# Patient Record
Sex: FEMALE | Race: WHITE | NOT HISPANIC OR LATINO | Employment: OTHER | URBAN - METROPOLITAN AREA
[De-identification: names, ages, dates, MRNs, and addresses within clinical notes are randomized per-mention and may not be internally consistent; named-entity substitution may affect disease eponyms.]

---

## 2017-01-11 DIAGNOSIS — E11.8 TYPE 2 DIABETES MELLITUS WITH COMPLICATION (HCC): ICD-10-CM

## 2017-01-11 RX ORDER — HUMAN INSULIN 100 [IU]/ML
INJECTION, SOLUTION SUBCUTANEOUS
Qty: 3 ML | Refills: 2 | Status: SHIPPED | OUTPATIENT
Start: 2017-01-11 | End: 2017-03-31 | Stop reason: SDUPTHER

## 2017-02-28 RX ORDER — ACETAMINOPHEN AND CODEINE PHOSPHATE 300; 30 MG/1; MG/1
TABLET ORAL
Qty: 30 TABLET | Refills: 2 | OUTPATIENT
Start: 2017-02-28 | End: 2017-06-07 | Stop reason: SDUPTHER

## 2017-03-29 ENCOUNTER — TELEPHONE (OUTPATIENT)
Dept: ENDOCRINOLOGY | Age: 72
End: 2017-03-29

## 2017-03-29 DIAGNOSIS — Z79.4 TYPE 2 DIABETES MELLITUS WITH COMPLICATION, WITH LONG-TERM CURRENT USE OF INSULIN (HCC): ICD-10-CM

## 2017-03-29 DIAGNOSIS — E11.8 TYPE 2 DIABETES MELLITUS WITH COMPLICATION, WITH LONG-TERM CURRENT USE OF INSULIN (HCC): ICD-10-CM

## 2017-03-29 NOTE — TELEPHONE ENCOUNTER
----- Message from Lupis Bernard sent at 3/29/2017 11:30 AM EDT -----  Patient told pharmacy  that her novolin Rand N have been updated and [facility] is calling to find out what those changes are.   [phone] (Phone)  [phone] (Fax)      SCRIPT SENT

## 2017-03-31 DIAGNOSIS — E11.8 TYPE 2 DIABETES MELLITUS WITH COMPLICATION, WITH LONG-TERM CURRENT USE OF INSULIN (HCC): ICD-10-CM

## 2017-03-31 DIAGNOSIS — Z79.4 TYPE 2 DIABETES MELLITUS WITH COMPLICATION, WITH LONG-TERM CURRENT USE OF INSULIN (HCC): ICD-10-CM

## 2017-04-11 ENCOUNTER — RESULTS ENCOUNTER (OUTPATIENT)
Dept: INTERNAL MEDICINE | Facility: CLINIC | Age: 72
End: 2017-04-11

## 2017-04-11 DIAGNOSIS — N18.30 CHRONIC KIDNEY DISEASE, STAGE III (MODERATE) (HCC): ICD-10-CM

## 2017-04-11 DIAGNOSIS — I10 ESSENTIAL HYPERTENSION: ICD-10-CM

## 2017-04-11 DIAGNOSIS — IMO0002 UNCONTROLLED TYPE 2 DIABETES MELLITUS WITH DIABETIC POLYNEUROPATHY, WITH LONG-TERM CURRENT USE OF INSULIN: ICD-10-CM

## 2017-04-11 DIAGNOSIS — E78.2 MIXED HYPERLIPIDEMIA: ICD-10-CM

## 2017-04-17 RX ORDER — ISOSORBIDE MONONITRATE 60 MG/1
TABLET, EXTENDED RELEASE ORAL
Qty: 30 TABLET | Refills: 3 | Status: SHIPPED | OUTPATIENT
Start: 2017-04-17 | End: 2017-08-26 | Stop reason: SDUPTHER

## 2017-04-18 LAB
ALBUMIN SERPL-MCNC: 4.4 G/DL (ref 3.5–4.8)
ALBUMIN/CREAT UR: 148.2 MG/G CREAT (ref 0–30)
ALBUMIN/GLOB SERPL: 1.8 {RATIO} (ref 1.2–2.2)
ALP SERPL-CCNC: 106 IU/L (ref 39–117)
ALT SERPL-CCNC: 18 IU/L (ref 0–32)
AST SERPL-CCNC: 23 IU/L (ref 0–40)
BASOPHILS # BLD AUTO: 0 X10E3/UL (ref 0–0.2)
BASOPHILS NFR BLD AUTO: 0 %
BILIRUB SERPL-MCNC: 0.5 MG/DL (ref 0–1.2)
BUN SERPL-MCNC: 23 MG/DL (ref 8–27)
BUN/CREAT SERPL: 25 (ref 12–28)
CALCIUM SERPL-MCNC: 9.6 MG/DL (ref 8.7–10.3)
CHLORIDE SERPL-SCNC: 103 MMOL/L (ref 96–106)
CHOLEST SERPL-MCNC: 153 MG/DL (ref 100–199)
CO2 SERPL-SCNC: 25 MMOL/L (ref 18–29)
CREAT SERPL-MCNC: 0.91 MG/DL (ref 0.57–1)
CREAT UR-MCNC: 116.7 MG/DL
EOSINOPHIL # BLD AUTO: 0.1 X10E3/UL (ref 0–0.4)
EOSINOPHIL NFR BLD AUTO: 2 %
ERYTHROCYTE [DISTWIDTH] IN BLOOD BY AUTOMATED COUNT: 14.8 % (ref 12.3–15.4)
GLOBULIN SER CALC-MCNC: 2.5 G/DL (ref 1.5–4.5)
GLUCOSE SERPL-MCNC: 151 MG/DL (ref 65–99)
HCT VFR BLD AUTO: 37 % (ref 34–46.6)
HDLC SERPL-MCNC: 36 MG/DL
HGB BLD-MCNC: 12 G/DL (ref 11.1–15.9)
IMM GRANULOCYTES # BLD: 0 X10E3/UL (ref 0–0.1)
IMM GRANULOCYTES NFR BLD: 0 %
LDLC SERPL CALC-MCNC: 75 MG/DL (ref 0–99)
LYMPHOCYTES # BLD AUTO: 1.7 X10E3/UL (ref 0.7–3.1)
LYMPHOCYTES NFR BLD AUTO: 27 %
Lab: NORMAL
Lab: NORMAL
MCH RBC QN AUTO: 27.5 PG (ref 26.6–33)
MCHC RBC AUTO-ENTMCNC: 32.4 G/DL (ref 31.5–35.7)
MCV RBC AUTO: 85 FL (ref 79–97)
MICROALBUMIN UR-MCNC: 173 UG/ML
MONOCYTES # BLD AUTO: 0.5 X10E3/UL (ref 0.1–0.9)
MONOCYTES NFR BLD AUTO: 9 %
NEUTROPHILS # BLD AUTO: 3.9 X10E3/UL (ref 1.4–7)
NEUTROPHILS NFR BLD AUTO: 62 %
PLATELET # BLD AUTO: 217 X10E3/UL (ref 150–379)
POTASSIUM SERPL-SCNC: 4.3 MMOL/L (ref 3.5–5.2)
PROT SERPL-MCNC: 6.9 G/DL (ref 6–8.5)
RBC # BLD AUTO: 4.37 X10E6/UL (ref 3.77–5.28)
SODIUM SERPL-SCNC: 142 MMOL/L (ref 134–144)
TRIGL SERPL-MCNC: 209 MG/DL (ref 0–149)
VLDLC SERPL CALC-MCNC: 42 MG/DL (ref 5–40)
WBC # BLD AUTO: 6.3 X10E3/UL (ref 3.4–10.8)

## 2017-04-19 LAB
HBA1C MFR BLD: 8.3 % (ref 4.8–5.6)
WRITTEN AUTHORIZATION: NORMAL

## 2017-04-24 ENCOUNTER — OFFICE VISIT (OUTPATIENT)
Dept: INTERNAL MEDICINE | Facility: CLINIC | Age: 72
End: 2017-04-24

## 2017-04-24 VITALS
HEIGHT: 62 IN | SYSTOLIC BLOOD PRESSURE: 130 MMHG | RESPIRATION RATE: 18 BRPM | WEIGHT: 210 LBS | DIASTOLIC BLOOD PRESSURE: 70 MMHG | OXYGEN SATURATION: 99 % | HEART RATE: 91 BPM | BODY MASS INDEX: 38.64 KG/M2

## 2017-04-24 DIAGNOSIS — J06.9 ACUTE URI: ICD-10-CM

## 2017-04-24 DIAGNOSIS — N18.30 CHRONIC KIDNEY DISEASE, STAGE III (MODERATE) (HCC): ICD-10-CM

## 2017-04-24 DIAGNOSIS — E78.2 MIXED HYPERLIPIDEMIA: ICD-10-CM

## 2017-04-24 DIAGNOSIS — IMO0002 UNCONTROLLED TYPE II DIABETES MELLITUS WITH NEPHROPATHY: ICD-10-CM

## 2017-04-24 DIAGNOSIS — I10 ESSENTIAL HYPERTENSION: ICD-10-CM

## 2017-04-24 DIAGNOSIS — D17.71 ANGIOMYOLIPOMA OF KIDNEY: ICD-10-CM

## 2017-04-24 DIAGNOSIS — Z00.00 MEDICARE ANNUAL WELLNESS VISIT, SUBSEQUENT: Primary | ICD-10-CM

## 2017-04-24 PROCEDURE — 99214 OFFICE O/P EST MOD 30 MIN: CPT | Performed by: INTERNAL MEDICINE

## 2017-04-24 PROCEDURE — G0439 PPPS, SUBSEQ VISIT: HCPCS | Performed by: INTERNAL MEDICINE

## 2017-04-24 NOTE — PROGRESS NOTES
Medicare Annual Wellness Visit    Chief Complaint:  Annual Exam (SUB AWV, URI started 2 weeks ago, is not coughing anything up, but can feel a lot of chest congestion (2nd time within a month she has been sick)); Diabetes; Chronic Kidney Disease; Hyperlipidemia; and Hypertension      History of Present Illness    Denisse Chavez is a 71 y.o. female who presents for an Annual Wellness Visit. In addition, we addressed the following health issues:    Mammo:6/15/16  Pap:n/a  C-scope:6/24/15  Dental Exam: she hasn't been to a dentist in years.  She has both upper and lower dentures.She only wears the uppers.    Eye Exam: Dr. Larkin last month  Exercise: not much.  She takes the dogs out 6-7 times a day.  Advanced Care Planning:  has an advance directive - a copy has been provided and is in file   Immunization History   Administered Date(s) Administered   • Influenza Split High Dose Preservative Free IM 10/13/2016   • Pneumococcal Conjugate 13-Valent 03/18/2015   • Pneumococcal Polysaccharide 04/13/2016   • Tdap 10/13/2016     Denisse is here for follow up on her DM, CKD, HLD, HTN.  She started two days ago with chest congestion.  She feels chilled.  She's not more short of breath than normal.  She's not couging anything up yet.  She does have sinus congestion.  She says this all just feels like a virus.   She is seeing Dr. Nunez for her DM.  She saw Dr. Eagle in November for the renal masses.  She did a sleep study and is now using CPAP.  Dr. Tang referred her for sleep study.  No new conerns.      Review of Systems   Constitutional: Positive for chills. Negative for diaphoresis, fatigue and fever.   HENT: Positive for congestion, sinus pressure and voice change (acutely). Negative for hearing loss, sore throat, tinnitus and trouble swallowing.    Eyes: Negative for visual disturbance.   Respiratory: Positive for cough and chest tightness (congestion). Negative for shortness of breath.    Cardiovascular:  Positive for leg swelling (chronic). Negative for chest pain and palpitations.   Gastrointestinal: Negative for abdominal distention, abdominal pain, anal bleeding, blood in stool, constipation, diarrhea, nausea and vomiting.   Endocrine: Negative for polydipsia and polyuria.   Genitourinary: Negative for dysuria and hematuria.   Musculoskeletal: Positive for arthralgias (hands), back pain and myalgias (from her back.  radiates down the back of her legs).   Skin: Negative for rash and wound.   Neurological: Negative for dizziness, syncope, light-headedness and headaches.   Hematological: Negative for adenopathy. Does not bruise/bleed easily.   Psychiatric/Behavioral: Negative for confusion and dysphoric mood. The patient is not nervous/anxious.        Past Medical History:   Diagnosis Date   • Angiomyolipoma of kidney 3/30/2016   • CAD (coronary artery disease)     MI in 1996, treated medically   • Hyperlipidemia    • Hypertension    • Low back pain    • Mass of ovary 3/30/2016   • Morbid obesity    • Myocardial infarction     x 2 in 1996   • Type 2 diabetes mellitus    • Uterine leiomyoma 3/30/2016       Past Surgical History:   Procedure Laterality Date   • CATARACT EXTRACTION      X2    • HERNIA REPAIR     • HYSTERECTOMY     • TONSILLECTOMY         Social History     Social History   • Marital status: Single     Spouse name: N/A   • Number of children: 3   • Years of education: N/A     Occupational History   • retired from [employer]      Social History Main Topics   • Smoking status: Former Smoker     Packs/day: 0.25     Years: 2.00     Types: Cigarettes     Quit date: 1965   • Smokeless tobacco: Never Used      Comment: LIGHT USAGE   • Alcohol use No   • Drug use: No   • Sexual activity: Defer     Other Topics Concern   • Not on file     Social History Narrative       Family History   Problem Relation Age of Onset   • Diabetes Mother    • Heart attack Mother    • Hypertension Mother    • Hypothyroidism Mother    •  Diabetes type II Son        Allergies   Allergen Reactions   • Ace Inhibitors Other (See Comments)     Hyperkalemia/ROB   • Angiotensin Receptor Blockers Other (See Comments)     Pt unable to remember   • Sulfa Antibiotics Itching     rash       Current Outpatient Prescriptions on File Prior to Visit   Medication Sig Dispense Refill   • acetaminophen-codeine (TYLENOL #3) 300-30 MG per tablet TAKE ONE TABLET BY MOUTH ONCE DAILY AS NEEDED FOR PAIN 30 tablet 2   • amLODIPine (NORVASC) 5 MG tablet Take 1 tablet by mouth daily. 30 tablet 11   • aspirin 81 MG tablet Take  by mouth daily.     • docusate sodium (COLACE) 100 MG capsule Take 100 mg by mouth 2 (two) times a day.     • furosemide (LASIX) 40 MG tablet TAKE ONE TABLET BY MOUTH ONCE DAILY 90 tablet 3   • insulin NPH (NOVOLIN N RELION) 100 UNIT/ML injection Inject 108 Units under the skin Daily. 58 UNITS IN THE AM AND 50 UNITS IN THE PM 4 each 3   • insulin regular (NOVOLIN R RELION) 100 UNIT/ML injection TO TAKE 50 UNITS IN THE AM AND 48 UNITS IN THE PM 3 each 2   • isosorbide mononitrate (IMDUR) 60 MG 24 hr tablet TAKE ONE TABLET BY MOUTH ONCE DAILY 30 tablet 3   • Misc. Devices (ROLLATOR) misc 1 Units as needed (for walking). 1 each 0   • simvastatin (ZOCOR) 40 MG tablet TAKE ONE-HALF TABLET BY MOUTH ONCE DAILY 45 tablet 3   • [DISCONTINUED] Multiple Vitamin (MULTIVITAMINS PO) Take 1 tablet by mouth daily.       No current facility-administered medications on file prior to visit.        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   • Type II diabetes mellitus with neurological manifestations, uncontrolled   • Uncontrolled type 2 diabetes mellitus with background retinopathy   • Hyperinsulinism   • Abnormal weight gain   • Hyperlipidemia   • Essential hypertension   • Edema of lower extremity   • Angiomyolipoma of  "kidney   • Coronary artery disease without angina pectoris   • Memory changes   • Left hip pain   • Left-sided low back pain without sciatica   • Lumbar spinal stenosis   • Encounter for long-term (current) use of insulin   • Obstructive sleep apnea on autoCPAP   • Chronic kidney disease, stage III (moderate)       Health Risk Assessment/Depression Screen/Functional and Cognitive Screening:   The patient has completed a Health Risk Assessment & Depression screen. These have been reviewed with them and have been scanned as a separate documents.    Age-appropriate Screening Schedule:  Refer to the list below for future screening recommendations based on patient's age. Orders for these recommended tests are listed in the plan section. The patient has been provided with a written plan.     I have reviewed their problem list, past medical history, family history, social history, and surgical history.     Vitals:    04/24/17 1255   BP: 130/70   Pulse: 91   Resp: 18   SpO2: 99%   Weight: 210 lb (95.3 kg)   Height: 62\" (157.5 cm)   PainSc: 6  Comment: Pt gets epidural's done regurlary through the order of Dr. Garrett.   PainLoc: Back       Body mass index is 38.41 kg/(m^2).    Physical Exam   Constitutional: She is oriented to person, place, and time. She appears well-developed and well-nourished. No distress.   HENT:   Head: Normocephalic and atraumatic.   Nose: Nose normal.   Mouth/Throat: Oropharynx is clear and moist.   Eyes: Conjunctivae and EOM are normal. Pupils are equal, round, and reactive to light. No scleral icterus.   Neck: Normal range of motion. Neck supple. No thyromegaly present.   Cardiovascular: Normal rate, regular rhythm and normal heart sounds.  Exam reveals no gallop and no friction rub.    No murmur heard.  Pulses:       Carotid pulses are 2+ on the right side, and 2+ on the left side.       Femoral pulses are 2+ on the right side, and 2+ on the left side.       Dorsalis pedis pulses are 2+ on the " right side, and 2+ on the left side.        Posterior tibial pulses are 2+ on the right side, and 2+ on the left side.   Pulmonary/Chest: Effort normal and breath sounds normal. No respiratory distress. She has no wheezes. She has no rales. Right breast exhibits no mass and no nipple discharge. Left breast exhibits no mass and no nipple discharge.   Abdominal: Soft. Bowel sounds are normal. She exhibits no distension and no mass. There is no tenderness.   Musculoskeletal: Normal range of motion. She exhibits edema (compression hose in place yolande).   Lymphadenopathy:     She has no cervical adenopathy.     She has no axillary adenopathy.        Right: No inguinal and no supraclavicular adenopathy present.        Left: No inguinal and no supraclavicular adenopathy present.   Neurological: She is alert and oriented to person, place, and time. She has normal reflexes. No cranial nerve deficit.   Skin: Skin is warm and dry.   Psychiatric: She has a normal mood and affect. Her speech is normal and behavior is normal. Judgment and thought content normal. Cognition and memory are normal.   Vitals reviewed.      Results for orders placed or performed in visit on 04/11/17   Comprehensive Metabolic Panel   Result Value Ref Range    Glucose 151 (H) 65 - 99 mg/dL    BUN 23 8 - 27 mg/dL    Creatinine 0.91 0.57 - 1.00 mg/dL    eGFR Non African Am 64 >59 mL/min/1.73    eGFR African Am 73 >59 mL/min/1.73    BUN/Creatinine Ratio 25 12 - 28    Sodium 142 134 - 144 mmol/L    Potassium 4.3 3.5 - 5.2 mmol/L    Chloride 103 96 - 106 mmol/L    Total CO2 25 18 - 29 mmol/L    Calcium 9.6 8.7 - 10.3 mg/dL    Total Protein 6.9 6.0 - 8.5 g/dL    Albumin 4.4 3.5 - 4.8 g/dL    Globulin 2.5 1.5 - 4.5 g/dL    A/G Ratio 1.8 1.2 - 2.2    Total Bilirubin 0.5 0.0 - 1.2 mg/dL    Alkaline Phosphatase 106 39 - 117 IU/L    AST (SGOT) 23 0 - 40 IU/L    ALT (SGPT) 18 0 - 32 IU/L   Lipid Panel   Result Value Ref Range    Total Cholesterol 153 100 - 199 mg/dL     Triglycerides 209 (H) 0 - 149 mg/dL    HDL Cholesterol 36 (L) >39 mg/dL    VLDL Cholesterol 42 (H) 5 - 40 mg/dL    LDL Cholesterol  75 0 - 99 mg/dL   Microalbumin / Creatinine Urine Ratio   Result Value Ref Range    Creatinine, Urine 116.7 Not Estab. mg/dL    Microalbumin, Urine 173.0 Not Estab. ug/mL    Microalbumin/Creatinine Ratio Urine 148.2 (H) 0.0 - 30.0 mg/g creat   Verbal Order   Result Value Ref Range    See below: Comment:     Additional Test(s) Requested Comment:    Hemoglobin A1c   Result Value Ref Range    Hemoglobin A1C 8.3 (H) 4.8 - 5.6 %   Written Authorization   Result Value Ref Range    Written Authorization Comment    CBC & Differential   Result Value Ref Range    WBC 6.3 3.4 - 10.8 x10E3/uL    RBC 4.37 3.77 - 5.28 x10E6/uL    Hemoglobin 12.0 11.1 - 15.9 g/dL    Hematocrit 37.0 34.0 - 46.6 %    MCV 85 79 - 97 fL    MCH 27.5 26.6 - 33.0 pg    MCHC 32.4 31.5 - 35.7 g/dL    RDW 14.8 12.3 - 15.4 %    Platelets 217 150 - 379 x10E3/uL    Neutrophil Rel % 62 %    Lymphocyte Rel % 27 %    Monocyte Rel % 9 %    Eosinophil Rel % 2 %    Basophil Rel % 0 %    Neutrophils Absolute 3.9 1.4 - 7.0 x10E3/uL    Lymphocytes Absolute 1.7 0.7 - 3.1 x10E3/uL    Monocytes Absolute 0.5 0.1 - 0.9 x10E3/uL    Eosinophils Absolute 0.1 0.0 - 0.4 x10E3/uL    Basophils Absolute 0.0 0.0 - 0.2 x10E3/uL    Immature Granulocyte Rel % 0 %    Immature Grans Absolute 0.0 0.0 - 0.1 x10E3/uL       Assessment & Plan:    Diagnoses and all orders for this visit:    Medicare annual wellness visit, subsequent    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  She is up to date on health maintenance except for zostavax.  She will look into getting this.  Her bp was ok on exam today.  Her A1c is not controlled but this is managed by endo.  Her lipids are adequately controlled.  Her TG have improved some as her A1c as her bs has improved.  She has yearly follow ups with Dr. Eagle for her angiomyolipomas.  Her labs all look very good.  She also follows with Dr. Montenegro for her CKD.  Her Creatinine looks great right now.  She does have microalbuminuria and is unable to take ACE or ARB.  Her URI appears to be all viral.  Nothing on exam was concerning.        Follow Up:  Return in about 6 months (around 10/24/2017) for Next scheduled follow up, with nonfasting labs prior.     An After Visit Summary and PPPS with all of these plans were given to the patient.

## 2017-04-24 NOTE — PATIENT INSTRUCTIONS
Medicare Wellness  Personal Prevention Plan of Service     Date of Office Visit:  2017  Encounter Provider:  Agueda Rodriguez MD  Place of Service:  [facility] INTERNAL MEDICINE  Patient Name: Denisse Chavez  :  1945    As part of the Medicare Wellness portion of your visit today, we are providing you with this personalized preventive plan of services (PPPS). This plan is based upon recommendations of the [place] Preventive Services Task Force (USPSTF) and the Advisory Committee on Immunization Practices (ACIP).    This lists the preventive care services that should be considered, and provides dates of when you are due. Items listed as completed are up-to-date and do not require any further intervention.    Health Maintenance   Topic Date Due   • ZOSTER VACCINE  2016   • DIABETIC FOOT EXAM  10/13/2017   • HEMOGLOBIN A1C  10/17/2017   • DIABETIC EYE EXAM  2018   • LIPID PANEL  2018   • URINE MICROALBUMIN  2018   • MEDICARE ANNUAL WELLNESS  2018   • MAMMOGRAM  06/15/2018   • COLONOSCOPY  2018   • TDAP/TD VACCINES (2 - Td) 10/13/2026   • HEPATITIS C SCREENING  Completed   • INFLUENZA VACCINE  Completed   • PNEUMOCOCCAL VACCINES (65+ LOW/MEDIUM RISK)  Completed       No orders of the defined types were placed in this encounter.      Return in about 6 months (around 10/24/2017) for Next scheduled follow up, with nonfasting labs prior.

## 2017-04-26 ENCOUNTER — HOSPITAL ENCOUNTER (OUTPATIENT)
Dept: SLEEP MEDICINE | Facility: HOSPITAL | Age: 72
Discharge: HOME | End: 2017-04-26

## 2017-04-26 PROCEDURE — 99213 OFFICE O/P EST LOW 20 MIN: CPT | Performed by: INTERNAL MEDICINE

## 2017-04-29 NOTE — PROGRESS NOTES
Follow Up [facility] Note April 26, 2017      Patient Care Team:  Agueda Rodriguez MD as PCP - General (Internal Medicine)  Wally TYLER MD as PCP - Claims Attributed  Wally TYLER MD as Consulting Physician (Endocrinology)  Warner Tang MD as Consulting Physician (Cardiology)  Sergio Eagle MD as Consulting Physician (Urology)  Alfredito Mueller MD as Consulting Physician (Sleep Medicine)    Chief Complaint:  SAWYER     Interval History:   The patient was last seen by me in December 2016.  Her auto CPAP was adjusted.  She states she is improved.  She goes to bed at 1:10 AM and awakens at 8:30 AM.  She awakens once for the bathroom.  Her [place] Sleepiness Scale is normal at 0.    Review of Systems:  Recorded on the Sleep Questionnaire.  Unremarkable except for nasal congestion and occasional cough.    Social History:  She quit smoking in 1970.  No alcohol.  6 cups of tea a day.    Allergies:  Reviewed.     Medication Review:  Reviewed.      Vital Signs:  Height 61 inches and weight 210 and she is obese with a body mass index of 39-40.    Physical Exam:    Constitutional:  Well developed white female and appears in no apparent distress.  Awake & oriented times 3.  Normal mood with normal recent and remote memory and normal judgement.  Eyes:  Conjunctivae normal.  Oropharynx:  moist mucous membranes without exudate and a large tongue and normal uvula and patent posterior pharyngeal opening and class II-III MP airway.      Results Review:  DME is [facility] and she uses a fullface mask.  Downloads between October 28, 2016 and April 25, 2017 compliances 89% and average usage is 6 hours and 40 minutes and average AHI is mildly abnormal at 6.4 without significant leak and average auto CPAP pressure is 13.1 and her auto CPAP is 9-15.       Impression:   Obstructive sleep apnea nearly adequately treated with auto titrating CPAP with good compliance and usage and no  complaints of hypersomnolence.      Plan:  Good sleep hygiene measures should be maintained.  Weight loss would be beneficial in this patient who is obese by BMI.    The patient will continue her auto CPAP.  Pressures will be changed to auto CPAP between 9 and 17.    The patient will follow up one year.      Alfredito Mueller MD  04/29/17  10:59 AM

## 2017-05-05 ENCOUNTER — OFFICE VISIT (OUTPATIENT)
Dept: ENDOCRINOLOGY | Age: 72
End: 2017-05-05

## 2017-05-05 VITALS
DIASTOLIC BLOOD PRESSURE: 72 MMHG | HEART RATE: 102 BPM | HEIGHT: 62 IN | BODY MASS INDEX: 38.46 KG/M2 | OXYGEN SATURATION: 98 % | WEIGHT: 209 LBS | SYSTOLIC BLOOD PRESSURE: 118 MMHG

## 2017-05-05 DIAGNOSIS — R63.5 ABNORMAL WEIGHT GAIN: ICD-10-CM

## 2017-05-05 DIAGNOSIS — IMO0002 UNCONTROLLED TYPE 2 DIABETES MELLITUS WITH COMPLICATION, WITH LONG-TERM CURRENT USE OF INSULIN: Primary | ICD-10-CM

## 2017-05-05 DIAGNOSIS — Z79.4 ENCOUNTER FOR LONG-TERM (CURRENT) USE OF INSULIN (HCC): ICD-10-CM

## 2017-05-05 DIAGNOSIS — Z79.4 TYPE 2 DIABETES MELLITUS WITH COMPLICATION, WITH LONG-TERM CURRENT USE OF INSULIN (HCC): ICD-10-CM

## 2017-05-05 DIAGNOSIS — IMO0002 UNCONTROLLED TYPE II DIABETES MELLITUS WITH NEPHROPATHY: ICD-10-CM

## 2017-05-05 DIAGNOSIS — IMO0002 UNCONTROLLED TYPE 2 DIABETES MELLITUS WITH DIABETIC NEUROPATHY, WITH LONG-TERM CURRENT USE OF INSULIN: ICD-10-CM

## 2017-05-05 DIAGNOSIS — E11.8 TYPE 2 DIABETES MELLITUS WITH COMPLICATION, WITH LONG-TERM CURRENT USE OF INSULIN (HCC): ICD-10-CM

## 2017-05-05 DIAGNOSIS — E16.1 HYPERINSULINISM: ICD-10-CM

## 2017-05-05 DIAGNOSIS — IMO0002 UNCONTROLLED TYPE 2 DIABETES MELLITUS WITH BACKGROUND RETINOPATHY: ICD-10-CM

## 2017-05-05 DIAGNOSIS — N18.30 CHRONIC KIDNEY DISEASE, STAGE III (MODERATE) (HCC): ICD-10-CM

## 2017-05-05 PROCEDURE — 99214 OFFICE O/P EST MOD 30 MIN: CPT | Performed by: INTERNAL MEDICINE

## 2017-05-10 ENCOUNTER — ANESTHESIA EVENT (OUTPATIENT)
Dept: PAIN MEDICINE | Facility: HOSPITAL | Age: 72
End: 2017-05-10

## 2017-05-10 ENCOUNTER — HOSPITAL ENCOUNTER (OUTPATIENT)
Dept: PAIN MEDICINE | Facility: HOSPITAL | Age: 72
Discharge: HOME | End: 2017-05-10
Admitting: NEUROLOGICAL SURGERY

## 2017-05-10 ENCOUNTER — HOSPITAL ENCOUNTER (OUTPATIENT)
Dept: GENERAL RADIOLOGY | Facility: HOSPITAL | Age: 72
Discharge: HOME | End: 2017-05-10

## 2017-05-10 ENCOUNTER — ANESTHESIA (OUTPATIENT)
Dept: PAIN MEDICINE | Facility: HOSPITAL | Age: 72
End: 2017-05-10

## 2017-05-10 VITALS
TEMPERATURE: 99.1 F | RESPIRATION RATE: 16 BRPM | OXYGEN SATURATION: 100 % | DIASTOLIC BLOOD PRESSURE: 67 MMHG | SYSTOLIC BLOOD PRESSURE: 160 MMHG | HEART RATE: 71 BPM

## 2017-05-10 DIAGNOSIS — R52 PAIN: ICD-10-CM

## 2017-05-10 LAB — GLUCOSE BLDC GLUCOMTR-MCNC: 115 MG/DL (ref 70–130)

## 2017-05-10 PROCEDURE — 82962 GLUCOSE BLOOD TEST: CPT

## 2017-05-10 PROCEDURE — 77003 FLUOROGUIDE FOR SPINE INJECT: CPT

## 2017-05-10 PROCEDURE — 25010000002 METHYLPREDNISOLONE PER 80 MG: Performed by: ANESTHESIOLOGY

## 2017-05-10 PROCEDURE — C1755 CATHETER, INTRASPINAL: HCPCS

## 2017-05-10 RX ORDER — BUPIVACAINE HYDROCHLORIDE 2.5 MG/ML
30 INJECTION, SOLUTION EPIDURAL; INFILTRATION; INTRACAUDAL ONCE
Status: DISCONTINUED | OUTPATIENT
Start: 2017-05-10 | End: 2017-05-11 | Stop reason: HOSPADM

## 2017-05-10 RX ORDER — FENTANYL CITRATE 50 UG/ML
50 INJECTION, SOLUTION INTRAMUSCULAR; INTRAVENOUS
Status: DISCONTINUED | OUTPATIENT
Start: 2017-05-10 | End: 2017-05-11 | Stop reason: HOSPADM

## 2017-05-10 RX ORDER — SODIUM CHLORIDE 0.9 % (FLUSH) 0.9 %
1-10 SYRINGE (ML) INJECTION AS NEEDED
Status: DISCONTINUED | OUTPATIENT
Start: 2017-05-10 | End: 2017-05-11 | Stop reason: HOSPADM

## 2017-05-10 RX ORDER — LIDOCAINE HYDROCHLORIDE 10 MG/ML
1 INJECTION, SOLUTION INFILTRATION; PERINEURAL ONCE
Status: DISCONTINUED | OUTPATIENT
Start: 2017-05-10 | End: 2017-05-11 | Stop reason: HOSPADM

## 2017-05-10 RX ORDER — METHYLPREDNISOLONE ACETATE 80 MG/ML
80 INJECTION, SUSPENSION INTRA-ARTICULAR; INTRALESIONAL; INTRAMUSCULAR; SOFT TISSUE ONCE
Status: COMPLETED | OUTPATIENT
Start: 2017-05-10 | End: 2017-05-10

## 2017-05-10 RX ORDER — MIDAZOLAM HYDROCHLORIDE 1 MG/ML
1 INJECTION INTRAMUSCULAR; INTRAVENOUS
Status: DISCONTINUED | OUTPATIENT
Start: 2017-05-10 | End: 2017-05-11 | Stop reason: HOSPADM

## 2017-05-10 RX ADMIN — METHYLPREDNISOLONE ACETATE 80 MG: 80 INJECTION, SUSPENSION INTRA-ARTICULAR; INTRALESIONAL; INTRAMUSCULAR; SOFT TISSUE at 09:29

## 2017-06-07 DIAGNOSIS — I10 ESSENTIAL HYPERTENSION: ICD-10-CM

## 2017-06-07 RX ORDER — ACETAMINOPHEN AND CODEINE PHOSPHATE 300; 30 MG/1; MG/1
TABLET ORAL
Qty: 30 TABLET | Refills: 2 | OUTPATIENT
Start: 2017-06-07 | End: 2017-09-15 | Stop reason: SDUPTHER

## 2017-06-07 RX ORDER — AMLODIPINE BESYLATE 5 MG/1
TABLET ORAL
Qty: 30 TABLET | Refills: 0 | Status: SHIPPED | OUTPATIENT
Start: 2017-06-07 | End: 2017-06-28 | Stop reason: SDUPTHER

## 2017-06-07 RX ORDER — ACETAMINOPHEN AND CODEINE PHOSPHATE 300; 30 MG/1; MG/1
TABLET ORAL
Qty: 30 TABLET | Refills: 0 | OUTPATIENT
Start: 2017-06-07

## 2017-06-28 ENCOUNTER — OFFICE VISIT (OUTPATIENT)
Dept: CARDIOLOGY | Facility: CLINIC | Age: 72
End: 2017-06-28

## 2017-06-28 VITALS
WEIGHT: 216 LBS | HEART RATE: 85 BPM | DIASTOLIC BLOOD PRESSURE: 60 MMHG | SYSTOLIC BLOOD PRESSURE: 166 MMHG | HEIGHT: 62 IN | BODY MASS INDEX: 39.75 KG/M2

## 2017-06-28 DIAGNOSIS — I25.10 CORONARY ARTERY DISEASE INVOLVING NATIVE CORONARY ARTERY OF NATIVE HEART WITHOUT ANGINA PECTORIS: Primary | ICD-10-CM

## 2017-06-28 DIAGNOSIS — I87.2 CHRONIC VENOUS INSUFFICIENCY: ICD-10-CM

## 2017-06-28 DIAGNOSIS — I10 ESSENTIAL HYPERTENSION: ICD-10-CM

## 2017-06-28 DIAGNOSIS — Z79.4 TYPE 2 DIABETES MELLITUS WITH COMPLICATION, WITH LONG-TERM CURRENT USE OF INSULIN (HCC): ICD-10-CM

## 2017-06-28 DIAGNOSIS — E11.8 TYPE 2 DIABETES MELLITUS WITH COMPLICATION, WITH LONG-TERM CURRENT USE OF INSULIN (HCC): ICD-10-CM

## 2017-06-28 DIAGNOSIS — IMO0001 AORTIC SCLEROSIS: ICD-10-CM

## 2017-06-28 PROCEDURE — 93000 ELECTROCARDIOGRAM COMPLETE: CPT | Performed by: INTERNAL MEDICINE

## 2017-06-28 PROCEDURE — 99213 OFFICE O/P EST LOW 20 MIN: CPT | Performed by: INTERNAL MEDICINE

## 2017-06-28 RX ORDER — AMLODIPINE BESYLATE 10 MG/1
10 TABLET ORAL DAILY
Qty: 90 TABLET | Refills: 3 | Status: SHIPPED | OUTPATIENT
Start: 2017-06-28 | End: 2018-07-02 | Stop reason: SDUPTHER

## 2017-06-28 NOTE — PROGRESS NOTES
Date of Office Visit: 2017  Encounter Provider: Warner Tang MD  Place of Service: [facility] CARDIOLOGY  Patient Name: Denisse Chavez  :1945    Chief Complaint   Patient presents with   • Coronary Artery Disease     1 YR FOLLOW UP    :     HPI: Denisse Chavez is a 71 y.o. female who presents today to follow up.  She established care with me in May 2016.    She suffered a myocardial infarction in [place] in . She underwent cardiac catheterization and was reportedly found to have nonobstructive disease for which medical therapy was recommended. Her anginal symptoms were chest heaviness and nausea. In 2016, an echo was checked which showed normal LV systolic function, grade II diastolic dysfunction, aortic valve calcification, but no aortic stenosis. There was hypokinesis of the lateral wall, so I ordered a PET.  This revealed a small-medium sized lateral infarct without ischemia.      She has chronic lower extremity edema; she does use leg wraps.  Her SBP has been in the 160's at home.  I referred her for a sleep study, and she was found to have SAWYER and is on PAP therapy.    She denies angina, dyspnea, orthopnea, PND, or palpitations.    Past Medical History:   Diagnosis Date   • Angiomyolipoma of kidney 3/30/2016   • CAD (coronary artery disease)     MI in , treated medically.  PET 2016 with small-medium sized lateral infarct, no ischemia.  This wall motion abnormality was seen on echo as well.   • Chronic venous insufficiency    • Hyperlipidemia    • Hypertension    • Low back pain    • Mass of ovary 3/30/2016   • Morbid obesity    • Type 2 diabetes mellitus    • Uterine leiomyoma 3/30/2016       Past Surgical History:   Procedure Laterality Date   • CATARACT EXTRACTION      X2    • HERNIA REPAIR     • HYSTERECTOMY     • TONSILLECTOMY         Social History     Social History   • Marital status: Single     Spouse name: N/A   • Number of children: 3    • Years of education: N/A     Occupational History   • retired from [employer]      Social History Main Topics   • Smoking status: Former Smoker     Packs/day: 0.25     Years: 2.00     Types: Cigarettes     Quit date: 1965   • Smokeless tobacco: Never Used      Comment: LIGHT USAGE   • Alcohol use No   • Drug use: No   • Sexual activity: Defer     Other Topics Concern   • Not on file     Social History Narrative       Family History   Problem Relation Age of Onset   • Diabetes Mother    • Heart attack Mother    • Hypertension Mother    • Hypothyroidism Mother    • Diabetes type II Son        Review of Systems   Cardiovascular: Positive for leg swelling.   Endocrine: Positive for polyuria.   Musculoskeletal: Positive for joint pain and joint swelling.   All other systems reviewed and are negative.      Allergies   Allergen Reactions   • Ace Inhibitors Other (See Comments)     Hyperkalemia/ROB   • Angiotensin Receptor Blockers Other (See Comments)     Pt unable to remember   • Sulfa Antibiotics Itching     rash         Current Outpatient Prescriptions:   •  acetaminophen-codeine (TYLENOL #3) 300-30 MG per tablet, TAKE ONE TABLET BY MOUTH ONCE DAILY AS NEEDED FOR PAIN, Disp: 30 tablet, Rfl: 2  •  amLODIPine (NORVASC) 10 MG tablet, Take 1 tablet by mouth Daily., Disp: 90 tablet, Rfl: 3  •  aspirin 81 MG tablet, Take  by mouth daily., Disp: , Rfl:   •  docusate sodium (COLACE) 100 MG capsule, Take 100 mg by mouth 2 (two) times a day., Disp: , Rfl:   •  furosemide (LASIX) 40 MG tablet, TAKE ONE TABLET BY MOUTH ONCE DAILY, Disp: 90 tablet, Rfl: 3  •  insulin NPH (NOVOLIN N RELION) 100 UNIT/ML injection, Inject 108 Units under the skin Daily. 58 UNITS IN THE AM AND 50 UNITS IN THE PM, Disp: 4 each, Rfl: 3  •  insulin regular (NOVOLIN R RELION) 100 UNIT/ML injection, TO TAKE 50 UNITS IN THE AM AND 48 UNITS IN THE PM, Disp: 3 each, Rfl: 2  •  isosorbide mononitrate (IMDUR) 60 MG 24 hr tablet, TAKE ONE TABLET BY MOUTH ONCE DAILY,  "Disp: 30 tablet, Rfl: 3  •  Misc. Devices (ROLLATOR) misc, 1 Units as needed (for walking)., Disp: 1 each, Rfl: 0  •  simvastatin (ZOCOR) 40 MG tablet, TAKE ONE-HALF TABLET BY MOUTH ONCE DAILY, Disp: 45 tablet, Rfl: 3     Objective:     Vitals:    06/28/17 1459   BP: 166/60   Pulse: 85   Weight: 216 lb (98 kg)   Height: 62\" (157.5 cm)     Body mass index is 39.51 kg/(m^2).    Physical Exam   Constitutional: She is oriented to person, place, and time.   Obese   HENT:   Head: Normocephalic.   Nose: Nose normal.   Mouth/Throat: Oropharynx is clear and moist.   Eyes: Conjunctivae and EOM are normal. Pupils are equal, round, and reactive to light.   Neck: Normal range of motion.   Cannot assess for JVD due to habitus   Cardiovascular: Normal rate, regular rhythm and intact distal pulses.  Exam reveals distant heart sounds.    Murmur heard.   Systolic murmur is present with a grade of 2/6   Pulmonary/Chest: Effort normal.   Abdominal: Soft.   Obesity limits abdominal exam   Musculoskeletal: Normal range of motion. She exhibits no edema.   Neurological: She is alert and oriented to person, place, and time. No cranial nerve deficit.   Skin: Skin is warm and dry. No rash noted.   Psychiatric: She has a normal mood and affect. Her behavior is normal. Judgment and thought content normal.   Vitals reviewed.        ECG 12 Lead  Date/Time: 6/28/2017 3:31 PM  Performed by: TERA DOYLE  Authorized by: TERA DOYLE   Comparison: compared with previous ECG   Similar to previous ECG  Rhythm: sinus rhythm  Conduction: conduction normal  ST segment depression noted on lead: nsst abn.  T Waves: T waves normal  QRS axis: normal  Other: no other findings  Clinical impression: non-specific ECG              Assessment:       Diagnosis Plan   1. Coronary artery disease involving native coronary artery of native heart without angina pectoris     2. Essential hypertension  amLODIPine (NORVASC) 10 MG tablet   3. Aortic sclerosis     4. " Chronic venous insufficiency     5. Type 2 diabetes mellitus with complication, with long-term current use of insulin            Plan:       1.  Coronary Artery Disease  Assessment  • The patient has no angina  • She has no angina.  A nuclear stress in 2016 showed no ischemia.  She has normal LV systolic function.  She has evidence of a prior lateral wall infarct.  She's on a statin.    Subjective - Objective  • There is a history of past MI 1996  • Current antiplatelet therapy includes aspirin 81 mg      2.  I increased her amlodipine to 10mg daily.  She cannot take ACE/ARB due to hyperkalemia.  I may have to add a beta blocker.    3.  An echo in 2016 showed sclerosis without stenosis.  She has a murmur on exam. I will repeat her echo in 2019.    4.  She has chronic leg swelling which is multifactorial.  She is on furosemide and she wraps them.      Sincerely,       Warner Tang MD

## 2017-08-28 DIAGNOSIS — Z79.4 TYPE 2 DIABETES MELLITUS WITH COMPLICATION, WITH LONG-TERM CURRENT USE OF INSULIN (HCC): ICD-10-CM

## 2017-08-28 DIAGNOSIS — E11.8 TYPE 2 DIABETES MELLITUS WITH COMPLICATION, WITH LONG-TERM CURRENT USE OF INSULIN (HCC): ICD-10-CM

## 2017-08-28 RX ORDER — ISOSORBIDE MONONITRATE 60 MG/1
TABLET, EXTENDED RELEASE ORAL
Qty: 30 TABLET | Refills: 3 | Status: SHIPPED | OUTPATIENT
Start: 2017-08-28 | End: 2017-12-30 | Stop reason: SDUPTHER

## 2017-08-29 RX ORDER — HUMAN INSULIN 100 [IU]/ML
INJECTION, SOLUTION SUBCUTANEOUS
Qty: 30 ML | Refills: 2 | Status: SHIPPED | OUTPATIENT
Start: 2017-08-29 | End: 2017-12-29 | Stop reason: SDUPTHER

## 2017-09-15 NOTE — TELEPHONE ENCOUNTER
Pt had enough to wait until Monday to be filled.   Tylenol #3 refill.  Dmitry needs review.   Narc will be updated at [facility] coming up in one month, cannot drive to come in to sign one.

## 2017-09-18 RX ORDER — ACETAMINOPHEN AND CODEINE PHOSPHATE 300; 30 MG/1; MG/1
TABLET ORAL
Qty: 30 TABLET | Refills: 2 | OUTPATIENT
Start: 2017-09-18 | End: 2017-12-18 | Stop reason: SDUPTHER

## 2017-09-29 ENCOUNTER — OFFICE VISIT (OUTPATIENT)
Dept: ENDOCRINOLOGY | Age: 72
End: 2017-09-29

## 2017-09-29 VITALS
BODY MASS INDEX: 39.67 KG/M2 | DIASTOLIC BLOOD PRESSURE: 64 MMHG | HEIGHT: 62 IN | WEIGHT: 215.6 LBS | OXYGEN SATURATION: 97 % | HEART RATE: 96 BPM | SYSTOLIC BLOOD PRESSURE: 134 MMHG

## 2017-09-29 DIAGNOSIS — IMO0002 UNCONTROLLED TYPE 2 DIABETES MELLITUS WITH BACKGROUND RETINOPATHY: ICD-10-CM

## 2017-09-29 DIAGNOSIS — E16.1 HYPERINSULINISM: ICD-10-CM

## 2017-09-29 DIAGNOSIS — IMO0002 UNCONTROLLED TYPE 2 DIABETES MELLITUS WITH COMPLICATION, WITH LONG-TERM CURRENT USE OF INSULIN: Primary | ICD-10-CM

## 2017-09-29 DIAGNOSIS — R63.5 ABNORMAL WEIGHT GAIN: ICD-10-CM

## 2017-09-29 DIAGNOSIS — IMO0002 UNCONTROLLED TYPE 2 DIABETES MELLITUS WITH DIABETIC NEUROPATHY, WITH LONG-TERM CURRENT USE OF INSULIN: ICD-10-CM

## 2017-09-29 DIAGNOSIS — Z79.4 ENCOUNTER FOR LONG-TERM (CURRENT) USE OF INSULIN (HCC): ICD-10-CM

## 2017-09-29 DIAGNOSIS — IMO0002 UNCONTROLLED TYPE II DIABETES MELLITUS WITH NEPHROPATHY: ICD-10-CM

## 2017-09-29 PROCEDURE — 99214 OFFICE O/P EST MOD 30 MIN: CPT | Performed by: INTERNAL MEDICINE

## 2017-09-30 LAB
ALBUMIN SERPL-MCNC: 4.5 G/DL (ref 3.5–5.2)
ALBUMIN/CREAT UR: 652 MG/G CREAT (ref 0–30)
ALBUMIN/GLOB SERPL: 1.5 G/DL
ALP SERPL-CCNC: 112 U/L (ref 39–117)
ALT SERPL-CCNC: 19 U/L (ref 1–33)
AST SERPL-CCNC: 23 U/L (ref 1–32)
BILIRUB SERPL-MCNC: 0.4 MG/DL (ref 0.1–1.2)
BUN SERPL-MCNC: 19 MG/DL (ref 8–23)
BUN/CREAT SERPL: 19.4 (ref 7–25)
CALCIUM SERPL-MCNC: 10.4 MG/DL (ref 8.6–10.5)
CHLORIDE SERPL-SCNC: 98 MMOL/L (ref 98–107)
CHOLEST SERPL-MCNC: 173 MG/DL (ref 0–200)
CO2 SERPL-SCNC: 25.6 MMOL/L (ref 22–29)
CREAT SERPL-MCNC: 0.98 MG/DL (ref 0.57–1)
CREAT UR-MCNC: 37.3 MG/DL
GLOBULIN SER CALC-MCNC: 3.1 GM/DL
GLUCOSE SERPL-MCNC: 262 MG/DL (ref 65–99)
HBA1C MFR BLD: 7.4 % (ref 4.8–5.6)
HDLC SERPL-MCNC: 34 MG/DL (ref 40–60)
LDLC SERPL CALC-MCNC: 95 MG/DL (ref 0–100)
MICROALBUMIN UR-MCNC: 243.2 UG/ML
POTASSIUM SERPL-SCNC: 4.3 MMOL/L (ref 3.5–5.2)
PROT SERPL-MCNC: 7.6 G/DL (ref 6–8.5)
SODIUM SERPL-SCNC: 141 MMOL/L (ref 136–145)
TRIGL SERPL-MCNC: 220 MG/DL (ref 0–150)
VLDLC SERPL CALC-MCNC: 44 MG/DL (ref 5–40)

## 2017-10-21 ENCOUNTER — RESULTS ENCOUNTER (OUTPATIENT)
Dept: INTERNAL MEDICINE | Facility: CLINIC | Age: 72
End: 2017-10-21

## 2017-10-21 DIAGNOSIS — I10 ESSENTIAL HYPERTENSION: ICD-10-CM

## 2017-10-21 DIAGNOSIS — N18.30 CHRONIC KIDNEY DISEASE, STAGE III (MODERATE) (HCC): ICD-10-CM

## 2017-10-21 DIAGNOSIS — E78.2 MIXED HYPERLIPIDEMIA: ICD-10-CM

## 2017-10-24 ENCOUNTER — OFFICE VISIT (OUTPATIENT)
Dept: INTERNAL MEDICINE | Facility: CLINIC | Age: 72
End: 2017-10-24

## 2017-10-24 VITALS
HEART RATE: 102 BPM | DIASTOLIC BLOOD PRESSURE: 74 MMHG | RESPIRATION RATE: 18 BRPM | BODY MASS INDEX: 41.04 KG/M2 | SYSTOLIC BLOOD PRESSURE: 148 MMHG | OXYGEN SATURATION: 98 % | WEIGHT: 223 LBS | HEIGHT: 62 IN

## 2017-10-24 DIAGNOSIS — N18.30 TYPE 2 DIABETES MELLITUS WITH STAGE 3 CHRONIC KIDNEY DISEASE, WITH LONG-TERM CURRENT USE OF INSULIN (HCC): ICD-10-CM

## 2017-10-24 DIAGNOSIS — Z23 NEED FOR INFLUENZA VACCINATION: ICD-10-CM

## 2017-10-24 DIAGNOSIS — I10 ESSENTIAL HYPERTENSION: Primary | ICD-10-CM

## 2017-10-24 DIAGNOSIS — E11.22 TYPE 2 DIABETES MELLITUS WITH STAGE 3 CHRONIC KIDNEY DISEASE, WITH LONG-TERM CURRENT USE OF INSULIN (HCC): ICD-10-CM

## 2017-10-24 DIAGNOSIS — Z79.4 TYPE 2 DIABETES MELLITUS WITH STAGE 3 CHRONIC KIDNEY DISEASE, WITH LONG-TERM CURRENT USE OF INSULIN (HCC): ICD-10-CM

## 2017-10-24 DIAGNOSIS — E78.2 MIXED HYPERLIPIDEMIA: ICD-10-CM

## 2017-10-24 PROCEDURE — 99214 OFFICE O/P EST MOD 30 MIN: CPT | Performed by: INTERNAL MEDICINE

## 2017-10-24 RX ORDER — METOPROLOL SUCCINATE 25 MG/1
25 TABLET, EXTENDED RELEASE ORAL DAILY
Qty: 30 TABLET | Refills: 6 | Status: SHIPPED | OUTPATIENT
Start: 2017-10-24 | End: 2018-06-05 | Stop reason: SDUPTHER

## 2017-10-24 NOTE — PROGRESS NOTES
Chief Complaint  Denisse Chavez is a 71 y.o. female who presents for Hyperlipidemia; Hypertension; Diabetes; and Follow-up (6 month)  .    History of Present Illness   Denisse is here for routine follow up on her HTN, HLD, DM.  She is seeing Dr. Nunez for her DM.  She has seen him recently.  No cp or palp or soa .  She always has swelling.  She wears her compression hose.  She needs  Flu shot today.  Her podiatrist did a monofilament exam the last time she was there.  No new concerns.  She is feeling ok these days.  Her bp has been elevated on multiple occasions.  We discussed that she was on Atenolol in the past but she has no recollection why it was stopped.        Review of Systems   Constitution: Positive for weight gain. Negative for malaise/fatigue.   Cardiovascular: Positive for leg swelling. Negative for chest pain, dyspnea on exertion and palpitations.   Respiratory: Negative for shortness of breath.    Endocrine: Negative for polydipsia and polyuria.       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   • Type II diabetes mellitus with neurological manifestations, uncontrolled   • Uncontrolled type 2 diabetes mellitus with background retinopathy   • Hyperinsulinism   • Abnormal weight gain   • Hyperlipidemia   • Essential hypertension   • Edema of lower extremity   • Angiomyolipoma of kidney   • Memory changes   • Left hip pain   • Left-sided low back pain without sciatica   • Lumbar spinal stenosis   • Encounter for long-term (current) use of insulin   • Obstructive sleep apnea on autoCPAP   • Chronic kidney disease, stage III (moderate)   • Chronic venous insufficiency   • CAD (coronary artery disease)   • Type 2 diabetes mellitus with stage 3 chronic kidney disease, with long-term current use of insulin       Past Medical History:   Diagnosis Date    • Angiomyolipoma of kidney 3/30/2016   • CAD (coronary artery disease)     MI in 1996, treated medically.  PET 6/2016 with small-medium sized lateral infarct, no ischemia.  This wall motion abnormality was seen on echo as well.   • Chronic venous insufficiency    • Hyperlipidemia    • Hypertension    • Low back pain    • Mass of ovary 3/30/2016   • Morbid obesity    • Type 2 diabetes mellitus    • Uterine leiomyoma 3/30/2016       Past Surgical History:   Procedure Laterality Date   • CATARACT EXTRACTION      X2    • HERNIA REPAIR     • HYSTERECTOMY     • TONSILLECTOMY         Family History   Problem Relation Age of Onset   • Diabetes Mother    • Heart attack Mother    • Hypertension Mother    • Hypothyroidism Mother    • Diabetes type II Son        Social History     Social History   • Marital status: Single     Spouse name: N/A   • Number of children: 3   • Years of education: N/A     Occupational History   • retired from [employer]      Social History Main Topics   • Smoking status: Former Smoker     Packs/day: 0.25     Years: 2.00     Types: Cigarettes     Quit date: 1965   • Smokeless tobacco: Never Used      Comment: LIGHT USAGE   • Alcohol use No   • Drug use: No   • Sexual activity: Defer     Other Topics Concern   • Not on file     Social History Narrative       Current Outpatient Prescriptions on File Prior to Visit   Medication Sig Dispense Refill   • acetaminophen-codeine (TYLENOL #3) 300-30 MG per tablet TAKE ONE TABLET BY MOUTH ONCE DAILY AS NEEDED FOR PAIN 30 tablet 2   • amLODIPine (NORVASC) 10 MG tablet Take 1 tablet by mouth Daily. 90 tablet 3   • aspirin 81 MG tablet Take  by mouth daily.     • docusate sodium (COLACE) 100 MG capsule Take 100 mg by mouth 2 (two) times a day.     • furosemide (LASIX) 40 MG tablet TAKE ONE TABLET BY MOUTH ONCE DAILY 90 tablet 3   • insulin NPH (NOVOLIN N RELION) 100 UNIT/ML injection Inject 108 Units under the skin Daily. 58 UNITS IN THE AM AND 50 UNITS IN THE PM 4  "each 3   • isosorbide mononitrate (IMDUR) 60 MG 24 hr tablet TAKE ONE TABLET BY MOUTH ONCE DAILY 30 tablet 3   • Misc. Devices (ROLLATOR) misc 1 Units as needed (for walking). 1 each 0   • NOVOLIN R RELION 100 UNIT/ML injection TAKE 50 UNITS SUBQ IN THE AM, AND 48 UNITS IN THE PM 30 mL 2   • simvastatin (ZOCOR) 40 MG tablet TAKE ONE-HALF TABLET BY MOUTH ONCE DAILY 45 tablet 3     No current facility-administered medications on file prior to visit.        Allergies   Allergen Reactions   • Ace Inhibitors Other (See Comments)     Hyperkalemia/ROB   • Angiotensin Receptor Blockers Other (See Comments)     Pt unable to remember   • Sulfa Antibiotics Itching     rash       Vitals:    10/24/17 1414   BP: 148/74   Pulse: 102   Resp: 18   SpO2: 98%   Weight: 223 lb (101 kg)   Height: 62\" (157.5 cm)       Body mass index is 40.79 kg/(m^2).    Objective   Physical Exam   Constitutional: She is oriented to person, place, and time. She appears well-developed and well-nourished. No distress.   HENT:   Head: Normocephalic and atraumatic.   Eyes: Conjunctivae are normal. No scleral icterus.   Neck: Normal range of motion. Neck supple.   Cardiovascular: Normal rate, regular rhythm and normal heart sounds.  Exam reveals no gallop and no friction rub.    No murmur heard.  Pulmonary/Chest: Effort normal and breath sounds normal. No respiratory distress. She has no wheezes. She has no rales.   Musculoskeletal: She exhibits edema (2= ble compression hose in place).   Lymphadenopathy:     She has no cervical adenopathy.   Neurological: She is alert and oriented to person, place, and time. No cranial nerve deficit.   Psychiatric: She has a normal mood and affect. Her behavior is normal. Judgment and thought content normal.       Results for orders placed or performed in visit on 09/29/17   Comprehensive Metabolic Panel   Result Value Ref Range    Glucose 262 (H) 65 - 99 mg/dL    BUN 19 8 - 23 mg/dL    Creatinine 0.98 0.57 - 1.00 mg/dL    " eGFR Non African Am 56 (L) >60 mL/min/1.73    eGFR African Am 68 >60 mL/min/1.73    BUN/Creatinine Ratio 19.4 7.0 - 25.0    Sodium 141 136 - 145 mmol/L    Potassium 4.3 3.5 - 5.2 mmol/L    Chloride 98 98 - 107 mmol/L    Total CO2 25.6 22.0 - 29.0 mmol/L    Calcium 10.4 8.6 - 10.5 mg/dL    Total Protein 7.6 6.0 - 8.5 g/dL    Albumin 4.50 3.50 - 5.20 g/dL    Globulin 3.1 gm/dL    A/G Ratio 1.5 g/dL    Total Bilirubin 0.4 0.1 - 1.2 mg/dL    Alkaline Phosphatase 112 39 - 117 U/L    AST (SGOT) 23 1 - 32 U/L    ALT (SGPT) 19 1 - 33 U/L   Hemoglobin A1c   Result Value Ref Range    Hemoglobin A1C 7.40 (H) 4.80 - 5.60 %   Lipid Panel   Result Value Ref Range    Total Cholesterol 173 0 - 200 mg/dL    Triglycerides 220 (H) 0 - 150 mg/dL    HDL Cholesterol 34 (L) 40 - 60 mg/dL    VLDL Cholesterol 44 (H) 5 - 40 mg/dL    LDL Cholesterol  95 0 - 100 mg/dL   Microalbumin / Creatinine Urine Ratio - Urine, Clean Catch   Result Value Ref Range    Creatinine, Urine 37.3 Not Estab. mg/dL    Microalbumin, Urine 243.2 Not Estab. ug/mL    Microalbumin/Creatinine Ratio Urine 652.0 (H) 0.0 - 30.0 mg/g creat       Assessment/Plan   Diagnoses and all orders for this visit:    Essential hypertension  -     metoprolol succinate XL (TOPROL-XL) 25 MG 24 hr tablet; Take 1 tablet by mouth Daily.    Mixed hyperlipidemia    Type 2 diabetes mellitus with stage 3 chronic kidney disease, with long-term current use of insulin    Need for influenza vaccination  -     Flu Vaccine High Dose PF 65YR+        Discussion/Summary  Yasmin is here for routine follow up.  She is doing well but her bp is too high.  I spent about ten minutes looking through her chart to figure out why her atenolol was dropped.  It appeas to have happened in late spring of 2016.  We are going to start metoprolol since Atenolol is not available.  I will see her back in 4 weeks to reassess.  Cont all other meds.    Return in about 4 weeks (around 11/21/2017).

## 2017-11-15 RX ORDER — SIMVASTATIN 40 MG
TABLET ORAL
Qty: 45 TABLET | Refills: 3 | Status: SHIPPED | OUTPATIENT
Start: 2017-11-15 | End: 2019-01-08 | Stop reason: SDUPTHER

## 2017-11-20 ENCOUNTER — TELEPHONE (OUTPATIENT)
Dept: INTERNAL MEDICINE | Facility: CLINIC | Age: 72
End: 2017-11-20

## 2017-11-20 NOTE — TELEPHONE ENCOUNTER
Called patient to find out if she had callus's on her feet, pt stated that she does have callus's on the bottom of her right foot. Michael informed, completed the form. Faxing it today, pt aware.

## 2017-11-27 ENCOUNTER — OFFICE VISIT (OUTPATIENT)
Dept: INTERNAL MEDICINE | Facility: CLINIC | Age: 72
End: 2017-11-27

## 2017-11-27 VITALS
HEIGHT: 62 IN | DIASTOLIC BLOOD PRESSURE: 70 MMHG | RESPIRATION RATE: 18 BRPM | OXYGEN SATURATION: 98 % | SYSTOLIC BLOOD PRESSURE: 150 MMHG | HEART RATE: 86 BPM | BODY MASS INDEX: 39.2 KG/M2 | WEIGHT: 213 LBS

## 2017-11-27 DIAGNOSIS — J06.9 ACUTE URI: ICD-10-CM

## 2017-11-27 DIAGNOSIS — I10 ESSENTIAL HYPERTENSION: Primary | ICD-10-CM

## 2017-11-27 DIAGNOSIS — M48.061 SPINAL STENOSIS OF LUMBAR REGION, UNSPECIFIED WHETHER NEUROGENIC CLAUDICATION PRESENT: ICD-10-CM

## 2017-11-27 PROCEDURE — 99214 OFFICE O/P EST MOD 30 MIN: CPT | Performed by: INTERNAL MEDICINE

## 2017-11-27 RX ORDER — FUROSEMIDE 40 MG/1
TABLET ORAL
Qty: 90 TABLET | Refills: 3 | Status: SHIPPED | OUTPATIENT
Start: 2017-11-27 | End: 2019-08-28 | Stop reason: SDUPTHER

## 2017-11-27 NOTE — PROGRESS NOTES
Chief Complaint  Denisse Chavez is a 72 y.o. female who presents for Hypertension and Follow-up (4 week, started metoprolol)  .    History of Present Illness   She is here for follow up on her bp.  She is taking metoprolol once a day.  She has not stopped her other bp meds.  Her bp is higher today but she is not feeling well.   No cp or palp or dizziness or SOA.  She has a cuff at home but has not been checking her bp.      She has a cold that she has been fighting since last week.  She has been using coricidin HBP.  She just doesn't feel too good.  No fevers.  NO SOA.      They want a new referral to spine ortho.  She is having a lot of issues from her spinal stenosis.  Lots of back pain    Review of Systems   Constitution: Positive for malaise/fatigue. Negative for chills and fever.   HENT: Positive for congestion and sore throat.    Cardiovascular: Negative for chest pain, dyspnea on exertion and palpitations.   Respiratory: Positive for cough. Negative for shortness of breath.    Musculoskeletal: Positive for back pain.   Neurological: Negative for dizziness and light-headedness.       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   • Type II diabetes mellitus with neurological manifestations, uncontrolled   • Uncontrolled type 2 diabetes mellitus with background retinopathy   • Hyperinsulinism   • Abnormal weight gain   • Hyperlipidemia   • Essential hypertension   • Edema of lower extremity   • Angiomyolipoma of kidney   • Memory changes   • Left hip pain   • Left-sided low back pain without sciatica   • Lumbar spinal stenosis   • Encounter for long-term (current) use of insulin   • Obstructive sleep apnea on autoCPAP   • Chronic kidney disease, stage III (moderate)   • Chronic venous insufficiency   • CAD (coronary artery disease)   • Type 2 diabetes mellitus  with stage 3 chronic kidney disease, with long-term current use of insulin       Past Medical History:   Diagnosis Date   • Angiomyolipoma of kidney 3/30/2016   • CAD (coronary artery disease)     MI in 1996, treated medically.  PET 6/2016 with small-medium sized lateral infarct, no ischemia.  This wall motion abnormality was seen on echo as well.   • Chronic venous insufficiency    • Hyperlipidemia    • Hypertension    • Low back pain    • Mass of ovary 3/30/2016   • Morbid obesity    • Type 2 diabetes mellitus    • Uterine leiomyoma 3/30/2016       Past Surgical History:   Procedure Laterality Date   • CATARACT EXTRACTION      X2    • HERNIA REPAIR     • HYSTERECTOMY     • TONSILLECTOMY         Family History   Problem Relation Age of Onset   • Diabetes Mother    • Heart attack Mother    • Hypertension Mother    • Hypothyroidism Mother    • Diabetes type II Son        Social History     Social History   • Marital status: Single     Spouse name: N/A   • Number of children: 3   • Years of education: N/A     Occupational History   • retired from [employer]      Social History Main Topics   • Smoking status: Former Smoker     Packs/day: 0.25     Years: 2.00     Types: Cigarettes     Quit date: 1965   • Smokeless tobacco: Never Used      Comment: LIGHT USAGE   • Alcohol use No   • Drug use: No   • Sexual activity: Defer     Other Topics Concern   • Not on file     Social History Narrative       Current Outpatient Prescriptions on File Prior to Visit   Medication Sig Dispense Refill   • acetaminophen-codeine (TYLENOL #3) 300-30 MG per tablet TAKE ONE TABLET BY MOUTH ONCE DAILY AS NEEDED FOR PAIN 30 tablet 2   • amLODIPine (NORVASC) 10 MG tablet Take 1 tablet by mouth Daily. 90 tablet 3   • aspirin 81 MG tablet Take  by mouth daily.     • docusate sodium (COLACE) 100 MG capsule Take 100 mg by mouth 2 (two) times a day.     • insulin NPH (NOVOLIN N RELION) 100 UNIT/ML injection Inject 108 Units under the skin Daily. 58 UNITS  "IN THE AM AND 50 UNITS IN THE PM 4 each 3   • isosorbide mononitrate (IMDUR) 60 MG 24 hr tablet TAKE ONE TABLET BY MOUTH ONCE DAILY 30 tablet 3   • metoprolol succinate XL (TOPROL-XL) 25 MG 24 hr tablet Take 1 tablet by mouth Daily. 30 tablet 6   • Misc. Devices (ROLLATOR) misc 1 Units as needed (for walking). 1 each 0   • NOVOLIN R RELION 100 UNIT/ML injection TAKE 50 UNITS SUBQ IN THE AM, AND 48 UNITS IN THE PM 30 mL 2   • simvastatin (ZOCOR) 40 MG tablet TAKE ONE-HALF TABLET BY MOUTH ONCE DAILY 45 tablet 3   • [DISCONTINUED] furosemide (LASIX) 40 MG tablet TAKE ONE TABLET BY MOUTH ONCE DAILY 90 tablet 3     No current facility-administered medications on file prior to visit.        Allergies   Allergen Reactions   • Ace Inhibitors Other (See Comments)     Hyperkalemia/ROB   • Angiotensin Receptor Blockers Other (See Comments)     Pt unable to remember   • Sulfa Antibiotics Itching     rash       Vitals:    11/27/17 1346   BP: 150/70   Pulse: 86   Resp: 18   SpO2: 98%   Weight: 213 lb (96.6 kg)   Height: 62\" (157.5 cm)       Body mass index is 38.96 kg/(m^2).    Objective   Physical Exam   Constitutional: She is oriented to person, place, and time. She appears well-developed and well-nourished. No distress.   HENT:   Head: Normocephalic and atraumatic.   Mouth/Throat: Oropharynx is clear and moist.   Neck: Normal range of motion. Neck supple.   Cardiovascular: Normal rate and regular rhythm.    Pulmonary/Chest: Effort normal and breath sounds normal. No respiratory distress. She has no wheezes.   Lymphadenopathy:     She has no cervical adenopathy.   Neurological: She is alert and oriented to person, place, and time. No cranial nerve deficit.   Psychiatric: She has a normal mood and affect. Her behavior is normal.       Results for orders placed or performed in visit on 09/29/17   Comprehensive Metabolic Panel   Result Value Ref Range    Glucose 262 (H) 65 - 99 mg/dL    BUN 19 8 - 23 mg/dL    Creatinine 0.98 0.57 " - 1.00 mg/dL    eGFR Non African Am 56 (L) >60 mL/min/1.73    eGFR African Am 68 >60 mL/min/1.73    BUN/Creatinine Ratio 19.4 7.0 - 25.0    Sodium 141 136 - 145 mmol/L    Potassium 4.3 3.5 - 5.2 mmol/L    Chloride 98 98 - 107 mmol/L    Total CO2 25.6 22.0 - 29.0 mmol/L    Calcium 10.4 8.6 - 10.5 mg/dL    Total Protein 7.6 6.0 - 8.5 g/dL    Albumin 4.50 3.50 - 5.20 g/dL    Globulin 3.1 gm/dL    A/G Ratio 1.5 g/dL    Total Bilirubin 0.4 0.1 - 1.2 mg/dL    Alkaline Phosphatase 112 39 - 117 U/L    AST (SGOT) 23 1 - 32 U/L    ALT (SGPT) 19 1 - 33 U/L   Hemoglobin A1c   Result Value Ref Range    Hemoglobin A1C 7.40 (H) 4.80 - 5.60 %   Lipid Panel   Result Value Ref Range    Total Cholesterol 173 0 - 200 mg/dL    Triglycerides 220 (H) 0 - 150 mg/dL    HDL Cholesterol 34 (L) 40 - 60 mg/dL    VLDL Cholesterol 44 (H) 5 - 40 mg/dL    LDL Cholesterol  95 0 - 100 mg/dL   Microalbumin / Creatinine Urine Ratio - Urine, Clean Catch   Result Value Ref Range    Creatinine, Urine 37.3 Not Estab. mg/dL    Microalbumin, Urine 243.2 Not Estab. ug/mL    Microalbumin/Creatinine Ratio Urine 652.0 (H) 0.0 - 30.0 mg/g creat       Assessment/Plan   Diagnoses and all orders for this visit:    Essential hypertension    Spinal stenosis of lumbar region, unspecified whether neurogenic claudication present  -     Ambulatory Referral to Orthopedic Surgery    Acute URI        Discussion/Summary  Yasmin is here for follow up on her bp.  Unfortunately, it's not any better right now but she doesn't feel good and is battling a cold.  I have asked her to check her bp at home once she is feeling better.  She has an appt with endo in two months.  If her bp is still running high, advised to follow up with me sooner.  For her URI, advised saline nasal spray and mucinex or coricidin HBP.   No Follow-up on file.

## 2017-12-19 RX ORDER — ACETAMINOPHEN AND CODEINE PHOSPHATE 300; 30 MG/1; MG/1
TABLET ORAL
Qty: 30 TABLET | Refills: 2 | OUTPATIENT
Start: 2017-12-19 | End: 2018-03-27 | Stop reason: SDUPTHER

## 2017-12-19 NOTE — TELEPHONE ENCOUNTER
Tylenol #3 refill.   Dmitry SOLIS.   Narc needs updating. Did not get pt to sign at last OV, pt cannot drive to come in to sign one. Okay to wait until next f/u apt?

## 2017-12-29 DIAGNOSIS — Z79.4 TYPE 2 DIABETES MELLITUS WITH COMPLICATION, WITH LONG-TERM CURRENT USE OF INSULIN (HCC): ICD-10-CM

## 2017-12-29 DIAGNOSIS — E11.8 TYPE 2 DIABETES MELLITUS WITH COMPLICATION, WITH LONG-TERM CURRENT USE OF INSULIN (HCC): ICD-10-CM

## 2017-12-29 RX ORDER — HUMAN INSULIN 100 [IU]/ML
INJECTION, SOLUTION SUBCUTANEOUS
Qty: 30 ML | Refills: 0 | Status: SHIPPED | OUTPATIENT
Start: 2017-12-29 | End: 2018-02-07 | Stop reason: SDUPTHER

## 2018-01-02 RX ORDER — ISOSORBIDE MONONITRATE 60 MG/1
TABLET, EXTENDED RELEASE ORAL
Qty: 30 TABLET | Refills: 3 | Status: SHIPPED | OUTPATIENT
Start: 2018-01-02 | End: 2018-05-15 | Stop reason: SDUPTHER

## 2018-01-19 ENCOUNTER — TRANSCRIBE ORDERS (OUTPATIENT)
Dept: ADMINISTRATIVE | Facility: HOSPITAL | Age: 73
End: 2018-01-19

## 2018-01-19 DIAGNOSIS — M54.5 LOW BACK PAIN, UNSPECIFIED BACK PAIN LATERALITY, UNSPECIFIED CHRONICITY, WITH SCIATICA PRESENCE UNSPECIFIED: Primary | ICD-10-CM

## 2018-01-23 ENCOUNTER — TRANSCRIBE ORDERS (OUTPATIENT)
Dept: PAIN MEDICINE | Facility: HOSPITAL | Age: 73
End: 2018-01-23

## 2018-01-23 ENCOUNTER — ANESTHESIA EVENT (OUTPATIENT)
Dept: PAIN MEDICINE | Facility: HOSPITAL | Age: 73
End: 2018-01-23

## 2018-01-23 ENCOUNTER — ANESTHESIA (OUTPATIENT)
Dept: PAIN MEDICINE | Facility: HOSPITAL | Age: 73
End: 2018-01-23

## 2018-01-23 ENCOUNTER — HOSPITAL ENCOUNTER (OUTPATIENT)
Dept: GENERAL RADIOLOGY | Facility: HOSPITAL | Age: 73
Discharge: HOME | End: 2018-01-23

## 2018-01-23 ENCOUNTER — HOSPITAL ENCOUNTER (OUTPATIENT)
Dept: PAIN MEDICINE | Facility: HOSPITAL | Age: 73
Discharge: HOME | End: 2018-01-23
Attending: ORTHOPAEDIC SURGERY | Admitting: ORTHOPAEDIC SURGERY

## 2018-01-23 VITALS
WEIGHT: 221 LBS | HEART RATE: 61 BPM | DIASTOLIC BLOOD PRESSURE: 57 MMHG | OXYGEN SATURATION: 98 % | HEIGHT: 62 IN | SYSTOLIC BLOOD PRESSURE: 135 MMHG | TEMPERATURE: 97.8 F | RESPIRATION RATE: 16 BRPM | BODY MASS INDEX: 40.67 KG/M2

## 2018-01-23 DIAGNOSIS — M54.5 LOW BACK PAIN, UNSPECIFIED BACK PAIN LATERALITY, UNSPECIFIED CHRONICITY, WITH SCIATICA PRESENCE UNSPECIFIED: ICD-10-CM

## 2018-01-23 DIAGNOSIS — M54.5 LOW BACK PAIN, UNSPECIFIED BACK PAIN LATERALITY, UNSPECIFIED CHRONICITY, WITH SCIATICA PRESENCE UNSPECIFIED: Primary | ICD-10-CM

## 2018-01-23 DIAGNOSIS — R52 PAIN: ICD-10-CM

## 2018-01-23 PROCEDURE — 25010000002 METHYLPREDNISOLONE PER 80 MG: Performed by: ANESTHESIOLOGY

## 2018-01-23 PROCEDURE — C1755 CATHETER, INTRASPINAL: HCPCS

## 2018-01-23 PROCEDURE — 77003 FLUOROGUIDE FOR SPINE INJECT: CPT

## 2018-01-23 RX ORDER — METHYLPREDNISOLONE ACETATE 80 MG/ML
80 INJECTION, SUSPENSION INTRA-ARTICULAR; INTRALESIONAL; INTRAMUSCULAR; SOFT TISSUE ONCE
Status: COMPLETED | OUTPATIENT
Start: 2018-01-23 | End: 2018-01-23

## 2018-01-23 RX ORDER — LIDOCAINE HYDROCHLORIDE 10 MG/ML
1 INJECTION, SOLUTION INFILTRATION; PERINEURAL ONCE AS NEEDED
Status: DISCONTINUED | OUTPATIENT
Start: 2018-01-23 | End: 2018-01-24 | Stop reason: HOSPADM

## 2018-01-23 RX ADMIN — METHYLPREDNISOLONE ACETATE 80 MG: 80 INJECTION, SUSPENSION INTRA-ARTICULAR; INTRALESIONAL; INTRAMUSCULAR; SOFT TISSUE at 12:17

## 2018-01-23 NOTE — ANESTHESIA PROCEDURE NOTES
PAIN Epidural block    Patient location during procedure: pain clinic  Start Time: 1/23/2018 12:03 PM  Stop Time: 1/23/2018 12:19 PM  Indication:procedure for pain  Performed By  Anesthesiologist: SAMPSON FELIPE  Preanesthetic Checklist  Completed: patient identified, site marked, surgical consent, pre-op evaluation, timeout performed, risks and benefits discussed and monitors and equipment checked  Additional Notes  Interlaminar epidural was performed under fluoroscopic guidance.    Diagnosis     * Degeneration of lumbar intervertebral disc (M51.36)     * Spondylolisthesis, lumbar region (M43.16)     * Spinal stenosis, lumbar region without neurogenic claudication (M48.061)     * Neural foraminal stenosis of lumbar spine (M99.83)      Prep:  Pt Position:prone  Sterile Tech:cap, gloves, mask and sterile barrier  Prep:chlorhexidine gluconate and isopropyl alcohol  Monitoring:blood pressure monitoring, continuous pulse oximetry and EKG  Procedure:  Sedation: no   Approach:right paramedian  Guidance: fluoroscopy  Location:lumbar  Level:3-4  Needle Type:Tuohy  Needle Gauge:20 G  Aspiration:negative  Medications:  Depomedrol:80 mg  Preservative Free Saline:3mL  Isovue:0mL  Comments:Contrast was not utilized due to the patient's kidney function  Post Assessment:  Dressing:occlusive dressing applied  Pt Tolerance:patient tolerated the procedure well with no apparent complications  Complications:no

## 2018-01-23 NOTE — H&P
[facility]    History and Physical    Patient Name: Denisse Chavez  :  1945  MRN:  [id number]  Date of Admission: 2018    Subjective     The patient is a 72-year-old lady with pain in her lower back which radiates down the posterior aspect of her left and right lower extremity to the knee.  She has undergone lumbar epidural steroid injections in the past which of been helpful.  Her pain level today is a 7/10.    Her MRI results are as follows:    There is transitional anatomy at the level of the lumbosacral junction.  This transitional vertebral body has been enumerated as a relatively  lumbarized S1 segment. Utilizing this enumeration system, the conus  medullaris terminates at the L1-2 level. Please take careful note of  this enumeration system at the time of any potential intervention.      There is a mild-to-moderate degree of dextroconvex scoliotic curvature  of the lumbar spine with its apex centered at the L2-3 level. Anterior  spondylolisthesis of L4-L5 by approximately 4 to 5 mm is seen.      There is a severe degree of central canal stenosis at the L4-5 level  secondary to disc bulging, ligamentum flavum thickening, facet  arthropathy. A moderate to severe degree of central canal stenosis is  identified at the L3-4 level secondary to similar phenomena.      There is multilevel foraminal stenosis and this includes  mild-to-moderate left L2-3 and bilateral L3-4 foraminal narrowing  secondary to disc osteophytic change and facet arthropathy. There is a  moderate degree of left L4-5 and a mild-to-moderate degree of right L4-5  foraminal stenosis secondary to similar changes.      Prominent degenerative disc changes are identified the L2-3 and L3-4  levels.    The following portions of the patients history were reviewed and updated as appropriate: current medications, allergies, past medical history, past surgical history, past family history, past social history and problem  list                Objective     Past Medical History:   Past Medical History:   Diagnosis Date   • Angiomyolipoma of kidney 3/30/2016   • CAD (coronary artery disease)     MI in 1996, treated medically.  PET 6/2016 with small-medium sized lateral infarct, no ischemia.  This wall motion abnormality was seen on echo as well.   • Chronic venous insufficiency    • Hyperlipidemia    • Hypertension    • Low back pain    • Mass of ovary 3/30/2016   • Morbid obesity    • Sleep apnea    • Type 2 diabetes mellitus    • Uterine leiomyoma 3/30/2016     Past Surgical History:   Past Surgical History:   Procedure Laterality Date   • CATARACT EXTRACTION      X2    • HERNIA REPAIR     • HYSTERECTOMY     • TONSILLECTOMY       Family History:   Family History   Problem Relation Age of Onset   • Diabetes Mother    • Heart attack Mother    • Hypertension Mother    • Hypothyroidism Mother    • Diabetes type II Son      Social History:   Social History   Substance Use Topics   • Smoking status: Former Smoker     Packs/day: 0.25     Years: 2.00     Types: Cigarettes     Quit date: 1965   • Smokeless tobacco: Never Used      Comment: LIGHT USAGE   • Alcohol use No       Vital Signs Range for the last 24 hours  Temperature:     Temp Source:     BP:     Pulse:     Respirations:     SPO2:     O2 Amount (l/min):     O2 Devices     Weight:           --------------------------------------------------------------------------------    Current Outpatient Prescriptions   Medication Sig Dispense Refill   • acetaminophen-codeine (TYLENOL #3) 300-30 MG per tablet TAKE ONE TABLET BY MOUTH ONCE DAILY AS NEEDED FOR PAIN 30 tablet 2   • amLODIPine (NORVASC) 10 MG tablet Take 1 tablet by mouth Daily. 90 tablet 3   • docusate sodium (COLACE) 100 MG capsule Take 100 mg by mouth 2 (two) times a day.     • furosemide (LASIX) 40 MG tablet TAKE ONE TABLET BY MOUTH ONCE DAILY 90 tablet 3   • insulin NPH (NOVOLIN N RELION) 100 UNIT/ML injection Inject 108 Units  under the skin Daily. 58 UNITS IN THE AM AND 50 UNITS IN THE PM 4 each 3   • isosorbide mononitrate (IMDUR) 60 MG 24 hr tablet TAKE ONE TABLET BY MOUTH ONCE DAILY 30 tablet 3   • metoprolol succinate XL (TOPROL-XL) 25 MG 24 hr tablet Take 1 tablet by mouth Daily. 30 tablet 6   • NOVOLIN R RELION 100 UNIT/ML injection INJECT 50 UNITS SUBCUTANEOUSLY IN THE MORNING AND 48 IN THE EVENING 30 mL 0   • simvastatin (ZOCOR) 40 MG tablet TAKE ONE-HALF TABLET BY MOUTH ONCE DAILY 45 tablet 3   • aspirin 81 MG tablet Take  by mouth daily.     • Misc. Devices (ROLLATOR) misc 1 Units as needed (for walking). 1 each 0     No current facility-administered medications for this encounter.        --------------------------------------------------------------------------------  Assessment/Plan      Anesthesia Evaluation            Airway   Mallampati: II  TM distance: >3 FB  Neck ROM: full  Dental - normal exam     Pulmonary - normal exam    breath sounds clear to auscultation  (+) sleep apnea,   Cardiovascular - normal exam    Rhythm: regular  Rate: normal    (+) hypertension, CAD, PVD, hyperlipidemia      Neuro/Psych  GI/Hepatic/Renal/Endo    (+) morbid obesity, renal disease CRI, diabetes mellitus using insulin,     Musculoskeletal     Abdominal  - normal exam    Abdomen: soft.  Bowel sounds: normal.   Substance History      OB/GYN          Other                                           Diagnosis and Plan    Treatment Plan  ASA 4      Procedures: Lumbar Epidural Steroid Injection(LESI), With fluoroscopy,       Anesthetic plan and risks discussed with patient.          Diagnosis     * Degeneration of lumbar intervertebral disc [M51.36]     * Spondylolisthesis, lumbar region [M43.16]     * Spinal stenosis, lumbar region without neurogenic claudication [M48.061]     * Neural foraminal stenosis of lumbar spine [M99.83]

## 2018-02-07 DIAGNOSIS — E11.8 TYPE 2 DIABETES MELLITUS WITH COMPLICATION, WITH LONG-TERM CURRENT USE OF INSULIN (HCC): ICD-10-CM

## 2018-02-07 DIAGNOSIS — Z79.4 TYPE 2 DIABETES MELLITUS WITH COMPLICATION, WITH LONG-TERM CURRENT USE OF INSULIN (HCC): ICD-10-CM

## 2018-02-07 RX ORDER — HUMAN INSULIN 100 [IU]/ML
INJECTION, SUSPENSION SUBCUTANEOUS
Qty: 40 ML | Refills: 3 | Status: ON HOLD | OUTPATIENT
Start: 2018-02-07 | End: 2018-07-21

## 2018-02-07 RX ORDER — HUMAN INSULIN 100 [IU]/ML
INJECTION, SOLUTION SUBCUTANEOUS
Qty: 30 ML | Refills: 0 | Status: SHIPPED | OUTPATIENT
Start: 2018-02-07 | End: 2018-03-27 | Stop reason: SDUPTHER

## 2018-02-13 ENCOUNTER — OFFICE VISIT (OUTPATIENT)
Dept: ENDOCRINOLOGY | Age: 73
End: 2018-02-13

## 2018-02-13 VITALS
HEART RATE: 79 BPM | BODY MASS INDEX: 40.78 KG/M2 | DIASTOLIC BLOOD PRESSURE: 60 MMHG | HEIGHT: 62 IN | SYSTOLIC BLOOD PRESSURE: 132 MMHG | WEIGHT: 221.6 LBS

## 2018-02-13 DIAGNOSIS — IMO0002 UNCONTROLLED TYPE II DIABETES MELLITUS WITH NEPHROPATHY: ICD-10-CM

## 2018-02-13 DIAGNOSIS — R63.5 ABNORMAL WEIGHT GAIN: ICD-10-CM

## 2018-02-13 DIAGNOSIS — IMO0002 UNCONTROLLED TYPE 2 DIABETES MELLITUS WITH BACKGROUND RETINOPATHY: ICD-10-CM

## 2018-02-13 DIAGNOSIS — E11.8 TYPE 2 DIABETES MELLITUS WITH COMPLICATION, WITH LONG-TERM CURRENT USE OF INSULIN (HCC): Primary | ICD-10-CM

## 2018-02-13 DIAGNOSIS — E11.22 TYPE 2 DIABETES MELLITUS WITH STAGE 3 CHRONIC KIDNEY DISEASE, WITH LONG-TERM CURRENT USE OF INSULIN (HCC): ICD-10-CM

## 2018-02-13 DIAGNOSIS — E78.2 MIXED HYPERLIPIDEMIA: ICD-10-CM

## 2018-02-13 DIAGNOSIS — N18.30 TYPE 2 DIABETES MELLITUS WITH STAGE 3 CHRONIC KIDNEY DISEASE, WITH LONG-TERM CURRENT USE OF INSULIN (HCC): ICD-10-CM

## 2018-02-13 DIAGNOSIS — Z79.4 TYPE 2 DIABETES MELLITUS WITH STAGE 3 CHRONIC KIDNEY DISEASE, WITH LONG-TERM CURRENT USE OF INSULIN (HCC): ICD-10-CM

## 2018-02-13 DIAGNOSIS — IMO0002 UNCONTROLLED TYPE 2 DIABETES MELLITUS WITH COMPLICATION, WITH LONG-TERM CURRENT USE OF INSULIN: ICD-10-CM

## 2018-02-13 DIAGNOSIS — Z79.4 ENCOUNTER FOR LONG-TERM (CURRENT) USE OF INSULIN (HCC): ICD-10-CM

## 2018-02-13 DIAGNOSIS — Z79.4 TYPE 2 DIABETES MELLITUS WITH COMPLICATION, WITH LONG-TERM CURRENT USE OF INSULIN (HCC): Primary | ICD-10-CM

## 2018-02-13 DIAGNOSIS — IMO0002 UNCONTROLLED TYPE 2 DIABETES MELLITUS WITH DIABETIC NEUROPATHY, WITH LONG-TERM CURRENT USE OF INSULIN: ICD-10-CM

## 2018-02-13 PROCEDURE — 99214 OFFICE O/P EST MOD 30 MIN: CPT | Performed by: INTERNAL MEDICINE

## 2018-02-13 NOTE — PROGRESS NOTES
72 y.o.    Patient Care Team:  Agueda Rodriguez MD as PCP - General (Internal Medicine)  Wally TYLER MD as PCP - Claims Attributed  Wally TYLER MD as Consulting Physician (Endocrinology)  Warner Tang MD as Consulting Physician (Cardiology)  Sergio Eagle MD as Consulting Physician (Urology)  Alfredito Mueller MD as Consulting Physician (Sleep Medicine)    Chief Complaint:        F/U TYPE 2 DIABETES, UNCONTROLLED.  NO RECENT LABS.  Subjective     HPI   Patient is a 72-year-old white female with a past history of uncontrolled type 2 diabetes mellitus and complications came for follow-up  Uncontrolled type 2 diabetes mellitus  Patient is currently taking Novolin NPH 58 units in the morning and 50 units at bedtime and Novolin R insulin 50 units at breakfast and 48 units at supper  Patient has been experiencing late night or early morning hypoglycemia over the past several weeks with the blood sugars dropping into 67-90 range  She admits that sometimes she only eats salad for dinner and does not eat anything for a snack  Hypoglycemia  Patient had several episodes of hypoglycemia usually in the morning but sometimes also at bedtime  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  Patient is significant symptoms of tingling numbness and burning in both lower extremities  Uncontrolled type 2 diabetes mellitus with retinopathy  Patient is status post laser treatments in the past and has regular ophthalmology follow-up  Uncontrolled type 2 diabetes mellitus with nephropathy  Patient is microalbuminuria and elevated creatinine with chronic kidney disease.  Abnormal weight gain  Patient currently weighs 221 pounds with a BMI of 40.5.          Interval History November 3, 2016       Summary  Patient reported that she was diagnosed in 1996 with type 2 diabetes and has been on several different medications until 10 years ago when she started taking Lantus 44 units at bedtime in addition  Novolin regular insulin 15 units before breakfast and lunch and 20 units before dinner and metformin and Amaryl. Despite all this medication the blood sugars tend to be in the 150-300-400 range and hence the consultation.  Patient reported that she is not very compliant with her diet or activity. She does take her insulin injections on time.  She is occasionally known to miss her lunch.  Due to the extremely high cost of the insulins patient will switch to NPH and regular insulins and previous visit.  Patient reports that his tolerated this which very well.  she managed to increase the insulin NPH to 40 units in the morning and 30 units at night and regular insulin to 40 units at breakfast and supper. Despite which her blood sugars continue to be high  Her blood sugars have improved to some extent but still fluctuating between 150-400.  Fasting blood sugars appear to be consistent mostly less than 150  Diabetic neuropathy  Patient reports significant symptoms of tingling and numbness in both lower extremities  Diabetic nephropathy  Patient is known to have microalbuminuria and elevated creatinine next line diabetic retinopathy uncontrolled  Patient has history of background diabetic retinopathy.  Hypoglycemia patient has had experience with hypoglycemia but not in the past one month.  Hypertension patient is currently on medication  Hyperlipidemia patient is currently on medication.  interval history  Patient reported occasional hypoglycemia usually at night and early morning hours   The following portions of the patient's history were reviewed and updated as appropriate: allergies, current medications, past family history, past medical history, past social history, past surgical history and problem list.    Past Medical History:   Diagnosis Date   • Angiomyolipoma of kidney 3/30/2016   • CAD (coronary artery disease)     MI in 1996, treated medically.  PET 6/2016 with small-medium sized lateral infarct, no  ischemia.  This wall motion abnormality was seen on echo as well.   • Chronic venous insufficiency    • Hyperlipidemia    • Hypertension    • Low back pain    • Mass of ovary 3/30/2016   • Morbid obesity    • Sleep apnea    • Type 2 diabetes mellitus    • Uterine leiomyoma 3/30/2016     Family History   Problem Relation Age of Onset   • Diabetes Mother    • Heart attack Mother    • Hypertension Mother    • Hypothyroidism Mother    • Diabetes type II Son      Social History     Social History   • Marital status: Single     Spouse name: N/A   • Number of children: 3   • Years of education: N/A     Occupational History   • retired from [employer]      Social History Main Topics   • Smoking status: Former Smoker     Packs/day: 0.25     Years: 2.00     Types: Cigarettes     Quit date: 1965   • Smokeless tobacco: Never Used      Comment: LIGHT USAGE   • Alcohol use No   • Drug use: No   • Sexual activity: Defer     Other Topics Concern   • Not on file     Social History Narrative     Allergies   Allergen Reactions   • Ace Inhibitors Other (See Comments)     Hyperkalemia/ROB   • Angiotensin Receptor Blockers Other (See Comments)     Pt unable to remember   • Sulfa Antibiotics Itching     rash       Current Outpatient Prescriptions:   •  acetaminophen-codeine (TYLENOL #3) 300-30 MG per tablet, TAKE ONE TABLET BY MOUTH ONCE DAILY AS NEEDED FOR PAIN, Disp: 30 tablet, Rfl: 2  •  amLODIPine (NORVASC) 10 MG tablet, Take 1 tablet by mouth Daily., Disp: 90 tablet, Rfl: 3  •  aspirin 81 MG tablet, Take  by mouth daily., Disp: , Rfl:   •  docusate sodium (COLACE) 100 MG capsule, Take 100 mg by mouth 2 (two) times a day., Disp: , Rfl:   •  furosemide (LASIX) 40 MG tablet, TAKE ONE TABLET BY MOUTH ONCE DAILY, Disp: 90 tablet, Rfl: 3  •  insulin NPH (NOVOLIN N RELION) 100 UNIT/ML injection, Inject 108 Units under the skin Daily. 58 UNITS IN THE AM AND 50 UNITS IN THE PM, Disp: 4 each, Rfl: 3  •  isosorbide mononitrate (IMDUR) 60 MG 24 hr  "tablet, TAKE ONE TABLET BY MOUTH ONCE DAILY, Disp: 30 tablet, Rfl: 3  •  metoprolol succinate XL (TOPROL-XL) 25 MG 24 hr tablet, Take 1 tablet by mouth Daily., Disp: 30 tablet, Rfl: 6  •  Misc. Devices (ROLLATOR) misc, 1 Units as needed (for walking)., Disp: 1 each, Rfl: 0  •  NOVOLIN N RELION 100 UNIT/ML injection, INJECT 58 UNITS SUBCUTANEOUSLY IN THE MORNING AND 50 IN THE EVENING, Disp: 40 mL, Rfl: 3  •  NOVOLIN R RELION 100 UNIT/ML injection, INJECT 50 UNITS SUBCUTANEOUSLY IN THE MORNING AND 48 IN THE EVENING, Disp: 30 mL, Rfl: 0  •  simvastatin (ZOCOR) 40 MG tablet, TAKE ONE-HALF TABLET BY MOUTH ONCE DAILY, Disp: 45 tablet, Rfl: 3        Review of Systems   Constitutional: Negative for chills, fatigue and fever.   Cardiovascular: Negative for chest pain and palpitations.   Gastrointestinal: Negative for abdominal pain, constipation, diarrhea, nausea and vomiting.   Endocrine: Negative for cold intolerance and heat intolerance.   All other systems reviewed and are negative.      Objective       Vitals:    02/13/18 1121   BP: 132/60   Pulse: 79   Weight: 101 kg (221 lb 9.6 oz)   Height: 157.5 cm (62\")     Body mass index is 40.53 kg/(m^2).      Physical Exam   Constitutional: She is oriented to person, place, and time. She appears well-developed.   HENT:   Head: Normocephalic and atraumatic.   Eyes: EOM are normal. Pupils are equal, round, and reactive to light.   Neck: Normal range of motion. Neck supple. No thyromegaly present.   Cardiovascular: Normal rate, regular rhythm, normal heart sounds and intact distal pulses.    Pulmonary/Chest: Effort normal and breath sounds normal.   Abdominal: Soft. Bowel sounds are normal. She exhibits distension. There is no tenderness.   Musculoskeletal: Normal range of motion. She exhibits no edema.   Neurological: She is alert and oriented to person, place, and time.   Skin: Skin is warm and dry.   Psychiatric: She has a normal mood and affect. Her behavior is normal. "   Nursing note and vitals reviewed.    Results Review:     I reviewed the patient's new clinical results.    Medical records reviewed  Summary:      Office Visit on 09/29/2017   Component Date Value Ref Range Status   • Glucose 09/29/2017 262* 65 - 99 mg/dL Final   • BUN 09/29/2017 19  8 - 23 mg/dL Final   • Creatinine 09/29/2017 0.98  0.57 - 1.00 mg/dL Final   • eGFR Non  Am 09/29/2017 56* >60 mL/min/1.73 Final    Comment: The MDRD GFR formula is only valid for adults with stable  renal function between ages 18 and 70.     • eGFR  Am 09/29/2017 68  >60 mL/min/1.73 Final   • BUN/Creatinine Ratio 09/29/2017 19.4  7.0 - 25.0 Final   • Sodium 09/29/2017 141  136 - 145 mmol/L Final   • Potassium 09/29/2017 4.3  3.5 - 5.2 mmol/L Final   • Chloride 09/29/2017 98  98 - 107 mmol/L Final   • Total CO2 09/29/2017 25.6  22.0 - 29.0 mmol/L Final   • Calcium 09/29/2017 10.4  8.6 - 10.5 mg/dL Final   • Total Protein 09/29/2017 7.6  6.0 - 8.5 g/dL Final   • Albumin 09/29/2017 4.50  3.50 - 5.20 g/dL Final   • Globulin 09/29/2017 3.1  gm/dL Final   • A/G Ratio 09/29/2017 1.5  g/dL Final   • Total Bilirubin 09/29/2017 0.4  0.1 - 1.2 mg/dL Final   • Alkaline Phosphatase 09/29/2017 112  39 - 117 U/L Final   • AST (SGOT) 09/29/2017 23  1 - 32 U/L Final   • ALT (SGPT) 09/29/2017 19  1 - 33 U/L Final   • Hemoglobin A1C 09/29/2017 7.40* 4.80 - 5.60 % Final    Comment: Hemoglobin A1C Ranges:  Increased Risk for Diabetes  5.7% to 6.4%  Diabetes                     >= 6.5%  Diabetic Goal                < 7.0%     • Total Cholesterol 09/29/2017 173  0 - 200 mg/dL Final   • Triglycerides 09/29/2017 220* 0 - 150 mg/dL Final   • HDL Cholesterol 09/29/2017 34* 40 - 60 mg/dL Final   • VLDL Cholesterol 09/29/2017 44* 5 - 40 mg/dL Final   • LDL Cholesterol  09/29/2017 95  0 - 100 mg/dL Final   • Creatinine, Urine 09/29/2017 37.3  Not Estab. mg/dL Final   • Microalbumin, Urine 09/29/2017 243.2  Not Estab. ug/mL Final   •  Microalbumin/Creatinine Ratio Urine 09/29/2017 652.0* 0.0 - 30.0 mg/g creat Final     Lab Results   Component Value Date    HGBA1C 7.40 (H) 09/29/2017    HGBA1C 8.3 (H) 04/17/2017    HGBA1C 7.90 (H) 11/03/2016     Lab Results   Component Value Date    MICROALBUR 243.2 09/29/2017    CREATININE 0.98 09/29/2017     Imaging Results (most recent)     None          Assessment and Plan:    Denisse was seen today for diabetes.    Diagnoses and all orders for this visit:    Type 2 diabetes mellitus with complication, with long-term current use of insulin  -     Comprehensive Metabolic Panel  -     Hemoglobin A1c  -     TSH  -     Lipid Panel  -     Microalbumin / Creatinine Urine Ratio - Urine, Clean Catch    Mixed hyperlipidemia  -     Comprehensive Metabolic Panel  -     Hemoglobin A1c  -     TSH  -     Lipid Panel  -     Microalbumin / Creatinine Urine Ratio - Urine, Clean Catch    Uncontrolled type 2 diabetes mellitus with complication, with long-term current use of insulin    Type 2 diabetes mellitus with stage 3 chronic kidney disease, with long-term current use of insulin    Uncontrolled type 2 diabetes mellitus with diabetic neuropathy, with long-term current use of insulin    Uncontrolled type 2 diabetes mellitus with background retinopathy    Uncontrolled type II diabetes mellitus with nephropathy    Abnormal weight gain    Encounter for long-term (current) use of insulin    Other orders  -     Cardiovascular Risk Assessment  -     Diabetes Patient Education          Glucose log reviewed  Blood sugars are mostly in the 100-200 range but she also has blood sugars in the 50-70 range  Especially in the morning as well as bedtime    I advised her to adjust insulin as follows  She will take NPH insulin 58 units in the morning and 42 units at bedtime  She will take regular insulin 50 units at breakfast and 48 units at supper  If she is eating salad at dinner/supper she will take only 38 units  Patient was given written  "instructions  Patient and her son both verbalized understanding    Patient will get lab testing done today  She will return to follow-up in 6 months.    The total time spent for old record and lab review and face- to- face was more than 25 min of which greater than 15 min of time was spent on counseling the patient on recommended evaluation and treatment options, instructions for management/treatment and /or follow up  and importance of compliance with chosen management or treatment options   Wally Craft MD. FACE    02/13/18      EMR Dragon / transcription disclaimer:     \"Dictated utilizing Dragon dictation\".         "

## 2018-02-13 NOTE — PATIENT INSTRUCTIONS
Patient instructions    NPH insulin 58 units before breakfast and 42 units at bedtime    Regular Insulin 50 units at breakfast and 48 units at supper    If you're eating salad at supper take only 38 units

## 2018-02-14 LAB
ALBUMIN SERPL-MCNC: 4.4 G/DL (ref 3.5–5.2)
ALBUMIN/CREAT UR: 937.4 MG/G CREAT (ref 0–30)
ALBUMIN/GLOB SERPL: 1.6 G/DL
ALP SERPL-CCNC: 112 U/L (ref 39–117)
ALT SERPL-CCNC: 18 U/L (ref 1–33)
AST SERPL-CCNC: 18 U/L (ref 1–32)
BILIRUB SERPL-MCNC: 0.4 MG/DL (ref 0.1–1.2)
BUN SERPL-MCNC: 29 MG/DL (ref 8–23)
BUN/CREAT SERPL: 27.1 (ref 7–25)
CALCIUM SERPL-MCNC: 10.1 MG/DL (ref 8.6–10.5)
CHLORIDE SERPL-SCNC: 101 MMOL/L (ref 98–107)
CHOLEST SERPL-MCNC: 186 MG/DL (ref 0–200)
CO2 SERPL-SCNC: 29.5 MMOL/L (ref 22–29)
CREAT SERPL-MCNC: 1.07 MG/DL (ref 0.57–1)
CREAT UR-MCNC: 82.9 MG/DL
GFR SERPLBLD CREATININE-BSD FMLA CKD-EPI: 50 ML/MIN/1.73
GFR SERPLBLD CREATININE-BSD FMLA CKD-EPI: 61 ML/MIN/1.73
GLOBULIN SER CALC-MCNC: 2.7 GM/DL
GLUCOSE SERPL-MCNC: 42 MG/DL (ref 65–99)
HBA1C MFR BLD: 7.3 % (ref 4.8–5.6)
HDLC SERPL-MCNC: 40 MG/DL (ref 40–60)
INTERPRETATION: NORMAL
LDLC SERPL CALC-MCNC: 108 MG/DL (ref 0–100)
Lab: NORMAL
MICROALBUMIN UR-MCNC: 777.1 UG/ML
POTASSIUM SERPL-SCNC: 4.3 MMOL/L (ref 3.5–5.2)
PROT SERPL-MCNC: 7.1 G/DL (ref 6–8.5)
SODIUM SERPL-SCNC: 143 MMOL/L (ref 136–145)
TRIGL SERPL-MCNC: 189 MG/DL (ref 0–150)
TSH SERPL DL<=0.005 MIU/L-ACNC: 2.49 MIU/ML (ref 0.27–4.2)
VLDLC SERPL CALC-MCNC: 37.8 MG/DL (ref 5–40)

## 2018-02-27 ENCOUNTER — ANESTHESIA EVENT (OUTPATIENT)
Dept: PAIN MEDICINE | Facility: HOSPITAL | Age: 73
End: 2018-02-27

## 2018-02-27 ENCOUNTER — HOSPITAL ENCOUNTER (OUTPATIENT)
Dept: GENERAL RADIOLOGY | Facility: HOSPITAL | Age: 73
Discharge: HOME | End: 2018-02-27

## 2018-02-27 ENCOUNTER — HOSPITAL ENCOUNTER (OUTPATIENT)
Dept: PAIN MEDICINE | Facility: HOSPITAL | Age: 73
Discharge: HOME | End: 2018-02-27
Admitting: ORTHOPAEDIC SURGERY

## 2018-02-27 ENCOUNTER — ANESTHESIA (OUTPATIENT)
Dept: PAIN MEDICINE | Facility: HOSPITAL | Age: 73
End: 2018-02-27

## 2018-02-27 VITALS
BODY MASS INDEX: 40.48 KG/M2 | HEIGHT: 62 IN | HEART RATE: 65 BPM | OXYGEN SATURATION: 96 % | WEIGHT: 220 LBS | TEMPERATURE: 99.1 F | SYSTOLIC BLOOD PRESSURE: 196 MMHG | RESPIRATION RATE: 16 BRPM | DIASTOLIC BLOOD PRESSURE: 58 MMHG

## 2018-02-27 DIAGNOSIS — R52 PAIN: ICD-10-CM

## 2018-02-27 DIAGNOSIS — M54.5 LOW BACK PAIN, UNSPECIFIED BACK PAIN LATERALITY, UNSPECIFIED CHRONICITY, WITH SCIATICA PRESENCE UNSPECIFIED: ICD-10-CM

## 2018-02-27 PROCEDURE — 77003 FLUOROGUIDE FOR SPINE INJECT: CPT

## 2018-02-27 PROCEDURE — 25010000002 METHYLPREDNISOLONE PER 80 MG: Performed by: ANESTHESIOLOGY

## 2018-02-27 PROCEDURE — C1755 CATHETER, INTRASPINAL: HCPCS

## 2018-02-27 RX ORDER — METHYLPREDNISOLONE ACETATE 80 MG/ML
80 INJECTION, SUSPENSION INTRA-ARTICULAR; INTRALESIONAL; INTRAMUSCULAR; SOFT TISSUE ONCE
Status: COMPLETED | OUTPATIENT
Start: 2018-02-27 | End: 2018-02-27

## 2018-02-27 RX ADMIN — METHYLPREDNISOLONE ACETATE 80 MG: 80 INJECTION, SUSPENSION INTRA-ARTICULAR; INTRALESIONAL; INTRAMUSCULAR; SOFT TISSUE at 13:30

## 2018-02-27 NOTE — ANESTHESIA PROCEDURE NOTES
PAIN Epidural block    Patient location during procedure: pain clinic  Start Time: 2/27/2018 1:03 PM  Stop Time: 2/27/2018 1:31 PM  Indication:procedure for pain  Performed By  Anesthesiologist: SAMPSON FELIPE  Preanesthetic Checklist  Completed: patient identified, site marked, surgical consent, pre-op evaluation, timeout performed, risks and benefits discussed and monitors and equipment checked  Additional Notes  Interlaminar epidural was performed under fluoroscopic guidance.    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)  Prep:  Pt Position:prone  Sterile Tech:cap, gloves, mask and sterile barrier  Prep:chlorhexidine gluconate and isopropyl alcohol  Monitoring:blood pressure monitoring, continuous pulse oximetry and EKG  Procedure:  Sedation: no   Approach:right paramedian  Guidance: fluoroscopy  Location:lumbar  Level:4-5  Needle Type:Tuohy  Needle Gauge:20 G  Aspiration:negative  Medications:  Depomedrol:80 mg  Preservative Free Saline:3mL  Isovue:0mL  Comments:Contrast was not utilized due to the patient's kidney function  Post Assessment:  Dressing:occlusive dressing applied  Pt Tolerance:patient tolerated the procedure well with no apparent complications  Complications:no

## 2018-02-27 NOTE — H&P
[facility]    History and Physical    Patient Name: Denisse Chavez  :  1945  MRN:  [id number]  Date of Admission: 2018    Subjective     Patient is a 72-year-old female with pain in her lower back which radiates down the posterior aspect of both lower extremities.  She reports 20% relief following her last lumbar epidural steroid injection.  Her pain level today is a 6/10.  She is here for lumbar epidural steroid injection #2.    The following portions of the patients history were reviewed and updated as appropriate: current medications, allergies, past medical history, past surgical history, past family history, past social history and problem list                Objective     Past Medical History:   Past Medical History:   Diagnosis Date   • Angiomyolipoma of kidney 3/30/2016   • CAD (coronary artery disease)     MI in , treated medically.  PET 2016 with small-medium sized lateral infarct, no ischemia.  This wall motion abnormality was seen on echo as well.   • Chronic venous insufficiency    • Hyperlipidemia    • Hypertension    • Low back pain    • Mass of ovary 3/30/2016   • Morbid obesity    • Sleep apnea    • Type 2 diabetes mellitus    • Uterine leiomyoma 3/30/2016     Past Surgical History:   Past Surgical History:   Procedure Laterality Date   • CATARACT EXTRACTION      X2    • HERNIA REPAIR     • HYSTERECTOMY     • TONSILLECTOMY       Family History:   Family History   Problem Relation Age of Onset   • Diabetes Mother    • Heart attack Mother    • Hypertension Mother    • Hypothyroidism Mother    • Diabetes type II Son      Social History:   Social History   Substance Use Topics   • Smoking status: Former Smoker     Packs/day: 0.25     Years: 2.00     Types: Cigarettes     Quit date:    • Smokeless tobacco: Never Used      Comment: LIGHT USAGE   • Alcohol use No       Vital Signs Range for the last 24 hours  Temperature: Temp:  [37.3 °C (99.1 °F)] 37.3 °C (99.1 °F)   Temp  "Source: Temp src: Oral   BP: BP: (194)/(80) 194/80   Pulse: Heart Rate:  [71] 71   Respirations: Resp:  [16] 16   SPO2: SpO2:  [96 %] 96 %   O2 Amount (l/min):     O2 Devices O2 Device: room air   Weight: Weight:  [99.8 kg (220 lb)] 99.8 kg (220 lb)     Flowsheet Rows         First Filed Value    Admission Height  157.5 cm (62\") Documented at 02/27/2018 1233    Admission Weight  99.8 kg (220 lb) Documented at 02/27/2018 1233          --------------------------------------------------------------------------------    Current Outpatient Prescriptions   Medication Sig Dispense Refill   • acetaminophen-codeine (TYLENOL #3) 300-30 MG per tablet TAKE ONE TABLET BY MOUTH ONCE DAILY AS NEEDED FOR PAIN 30 tablet 2   • amLODIPine (NORVASC) 10 MG tablet Take 1 tablet by mouth Daily. 90 tablet 3   • aspirin 81 MG tablet Take  by mouth daily.     • docusate sodium (COLACE) 100 MG capsule Take 100 mg by mouth 2 (two) times a day.     • furosemide (LASIX) 40 MG tablet TAKE ONE TABLET BY MOUTH ONCE DAILY 90 tablet 3   • isosorbide mononitrate (IMDUR) 60 MG 24 hr tablet TAKE ONE TABLET BY MOUTH ONCE DAILY 30 tablet 3   • metoprolol succinate XL (TOPROL-XL) 25 MG 24 hr tablet Take 1 tablet by mouth Daily. 30 tablet 6   • Misc. Devices (ROLLATOR) misc 1 Units as needed (for walking). 1 each 0   • NOVOLIN N RELION 100 UNIT/ML injection INJECT 58 UNITS SUBCUTANEOUSLY IN THE MORNING AND 50 IN THE EVENING 40 mL 3   • NOVOLIN R RELION 100 UNIT/ML injection INJECT 50 UNITS SUBCUTANEOUSLY IN THE MORNING AND 48 IN THE EVENING 30 mL 0   • simvastatin (ZOCOR) 40 MG tablet TAKE ONE-HALF TABLET BY MOUTH ONCE DAILY 45 tablet 3     No current facility-administered medications for this encounter.        --------------------------------------------------------------------------------  Assessment/Plan      Anesthesia Evaluation     Patient summary reviewed and Nursing notes reviewed   NPO Solid Status: > 8 hours  NPO Liquid Status: < 2 hours           " Airway   Mallampati: II  TM distance: >3 FB  Neck ROM: full  Dental - normal exam   (+) upper dentures    Pulmonary - normal exam    breath sounds clear to auscultation  (+) sleep apnea,   Cardiovascular - normal exam    Rhythm: regular  Rate: normal    (+) hypertension, CAD, PVD, hyperlipidemia,   (-) angina, orthopnea, PND, SOLARES      Neuro/Psych- negative ROS  GI/Hepatic/Renal/Endo    (+) morbid obesity,  diabetes mellitus,     Musculoskeletal (-) negative ROS    Abdominal  - normal exam    Abdomen: soft.  Bowel sounds: normal.   Substance History - negative use     OB/GYN negative ob/gyn ROS         Other - negative ROS                  Diagnosis and Plan    Treatment Plan  ASA 3   Patient has had previous injection/procedure with 10-25% improvement.   Procedures: Lumbar Epidural Steroid Injection(LESI), With fluoroscopy,       Anesthetic plan and risks discussed with patient.          Diagnosis     * Degeneration of lumbar intervertebral disc [M51.36]     * Spondylolisthesis, lumbar region [M43.16]     * Spinal stenosis, lumbar region without neurogenic claudication [M48.061]     * Foraminal stenosis of lumbar region [M99.83]

## 2018-03-27 DIAGNOSIS — Z79.4 TYPE 2 DIABETES MELLITUS WITH COMPLICATION, WITH LONG-TERM CURRENT USE OF INSULIN (HCC): ICD-10-CM

## 2018-03-27 DIAGNOSIS — E11.8 TYPE 2 DIABETES MELLITUS WITH COMPLICATION, WITH LONG-TERM CURRENT USE OF INSULIN (HCC): ICD-10-CM

## 2018-03-27 RX ORDER — ACETAMINOPHEN AND CODEINE PHOSPHATE 300; 30 MG/1; MG/1
TABLET ORAL
Qty: 30 TABLET | Refills: 2 | OUTPATIENT
Start: 2018-03-27 | End: 2018-07-09 | Stop reason: SDUPTHER

## 2018-03-27 RX ORDER — HUMAN INSULIN 100 [IU]/ML
INJECTION, SOLUTION SUBCUTANEOUS
Qty: 30 ML | Refills: 3 | Status: SHIPPED | OUTPATIENT
Start: 2018-03-27 | End: 2018-07-21 | Stop reason: HOSPADM

## 2018-04-18 ENCOUNTER — OFFICE VISIT (OUTPATIENT)
Dept: SLEEP MEDICINE | Facility: HOSPITAL | Age: 73
End: 2018-04-18
Attending: INTERNAL MEDICINE

## 2018-04-18 DIAGNOSIS — G47.21 CIRCADIAN RHYTHM SLEEP DISORDER, DELAYED SLEEP PHASE TYPE: ICD-10-CM

## 2018-04-18 DIAGNOSIS — G47.33 OBSTRUCTIVE SLEEP APNEA SYNDROME: Primary | Chronic | ICD-10-CM

## 2018-04-18 PROCEDURE — G0463 HOSPITAL OUTPT CLINIC VISIT: HCPCS

## 2018-04-18 PROCEDURE — 99213 OFFICE O/P EST LOW 20 MIN: CPT | Performed by: INTERNAL MEDICINE

## 2018-04-18 NOTE — PROGRESS NOTES
[facility] Note       Patient Care Team:  Agueda Rodriguez MD as PCP - General (Internal Medicine)  Wally TYLER MD as PCP - Claims Attributed  Wally TYLER MD as Consulting Physician (Endocrinology)  Warner Tang MD as Consulting Physician (Cardiology)  Sergio Eagle MD as Consulting Physician (Urology)  Alfredito Mueller MD as Consulting Physician (Sleep Medicine)    Chief Complaint:  SAWYER     Interval History:   The patient was last seen by me in April 2017.  She has no new complaints and she states she is stable.  Delta Sleepiness Scale is normal at 1.  She goes to bed at 1:30 AM and awakens at 8:30 AM.  She awakens once for the bathroom.    Review of Systems:  Recorded on the Sleep Questionnaire.  Unremarkable .    Social History:    Social History     Social History   • Marital status: Single   • Number of children: 3     Occupational History   • retired from [employer]      Social History Main Topics   • Smoking status: Former Smoker     Packs/day: 0.25     Years: 2.00     Types: Cigarettes     Quit date: 1965   • Smokeless tobacco: Never Used      Comment: LIGHT USAGE   • Alcohol use No   • Drug use: No   • Sexual activity: Defer     Other Topics Concern   • Not on file       Allergies:  Ace inhibitors; Angiotensin receptor blockers; and Sulfa antibiotics     Medication Review:  Reviewed.      Vital Signs:  Height 61 inches, weight 221 pounds and BMI morbidly obese at 41.75.  She has gained 10 pounds since I last saw her.    Physical Exam:    Constitutional:  Well developed white female and appears in no apparent distress.  Awake & oriented times 3.  Normal mood with normal recent and remote memory and normal judgement.  Eyes:  Conjunctivae normal.  Oropharynx:  moist mucous membranes without exudate and a large tongue and normal uvula and patent posterior pharyngeal opening and class II-III MP airway      Results Review:  DME is [facility] and she  uses a fullface mask.  Downloads between October 19 and April 16, 2018 compliances 98%.  Average usage is 6 hours and 43 minutes.  Average AHI is normal with a leak of 29 minutes.  Average AutoCPAP pressure is 12.5 and her auto CPAP is 9-17.       Impression:   Obstructive sleep apnea adequately treated with auto titrating CPAP with good compliance and usage and no complaints of hypersomnolence.  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      Plan:  Good sleep hygiene measures should be maintained.  Weight loss would be beneficial in this patient who is obese by BMI.  The patient is benefiting from the treatment being provided.     The patient will continue auto CPAP.  A new prescription will be sent to her DME for all needed supplies.    The patient will call for any problems and will follow up in one year.      Alfredito Mueller MD  Sleep Medicine  04/23/18  10:01 AM

## 2018-04-23 PROBLEM — IMO0001 CLASS 3 OBESITY DUE TO EXCESS CALORIES WITH SERIOUS COMORBIDITY AND BODY MASS INDEX (BMI) OF 40.0 TO 44.9 IN ADULT: Status: ACTIVE | Noted: 2018-04-23

## 2018-04-23 PROBLEM — G47.21 CIRCADIAN RHYTHM SLEEP DISORDER, DELAYED SLEEP PHASE TYPE: Status: ACTIVE | Noted: 2018-04-23

## 2018-04-24 DIAGNOSIS — IMO0002 UNCONTROLLED TYPE 2 DIABETES MELLITUS WITH COMPLICATION, WITH LONG-TERM CURRENT USE OF INSULIN: ICD-10-CM

## 2018-04-24 DIAGNOSIS — E78.2 MIXED HYPERLIPIDEMIA: ICD-10-CM

## 2018-04-24 DIAGNOSIS — N18.30 CHRONIC KIDNEY DISEASE, STAGE III (MODERATE) (HCC): ICD-10-CM

## 2018-04-24 DIAGNOSIS — I10 ESSENTIAL HYPERTENSION: ICD-10-CM

## 2018-04-24 DIAGNOSIS — Z00.00 HEALTH CARE MAINTENANCE: Primary | ICD-10-CM

## 2018-04-27 LAB
ALBUMIN SERPL-MCNC: 4 G/DL (ref 3.5–5.2)
ALBUMIN/CREAT UR: 791.6 MG/G CREAT (ref 0–30)
ALBUMIN/GLOB SERPL: 1.4 G/DL
ALP SERPL-CCNC: 89 U/L (ref 39–117)
ALT SERPL-CCNC: 15 U/L (ref 1–33)
APPEARANCE UR: CLEAR
AST SERPL-CCNC: 14 U/L (ref 1–32)
BACTERIA #/AREA URNS HPF: NORMAL /HPF
BACTERIA UR CULT: NORMAL
BACTERIA UR CULT: NORMAL
BASOPHILS # BLD AUTO: 0.02 10*3/MM3 (ref 0–0.2)
BASOPHILS NFR BLD AUTO: 0.2 % (ref 0–1.5)
BILIRUB SERPL-MCNC: 0.5 MG/DL (ref 0.1–1.2)
BILIRUB UR QL STRIP: NEGATIVE
BUN SERPL-MCNC: 20 MG/DL (ref 8–23)
BUN/CREAT SERPL: 18 (ref 7–25)
CALCIUM SERPL-MCNC: 9.6 MG/DL (ref 8.6–10.5)
CHLORIDE SERPL-SCNC: 101 MMOL/L (ref 98–107)
CHOLEST SERPL-MCNC: 186 MG/DL (ref 0–200)
CO2 SERPL-SCNC: 27.8 MMOL/L (ref 22–29)
COLOR UR: YELLOW
CREAT SERPL-MCNC: 1.11 MG/DL (ref 0.57–1)
CREAT UR-MCNC: 39.2 MG/DL
EOSINOPHIL # BLD AUTO: 0.33 10*3/MM3 (ref 0–0.7)
EOSINOPHIL NFR BLD AUTO: 3.6 % (ref 0.3–6.2)
EPI CELLS #/AREA URNS HPF: NORMAL /HPF
ERYTHROCYTE [DISTWIDTH] IN BLOOD BY AUTOMATED COUNT: 14.9 % (ref 11.7–13)
GFR SERPLBLD CREATININE-BSD FMLA CKD-EPI: 48 ML/MIN/1.73
GFR SERPLBLD CREATININE-BSD FMLA CKD-EPI: 59 ML/MIN/1.73
GLOBULIN SER CALC-MCNC: 2.8 GM/DL
GLUCOSE SERPL-MCNC: 203 MG/DL (ref 65–99)
GLUCOSE UR QL: NEGATIVE
HBA1C MFR BLD: 7.5 % (ref 4.8–5.6)
HCT VFR BLD AUTO: 41.7 % (ref 35.6–45.5)
HDLC SERPL-MCNC: 32 MG/DL (ref 40–60)
HGB BLD-MCNC: 13.1 G/DL (ref 11.9–15.5)
HGB UR QL STRIP: NEGATIVE
IMM GRANULOCYTES # BLD: 0.04 10*3/MM3 (ref 0–0.03)
IMM GRANULOCYTES NFR BLD: 0.4 % (ref 0–0.5)
KETONES UR QL STRIP: NEGATIVE
LDLC SERPL CALC-MCNC: 100 MG/DL (ref 0–100)
LEUKOCYTE ESTERASE UR QL STRIP: ABNORMAL
LYMPHOCYTES # BLD AUTO: 2.06 10*3/MM3 (ref 0.9–4.8)
LYMPHOCYTES NFR BLD AUTO: 22.6 % (ref 19.6–45.3)
MCH RBC QN AUTO: 27.8 PG (ref 26.9–32)
MCHC RBC AUTO-ENTMCNC: 31.4 G/DL (ref 32.4–36.3)
MCV RBC AUTO: 88.5 FL (ref 80.5–98.2)
MICRO URNS: ABNORMAL
MICROALBUMIN UR-MCNC: 310.3 UG/ML
MONOCYTES # BLD AUTO: 0.77 10*3/MM3 (ref 0.2–1.2)
MONOCYTES NFR BLD AUTO: 8.4 % (ref 5–12)
NEUTROPHILS # BLD AUTO: 5.95 10*3/MM3 (ref 1.9–8.1)
NEUTROPHILS NFR BLD AUTO: 65.2 % (ref 42.7–76)
NITRITE UR QL STRIP: NEGATIVE
PH UR STRIP: 5.5 [PH] (ref 5–7.5)
PLATELET # BLD AUTO: 235 10*3/MM3 (ref 140–500)
POTASSIUM SERPL-SCNC: 4.7 MMOL/L (ref 3.5–5.2)
PROT SERPL-MCNC: 6.8 G/DL (ref 6–8.5)
PROT UR QL STRIP: ABNORMAL
RBC # BLD AUTO: 4.71 10*6/MM3 (ref 3.9–5.2)
RBC #/AREA URNS HPF: NORMAL /HPF
SODIUM SERPL-SCNC: 140 MMOL/L (ref 136–145)
SP GR UR: 1.02 (ref 1–1.03)
T4 FREE SERPL-MCNC: 1.19 NG/DL (ref 0.93–1.7)
TRIGL SERPL-MCNC: 269 MG/DL (ref 0–150)
TSH SERPL DL<=0.005 MIU/L-ACNC: 4.4 MIU/ML (ref 0.27–4.2)
URINALYSIS REFLEX: ABNORMAL
UROBILINOGEN UR STRIP-MCNC: 0.2 MG/DL (ref 0.2–1)
VLDLC SERPL CALC-MCNC: 53.8 MG/DL (ref 5–40)
WBC # BLD AUTO: 9.13 10*3/MM3 (ref 4.5–10.7)
WBC #/AREA URNS HPF: NORMAL /HPF

## 2018-05-01 ENCOUNTER — OFFICE VISIT (OUTPATIENT)
Dept: INTERNAL MEDICINE | Facility: CLINIC | Age: 73
End: 2018-05-01

## 2018-05-01 VITALS
SYSTOLIC BLOOD PRESSURE: 130 MMHG | BODY MASS INDEX: 40.85 KG/M2 | HEIGHT: 62 IN | WEIGHT: 222 LBS | DIASTOLIC BLOOD PRESSURE: 72 MMHG | OXYGEN SATURATION: 98 % | RESPIRATION RATE: 18 BRPM | HEART RATE: 78 BPM

## 2018-05-01 DIAGNOSIS — E11.22 TYPE 2 DIABETES MELLITUS WITH STAGE 3 CHRONIC KIDNEY DISEASE, WITH LONG-TERM CURRENT USE OF INSULIN (HCC): ICD-10-CM

## 2018-05-01 DIAGNOSIS — Z12.11 COLON CANCER SCREENING: ICD-10-CM

## 2018-05-01 DIAGNOSIS — Z00.00 MEDICARE ANNUAL WELLNESS VISIT, SUBSEQUENT: ICD-10-CM

## 2018-05-01 DIAGNOSIS — I10 ESSENTIAL HYPERTENSION: ICD-10-CM

## 2018-05-01 DIAGNOSIS — Z79.4 TYPE 2 DIABETES MELLITUS WITH STAGE 3 CHRONIC KIDNEY DISEASE, WITH LONG-TERM CURRENT USE OF INSULIN (HCC): ICD-10-CM

## 2018-05-01 DIAGNOSIS — E78.2 MIXED HYPERLIPIDEMIA: ICD-10-CM

## 2018-05-01 DIAGNOSIS — N18.30 CHRONIC KIDNEY DISEASE, STAGE III (MODERATE) (HCC): ICD-10-CM

## 2018-05-01 DIAGNOSIS — Z23 NEED FOR HEPATITIS A IMMUNIZATION: Primary | ICD-10-CM

## 2018-05-01 DIAGNOSIS — I25.10 CORONARY ARTERY DISEASE INVOLVING NATIVE CORONARY ARTERY OF NATIVE HEART WITHOUT ANGINA PECTORIS: ICD-10-CM

## 2018-05-01 DIAGNOSIS — N18.30 TYPE 2 DIABETES MELLITUS WITH STAGE 3 CHRONIC KIDNEY DISEASE, WITH LONG-TERM CURRENT USE OF INSULIN (HCC): ICD-10-CM

## 2018-05-01 DIAGNOSIS — IMO0002 UNCONTROLLED TYPE II DIABETES MELLITUS WITH NEPHROPATHY: ICD-10-CM

## 2018-05-01 PROCEDURE — G0439 PPPS, SUBSEQ VISIT: HCPCS | Performed by: INTERNAL MEDICINE

## 2018-05-01 PROCEDURE — 99214 OFFICE O/P EST MOD 30 MIN: CPT | Performed by: INTERNAL MEDICINE

## 2018-05-01 PROCEDURE — 90471 IMMUNIZATION ADMIN: CPT | Performed by: INTERNAL MEDICINE

## 2018-05-01 PROCEDURE — 90632 HEPA VACCINE ADULT IM: CPT | Performed by: INTERNAL MEDICINE

## 2018-05-01 NOTE — PROGRESS NOTES
Medicare Annual Wellness Visit    Chief Complaint:  Annual Exam (SUB AWV); Diabetes; Hypertension; Hyperlipidemia; Chronic Kidney Disease; and Coronary Artery Disease      History of Present Illness    Denisse Chavez is a 72 y.o. female who presents for an Annual Wellness Visit. In addition, we addressed the following health issues:    Mammo:7/2017  C-scope:6/24/2015 due 2018  Dental Exam: years ago - she has dentures on top.  Eye Exam: Dr. Larkin on Thursday - goes q6mo  Exercise:  Just daily walking around her house.    Advanced Care Planning:  has an advance directive - a copy has been provided and is in file   Immunization History   Administered Date(s) Administered   • Flu Vaccine High Dose PF 65YR+ 10/13/2016, 10/24/2017   • Pneumococcal Conjugate 13-Valent (PCV13) 03/18/2015   • Pneumococcal Polysaccharide (PPSV23) 04/13/2016   • Tdap 10/13/2016     Denisse is here for follow up on her CKD, HTN, DM, HLD, CAD.  She is seeing endocrine for her DM.    Her A1c is where Dr. Gerard wants it to be.  She denies any cp or palp or dizziness or soa.  She will try to get more exercise as the weather improves.  She has chronic edema in her legs and wears her compression hose.        Review of Systems   Constitution: Negative for chills, fever and malaise/fatigue.   HENT: Negative for hearing loss, hoarse voice and sore throat.    Eyes: Negative for blurred vision, double vision and visual disturbance.   Cardiovascular: Positive for leg swelling (chronic). Negative for chest pain and palpitations.   Respiratory: Negative for cough and shortness of breath.    Endocrine: Negative for polydipsia and polyuria.   Hematologic/Lymphatic: Does not bruise/bleed easily.   Skin: Negative for rash and suspicious lesions.   Musculoskeletal: Positive for arthritis and joint pain. Negative for myalgias.   Gastrointestinal: Negative for bloating, abdominal pain, change in bowel habit, constipation, diarrhea, dysphagia, hematochezia,  melena, nausea and vomiting.   Genitourinary: Negative for dysuria and hematuria.   Neurological: Negative for dizziness, headaches and light-headedness.   Psychiatric/Behavioral: Negative for depression. The patient is not nervous/anxious.        Past Medical History:   Diagnosis Date   • Angiomyolipoma of kidney 3/30/2016   • CAD (coronary artery disease)     MI in 1996, treated medically.  PET 6/2016 with small-medium sized lateral infarct, no ischemia.  This wall motion abnormality was seen on echo as well.   • Chronic venous insufficiency    • Hyperlipidemia    • Hypertension    • Low back pain    • Mass of ovary 3/30/2016   • Morbid obesity    • Sleep apnea    • Type 2 diabetes mellitus    • Uterine leiomyoma 3/30/2016       Past Surgical History:   Procedure Laterality Date   • CATARACT EXTRACTION      X2    • HERNIA REPAIR     • HYSTERECTOMY     • TONSILLECTOMY         Social History     Social History   • Marital status: Single     Spouse name: N/A   • Number of children: 3   • Years of education: N/A     Occupational History   • retired from [employer]      Social History Main Topics   • Smoking status: Former Smoker     Packs/day: 0.25     Years: 2.00     Types: Cigarettes     Quit date: 1970   • Smokeless tobacco: Never Used      Comment: LIGHT USAGE   • Alcohol use No   • Drug use: No   • Sexual activity: Defer     Other Topics Concern   • Not on file     Social History Narrative   • No narrative on file       Family History   Problem Relation Age of Onset   • Diabetes Mother    • Heart attack Mother    • Hypertension Mother    • Hypothyroidism Mother    • Diabetes type II Son        Allergies   Allergen Reactions   • Ace Inhibitors Other (See Comments)     Hyperkalemia/ROB   • Angiotensin Receptor Blockers Other (See Comments)     Pt unable to remember   • Sulfa Antibiotics Itching     rash       Current Outpatient Prescriptions on File Prior to Visit   Medication Sig Dispense Refill   •  acetaminophen-codeine (TYLENOL #3) 300-30 MG per tablet TAKE ONE TABLET BY MOUTH ONCE DAILY AS NEEDED FOR PAIN 30 tablet 2   • amLODIPine (NORVASC) 10 MG tablet Take 1 tablet by mouth Daily. 90 tablet 3   • aspirin 81 MG tablet Take  by mouth daily.     • docusate sodium (COLACE) 100 MG capsule Take 100 mg by mouth 2 (two) times a day.     • furosemide (LASIX) 40 MG tablet TAKE ONE TABLET BY MOUTH ONCE DAILY 90 tablet 3   • isosorbide mononitrate (IMDUR) 60 MG 24 hr tablet TAKE ONE TABLET BY MOUTH ONCE DAILY 30 tablet 3   • metoprolol succinate XL (TOPROL-XL) 25 MG 24 hr tablet Take 1 tablet by mouth Daily. 30 tablet 6   • Misc. Devices (ROLLATOR) misc 1 Units as needed (for walking). 1 each 0   • NOVOLIN N RELION 100 UNIT/ML injection INJECT 58 UNITS SUBCUTANEOUSLY IN THE MORNING AND 50 IN THE EVENING 40 mL 3   • NOVOLIN R RELION 100 UNIT/ML injection INJECT 50 UNITS SUBCUTANEOUSLY IN THE MORNING AND 48 IN THE EVENING 30 mL 3   • simvastatin (ZOCOR) 40 MG tablet TAKE ONE-HALF TABLET BY MOUTH ONCE DAILY 45 tablet 3     No current facility-administered medications on file prior to visit.        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   • Type II diabetes mellitus with neurological manifestations, uncontrolled   • Uncontrolled type 2 diabetes mellitus with background retinopathy   • Hyperinsulinism   • Abnormal weight gain   • Hyperlipidemia   • Essential hypertension   • Edema of lower extremity   • Angiomyolipoma of kidney   • Memory changes   • Left hip pain   • Left-sided low back pain without sciatica   • Lumbar spinal stenosis   • Encounter for long-term (current) use of insulin   • Obstructive sleep apnea on autoCPAP   • Chronic kidney disease, stage III (moderate)   • Chronic venous insufficiency   • CAD (coronary artery disease)   • Type 2 diabetes mellitus  "with stage 3 chronic kidney disease, with long-term current use of insulin   • Circadian rhythm sleep disorder, delayed sleep phase type   • Class 3 obesity due to excess calories with serious comorbidity and body mass index (BMI) of 40.0 to 44.9 in adult       Health Risk Assessment/Depression Screen/Functional and Cognitive Screening:   The patient has completed a Health Risk Assessment & Depression screen. These have been reviewed with them and have been scanned as a separate documents.    Age-appropriate Screening Schedule:  Refer to the list below for future screening recommendations based on patient's age. Orders for these recommended tests are listed in the plan section. The patient has been provided with a written plan.     I have reviewed their problem list, past medical history, family history, social history, and surgical history.     Vitals:    05/01/18 1302   BP: 130/72   Pulse: 78   Resp: 18   SpO2: 98%   Weight: 101 kg (222 lb)   Height: 157.5 cm (62.01\")   PainSc: 4  Comment: Back and hips. Chronic pain.       Body mass index is 40.59 kg/m².    Physical Exam   Constitutional: She is oriented to person, place, and time. She appears well-developed and well-nourished. No distress.   HENT:   Head: Normocephalic and atraumatic.   Nose: Nose normal.   Mouth/Throat: Oropharynx is clear and moist.   Eyes: Conjunctivae and EOM are normal. Pupils are equal, round, and reactive to light. No scleral icterus.   Neck: Normal range of motion. Neck supple. No thyromegaly present.   Cardiovascular: Normal rate, regular rhythm and normal heart sounds.  Exam reveals no gallop and no friction rub.    No murmur heard.  Pulses:       Carotid pulses are 2+ on the right side, and 2+ on the left side.       Femoral pulses are 2+ on the right side, and 2+ on the left side.       Dorsalis pedis pulses are 2+ on the right side, and 2+ on the left side.        Posterior tibial pulses are 2+ on the right side, and 2+ on the left " side.   Pulmonary/Chest: Effort normal and breath sounds normal. No respiratory distress. She has no wheezes. She has no rales. Right breast exhibits no mass and no nipple discharge. Left breast exhibits no mass and no nipple discharge.   Abdominal: Soft. Bowel sounds are normal. She exhibits no distension and no mass. There is no tenderness.   Musculoskeletal: Normal range of motion. She exhibits no edema.     Vascular Status -  Her right foot exhibits no edema. Her left foot exhibits no edema.  Skin Integrity  -  Her right foot skin is intact.Her left foot skin is intact..  Lymphadenopathy:     She has no cervical adenopathy.     She has no axillary adenopathy.        Right: No inguinal and no supraclavicular adenopathy present.        Left: No inguinal and no supraclavicular adenopathy present.   Neurological: She is alert and oriented to person, place, and time. She has normal reflexes. No cranial nerve deficit.   Skin: Skin is warm and dry.   Psychiatric: She has a normal mood and affect. Her speech is normal and behavior is normal. Judgment and thought content normal. Cognition and memory are normal.   Vitals reviewed.      Results for orders placed or performed in visit on 04/24/18   Urine culture, Comprehensive   Result Value Ref Range    Urine Culture Final report     Result 1 Comment    Comprehensive Metabolic Panel   Result Value Ref Range    Glucose 203 (H) 65 - 99 mg/dL    BUN 20 8 - 23 mg/dL    Creatinine 1.11 (H) 0.57 - 1.00 mg/dL    eGFR Non African Am 48 (L) >60 mL/min/1.73    eGFR African Am 59 (L) >60 mL/min/1.73    BUN/Creatinine Ratio 18.0 7.0 - 25.0    Sodium 140 136 - 145 mmol/L    Potassium 4.7 3.5 - 5.2 mmol/L    Chloride 101 98 - 107 mmol/L    Total CO2 27.8 22.0 - 29.0 mmol/L    Calcium 9.6 8.6 - 10.5 mg/dL    Total Protein 6.8 6.0 - 8.5 g/dL    Albumin 4.00 3.50 - 5.20 g/dL    Globulin 2.8 gm/dL    A/G Ratio 1.4 g/dL    Total Bilirubin 0.5 0.1 - 1.2 mg/dL    Alkaline Phosphatase 89 39 -  117 U/L    AST (SGOT) 14 1 - 32 U/L    ALT (SGPT) 15 1 - 33 U/L   Lipid Panel   Result Value Ref Range    Total Cholesterol 186 0 - 200 mg/dL    Triglycerides 269 (H) 0 - 150 mg/dL    HDL Cholesterol 32 (L) 40 - 60 mg/dL    VLDL Cholesterol 53.8 (H) 5 - 40 mg/dL    LDL Cholesterol  100 0 - 100 mg/dL   TSH Rfx On Abnormal To Free T4   Result Value Ref Range    TSH 4.40 (H) 0.27 - 4.2 mIU/mL   Urinalysis With / Culture If Indicated - Urine, Clean Catch   Result Value Ref Range    Specific Gravity, UA 1.016 1.005 - 1.030    pH, UA 5.5 5.0 - 7.5    Color, UA Yellow Yellow    Appearance, UA Clear Clear    Leukocytes, UA Trace (A) Negative    Protein 2+ (A) Negative/Trace    Glucose, UA Negative Negative    Ketones Negative Negative    Blood, UA Negative Negative    Bilirubin, UA Negative Negative    Urobilinogen, UA 0.2 0.2 - 1.0 mg/dL    Nitrite, UA Negative Negative    Microscopic Examination See below:     Urinalysis Reflex Comment    Hemoglobin A1c   Result Value Ref Range    Hemoglobin A1C 7.50 (H) 4.80 - 5.60 %   Microalbumin / Creatinine Urine Ratio - Urine, Clean Catch   Result Value Ref Range    Creatinine, Urine 39.2 Not Estab. mg/dL    Microalbumin, Urine 310.3 Not Estab. ug/mL    Microalbumin/Creatinine Ratio 791.6 (H) 0.0 - 30.0 mg/g creat   Microscopic Examination   Result Value Ref Range    WBC, UA 0-5 0 - 5 /hpf    RBC, UA 0-2 0 - 2 /hpf    Epithelial Cells (non renal) 0-10 0 - 10 /hpf    Bacteria, UA Few None seen/Few /hpf   T4F   Result Value Ref Range    Free T4 1.19 0.93 - 1.70 ng/dL   CBC & Differential   Result Value Ref Range    WBC 9.13 4.50 - 10.70 10*3/mm3    RBC 4.71 3.90 - 5.20 10*6/mm3    Hemoglobin 13.1 11.9 - 15.5 g/dL    Hematocrit 41.7 35.6 - 45.5 %    MCV 88.5 80.5 - 98.2 fL    MCH 27.8 26.9 - 32.0 pg    MCHC 31.4 (L) 32.4 - 36.3 g/dL    RDW 14.9 (H) 11.7 - 13.0 %    Platelets 235 140 - 500 10*3/mm3    Neutrophil Rel % 65.2 42.7 - 76.0 %    Lymphocyte Rel % 22.6 19.6 - 45.3 %    Monocyte  Rel % 8.4 5.0 - 12.0 %    Eosinophil Rel % 3.6 0.3 - 6.2 %    Basophil Rel % 0.2 0.0 - 1.5 %    Neutrophils Absolute 5.95 1.90 - 8.10 10*3/mm3    Lymphocytes Absolute 2.06 0.90 - 4.80 10*3/mm3    Monocytes Absolute 0.77 0.20 - 1.20 10*3/mm3    Eosinophils Absolute 0.33 0.00 - 0.70 10*3/mm3    Basophils Absolute 0.02 0.00 - 0.20 10*3/mm3    Immature Granulocyte Rel % 0.4 0.0 - 0.5 %    Immature Grans Absolute 0.04 (H) 0.00 - 0.03 10*3/mm3       Assessment & Plan:    Diagnoses and all orders for this visit:    Medicare annual wellness visit, subsequent    Mixed hyperlipidemia    Essential hypertension    Coronary artery disease involving native coronary artery of native heart without angina pectoris    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  She is up to date on all health maintenance.  Her bp is stable.  Kidney function is stable.  Lipids are stable.   Continue all current meds.  Due for c-scope this year.        Follow Up:  Return in about 6 months (around 11/1/2018) for Next scheduled follow up, with nonfasting labs prior.     An After Visit Summary and PPPS with all of these plans were given to the patient.

## 2018-05-15 RX ORDER — ISOSORBIDE MONONITRATE 60 MG/1
TABLET, EXTENDED RELEASE ORAL
Qty: 30 TABLET | Refills: 3 | Status: SHIPPED | OUTPATIENT
Start: 2018-05-15 | End: 2018-09-28 | Stop reason: SDUPTHER

## 2018-05-24 ENCOUNTER — PREP FOR SURGERY (OUTPATIENT)
Dept: OTHER | Facility: HOSPITAL | Age: 73
End: 2018-05-24

## 2018-05-24 DIAGNOSIS — Z86.010 HISTORY OF COLON POLYPS: Primary | ICD-10-CM

## 2018-06-05 DIAGNOSIS — I10 ESSENTIAL HYPERTENSION: ICD-10-CM

## 2018-06-06 RX ORDER — METOPROLOL SUCCINATE 25 MG/1
TABLET, EXTENDED RELEASE ORAL
Qty: 30 TABLET | Refills: 6 | Status: SHIPPED | OUTPATIENT
Start: 2018-06-06 | End: 2019-02-11 | Stop reason: SDUPTHER

## 2018-06-13 PROBLEM — Z86.010 HISTORY OF COLON POLYPS: Status: ACTIVE | Noted: 2018-06-13

## 2018-06-13 PROBLEM — Z86.0100 HISTORY OF COLON POLYPS: Status: ACTIVE | Noted: 2018-06-13

## 2018-06-20 ENCOUNTER — ANESTHESIA (OUTPATIENT)
Dept: PAIN MEDICINE | Facility: HOSPITAL | Age: 73
End: 2018-06-20

## 2018-06-20 ENCOUNTER — ANESTHESIA EVENT (OUTPATIENT)
Dept: PAIN MEDICINE | Facility: HOSPITAL | Age: 73
End: 2018-06-20

## 2018-06-20 ENCOUNTER — HOSPITAL ENCOUNTER (OUTPATIENT)
Dept: PAIN MEDICINE | Facility: HOSPITAL | Age: 73
Discharge: HOME | End: 2018-06-20
Attending: ORTHOPAEDIC SURGERY | Admitting: ORTHOPAEDIC SURGERY

## 2018-06-20 ENCOUNTER — HOSPITAL ENCOUNTER (OUTPATIENT)
Dept: GENERAL RADIOLOGY | Facility: HOSPITAL | Age: 73
Discharge: HOME | End: 2018-06-20

## 2018-06-20 VITALS
TEMPERATURE: 98.5 F | HEART RATE: 68 BPM | SYSTOLIC BLOOD PRESSURE: 170 MMHG | DIASTOLIC BLOOD PRESSURE: 68 MMHG | OXYGEN SATURATION: 99 % | RESPIRATION RATE: 16 BRPM

## 2018-06-20 DIAGNOSIS — R52 PAIN: ICD-10-CM

## 2018-06-20 LAB — GLUCOSE BLDC GLUCOMTR-MCNC: 141 MG/DL (ref 70–130)

## 2018-06-20 PROCEDURE — 77003 FLUOROGUIDE FOR SPINE INJECT: CPT

## 2018-06-20 PROCEDURE — 25010000002 METHYLPREDNISOLONE PER 80 MG: Performed by: ANESTHESIOLOGY

## 2018-06-20 PROCEDURE — C1755 CATHETER, INTRASPINAL: HCPCS

## 2018-06-20 PROCEDURE — 82962 GLUCOSE BLOOD TEST: CPT

## 2018-06-20 RX ORDER — SODIUM CHLORIDE 0.9 % (FLUSH) 0.9 %
1-10 SYRINGE (ML) INJECTION AS NEEDED
Status: DISCONTINUED | OUTPATIENT
Start: 2018-06-20 | End: 2018-06-21 | Stop reason: HOSPADM

## 2018-06-20 RX ORDER — FENTANYL CITRATE 50 UG/ML
50 INJECTION, SOLUTION INTRAMUSCULAR; INTRAVENOUS AS NEEDED
Status: DISCONTINUED | OUTPATIENT
Start: 2018-06-20 | End: 2018-06-21 | Stop reason: HOSPADM

## 2018-06-20 RX ORDER — MIDAZOLAM HYDROCHLORIDE 1 MG/ML
1 INJECTION INTRAMUSCULAR; INTRAVENOUS AS NEEDED
Status: DISCONTINUED | OUTPATIENT
Start: 2018-06-20 | End: 2018-06-21 | Stop reason: HOSPADM

## 2018-06-20 RX ORDER — LIDOCAINE HYDROCHLORIDE 10 MG/ML
1 INJECTION, SOLUTION INFILTRATION; PERINEURAL ONCE AS NEEDED
Status: DISCONTINUED | OUTPATIENT
Start: 2018-06-20 | End: 2018-06-21 | Stop reason: HOSPADM

## 2018-06-20 RX ORDER — METHYLPREDNISOLONE ACETATE 80 MG/ML
80 INJECTION, SUSPENSION INTRA-ARTICULAR; INTRALESIONAL; INTRAMUSCULAR; SOFT TISSUE ONCE
Status: COMPLETED | OUTPATIENT
Start: 2018-06-20 | End: 2018-06-20

## 2018-06-20 RX ADMIN — METHYLPREDNISOLONE ACETATE 80 MG: 80 INJECTION, SUSPENSION INTRA-ARTICULAR; INTRALESIONAL; INTRAMUSCULAR; SOFT TISSUE at 08:31

## 2018-06-20 NOTE — H&P
INTERVAL HISTORY:    The patient returns for another Lumbar epidural steroid injection today.  They have received 50% improvement since their last injection with a pain level of 7/10 at its worst recently.    Conservative measures tried in the interim walking at home and Tylenol 3.  Her MRI shows spinal stenosis    Exam:  BP (!) 183/83 (BP Location: Left arm, Patient Position: Sitting)   Pulse 76   Temp 36.9 °C (98.5 °F) (Oral)   Resp 16   SpO2 98%   Airway Mallampatti 2  Alert and oriented      Diagnosis:  Post-Op Diagnosis Codes:     * Lumbar spinal stenosis [M48.061]     * Lumbar neuritis [M54.16]    Plan:  Lumbar epidural steroid injection under fluoroscopic guidance    I have encouraged them to continue:  1.  Physical therapy exercises at home as prescribed by physical therapy or from the pain clinic handout (given to the patient).  Continuation of these exercises every day, or multiple times per week, even when the patient has good pain relief, was stressed to the patient as a preventative measure to decrease the frequency and severity of future pain episodes.  2.  Continue pain medicines as already prescribed.  If patient not currently taking any, it is recommended to begin Acetaminophen 1000 mg po q 8 hours.  If other medicines containing Acetaminophen are currently prescribed, maintain daily dose at 3000mg.    3.  If they can tolerate NSAIDS, it is recommended to take Ibuprofen 600 mg po q 6 hours for 7 days during pain exacerbations.   Alternatively, they may substitute an NSAID of their choice (e.g. Aleve)  4.  Heat and ice to the affected area as tolerated for pain control.  It was discussed that heating pads can cause burns.  5.  Low impact exercise such as walking or water exercise was recommended to maintain overall health and aid in weight control.   6.  Follow up as needed for subsequent injections.  7.  Patient was counseled to abstain from tobacco products.

## 2018-06-20 NOTE — ANESTHESIA PROCEDURE NOTES
PAIN Epidural block    Patient location during procedure: pain clinic  Indication:procedure for pain  Performed By  Anesthesiologist: ROHITH PITTS  Preanesthetic Checklist  Completed: patient identified and risks and benefits discussed  Additional Notes  Diagnosis:     Post-Op Diagnosis Codes:     * Lumbar spinal stenosis (M48.061)     * Lumbar neuritis (M54.16)    Sedation:  None    No dye secondary to kidney disease    A lumbar epidural steroid injection with fluoroscopic guidance was performed.  Under fluoroscopic guidance, the epidural space was identified and accessed, confirmed by loss of resistance to saline.  The above medications were injected uneventfully.    Prep:  Pt Position:prone  Sterile Tech:cap, gloves, mask and sterile barrier  Prep:chlorhexidine gluconate and isopropyl alcohol  Monitoring:blood pressure monitoring, continuous pulse oximetry and EKG  Procedure:  Sedation: no   Approach:midline  Guidance: fluoroscopy  Location:lumbar  Level:4-5  Needle Type:Tuohy  Needle Gauge:20  Aspiration:negative  Medications:  Depomedrol:80  Preservative Free Saline:1mL    Post Assessment:  Pt Tolerance:patient tolerated the procedure well with no apparent complications  Complications:no

## 2018-06-27 ENCOUNTER — OFFICE VISIT (OUTPATIENT)
Dept: CARDIOLOGY | Facility: CLINIC | Age: 73
End: 2018-06-27

## 2018-06-27 VITALS
DIASTOLIC BLOOD PRESSURE: 60 MMHG | SYSTOLIC BLOOD PRESSURE: 134 MMHG | HEART RATE: 71 BPM | BODY MASS INDEX: 41.41 KG/M2 | WEIGHT: 225 LBS | HEIGHT: 62 IN

## 2018-06-27 DIAGNOSIS — I25.10 CORONARY ARTERY DISEASE INVOLVING NATIVE CORONARY ARTERY OF NATIVE HEART WITHOUT ANGINA PECTORIS: Primary | ICD-10-CM

## 2018-06-27 DIAGNOSIS — I35.8 AORTIC VALVE SCLEROSIS: ICD-10-CM

## 2018-06-27 DIAGNOSIS — I10 ESSENTIAL HYPERTENSION: ICD-10-CM

## 2018-06-27 DIAGNOSIS — R01.1 HEART MURMUR: ICD-10-CM

## 2018-06-27 DIAGNOSIS — I87.2 CHRONIC VENOUS INSUFFICIENCY: ICD-10-CM

## 2018-06-27 PROCEDURE — 93000 ELECTROCARDIOGRAM COMPLETE: CPT | Performed by: INTERNAL MEDICINE

## 2018-06-27 PROCEDURE — 99213 OFFICE O/P EST LOW 20 MIN: CPT | Performed by: INTERNAL MEDICINE

## 2018-06-27 NOTE — PROGRESS NOTES
Date of Office Visit: 2018  Encounter Provider: Warner Tang MD  Place of Service: [facility] CARDIOLOGY  Patient Name: Denisse Chavez  :1945    Chief Complaint   Patient presents with   • Congestive Heart Failure     1 yr FOLLOW UP    :     HPI: Denisse Chavez is a 72 y.o. female who presents today to follow up.  She established care with me in May 2016.    She suffered a myocardial infarction in [place] in . She underwent cardiac catheterization and was reportedly found to have nonobstructive disease for which medical therapy was recommended. Her anginal symptoms were chest heaviness and nausea. In 2016, an echo was checked which showed normal LV systolic function, grade II diastolic dysfunction, aortic valve calcification, but no aortic stenosis. There was hypokinesis of the lateral wall, so I ordered a PET.  This revealed a small-medium sized lateral infarct without ischemia.      She has chronic lower extremity edema; she does use leg wraps.  She denies angina, dyspnea, orthopnea, PND, or palpitations.    Past Medical History:   Diagnosis Date   • Angiomyolipoma of kidney 3/30/2016   • CAD (coronary artery disease)     MI in , treated medically.  PET 2016 with small-medium sized lateral infarct, no ischemia.  This wall motion abnormality was seen on echo as well.   • Chronic venous insufficiency    • Hyperlipidemia    • Hypertension    • Low back pain    • Mass of ovary 3/30/2016   • Morbid obesity    • Sleep apnea    • Type 2 diabetes mellitus    • Uterine leiomyoma 3/30/2016       Past Surgical History:   Procedure Laterality Date   • CATARACT EXTRACTION      X2    • HERNIA REPAIR     • HYSTERECTOMY     • TONSILLECTOMY         Social History     Social History   • Marital status: Single     Spouse name: N/A   • Number of children: 3   • Years of education: N/A     Occupational History   • retired from [email or web address]      Social History Main Topics   •  Smoking status: Former Smoker     Packs/day: 0.25     Years: 2.00     Types: Cigarettes     Quit date: 1970   • Smokeless tobacco: Never Used      Comment: LIGHT USAGE   • Alcohol use No      Comment: CAFFEINE USE: 10 -12 CUPS OF COFFEE DAILY.    • Drug use: No   • Sexual activity: Defer     Other Topics Concern   • Not on file     Social History Narrative   • No narrative on file       Family History   Problem Relation Age of Onset   • Diabetes Mother    • Heart attack Mother    • Hypertension Mother    • Hypothyroidism Mother    • Diabetes type II Son        Review of Systems   Constitution: Positive for malaise/fatigue.   Cardiovascular: Positive for leg swelling. Negative for chest pain and palpitations.   Respiratory: Negative for shortness of breath.    Endocrine: Positive for polyuria.   Musculoskeletal: Positive for joint pain and joint swelling.   Neurological: Negative for light-headedness.   All other systems reviewed and are negative.      Allergies   Allergen Reactions   • Ace Inhibitors Other (See Comments)     Hyperkalemia/ROB   • Angiotensin Receptor Blockers Other (See Comments)     Pt unable to remember   • Sulfa Antibiotics Itching     rash         Current Outpatient Prescriptions:   •  acetaminophen-codeine (TYLENOL #3) 300-30 MG per tablet, TAKE ONE TABLET BY MOUTH ONCE DAILY AS NEEDED FOR PAIN, Disp: 30 tablet, Rfl: 2  •  amLODIPine (NORVASC) 10 MG tablet, Take 1 tablet by mouth Daily., Disp: 90 tablet, Rfl: 3  •  aspirin 81 MG tablet, Take  by mouth daily., Disp: , Rfl:   •  docusate sodium (COLACE) 100 MG capsule, Take 100 mg by mouth 2 (two) times a day., Disp: , Rfl:   •  furosemide (LASIX) 40 MG tablet, TAKE ONE TABLET BY MOUTH ONCE DAILY, Disp: 90 tablet, Rfl: 3  •  isosorbide mononitrate (IMDUR) 60 MG 24 hr tablet, TAKE ONE TABLET BY MOUTH ONCE DAILY, Disp: 30 tablet, Rfl: 3  •  metoprolol succinate XL (TOPROL-XL) 25 MG 24 hr tablet, TAKE ONE TABLET BY MOUTH ONCE DAILY, Disp: 30 tablet,  "Rfl: 6  •  Misc. Devices (ROLLATOR) misc, 1 Units as needed (for walking)., Disp: 1 each, Rfl: 0  •  NOVOLIN N RELION 100 UNIT/ML injection, INJECT 58 UNITS SUBCUTANEOUSLY IN THE MORNING AND 50 IN THE EVENING (Patient taking differently: INJECT 58 UNITS SUBCUTANEOUSLY IN THE MORNING AND 42 IN THE EVENING), Disp: 40 mL, Rfl: 3  •  NOVOLIN R RELION 100 UNIT/ML injection, INJECT 50 UNITS SUBCUTANEOUSLY IN THE MORNING AND 48 IN THE EVENING, Disp: 30 mL, Rfl: 3  •  simvastatin (ZOCOR) 40 MG tablet, TAKE ONE-HALF TABLET BY MOUTH ONCE DAILY, Disp: 45 tablet, Rfl: 3     Objective:     Vitals:    06/27/18 1447   BP: 134/60   BP Location: Left arm   Pulse: 71   Weight: 102 kg (225 lb)   Height: 157.5 cm (62.01\")     Body mass index is 41.14 kg/m².    Physical Exam   Constitutional: She is oriented to person, place, and time.   Obese   HENT:   Head: Normocephalic.   Nose: Nose normal.   Mouth/Throat: Oropharynx is clear and moist.   Eyes: Conjunctivae and EOM are normal. Pupils are equal, round, and reactive to light.   Neck: Normal range of motion.   Cannot assess for JVD due to habitus   Cardiovascular: Normal rate, regular rhythm and intact distal pulses.  Exam reveals distant heart sounds.    Murmur heard.   Systolic murmur is present with a grade of 2/6   Pulmonary/Chest: Effort normal.   Abdominal: Soft.   Obesity limits abdominal exam   Musculoskeletal: Normal range of motion. She exhibits no edema.   Neurological: She is alert and oriented to person, place, and time. No cranial nerve deficit.   Skin: Skin is warm and dry. No rash noted.   Psychiatric: She has a normal mood and affect. Her behavior is normal. Judgment and thought content normal.   Vitals reviewed.        ECG 12 Lead  Date/Time: 6/27/2018 3:11 PM  Performed by: TERA DOYLE  Authorized by: TERA DOYLE   Comparison: compared with previous ECG   Similar to previous ECG  Rhythm: sinus rhythm  Conduction: conduction normal  ST Segments: ST segments " normal  T Waves: T waves normal  QRS axis: normal  Other: no other findings  Clinical impression: normal ECG              Assessment:       Diagnosis Plan   1. Coronary artery disease involving native coronary artery of native heart without angina pectoris     2. Essential hypertension     3. Chronic venous insufficiency     4. Aortic valve sclerosis  Adult Transthoracic Echo Complete W/ Cont if Necessary Per Protocol   5. Heart murmur            Plan:       1.  Coronary Artery Disease  Assessment  • The patient has no angina  • She has no angina.  A nuclear stress in 2016 showed no ischemia.  She has normal LV systolic function.  She has evidence of a prior lateral wall infarct.  She's on a statin.    Subjective - Objective  • There is a history of past MI 1996  • Current antiplatelet therapy includes aspirin 81 mg      2.  Her BP is within goal.     3.  She has chronic leg swelling which is multifactorial.  She is on furosemide and she wraps them.    4/5.  An echo in 2016 showed sclerosis without stenosis.  She has a soft murmur on exam. I will repeat her echo.      Sincerely,       Warner Tang MD

## 2018-07-02 DIAGNOSIS — I10 ESSENTIAL HYPERTENSION: ICD-10-CM

## 2018-07-03 RX ORDER — AMLODIPINE BESYLATE 10 MG/1
TABLET ORAL
Qty: 90 TABLET | Refills: 3 | Status: SHIPPED | OUTPATIENT
Start: 2018-07-03 | End: 2019-08-19 | Stop reason: SDUPTHER

## 2018-07-09 RX ORDER — ACETAMINOPHEN AND CODEINE PHOSPHATE 300; 30 MG/1; MG/1
1 TABLET ORAL EVERY 6 HOURS PRN
Qty: 30 TABLET | Refills: 2 | Status: SHIPPED | OUTPATIENT
Start: 2018-07-09 | End: 2018-07-21 | Stop reason: HOSPADM

## 2018-07-09 RX ORDER — ACETAMINOPHEN AND CODEINE PHOSPHATE 300; 30 MG/1; MG/1
TABLET ORAL
Qty: 30 TABLET | Refills: 2 | Status: CANCELLED | OUTPATIENT
Start: 2018-07-09

## 2018-07-15 ENCOUNTER — HOSPITAL ENCOUNTER (INPATIENT)
Facility: HOSPITAL | Age: 73
LOS: 5 days | Discharge: NURSING, REHAB OR LONG-TERM CARE | End: 2018-07-21
Attending: EMERGENCY MEDICINE | Admitting: INTERNAL MEDICINE

## 2018-07-15 ENCOUNTER — APPOINTMENT (OUTPATIENT)
Dept: GENERAL RADIOLOGY | Facility: HOSPITAL | Age: 73
End: 2018-07-15

## 2018-07-15 DIAGNOSIS — Z79.4 TYPE 2 DIABETES MELLITUS WITH COMPLICATION, WITH LONG-TERM CURRENT USE OF INSULIN (HCC): ICD-10-CM

## 2018-07-15 DIAGNOSIS — L03.115 CELLULITIS OF RIGHT LEG: Primary | ICD-10-CM

## 2018-07-15 DIAGNOSIS — L97.519 DIABETIC ULCER OF RIGHT FOOT ASSOCIATED WITH OTHER SPECIFIED DIABETES MELLITUS, UNSPECIFIED PART OF FOOT, UNSPECIFIED ULCER STAGE (HCC): ICD-10-CM

## 2018-07-15 DIAGNOSIS — R26.2 DIFFICULTY WALKING: ICD-10-CM

## 2018-07-15 DIAGNOSIS — E13.621 DIABETIC ULCER OF RIGHT FOOT ASSOCIATED WITH OTHER SPECIFIED DIABETES MELLITUS, UNSPECIFIED PART OF FOOT, UNSPECIFIED ULCER STAGE (HCC): ICD-10-CM

## 2018-07-15 DIAGNOSIS — A41.9 SEPSIS, DUE TO UNSPECIFIED ORGANISM: ICD-10-CM

## 2018-07-15 DIAGNOSIS — E11.8 TYPE 2 DIABETES MELLITUS WITH COMPLICATION, WITH LONG-TERM CURRENT USE OF INSULIN (HCC): ICD-10-CM

## 2018-07-15 LAB
BASOPHILS # BLD AUTO: 0.01 10*3/MM3 (ref 0–0.2)
BASOPHILS NFR BLD AUTO: 0.1 % (ref 0–1.5)
DEPRECATED RDW RBC AUTO: 44.6 FL (ref 37–54)
EOSINOPHIL # BLD AUTO: 0 10*3/MM3 (ref 0–0.7)
EOSINOPHIL NFR BLD AUTO: 0 % (ref 0.3–6.2)
ERYTHROCYTE [DISTWIDTH] IN BLOOD BY AUTOMATED COUNT: 14.2 % (ref 11.7–13)
HCT VFR BLD AUTO: 40.5 % (ref 35.6–45.5)
HGB BLD-MCNC: 13.2 G/DL (ref 11.9–15.5)
IMM GRANULOCYTES # BLD: 0.04 10*3/MM3 (ref 0–0.03)
IMM GRANULOCYTES NFR BLD: 0.2 % (ref 0–0.5)
LYMPHOCYTES # BLD AUTO: 0.78 10*3/MM3 (ref 0.9–4.8)
LYMPHOCYTES NFR BLD AUTO: 4.4 % (ref 19.6–45.3)
MCH RBC QN AUTO: 28.3 PG (ref 26.9–32)
MCHC RBC AUTO-ENTMCNC: 32.6 G/DL (ref 32.4–36.3)
MCV RBC AUTO: 86.9 FL (ref 80.5–98.2)
MONOCYTES # BLD AUTO: 0.89 10*3/MM3 (ref 0.2–1.2)
MONOCYTES NFR BLD AUTO: 5 % (ref 5–12)
NEUTROPHILS # BLD AUTO: 15.98 10*3/MM3 (ref 1.9–8.1)
NEUTROPHILS NFR BLD AUTO: 90.3 % (ref 42.7–76)
PLATELET # BLD AUTO: 200 10*3/MM3 (ref 140–500)
PMV BLD AUTO: 10.4 FL (ref 6–12)
RBC # BLD AUTO: 4.66 10*6/MM3 (ref 3.9–5.2)
WBC NRBC COR # BLD: 17.7 10*3/MM3 (ref 4.5–10.7)

## 2018-07-15 PROCEDURE — 87040 BLOOD CULTURE FOR BACTERIA: CPT | Performed by: EMERGENCY MEDICINE

## 2018-07-15 PROCEDURE — 85025 COMPLETE CBC W/AUTO DIFF WBC: CPT | Performed by: EMERGENCY MEDICINE

## 2018-07-15 PROCEDURE — 93005 ELECTROCARDIOGRAM TRACING: CPT | Performed by: EMERGENCY MEDICINE

## 2018-07-15 PROCEDURE — 71046 X-RAY EXAM CHEST 2 VIEWS: CPT

## 2018-07-15 PROCEDURE — 99284 EMERGENCY DEPT VISIT MOD MDM: CPT

## 2018-07-15 PROCEDURE — 84145 PROCALCITONIN (PCT): CPT | Performed by: EMERGENCY MEDICINE

## 2018-07-15 PROCEDURE — 25010000002 PIPERACILLIN SOD-TAZOBACTAM PER 1 G: Performed by: EMERGENCY MEDICINE

## 2018-07-15 PROCEDURE — 80053 COMPREHEN METABOLIC PANEL: CPT | Performed by: EMERGENCY MEDICINE

## 2018-07-15 PROCEDURE — 93010 ELECTROCARDIOGRAM REPORT: CPT | Performed by: INTERNAL MEDICINE

## 2018-07-15 PROCEDURE — 83605 ASSAY OF LACTIC ACID: CPT | Performed by: EMERGENCY MEDICINE

## 2018-07-15 RX ORDER — SODIUM CHLORIDE 0.9 % (FLUSH) 0.9 %
10 SYRINGE (ML) INJECTION AS NEEDED
Status: DISCONTINUED | OUTPATIENT
Start: 2018-07-15 | End: 2018-07-21 | Stop reason: HOSPADM

## 2018-07-15 RX ORDER — ACETAMINOPHEN 500 MG
1000 TABLET ORAL ONCE
Status: COMPLETED | OUTPATIENT
Start: 2018-07-15 | End: 2018-07-15

## 2018-07-15 RX ADMIN — TAZOBACTAM SODIUM AND PIPERACILLIN SODIUM 4.5 G: 500; 4 INJECTION, SOLUTION INTRAVENOUS at 23:27

## 2018-07-15 RX ADMIN — ACETAMINOPHEN 1000 MG: 500 TABLET, FILM COATED ORAL at 23:27

## 2018-07-16 ENCOUNTER — APPOINTMENT (OUTPATIENT)
Dept: CARDIOLOGY | Facility: HOSPITAL | Age: 73
End: 2018-07-16
Attending: INTERNAL MEDICINE

## 2018-07-16 ENCOUNTER — TELEPHONE (OUTPATIENT)
Dept: INTERNAL MEDICINE | Facility: CLINIC | Age: 73
End: 2018-07-16

## 2018-07-16 PROBLEM — L03.115 CELLULITIS OF RIGHT LEG: Status: ACTIVE | Noted: 2018-07-16

## 2018-07-16 PROBLEM — A41.9 SEPSIS: Status: ACTIVE | Noted: 2018-07-16

## 2018-07-16 PROBLEM — J06.9 VIRAL UPPER RESPIRATORY INFECTION: Status: ACTIVE | Noted: 2018-07-16

## 2018-07-16 LAB
ALBUMIN SERPL-MCNC: 3.5 G/DL (ref 3.5–5.2)
ALBUMIN SERPL-MCNC: 4.1 G/DL (ref 3.5–5.2)
ALBUMIN/GLOB SERPL: 1.4 G/DL
ALBUMIN/GLOB SERPL: 1.4 G/DL
ALP SERPL-CCNC: 64 U/L (ref 39–117)
ALP SERPL-CCNC: 76 U/L (ref 39–117)
ALT SERPL W P-5'-P-CCNC: 17 U/L (ref 1–33)
ALT SERPL W P-5'-P-CCNC: 19 U/L (ref 1–33)
ANION GAP SERPL CALCULATED.3IONS-SCNC: 14.6 MMOL/L
ANION GAP SERPL CALCULATED.3IONS-SCNC: 17.1 MMOL/L
AST SERPL-CCNC: 47 U/L (ref 1–32)
AST SERPL-CCNC: 53 U/L (ref 1–32)
B PERT DNA SPEC QL NAA+PROBE: NOT DETECTED
BACTERIA UR QL AUTO: ABNORMAL /HPF
BASOPHILS # BLD AUTO: 0.01 10*3/MM3 (ref 0–0.2)
BASOPHILS NFR BLD AUTO: 0.1 % (ref 0–1.5)
BH CV LOW VAS RIGHT LESSER SAPH VESSEL: 1
BH CV LOWER VASCULAR LEFT COMMON FEMORAL AUGMENT: NORMAL
BH CV LOWER VASCULAR LEFT COMMON FEMORAL COMPETENT: NORMAL
BH CV LOWER VASCULAR LEFT COMMON FEMORAL COMPRESS: NORMAL
BH CV LOWER VASCULAR LEFT COMMON FEMORAL PHASIC: NORMAL
BH CV LOWER VASCULAR LEFT COMMON FEMORAL SPONT: NORMAL
BH CV LOWER VASCULAR RIGHT COMMON FEMORAL AUGMENT: NORMAL
BH CV LOWER VASCULAR RIGHT COMMON FEMORAL COMPETENT: NORMAL
BH CV LOWER VASCULAR RIGHT COMMON FEMORAL COMPRESS: NORMAL
BH CV LOWER VASCULAR RIGHT COMMON FEMORAL PHASIC: NORMAL
BH CV LOWER VASCULAR RIGHT COMMON FEMORAL SPONT: NORMAL
BH CV LOWER VASCULAR RIGHT DISTAL FEMORAL COMPRESS: NORMAL
BH CV LOWER VASCULAR RIGHT GASTRONEMIUS COMPRESS: NORMAL
BH CV LOWER VASCULAR RIGHT GREATER SAPH AK COMPRESS: NORMAL
BH CV LOWER VASCULAR RIGHT GREATER SAPH BK COMPRESS: NORMAL
BH CV LOWER VASCULAR RIGHT LESSER SAPH COMPRESS: NORMAL
BH CV LOWER VASCULAR RIGHT LESSER SAPH THROMBUS: NORMAL
BH CV LOWER VASCULAR RIGHT MID FEMORAL AUGMENT: NORMAL
BH CV LOWER VASCULAR RIGHT MID FEMORAL COMPETENT: NORMAL
BH CV LOWER VASCULAR RIGHT MID FEMORAL COMPRESS: NORMAL
BH CV LOWER VASCULAR RIGHT MID FEMORAL PHASIC: NORMAL
BH CV LOWER VASCULAR RIGHT MID FEMORAL SPONT: NORMAL
BH CV LOWER VASCULAR RIGHT PERONEAL COMPRESS: NORMAL
BH CV LOWER VASCULAR RIGHT POPLITEAL AUGMENT: NORMAL
BH CV LOWER VASCULAR RIGHT POPLITEAL COMPETENT: NORMAL
BH CV LOWER VASCULAR RIGHT POPLITEAL COMPRESS: NORMAL
BH CV LOWER VASCULAR RIGHT POPLITEAL PHASIC: NORMAL
BH CV LOWER VASCULAR RIGHT POPLITEAL SPONT: NORMAL
BH CV LOWER VASCULAR RIGHT POSTERIOR TIBIAL COMPRESS: NORMAL
BH CV LOWER VASCULAR RIGHT PROXIMAL FEMORAL COMPRESS: NORMAL
BH CV LOWER VASCULAR RIGHT SAPHENOFEMORAL JUNCTION AUGMENT: NORMAL
BH CV LOWER VASCULAR RIGHT SAPHENOFEMORAL JUNCTION COMPETENT: NORMAL
BH CV LOWER VASCULAR RIGHT SAPHENOFEMORAL JUNCTION COMPRESS: NORMAL
BH CV LOWER VASCULAR RIGHT SAPHENOFEMORAL JUNCTION PHASIC: NORMAL
BH CV LOWER VASCULAR RIGHT SAPHENOFEMORAL JUNCTION SPONT: NORMAL
BILIRUB SERPL-MCNC: 0.7 MG/DL (ref 0.1–1.2)
BILIRUB SERPL-MCNC: 0.8 MG/DL (ref 0.1–1.2)
BILIRUB UR QL STRIP: NEGATIVE
BUN BLD-MCNC: 19 MG/DL (ref 8–23)
BUN BLD-MCNC: 21 MG/DL (ref 8–23)
BUN/CREAT SERPL: 20.2 (ref 7–25)
BUN/CREAT SERPL: 20.6 (ref 7–25)
C PNEUM DNA NPH QL NAA+NON-PROBE: NOT DETECTED
CALCIUM SPEC-SCNC: 7.8 MG/DL (ref 8.6–10.5)
CALCIUM SPEC-SCNC: 9 MG/DL (ref 8.6–10.5)
CHLORIDE SERPL-SCNC: 100 MMOL/L (ref 98–107)
CHLORIDE SERPL-SCNC: 98 MMOL/L (ref 98–107)
CLARITY UR: CLEAR
CO2 SERPL-SCNC: 20.4 MMOL/L (ref 22–29)
CO2 SERPL-SCNC: 21.9 MMOL/L (ref 22–29)
COLOR UR: YELLOW
CREAT BLD-MCNC: 0.94 MG/DL (ref 0.57–1)
CREAT BLD-MCNC: 1.02 MG/DL (ref 0.57–1)
D-LACTATE SERPL-SCNC: 0.9 MMOL/L (ref 0.5–2)
D-LACTATE SERPL-SCNC: 2.2 MMOL/L (ref 0.5–2)
DEPRECATED RDW RBC AUTO: 46.2 FL (ref 37–54)
EOSINOPHIL # BLD AUTO: 0 10*3/MM3 (ref 0–0.7)
EOSINOPHIL NFR BLD AUTO: 0 % (ref 0.3–6.2)
ERYTHROCYTE [DISTWIDTH] IN BLOOD BY AUTOMATED COUNT: 14.5 % (ref 11.7–13)
FLUAV H1 2009 PAND RNA NPH QL NAA+PROBE: NOT DETECTED
FLUAV H1 HA GENE NPH QL NAA+PROBE: NOT DETECTED
FLUAV H3 RNA NPH QL NAA+PROBE: NOT DETECTED
FLUAV SUBTYP SPEC NAA+PROBE: NOT DETECTED
FLUBV RNA ISLT QL NAA+PROBE: NOT DETECTED
GFR SERPL CREATININE-BSD FRML MDRD: 53 ML/MIN/1.73
GFR SERPL CREATININE-BSD FRML MDRD: 59 ML/MIN/1.73
GLOBULIN UR ELPH-MCNC: 2.5 GM/DL
GLOBULIN UR ELPH-MCNC: 3 GM/DL
GLUCOSE BLD-MCNC: 206 MG/DL (ref 65–99)
GLUCOSE BLD-MCNC: 247 MG/DL (ref 65–99)
GLUCOSE BLDC GLUCOMTR-MCNC: 138 MG/DL (ref 70–130)
GLUCOSE BLDC GLUCOMTR-MCNC: 217 MG/DL (ref 70–130)
GLUCOSE BLDC GLUCOMTR-MCNC: 222 MG/DL (ref 70–130)
GLUCOSE BLDC GLUCOMTR-MCNC: 252 MG/DL (ref 70–130)
GLUCOSE BLDC GLUCOMTR-MCNC: 273 MG/DL (ref 70–130)
GLUCOSE BLDC GLUCOMTR-MCNC: 329 MG/DL (ref 70–130)
GLUCOSE UR STRIP-MCNC: ABNORMAL MG/DL
HADV DNA SPEC NAA+PROBE: NOT DETECTED
HCOV 229E RNA SPEC QL NAA+PROBE: NOT DETECTED
HCOV HKU1 RNA SPEC QL NAA+PROBE: NOT DETECTED
HCOV NL63 RNA SPEC QL NAA+PROBE: NOT DETECTED
HCOV OC43 RNA SPEC QL NAA+PROBE: NOT DETECTED
HCT VFR BLD AUTO: 37.6 % (ref 35.6–45.5)
HGB BLD-MCNC: 12 G/DL (ref 11.9–15.5)
HGB UR QL STRIP.AUTO: ABNORMAL
HMPV RNA NPH QL NAA+NON-PROBE: NOT DETECTED
HOLD SPECIMEN: NORMAL
HPIV1 RNA SPEC QL NAA+PROBE: NOT DETECTED
HPIV2 RNA SPEC QL NAA+PROBE: NOT DETECTED
HPIV3 RNA NPH QL NAA+PROBE: NOT DETECTED
HPIV4 P GENE NPH QL NAA+PROBE: NOT DETECTED
HYALINE CASTS UR QL AUTO: ABNORMAL /LPF
IMM GRANULOCYTES # BLD: 0.03 10*3/MM3 (ref 0–0.03)
IMM GRANULOCYTES NFR BLD: 0.2 % (ref 0–0.5)
KETONES UR QL STRIP: ABNORMAL
LEUKOCYTE ESTERASE UR QL STRIP.AUTO: NEGATIVE
LYMPHOCYTES # BLD AUTO: 0.76 10*3/MM3 (ref 0.9–4.8)
LYMPHOCYTES NFR BLD AUTO: 4.7 % (ref 19.6–45.3)
M PNEUMO IGG SER IA-ACNC: NOT DETECTED
MCH RBC QN AUTO: 28.1 PG (ref 26.9–32)
MCHC RBC AUTO-ENTMCNC: 31.9 G/DL (ref 32.4–36.3)
MCV RBC AUTO: 88.1 FL (ref 80.5–98.2)
MONOCYTES # BLD AUTO: 0.9 10*3/MM3 (ref 0.2–1.2)
MONOCYTES NFR BLD AUTO: 5.6 % (ref 5–12)
NEUTROPHILS # BLD AUTO: 14.31 10*3/MM3 (ref 1.9–8.1)
NEUTROPHILS NFR BLD AUTO: 89.4 % (ref 42.7–76)
NITRITE UR QL STRIP: NEGATIVE
PH UR STRIP.AUTO: 8 [PH] (ref 5–8)
PLATELET # BLD AUTO: 175 10*3/MM3 (ref 140–500)
PMV BLD AUTO: 10.3 FL (ref 6–12)
POTASSIUM BLD-SCNC: 3.7 MMOL/L (ref 3.5–5.2)
POTASSIUM BLD-SCNC: 4.2 MMOL/L (ref 3.5–5.2)
PROCALCITONIN SERPL-MCNC: 1.02 NG/ML (ref 0.1–0.25)
PROCALCITONIN SERPL-MCNC: 1.91 NG/ML (ref 0.1–0.25)
PROT SERPL-MCNC: 6 G/DL (ref 6–8.5)
PROT SERPL-MCNC: 7.1 G/DL (ref 6–8.5)
PROT UR QL STRIP: ABNORMAL
RBC # BLD AUTO: 4.27 10*6/MM3 (ref 3.9–5.2)
RBC # UR: ABNORMAL /HPF
REF LAB TEST METHOD: ABNORMAL
RHINOVIRUS RNA SPEC NAA+PROBE: NOT DETECTED
RSV RNA NPH QL NAA+NON-PROBE: NOT DETECTED
SODIUM BLD-SCNC: 135 MMOL/L (ref 136–145)
SODIUM BLD-SCNC: 137 MMOL/L (ref 136–145)
SP GR UR STRIP: 1.02 (ref 1–1.03)
SQUAMOUS #/AREA URNS HPF: ABNORMAL /HPF
TRANS CELLS #/AREA URNS HPF: ABNORMAL /HPF
UROBILINOGEN UR QL STRIP: ABNORMAL
WBC NRBC COR # BLD: 16.01 10*3/MM3 (ref 4.5–10.7)
WBC UR QL AUTO: ABNORMAL /HPF

## 2018-07-16 PROCEDURE — 25010000002 ENOXAPARIN PER 10 MG: Performed by: HOSPITALIST

## 2018-07-16 PROCEDURE — 87798 DETECT AGENT NOS DNA AMP: CPT | Performed by: INTERNAL MEDICINE

## 2018-07-16 PROCEDURE — 93971 EXTREMITY STUDY: CPT

## 2018-07-16 PROCEDURE — 25010000002 CEFEPIME PER 500 MG: Performed by: HOSPITALIST

## 2018-07-16 PROCEDURE — 85025 COMPLETE CBC W/AUTO DIFF WBC: CPT | Performed by: HOSPITALIST

## 2018-07-16 PROCEDURE — 80053 COMPREHEN METABOLIC PANEL: CPT | Performed by: HOSPITALIST

## 2018-07-16 PROCEDURE — 25010000002 VANCOMYCIN 10 G RECONSTITUTED SOLUTION: Performed by: EMERGENCY MEDICINE

## 2018-07-16 PROCEDURE — 99223 1ST HOSP IP/OBS HIGH 75: CPT | Performed by: INTERNAL MEDICINE

## 2018-07-16 PROCEDURE — 82962 GLUCOSE BLOOD TEST: CPT

## 2018-07-16 PROCEDURE — 63710000001 INSULIN ASPART PER 5 UNITS: Performed by: HOSPITALIST

## 2018-07-16 PROCEDURE — 87486 CHLMYD PNEUM DNA AMP PROBE: CPT | Performed by: INTERNAL MEDICINE

## 2018-07-16 PROCEDURE — 63710000001 INSULIN ISOPHANE HUMAN PER 5 UNITS: Performed by: HOSPITALIST

## 2018-07-16 PROCEDURE — 84145 PROCALCITONIN (PCT): CPT | Performed by: HOSPITALIST

## 2018-07-16 PROCEDURE — 81001 URINALYSIS AUTO W/SCOPE: CPT | Performed by: EMERGENCY MEDICINE

## 2018-07-16 PROCEDURE — 87633 RESP VIRUS 12-25 TARGETS: CPT | Performed by: INTERNAL MEDICINE

## 2018-07-16 PROCEDURE — 87581 M.PNEUMON DNA AMP PROBE: CPT | Performed by: INTERNAL MEDICINE

## 2018-07-16 PROCEDURE — 25010000002 VANCOMYCIN PER 500 MG: Performed by: HOSPITALIST

## 2018-07-16 PROCEDURE — 83605 ASSAY OF LACTIC ACID: CPT | Performed by: HOSPITALIST

## 2018-07-16 RX ORDER — ISOSORBIDE MONONITRATE 60 MG/1
60 TABLET, EXTENDED RELEASE ORAL DAILY
Status: DISCONTINUED | OUTPATIENT
Start: 2018-07-16 | End: 2018-07-21 | Stop reason: HOSPADM

## 2018-07-16 RX ORDER — SODIUM CHLORIDE 9 MG/ML
75 INJECTION, SOLUTION INTRAVENOUS CONTINUOUS
Status: DISCONTINUED | OUTPATIENT
Start: 2018-07-16 | End: 2018-07-17

## 2018-07-16 RX ORDER — ASPIRIN 81 MG/1
81 TABLET ORAL DAILY
Status: DISCONTINUED | OUTPATIENT
Start: 2018-07-16 | End: 2018-07-21 | Stop reason: HOSPADM

## 2018-07-16 RX ORDER — HYDROCODONE BITARTRATE AND ACETAMINOPHEN 7.5; 325 MG/1; MG/1
1 TABLET ORAL EVERY 4 HOURS PRN
Status: DISCONTINUED | OUTPATIENT
Start: 2018-07-16 | End: 2018-07-21 | Stop reason: HOSPADM

## 2018-07-16 RX ORDER — DOCUSATE SODIUM 100 MG/1
100 CAPSULE, LIQUID FILLED ORAL 2 TIMES DAILY
Status: DISCONTINUED | OUTPATIENT
Start: 2018-07-16 | End: 2018-07-21 | Stop reason: HOSPADM

## 2018-07-16 RX ORDER — NICOTINE POLACRILEX 4 MG
15 LOZENGE BUCCAL
Status: DISCONTINUED | OUTPATIENT
Start: 2018-07-16 | End: 2018-07-21 | Stop reason: HOSPADM

## 2018-07-16 RX ORDER — ONDANSETRON 2 MG/ML
4 INJECTION INTRAMUSCULAR; INTRAVENOUS EVERY 6 HOURS PRN
Status: DISCONTINUED | OUTPATIENT
Start: 2018-07-16 | End: 2018-07-21 | Stop reason: HOSPADM

## 2018-07-16 RX ORDER — ACETAMINOPHEN 325 MG/1
650 TABLET ORAL EVERY 4 HOURS PRN
Status: DISCONTINUED | OUTPATIENT
Start: 2018-07-16 | End: 2018-07-21 | Stop reason: HOSPADM

## 2018-07-16 RX ORDER — VANCOMYCIN HYDROCHLORIDE 1 G/200ML
1000 INJECTION, SOLUTION INTRAVENOUS EVERY 12 HOURS
Status: DISCONTINUED | OUTPATIENT
Start: 2018-07-16 | End: 2018-07-21 | Stop reason: HOSPADM

## 2018-07-16 RX ORDER — ATORVASTATIN CALCIUM 20 MG/1
20 TABLET, FILM COATED ORAL DAILY
Status: DISCONTINUED | OUTPATIENT
Start: 2018-07-16 | End: 2018-07-21 | Stop reason: HOSPADM

## 2018-07-16 RX ORDER — METOPROLOL SUCCINATE 25 MG/1
25 TABLET, EXTENDED RELEASE ORAL DAILY
Status: DISCONTINUED | OUTPATIENT
Start: 2018-07-16 | End: 2018-07-21 | Stop reason: HOSPADM

## 2018-07-16 RX ORDER — AMLODIPINE BESYLATE 10 MG/1
10 TABLET ORAL DAILY
Status: DISCONTINUED | OUTPATIENT
Start: 2018-07-16 | End: 2018-07-21 | Stop reason: HOSPADM

## 2018-07-16 RX ORDER — SODIUM CHLORIDE 0.9 % (FLUSH) 0.9 %
1-10 SYRINGE (ML) INJECTION AS NEEDED
Status: DISCONTINUED | OUTPATIENT
Start: 2018-07-16 | End: 2018-07-21 | Stop reason: HOSPADM

## 2018-07-16 RX ORDER — FUROSEMIDE 40 MG/1
40 TABLET ORAL DAILY
Status: DISCONTINUED | OUTPATIENT
Start: 2018-07-16 | End: 2018-07-16

## 2018-07-16 RX ORDER — DEXTROSE MONOHYDRATE 25 G/50ML
25 INJECTION, SOLUTION INTRAVENOUS
Status: DISCONTINUED | OUTPATIENT
Start: 2018-07-16 | End: 2018-07-21 | Stop reason: HOSPADM

## 2018-07-16 RX ADMIN — INSULIN ASPART 10 UNITS: 100 INJECTION, SOLUTION INTRAVENOUS; SUBCUTANEOUS at 12:18

## 2018-07-16 RX ADMIN — CEFEPIME HYDROCHLORIDE 1 G: 1 INJECTION, POWDER, FOR SOLUTION INTRAMUSCULAR; INTRAVENOUS at 17:09

## 2018-07-16 RX ADMIN — ENOXAPARIN SODIUM 40 MG: 40 INJECTION SUBCUTANEOUS at 18:18

## 2018-07-16 RX ADMIN — DOCUSATE SODIUM 100 MG: 100 CAPSULE, LIQUID FILLED ORAL at 21:55

## 2018-07-16 RX ADMIN — SODIUM CHLORIDE 100 ML/HR: 9 INJECTION, SOLUTION INTRAVENOUS at 03:38

## 2018-07-16 RX ADMIN — HUMAN INSULIN 40 UNITS: 100 INJECTION, SUSPENSION SUBCUTANEOUS at 17:08

## 2018-07-16 RX ADMIN — DOCUSATE SODIUM 100 MG: 100 CAPSULE, LIQUID FILLED ORAL at 09:12

## 2018-07-16 RX ADMIN — VANCOMYCIN HYDROCHLORIDE 2000 MG: 10 INJECTION, POWDER, LYOPHILIZED, FOR SOLUTION INTRAVENOUS at 00:05

## 2018-07-16 RX ADMIN — ATORVASTATIN CALCIUM 20 MG: 20 TABLET, FILM COATED ORAL at 09:13

## 2018-07-16 RX ADMIN — ISOSORBIDE MONONITRATE 60 MG: 60 TABLET, EXTENDED RELEASE ORAL at 09:13

## 2018-07-16 RX ADMIN — AMLODIPINE BESYLATE 10 MG: 10 TABLET ORAL at 09:13

## 2018-07-16 RX ADMIN — HUMAN INSULIN 40 UNITS: 100 INJECTION, SUSPENSION SUBCUTANEOUS at 09:13

## 2018-07-16 RX ADMIN — ENOXAPARIN SODIUM 40 MG: 40 INJECTION SUBCUTANEOUS at 06:17

## 2018-07-16 RX ADMIN — VANCOMYCIN HYDROCHLORIDE 1000 MG: 1 INJECTION, SOLUTION INTRAVENOUS at 12:36

## 2018-07-16 RX ADMIN — INSULIN ASPART 8 UNITS: 100 INJECTION, SOLUTION INTRAVENOUS; SUBCUTANEOUS at 17:08

## 2018-07-16 RX ADMIN — CEFEPIME HYDROCHLORIDE 2 G: 2 INJECTION, POWDER, FOR SOLUTION INTRAVENOUS at 04:32

## 2018-07-16 RX ADMIN — SODIUM CHLORIDE 1503 ML: 9 INJECTION, SOLUTION INTRAVENOUS at 01:11

## 2018-07-16 RX ADMIN — ASPIRIN 81 MG: 81 TABLET ORAL at 09:13

## 2018-07-16 RX ADMIN — METOPROLOL SUCCINATE 25 MG: 25 TABLET, FILM COATED, EXTENDED RELEASE ORAL at 09:13

## 2018-07-16 RX ADMIN — FUROSEMIDE 40 MG: 40 TABLET ORAL at 09:13

## 2018-07-16 NOTE — H&P
Name: Denisse Chavez ADMIT: 7/15/2018   : 1945  PCP: Agueda Rodriguez MD    MRN: [id number] LOS: 0 days   AGE/SEX: 72 y.o. female  ROOM: [facility]/     Chief Complaint   Patient presents with   • Shortness of Breath   • Fever   • Nausea       Subjective   Ms. Chavez is a 72 y.o. female with a history of type 2 diabetes on insulin, hypertension, chronic kidney disease, obesity, chronic right diabetic foot ulcer followed by podiatry who came to the hospital who came to the hospital for weakness, fatigue.  She also had upper respiratory symptoms with cough, sneezing and rhinorrhea.  In the ER she was found to have elevated temperature of 103.8.  She denies any new shortness of breath.  She does have a right plantar surface diabetic wound and has been followed by podiatry.  She saw the podiatrist on Tuesday (6 days ago) and there is no signs of infection.  Her son at the bedside says the toe wound on the plantar surface appears stable.  He noticed on  his mother had swelling, erythema and warmth of the right lower extremity.  This has progressed since  and prompted him to bring her to the emergency room.  He was so concerned by his mother he was checking up on her every few hours being worried that she was going to stop breathing.  She arrived she was given broad antibiotics with vancomycin and Zosyn.  She had multiple lab abnormalities and had sepsis secondary to the right lower extremity cellulitis.  She had elevated lactic acid of 2.2, elevated pro-calcitonin at 1.91, echo cytosis 17.7, fever of 103.8, left shift and she was admitted to our service.  She also has hyperglycemia and is on an unusual insulin regimen and is followed by Dr. Braden.  She does not have any history of DVTs.    History of Present Illness    Past Medical History:   Diagnosis Date   • Angiomyolipoma of kidney 3/30/2016   • CAD (coronary artery disease)     MI in , treated medically.  PET 2016 with small-medium  sized lateral infarct, no ischemia.  This wall motion abnormality was seen on echo as well.   • Chronic venous insufficiency    • Hyperlipidemia    • Hypertension    • Low back pain    • Mass of ovary 3/30/2016   • Morbid obesity (CMS/HCC)    • Sleep apnea    • Type 2 diabetes mellitus (CMS/HCC)    • Uterine leiomyoma 3/30/2016     Past Surgical History:   Procedure Laterality Date   • CATARACT EXTRACTION      X2    • HERNIA REPAIR     • HYSTERECTOMY     • TONSILLECTOMY       Family History   Problem Relation Age of Onset   • Diabetes Mother    • Heart attack Mother    • Hypertension Mother    • Hypothyroidism Mother    • Diabetes type II Son      Social History   Substance Use Topics   • Smoking status: Former Smoker     Packs/day: 0.25     Years: 2.00     Types: Cigarettes     Quit date: 1970   • Smokeless tobacco: Never Used      Comment: LIGHT USAGE   • Alcohol use No      Comment: CAFFEINE USE: 10 -12 CUPS OF COFFEE DAILY.      Prescriptions Prior to Admission   Medication Sig Dispense Refill Last Dose   • acetaminophen-codeine (TYLENOL #3) 300-30 MG per tablet Take 1 tablet by mouth Every 6 (Six) Hours As Needed for Moderate Pain . 30 tablet 2    • amLODIPine (NORVASC) 10 MG tablet TAKE ONE TABLET BY MOUTH ONCE DAILY 90 tablet 3    • aspirin 81 MG tablet Take  by mouth daily.   Taking   • docusate sodium (COLACE) 100 MG capsule Take 100 mg by mouth 2 (two) times a day.   Taking   • furosemide (LASIX) 40 MG tablet TAKE ONE TABLET BY MOUTH ONCE DAILY 90 tablet 3 Taking   • isosorbide mononitrate (IMDUR) 60 MG 24 hr tablet TAKE ONE TABLET BY MOUTH ONCE DAILY 30 tablet 3 Taking   • metoprolol succinate XL (TOPROL-XL) 25 MG 24 hr tablet TAKE ONE TABLET BY MOUTH ONCE DAILY 30 tablet 6 Taking   • Misc. Devices (ROLLATOR) misc 1 Units as needed (for walking). 1 each 0 Taking   • NOVOLIN N RELION 100 UNIT/ML injection INJECT 58 UNITS SUBCUTANEOUSLY IN THE MORNING AND 50 IN THE EVENING (Patient taking differently:  INJECT 58 UNITS SUBCUTANEOUSLY IN THE MORNING AND 42 IN THE EVENING) 40 mL 3 Taking   • NOVOLIN R RELION 100 UNIT/ML injection INJECT 50 UNITS SUBCUTANEOUSLY IN THE MORNING AND 48 IN THE EVENING 30 mL 3 Taking   • simvastatin (ZOCOR) 40 MG tablet TAKE ONE-HALF TABLET BY MOUTH ONCE DAILY 45 tablet 3 Taking     Allergies:    Allergies   Allergen Reactions   • Ace Inhibitors Other (See Comments)     Hyperkalemia/ROB   • Angiotensin Receptor Blockers Other (See Comments)     Pt unable to remember   • Sulfa Antibiotics Itching     rash       Review of Systems   Constitutional: Positive for activity change, fatigue and fever. Negative for appetite change.   HENT: Positive for rhinorrhea and sneezing. Negative for congestion, nosebleeds, sore throat and trouble swallowing.    Eyes: Negative for pain and visual disturbance.   Respiratory: Positive for cough. Negative for chest tightness and shortness of breath.    Cardiovascular: Positive for leg swelling (Right only). Negative for chest pain and palpitations.   Gastrointestinal: Negative for abdominal pain, constipation, diarrhea, nausea and vomiting.   Endocrine:        + for Diabetes but neg for thyroid disease   Genitourinary: Negative for difficulty urinating and hematuria.   Musculoskeletal: Negative.  Negative for back pain, neck pain and neck stiffness.   Skin: Positive for rash. Negative for wound.   Neurological: Positive for weakness. Negative for dizziness, syncope, light-headedness and headaches.   Hematological: Negative for adenopathy. Does not bruise/bleed easily.   Psychiatric/Behavioral: Negative for agitation, behavioral problems and confusion.        Objective    Vital Signs  Temp:  [99.1 °F (37.3 °C)-103.8 °F (39.9 °C)] 99.1 °F (37.3 °C)  Heart Rate:  [] 82  Resp:  [18-22] 18  BP: (157-205)/(62-81) 157/62  SpO2:  [94 %-99 %] 96 %  on   ;   Device (Oxygen Therapy): room air  Body mass index is 41.15 kg/m².    Physical Exam   Constitutional: She is  oriented to person, place, and time. She appears well-developed and well-nourished. No distress.   HENT:   Head: Normocephalic and atraumatic.   Mouth/Throat: Oropharynx is clear and moist. No oropharyngeal exudate.   Eyes: Conjunctivae and EOM are normal. Pupils are equal, round, and reactive to light. No scleral icterus.   Neck: Normal range of motion. Neck supple. No JVD present. No thyromegaly present.   Cardiovascular: Normal rate, regular rhythm and normal heart sounds.    No murmur heard.  Pulmonary/Chest: Effort normal and breath sounds normal. No stridor. No respiratory distress. She has no wheezes. She has no rales.   Abdominal: Soft. Bowel sounds are normal. She exhibits no distension. There is no tenderness.   Musculoskeletal: She exhibits edema (Nonpitting edema right lower extremity.  Bilateral chronic venous stasis changes.). She exhibits no tenderness.   Lymphadenopathy:     She has no cervical adenopathy.   Neurological: She is alert and oriented to person, place, and time. No cranial nerve deficit.   Skin: Skin is warm and dry. She is not diaphoretic. There is erythema.   There is a right plantar surface diabetic wound measuring about 3 x 2 cm that is not infected.  An area of erythema along the medial surface of the right leg extending from her ankle all the way to mid thigh which is warm, erythematous and mildly tender to palpation.   Psychiatric: She has a normal mood and affect. Her behavior is normal.       Results Review:   I reviewed the patient's new clinical results including all labs and xray's.    Lab Results (last 24 hours)     Procedure Component Value Units Date/Time    CBC & Differential [[id number]] Collected:  07/15/18 2326    Specimen:  Blood Updated:  07/15/18 2345    Narrative:       The following orders were created for panel order CBC & Differential.  Procedure                               Abnormality         Status                     ---------                                -----------         ------                     CBC Auto Differential[[id number]]        Abnormal            Final result                 Please view results for these tests on the individual orders.    Comprehensive Metabolic Panel [[id number]]  (Abnormal) Collected:  07/15/18 2326    Specimen:  Blood Updated:  07/16/18 0009     Glucose 206 (H) mg/dL      BUN 21 mg/dL      Creatinine 1.02 (H) mg/dL      Sodium 137 mmol/L      Potassium 4.2 mmol/L      Chloride 98 mmol/L      CO2 21.9 (L) mmol/L      Calcium 9.0 mg/dL      Total Protein 7.1 g/dL      Albumin 4.10 g/dL      ALT (SGPT) 19 U/L      AST (SGOT) 53 (H) U/L      Alkaline Phosphatase 76 U/L      Total Bilirubin 0.8 mg/dL      eGFR Non African Amer 53 (L) mL/min/1.73      Globulin 3.0 gm/dL      A/G Ratio 1.4 g/dL      BUN/Creatinine Ratio 20.6     Anion Gap 17.1 mmol/L     Narrative:       The MDRD GFR formula is only valid for adults with stable renal function between ages 18 and 70.    Blood Culture - Blood, [[id number]] Collected:  07/15/18 2326    Specimen:  Blood from Arm, Left Updated:  07/15/18 2335    Lactic Acid, Plasma [[id number]]  (Abnormal) Collected:  07/15/18 2326    Specimen:  Blood Updated:  07/16/18 0032     Lactate 2.2 (C) mmol/L     Procalcitonin [[id number]]  (Abnormal) Collected:  07/15/18 2326    Specimen:  Blood Updated:  07/16/18 0032     Procalcitonin 1.02 (C) ng/mL     Narrative:       As a Marker for Sepsis (Non-Neonates):   1. <0.5 ng/mL represents a low risk of severe sepsis and/or septic shock.  1. >2 ng/mL represents a high risk of severe sepsis and/or septic shock.    As a Marker for Lower Respiratory Tract Infections that require antibiotic therapy:  PCT on Admission     Antibiotic Therapy             6-12 Hrs later  > 0.5                Strongly Recommended            >0.25 - <0.5         Recommended  0.1 - 0.25           Discouraged                   Remeasure/reassess PCT  <0.1                 Strongly Discouraged           "Remeasure/reassess PCT      As 28 day mortality risk marker: \"Change in Procalcitonin Result\" (> 80 % or <=80 %) if Day 0 (or Day 1) and Day 4 values are available. Refer to http://[email or web address]-pct-[email or web address]/   Change in PCT <=80 %   A decrease of PCT levels below or equal to 80 % defines a positive change in PCT test result representing a higher risk for 28-day all-cause mortality of patients diagnosed with severe sepsis or septic shock.  Change in PCT > 80 %   A decrease of PCT levels of more than 80 % defines a negative change in PCT result representing a lower risk for 28-day all-cause mortality of patients diagnosed with severe sepsis or septic shock.                CBC Auto Differential [[id number]]  (Abnormal) Collected:  07/15/18 2326    Specimen:  Blood Updated:  07/15/18 2345     WBC 17.70 (H) 10*3/mm3      RBC 4.66 10*6/mm3      Hemoglobin 13.2 g/dL      Hematocrit 40.5 %      MCV 86.9 fL      MCH 28.3 pg      MCHC 32.6 g/dL      RDW 14.2 (H) %      RDW-SD 44.6 fl      MPV 10.4 fL      Platelets 200 10*3/mm3      Neutrophil % 90.3 (H) %      Lymphocyte % 4.4 (L) %      Monocyte % 5.0 %      Eosinophil % 0.0 (L) %      Basophil % 0.1 %      Immature Grans % 0.2 %      Neutrophils, Absolute 15.98 (H) 10*3/mm3      Lymphocytes, Absolute 0.78 (L) 10*3/mm3      Monocytes, Absolute 0.89 10*3/mm3      Eosinophils, Absolute 0.00 10*3/mm3      Basophils, Absolute 0.01 10*3/mm3      Immature Grans, Absolute 0.04 (H) 10*3/mm3     Lactic Acid, Reflex Timer (This will reflex a repeat order 3-3:15 hours after ordered.) [[id number]] Collected:  07/15/18 2326    Specimen:  Blood Updated:  07/16/18 0345     Extra Tube Hold for add-ons.     Comment: Auto resulted.       Blood Culture - Blood, [[id number]] Collected:  07/15/18 2331    Specimen:  Blood from Arm, Right Updated:  07/15/18 2335    Urinalysis With Microscopic If Indicated (No Culture) - Urine, Clean Catch [[id number]]  (Abnormal) Collected:  07/16/18 0030    " "Specimen:  Urine from Urine, Clean Catch Updated:  07/16/18 0101     Color, UA Yellow     Appearance, UA Clear     pH, UA 8.0     Specific Gravity, UA 1.022     Glucose,  mg/dL (Trace) (A)     Ketones, UA 15 mg/dL (1+) (A)     Bilirubin, UA Negative     Blood, UA Small (1+) (A)     Protein, UA >=300 mg/dL (3+) (A)     Leuk Esterase, UA Negative     Nitrite, UA Negative     Urobilinogen, UA 0.2 E.U./dL    Urinalysis, Microscopic Only - Urine, Clean Catch [[id number]]  (Abnormal) Collected:  07/16/18 0030    Specimen:  Urine from Urine, Clean Catch Updated:  07/16/18 0132     RBC, UA 3-5 (A) /HPF      WBC, UA 0-2 /HPF      Bacteria, UA None Seen /HPF      Squamous Epithelial Cells, UA 3-6 (A) /HPF      Transitional Epithelial Cells, UA 0-2 /HPF      Hyaline Casts, UA 0-2 /LPF      Methodology Manual Light Microscopy    POC Glucose Once [[id number]]  (Abnormal) Collected:  07/16/18 0315    Specimen:  Blood Updated:  07/16/18 0317     Glucose 217 (H) mg/dL     Narrative:       Meter: [id number] : [id number] Lai Healy RN    Procalcitonin [[id number]]  (Abnormal) Collected:  07/16/18 0430    Specimen:  Blood Updated:  07/16/18 0633     Procalcitonin 1.91 (C) ng/mL     Narrative:       As a Marker for Sepsis (Non-Neonates):   1. <0.5 ng/mL represents a low risk of severe sepsis and/or septic shock.  1. >2 ng/mL represents a high risk of severe sepsis and/or septic shock.    As a Marker for Lower Respiratory Tract Infections that require antibiotic therapy:  PCT on Admission     Antibiotic Therapy             6-12 Hrs later  > 0.5                Strongly Recommended            >0.25 - <0.5         Recommended  0.1 - 0.25           Discouraged                   Remeasure/reassess PCT  <0.1                 Strongly Discouraged          Remeasure/reassess PCT      As 28 day mortality risk marker: \"Change in Procalcitonin Result\" (> 80 % or <=80 %) if Day 0 (or Day 1) and Day 4 values are available. Refer to " http://[email or web address]-pct-[email or web address]/   Change in PCT <=80 %   A decrease of PCT levels below or equal to 80 % defines a positive change in PCT test result representing a higher risk for 28-day all-cause mortality of patients diagnosed with severe sepsis or septic shock.  Change in PCT > 80 %   A decrease of PCT levels of more than 80 % defines a negative change in PCT result representing a lower risk for 28-day all-cause mortality of patients diagnosed with severe sepsis or septic shock.                Lactic Acid, Plasma [[id number]]  (Normal) Collected:  07/16/18 0430    Specimen:  Blood Updated:  07/16/18 0502     Lactate 0.9 mmol/L     Comprehensive Metabolic Panel [[id number]]  (Abnormal) Collected:  07/16/18 0430    Specimen:  Blood Updated:  07/16/18 0520     Glucose 247 (H) mg/dL      BUN 19 mg/dL      Creatinine 0.94 mg/dL      Sodium 135 (L) mmol/L      Potassium 3.7 mmol/L      Chloride 100 mmol/L      CO2 20.4 (L) mmol/L      Calcium 7.8 (L) mg/dL      Total Protein 6.0 g/dL      Albumin 3.50 g/dL      ALT (SGPT) 17 U/L      AST (SGOT) 47 (H) U/L      Alkaline Phosphatase 64 U/L      Total Bilirubin 0.7 mg/dL      eGFR Non African Amer 59 (L) mL/min/1.73      Globulin 2.5 gm/dL      A/G Ratio 1.4 g/dL      BUN/Creatinine Ratio 20.2     Anion Gap 14.6 mmol/L     Narrative:       The MDRD GFR formula is only valid for adults with stable renal function between ages 18 and 70.    CBC Auto Differential [[id number]]  (Abnormal) Collected:  07/16/18 0430    Specimen:  Blood Updated:  07/16/18 0454     WBC 16.01 (H) 10*3/mm3      RBC 4.27 10*6/mm3      Hemoglobin 12.0 g/dL      Hematocrit 37.6 %      MCV 88.1 fL      MCH 28.1 pg      MCHC 31.9 (L) g/dL      RDW 14.5 (H) %      RDW-SD 46.2 fl      MPV 10.3 fL      Platelets 175 10*3/mm3      Neutrophil % 89.4 (H) %      Lymphocyte % 4.7 (L) %      Monocyte % 5.6 %      Eosinophil % 0.0 (L) %      Basophil % 0.1 %      Immature Grans % 0.2 %      Neutrophils,  Absolute 14.31 (H) 10*3/mm3      Lymphocytes, Absolute 0.76 (L) 10*3/mm3      Monocytes, Absolute 0.90 10*3/mm3      Eosinophils, Absolute 0.00 10*3/mm3      Basophils, Absolute 0.01 10*3/mm3      Immature Grans, Absolute 0.03 10*3/mm3     POC Glucose Once [[id number]]  (Abnormal) Collected:  07/16/18 0617    Specimen:  Blood Updated:  07/16/18 0618     Glucose 252 (H) mg/dL     Narrative:       Meter: [id number] : [id number] Laiyoselyn Healy RN          XR Chest 2 View   Final Result   No acute findings.           This report was finalized on 7/16/2018 4:33 AM by Dr. Navid Velázquez M.D.            Assessment/Plan      Active Hospital Problems    Diagnosis Date Noted   • **Cellulitis of right leg [L03.115] 07/16/2018   • Sepsis (CMS/HCC) [A41.9] 07/16/2018   • Chronic kidney disease, stage III (moderate) [N18.3] 10/13/2016   • Obstructive sleep apnea on autoCPAP [G47.30] 09/04/2016   • Diabetes mellitus type 2, uncontrolled, with complications (CMS/HCC) [E11.8, E11.65] 03/08/2016   • Essential hypertension [I10] 03/08/2016      Resolved Hospital Problems    Diagnosis Date Noted Date Resolved   No resolved problems to display.       · Right lower extremity cellulitis in the setting of uncontrolled diabetes, chronic kidney disease: I will continue vancomycin and cefepime.  Follow up on improvement fever curve,white blood cell count and signs and symptoms.We'll check right lower extremity venous duplex  · Sepsis:Likely secondary to above, fever 103.8, white blood cell count elevation, pro-calcitonin elevation, lactic acid elevation.  Continue IV antibiotics, decrease rate of IV fluids and hold Lasix for now  · Upper respiratory infection: Suspect a viral illness.  Respiratory viral panel has been ordered and pending.  This could partially be source of her fever but I would not expect elevation of white blood cell count or pro-calcitonin with this.  · Type 2 diabetes: Uncontrolled, is followed by endocrinology as out  patient and I will ask him to manage while in the hospital due to taking both Novolin R and Novolin N with breakfast and nightly.  · Continue amlodipine for hypertension  · Coronary artery disease: Stable, continue metoprolol, Imdur, statin, aspirin.  Cardiologist is Dr. Tang  · Lovenox for DVT prophylaxis.  ·   · I discussed CODE STATUS with patient and she is a full code.  Her healthcare surrogate is her son      I discussed the patients findings and my recommendations with patient, family and nursing staff.      Duy Banda MD  [facility]  07/16/18  10:08 AM

## 2018-07-16 NOTE — NURSING NOTE
WOCN Consult: Diabetic Wound. Patient is seen per Podiatrist who is caring for wound. Callous with dark edges. States it is debrided periodically. Current care antibiotic ointment and gauze roll. Cellulitis 3 plus edema right leg. Questioned if patient has had an xray of foot and she states no. She has had this wound for many years.      07/16/18 1351   Wound 07/16/18 Right posterior  diabetic/neuropathic ulceration   Date first assessed: 07/16/18   Present On Admission : yes  Side: Right  Orientation: posterior  Location: (c)   Type: diabetic/neuropathic ulceration   Dressing Appearance dry;intact   Base pink;slough  (yellow/ dark brown edges)   Periwound dry   Wound Length (cm) 2.5 cm   Wound Width (cm) 1 cm   Drainage Amount none    Inquired with patient if she would be open to vascular MD here at hospital assess wound. Patient states she is happy with current treatment and does not want an another MD to see.

## 2018-07-16 NOTE — ED PROVIDER NOTES
" EMERGENCY DEPARTMENT ENCOUNTER    Room Number:  24/24  Date seen:  7/16/2018  Time seen: 11:09 PM  PCP: Agueda Rodriguez MD  Historian: Patient, Family      HPI:  Chief Complaint: Respiratory Symptoms  Context: Denisse Chavez is a 72 y.o. female with h/o diabetes who presents to the ED c/o respiratory symptoms onset about 3 days ago. Specifically, pt has had rhinorrhea, dry cough, dyspnea, nausea, vomiting, fatigue, decreased appetite, and generalized weakness. Pt denies abdominal pain, diarrhea, chest pain, headache, neck pain/stiffness, dysuria, focal weakness/numbness, and speech/visual difficulties. Upon pt's arrival to the ER, pt has a fever of 103.8 F. Per family, pt has a known ulcer on the right foot for which pt is currently being followed by a podiatrist. There are no other complaints at this time.     Location: Respiratory  Radiation: N/A  Quality: \"respiratory infection\"  Intensity/Severity: Moderate  Duration: Onset about 3 days ago  Onset quality: Gradual in onset  Timing: Constant   Progression: Unchanged  Aggravating Factors: Nothing  Alleviating Factors: Nothing  Previous Episodes: Unknown  Treatment before arrival: Unknown  Associated Symptoms: Rhinorrhea, cough, dyspnea, nausea, vomiting, fatigue, decreased appetite, generalized weakness, fever        MEDICAL RECORD REVIEW    Pt has h/o diabetes for which pt is followed by Dr. Holman, endocrinologist.             PAST MEDICAL HISTORY  Active Ambulatory Problems     Diagnosis Date Noted   • Type 2 diabetes mellitus (CMS/[facility]) 03/07/2016   • Diabetes mellitus type 2, uncontrolled, with complications (CMS/[facility]) 03/08/2016   • Uncontrolled type II diabetes mellitus with nephropathy (CMS/[facility]) 03/08/2016   • Type II diabetes mellitus with neurological manifestations, uncontrolled (CMS/[facility]) 03/08/2016   • Uncontrolled type 2 diabetes mellitus with background retinopathy (CMS/[facility]) 03/08/2016   • Hyperinsulinism 03/08/2016   • Abnormal weight gain " 03/08/2016   • Hyperlipidemia 03/08/2016   • Essential hypertension 03/08/2016   • Edema of lower extremity 03/30/2016   • Angiomyolipoma of kidney 03/30/2016   • Memory changes 04/13/2016   • Left hip pain 05/16/2016   • Left-sided low back pain without sciatica 05/16/2016   • Lumbar spinal stenosis 05/16/2016   • Encounter for long-term (current) use of insulin (CMS/[facility]) 06/24/2016   • Obstructive sleep apnea on autoCPAP 09/04/2016   • Chronic kidney disease, stage III (moderate) 10/13/2016   • Chronic venous insufficiency    • CAD (coronary artery disease)    • Type 2 diabetes mellitus with stage 3 chronic kidney disease, with long-term current use of insulin (CMS/[facility]) 10/24/2017   • Circadian rhythm sleep disorder, delayed sleep phase type 04/23/2018   • Class 3 obesity due to excess calories with serious comorbidity and body mass index (BMI) of 40.0 to 44.9 in adult (CMS/[facility]) 04/23/2018   • History of colon polyps 06/13/2018     Resolved Ambulatory Problems     Diagnosis Date Noted   • Renal function test abnormal 03/30/2016   • Hypercalcemia 03/30/2016   • Hyperkalemia 03/30/2016   • Mass of ovary 03/30/2016   • Uterine leiomyoma 03/30/2016     Past Medical History:   Diagnosis Date   • Angiomyolipoma of kidney 3/30/2016   • CAD (coronary artery disease)    • Chronic venous insufficiency    • Hyperlipidemia    • Hypertension    • Low back pain    • Mass of ovary 3/30/2016   • Morbid obesity (CMS/[facility])    • Sleep apnea    • Type 2 diabetes mellitus (CMS/[facility])    • Uterine leiomyoma 3/30/2016         PAST SURGICAL HISTORY  Past Surgical History:   Procedure Laterality Date   • CATARACT EXTRACTION      X2    • HERNIA REPAIR     • HYSTERECTOMY     • TONSILLECTOMY           FAMILY HISTORY  Family History   Problem Relation Age of Onset   • Diabetes Mother    • Heart attack Mother    • Hypertension Mother    • Hypothyroidism Mother    • Diabetes type II Son          SOCIAL HISTORY  Social History     Social History    • Marital status: Single     Spouse name: N/A   • Number of children: 3   • Years of education: N/A     Occupational History   • retired from [employer]      Social History Main Topics   • Smoking status: Former Smoker     Packs/day: 0.25     Years: 2.00     Types: Cigarettes     Quit date: 1970   • Smokeless tobacco: Never Used      Comment: LIGHT USAGE   • Alcohol use No      Comment: CAFFEINE USE: 10 -12 CUPS OF COFFEE DAILY.    • Drug use: No   • Sexual activity: Defer     Other Topics Concern   • Not on file     Social History Narrative   • No narrative on file         ALLERGIES  Ace inhibitors; Angiotensin receptor blockers; and Sulfa antibiotics        REVIEW OF SYSTEMS  Review of Systems   Constitutional: Positive for appetite change (decreased appetite), fatigue and fever.   HENT: Positive for rhinorrhea. Negative for congestion and sore throat.    Eyes: Negative for pain and visual disturbance.   Respiratory: Positive for cough and shortness of breath.    Cardiovascular: Negative for chest pain, palpitations and leg swelling.   Gastrointestinal: Positive for nausea and vomiting. Negative for abdominal pain and diarrhea.   Genitourinary: Negative for difficulty urinating, dysuria, flank pain and frequency.   Musculoskeletal: Negative for myalgias, neck pain and neck stiffness.   Skin: Positive for wound (ulcer on right foot).   Neurological: Positive for weakness (generalized; not focal). Negative for dizziness, speech difficulty, light-headedness, numbness and headaches.   Psychiatric/Behavioral: Negative.    All other systems reviewed and are negative.           PHYSICAL EXAM  ED Triage Vitals   Temp Heart Rate Resp BP SpO2   07/15/18 2254 07/15/18 2254 07/15/18 2254 07/15/18 2318 07/15/18 2254   (!) 103.8 °F (39.9 °C) 119 22 (!) 205/81 99 %      Temp src Heart Rate Source Patient Position BP Location FiO2 (%)   07/15/18 2254 07/15/18 2254 -- -- --   Tympanic Monitor            Physical Exam    Constitutional: She is oriented to person, place, and time. She appears distressed (pt appears uncomfortable).   HENT:   Head: Normocephalic.   Mouth/Throat: Mucous membranes are normal.   Eyes: EOM are normal. Pupils are equal, round, and reactive to light.   Neck: Normal range of motion. Neck supple.   Cardiovascular: Normal rate, regular rhythm and normal heart sounds.    Pulmonary/Chest: Effort normal and breath sounds normal. No respiratory distress. She has no decreased breath sounds. She has no wheezes. She has no rhonchi. She has no rales.   Abdominal: Soft. There is no tenderness. There is no rebound and no guarding.   Musculoskeletal: Normal range of motion.   Neurological: She is alert and oriented to person, place, and time. She has normal sensation.   Skin: Skin is warm. There is erythema (of the RLE).   There is a draining ulcer to the plantar aspect of the right foot.   Psychiatric: Mood and affect normal.   Nursing note and vitals reviewed.          LAB RESULTS  Recent Results (from the past 24 hour(s))   Comprehensive Metabolic Panel    Collection Time: 07/15/18 11:26 PM   Result Value Ref Range    Glucose 206 (H) 65 - 99 mg/dL    BUN 21 8 - 23 mg/dL    Creatinine 1.02 (H) 0.57 - 1.00 mg/dL    Sodium 137 136 - 145 mmol/L    Potassium 4.2 3.5 - 5.2 mmol/L    Chloride 98 98 - 107 mmol/L    CO2 21.9 (L) 22.0 - 29.0 mmol/L    Calcium 9.0 8.6 - 10.5 mg/dL    Total Protein 7.1 6.0 - 8.5 g/dL    Albumin 4.10 3.50 - 5.20 g/dL    ALT (SGPT) 19 1 - 33 U/L    AST (SGOT) 53 (H) 1 - 32 U/L    Alkaline Phosphatase 76 39 - 117 U/L    Total Bilirubin 0.8 0.1 - 1.2 mg/dL    eGFR Non African Amer 53 (L) >60 mL/min/1.73    Globulin 3.0 gm/dL    A/G Ratio 1.4 g/dL    BUN/Creatinine Ratio 20.6 7.0 - 25.0    Anion Gap 17.1 mmol/L   Lactic Acid, Plasma    Collection Time: 07/15/18 11:26 PM   Result Value Ref Range    Lactate 2.2 (C) 0.5 - 2.0 mmol/L   Procalcitonin    Collection Time: 07/15/18 11:26 PM   Result Value  Ref Range    Procalcitonin 1.02 (C) 0.10 - 0.25 ng/mL   CBC Auto Differential    Collection Time: 07/15/18 11:26 PM   Result Value Ref Range    WBC 17.70 (H) 4.50 - 10.70 10*3/mm3    RBC 4.66 3.90 - 5.20 10*6/mm3    Hemoglobin 13.2 11.9 - 15.5 g/dL    Hematocrit 40.5 35.6 - 45.5 %    MCV 86.9 80.5 - 98.2 fL    MCH 28.3 26.9 - 32.0 pg    MCHC 32.6 32.4 - 36.3 g/dL    RDW 14.2 (H) 11.7 - 13.0 %    RDW-SD 44.6 37.0 - 54.0 fl    MPV 10.4 6.0 - 12.0 fL    Platelets 200 140 - 500 10*3/mm3    Neutrophil % 90.3 (H) 42.7 - 76.0 %    Lymphocyte % 4.4 (L) 19.6 - 45.3 %    Monocyte % 5.0 5.0 - 12.0 %    Eosinophil % 0.0 (L) 0.3 - 6.2 %    Basophil % 0.1 0.0 - 1.5 %    Immature Grans % 0.2 0.0 - 0.5 %    Neutrophils, Absolute 15.98 (H) 1.90 - 8.10 10*3/mm3    Lymphocytes, Absolute 0.78 (L) 0.90 - 4.80 10*3/mm3    Monocytes, Absolute 0.89 0.20 - 1.20 10*3/mm3    Eosinophils, Absolute 0.00 0.00 - 0.70 10*3/mm3    Basophils, Absolute 0.01 0.00 - 0.20 10*3/mm3    Immature Grans, Absolute 0.04 (H) 0.00 - 0.03 10*3/mm3   Urinalysis With Microscopic If Indicated (No Culture) - Urine, Clean Catch    Collection Time: 07/16/18 12:30 AM   Result Value Ref Range    Color, UA Yellow Yellow, Straw    Appearance, UA Clear Clear    pH, UA 8.0 5.0 - 8.0    Specific Gravity, UA 1.022 1.005 - 1.030    Glucose,  mg/dL (Trace) (A) Negative    Ketones, UA 15 mg/dL (1+) (A) Negative    Bilirubin, UA Negative Negative    Blood, UA Small (1+) (A) Negative    Protein, UA >=300 mg/dL (3+) (A) Negative    Leuk Esterase, UA Negative Negative    Nitrite, UA Negative Negative    Urobilinogen, UA 0.2 E.U./dL 0.2 - 1.0 E.U./dL   Urinalysis, Microscopic Only - Urine, Clean Catch    Collection Time: 07/16/18 12:30 AM   Result Value Ref Range    RBC, UA 3-5 (A) None Seen, 0-2 /HPF    WBC, UA 0-2 None Seen, 0-2 /HPF    Bacteria, UA None Seen None Seen /HPF    Squamous Epithelial Cells, UA 3-6 (A) None Seen, 0-2 /HPF    Transitional Epithelial Cells, UA 0-2 0  - 2 /HPF    Hyaline Casts, UA 0-2 None Seen /LPF    Methodology Manual Light Microscopy        Ordered the above labs and reviewed the results.        RADIOLOGY  XR Chest 2 View   Preliminary Result   No acute findings.                     Ordered the above noted radiological studies. Reviewed by me in PACS.            PROCEDURES  Procedures        EKG:           EKG time: 23:40  Rhythm/Rate: NSR rate 91  P waves and [place]: Normal P waves  QRS, axis: Probable LVH   ST and T waves: Normal ST     Interpreted Contemporaneously by me, independently viewed  No old EKG is available for comparison           MEDICATIONS GIVEN IN ER  Medications   sodium chloride 0.9 % flush 10 mL (not administered)   piperacillin-tazobactam (ZOSYN) 4.5 g in iso-osmotic dextrose 100 mL IVPB (premix) (0 g Intravenous Stopped 7/16/18 0004)   vancomycin 2000 mg/500 mL 0.9% NS IVPB ([facility]) (2,000 mg Intravenous New Bag 7/16/18 0005)   acetaminophen (TYLENOL) tablet 1,000 mg (1,000 mg Oral Given 7/15/18 2327)   sodium chloride 0.9% - IBW for BMI>30 bolus 1,503 mL (1,503 mL Intravenous New Bag 7/16/18 0111)             PROGRESS AND CONSULTS  ED Course as of Jul 16 0228   Mon Jul 16, 2018   0105 1:06 AM  Patient with fever and cellulitis with diabetic foot ulcer on right leg.  Given zosyn and vancomycin.  Given sepsis fluids.  Discussed with Dr. Mak who will admit.  [SL]      ED Course User Index  [SL] Cordell Kemp MD       11:18 PM:  UA, procalcitonin, lactic acid, blood cultures, blood work, CXR, and EKG ordered for further evaluation. Pt's temperature is 103.8 F -> Tylenol ordered. Zosyn and Vancomycin ordered to treat pt empirically for infection.     12:38 AM:  Placed call to [facility] for admission. Decision time to admit: Now.     12:56 AM:  Discussed case with Dr. Mak, hospitalist. He will admit pt to a telemetry bed.     1:02 AM:  Pt's lactic acid is 2.2 -> 30 cc/kg bolus ordered to hydrate pt.     1:06 AM:  Rechecked pt. On re-evaluation,  the erythema on pt's RLE has worsened. Informed pt and family that pt's WBC is 17.70 and pt's lactic acid is 2.2. Pt's CXR is negative acute. Suspect that pt has cellulitis of the right leg. Pt has been admitted to the hospital for further evaluation and treatment. Antibiotics have been ordered to treat for pt's cellulitis. Pt and family agree with plan.         MEDICAL DECISION MAKING      MDM  Number of Diagnoses or Management Options     Amount and/or Complexity of Data Reviewed  Clinical lab tests: ordered and reviewed (Lactic acid is 2.2. )  Tests in the radiology section of CPT®: ordered and reviewed (CXR:  No acute findings.)  Tests in the medicine section of CPT®: ordered and reviewed (EKG interpreted.   )  Decide to obtain previous medical records or to obtain history from someone other than the patient: yes  Discuss the patient with other providers: yes (Discussed the case with Dr. Mak, hospitalist.  )    Patient Progress  Patient progress: stable             DIAGNOSIS  Final diagnoses:   Cellulitis of right leg   Diabetic ulcer of right foot associated with other specified diabetes mellitus, unspecified part of foot, unspecified ulcer stage (CMS/HCC)   Sepsis, due to unspecified organism (CMS/HCC)         DISPOSITION  Pt admitted to telemetry.      ADMISSION    Discussed treatment plan and reason for admission with pt/family and admitting physician.  Pt/family voiced understanding of the plan for admission for further testing/treatment as needed.                 Latest Documented Vital Signs:  As of 2:09 AM  BP- 166/64 HR- 89 Temp- 99.9 °F (37.7 °C) (Oral) O2 sat- 94%        --  Documentation assistance provided by [employer] Banegas for Dr. Viridiana MD.  Information recorded by the [employer]iomara was done at my direction and has been verified and validated by me.           Jun Banegas  07/16/18 0228       Cordell Kemp MD  07/16/18 1122

## 2018-07-16 NOTE — CONSULTS
72 y.o.  Patient Care Team:  Agueda Rodriguez MD as PCP - General (Internal Medicine)  Wally TYLER MD as PCP - Claims Attributed  Wally TYLER MD as Consulting Physician (Endocrinology)  Warner Tang MD as Consulting Physician (Cardiology)  Sergio Eagle MD as Consulting Physician (Urology)  Candis Cruz, RN as Care Coordinator (Population Health)      Chief Complaint   Patient presents with   • Shortness of Breath   • Fever   • Nausea   Uncontrolled type 2 diabetes mellitus and cellulitis of right lower extremity    HPI   Patient is a 72-year-old white female with a past history of uncontrolled type 2 diabetes mellitus on insulin regimen admitted to the hospital for fever nausea and shortness of breath.  She was also reporting fatigue for the past several days  Patient  had chronic right diabetic foot ulcer followed by podiatry and apparently was seen by podiatry Tuesday last week and was debrided and some topical anybody cream was applied  Subsequently patient reported that she has not been feeling well she is feeling fatigued and weak and started having upper respiratory symptoms and had a elevated temperature of 103.8  Associates symptom was shortness of breath and mild cough    After a few days patient noticed swelling and redness and warmth over the right lower extremity and came to the emergency room yesterday and was evaluated and admitted to the hospital  She was started on vancomycin and Zosyn  Lactic acid was elevated at 2.2 and pro calcitonin was elevated at 1.9    Patient's blood sugars were in the 200-300 range and I was concentrated for further managing patient's diabetes during this hospitalization  Patient has been a diabetic for long time and was successfully managed on NPH and regular insulin regimen  She takes approximately 58 units of NPH in the morning and 42 at bedtime and she takes regular insulin 50 units with breakfast and 48 units at supper  She reports that  most of her blood sugars were below 200  She had very few episodes of hypoglycemia  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  Patient is symptoms of peripheral neuropathy and is a diabetic foot ulceration in the right fourth which is being attended by podiatry on a regular basis  Uncontrolled type 2 diabetes mellitus with nephropathy  Patient is chronic kidney disease with elevated microalbuminuria in the past        Past Medical History:   Diagnosis Date   • Angiomyolipoma of kidney 3/30/2016   • CAD (coronary artery disease)     MI in 1996, treated medically.  PET 6/2016 with small-medium sized lateral infarct, no ischemia.  This wall motion abnormality was seen on echo as well.   • Chronic venous insufficiency    • Hyperlipidemia    • Hypertension    • Low back pain    • Mass of ovary 3/30/2016   • Morbid obesity (CMS/HCC)    • Sleep apnea    • Type 2 diabetes mellitus (CMS/HCC)    • Uterine leiomyoma 3/30/2016       Family History   Problem Relation Age of Onset   • Diabetes Mother    • Heart attack Mother    • Hypertension Mother    • Hypothyroidism Mother    • Diabetes type II Son        Social History     Social History   • Marital status: Single     Spouse name: N/A   • Number of children: 3   • Years of education: N/A     Occupational History   • retired from [employer]      Social History Main Topics   • Smoking status: Former Smoker     Packs/day: 0.25     Years: 2.00     Types: Cigarettes     Quit date: 1970   • Smokeless tobacco: Never Used      Comment: LIGHT USAGE   • Alcohol use No      Comment: CAFFEINE USE: 10 -12 CUPS OF COFFEE DAILY.    • Drug use: No   • Sexual activity: Defer     Other Topics Concern   • Not on file     Social History Narrative   • No narrative on file       Allergies   Allergen Reactions   • Ace Inhibitors Other (See Comments)     Hyperkalemia/ROB   • Angiotensin Receptor Blockers Other (See Comments)     Pt unable to remember   • Sulfa Antibiotics Itching     rash          Current Facility-Administered Medications:   •  acetaminophen (TYLENOL) tablet 650 mg, 650 mg, Oral, Q4H PRN, Anil Mak MD  •  amLODIPine (NORVASC) tablet 10 mg, 10 mg, Oral, Daily, Anil Mak MD, 10 mg at 07/16/18 0913  •  aspirin EC tablet 81 mg, 81 mg, Oral, Daily, Anil Mak MD, 81 mg at 07/16/18 0913  •  atorvastatin (LIPITOR) tablet 20 mg, 20 mg, Oral, Daily, Anil Mak MD, 20 mg at 07/16/18 0913  •  cefepime (MAXIPIME) 1 g/100 mL 0.9% NS IVPB (mbp), 1 g, Intravenous, Q12H, Anil Mak MD  •  dextrose (D50W) solution 25 g, 25 g, Intravenous, Q15 Min PRN, Anil Mak MD  •  dextrose (GLUTOSE) oral gel 15 g, 15 g, Oral, Q15 Min PRN, Anil Mak MD  •  docusate sodium (COLACE) capsule 100 mg, 100 mg, Oral, BID, Anil Mak MD, 100 mg at 07/16/18 0912  •  enoxaparin (LOVENOX) syringe 40 mg, 40 mg, Subcutaneous, Q12H, Anil Mak MD, 40 mg at 07/16/18 0617  •  glucagon (human recombinant) (GLUCAGEN DIAGNOSTIC) injection 1 mg, 1 mg, Subcutaneous, PRN, Anil Mak MD  •  HYDROcodone-acetaminophen (NORCO) 7.5-325 MG per tablet 1 tablet, 1 tablet, Oral, Q4H PRN, Anil Mak MD  •  insulin aspart (novoLOG) injection 0-14 Units, 0-14 Units, Subcutaneous, 4x Daily With Meals & Nightly, Anil Mak MD, 10 Units at 07/16/18 1218  •  insulin NPH (humuLIN N,novoLIN N) injection 40 Units, 40 Units, Subcutaneous, BID AC, Anil Mak MD, 40 Units at 07/16/18 0913  •  isosorbide mononitrate (IMDUR) 24 hr tablet 60 mg, 60 mg, Oral, Daily, Anil Mak MD, 60 mg at 07/16/18 0913  •  metoprolol succinate XL (TOPROL-XL) 24 hr tablet 25 mg, 25 mg, Oral, Daily, Anil Mak MD, 25 mg at 07/16/18 0913  •  ondansetron (ZOFRAN) injection 4 mg, 4 mg, Intravenous, Q6H PRN, Anil Mak MD  •  Pharmacy to dose vancomycin, , Does not apply, Continuous PRN, Anil Mak MD  •  sodium chloride 0.9 % flush 1-10 mL, 1-10 mL, Intravenous, PRN, Anil Mak MD  •  Insert peripheral IV, ,  , Once **AND** sodium chloride 0.9 % flush 10 mL, 10 mL, Intravenous, PRN, Cordell Kemp MD  •  sodium chloride 0.9 % infusion, 75 mL/hr, Intravenous, Continuous, Duy Banda MD, Last Rate: 75 mL/hr at 07/16/18 1011, 75 mL/hr at 07/16/18 1011  •  vancomycin (VANCOCIN) in iso-osmotic dextrose IVPB 1 g (premix) 200 mL, 1,000 mg, Intravenous, Q12H, Anil Mak MD, 1,000 mg at 07/16/18 1236         Review of Systems   Constitutional: Positive for fatigue.   HENT: Positive for congestion.    Respiratory: Positive for cough and shortness of breath.    Cardiovascular: Positive for leg swelling.   Gastrointestinal: Positive for abdominal distention and nausea. Negative for abdominal pain.   Endocrine: Negative.    Genitourinary: Negative.    Musculoskeletal: Negative.    Skin: Positive for wound.   Neurological: Positive for dizziness, light-headedness and numbness.   Hematological: Negative.    Psychiatric/Behavioral: Negative.    All other systems reviewed and are negative.    Objective     Vital Signs  Temp:  [99.1 °F (37.3 °C)-103.8 °F (39.9 °C)] 99.8 °F (37.7 °C)  Heart Rate:  [] 82  Resp:  [18-22] 18  BP: (157-205)/(62-81) 166/71    Physical Exam  Physical Exam   Constitutional: She is oriented to person, place, and time. She appears well-developed and well-nourished.   Eyes: EOM are normal. Pupils are equal, round, and reactive to light.   Neck: Normal range of motion. Neck supple. No thyromegaly present.   Cardiovascular: Normal rate, regular rhythm, normal heart sounds and intact distal pulses.    Pulmonary/Chest: Effort normal and breath sounds normal.   Abdominal: Soft. Bowel sounds are normal. She exhibits distension. There is no tenderness.   Musculoskeletal: Normal range of motion. She exhibits edema.   Neurological: She is alert and oriented to person, place, and time.   Skin: Skin is warm and dry. There is erythema.   Psychiatric: She has a normal mood and affect. Her behavior is  normal.   Nursing note and vitals reviewed.      Results Review:    I reviewed the patient's new clinical results.  Glucose   Date/Time Value Ref Range Status   07/16/2018 1640 273 (H) 70 - 130 mg/dL Final   07/16/2018 1116 329 (H) 70 - 130 mg/dL Final   07/16/2018 0617 252 (H) 70 - 130 mg/dL Final   07/16/2018 0315 217 (H) 70 - 130 mg/dL Final     Lab Results (last 72 hours)     Procedure Component Value Units Date/Time    POC Glucose Once [[id number]]  (Abnormal) Collected:  07/16/18 1640    Specimen:  Blood Updated:  07/16/18 1642     Glucose 273 (H) mg/dL     Narrative:       Meter: [id number] : [id number] Vaavud KIA    Blood Culture - Blood, [[id number]]  (Normal) Collected:  07/15/18 2331    Specimen:  Blood from Arm, Right Updated:  07/16/18 1145     Blood Culture No growth at less than 24 hours    Blood Culture - Blood, [[id number]]  (Normal) Collected:  07/15/18 2326    Specimen:  Blood from Arm, Left Updated:  07/16/18 1145     Blood Culture No growth at less than 24 hours    POC Glucose Once [[id number]]  (Abnormal) Collected:  07/16/18 1116    Specimen:  Blood Updated:  07/16/18 1117     Glucose 329 (H) mg/dL     Narrative:       Meter: [id number] : [id number] Vaavud KIA    Respiratory Panel, PCR - Swab, Nasopharynx [[id number]] Collected:  07/16/18 1048    Specimen:  Swab from Nasopharynx Updated:  07/16/18 1056    Procalcitonin [[id number]]  (Abnormal) Collected:  07/16/18 0430    Specimen:  Blood Updated:  07/16/18 0633     Procalcitonin 1.91 (C) ng/mL     Narrative:       As a Marker for Sepsis (Non-Neonates):   1. <0.5 ng/mL represents a low risk of severe sepsis and/or septic shock.  1. >2 ng/mL represents a high risk of severe sepsis and/or septic shock.    As a Marker for Lower Respiratory Tract Infections that require antibiotic therapy:  PCT on Admission     Antibiotic Therapy             6-12 Hrs later  > 0.5                Strongly Recommended            >0.25 - <0.5          "Recommended  0.1 - 0.25           Discouraged                   Remeasure/reassess PCT  <0.1                 Strongly Discouraged          Remeasure/reassess PCT      As 28 day mortality risk marker: \"Change in Procalcitonin Result\" (> 80 % or <=80 %) if Day 0 (or Day 1) and Day 4 values are available. Refer to [email or web address] [email or web address]/   Change in PCT <=80 %   A decrease of PCT levels below or equal to 80 % defines a positive change in PCT test result representing a higher risk for 28-day all-cause mortality of patients diagnosed with severe sepsis or septic shock.  Change in PCT > 80 %   A decrease of PCT levels of more than 80 % defines a negative change in PCT result representing a lower risk for 28-day all-cause mortality of patients diagnosed with severe sepsis or septic shock.                POC Glucose Once [[id number]]  (Abnormal) Collected:  07/16/18 0617    Specimen:  Blood Updated:  07/16/18 0618     Glucose 252 (H) mg/dL     Narrative:       Meter: [id number] : [id number] Lai Healy RN    Comprehensive Metabolic Panel [[id number]]  (Abnormal) Collected:  07/16/18 0430    Specimen:  Blood Updated:  07/16/18 0520     Glucose 247 (H) mg/dL      BUN 19 mg/dL      Creatinine 0.94 mg/dL      Sodium 135 (L) mmol/L      Potassium 3.7 mmol/L      Chloride 100 mmol/L      CO2 20.4 (L) mmol/L      Calcium 7.8 (L) mg/dL      Total Protein 6.0 g/dL      Albumin 3.50 g/dL      ALT (SGPT) 17 U/L      AST (SGOT) 47 (H) U/L      Alkaline Phosphatase 64 U/L      Total Bilirubin 0.7 mg/dL      eGFR Non African Amer 59 (L) mL/min/1.73      Globulin 2.5 gm/dL      A/G Ratio 1.4 g/dL      BUN/Creatinine Ratio 20.2     Anion Gap 14.6 mmol/L     Narrative:       The MDRD GFR formula is only valid for adults with stable renal function between ages 18 and 70.    Lactic Acid, Plasma [[id number]]  (Normal) Collected:  07/16/18 0430    Specimen:  Blood Updated:  07/16/18 0502     Lactate 0.9 mmol/L     CBC Auto " Differential [[id number]]  (Abnormal) Collected:  07/16/18 0430    Specimen:  Blood Updated:  07/16/18 0454     WBC 16.01 (H) 10*3/mm3      RBC 4.27 10*6/mm3      Hemoglobin 12.0 g/dL      Hematocrit 37.6 %      MCV 88.1 fL      MCH 28.1 pg      MCHC 31.9 (L) g/dL      RDW 14.5 (H) %      RDW-SD 46.2 fl      MPV 10.3 fL      Platelets 175 10*3/mm3      Neutrophil % 89.4 (H) %      Lymphocyte % 4.7 (L) %      Monocyte % 5.6 %      Eosinophil % 0.0 (L) %      Basophil % 0.1 %      Immature Grans % 0.2 %      Neutrophils, Absolute 14.31 (H) 10*3/mm3      Lymphocytes, Absolute 0.76 (L) 10*3/mm3      Monocytes, Absolute 0.90 10*3/mm3      Eosinophils, Absolute 0.00 10*3/mm3      Basophils, Absolute 0.01 10*3/mm3      Immature Grans, Absolute 0.03 10*3/mm3     Lactic Acid, Reflex Timer (This will reflex a repeat order 3-3:15 hours after ordered.) [[id number]] Collected:  07/15/18 2326    Specimen:  Blood Updated:  07/16/18 0345     Extra Tube Hold for add-ons.     Comment: Auto resulted.       POC Glucose Once [[id number]]  (Abnormal) Collected:  07/16/18 0315    Specimen:  Blood Updated:  07/16/18 0317     Glucose 217 (H) mg/dL     Narrative:       Meter: [id number] : [id number] Lai Healy RN    Urinalysis, Microscopic Only - Urine, Clean Catch [[id number]]  (Abnormal) Collected:  07/16/18 0030    Specimen:  Urine from Urine, Clean Catch Updated:  07/16/18 0132     RBC, UA 3-5 (A) /HPF      WBC, UA 0-2 /HPF      Bacteria, UA None Seen /HPF      Squamous Epithelial Cells, UA 3-6 (A) /HPF      Transitional Epithelial Cells, UA 0-2 /HPF      Hyaline Casts, UA 0-2 /LPF      Methodology Manual Light Microscopy    Urinalysis With Microscopic If Indicated (No Culture) - Urine, Clean Catch [[id number]]  (Abnormal) Collected:  07/16/18 0030    Specimen:  Urine from Urine, Clean Catch Updated:  07/16/18 0101     Color, UA Yellow     Appearance, UA Clear     pH, UA 8.0     Specific Gravity, UA 1.022     Glucose,  mg/dL (Trace)  "(A)     Ketones, UA 15 mg/dL (1+) (A)     Bilirubin, UA Negative     Blood, UA Small (1+) (A)     Protein, UA >=300 mg/dL (3+) (A)     Leuk Esterase, UA Negative     Nitrite, UA Negative     Urobilinogen, UA 0.2 E.U./dL    Lactic Acid, Plasma [[id number]]  (Abnormal) Collected:  07/15/18 2326    Specimen:  Blood Updated:  07/16/18 0032     Lactate 2.2 (C) mmol/L     Procalcitonin [[id number]]  (Abnormal) Collected:  07/15/18 2326    Specimen:  Blood Updated:  07/16/18 0032     Procalcitonin 1.02 (C) ng/mL     Narrative:       As a Marker for Sepsis (Non-Neonates):   1. <0.5 ng/mL represents a low risk of severe sepsis and/or septic shock.  1. >2 ng/mL represents a high risk of severe sepsis and/or septic shock.    As a Marker for Lower Respiratory Tract Infections that require antibiotic therapy:  PCT on Admission     Antibiotic Therapy             6-12 Hrs later  > 0.5                Strongly Recommended            >0.25 - <0.5         Recommended  0.1 - 0.25           Discouraged                   Remeasure/reassess PCT  <0.1                 Strongly Discouraged          Remeasure/reassess PCT      As 28 day mortality risk marker: \"Change in Procalcitonin Result\" (> 80 % or <=80 %) if Day 0 (or Day 1) and Day 4 values are available. Refer to http://www.[employer]-pct-calculator.com/   Change in PCT <=80 %   A decrease of PCT levels below or equal to 80 % defines a positive change in PCT test result representing a higher risk for 28-day all-cause mortality of patients diagnosed with severe sepsis or septic shock.  Change in PCT > 80 %   A decrease of PCT levels of more than 80 % defines a negative change in PCT result representing a lower risk for 28-day all-cause mortality of patients diagnosed with severe sepsis or septic shock.                Comprehensive Metabolic Panel [[id number]]  (Abnormal) Collected:  07/15/18 2326    Specimen:  Blood Updated:  07/16/18 0009     Glucose 206 (H) mg/dL      BUN 21 mg/dL      " Creatinine 1.02 (H) mg/dL      Sodium 137 mmol/L      Potassium 4.2 mmol/L      Chloride 98 mmol/L      CO2 21.9 (L) mmol/L      Calcium 9.0 mg/dL      Total Protein 7.1 g/dL      Albumin 4.10 g/dL      ALT (SGPT) 19 U/L      AST (SGOT) 53 (H) U/L      Alkaline Phosphatase 76 U/L      Total Bilirubin 0.8 mg/dL      eGFR Non African Amer 53 (L) mL/min/1.73      Globulin 3.0 gm/dL      A/G Ratio 1.4 g/dL      BUN/Creatinine Ratio 20.6     Anion Gap 17.1 mmol/L     Narrative:       The MDRD GFR formula is only valid for adults with stable renal function between ages 18 and 70.    CBC & Differential [[id number]] Collected:  07/15/18 2326    Specimen:  Blood Updated:  07/15/18 2345    Narrative:       The following orders were created for panel order CBC & Differential.  Procedure                               Abnormality         Status                     ---------                               -----------         ------                     CBC Auto Differential[[id number]]        Abnormal            Final result                 Please view results for these tests on the individual orders.    CBC Auto Differential [[id number]]  (Abnormal) Collected:  07/15/18 2326    Specimen:  Blood Updated:  07/15/18 2345     WBC 17.70 (H) 10*3/mm3      RBC 4.66 10*6/mm3      Hemoglobin 13.2 g/dL      Hematocrit 40.5 %      MCV 86.9 fL      MCH 28.3 pg      MCHC 32.6 g/dL      RDW 14.2 (H) %      RDW-SD 44.6 fl      MPV 10.4 fL      Platelets 200 10*3/mm3      Neutrophil % 90.3 (H) %      Lymphocyte % 4.4 (L) %      Monocyte % 5.0 %      Eosinophil % 0.0 (L) %      Basophil % 0.1 %      Immature Grans % 0.2 %      Neutrophils, Absolute 15.98 (H) 10*3/mm3      Lymphocytes, Absolute 0.78 (L) 10*3/mm3      Monocytes, Absolute 0.89 10*3/mm3      Eosinophils, Absolute 0.00 10*3/mm3      Basophils, Absolute 0.01 10*3/mm3      Immature Grans, Absolute 0.04 (H) 10*3/mm3           Imaging Results (last 72 hours)     Procedure Component Value  Units Date/Time    XR Chest 2 View [[id number]] Collected:  07/16/18 0027     Updated:  07/16/18 0437    Narrative:       CHEST PA AND LATERAL.     HISTORY: Shortness of breath.     COMPARISON: No prior studies for comparison.     FINDINGS:  Cardiomediastinal silhouette is within normal limits.         There is no consolidation or effusion. Low lung volumes.          Impression:       No acute findings.         This report was finalized on 7/16/2018 4:33 AM by Dr. Navid Velázquez M.D.             Assessment/Plan     Patient Active Problem List    Diagnosis   • Cellulitis of right leg [L03.115]   • Sepsis (CMS/[facility]) [A41.9]   • Viral upper respiratory infection [J06.9, B97.89]   • History of colon polyps [Z86.010]     Overview Note:     Added automatically from request for surgery [id number]     • Circadian rhythm sleep disorder, delayed sleep phase type [G47.21]   • Class 3 obesity due to excess calories with serious comorbidity and body mass index (BMI) of 40.0 to 44.9 in adult (CMS/[facility]) [E66.09, Z68.41]   • Type 2 diabetes mellitus with stage 3 chronic kidney disease, with long-term current use of insulin (CMS/[facility]) [E11.22, N18.3, Z79.4]   • Chronic venous insufficiency [I87.2]   • CAD (coronary artery disease) [I25.10]     Overview Note:     MI in 1996, treated medically.  PET 6/2016 with small-medium sized lateral infarct, no ischemia.  This wall motion abnormality was seen on echo as well.     • Chronic kidney disease, stage III (moderate) [N18.3]   • Obstructive sleep apnea on autoCPAP [G47.30]   • Encounter for long-term (current) use of insulin (CMS/[facility]) [Z79.4]   • Left hip pain [M25.552]   • Left-sided low back pain without sciatica [M54.5]   • Lumbar spinal stenosis [M48.061]   • Memory changes [R41.3]   • Edema of lower extremity [R60.0]     Overview Note:     Impression: 09/23/2015 - Cont to wear her compression socks.  Lasix may help the edema some if we do end up switching from HCTZ.  Impression:  03/18/2015 - She is being fitted for compression socks soon.;      • Angiomyolipoma of kidney [D17.71]   • Diabetes mellitus type 2, uncontrolled, with complications (CMS/[facility]) [E11.8, E11.65]   • Uncontrolled type II diabetes mellitus with nephropathy (CMS/[facility]) [E11.21, E11.65]   • Type II diabetes mellitus with neurological manifestations, uncontrolled (CMS/[facility]) [E11.49, E11.65]   • Uncontrolled type 2 diabetes mellitus with background retinopathy (CMS/[facility]) [E11.3299, E11.65]   • Hyperinsulinism [E16.1]   • Abnormal weight gain [R63.5]   • Hyperlipidemia [E78.5]   • Essential hypertension [I10]   • Type 2 diabetes mellitus (CMS/[facility]) [E11.9]     Uncontrolled type 2 diabetes mellitus with complications  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  Diabetic foot ulceration right foot  Cellulitis right leg  Uncontrolled type 2 diabetes mellitus with nephropathy  Chronic kidney disease stage III  Uncontrolled diabetes mellitus with background diabetic retinopathy  Hyperinsulinism  Chronic insulin management    Patient's blood sugars have been in the 200 300 range  She's not receiving the regular insulin as she was getting at home  She is currently receiving NPH insulin 40 units twice daily along with sliding scale    Patient is eating reasonably better  She would like to restart her home regimen  She takes 58 units of NPH in the morning and 42 units at night  She takes 50 units of regular insulin in the morning and 48 units at supper    Considering the hospitalization and multiple interruptions to meal schedule I recommended that I will decrease the insulin during this hospitalization and try and keep her blood sugars below 200 if possible  Patient verbalized understanding  I also advised the patient to consider avoidance of hypoglycemia as one of the goals during this hospitalization    I will start her on 40 units of NPH in the morning and 30 units of NPH at bedtime  She will receive 30 units of regular in  "the morning and evening at mealtimes  Will also keep her on a regular insulin sliding scale    Will continue to monitor the Accu-Cheks and then adjust insulin as needed during this hospitalization  Patient and her son both verbalized understanding  I also discussed with the nurse attending the patient    The total time spent for old record and lab review and floor time was more than 110 min of which greater than 60 min of time ( greater than 50% of the total time )  was spent face to face with the patient counseling and coordination of care on recommended evaluation and treatment options, instructions for management/treatment and /or follow up  and importance of compliance with chosen management or treatment options  Wally Craft MD FACE.  07/16/18  5:10 PM        EMR Dragon / transcription disclaimer:     \"Dictated utilizing Dragon dictation\".   "

## 2018-07-16 NOTE — TELEPHONE ENCOUNTER
Pt's son called to tell me that he took his mother to the ER early this morning, she was sleeping excessively, labored breathing, not very responsive.   He is going to be applying to [place] for her.     Meal prep, getting dressed, taking her to doctor's appointments.   Would like to date the FMLA for today.

## 2018-07-16 NOTE — PROGRESS NOTES
Discharge Planning Assessment  [facility]     Patient Name: Denisse Chavez  MRN: [id number]  Today's Date: 7/16/2018    Admit Date: 7/15/2018          Discharge Needs Assessment     Row Name 07/16/18 1321       Living Environment    Lives With child(reagan), adult    Name(s) of Who Lives With Patient Anil Chavez     Current Living Arrangements home/apartment/condo    Potentially Unsafe Housing Conditions other (see comments)   no concerns     Primary Care Provided by self    Provides Primary Care For no one    Family Caregiver if Needed child(reagan), adult    Quality of Family Relationships helpful;involved;supportive    Able to Return to Prior Arrangements yes       Resource/Environmental Concerns    Resource/Environmental Concerns none    Transportation Concerns car, none       Transition Planning    Patient/Family Anticipates Transition to home with family    Patient/Family Anticipated Services at Transition none    Transportation Anticipated family or friend will provide       Discharge Needs Assessment    Readmission Within the Last 30 Days no previous admission in last 30 days    Concerns to be Addressed denies needs/concerns at this time;no discharge needs identified    Equipment Currently Used at Home cane, straight;rollator;bipap/cpap    Anticipated Changes Related to Illness none    Equipment Needed After Discharge none            Discharge Plan     Row Name 07/16/18 1322       Plan    Plan Home; follow for IV abx     Patient/Family in Agreement with Plan yes    Plan Comments CCP met with patient at bedside. CCP role explained and discharge planning discussed. Face sheet verified and IMM noted. Patient's PCP is Dr. Rodriguez. Patient lives with her son, in a single story home with a basement. Patient's bedroom and bathroom are located on the main level. Patient uses a cane for mobility and also has a rollator she uses for long distances. Patient has a CPAP machine. Patient has not used home health but has  been to SNF but could not recall which agency. Patient uses [facility] on [address]; patient denies having trouble affording her medications and does not have trouble remembering her medications. Patient has transportation home. Patient does not anticipate any discharge needs. CCP will follow for IV abx and PT eval. Mariya WALLACEW         Destination     No service coordination in this encounter.      Durable Medical Equipment     No service coordination in this encounter.      Dialysis/Infusion     No service coordination in this encounter.      Home Medical Care     No service coordination in this encounter.      Social Care     No service coordination in this encounter.                Demographic Summary     Row Name 07/16/18 1320       General Information    Admission Type inpatient    Arrived From emergency department    Required Notices Provided Important Message from Medicare    Referral Source admission list    Reason for Consult discharge planning    Preferred Language English     Used During This Interaction no            Functional Status     Row Name 07/16/18 1321       Functional Status    Usual Activity Tolerance good    Current Activity Tolerance good       Functional Status, IADL    Medications assistive equipment    Meal Preparation assistive equipment    Housekeeping assistive equipment    Laundry assistive equipment    Shopping assistive equipment       Mental Status    General Appearance WDL WDL       Mental Status Summary    Recent Changes in Mental Status/Cognitive Functioning no changes            Psychosocial    No documentation.           Abuse/Neglect    No documentation.           Legal    No documentation.           Substance Abuse    No documentation.           Patient Forms     Row Name 07/16/18 1327       Patient Forms    Provider Choice List Delivered    Delivered to Patient    Method of delivery In person          AMANDA Smith

## 2018-07-16 NOTE — PLAN OF CARE
Problem: Patient Care Overview  Goal: Plan of Care Review  Outcome: Ongoing (interventions implemented as appropriate)   07/16/18 0503 07/16/18 0920 07/16/18 1724   Coping/Psychosocial   Plan of Care Reviewed With --  patient --    Plan of Care Review   Progress no change --  --    OTHER   Outcome Summary --  --  VSS. Resp viral panel sent down for cold like symptoms. Doppler of right leg + for superficial thrombus. Pt on lovenox. IV fluids ans abx continue. Will continue to monitor.      Goal: Individualization and Mutuality  Outcome: Ongoing (interventions implemented as appropriate)    Goal: Discharge Needs Assessment  Outcome: Ongoing (interventions implemented as appropriate)    Goal: Interprofessional Rounds/Family Conf  Outcome: Ongoing (interventions implemented as appropriate)      Problem: Infection, Risk/Actual (Adult)  Goal: Infection Prevention/Resolution  Outcome: Ongoing (interventions implemented as appropriate)

## 2018-07-16 NOTE — PLAN OF CARE
Problem: Fall Risk (Adult)  Goal: Identify Related Risk Factors and Signs and Symptoms  Outcome: Outcome(s) achieved Date Met: 07/16/18    Goal: Absence of Fall  Outcome: Ongoing (interventions implemented as appropriate)      Problem: Patient Care Overview  Goal: Plan of Care Review  Outcome: Ongoing (interventions implemented as appropriate)   07/16/18 0503   Coping/Psychosocial   Plan of Care Reviewed With patient   Plan of Care Review   Progress no change   OTHER   Outcome Summary admitted to floor for cellulitis, IV abx given, IV fluids started, VSS, will continue to monitor       Problem: Infection, Risk/Actual (Adult)  Goal: Identify Related Risk Factors and Signs and Symptoms  Outcome: Outcome(s) achieved Date Met: 07/16/18    Goal: Infection Prevention/Resolution  Outcome: Ongoing (interventions implemented as appropriate)

## 2018-07-16 NOTE — PROGRESS NOTES
"Pharmacokinetic Consult - Vancomycin Dosing (Initial Note)    Denisse Chavez has been consulted for pharmacy to dose vancomycin for SSTI.    Pharmacy dosing vancomycin per Dr. Mak's request.     Goal trough: 10-20 mg/L   Other antimicrobials: cefepime    Relevant clinical data and objective history reviewed:  72 y.o. female 157.5 cm (62\") 102 kg (225 lb)    Past Medical History:   Diagnosis Date   • Angiomyolipoma of kidney 3/30/2016   • CAD (coronary artery disease)     MI in 1996, treated medically.  PET 6/2016 with small-medium sized lateral infarct, no ischemia.  This wall motion abnormality was seen on echo as well.   • Chronic venous insufficiency    • Hyperlipidemia    • Hypertension    • Low back pain    • Mass of ovary 3/30/2016   • Morbid obesity (CMS/HCC)    • Sleep apnea    • Type 2 diabetes mellitus (CMS/[facility])    • Uterine leiomyoma 3/30/2016     Creatinine   Date Value Ref Range Status   07/15/2018 1.02 (H) 0.57 - 1.00 mg/dL Final     BUN   Date Value Ref Range Status   07/15/2018 21 8 - 23 mg/dL Final     Estimated Creatinine Clearance: 55.8 mL/min (A) (by C-G formula based on SCr of 1.02 mg/dL (H)).    Lab Results   Component Value Date    WBC 17.70 (H) 07/15/2018     Temp Readings from Last 3 Encounters:   07/16/18 100.2 °F (37.9 °C) (Oral)      Baseline culture/source/susceptibility:  07/15 - Blood culture x2 -In process     Assessment/Plan  1.  Patient received vancomycin 2000mg IV x1 on 07/16/18 0005. Will start vancomycin 1000mg IV q12h. (BMI = 41.15kg)    2. Will monitor serum creatinine every 24 hours for the first 3 days then at least every 48 hours per dosing recommendations.     3. Vancomycin level 07/17/18 @ 1130 prior to 4th TDD.    4. Pharmacy will continue to follow daily while on vancomycin and adjust as needed.     Thanks,  Leroy Johnson, PharmD      "

## 2018-07-17 LAB
ANION GAP SERPL CALCULATED.3IONS-SCNC: 13.1 MMOL/L
BASOPHILS # BLD AUTO: 0.01 10*3/MM3 (ref 0–0.2)
BASOPHILS NFR BLD AUTO: 0.1 % (ref 0–1.5)
BUN BLD-MCNC: 15 MG/DL (ref 8–23)
BUN/CREAT SERPL: 17.9 (ref 7–25)
CALCIUM SPEC-SCNC: 8.3 MG/DL (ref 8.6–10.5)
CHLORIDE SERPL-SCNC: 97 MMOL/L (ref 98–107)
CO2 SERPL-SCNC: 22.9 MMOL/L (ref 22–29)
CREAT BLD-MCNC: 0.84 MG/DL (ref 0.57–1)
DEPRECATED RDW RBC AUTO: 46.2 FL (ref 37–54)
EOSINOPHIL # BLD AUTO: 0.09 10*3/MM3 (ref 0–0.7)
EOSINOPHIL NFR BLD AUTO: 0.6 % (ref 0.3–6.2)
ERYTHROCYTE [DISTWIDTH] IN BLOOD BY AUTOMATED COUNT: 14.3 % (ref 11.7–13)
GFR SERPL CREATININE-BSD FRML MDRD: 67 ML/MIN/1.73
GLUCOSE BLD-MCNC: 85 MG/DL (ref 65–99)
GLUCOSE BLDC GLUCOMTR-MCNC: 139 MG/DL (ref 70–130)
GLUCOSE BLDC GLUCOMTR-MCNC: 158 MG/DL (ref 70–130)
GLUCOSE BLDC GLUCOMTR-MCNC: 233 MG/DL (ref 70–130)
GLUCOSE BLDC GLUCOMTR-MCNC: 89 MG/DL (ref 70–130)
HCT VFR BLD AUTO: 39.3 % (ref 35.6–45.5)
HGB BLD-MCNC: 12.2 G/DL (ref 11.9–15.5)
IMM GRANULOCYTES # BLD: 0.03 10*3/MM3 (ref 0–0.03)
IMM GRANULOCYTES NFR BLD: 0.2 % (ref 0–0.5)
LYMPHOCYTES # BLD AUTO: 1.39 10*3/MM3 (ref 0.9–4.8)
LYMPHOCYTES NFR BLD AUTO: 9.8 % (ref 19.6–45.3)
MCH RBC QN AUTO: 27.2 PG (ref 26.9–32)
MCHC RBC AUTO-ENTMCNC: 31 G/DL (ref 32.4–36.3)
MCV RBC AUTO: 87.5 FL (ref 80.5–98.2)
MONOCYTES # BLD AUTO: 0.8 10*3/MM3 (ref 0.2–1.2)
MONOCYTES NFR BLD AUTO: 5.6 % (ref 5–12)
NEUTROPHILS # BLD AUTO: 11.93 10*3/MM3 (ref 1.9–8.1)
NEUTROPHILS NFR BLD AUTO: 83.7 % (ref 42.7–76)
PLATELET # BLD AUTO: 163 10*3/MM3 (ref 140–500)
PMV BLD AUTO: 10.5 FL (ref 6–12)
POTASSIUM BLD-SCNC: 3.1 MMOL/L (ref 3.5–5.2)
RBC # BLD AUTO: 4.49 10*6/MM3 (ref 3.9–5.2)
SODIUM BLD-SCNC: 133 MMOL/L (ref 136–145)
VANCOMYCIN TROUGH SERPL-MCNC: 13.4 MCG/ML (ref 5–20)
WBC NRBC COR # BLD: 14.25 10*3/MM3 (ref 4.5–10.7)

## 2018-07-17 PROCEDURE — 80202 ASSAY OF VANCOMYCIN: CPT | Performed by: HOSPITALIST

## 2018-07-17 PROCEDURE — 25010000002 ENOXAPARIN PER 10 MG: Performed by: HOSPITALIST

## 2018-07-17 PROCEDURE — 25010000002 CEFEPIME PER 500 MG: Performed by: HOSPITALIST

## 2018-07-17 PROCEDURE — 97162 PT EVAL MOD COMPLEX 30 MIN: CPT

## 2018-07-17 PROCEDURE — 99233 SBSQ HOSP IP/OBS HIGH 50: CPT | Performed by: INTERNAL MEDICINE

## 2018-07-17 PROCEDURE — 85025 COMPLETE CBC W/AUTO DIFF WBC: CPT | Performed by: INTERNAL MEDICINE

## 2018-07-17 PROCEDURE — 97110 THERAPEUTIC EXERCISES: CPT

## 2018-07-17 PROCEDURE — 25010000002 VANCOMYCIN PER 500 MG: Performed by: HOSPITALIST

## 2018-07-17 PROCEDURE — 63710000001 INSULIN REGULAR HUMAN PER 5 UNITS: Performed by: INTERNAL MEDICINE

## 2018-07-17 PROCEDURE — 82962 GLUCOSE BLOOD TEST: CPT

## 2018-07-17 PROCEDURE — 63710000001 INSULIN ISOPHANE HUMAN PER 5 UNITS: Performed by: INTERNAL MEDICINE

## 2018-07-17 PROCEDURE — 80048 BASIC METABOLIC PNL TOTAL CA: CPT | Performed by: INTERNAL MEDICINE

## 2018-07-17 RX ORDER — TEMAZEPAM 15 MG/1
15 CAPSULE ORAL NIGHTLY PRN
Status: DISCONTINUED | OUTPATIENT
Start: 2018-07-17 | End: 2018-07-21 | Stop reason: HOSPADM

## 2018-07-17 RX ORDER — POTASSIUM CHLORIDE 750 MG/1
40 CAPSULE, EXTENDED RELEASE ORAL 2 TIMES DAILY WITH MEALS
Status: COMPLETED | OUTPATIENT
Start: 2018-07-17 | End: 2018-07-17

## 2018-07-17 RX ADMIN — DOCUSATE SODIUM 100 MG: 100 CAPSULE, LIQUID FILLED ORAL at 20:51

## 2018-07-17 RX ADMIN — HUMAN INSULIN 2 UNITS: 100 INJECTION, SOLUTION SUBCUTANEOUS at 21:15

## 2018-07-17 RX ADMIN — ATORVASTATIN CALCIUM 20 MG: 20 TABLET, FILM COATED ORAL at 08:40

## 2018-07-17 RX ADMIN — ASPIRIN 81 MG: 81 TABLET ORAL at 08:40

## 2018-07-17 RX ADMIN — VANCOMYCIN HYDROCHLORIDE 1000 MG: 1 INJECTION, SOLUTION INTRAVENOUS at 13:01

## 2018-07-17 RX ADMIN — TEMAZEPAM 15 MG: 15 CAPSULE ORAL at 23:28

## 2018-07-17 RX ADMIN — POTASSIUM CHLORIDE 40 MEQ: 750 CAPSULE, EXTENDED RELEASE ORAL at 08:51

## 2018-07-17 RX ADMIN — ACETAMINOPHEN 650 MG: 325 TABLET, FILM COATED ORAL at 20:50

## 2018-07-17 RX ADMIN — CEFEPIME HYDROCHLORIDE 1 G: 1 INJECTION, POWDER, FOR SOLUTION INTRAMUSCULAR; INTRAVENOUS at 15:16

## 2018-07-17 RX ADMIN — HUMAN INSULIN 20 UNITS: 100 INJECTION, SUSPENSION SUBCUTANEOUS at 08:38

## 2018-07-17 RX ADMIN — VANCOMYCIN HYDROCHLORIDE 1000 MG: 1 INJECTION, SOLUTION INTRAVENOUS at 01:02

## 2018-07-17 RX ADMIN — ENOXAPARIN SODIUM 40 MG: 40 INJECTION SUBCUTANEOUS at 06:57

## 2018-07-17 RX ADMIN — AMLODIPINE BESYLATE 10 MG: 10 TABLET ORAL at 08:40

## 2018-07-17 RX ADMIN — HUMAN INSULIN 15 UNITS: 100 INJECTION, SOLUTION SUBCUTANEOUS at 08:39

## 2018-07-17 RX ADMIN — HUMAN INSULIN 3 UNITS: 100 INJECTION, SOLUTION SUBCUTANEOUS at 17:19

## 2018-07-17 RX ADMIN — HUMAN INSULIN 20 UNITS: 100 INJECTION, SUSPENSION SUBCUTANEOUS at 20:51

## 2018-07-17 RX ADMIN — SODIUM CHLORIDE 75 ML/HR: 9 INJECTION, SOLUTION INTRAVENOUS at 01:16

## 2018-07-17 RX ADMIN — POTASSIUM CHLORIDE 40 MEQ: 750 CAPSULE, EXTENDED RELEASE ORAL at 17:13

## 2018-07-17 RX ADMIN — ENOXAPARIN SODIUM 40 MG: 40 INJECTION SUBCUTANEOUS at 17:13

## 2018-07-17 RX ADMIN — DOCUSATE SODIUM 100 MG: 100 CAPSULE, LIQUID FILLED ORAL at 08:40

## 2018-07-17 RX ADMIN — METOPROLOL SUCCINATE 25 MG: 25 TABLET, FILM COATED, EXTENDED RELEASE ORAL at 08:39

## 2018-07-17 RX ADMIN — ISOSORBIDE MONONITRATE 60 MG: 60 TABLET, EXTENDED RELEASE ORAL at 08:40

## 2018-07-17 RX ADMIN — HUMAN INSULIN 15 UNITS: 100 INJECTION, SOLUTION SUBCUTANEOUS at 17:19

## 2018-07-17 RX ADMIN — CEFEPIME HYDROCHLORIDE 1 G: 1 INJECTION, POWDER, FOR SOLUTION INTRAMUSCULAR; INTRAVENOUS at 04:21

## 2018-07-17 NOTE — PLAN OF CARE
Problem: Patient Care Overview  Goal: Plan of Care Review  Outcome: Ongoing (interventions implemented as appropriate)   07/17/18 9526   Coping/Psychosocial   Plan of Care Reviewed With patient   OTHER   Outcome Summary Pt presents with impaired functional mobility and gait secondary to some generalized weakness, pain, and decreased activity tolerance s/p R LE cellulitis. Pt may benefit from skilled PT to address strength, mobility, and gait. Pt's concerns regarding discharge include pt has 6 steps to get into home.

## 2018-07-17 NOTE — PROGRESS NOTES
Name: Denisse Chavez ADMIT: 7/15/2018   : 1945  PCP: Agueda Rodriguez MD    MRN: [id number] LOS: 1 days   AGE/SEX: 72 y.o. female  ROOM: [facility]   Subjective     Right lower extremity pain and erythema slightly worse today according to patient.  Shortness of breath earlier today when walking to the bathroom but has resolved.  No chest pain or further shortness of breath.  No nausea, vomiting, diarrhea, rash or fever since admission.  Objective   Vital Signs  Temp:  [98.2 °F (36.8 °C)-99.1 °F (37.3 °C)] 98.6 °F (37 °C)  Heart Rate:  [74-86] 82  Resp:  [16-18] 16  BP: (135-172)/(58-69) 135/58  SpO2:  [95 %-98 %] 95 %  on   ;   Device (Oxygen Therapy): room air  Body mass index is 41.15 kg/m².    Physical Exam  Constitutional: She is oriented to person, place, and time. She appears well-developed and well-nourished. No distress.   Cardiovascular: Normal rate, regular rhythm and normal heart sounds.    No murmur heard.  Pulmonary/Chest: Effort normal and breath sounds normal. No stridor. No respiratory distress. She has no wheezes. She has no rales.   Abdominal: Soft. Bowel sounds are normal. She exhibits no distension. There is no tenderness.   Musculoskeletal: She exhibits edema (Nonpitting edema right lower extremity.  Bilateral chronic venous stasis changes.). She exhibits no tenderness.   Lymphadenopathy:     She has no cervical adenopathy.   Neurological: She is alert and oriented to person, place, and time. No cranial nerve deficit.   Skin: Skin is warm and dry. She is not diaphoretic. There is erythema.   There is a right plantar surface diabetic wound measuring about 3 x 2 cm that is not infected.  An area of erythema along the medial surface of the right leg extending from her ankle all the way to mid thigh which is warm, erythematous and mildly tender to palpation.  Slightly more erythematous today   Psychiatric: She has a normal mood and affect. Her behavior is normal.      Results Review:        I reviewed the patient's new clinical results.    Results from last 7 days  Lab Units 07/17/18  0609 07/16/18  0430 07/15/18  2326   WBC 10*3/mm3 14.25* 16.01* 17.70*   HEMOGLOBIN g/dL 12.2 12.0 13.2   PLATELETS 10*3/mm3 163 175 200     Results from last 7 days  Lab Units 07/17/18  0609 07/16/18  0430 07/15/18  2326   SODIUM mmol/L 133* 135* 137   POTASSIUM mmol/L 3.1* 3.7 4.2   CHLORIDE mmol/L 97* 100 98   CO2 mmol/L 22.9 20.4* 21.9*   BUN mg/dL 15 19 21   CREATININE mg/dL 0.84 0.94 1.02*   GLUCOSE mg/dL 85 247* 206*   Estimated Creatinine Clearance: 67.8 mL/min (by C-G formula based on SCr of 0.84 mg/dL).  Results from last 7 days  Lab Units 07/17/18  0609 07/16/18  0430 07/15/18  2326   CALCIUM mg/dL 8.3* 7.8* 9.0   ALBUMIN g/dL  --  3.50 4.10         amLODIPine 10 mg Oral Daily   aspirin 81 mg Oral Daily   atorvastatin 20 mg Oral Daily   cefepime 1 g Intravenous Q12H   docusate sodium 100 mg Oral BID   enoxaparin 40 mg Subcutaneous Q12H   insulin NPH 20 Units Subcutaneous QAM   insulin NPH 20 Units Subcutaneous Nightly   insulin regular 15 Units Subcutaneous Daily With Breakfast & Dinner   isosorbide mononitrate 60 mg Oral Daily   metoprolol succinate XL 25 mg Oral Daily   potassium chloride 40 mEq Oral BID With Meals   vancomycin 1,000 mg Intravenous Q12H       Pharmacy to dose vancomycin    Diet Regular; Cardiac, Consistent Carbohydrate, Renal      Assessment/Plan      Active Hospital Problems    Diagnosis Date Noted   • **Cellulitis of right leg [L03.115] 07/16/2018   • Sepsis (CMS/[facility]) [A41.9] 07/16/2018   • Viral upper respiratory infection [J06.9, B97.89] 07/16/2018   • Chronic kidney disease, stage III (moderate) [N18.3] 10/13/2016   • Obstructive sleep apnea on autoCPAP [G47.30] 09/04/2016   • Diabetes mellitus type 2, uncontrolled, with complications (CMS/HCC) [E11.8, E11.65] 03/08/2016   • Essential hypertension [I10] 03/08/2016      Resolved Hospital Problems    Diagnosis Date Noted Date Resolved    No resolved problems to display.     · Right lower extremity cellulitis in the setting of uncontrolled diabetes, chronic kidney disease:continue vancomycin and cefepime.  Fever ,white blood cell count and signs and symptoms all improving. Right lower extremity venous duplex negative for DVT  · Sepsis:secondary to above, fever 103.8, white blood cell count elevation, pro-calcitonin elevation, lactic acid elevation.  Continue IV antibiotics, Continue IIV fluids and hold Lasix for now  · Upper respiratory infection: Suspect a viral illness.  Respiratory viral panel is negative.  This could partially be source of her fever but I would not expect elevation of white blood cell count or pro-calcitonin with this.  · Type 2 diabetes: Uncontrolled, followed by endocrinology as out patient and I appreciate their assistance. Continue amlodipine for hypertension  · Coronary artery disease: Stable, continue metoprolol, Imdur, statin, aspirin.  Cardiologist is Dr. Tang  · Replace K  · Lovenox for DVT prophylaxis.  ·  PT consult      I discussed CODE STATUS with patient and she is a full code.  Her healthcare surrogate is her son    Duy Brooks MD [place]  [facility]  07/17/18  3:59 PM

## 2018-07-17 NOTE — THERAPY EVALUATION
Acute Care - Physical Therapy Initial Evaluation  [facility]     Patient Name: Denisse Chavez  : 1945  MRN: [id number]  Today's Date: 2018   Onset of Illness/Injury or Date of Surgery: 07/15/18            Admit Date: 7/15/2018    Visit Dx:     ICD-10-CM ICD-9-CM   1. Cellulitis of right leg L03.115 682.6   2. Diabetic ulcer of right foot associated with other specified diabetes mellitus, unspecified part of foot, unspecified ulcer stage (CMS/[facility]) E13.621 250.80    L97.519 707.15   3. Sepsis, due to unspecified organism (CMS/[facility]) A41.9 038.9     995.91   4. Difficulty walking R26.2 719.7     Patient Active Problem List   Diagnosis   • Type 2 diabetes mellitus (CMS/[facility])   • Diabetes mellitus type 2, uncontrolled, with complications (CMS/[facility])   • Uncontrolled type II diabetes mellitus with nephropathy (CMS/[facility])   • Type II diabetes mellitus with neurological manifestations, uncontrolled (CMS/[facility])   • Uncontrolled type 2 diabetes mellitus with background retinopathy (CMS/[facility])   • Hyperinsulinism   • Abnormal weight gain   • Hyperlipidemia   • Essential hypertension   • Edema of lower extremity   • Angiomyolipoma of kidney   • Memory changes   • Left hip pain   • Left-sided low back pain without sciatica   • Lumbar spinal stenosis   • Encounter for long-term (current) use of insulin (CMS/[facility])   • Obstructive sleep apnea on autoCPAP   • Chronic kidney disease, stage III (moderate)   • Chronic venous insufficiency   • CAD (coronary artery disease)   • Type 2 diabetes mellitus with stage 3 chronic kidney disease, with long-term current use of insulin (CMS/[facility])   • Circadian rhythm sleep disorder, delayed sleep phase type   • Class 3 obesity due to excess calories with serious comorbidity and body mass index (BMI) of 40.0 to 44.9 in adult (CMS/[facility])   • History of colon polyps   • Cellulitis of right leg   • Sepsis (CMS/[facility])   • Viral upper respiratory infection     Past Medical History:   Diagnosis Date    • Angiomyolipoma of kidney 3/30/2016   • CAD (coronary artery disease)     MI in 1996, treated medically.  PET 6/2016 with small-medium sized lateral infarct, no ischemia.  This wall motion abnormality was seen on echo as well.   • Chronic venous insufficiency    • Hyperlipidemia    • Hypertension    • Low back pain    • Mass of ovary 3/30/2016   • Morbid obesity (CMS/HCC)    • Sleep apnea    • Type 2 diabetes mellitus (CMS/HCC)    • Uterine leiomyoma 3/30/2016     Past Surgical History:   Procedure Laterality Date   • CATARACT EXTRACTION      X2    • HERNIA REPAIR     • HYSTERECTOMY     • TONSILLECTOMY          PT ASSESSMENT (last 12 hours)      Physical Therapy Evaluation     Row Name 07/17/18 1344          PT Evaluation Time/Intention    Subjective Information complains of;pain  -     Document Type evaluation  -     Mode of Treatment physical therapy  -     Patient Effort good  -     Symptoms Noted During/After Treatment none  -     Row Name 07/17/18 1341          General Information    Onset of Illness/Injury or Date of Surgery 07/15/18  -     Patient Observations alert;cooperative;agree to therapy  -     Patient/Family Observations pt supine in bed, R LE red and swollen, open sore noted on bottom of R foot, no acute distress noted at rest, son arrived to room during evaluation  -     Prior Level of Function independent:;gait;transfer;bed mobility;ADL's   walks with cane at baseline  -     Equipment Currently Used at Home cane, straight;walker, rolling  -     Pertinent History of Current Functional Problem pt admitted with cold-like symptoms, SOA, fever, R LE cellulitis, and uncontrolled DM  -     Existing Precautions/Restrictions fall  -     Barriers to Rehab medically complex  -     Row Name 07/17/18 1342          Relationship/Environment    Lives With child(reagan), adult  -     Row Name 07/17/18 1340          Resource/Environmental Concerns    Current Living Arrangements  home/apartment/condo  -     Row Name 07/17/18 1348          Cognitive Assessment/Interventions    Additional Documentation Cognitive Assessment/Intervention (Group)  -     Row Name 07/17/18 1348          Cognitive Assessment/Intervention- PT/OT    Orientation Status (Cognition) oriented x 4  -CH     Follows Commands (Cognition) WFL  -     Cognitive Function (Cognitive) WFL  -     Personal Safety Interventions fall prevention program maintained;gait belt;nonskid shoes/slippers when out of bed  -     Row Name 07/17/18 1348          Bed Mobility Assessment/Treatment    Bed Mobility Assessment/Treatment supine-sit;sit-supine  -     Supine-Sit [place] (Bed Mobility) supervision  -     Sit-Supine [place] (Bed Mobility) supervision  -     Comment (Bed Mobility) Pt requires increased time and effort to get in and out of bed without physical assistance  -     Row Name 07/17/18 1348          Transfer Assessment/Treatment    Transfer Assessment/Treatment sit-stand transfer;stand-sit transfer  -     Sit-Stand [place] (Transfers) verbal cues;nonverbal cues (demo/gesture);contact guard  -     Stand-Sit [place] (Transfers) verbal cues;nonverbal cues (demo/gesture);minimum assist (75% patient effort);2 person assist  -     Row Name 07/17/18 1348          Sit-Stand Transfer    Assistive Device (Sit-Stand Transfers) walker, front-wheeled  -     Row Name 07/17/18 1348          Stand-Sit Transfer    Assistive Device (Stand-Sit Transfers) walker, front-wheeled  -     Row Name 07/17/18 1348          Gait/Stairs Assessment/Training    Gait/Stairs Assessment/Training gait/ambulation independence  -     [place] Level (Gait) verbal cues;nonverbal cues (demo/gesture);contact guard  -     Assistive Device (Gait) walker, front-wheeled  -     Distance in Feet (Gait) 60  -CH     Deviations/Abnormal Patterns (Gait) charly decreased;gait speed decreased;stride length decreased  -      Bilateral Gait Deviations forward flexed posture  -     Comment (Gait/Stairs) pt with increased labored breathing upon returning to room, O2 sats at 95%  -[facility] Name 07/17/18 1348          General ROM    GENERAL ROM COMMENTS AROM WFL for age  -[facility] Name 07/17/18 1348          General Assessment (Manual Muscle Testing)    Comment, General Manual Muscle Testing (MMT) Assessment some generalized wekaness noted with functional mobility  -[facility] Name 07/17/18 1348          Motor Assessment/Intervention    Additional Documentation Balance (Group)  -CH     Row Name 07/17/18 1348          Balance    Balance static standing balance;dynamic standing balance  -[facility] Name 07/17/18 1348          Static Standing Balance    Level of [place] (Supported Standing, Static Balance) contact guard assist  -     Assistive Device Utilized (Supported Standing, Static Balance) rolling walker  -[facility] Name 07/17/18 1348          Dynamic Standing Balance    Level of [place], Reaches Outside Midline (Standing, Dynamic Balance) contact guard assist   rolling walker  -[facility] Name 07/17/18 1348          Pain Assessment    Additional Documentation Pain Scale: Numbers Pre/Post-Treatment (Group)  -CH     Row Name 07/17/18 1348          Pain Scale: Numbers Pre/Post-Treatment    Pain Scale: Numbers, Post-Treatment 7/10  -     Pain Location - Side Right  -     Pain Location --   leg  -     Pain Intervention(s) Repositioned  -CH     Row Name             Wound 07/16/18 Right posterior  diabetic/neuropathic ulceration    Wound - Properties Group Date first assessed: 07/16/18  -BF Present On Admission : yes  -BF Side: Right  -BF Orientation: posterior  -BF Type: diabetic/neuropathic ulceration  -[facility] Name 07/17/18 1348          Plan of Care Review    Plan of Care Reviewed With patient  -CH     Row Name 07/17/18 1348          Physical Therapy Clinical Impression    Patient/Family Goals Statement (PT  Clinical Impression) to return to [facility]  -     Criteria for Skilled Interventions Met (PT Clinical Impression) treatment indicated  -     Impairments Found (describe specific impairments) gait, locomotion, and balance;muscle performance  -     Rehab Potential (PT Clinical Summary) good, to achieve stated therapy goals  -     Row Name 07/17/18 1348          Physical Therapy Goals    Transfer Goal Selection (PT) transfer, PT goal 1  -     Gait Training Goal Selection (PT) gait training, PT goal 1  -     Stairs Goal Selection (PT) stairs, PT goal 1  -     Additional Documentation Stairs Goal Selection (PT) (Row)  -     Row Name 07/17/18 1345          Transfer Goal 1 (PT)    Activity/Assistive Device (Transfer Goal 1, PT) transfers, all;walker, rolling  -     [place] Level/Cues Needed (Transfer Goal 1, PT) supervision required  -CH     Time Frame (Transfer Goal 1, PT) 1 week  -     Row Name 07/17/18 1345          Gait Training Goal 1 (PT)    Activity/Assistive Device (Gait Training Goal 1, PT) gait (walking locomotion);walker, rolling  -     [place] Level (Gait Training Goal 1, PT) supervision required  -     Distance (Gait Goal 1, PT) 150  -CH     Time Frame (Gait Training Goal 1, PT) 1 week  -     Row Name 07/17/18 1345          Stairs Goal 1 (PT)    Activity/Assistive Device (Stairs Goal 1, PT) stairs, all skills  -     [place] Level/Cues Needed (Stairs Goal 1, PT) contact guard assist  -     Number of Stairs (Stairs Goal 1, PT) 6  -CH     Time Frame (Stairs Goal 1, PT) 1 week  -     Row Name 07/17/18 1347          Positioning and Restraints    Pre-Treatment Position in bed  -     Post Treatment Position bed  -     In Bed supine;call light within reach;encouraged to call for assist;with family/caregiver  -       User Key  (r) = Recorded By, (t) = Taken By, (c) = Cosigned By    Initials Name Provider Type    BF Brianne Tatum, RN, CWOCN Registered Nurse    LACY Sam  STAR Otero PT Physical Therapist          Physical Therapy Education     Title: PT OT SLP Therapies (Active)     Topic: Physical Therapy (Active)     Point: Mobility training (Done)    Learning Progress Summary     Learner Status Readiness Method Response Comment Documented by    Patient Done Acceptance E,TB,D VU,NR   07/17/18 1418          Point: Body mechanics (Done)    Learning Progress Summary     Learner Status Readiness Method Response Comment Documented by    Patient Done Acceptance E,TB,D VU,NR   07/17/18 1418          Point: Precautions (Done)    Learning Progress Summary     Learner Status Readiness Method Response Comment Documented by    Patient Done Acceptance E,TB,D VU,NR   07/17/18 1418                      User Key     Initials Effective Dates Name Provider Type Discipline     04/03/18 -  Cecy Otero, PT Physical Therapist PT                PT Recommendation and Plan  Anticipated Discharge Disposition (PT): home with home health, skilled nursing facility  Planned Therapy Interventions (PT Eval): balance training, bed mobility training, gait training, home exercise program, patient/family education, transfer training  Therapy Frequency (PT Clinical Impression): daily  Outcome Summary/Treatment Plan (PT)  Anticipated Discharge Disposition (PT): home with home health, skilled nursing facility  Plan of Care Reviewed With: patient  Outcome Summary: Pt presents with impaired functional mobility and gait secondary to some generalized weakness, pain, and decreased activity tolerance s/p R LE cellulitis. Pt may benefit from skilled PT to address strength, mobility, and gait. Pt's concerns regarding discharge include pt has 6 steps to get into home.          Outcome Measures     Row Name 07/17/18 1400             How much help from another person do you currently need...    Turning from your back to your side while in flat bed without using bedrails? 3  -CH      Moving from lying on back to  sitting on the side of a flat bed without bedrails? 3  -CH      Moving to and from a bed to a chair (including a wheelchair)? 3  -CH      Standing up from a chair using your arms (e.g., wheelchair, bedside chair)? 3  -CH      Climbing 3-5 steps with a railing? 2  -CH      To walk in hospital room? 3  -CH      AM-PAC 6 Clicks Score 17  -CH         Functional Assessment    Outcome Measure Options AM-PAC 6 Clicks Basic Mobility (PT)  -        User Key  (r) = Recorded By, (t) = Taken By, (c) = Cosigned By    Initials Name Provider Type     Cecy Otero, PT Physical Therapist           Time Calculation:         PT Charges     Row Name 07/17/18 1422             Time Calculation    Start Time 1331  -      Stop Time 1348  -      Time Calculation (min) 17 min  -      PT Received On 07/17/18  -      PT - Next Appointment 07/18/18  -      PT Goal Re-Cert Due Date 07/24/18  -         Time Calculation- PT    Total Timed Code Minutes- PT 10 minute(s)  -        User Key  (r) = Recorded By, (t) = Taken By, (c) = Cosigned By    Initials Name Provider Type     Cecy Otero, PT Physical Therapist        Therapy Suggested Charges     Code   Minutes Charges    None           Therapy Charges for Today     Code Description Service Date Service Provider Modifiers Qty    40856977470  PT EVAL MOD COMPLEXITY 2 7/17/2018 Cecy Otero, PT GP 1    [id number] HC PT THER PROC EA 15 MIN 7/17/2018 Cecy Otero, PT GP 1          PT G-Codes  Outcome Measure Options: AM-PAC 6 Clicks Basic Mobility (PT)      Cecy Otero, PT  7/17/2018

## 2018-07-17 NOTE — PLAN OF CARE
Problem: Patient Care Overview  Goal: Plan of Care Review  Outcome: Ongoing (interventions implemented as appropriate)   07/17/18 0536   Coping/Psychosocial   Plan of Care Reviewed With patient   Plan of Care Review   Progress improving   OTHER   Outcome Summary VSS. IV fluids and abx continue. No acute changes. Continue to monitor.      Goal: Individualization and Mutuality  Outcome: Ongoing (interventions implemented as appropriate)    Goal: Discharge Needs Assessment  Outcome: Ongoing (interventions implemented as appropriate)      Problem: Infection, Risk/Actual (Adult)  Goal: Infection Prevention/Resolution  Outcome: Ongoing (interventions implemented as appropriate)

## 2018-07-17 NOTE — PROGRESS NOTES
72 y.o.   LOS: 1 day   Patient Care Team:  Agueda Rodriguez MD as PCP - General (Internal Medicine)  Wally TYLER MD as PCP - Claims Attributed  Wally TYLER MD as Consulting Physician (Endocrinology)  Warner Tang MD as Consulting Physician (Cardiology)  Sergio Eagle MD as Consulting Physician (Urology)  Candis Cruz, RN as Care Coordinator (Population Health)    Chief Complaint:  Uncontrolled type 2 diabetes mellitus    Chief Complaint   Patient presents with   • Shortness of Breath   • Fever   • Nausea       Subjective     HPI  Patient's blood sugar started dropping into 80 range this morning and the nurse was uncomfortable giving her the insulin  I advised her to decrease insulin by 50%  Patient is currently 276  She is obviously eating better  She might need more insulin than we have given overnight  Interval History:    Patient is a 72-year-old white female with a past history of uncontrolled type 2 diabetes mellitus on insulin regimen admitted to the hospital for fever nausea and shortness of breath.  She was also reporting fatigue for the past several days  Patient  had chronic right diabetic foot ulcer followed by podiatry and apparently was seen by podiatry Tuesday last week and was debrided and some topical anybody cream was applied  Subsequently patient reported that she has not been feeling well she is feeling fatigued and weak and started having upper respiratory symptoms and had a elevated temperature of 103.8  Associates symptom was shortness of breath and mild cough     After a few days patient noticed swelling and redness and warmth over the right lower extremity and came to the emergency room yesterday and was evaluated and admitted to the hospital  She was started on vancomycin and Zosyn  Lactic acid was elevated at 2.2 and pro calcitonin was elevated at 1.9     Patient's blood sugars were in the 200-300 range and I was concentrated for further managing patient's  diabetes during this hospitalization  Patient has been a diabetic for long time and was successfully managed on NPH and regular insulin regimen  She takes approximately 58 units of NPH in the morning and 42 at bedtime and she takes regular insulin 50 units with breakfast and 48 units at supper  She reports that most of her blood sugars were below 200  She had very few episodes of hypoglycemia  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  Patient is symptoms of peripheral neuropathy and is a diabetic foot ulceration in the right fourth which is being attended by podiatry on a regular basis  Uncontrolled type 2 diabetes mellitus with nephropathy  Patient is chronic kidney disease with elevated microalbuminuria in the past        Review of Systems:      Review of Systems   Constitutional: Positive for fatigue.   Respiratory: Negative.    Cardiovascular: Positive for leg swelling.   Gastrointestinal: Positive for abdominal distention. Negative for abdominal pain.   Skin: Positive for color change and rash.   All other systems reviewed and are negative.    Objective     Vital Signs   Temp:  [98.2 °F (36.8 °C)-99.8 °F (37.7 °C)] 98.2 °F (36.8 °C)  Heart Rate:  [74-82] 78  Resp:  [16-18] 16  BP: (144-172)/(63-71) 156/69    Physical Exam:  Physical Exam   Constitutional: She is oriented to person, place, and time.   HENT:   Head: Atraumatic.   Eyes: EOM are normal. Pupils are equal, round, and reactive to light.   Neck: Normal range of motion. Neck supple.   Cardiovascular: Normal rate, regular rhythm, normal heart sounds and intact distal pulses.    Pulmonary/Chest: Effort normal and breath sounds normal.   Abdominal: Soft. Bowel sounds are normal. She exhibits distension.   Musculoskeletal: She exhibits edema.   Neurological: She is alert and oriented to person, place, and time.   Skin: Rash noted.   Psychiatric: She has a normal mood and affect. Her behavior is normal.   Nursing note and vitals  reviewed.  Results Review:     I reviewed the patient's new clinical results.      Glucose   Date/Time Value Ref Range Status   07/17/2018 0609 85 65 - 99 mg/dL Final   07/16/2018 0430 247 (H) 65 - 99 mg/dL Final   07/15/2018 2326 206 (H) 65 - 99 mg/dL Final     Lab Results (last 72 hours)     Procedure Component Value Units Date/Time    Basic Metabolic Panel [[id number]]  (Abnormal) Collected:  07/17/18 0609    Specimen:  Blood Updated:  07/17/18 0657     Glucose 85 mg/dL      BUN 15 mg/dL      Creatinine 0.84 mg/dL      Sodium 133 (L) mmol/L      Potassium 3.1 (L) mmol/L      Chloride 97 (L) mmol/L      CO2 22.9 mmol/L      Calcium 8.3 (L) mg/dL      eGFR Non African Amer 67 mL/min/1.73      BUN/Creatinine Ratio 17.9     Anion Gap 13.1 mmol/L     Narrative:       The MDRD GFR formula is only valid for adults with stable renal function between ages 18 and 70.    CBC & Differential [[id number]] Collected:  07/17/18 0609    Specimen:  Blood Updated:  07/17/18 0643    Narrative:       The following orders were created for panel order CBC & Differential.  Procedure                               Abnormality         Status                     ---------                               -----------         ------                     CBC Auto Differential[[id number]]        Abnormal            Final result                 Please view results for these tests on the individual orders.    CBC Auto Differential [[id number]]  (Abnormal) Collected:  07/17/18 0609    Specimen:  Blood Updated:  07/17/18 0643     WBC 14.25 (H) 10*3/mm3      RBC 4.49 10*6/mm3      Hemoglobin 12.2 g/dL      Hematocrit 39.3 %      MCV 87.5 fL      MCH 27.2 pg      MCHC 31.0 (L) g/dL      RDW 14.3 (H) %      RDW-SD 46.2 fl      MPV 10.5 fL      Platelets 163 10*3/mm3      Neutrophil % 83.7 (H) %      Lymphocyte % 9.8 (L) %      Monocyte % 5.6 %      Eosinophil % 0.6 %      Basophil % 0.1 %      Immature Grans % 0.2 %      Neutrophils, Absolute 11.93 (H)  10*3/mm3      Lymphocytes, Absolute 1.39 10*3/mm3      Monocytes, Absolute 0.80 10*3/mm3      Eosinophils, Absolute 0.09 10*3/mm3      Basophils, Absolute 0.01 10*3/mm3      Immature Grans, Absolute 0.03 10*3/mm3     POC Glucose Once [[id number]]  (Normal) Collected:  07/17/18 0612    Specimen:  Blood Updated:  07/17/18 0613     Glucose 89 mg/dL     Narrative:       Meter: [id number] : [id number] Dada ADAM    Blood Culture - Blood, [[id number]]  (Normal) Collected:  07/15/18 2331    Specimen:  Blood from Arm, Right Updated:  07/16/18 2345     Blood Culture No growth at 24 hours    Blood Culture - Blood, [[id number]]  (Normal) Collected:  07/15/18 2326    Specimen:  Blood from Arm, Left Updated:  07/16/18 2345     Blood Culture No growth at 24 hours    POC Glucose Once [[id number]]  (Abnormal) Collected:  07/16/18 2328    Specimen:  Blood Updated:  07/16/18 2329     Glucose 138 (H) mg/dL     Narrative:       Meter: [id number] : [id number] Dada ADAM    Respiratory Panel, PCR - Swab, Nasopharynx [[id number]]  (Normal) Collected:  07/16/18 1048    Specimen:  Swab from Nasopharynx Updated:  07/16/18 2157     ADENOVIRUS, PCR Not Detected     Coronavirus 229E Not Detected     Coronavirus HKU1 Not Detected     Coronavirus NL63 Not Detected     Coronavirus OC43 Not Detected     Human Metapneumovirus Not Detected     Human Rhinovirus/Enterovirus Not Detected     Influenza B PCR Not Detected     Parainfluenza Virus 1 Not Detected     Parainfluenza Virus 2 Not Detected     Parainfluenza Virus 3 Not Detected     Parainfluenza Virus 4 Not Detected     Bordetella pertussis pcr Not Detected     Influenza A H1 2009 PCR Not Detected     Chlamydophila pneumoniae PCR Not Detected     Mycoplasma pneumo by PCR Not Detected     Influenza A PCR Not Detected     Influenza A H3 Not Detected     Influenza A H1 Not Detected     RSV, PCR Not Detected    POC Glucose Once [[id number]]  (Abnormal) Collected:  07/16/18 2055     "Specimen:  Blood Updated:  07/16/18 2056     Glucose 222 (H) mg/dL     Narrative:       Meter: [id number] : [id number] Dada Ramon NA    POC Glucose Once [[id number]]  (Abnormal) Collected:  07/16/18 1640    Specimen:  Blood Updated:  07/16/18 1642     Glucose 273 (H) mg/dL     Narrative:       Meter: [id number] : [id number]  Tricia NA    POC Glucose Once [[id number]]  (Abnormal) Collected:  07/16/18 1116    Specimen:  Blood Updated:  07/16/18 1117     Glucose 329 (H) mg/dL     Narrative:       Meter: [id number] : [id number]  Tricia NA    Procalcitonin [[id number]]  (Abnormal) Collected:  07/16/18 0430    Specimen:  Blood Updated:  07/16/18 0633     Procalcitonin 1.91 (C) ng/mL     Narrative:       As a Marker for Sepsis (Non-Neonates):   1. <0.5 ng/mL represents a low risk of severe sepsis and/or septic shock.  1. >2 ng/mL represents a high risk of severe sepsis and/or septic shock.    As a Marker for Lower Respiratory Tract Infections that require antibiotic therapy:  PCT on Admission     Antibiotic Therapy             6-12 Hrs later  > 0.5                Strongly Recommended            >0.25 - <0.5         Recommended  0.1 - 0.25           Discouraged                   Remeasure/reassess PCT  <0.1                 Strongly Discouraged          Remeasure/reassess PCT      As 28 day mortality risk marker: \"Change in Procalcitonin Result\" (> 80 % or <=80 %) if Day 0 (or Day 1) and Day 4 values are available. Refer to http://[email or web address]-pct-calculator.com/   Change in PCT <=80 %   A decrease of PCT levels below or equal to 80 % defines a positive change in PCT test result representing a higher risk for 28-day all-cause mortality of patients diagnosed with severe sepsis or septic shock.  Change in PCT > 80 %   A decrease of PCT levels of more than 80 % defines a negative change in PCT result representing a lower risk for 28-day all-cause mortality of patients diagnosed with severe sepsis or " septic shock.                POC Glucose Once [[id number]]  (Abnormal) Collected:  07/16/18 0617    Specimen:  Blood Updated:  07/16/18 0618     Glucose 252 (H) mg/dL     Narrative:       Meter: [id number] : [id number] Lai Healy LEROY    Comprehensive Metabolic Panel [[id number]]  (Abnormal) Collected:  07/16/18 0430    Specimen:  Blood Updated:  07/16/18 0520     Glucose 247 (H) mg/dL      BUN 19 mg/dL      Creatinine 0.94 mg/dL      Sodium 135 (L) mmol/L      Potassium 3.7 mmol/L      Chloride 100 mmol/L      CO2 20.4 (L) mmol/L      Calcium 7.8 (L) mg/dL      Total Protein 6.0 g/dL      Albumin 3.50 g/dL      ALT (SGPT) 17 U/L      AST (SGOT) 47 (H) U/L      Alkaline Phosphatase 64 U/L      Total Bilirubin 0.7 mg/dL      eGFR Non African Amer 59 (L) mL/min/1.73      Globulin 2.5 gm/dL      A/G Ratio 1.4 g/dL      BUN/Creatinine Ratio 20.2     Anion Gap 14.6 mmol/L     Narrative:       The MDRD GFR formula is only valid for adults with stable renal function between ages 18 and 70.    Lactic Acid, Plasma [[id number]]  (Normal) Collected:  07/16/18 0430    Specimen:  Blood Updated:  07/16/18 0502     Lactate 0.9 mmol/L     CBC Auto Differential [[id number]]  (Abnormal) Collected:  07/16/18 0430    Specimen:  Blood Updated:  07/16/18 0454     WBC 16.01 (H) 10*3/mm3      RBC 4.27 10*6/mm3      Hemoglobin 12.0 g/dL      Hematocrit 37.6 %      MCV 88.1 fL      MCH 28.1 pg      MCHC 31.9 (L) g/dL      RDW 14.5 (H) %      RDW-SD 46.2 fl      MPV 10.3 fL      Platelets 175 10*3/mm3      Neutrophil % 89.4 (H) %      Lymphocyte % 4.7 (L) %      Monocyte % 5.6 %      Eosinophil % 0.0 (L) %      Basophil % 0.1 %      Immature Grans % 0.2 %      Neutrophils, Absolute 14.31 (H) 10*3/mm3      Lymphocytes, Absolute 0.76 (L) 10*3/mm3      Monocytes, Absolute 0.90 10*3/mm3      Eosinophils, Absolute 0.00 10*3/mm3      Basophils, Absolute 0.01 10*3/mm3      Immature Grans, Absolute 0.03 10*3/mm3     Lactic Acid, Reflex Timer (This will  reflex a repeat order 3-3:15 hours after ordered.) [[id number]] Collected:  07/15/18 2326    Specimen:  Blood Updated:  07/16/18 0345     Extra Tube Hold for add-ons.     Comment: Auto resulted.       POC Glucose Once [[id number]]  (Abnormal) Collected:  07/16/18 0315    Specimen:  Blood Updated:  07/16/18 0317     Glucose 217 (H) mg/dL     Narrative:       Meter: [id number] : [id number] Lai Healy RN    Urinalysis, Microscopic Only - Urine, Clean Catch [[id number]]  (Abnormal) Collected:  07/16/18 0030    Specimen:  Urine from Urine, Clean Catch Updated:  07/16/18 0132     RBC, UA 3-5 (A) /HPF      WBC, UA 0-2 /HPF      Bacteria, UA None Seen /HPF      Squamous Epithelial Cells, UA 3-6 (A) /HPF      Transitional Epithelial Cells, UA 0-2 /HPF      Hyaline Casts, UA 0-2 /LPF      Methodology Manual Light Microscopy    Urinalysis With Microscopic If Indicated (No Culture) - Urine, Clean Catch [[id number]]  (Abnormal) Collected:  07/16/18 0030    Specimen:  Urine from Urine, Clean Catch Updated:  07/16/18 0101     Color, UA Yellow     Appearance, UA Clear     pH, UA 8.0     Specific Gravity, UA 1.022     Glucose,  mg/dL (Trace) (A)     Ketones, UA 15 mg/dL (1+) (A)     Bilirubin, UA Negative     Blood, UA Small (1+) (A)     Protein, UA >=300 mg/dL (3+) (A)     Leuk Esterase, UA Negative     Nitrite, UA Negative     Urobilinogen, UA 0.2 E.U./dL    Lactic Acid, Plasma [[id number]]  (Abnormal) Collected:  07/15/18 2326    Specimen:  Blood Updated:  07/16/18 0032     Lactate 2.2 (C) mmol/L     Procalcitonin [[id number]]  (Abnormal) Collected:  07/15/18 2326    Specimen:  Blood Updated:  07/16/18 0032     Procalcitonin 1.02 (C) ng/mL     Narrative:       As a Marker for Sepsis (Non-Neonates):   1. <0.5 ng/mL represents a low risk of severe sepsis and/or septic shock.  1. >2 ng/mL represents a high risk of severe sepsis and/or septic shock.    As a Marker for Lower Respiratory Tract Infections that require antibiotic  "therapy:  PCT on Admission     Antibiotic Therapy             6-12 Hrs later  > 0.5                Strongly Recommended            >0.25 - <0.5         Recommended  0.1 - 0.25           Discouraged                   Remeasure/reassess PCT  <0.1                 Strongly Discouraged          Remeasure/reassess PCT      As 28 day mortality risk marker: \"Change in Procalcitonin Result\" (> 80 % or <=80 %) if Day 0 (or Day 1) and Day 4 values are available. Refer to http://www.[facility]-calculator.com/   Change in PCT <=80 %   A decrease of PCT levels below or equal to 80 % defines a positive change in PCT test result representing a higher risk for 28-day all-cause mortality of patients diagnosed with severe sepsis or septic shock.  Change in PCT > 80 %   A decrease of PCT levels of more than 80 % defines a negative change in PCT result representing a lower risk for 28-day all-cause mortality of patients diagnosed with severe sepsis or septic shock.                Comprehensive Metabolic Panel [[id number]]  (Abnormal) Collected:  07/15/18 2326    Specimen:  Blood Updated:  07/16/18 0009     Glucose 206 (H) mg/dL      BUN 21 mg/dL      Creatinine 1.02 (H) mg/dL      Sodium 137 mmol/L      Potassium 4.2 mmol/L      Chloride 98 mmol/L      CO2 21.9 (L) mmol/L      Calcium 9.0 mg/dL      Total Protein 7.1 g/dL      Albumin 4.10 g/dL      ALT (SGPT) 19 U/L      AST (SGOT) 53 (H) U/L      Alkaline Phosphatase 76 U/L      Total Bilirubin 0.8 mg/dL      eGFR Non African Amer 53 (L) mL/min/1.73      Globulin 3.0 gm/dL      A/G Ratio 1.4 g/dL      BUN/Creatinine Ratio 20.6     Anion Gap 17.1 mmol/L     Narrative:       The MDRD GFR formula is only valid for adults with stable renal function between ages 18 and 70.    CBC & Differential [[id number]] Collected:  07/15/18 2326    Specimen:  Blood Updated:  07/15/18 2345    Narrative:       The following orders were created for panel order CBC & Differential.  Procedure                  "              Abnormality         Status                     ---------                               -----------         ------                     CBC Auto Differential[[id number]]        Abnormal            Final result                 Please view results for these tests on the individual orders.    CBC Auto Differential [[id number]]  (Abnormal) Collected:  07/15/18 2326    Specimen:  Blood Updated:  07/15/18 2345     WBC 17.70 (H) 10*3/mm3      RBC 4.66 10*6/mm3      Hemoglobin 13.2 g/dL      Hematocrit 40.5 %      MCV 86.9 fL      MCH 28.3 pg      MCHC 32.6 g/dL      RDW 14.2 (H) %      RDW-SD 44.6 fl      MPV 10.4 fL      Platelets 200 10*3/mm3      Neutrophil % 90.3 (H) %      Lymphocyte % 4.4 (L) %      Monocyte % 5.0 %      Eosinophil % 0.0 (L) %      Basophil % 0.1 %      Immature Grans % 0.2 %      Neutrophils, Absolute 15.98 (H) 10*3/mm3      Lymphocytes, Absolute 0.78 (L) 10*3/mm3      Monocytes, Absolute 0.89 10*3/mm3      Eosinophils, Absolute 0.00 10*3/mm3      Basophils, Absolute 0.01 10*3/mm3      Immature Grans, Absolute 0.04 (H) 10*3/mm3         Imaging Results (last 72 hours)     Procedure Component Value Units Date/Time    XR Chest 2 View [[id number]] Collected:  07/16/18 0027     Updated:  07/16/18 0437    Narrative:       CHEST PA AND LATERAL.     HISTORY: Shortness of breath.     COMPARISON: No prior studies for comparison.     FINDINGS:  Cardiomediastinal silhouette is within normal limits.         There is no consolidation or effusion. Low lung volumes.          Impression:       No acute findings.         This report was finalized on 7/16/2018 4:33 AM by Dr. Navid Velázquez M.D.             Medication Review:       Current Facility-Administered Medications:   •  acetaminophen (TYLENOL) tablet 650 mg, 650 mg, Oral, Q4H PRN, Anil Mak MD  •  amLODIPine (NORVASC) tablet 10 mg, 10 mg, Oral, Daily, Anil Mak MD, 10 mg at 07/16/18 0913  •  aspirin EC tablet 81 mg, 81 mg, Oral, Daily, Anil  FATOU Mak MD, 81 mg at 07/16/18 0913  •  atorvastatin (LIPITOR) tablet 20 mg, 20 mg, Oral, Daily, Anil Mak MD, 20 mg at 07/16/18 0913  •  cefepime (MAXIPIME) 1 g/100 mL 0.9% NS IVPB (mbp), 1 g, Intravenous, Q12H, Anil Mak MD, 1 g at 07/17/18 0421  •  dextrose (D50W) solution 25 g, 25 g, Intravenous, Q15 Min PRN, Anil Mak MD  •  dextrose (GLUTOSE) oral gel 15 g, 15 g, Oral, Q15 Min PRN, Anil Mak MD  •  docusate sodium (COLACE) capsule 100 mg, 100 mg, Oral, BID, Anil Mak MD, 100 mg at 07/16/18 2155  •  enoxaparin (LOVENOX) syringe 40 mg, 40 mg, Subcutaneous, Q12H, Anil Mak MD, 40 mg at 07/17/18 0657  •  glucagon (human recombinant) (GLUCAGEN DIAGNOSTIC) injection 1 mg, 1 mg, Subcutaneous, PRN, Anil Mak MD  •  HYDROcodone-acetaminophen (NORCO) 7.5-325 MG per tablet 1 tablet, 1 tablet, Oral, Q4H PRN, nAil Mak MD  •  insulin NPH (humuLIN N,novoLIN N) injection 20 Units, 20 Units, Subcutaneous, QAM, Wally TYLER MD  •  insulin NPH (humuLIN N,novoLIN N) injection 20 Units, 20 Units, Subcutaneous, Nightly, Wally TYLER MD  •  insulin regular (humuLIN R,novoLIN R) injection 15 Units, 15 Units, Subcutaneous, Daily With Breakfast & Dinner, Wally TYLER MD  •  insulin regular (humuLIN R,novoLIN R) injection 2-5 Units, 2-5 Units, Subcutaneous, 4x Daily AC & at Bedtime PRN, Wally TYLER MD  •  isosorbide mononitrate (IMDUR) 24 hr tablet 60 mg, 60 mg, Oral, Daily, Anil Mak MD, 60 mg at 07/16/18 0913  •  metoprolol succinate XL (TOPROL-XL) 24 hr tablet 25 mg, 25 mg, Oral, Daily, Anil Mak MD, 25 mg at 07/16/18 0913  •  ondansetron (ZOFRAN) injection 4 mg, 4 mg, Intravenous, Q6H PRN, Anil Mak MD  •  Pharmacy to dose vancomycin, , Does not apply, Continuous PRN, Anil Mak MD  •  sodium chloride 0.9 % flush 1-10 mL, 1-10 mL, Intravenous, PRN, Anil Mak MD  •  Insert peripheral IV, , , Once **AND** sodium chloride 0.9 %  flush 10 mL, 10 mL, Intravenous, PRN, Cordell Kemp MD  •  sodium chloride 0.9 % infusion, 75 mL/hr, Intravenous, Continuous, Duy Banda MD, Last Rate: 75 mL/hr at 07/17/18 0116, 75 mL/hr at 07/17/18 0116  •  vancomycin (VANCOCIN) in iso-osmotic dextrose IVPB 1 g (premix) 200 mL, 1,000 mg, Intravenous, Q12H, Anil Mak MD, 1,000 mg at 07/17/18 0102    Assessment/Plan     Patient Active Problem List   Diagnosis   • Type 2 diabetes mellitus (CMS/[facility])   • Diabetes mellitus type 2, uncontrolled, with complications (CMS/[facility])   • Uncontrolled type II diabetes mellitus with nephropathy (CMS/[facility])   • Type II diabetes mellitus with neurological manifestations, uncontrolled (CMS/[facility])   • Uncontrolled type 2 diabetes mellitus with background retinopathy (CMS/[facility])   • Hyperinsulinism   • Abnormal weight gain   • Hyperlipidemia   • Essential hypertension   • Edema of lower extremity   • Angiomyolipoma of kidney   • Memory changes   • Left hip pain   • Left-sided low back pain without sciatica   • Lumbar spinal stenosis   • Encounter for long-term (current) use of insulin (CMS/[facility])   • Obstructive sleep apnea on autoCPAP   • Chronic kidney disease, stage III (moderate)   • Chronic venous insufficiency   • CAD (coronary artery disease)   • Type 2 diabetes mellitus with stage 3 chronic kidney disease, with long-term current use of insulin (CMS/[facility])   • Circadian rhythm sleep disorder, delayed sleep phase type   • Class 3 obesity due to excess calories with serious comorbidity and body mass index (BMI) of 40.0 to 44.9 in adult (CMS/[facility])   • History of colon polyps   • Cellulitis of right leg   • Sepsis (CMS/[facility])   • Viral upper respiratory infection     Uncontrolled type 2 diabetes mellitus with complications  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  Diabetic foot ulceration right foot  Cellulitis right leg  Uncontrolled type 2 diabetes mellitus with nephropathy  Chronic kidney disease stage  III  Uncontrolled diabetes mellitus with background diabetic retinopathy  Hyperinsulinism  Chronic insulin management     Patient's blood sugars have been in the 200 300 range  She's not receiving the regular insulin as she was getting at home  She is currently receiving NPH insulin 40 units twice daily along with sliding scale     Patient is eating reasonably better  She would like to restart her home regimen  She takes 58 units of NPH in the morning and 42 units at night  She takes 50 units of regular insulin in the morning and 48 units at supper   Considering the hospitalization and multiple interruptions to meal schedule I recommended that I will decrease the insulin during this hospitalization and try and keep her blood sugars below 200 if possible  Patient verbalized understanding  I also advised the patient to consider avoidance of hypoglycemia as one of the goals during this hospitalization     Patient was started on 40 units of NPH in the morning and 30 units at night and the regular insulin 30 units twice daily  Patient did not eat a bedtime snack yesterday    Patient's blood sugars overnight came down to 80 range  Patient is not symptomatic  I advised the nurse to decrease NPH to 20 units twice daily  I will also decrease the regular insulin to 15 units twice daily before meals  Will continue to monitor the Accu-Cheks and then adjust insulin as needed  I also recommended that she eat a bedtime snack daily    I discussed with the patient and her son at the bedside  Both verbalized understanding    The total time spent for old record and lab review and floor time was more than 35 min of which greater than 20 min of time  ( greater than 50% of the total time )  was spent face to face with the patient counseling and coordination of care owas spent on counseling the patient on recommended evaluation and treatment options, instructions for management/treatment and /or follow up  and importance of compliance  "with chosen management or treatment options    Wally Craft MD FACE.  07/17/18  8:11 AM      EM[facility] Dragon / transcription disclaimer:     \"Dictated utilizing Dragon dictation\".   "

## 2018-07-17 NOTE — PROGRESS NOTES
"Pharmacy to Dose Vancomycin    Patient: Denisse Chavez ([birth year]/, [id number]  LOS: 1 day )  Relevant clinical data and objective history reviewed:  72 y.o. female 157.5 cm (62\") 102 kg (225 lb)  Body mass index is 41.15 kg/m².  she has a past medical history of Angiomyolipoma of kidney (3/30/2016); CAD (coronary artery disease); Chronic venous insufficiency; Hyperlipidemia; Hypertension; Low back pain; Mass of ovary (3/30/2016); Morbid obesity (CMS/[facility]); Sleep apnea; Type 2 diabetes mellitus (CMS/[facility]); and Uterine leiomyoma (3/30/2016).    Day #2  Duration: 7 days  Consult for Dr Anil Mak MD  Indication: RLE cellulitis  Current dose: 1000 mg IV q12h    Cultures: respiratory viral panel - negative   blood cx x2 - no growth x 24h     Radiology:  Date Location/type Results      CXR  no acute findings             Other Abx: cefepime 1 gm q12h    Evaluation of prior vancomycin  levels:   Date Type/value Comment     1130  trough = 13.4  prior to 4th dose of 1000 mg IV q12h              Results from last 7 days  Lab Units 18  0609 18  0430 07/15/18  2326   WBC 10*3/mm3 14.25* 16.01* 17.70*   HEMOGLOBIN g/dL 12.2 12.0 13.2   HEMATOCRIT % 39.3 37.6 40.5   PLATELETS 10*3/mm3 163 175 200     Lab Results  Lab Value Date/Time   LACTATE 0.9 2018 0430   LACTATE 2.2 (C) 07/15/2018 2326   PROCALCITO 1.91 (C) 2018 0430   PROCALCITO 1.02 (C) 07/15/2018 2326     Temp (24hrs), Av.7 °F (37.1 °C), Min:98.2 °F (36.8 °C), Max:99.1 °F (37.3 °C)    Serum creatinine: 0.84 mg/dL 18 0609  Estimated creatinine clearance: 67.8 mL/min      Assessment/Plan:   -fever and WBC trending down  -renal function improved     Continue Vancomycin 1000 mg IV q12h.  At goal of 10-20 for cellulitis. Recheck trough in about 2-3 days to assess for accumulation.     Sundar Galvez, PharmD  18 1:50 PM      "

## 2018-07-17 NOTE — PLAN OF CARE
Problem: Fall Risk (Adult)  Goal: Absence of Fall  Outcome: Ongoing (interventions implemented as appropriate)      Problem: Patient Care Overview  Goal: Plan of Care Review  Outcome: Ongoing (interventions implemented as appropriate)   07/17/18 1621   Coping/Psychosocial   Plan of Care Reviewed With patient   Plan of Care Review   Progress improving   OTHER   Outcome Summary VSS. IVF turned off and abx continueed. Sleeping pill ordered for tonight. Potassium BID ordered. Continue to monitor.      Goal: Individualization and Mutuality  Outcome: Ongoing (interventions implemented as appropriate)    Goal: Discharge Needs Assessment  Outcome: Ongoing (interventions implemented as appropriate)    Goal: Interprofessional Rounds/Family Conf  Outcome: Ongoing (interventions implemented as appropriate)      Problem: Infection, Risk/Actual (Adult)  Goal: Infection Prevention/Resolution  Outcome: Ongoing (interventions implemented as appropriate)

## 2018-07-18 LAB
ANION GAP SERPL CALCULATED.3IONS-SCNC: 12.8 MMOL/L
BASOPHILS # BLD AUTO: 0.01 10*3/MM3 (ref 0–0.2)
BASOPHILS NFR BLD AUTO: 0.1 % (ref 0–1.5)
BUN BLD-MCNC: 14 MG/DL (ref 8–23)
BUN/CREAT SERPL: 15.7 (ref 7–25)
CALCIUM SPEC-SCNC: 8.1 MG/DL (ref 8.6–10.5)
CHLORIDE SERPL-SCNC: 100 MMOL/L (ref 98–107)
CO2 SERPL-SCNC: 22.2 MMOL/L (ref 22–29)
CREAT BLD-MCNC: 0.89 MG/DL (ref 0.57–1)
DEPRECATED RDW RBC AUTO: 45.5 FL (ref 37–54)
EOSINOPHIL # BLD AUTO: 0.18 10*3/MM3 (ref 0–0.7)
EOSINOPHIL NFR BLD AUTO: 1.8 % (ref 0.3–6.2)
ERYTHROCYTE [DISTWIDTH] IN BLOOD BY AUTOMATED COUNT: 14.2 % (ref 11.7–13)
GFR SERPL CREATININE-BSD FRML MDRD: 62 ML/MIN/1.73
GLUCOSE BLD-MCNC: 148 MG/DL (ref 65–99)
GLUCOSE BLDC GLUCOMTR-MCNC: 146 MG/DL (ref 70–130)
GLUCOSE BLDC GLUCOMTR-MCNC: 151 MG/DL (ref 70–130)
GLUCOSE BLDC GLUCOMTR-MCNC: 165 MG/DL (ref 70–130)
GLUCOSE BLDC GLUCOMTR-MCNC: 269 MG/DL (ref 70–130)
HCT VFR BLD AUTO: 37.1 % (ref 35.6–45.5)
HGB BLD-MCNC: 11.5 G/DL (ref 11.9–15.5)
IMM GRANULOCYTES # BLD: 0.02 10*3/MM3 (ref 0–0.03)
IMM GRANULOCYTES NFR BLD: 0.2 % (ref 0–0.5)
LYMPHOCYTES # BLD AUTO: 1.27 10*3/MM3 (ref 0.9–4.8)
LYMPHOCYTES NFR BLD AUTO: 13 % (ref 19.6–45.3)
MCH RBC QN AUTO: 27.2 PG (ref 26.9–32)
MCHC RBC AUTO-ENTMCNC: 31 G/DL (ref 32.4–36.3)
MCV RBC AUTO: 87.7 FL (ref 80.5–98.2)
MONOCYTES # BLD AUTO: 0.57 10*3/MM3 (ref 0.2–1.2)
MONOCYTES NFR BLD AUTO: 5.8 % (ref 5–12)
NEUTROPHILS # BLD AUTO: 7.73 10*3/MM3 (ref 1.9–8.1)
NEUTROPHILS NFR BLD AUTO: 79.1 % (ref 42.7–76)
PLATELET # BLD AUTO: 177 10*3/MM3 (ref 140–500)
PMV BLD AUTO: 10.9 FL (ref 6–12)
POTASSIUM BLD-SCNC: 3.6 MMOL/L (ref 3.5–5.2)
RBC # BLD AUTO: 4.23 10*6/MM3 (ref 3.9–5.2)
SODIUM BLD-SCNC: 135 MMOL/L (ref 136–145)
WBC NRBC COR # BLD: 9.78 10*3/MM3 (ref 4.5–10.7)

## 2018-07-18 PROCEDURE — 82962 GLUCOSE BLOOD TEST: CPT

## 2018-07-18 PROCEDURE — 25010000002 CEFEPIME PER 500 MG: Performed by: HOSPITALIST

## 2018-07-18 PROCEDURE — 80048 BASIC METABOLIC PNL TOTAL CA: CPT | Performed by: INTERNAL MEDICINE

## 2018-07-18 PROCEDURE — 85025 COMPLETE CBC W/AUTO DIFF WBC: CPT | Performed by: INTERNAL MEDICINE

## 2018-07-18 PROCEDURE — 63710000001 INSULIN ISOPHANE HUMAN PER 5 UNITS: Performed by: INTERNAL MEDICINE

## 2018-07-18 PROCEDURE — 99233 SBSQ HOSP IP/OBS HIGH 50: CPT | Performed by: INTERNAL MEDICINE

## 2018-07-18 PROCEDURE — 25010000002 CEFEPIME PER 500 MG: Performed by: INTERNAL MEDICINE

## 2018-07-18 PROCEDURE — 25010000002 ENOXAPARIN PER 10 MG: Performed by: HOSPITALIST

## 2018-07-18 PROCEDURE — 97110 THERAPEUTIC EXERCISES: CPT

## 2018-07-18 PROCEDURE — 63710000001 INSULIN REGULAR HUMAN PER 5 UNITS: Performed by: INTERNAL MEDICINE

## 2018-07-18 PROCEDURE — 25010000002 VANCOMYCIN PER 500 MG: Performed by: HOSPITALIST

## 2018-07-18 RX ADMIN — HUMAN INSULIN 15 UNITS: 100 INJECTION, SOLUTION SUBCUTANEOUS at 08:13

## 2018-07-18 RX ADMIN — METOPROLOL SUCCINATE 25 MG: 25 TABLET, FILM COATED, EXTENDED RELEASE ORAL at 08:13

## 2018-07-18 RX ADMIN — ATORVASTATIN CALCIUM 20 MG: 20 TABLET, FILM COATED ORAL at 08:13

## 2018-07-18 RX ADMIN — HUMAN INSULIN 4 UNITS: 100 INJECTION, SOLUTION SUBCUTANEOUS at 11:56

## 2018-07-18 RX ADMIN — TEMAZEPAM 15 MG: 15 CAPSULE ORAL at 23:47

## 2018-07-18 RX ADMIN — ENOXAPARIN SODIUM 40 MG: 40 INJECTION SUBCUTANEOUS at 06:09

## 2018-07-18 RX ADMIN — DOCUSATE SODIUM 100 MG: 100 CAPSULE, LIQUID FILLED ORAL at 08:13

## 2018-07-18 RX ADMIN — CEFEPIME HYDROCHLORIDE 1 G: 1 INJECTION, POWDER, FOR SOLUTION INTRAMUSCULAR; INTRAVENOUS at 04:27

## 2018-07-18 RX ADMIN — ASPIRIN 81 MG: 81 TABLET ORAL at 08:13

## 2018-07-18 RX ADMIN — HUMAN INSULIN 2 UNITS: 100 INJECTION, SOLUTION SUBCUTANEOUS at 21:22

## 2018-07-18 RX ADMIN — HUMAN INSULIN 20 UNITS: 100 INJECTION, SUSPENSION SUBCUTANEOUS at 21:45

## 2018-07-18 RX ADMIN — AMLODIPINE BESYLATE 10 MG: 10 TABLET ORAL at 08:13

## 2018-07-18 RX ADMIN — CEFEPIME HYDROCHLORIDE 1 G: 1 INJECTION, POWDER, FOR SOLUTION INTRAMUSCULAR; INTRAVENOUS at 13:58

## 2018-07-18 RX ADMIN — VANCOMYCIN HYDROCHLORIDE 1000 MG: 1 INJECTION, SOLUTION INTRAVENOUS at 12:00

## 2018-07-18 RX ADMIN — CEFEPIME HYDROCHLORIDE 1 G: 1 INJECTION, POWDER, FOR SOLUTION INTRAMUSCULAR; INTRAVENOUS at 21:24

## 2018-07-18 RX ADMIN — ENOXAPARIN SODIUM 40 MG: 40 INJECTION SUBCUTANEOUS at 17:53

## 2018-07-18 RX ADMIN — HUMAN INSULIN 15 UNITS: 100 INJECTION, SOLUTION SUBCUTANEOUS at 17:54

## 2018-07-18 RX ADMIN — DOCUSATE SODIUM 100 MG: 100 CAPSULE, LIQUID FILLED ORAL at 20:19

## 2018-07-18 RX ADMIN — VANCOMYCIN HYDROCHLORIDE 1000 MG: 1 INJECTION, SOLUTION INTRAVENOUS at 01:17

## 2018-07-18 RX ADMIN — HUMAN INSULIN 20 UNITS: 100 INJECTION, SUSPENSION SUBCUTANEOUS at 08:13

## 2018-07-18 RX ADMIN — ACETAMINOPHEN 650 MG: 325 TABLET, FILM COATED ORAL at 20:19

## 2018-07-18 RX ADMIN — ISOSORBIDE MONONITRATE 60 MG: 60 TABLET, EXTENDED RELEASE ORAL at 08:13

## 2018-07-18 NOTE — DISCHARGE PLACEMENT REQUEST
"Denisse Chavez (72 y.o. Female)     Date of Birth Social Security Number Address Home Phone MRN    1945  [address] [address] [phone] [id number]    Congregational Marital Status          Congregational Single       Admission Date Admission Type Admitting Provider Attending Provider Department, Room/Bed    7/15/18 Emergency Hogancamp, MD Mal Montoya Matthew D, MD [address], [address]    Discharge Date Discharge Disposition Discharge Destination                       Attending Provider:  Rudi Resendez MD    Allergies:  Ace Inhibitors, Angiotensin Receptor Blockers, Sulfa Antibiotics    Isolation:  None   Infection:  None   Code Status:  CPR    Ht:  157.5 cm (62\")   Wt:  102 kg (225 lb)    Admission Cmt:  None   Principal Problem:  Cellulitis of right leg [L03.115]                 Active Insurance as of 7/15/2018     Primary Coverage     Payor Plan Insurance Group Employer/Plan Group    MEDICARE MEDICARE A & B      Payor Plan Address Payor Plan Phone Number Effective From Effective To    [address] [phone] 9/1/2007     [facility] [id number]       Subscriber Name Subscriber Birth Date Member ID       DNEISSE CHAVEZ 1945 [id number]           Secondary Coverage     Payor Plan Insurance Group Employer/Plan Group    [facility] SUPP [id number]     Payor Plan Address Payor Plan Phone Number Effective From Effective To    [address]  12/1/2016     [facility] [id number]       Subscriber Name Subscriber Birth Date Member ID       DENISSE CHAVEZ 1945 [id number]                 Emergency Contacts      (Rel.) Home Phone Work Phone Mobile Phone    Anil Chavez (Son) [phone] -- [phone]            "

## 2018-07-18 NOTE — PLAN OF CARE
Problem: Fall Risk (Adult)  Goal: Absence of Fall  Outcome: Ongoing (interventions implemented as appropriate)      Problem: Patient Care Overview  Goal: Plan of Care Review  Outcome: Ongoing (interventions implemented as appropriate)   07/18/18 0613   Coping/Psychosocial   Plan of Care Reviewed With patient   Plan of Care Review   Progress improving   OTHER   Outcome Summary VSS. IV abx continued. Dressing on foot changed. No acute changes. Encourage pt to turn- cream applied to coccyx. Continue to monitor.     Goal: Individualization and Mutuality  Outcome: Ongoing (interventions implemented as appropriate)      Problem: Infection, Risk/Actual (Adult)  Goal: Infection Prevention/Resolution  Outcome: Ongoing (interventions implemented as appropriate)      Problem: Skin Injury Risk (Adult)  Goal: Identify Related Risk Factors and Signs and Symptoms  Outcome: Ongoing (interventions implemented as appropriate)    Goal: Skin Health and Integrity  Outcome: Ongoing (interventions implemented as appropriate)

## 2018-07-18 NOTE — PROGRESS NOTES
Continued Stay Note  [facility]     Patient Name: Denisse Chavez  MRN: [id number]  Today's Date: 7/18/2018    Admit Date: 7/15/2018          Discharge Plan     Row Name 07/18/18 1433       Plan    Plan subacute rehab    Patient/Family in Agreement with Plan yes   son at bedside    Plan Comments Met at bedside with pt and her son. Pt ambulated 60ft with PT today. Both pt and son state that she is alone during the day and feel she will need some rehab. Discussed choices. [place]. Pt [facility]. They request referral to [facility] and [facility]. Call to [facility] and placed in her in box....[facility]              Discharge Codes    No documentation.           Lis Pérez RN

## 2018-07-18 NOTE — PLAN OF CARE
Problem: Patient Care Overview  Goal: Plan of Care Review  Outcome: Ongoing (interventions implemented as appropriate)   07/18/18 1005   Coping/Psychosocial   Plan of Care Reviewed With patient   Plan of Care Review   Progress improving   OTHER   Outcome Summary Pt increasing with strength and balance with use of RWX and improved gait

## 2018-07-18 NOTE — PROGRESS NOTES
Name: Denisse Chavez ADMIT: 7/15/2018   : 1945  PCP: Agueda Rodriguez MD    MRN: [id number] LOS: 2 days   AGE/SEX: 72 y.o. female  ROOM: [facility]   Subjective   CC: right lower extremity pain  No acute events.  RLE is feeling better.  Taking PO well.  No f/c/n/v/d.  Worked with PT today, has not yet attempted stairs which she has at her house.    Objective   Vital Signs  Temp:  [98.9 °F (37.2 °C)-100.8 °F (38.2 °C)] 98.9 °F (37.2 °C)  Heart Rate:  [69-93] 69  Resp:  [18] 18  BP: (139-172)/(56-77) 150/60  SpO2:  [94 %-96 %] 96 %  on   ;   Device (Oxygen Therapy): room air  Body mass index is 41.15 kg/m².    Physical Exam   Constitutional: She is oriented to person, place, and time. No distress.   HENT:   Head: Normocephalic and atraumatic.   Mouth/Throat: Oropharynx is clear and moist.   Eyes: Pupils are equal, round, and reactive to light. Conjunctivae and EOM are normal.   Neck: Normal range of motion. Neck supple.   Cardiovascular: Normal rate, regular rhythm and intact distal pulses.    Pulmonary/Chest: Effort normal and breath sounds normal.   Abdominal: Soft. Bowel sounds are normal.   Musculoskeletal: She exhibits edema (BLE with chronic stasis changes). She exhibits no tenderness.   Neurological: She is alert and oriented to person, place, and time.   Skin: Skin is warm and dry. She is not diaphoretic.   Mild erythema around right ankle   Right plantar surface diabetic wound measuring about 3 x 2 cm that is not infected.     Psychiatric: She has a normal mood and affect. Her behavior is normal.   Nursing note and vitals reviewed.     Results Review:       I reviewed the patient's new clinical results.    Results from last 7 days  Lab Units 18  0603 18  0609 18  0430 07/15/18  2326   WBC 10*3/mm3 9.78 14.25* 16.01* 17.70*   HEMOGLOBIN g/dL 11.5* 12.2 12.0 13.2   PLATELETS 10*3/mm3 177 163 175 200       Results from last 7 days  Lab Units 18  0603 18  0609  07/16/18  0430 07/15/18  2326   SODIUM mmol/L 135* 133* 135* 137   POTASSIUM mmol/L 3.6 3.1* 3.7 4.2   CHLORIDE mmol/L 100 97* 100 98   CO2 mmol/L 22.2 22.9 20.4* 21.9*   BUN mg/dL 14 15 19 21   CREATININE mg/dL 0.89 0.84 0.94 1.02*   GLUCOSE mg/dL 148* 85 247* 206*   Estimated Creatinine Clearance: 64 mL/min (by C-G formula based on SCr of 0.89 mg/dL).    Results from last 7 days  Lab Units 07/18/18  0603 07/17/18  0609 07/16/18  0430 07/15/18  2326   CALCIUM mg/dL 8.1* 8.3* 7.8* 9.0   ALBUMIN g/dL  --   --  3.50 4.10         amLODIPine 10 mg Oral Daily   aspirin 81 mg Oral Daily   atorvastatin 20 mg Oral Daily   cefepime 1 g Intravenous Q8H   docusate sodium 100 mg Oral BID   enoxaparin 40 mg Subcutaneous Q12H   insulin NPH 20 Units Subcutaneous QAM   insulin NPH 20 Units Subcutaneous Nightly   insulin regular 15 Units Subcutaneous Daily With Breakfast & Dinner   isosorbide mononitrate 60 mg Oral Daily   metoprolol succinate XL 25 mg Oral Daily   vancomycin 1,000 mg Intravenous Q12H       Pharmacy to dose vancomycin    Diet Regular; Cardiac, Consistent Carbohydrate, Renal      Assessment/Plan      Active Hospital Problems    Diagnosis Date Noted   • **Cellulitis of right leg [L03.115] 07/16/2018   • Sepsis (CMS/[facility]) [A41.9] 07/16/2018   • Viral upper respiratory infection [J06.9, B97.89] 07/16/2018   • Chronic kidney disease, stage III (moderate) [N18.3] 10/13/2016   • Obstructive sleep apnea on autoCPAP [G47.30] 09/04/2016   • Diabetes mellitus type 2, uncontrolled, with complications (CMS/[facility]) [E11.8, E11.65] 03/08/2016   • Essential hypertension [I10] 03/08/2016      Resolved Hospital Problems    Diagnosis Date Noted Date Resolved   No resolved problems to display.   Right lower extremity cellulitis  - complicated by uncontrolled diabetes and chronic kidney disease  - had secondary sepsis with fever 103.8, white blood cell count elevation, pro-calcitonin elevation, lactic acid elevation.  - continue vancomycin  and cefepime with plan to switch to PO abx tomorrow if she continues to improve  - Right lower extremity venous duplex negative for DVT    Upper respiratory infection  - likely a viral illness, though RVP is negative.      Type 2 diabetes  - Uncontrolled, followed by endocrinology as out patient and I appreciate their assistance    HTN  - continue amlodipine    CAD  - Stable  - continue metoprolol, Imdur, statin, aspirin  - Cardiologist is Dr. Tang    DVT Prophylaxis  - lovenox    Disposition  LENORE tomorrow    Rudi Resendez MD  [facility]  07/18/18  2:51 PM

## 2018-07-18 NOTE — PLAN OF CARE
Problem: Patient Care Overview  Goal: Plan of Care Review  Outcome: Ongoing (interventions implemented as appropriate)   07/18/18 1610   Coping/Psychosocial   Plan of Care Reviewed With patient   Plan of Care Review   Progress improving   OTHER   Outcome Summary Transfer from . A&O x4. Up w/ Asst x1 w/ [place]. ACHS. Right Diabetic Foot Ulcer - Wound Care Daily. Continue IV Antibiotic - possible switch to PO tomorrow. Plan to d/c to [facility].      Goal: Individualization and Mutuality  Outcome: Ongoing (interventions implemented as appropriate)    Goal: Discharge Needs Assessment  Outcome: Ongoing (interventions implemented as appropriate)    Goal: Interprofessional Rounds/Family Conf  Outcome: Ongoing (interventions implemented as appropriate)      Problem: Infection, Risk/Actual (Adult)  Goal: Infection Prevention/Resolution  Outcome: Ongoing (interventions implemented as appropriate)      Problem: Skin Injury Risk (Adult)  Goal: Skin Health and Integrity  Outcome: Ongoing (interventions implemented as appropriate)

## 2018-07-18 NOTE — PROGRESS NOTES
72 y.o.   LOS: 2 days   Patient Care Team:  Agueda Rodriguez MD as PCP - General (Internal Medicine)  Wally TYLER MD as PCP - Claims Attributed  Wally TYLER MD as Consulting Physician (Endocrinology)  Warner Tang MD as Consulting Physician (Cardiology)  Sergio Eagle MD as Consulting Physician (Urology)  Candis Cruz, LEROY as Care Coordinator (Population Health)    Chief Complaint:  Uncontrolled type 2 diabetes mellitus    Chief Complaint   Patient presents with   • Shortness of Breath   • Fever   • Nausea       Subjective     HPI  Patient's blood sugars are fluctuating from     She is currently eating reasonably well  Her insulin doses have been given since yesterday  She denies any problems with insulin dosing  She reports more redness and swelling in the right lower extremity today      Interval History:     Patient is a 72-year-old white female with a past history of uncontrolled type 2 diabetes mellitus on insulin regimen admitted to the hospital for fever nausea and shortness of breath.  She was also reporting fatigue for the past several days  Patient  had chronic right diabetic foot ulcer followed by podiatry and apparently was seen by podiatry Tuesday last week and was debrided and some topical anybody cream was applied  Subsequently patient reported that she has not been feeling well she is feeling fatigued and weak and started having upper respiratory symptoms and had a elevated temperature of 103.8  Associates symptom was shortness of breath and mild cough     After a few days patient noticed swelling and redness and warmth over the right lower extremity and came to the emergency room yesterday and was evaluated and admitted to the hospital  She was started on vancomycin and Zosyn  Lactic acid was elevated at 2.2 and pro calcitonin was elevated at 1.9     Patient's blood sugars were in the 200-300 range and I was concentrated for further managing patient's diabetes  during this hospitalization  Patient has been a diabetic for long time and was successfully managed on NPH and regular insulin regimen  She takes approximately 58 units of NPH in the morning and 42 at bedtime and she takes regular insulin 50 units with breakfast and 48 units at supper  She reports that most of her blood sugars were below 200  She had very few episodes of hypoglycemia  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  Patient is symptoms of peripheral neuropathy and is a diabetic foot ulceration in the right fourth which is being attended by podiatry on a regular basis  Uncontrolled type 2 diabetes mellitus with nephropathy  Patient is chronic kidney disease with elevated microalbuminuria in the past     Review of Systems:      Review of Systems   Constitutional: Positive for fatigue.   Respiratory: Negative.    Cardiovascular: Positive for leg swelling.   Gastrointestinal: Positive for abdominal distention. Negative for abdominal pain.   Skin: Positive for color change and rash.   All other systems reviewed and are negative.    Objective     Vital Signs   Temp:  [98.6 °F (37 °C)-100.8 °F (38.2 °C)] 99.7 °F (37.6 °C)  Heart Rate:  [73-93] 93  Resp:  [16-18] 18  BP: (135-172)/(56-77) 168/59    Physical Exam:  Physical Exam   Constitutional: She is oriented to person, place, and time.   HENT:   Head: Atraumatic.   Eyes: Pupils are equal, round, and reactive to light. EOM are normal.   Neck: Normal range of motion. Neck supple.   Cardiovascular: Normal rate, regular rhythm, normal heart sounds and intact distal pulses.    Pulmonary/Chest: Effort normal and breath sounds normal.   Abdominal: Soft. Bowel sounds are normal. She exhibits distension.   Musculoskeletal: She exhibits edema.   Neurological: She is alert and oriented to person, place, and time.   Skin: Rash noted.   Psychiatric: She has a normal mood and affect. Her behavior is normal.   Nursing note and vitals reviewed.  Results Review:      I reviewed the patient's new clinical results.      Glucose   Date/Time Value Ref Range Status   07/18/2018 0603 148 (H) 65 - 99 mg/dL Final   07/17/2018 0609 85 65 - 99 mg/dL Final   07/16/2018 0430 247 (H) 65 - 99 mg/dL Final   07/15/2018 2326 206 (H) 65 - 99 mg/dL Final     Lab Results (last 72 hours)     Procedure Component Value Units Date/Time    Basic Metabolic Panel [[id number]]  (Abnormal) Collected:  07/18/18 0603    Specimen:  Blood Updated:  07/18/18 0728     Glucose 148 (H) mg/dL      BUN 14 mg/dL      Creatinine 0.89 mg/dL      Sodium 135 (L) mmol/L      Potassium 3.6 mmol/L      Chloride 100 mmol/L      CO2 22.2 mmol/L      Calcium 8.1 (L) mg/dL      eGFR Non African Amer 62 mL/min/1.73      BUN/Creatinine Ratio 15.7     Anion Gap 12.8 mmol/L     Narrative:       The MDRD GFR formula is only valid for adults with stable renal function between ages 18 and 70.    CBC & Differential [[id number]] Collected:  07/18/18 0603    Specimen:  Blood Updated:  07/18/18 0659    Narrative:       The following orders were created for panel order CBC & Differential.  Procedure                               Abnormality         Status                     ---------                               -----------         ------                     CBC Auto Differential[[id number]]        Abnormal            Final result                 Please view results for these tests on the individual orders.    CBC Auto Differential [[id number]]  (Abnormal) Collected:  07/18/18 0603    Specimen:  Blood Updated:  07/18/18 0659     WBC 9.78 10*3/mm3      RBC 4.23 10*6/mm3      Hemoglobin 11.5 (L) g/dL      Hematocrit 37.1 %      MCV 87.7 fL      MCH 27.2 pg      MCHC 31.0 (L) g/dL      RDW 14.2 (H) %      RDW-SD 45.5 fl      MPV 10.9 fL      Platelets 177 10*3/mm3      Neutrophil % 79.1 (H) %      Lymphocyte % 13.0 (L) %      Monocyte % 5.8 %      Eosinophil % 1.8 %      Basophil % 0.1 %      Immature Grans % 0.2 %      Neutrophils, Absolute  7.73 10*3/mm3      Lymphocytes, Absolute 1.27 10*3/mm3      Monocytes, Absolute 0.57 10*3/mm3      Eosinophils, Absolute 0.18 10*3/mm3      Basophils, Absolute 0.01 10*3/mm3      Immature Grans, Absolute 0.02 10*3/mm3     POC Glucose Once [[id number]]  (Abnormal) Collected:  07/18/18 0636    Specimen:  Blood Updated:  07/18/18 0638     Glucose 146 (H) mg/dL     Narrative:       Meter: [id number] : [id number] Rubina ADAM    Blood Culture - Blood, [[id number]]  (Normal) Collected:  07/15/18 2331    Specimen:  Blood from Arm, Right Updated:  07/17/18 2345     Blood Culture No growth at 2 days    Blood Culture - Blood, [[id number]]  (Normal) Collected:  07/15/18 2326    Specimen:  Blood from Arm, Left Updated:  07/17/18 2345     Blood Culture No growth at 2 days    POC Glucose Once [[id number]]  (Abnormal) Collected:  07/17/18 2044    Specimen:  Blood Updated:  07/17/18 2047     Glucose 158 (H) mg/dL     Narrative:       Meter: [id number] : [id number] Rubina ADAM    POC Glucose Once [[id number]]  (Abnormal) Collected:  07/17/18 1601    Specimen:  Blood Updated:  07/17/18 1604     Glucose 233 (H) mg/dL     Narrative:       Meter: [id number] : [id number] Rosy ADAM    Vancomycin, Trough Vancomycin is scheduled at 1200 on 07/17/18. Please draw trough 30 minutes prior to hanging dose (don't draw level while medication is infusing). [[id number]]  (Normal) Collected:  07/17/18 1130    Specimen:  Blood Updated:  07/17/18 1246     Vancomycin Trough 13.40 mcg/mL     POC Glucose Once [[id number]]  (Abnormal) Collected:  07/17/18 1058    Specimen:  Blood Updated:  07/17/18 1103     Glucose 139 (H) mg/dL     Narrative:       Meter: [id number] : [id number] Rosy ADAM    Basic Metabolic Panel [[id number]]  (Abnormal) Collected:  07/17/18 0609    Specimen:  Blood Updated:  07/17/18 0657     Glucose 85 mg/dL      BUN 15 mg/dL      Creatinine 0.84 mg/dL      Sodium 133 (L) mmol/L      Potassium 3.1 (L)  mmol/L      Chloride 97 (L) mmol/L      CO2 22.9 mmol/L      Calcium 8.3 (L) mg/dL      eGFR Non African Amer 67 mL/min/1.73      BUN/Creatinine Ratio 17.9     Anion Gap 13.1 mmol/L     Narrative:       The MDRD GFR formula is only valid for adults with stable renal function between ages 18 and 70.    CBC & Differential [[id number]] Collected:  07/17/18 0609    Specimen:  Blood Updated:  07/17/18 0643    Narrative:       The following orders were created for panel order CBC & Differential.  Procedure                               Abnormality         Status                     ---------                               -----------         ------                     CBC Auto Differential[[id number]]        Abnormal            Final result                 Please view results for these tests on the individual orders.    CBC Auto Differential [[id number]]  (Abnormal) Collected:  07/17/18 0609    Specimen:  Blood Updated:  07/17/18 0643     WBC 14.25 (H) 10*3/mm3      RBC 4.49 10*6/mm3      Hemoglobin 12.2 g/dL      Hematocrit 39.3 %      MCV 87.5 fL      MCH 27.2 pg      MCHC 31.0 (L) g/dL      RDW 14.3 (H) %      RDW-SD 46.2 fl      MPV 10.5 fL      Platelets 163 10*3/mm3      Neutrophil % 83.7 (H) %      Lymphocyte % 9.8 (L) %      Monocyte % 5.6 %      Eosinophil % 0.6 %      Basophil % 0.1 %      Immature Grans % 0.2 %      Neutrophils, Absolute 11.93 (H) 10*3/mm3      Lymphocytes, Absolute 1.39 10*3/mm3      Monocytes, Absolute 0.80 10*3/mm3      Eosinophils, Absolute 0.09 10*3/mm3      Basophils, Absolute 0.01 10*3/mm3      Immature Grans, Absolute 0.03 10*3/mm3     POC Glucose Once [[id number]]  (Normal) Collected:  07/17/18 0612    Specimen:  Blood Updated:  07/17/18 0613     Glucose 89 mg/dL     Narrative:       Meter: [id number] : [id number] Dada ADAM    POC Glucose Once [[id number]]  (Abnormal) Collected:  07/16/18 2328    Specimen:  Blood Updated:  07/16/18 2329     Glucose 138 (H) mg/dL     Narrative:        Meter: [id number] : [id number] Dada Rmaon KIA    Respiratory Panel, PCR - Swab, Nasopharynx [[id number]]  (Normal) Collected:  07/16/18 1048    Specimen:  Swab from Nasopharynx Updated:  07/16/18 2157     ADENOVIRUS, PCR Not Detected     Coronavirus 229E Not Detected     Coronavirus HKU1 Not Detected     Coronavirus NL63 Not Detected     Coronavirus OC43 Not Detected     Human Metapneumovirus Not Detected     Human Rhinovirus/Enterovirus Not Detected     Influenza B PCR Not Detected     Parainfluenza Virus 1 Not Detected     Parainfluenza Virus 2 Not Detected     Parainfluenza Virus 3 Not Detected     Parainfluenza Virus 4 Not Detected     Bordetella pertussis pcr Not Detected     Influenza A H1 2009 PCR Not Detected     Chlamydophila pneumoniae PCR Not Detected     Mycoplasma pneumo by PCR Not Detected     Influenza A PCR Not Detected     Influenza A H3 Not Detected     Influenza A H1 Not Detected     RSV, PCR Not Detected    POC Glucose Once [[id number]]  (Abnormal) Collected:  07/16/18 2056    Specimen:  Blood Updated:  07/16/18 2056     Glucose 222 (H) mg/dL     Narrative:       Meter: [id number] : [id number] Dada Ramon KIA    POC Glucose Once [[id number]]  (Abnormal) Collected:  07/16/18 1640    Specimen:  Blood Updated:  07/16/18 1642     Glucose 273 (H) mg/dL     Narrative:       Meter: [id number] : [id number] Ck ADAM    POC Glucose Once [[id number]]  (Abnormal) Collected:  07/16/18 1116    Specimen:  Blood Updated:  07/16/18 1117     Glucose 329 (H) mg/dL     Narrative:       Meter: [id number] : [id number] PictureHealingdeisy ADAM    Procalcitonin [[id number]]  (Abnormal) Collected:  07/16/18 0430    Specimen:  Blood Updated:  07/16/18 0633     Procalcitonin 1.91 (C) ng/mL     Narrative:       As a Marker for Sepsis (Non-Neonates):   1. <0.5 ng/mL represents a low risk of severe sepsis and/or septic shock.  1. >2 ng/mL represents a high risk of severe sepsis and/or septic shock.    As  "a Marker for Lower Respiratory Tract Infections that require antibiotic therapy:  PCT on Admission     Antibiotic Therapy             6-12 Hrs later  > 0.5                Strongly Recommended            >0.25 - <0.5         Recommended  0.1 - 0.25           Discouraged                   Remeasure/reassess PCT  <0.1                 Strongly Discouraged          Remeasure/reassess PCT      As 28 day mortality risk marker: \"Change in Procalcitonin Result\" (> 80 % or <=80 %) if Day 0 (or Day 1) and Day 4 values are available. Refer to [email or web address]/   Change in PCT <=80 %   A decrease of PCT levels below or equal to 80 % defines a positive change in PCT test result representing a higher risk for 28-day all-cause mortality of patients diagnosed with severe sepsis or septic shock.  Change in PCT > 80 %   A decrease of PCT levels of more than 80 % defines a negative change in PCT result representing a lower risk for 28-day all-cause mortality of patients diagnosed with severe sepsis or septic shock.                POC Glucose Once [[id number]]  (Abnormal) Collected:  07/16/18 0617    Specimen:  Blood Updated:  07/16/18 0618     Glucose 252 (H) mg/dL     Narrative:       Meter: [id number] : [id number] Lai Healy RN    Comprehensive Metabolic Panel [[id number]]  (Abnormal) Collected:  07/16/18 0430    Specimen:  Blood Updated:  07/16/18 0520     Glucose 247 (H) mg/dL      BUN 19 mg/dL      Creatinine 0.94 mg/dL      Sodium 135 (L) mmol/L      Potassium 3.7 mmol/L      Chloride 100 mmol/L      CO2 20.4 (L) mmol/L      Calcium 7.8 (L) mg/dL      Total Protein 6.0 g/dL      Albumin 3.50 g/dL      ALT (SGPT) 17 U/L      AST (SGOT) 47 (H) U/L      Alkaline Phosphatase 64 U/L      Total Bilirubin 0.7 mg/dL      eGFR Non African Amer 59 (L) mL/min/1.73      Globulin 2.5 gm/dL      A/G Ratio 1.4 g/dL      BUN/Creatinine Ratio 20.2     Anion Gap 14.6 mmol/L     Narrative:       The MDRD GFR formula is only " valid for adults with stable renal function between ages 18 and 70.    Lactic Acid, Plasma [[id number]]  (Normal) Collected:  07/16/18 0430    Specimen:  Blood Updated:  07/16/18 0502     Lactate 0.9 mmol/L     CBC Auto Differential [[id number]]  (Abnormal) Collected:  07/16/18 0430    Specimen:  Blood Updated:  07/16/18 0454     WBC 16.01 (H) 10*3/mm3      RBC 4.27 10*6/mm3      Hemoglobin 12.0 g/dL      Hematocrit 37.6 %      MCV 88.1 fL      MCH 28.1 pg      MCHC 31.9 (L) g/dL      RDW 14.5 (H) %      RDW-SD 46.2 fl      MPV 10.3 fL      Platelets 175 10*3/mm3      Neutrophil % 89.4 (H) %      Lymphocyte % 4.7 (L) %      Monocyte % 5.6 %      Eosinophil % 0.0 (L) %      Basophil % 0.1 %      Immature Grans % 0.2 %      Neutrophils, Absolute 14.31 (H) 10*3/mm3      Lymphocytes, Absolute 0.76 (L) 10*3/mm3      Monocytes, Absolute 0.90 10*3/mm3      Eosinophils, Absolute 0.00 10*3/mm3      Basophils, Absolute 0.01 10*3/mm3      Immature Grans, Absolute 0.03 10*3/mm3     Lactic Acid, Reflex Timer (This will reflex a repeat order 3-3:15 hours after ordered.) [[id number]] Collected:  07/15/18 2326    Specimen:  Blood Updated:  07/16/18 0345     Extra Tube Hold for add-ons.     Comment: Auto resulted.       POC Glucose Once [[id number]]  (Abnormal) Collected:  07/16/18 0315    Specimen:  Blood Updated:  07/16/18 0317     Glucose 217 (H) mg/dL     Narrative:       Meter: [id number] : [id number] Lai Healy RN    Urinalysis, Microscopic Only - Urine, Clean Catch [[id number]]  (Abnormal) Collected:  07/16/18 0030    Specimen:  Urine from Urine, Clean Catch Updated:  07/16/18 0132     RBC, UA 3-5 (A) /HPF      WBC, UA 0-2 /HPF      Bacteria, UA None Seen /HPF      Squamous Epithelial Cells, UA 3-6 (A) /HPF      Transitional Epithelial Cells, UA 0-2 /HPF      Hyaline Casts, UA 0-2 /LPF      Methodology Manual Light Microscopy    Urinalysis With Microscopic If Indicated (No Culture) - Urine, Clean Catch [[id number]]  (Abnormal)  "Collected:  07/16/18 0030    Specimen:  Urine from Urine, Clean Catch Updated:  07/16/18 0101     Color, UA Yellow     Appearance, UA Clear     pH, UA 8.0     Specific Gravity, UA 1.022     Glucose,  mg/dL (Trace) (A)     Ketones, UA 15 mg/dL (1+) (A)     Bilirubin, UA Negative     Blood, UA Small (1+) (A)     Protein, UA >=300 mg/dL (3+) (A)     Leuk Esterase, UA Negative     Nitrite, UA Negative     Urobilinogen, UA 0.2 E.U./dL    Lactic Acid, Plasma [[id number]]  (Abnormal) Collected:  07/15/18 2326    Specimen:  Blood Updated:  07/16/18 0032     Lactate 2.2 (C) mmol/L     Procalcitonin [[id number]]  (Abnormal) Collected:  07/15/18 2326    Specimen:  Blood Updated:  07/16/18 0032     Procalcitonin 1.02 (C) ng/mL     Narrative:       As a Marker for Sepsis (Non-Neonates):   1. <0.5 ng/mL represents a low risk of severe sepsis and/or septic shock.  1. >2 ng/mL represents a high risk of severe sepsis and/or septic shock.    As a Marker for Lower Respiratory Tract Infections that require antibiotic therapy:  PCT on Admission     Antibiotic Therapy             6-12 Hrs later  > 0.5                Strongly Recommended            >0.25 - <0.5         Recommended  0.1 - 0.25           Discouraged                   Remeasure/reassess PCT  <0.1                 Strongly Discouraged          Remeasure/reassess PCT      As 28 day mortality risk marker: \"Change in Procalcitonin Result\" (> 80 % or <=80 %) if Day 0 (or Day 1) and Day 4 values are available. Refer to [email or web address]-[email or web address]-[email or web address]/   Change in PCT <=80 %   A decrease of PCT levels below or equal to 80 % defines a positive change in PCT test result representing a higher risk for 28-day all-cause mortality of patients diagnosed with severe sepsis or septic shock.  Change in PCT > 80 %   A decrease of PCT levels of more than 80 % defines a negative change in PCT result representing a lower risk for 28-day all-cause mortality of patients diagnosed with " severe sepsis or septic shock.                Comprehensive Metabolic Panel [[id number]]  (Abnormal) Collected:  07/15/18 2326    Specimen:  Blood Updated:  07/16/18 0009     Glucose 206 (H) mg/dL      BUN 21 mg/dL      Creatinine 1.02 (H) mg/dL      Sodium 137 mmol/L      Potassium 4.2 mmol/L      Chloride 98 mmol/L      CO2 21.9 (L) mmol/L      Calcium 9.0 mg/dL      Total Protein 7.1 g/dL      Albumin 4.10 g/dL      ALT (SGPT) 19 U/L      AST (SGOT) 53 (H) U/L      Alkaline Phosphatase 76 U/L      Total Bilirubin 0.8 mg/dL      eGFR Non African Amer 53 (L) mL/min/1.73      Globulin 3.0 gm/dL      A/G Ratio 1.4 g/dL      BUN/Creatinine Ratio 20.6     Anion Gap 17.1 mmol/L     Narrative:       The MDRD GFR formula is only valid for adults with stable renal function between ages 18 and 70.    CBC & Differential [[id number]] Collected:  07/15/18 2326    Specimen:  Blood Updated:  07/15/18 2345    Narrative:       The following orders were created for panel order CBC & Differential.  Procedure                               Abnormality         Status                     ---------                               -----------         ------                     CBC Auto Differential[[id number]]        Abnormal            Final result                 Please view results for these tests on the individual orders.    CBC Auto Differential [[id number]]  (Abnormal) Collected:  07/15/18 2326    Specimen:  Blood Updated:  07/15/18 2345     WBC 17.70 (H) 10*3/mm3      RBC 4.66 10*6/mm3      Hemoglobin 13.2 g/dL      Hematocrit 40.5 %      MCV 86.9 fL      MCH 28.3 pg      MCHC 32.6 g/dL      RDW 14.2 (H) %      RDW-SD 44.6 fl      MPV 10.4 fL      Platelets 200 10*3/mm3      Neutrophil % 90.3 (H) %      Lymphocyte % 4.4 (L) %      Monocyte % 5.0 %      Eosinophil % 0.0 (L) %      Basophil % 0.1 %      Immature Grans % 0.2 %      Neutrophils, Absolute 15.98 (H) 10*3/mm3      Lymphocytes, Absolute 0.78 (L) 10*3/mm3      Monocytes, Absolute  0.89 10*3/mm3      Eosinophils, Absolute 0.00 10*3/mm3      Basophils, Absolute 0.01 10*3/mm3      Immature Grans, Absolute 0.04 (H) 10*3/mm3         Imaging Results (last 72 hours)     Procedure Component Value Units Date/Time    XR Chest 2 View [[id number]] Collected:  07/16/18 0027     Updated:  07/16/18 0437    Narrative:       CHEST PA AND LATERAL.     HISTORY: Shortness of breath.     COMPARISON: No prior studies for comparison.     FINDINGS:  Cardiomediastinal silhouette is within normal limits.         There is no consolidation or effusion. Low lung volumes.          Impression:       No acute findings.         This report was finalized on 7/16/2018 4:33 AM by Dr. Navid Velázquez M.D.             Medication Review:       Current Facility-Administered Medications:   •  acetaminophen (TYLENOL) tablet 650 mg, 650 mg, Oral, Q4H PRN, Anil Mak MD, 650 mg at 07/17/18 2050  •  amLODIPine (NORVASC) tablet 10 mg, 10 mg, Oral, Daily, Anil Mak MD, 10 mg at 07/18/18 0813  •  aspirin EC tablet 81 mg, 81 mg, Oral, Daily, Anil Mak MD, 81 mg at 07/18/18 0813  •  atorvastatin (LIPITOR) tablet 20 mg, 20 mg, Oral, Daily, Anil Mak MD, 20 mg at 07/18/18 0813  •  cefepime (MAXIPIME) 1 g/100 mL 0.9% NS IVPB (mbp), 1 g, Intravenous, Q8H, Rudi Resendez MD  •  dextrose (D50W) solution 25 g, 25 g, Intravenous, Q15 Min PRN, Anil Mak MD  •  dextrose (GLUTOSE) oral gel 15 g, 15 g, Oral, Q15 Min PRN, Anil Mak MD  •  docusate sodium (COLACE) capsule 100 mg, 100 mg, Oral, BID, Anil Mak MD, 100 mg at 07/18/18 0813  •  enoxaparin (LOVENOX) syringe 40 mg, 40 mg, Subcutaneous, Q12H, Anil Mak MD, 40 mg at 07/18/18 0609  •  glucagon (human recombinant) (GLUCAGEN DIAGNOSTIC) injection 1 mg, 1 mg, Subcutaneous, PRN, Anil Mak MD  •  HYDROcodone-acetaminophen (NORCO) 7.5-325 MG per tablet 1 tablet, 1 tablet, Oral, Q4H PRN, Anil Mak MD  •  insulin NPH (humuLIN N,novoLIN N) injection 20  Units, 20 Units, Subcutaneous, QAM, Wally TYLER MD, 20 Units at 07/18/18 0813  •  insulin NPH (humuLIN N,novoLIN N) injection 20 Units, 20 Units, Subcutaneous, Nightly, Wally TYLER MD, 20 Units at 07/17/18 2051  •  insulin regular (humuLIN R,novoLIN R) injection 15 Units, 15 Units, Subcutaneous, Daily With Breakfast & Dinner, Wally TYLER MD, 15 Units at 07/18/18 0813  •  insulin regular (humuLIN R,novoLIN R) injection 2-5 Units, 2-5 Units, Subcutaneous, 4x Daily AC & at Bedtime PRN, Wally TYLER MD, 2 Units at 07/17/18 2115  •  isosorbide mononitrate (IMDUR) 24 hr tablet 60 mg, 60 mg, Oral, Daily, Anil Mak MD, 60 mg at 07/18/18 0813  •  metoprolol succinate XL (TOPROL-XL) 24 hr tablet 25 mg, 25 mg, Oral, Daily, Anil Mak MD, 25 mg at 07/18/18 0813  •  ondansetron (ZOFRAN) injection 4 mg, 4 mg, Intravenous, Q6H PRN, Anil Mak MD  •  Pharmacy to dose vancomycin, , Does not apply, Continuous PRN, Anil Mak MD  •  sodium chloride 0.9 % flush 1-10 mL, 1-10 mL, Intravenous, PRN, Anil Mak MD  •  Insert peripheral IV, , , Once **AND** sodium chloride 0.9 % flush 10 mL, 10 mL, Intravenous, PRN, Cordell Kemp MD  •  temazepam (RESTORIL) capsule 15 mg, 15 mg, Oral, Nightly PRN, Duy Banda MD, 15 mg at 07/17/18 2328  •  vancomycin (VANCOCIN) in iso-osmotic dextrose IVPB 1 g (premix) 200 mL, 1,000 mg, Intravenous, Q12H, Anil Mak MD, Last Rate: 0 mL/hr at 07/17/18 1516, 1,000 mg at 07/18/18 0117    Assessment/Plan     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 (CMS/HCC)   • Type II diabetes mellitus with neurological manifestations, uncontrolled (CMS/HCC)   • Uncontrolled type 2 diabetes mellitus with background retinopathy (CMS/HCC)   • Hyperinsulinism   • Abnormal weight  gain   • Hyperlipidemia   • Essential hypertension   • Edema of lower extremity   • Angiomyolipoma of kidney   • Memory changes   • Left hip pain   • Left-sided low back pain without sciatica   • Lumbar spinal stenosis   • Encounter for long-term (current) use of insulin (CMS/[facility])   • Obstructive sleep apnea on autoCPAP   • Chronic kidney disease, stage III (moderate)   • Chronic venous insufficiency   • CAD (coronary artery disease)   • Type 2 diabetes mellitus with stage 3 chronic kidney disease, with long-term current use of insulin (CMS/[facility])   • Circadian rhythm sleep disorder, delayed sleep phase type   • Class 3 obesity due to excess calories with serious comorbidity and body mass index (BMI) of 40.0 to 44.9 in adult (CMS/[facility])   • History of colon polyps   • Cellulitis of right leg   • Sepsis (CMS/[facility])   • Viral upper respiratory infection     Uncontrolled type 2 diabetes mellitus with complications  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  Diabetic foot ulceration right foot  Cellulitis right leg  Uncontrolled type 2 diabetes mellitus with nephropathy  Chronic kidney disease stage III  Uncontrolled diabetes mellitus with background diabetic retinopathy  Hyperinsulinism  Chronic insulin management     Patient's blood sugars have been in the 200 300 range  She's not receiving the regular insulin as she was getting at home  She is currently receiving NPH insulin 40 units twice daily along with sliding scale     Patient is eating reasonably better  She would like to restart her home regimen  She takes 58 units of NPH in the morning and 42 units at night  She takes 50 units of regular insulin in the morning and 48 units at supper   Considering the hospitalization and multiple interruptions to meal schedule I recommended that I will decrease the insulin during this hospitalization and try and keep her blood sugars below 200 if possible  Patient verbalized understanding  I also advised the patient to  "consider avoidance of hypoglycemia as one of the goals during this hospitalization     Patient was started on 40 units of NPH in the morning and 30 units at night and the regular insulin 30 units twice daily  Patient did not eat a bedtime snack yesterday  Patient's blood sugars came down to 80 range yesterday morning  She was still not symptomatic  The NPH insulin was reduced to 20 units twice daily  Regular insulin was reduced to 15 units twice daily before meals  Patient's blood sugars have remained in the 100 200 range since yesterday  I advised the patient to eat a bedtime snack    As a result patient's blood sugars better this morning  I discussed my plan with the patient and her son at bedside  Both verbalized understanding      The total time spent for old record and lab review and floor time was more than 35 min of which greater than 20 min of time  ( greater than 50% of the total time )  was spent face to face with the patient counseling and coordination of care owas spent on counseling the patient on recommended evaluation and treatment options, instructions for management/treatment and /or follow up  and importance of compliance with chosen management or treatment options      Wally Craft MD FACE.  07/18/18  10:46 AM      EMR Dragon / transcription disclaimer:     \"Dictated utilizing Dragon dictation\".   "

## 2018-07-18 NOTE — PLAN OF CARE
Problem: Fall Risk (Adult)  Goal: Absence of Fall  Outcome: Ongoing (interventions implemented as appropriate)      Problem: Patient Care Overview  Goal: Plan of Care Review  Outcome: Ongoing (interventions implemented as appropriate)   07/18/18 6262   Coping/Psychosocial   Plan of Care Reviewed With patient   Plan of Care Review   Progress improving   OTHER   Outcome Summary VSS. Cefepime changed to every 8 hours. Plan is possibly switch abx to PO tomorrow then discharge. Pt is thinking about rehab. Continue to monitor.      Goal: Individualization and Mutuality  Outcome: Ongoing (interventions implemented as appropriate)    Goal: Discharge Needs Assessment  Outcome: Ongoing (interventions implemented as appropriate)    Goal: Interprofessional Rounds/Family Conf  Outcome: Ongoing (interventions implemented as appropriate)      Problem: Infection, Risk/Actual (Adult)  Goal: Infection Prevention/Resolution  Outcome: Ongoing (interventions implemented as appropriate)      Problem: Skin Injury Risk (Adult)  Goal: Identify Related Risk Factors and Signs and Symptoms  Outcome: Outcome(s) achieved Date Met: 07/18/18    Goal: Skin Health and Integrity  Outcome: Ongoing (interventions implemented as appropriate)

## 2018-07-18 NOTE — THERAPY TREATMENT NOTE
Acute Care - Physical Therapy Treatment Note  [facility]     Patient Name: Denisse Chavez  : 1945  MRN: [id number]  Today's Date: 2018  Onset of Illness/Injury or Date of Surgery: 07/15/18          Admit Date: 7/15/2018    Visit Dx:    ICD-10-CM ICD-9-CM   1. Cellulitis of right leg L03.115 682.6   2. Diabetic ulcer of right foot associated with other specified diabetes mellitus, unspecified part of foot, unspecified ulcer stage (CMS/[facility]) E13.621 250.80    L97.519 707.15   3. Sepsis, due to unspecified organism (CMS/[facility]) A41.9 038.9     995.91   4. Difficulty walking R26.2 719.7     Patient Active Problem List   Diagnosis   • Type 2 diabetes mellitus (CMS/[facility])   • Diabetes mellitus type 2, uncontrolled, with complications (CMS/[facility])   • Uncontrolled type II diabetes mellitus with nephropathy (CMS/[facility])   • Type II diabetes mellitus with neurological manifestations, uncontrolled (CMS/[facility])   • Uncontrolled type 2 diabetes mellitus with background retinopathy (CMS/[facility])   • Hyperinsulinism   • Abnormal weight gain   • Hyperlipidemia   • Essential hypertension   • Edema of lower extremity   • Angiomyolipoma of kidney   • Memory changes   • Left hip pain   • Left-sided low back pain without sciatica   • Lumbar spinal stenosis   • Encounter for long-term (current) use of insulin (CMS/[facility])   • Obstructive sleep apnea on autoCPAP   • Chronic kidney disease, stage III (moderate)   • Chronic venous insufficiency   • CAD (coronary artery disease)   • Type 2 diabetes mellitus with stage 3 chronic kidney disease, with long-term current use of insulin (CMS/[facility])   • Circadian rhythm sleep disorder, delayed sleep phase type   • Class 3 obesity due to excess calories with serious comorbidity and body mass index (BMI) of 40.0 to 44.9 in adult (CMS/[facility])   • History of colon polyps   • Cellulitis of right leg   • Sepsis (CMS/[facility])   • Viral upper respiratory infection       Therapy Treatment          Rehabilitation  Treatment Summary     Row Name 07/18/18 1000             Treatment Time/Intention    Discipline physical therapy assistant  -CW      Document Type therapy note (daily note)  -CW      Subjective Information complains of;weakness;fatigue  -CW      Mode of Treatment physical therapy  -CW      Therapy Frequency (PT Clinical Impression) daily  -CW      Patient Effort good  -CW      Existing Precautions/Restrictions fall  -CW      Recorded by [CW] René Patel, [facility] 07/18/18 1005      Row Name 07/18/18 1000             Vital Signs    O2 Delivery Pre Treatment room air  -CW      Recorded by [CW] René Patel, [facility] 07/18/18 1005      Row Name 07/18/18 1000             Cognitive Assessment/Intervention- PT/OT    Orientation Status (Cognition) oriented x 4  -CW      Follows Commands (Cognition) WFL  -CW      Cognitive Function (Cognitive) WFL  -CW      Personal Safety Interventions fall prevention program maintained;gait belt;muscle strengthening facilitated;nonskid shoes/slippers when out of bed  -CW      Recorded by [CW] René Patel, [facility] 07/18/18 1005      Row Name 07/18/18 1000             Bed Mobility Assessment/Treatment    Bed Mobility Assessment/Treatment supine-sit;sit-supine  -CW      Supine-Sit [place] (Bed Mobility) not tested  -CW      Comment (Bed Mobility) in chair  -CW      Recorded by [CW] René Patel, [facility] 07/18/18 1005      Row Name 07/18/18 1000             Transfer Assessment/Treatment    Transfer Assessment/Treatment sit-stand transfer;stand-sit transfer  -CW      Recorded by [CW] René Patel, [facility] 07/18/18 1005      Row Name 07/18/18 1000             Sit-Stand Transfer    Sit-Stand [place] (Transfers) verbal cues;nonverbal cues (demo/gesture);contact guard  -CW      Assistive Device (Sit-Stand Transfers) walker, front-wheeled  -CW      Recorded by [CW] René Patel, [facility] 07/18/18 1005      Row Name 07/18/18 1000             Stand-Sit Transfer    Stand-Sit  [place] (Transfers) verbal cues;nonverbal cues (demo/gesture);minimum assist (75% patient effort);2 person assist  -CW      Assistive Device (Stand-Sit Transfers) walker, front-wheeled  -CW      Recorded by [CW] René Patel PTA 07/18/18 1005      Row Name 07/18/18 1000             Gait/Stairs Assessment/Training    [place] Level (Gait) verbal cues;nonverbal cues (demo/gesture);contact guard  -CW      Assistive Device (Gait) walker, front-wheeled  -CW      Distance in Feet (Gait) 60  -CW      Deviations/Abnormal Patterns (Gait) charly decreased;gait speed decreased;stride length decreased  -CW      Bilateral Gait Deviations forward flexed posture  -CW      Recorded by [CW] René Patel PTA 07/18/18 1005      Row Name 07/18/18 1000             Positioning and Restraints    Pre-Treatment Position sitting in chair/recliner  -CW      Post Treatment Position chair  -CW      In Chair notified nsg;sitting;call light within reach;encouraged to call for assist  -CW      Recorded by [CW] René Patel PTA 07/18/18 1005      Row Name 07/18/18 1000             Pain Assessment    Additional Documentation Pain Scale: Numbers Pre/Post-Treatment (Group)  -CW      Recorded by [CW] René Patel PTA 07/18/18 1005      Row Name 07/18/18 1000             Pain Scale: Numbers Pre/Post-Treatment    Pain Scale: Numbers, Pretreatment 4/10  -CW      Pain Scale: Numbers, Post-Treatment 5/10  -CW      Pain Location - Side Right  -CW      Pain Location - Orientation lower  -CW      Pain Location extremity  -CW      Pain Intervention(s) Repositioned;Ambulation/increased activity  -CW      Recorded by [CW] René Patel PTA 07/18/18 1005      Row Name                Wound 07/16/18 Right posterior  diabetic/neuropathic ulceration    Wound - Properties Group Date first assessed: 07/16/18 [BF] Present On Admission : yes [BF] Side: Right [BF] Orientation: posterior [BF] Type: diabetic/neuropathic ulceration  [BF] Recorded by:  [BF] [facility] 07/16/18 1351    Row Name 07/18/18 1000             Outcome Summary/Treatment Plan (PT)    Anticipated Discharge Disposition (PT) home with home health;skilled nursing facility  -      Recorded by [CW] René Patel, PTA 07/18/18 1005        User Key  (r) = Recorded By, (t) = Taken By, (c) = Cosigned By    Initials Name Effective Dates Discipline    BF [facility]atum [facility] 06/16[facility]16 -  Nurse    CW René Patel, PTA 03/07/18 -  PT          Wound 07/16/18 Right posterior  diabetic/neuropathic ulceration (Active)   Dressing Appearance dry;intact;dried drainage 7/18/2018  8:09 AM   Closure None 7/18/2018  8:09 AM   Base dressing in place, unable to visualize 7/18/2018  8:09 AM   Periwound dry 7/18/2018  8:09 AM   Drainage Characteristics/Odor serosanguineous 7/18/2018  8:09 AM   Drainage Amount scant 7/18/2018  8:09 AM   Care, Wound cleansed with;sterile normal saline;antimicrobial agent applied 7/17/2018 11:45 PM   Dressing Care, Wound gauze;non-adherent 7/18/2018  8:09 AM             Physical Therapy Education     Title: PT OT SLP Therapies (Done)     Topic: Physical Therapy (Done)     Point: Mobility training (Done)    Learning Progress Summary     Learner Status Readiness Method Response Comment Documented by    Patient Done Acceptance OSVALDO GUTHRIE DU CW 07/18/18 1005     Done Acceptance CLEVELANDTBSONI VU,NR   07/17/18 1418          Point: Home exercise program (Done)    Learning Progress Summary     Learner Status Readiness Method Response Comment Documented by    Patient Done Acceptance OSVALDO GUTHRIE DU CW 07/18/18 1005          Point: Body mechanics (Done)    Learning Progress Summary     Learner Status Readiness Method Response Comment Documented by    Patient Done Acceptance OSVALDO GUTHRIE DU CW 07/18/18 1005     Done Acceptance ETBSONI VU,NR   07/17/18 1418          Point: Precautions (Done)    Learning Progress Summary     Learner Status Readiness Method Response  Comment Documented by    Patient Done Acceptance EOSVALDO DU   07/18/18 1005     Done Acceptance E,TB,SONI DUNLAP,LOU   07/17/18 1418                      User Key     Initials Effective Dates Name Provider Type [facility] 04/03/18 -  Cecy Otero, PT Physical Therapist PT     03/07/18 -  René Patel PTA Physical Therapy Assistant PT                    PT Recommendation and Plan  Anticipated Discharge Disposition (PT): home with home health, skilled nursing facility  Therapy Frequency (PT Clinical Impression): daily  Outcome Summary/Treatment Plan (PT)  Anticipated Discharge Disposition (PT): home with home health, skilled nursing facility  Plan of Care Reviewed With: patient  Progress: improving  Outcome Summary: Pt increasing with strength and balance with use of RWX and improved gait          Outcome Measures     Row Name 07/18/18 1000 07/17/18 1400          How much help from another person do you currently need...    Turning from your back to your side while in flat bed without using bedrails? 3  -CW 3  -CH     Moving from lying on back to sitting on the side of a flat bed without bedrails? 3  -CW 3  -CH     Moving to and from a bed to a chair (including a wheelchair)? 3  -CW 3  -CH     Standing up from a chair using your arms (e.g., wheelchair, bedside chair)? 3  -CW 3  -CH     Climbing 3-5 steps with a railing? 2  -CW 2  -CH     To walk in hospital room? 3  -CW 3  -CH     AM-PAC 6 Clicks Score 17  -CW 17  -CH        Functional Assessment    Outcome Measure Options AM-PAC 6 Clicks Basic Mobility (PT)  -CW AM-PAC 6 Clicks Basic Mobility (PT)  -CH       User Key  (r) = Recorded By, (t) = Taken By, (c) = Cosigned By    Initials Name Provider Type     Cecy Otero, PT Physical Therapist    CW René Patel, AR[facility] Physical Therapy Assistant           Time Calculation:         PT Charges     Row Name 07/18/18 1006             Time Calculation    Start Time 0950  -CW      Stop Time 1006  -CW       Time Calculation (min) 16 min  -CW      PT Received On 07/18/18  -CW      PT - Next Appointment 07/19/18  -CW        User Key  (r) = Recorded By, (t) = Taken By, (c) = Cosigned By    Initials Name Provider Type    CW René Patel PTA Physical Therapy Assistant        Therapy Suggested Charges     Code   Minutes Charges    None           Therapy Charges for Today     Code Description Service Date Service Provider Modifiers Qty    70276662388 HC PT THER PROC EA 15 MIN 7/18/2018 René Patel PTA GP 1    50148270612 HC PT THER SUPP EA 15 MIN 7/18/2018 René Patel PTA GP 1          PT G-Codes  Outcome Measure Options: AM-PAC 6 Clicks Basic Mobility (PT)    René Patel PTA  7/18/2018

## 2018-07-18 NOTE — PROGRESS NOTES
Continued Stay Note  [facility]     Patient Name: Denisse Chavez  MRN: [id number]  Today's Date: 7/18/2018    Admit Date: 7/15/2018          Discharge Plan     Row Name 07/18/18 1711       Plan    Plan [facility]--SNF. Need 30-day signed (on packet). Packet in office.    Patient/Family in Agreement with Plan yes    Plan Comments Gemma/Carlos met with patient at bedside. States agreeable with [facility]. Says [facility] does not have any beds. Continue to follow.    Row Name 07/18/18 1433       Plan    Plan subacute rehab    Patient/Family in Agreement with Plan yes   son at bedside    Plan Comments Met at bedside with pt and her son. Pt ambulated 60ft with PT today. Both pt and son state that she is alone during the day and feel she will need some rehab. Discussed choices. [place]. Pt has been at [facility], Given list and [facility]. They request referral to [facility] and [facility]. Call to Tasha and placed in her in box....[facility]              Discharge Codes    No documentation.           Christi Pagan RN

## 2018-07-19 LAB
ANION GAP SERPL CALCULATED.3IONS-SCNC: 12.5 MMOL/L
BASOPHILS # BLD AUTO: 0.01 10*3/MM3 (ref 0–0.2)
BASOPHILS NFR BLD AUTO: 0.1 % (ref 0–1.5)
BUN BLD-MCNC: 14 MG/DL (ref 8–23)
BUN/CREAT SERPL: 16.9 (ref 7–25)
CALCIUM SPEC-SCNC: 8.5 MG/DL (ref 8.6–10.5)
CHLORIDE SERPL-SCNC: 100 MMOL/L (ref 98–107)
CO2 SERPL-SCNC: 22.5 MMOL/L (ref 22–29)
CREAT BLD-MCNC: 0.83 MG/DL (ref 0.57–1)
DEPRECATED RDW RBC AUTO: 44.9 FL (ref 37–54)
EOSINOPHIL # BLD AUTO: 0.27 10*3/MM3 (ref 0–0.7)
EOSINOPHIL NFR BLD AUTO: 3.3 % (ref 0.3–6.2)
ERYTHROCYTE [DISTWIDTH] IN BLOOD BY AUTOMATED COUNT: 14.2 % (ref 11.7–13)
GFR SERPL CREATININE-BSD FRML MDRD: 68 ML/MIN/1.73
GLUCOSE BLD-MCNC: 152 MG/DL (ref 65–99)
GLUCOSE BLDC GLUCOMTR-MCNC: 150 MG/DL (ref 70–130)
GLUCOSE BLDC GLUCOMTR-MCNC: 152 MG/DL (ref 70–130)
GLUCOSE BLDC GLUCOMTR-MCNC: 220 MG/DL (ref 70–130)
GLUCOSE BLDC GLUCOMTR-MCNC: 279 MG/DL (ref 70–130)
HCT VFR BLD AUTO: 37.9 % (ref 35.6–45.5)
HGB BLD-MCNC: 12.5 G/DL (ref 11.9–15.5)
IMM GRANULOCYTES # BLD: 0.06 10*3/MM3 (ref 0–0.03)
IMM GRANULOCYTES NFR BLD: 0.7 % (ref 0–0.5)
LYMPHOCYTES # BLD AUTO: 1.67 10*3/MM3 (ref 0.9–4.8)
LYMPHOCYTES NFR BLD AUTO: 20.7 % (ref 19.6–45.3)
MCH RBC QN AUTO: 28.3 PG (ref 26.9–32)
MCHC RBC AUTO-ENTMCNC: 33 G/DL (ref 32.4–36.3)
MCV RBC AUTO: 85.7 FL (ref 80.5–98.2)
MONOCYTES # BLD AUTO: 0.6 10*3/MM3 (ref 0.2–1.2)
MONOCYTES NFR BLD AUTO: 7.4 % (ref 5–12)
NEUTROPHILS # BLD AUTO: 5.51 10*3/MM3 (ref 1.9–8.1)
NEUTROPHILS NFR BLD AUTO: 68.5 % (ref 42.7–76)
PLATELET # BLD AUTO: 206 10*3/MM3 (ref 140–500)
PMV BLD AUTO: 10.4 FL (ref 6–12)
POTASSIUM BLD-SCNC: 3.6 MMOL/L (ref 3.5–5.2)
RBC # BLD AUTO: 4.42 10*6/MM3 (ref 3.9–5.2)
SODIUM BLD-SCNC: 135 MMOL/L (ref 136–145)
WBC NRBC COR # BLD: 8.06 10*3/MM3 (ref 4.5–10.7)

## 2018-07-19 PROCEDURE — 97110 THERAPEUTIC EXERCISES: CPT

## 2018-07-19 PROCEDURE — 63710000001 INSULIN REGULAR HUMAN PER 5 UNITS: Performed by: INTERNAL MEDICINE

## 2018-07-19 PROCEDURE — 63710000001 INSULIN ISOPHANE HUMAN PER 5 UNITS: Performed by: INTERNAL MEDICINE

## 2018-07-19 PROCEDURE — 82962 GLUCOSE BLOOD TEST: CPT

## 2018-07-19 PROCEDURE — 25010000002 ENOXAPARIN PER 10 MG: Performed by: HOSPITALIST

## 2018-07-19 PROCEDURE — 25010000002 CEFEPIME PER 500 MG: Performed by: INTERNAL MEDICINE

## 2018-07-19 PROCEDURE — 85025 COMPLETE CBC W/AUTO DIFF WBC: CPT | Performed by: INTERNAL MEDICINE

## 2018-07-19 PROCEDURE — 25010000002 VANCOMYCIN PER 500 MG: Performed by: HOSPITALIST

## 2018-07-19 PROCEDURE — 80048 BASIC METABOLIC PNL TOTAL CA: CPT | Performed by: INTERNAL MEDICINE

## 2018-07-19 RX ADMIN — DOCUSATE SODIUM 100 MG: 100 CAPSULE, LIQUID FILLED ORAL at 08:22

## 2018-07-19 RX ADMIN — DOCUSATE SODIUM 100 MG: 100 CAPSULE, LIQUID FILLED ORAL at 21:13

## 2018-07-19 RX ADMIN — VANCOMYCIN HYDROCHLORIDE 1000 MG: 1 INJECTION, SOLUTION INTRAVENOUS at 12:31

## 2018-07-19 RX ADMIN — HUMAN INSULIN 20 UNITS: 100 INJECTION, SUSPENSION SUBCUTANEOUS at 08:22

## 2018-07-19 RX ADMIN — HUMAN INSULIN 3 UNITS: 100 INJECTION, SOLUTION SUBCUTANEOUS at 17:16

## 2018-07-19 RX ADMIN — CEFEPIME HYDROCHLORIDE 1 G: 1 INJECTION, POWDER, FOR SOLUTION INTRAMUSCULAR; INTRAVENOUS at 14:45

## 2018-07-19 RX ADMIN — AMLODIPINE BESYLATE 10 MG: 10 TABLET ORAL at 08:21

## 2018-07-19 RX ADMIN — METOPROLOL SUCCINATE 25 MG: 25 TABLET, FILM COATED, EXTENDED RELEASE ORAL at 08:21

## 2018-07-19 RX ADMIN — ASPIRIN 81 MG: 81 TABLET ORAL at 08:21

## 2018-07-19 RX ADMIN — ENOXAPARIN SODIUM 40 MG: 40 INJECTION SUBCUTANEOUS at 06:26

## 2018-07-19 RX ADMIN — ENOXAPARIN SODIUM 40 MG: 40 INJECTION SUBCUTANEOUS at 17:16

## 2018-07-19 RX ADMIN — CEFEPIME HYDROCHLORIDE 1 G: 1 INJECTION, POWDER, FOR SOLUTION INTRAMUSCULAR; INTRAVENOUS at 06:26

## 2018-07-19 RX ADMIN — CEFEPIME HYDROCHLORIDE 1 G: 1 INJECTION, POWDER, FOR SOLUTION INTRAMUSCULAR; INTRAVENOUS at 22:44

## 2018-07-19 RX ADMIN — HUMAN INSULIN 3 UNITS: 100 INJECTION, SOLUTION SUBCUTANEOUS at 21:13

## 2018-07-19 RX ADMIN — ATORVASTATIN CALCIUM 20 MG: 20 TABLET, FILM COATED ORAL at 08:21

## 2018-07-19 RX ADMIN — ISOSORBIDE MONONITRATE 60 MG: 60 TABLET, EXTENDED RELEASE ORAL at 08:21

## 2018-07-19 RX ADMIN — VANCOMYCIN HYDROCHLORIDE 1000 MG: 1 INJECTION, SOLUTION INTRAVENOUS at 01:36

## 2018-07-19 RX ADMIN — HUMAN INSULIN 15 UNITS: 100 INJECTION, SOLUTION SUBCUTANEOUS at 08:22

## 2018-07-19 RX ADMIN — HUMAN INSULIN 3 UNITS: 100 INJECTION, SOLUTION SUBCUTANEOUS at 11:31

## 2018-07-19 RX ADMIN — HUMAN INSULIN 20 UNITS: 100 INJECTION, SUSPENSION SUBCUTANEOUS at 21:12

## 2018-07-19 RX ADMIN — TEMAZEPAM 15 MG: 15 CAPSULE ORAL at 21:54

## 2018-07-19 RX ADMIN — HUMAN INSULIN 15 UNITS: 100 INJECTION, SOLUTION SUBCUTANEOUS at 17:17

## 2018-07-19 NOTE — PLAN OF CARE
Problem: Fall Risk (Adult)  Goal: Absence of Fall  Outcome: Ongoing (interventions implemented as appropriate)      Problem: Patient Care Overview  Goal: Plan of Care Review  Outcome: Ongoing (interventions implemented as appropriate)   07/19/18 1602   Coping/Psychosocial   Plan of Care Reviewed With patient;chauncey   Plan of Care Review   Progress improving   OTHER   Outcome Summary Pt has had no c/o pain, ambulates with assist and cane. R lower leg slightly red and warm. Continue IV antibiotics. Plan for d/c to SNF 7/21/18. Will continue to monitor.      Goal: Individualization and Mutuality  Outcome: Ongoing (interventions implemented as appropriate)    Goal: Discharge Needs Assessment  Outcome: Ongoing (interventions implemented as appropriate)    Goal: Interprofessional Rounds/Family Conf  Outcome: Ongoing (interventions implemented as appropriate)      Problem: Infection, Risk/Actual (Adult)  Goal: Infection Prevention/Resolution  Outcome: Ongoing (interventions implemented as appropriate)      Problem: Skin Injury Risk (Adult)  Goal: Skin Health and Integrity  Outcome: Ongoing (interventions implemented as appropriate)

## 2018-07-19 NOTE — PLAN OF CARE
Problem: Patient Care Overview  Goal: Plan of Care Review  Outcome: Ongoing (interventions implemented as appropriate)   07/19/18 2289   Coping/Psychosocial   Plan of Care Reviewed With patient   Plan of Care Review   Progress improving   OTHER   Outcome Summary Pt tolerated treatment well this date. Increased gait distance to 75ft w/ Rw and CGA. Reported pain in her back, which pt states is chronic. PT will continue to address functional mobility deficits as tolerated.

## 2018-07-19 NOTE — PROGRESS NOTES
Continued Stay Note  [facility]     Patient Name: Denisse Chavez  MRN: [id number]  Today's Date: 7/19/2018    Admit Date: 7/15/2018          Discharge Plan     Row Name 07/19/18 1105       Plan    Plan [facility]--SNF. Bed available on Saturday. Need 30-day signed (on packet). Packet in office. Continue to follow.              Discharge Codes    No documentation.           Christi Pagan RN

## 2018-07-19 NOTE — THERAPY TREATMENT NOTE
Acute Care - Physical Therapy Treatment Note  [facility]     Patient Name: Denisse Chavez  : 1945  MRN: [id number]  Today's Date: 2018  Onset of Illness/Injury or Date of Surgery: 07/15/18          Admit Date: 7/15/2018    Visit Dx:    ICD-10-CM ICD-9-CM   1. Cellulitis of right leg L03.115 682.6   2. Diabetic ulcer of right foot associated with other specified diabetes mellitus, unspecified part of foot, unspecified ulcer stage (CMS/[facility]) E13.621 250.80    L97.519 707.15   3. Sepsis, due to unspecified organism (CMS/[facility]) A41.9 038.9     995.91   4. Difficulty walking R26.2 719.7     Patient Active Problem List   Diagnosis   • Type 2 diabetes mellitus (CMS/[facility])   • Diabetes mellitus type 2, uncontrolled, with complications (CMS/[facility])   • Uncontrolled type II diabetes mellitus with nephropathy (CMS/[facility])   • Type II diabetes mellitus with neurological manifestations, uncontrolled (CMS/[facility])   • Uncontrolled type 2 diabetes mellitus with background retinopathy (CMS/[facility])   • Hyperinsulinism   • Abnormal weight gain   • Hyperlipidemia   • Essential hypertension   • Edema of lower extremity   • Angiomyolipoma of kidney   • Memory changes   • Left hip pain   • Left-sided low back pain without sciatica   • Lumbar spinal stenosis   • Encounter for long-term (current) use of insulin (CMS/[facility])   • Obstructive sleep apnea on autoCPAP   • Chronic kidney disease, stage III (moderate)   • Chronic venous insufficiency   • CAD (coronary artery disease)   • Type 2 diabetes mellitus with stage 3 chronic kidney disease, with long-term current use of insulin (CMS/[facility])   • Circadian rhythm sleep disorder, delayed sleep phase type   • Class 3 obesity due to excess calories with serious comorbidity and body mass index (BMI) of 40.0 to 44.9 in adult (CMS/[facility])   • History of colon polyps   • Cellulitis of right leg   • Sepsis (CMS/[facility])   • Viral upper respiratory infection       Therapy Treatment          Rehabilitation  Treatment Summary     Row Name 07/19/18 1310             Treatment Time/Intention    Discipline physical therapy assistant  -      Document Type therapy note (daily note)  -      Subjective Information complains of;pain;weakness  -SM      Mode of Treatment physical therapy  -SM      Patient Effort good  -SM      Existing Precautions/Restrictions fall  -SM      Recorded by [SM] Agueda Lucas, [facility] 07/19/18 1402      Row Name 07/19/18 1310             Cognitive Assessment/Intervention- PT/OT    Orientation Status (Cognition) oriented x 4  -SM      Follows Commands (Cognition) WFL  -SM      Cognitive Function (Cognitive) WFL  -SM      Personal Safety Interventions fall prevention program maintained;gait belt;nonskid shoes/slippers when out of bed  -SM      Recorded by [SM] Agueda Lucas, [facility] 07/19/18 1402      Row Name 07/19/18 1310             Bed Mobility Assessment/Treatment    Bed Mobility Assessment/Treatment supine-sit  -SM      Supine-Sit [place] (Bed Mobility) supervision  -SM      Sit-Supine [place] (Bed Mobility) not tested   up in chair  -SM      Assistive Device (Bed Mobility) bed rails;head of bed elevated  -SM      Recorded by [SM] Agueda Lucas, [facility] 07/19/18 1402      Row Name 07/19/18 1310             Transfer Assessment/Treatment    Transfer Assessment/Treatment sit-stand transfer;stand-sit transfer  -SM      Recorded by [] Agueda Lucas, [facility] 07/19/18 1402      Row Name 07/19/18 1310             Sit-Stand Transfer    Sit-Stand [place] (Transfers) contact guard;verbal cues  -      Assistive Device (Sit-Stand Transfers) walker, front-wheeled  -      Recorded by [SM] Agueda Lucas, [facility] 07/19/18 1402      Row Name 07/19/18 1310             Stand-Sit Transfer    Stand-Sit [place] (Transfers) contact guard;verbal cues  -      Assistive Device (Stand-Sit Transfers) walker, front-wheeled  -      Recorded by [SM] Agueda Lucas, [facility] 07/19/18 1402       Row Name 07/19/18 1310             Gait/Stairs Assessment/Training    Gait/Stairs Assessment/Training gait/ambulation independence;gait/ambulation assistive device;distance ambulated;gait pattern;gait deviations  -      [place] (Gait) contact guard;1 person to manage equipment;verbal cues  -      Assistive Device (Gait) walker, front-wheeled  -      Distance in Feet (Gait) 75  -SM      Deviations/Abnormal Patterns (Gait) charly decreased;stride length decreased  -      Bilateral Gait Deviations forward flexed posture  -SM      Recorded by [] Agueda Lucas, \[facility]\"" 07/19/18 1402      Row Name 07/19/18 1310             Positioning and Restraints    Pre-Treatment Position in bed  -      Post Treatment Position chair  -SM      In Chair reclined;call light within reach;encouraged to call for assist;exit alarm on  -SM      Recorded by [] Agueda Lucas, \[facility]\"" 07/19/18 1402      Row Name 07/19/18 1310             Pain Assessment    Additional Documentation Pain Scale: Numbers Pre/Post-Treatment (Group)  -      Recorded by [] Agueda Lucas, \[facility]\"" 07/19/18 1402      Row Name 07/19/18 1310             Pain Scale: Numbers Pre/Post-Treatment    Pain Scale: Numbers, Pretreatment 5/10  -SM      Pain Scale: Numbers, Post-Treatment 5/10  -SM      Pain Location - Orientation lower  -SM      Pain Location back  -SM      Pain Intervention(s) Repositioned;Ambulation/increased activity;Rest  -SM      Recorded by [] Agueda Lucas, \[facility]\"" 07/19/18 1402      Row Name                Wound 07/16/18 Right posterior  diabetic/neuropathic ulceration    Wound - Properties Group Date first assessed: 07/16/18 [BF] Present On Admission : yes [BF] Side: Right [BF] Orientation: posterior [BF] Type: diabetic/neuropathic ulceration [BF] Recorded by:  [BF] Brianne Tatum RN, CWOCN 07/16/18 7348      User Key  (r) = Recorded By, (t) = Taken By, (c) = Cosigned By    Initials Name Effective Dates Discipline    BF  Brianne Tatum RN, CWOCN 06/16/16 -  Nurse     Agueda Lucas, PTA 03/07/18 -  PT          Wound 07/16/18 Right posterior  diabetic/neuropathic ulceration (Active)   Dressing Appearance dry;intact 7/19/2018  8:23 AM   Dressing Care, Wound gauze 7/19/2018  8:23 AM             Physical Therapy Education     Title: PT OT SLP Therapies (Done)     Topic: Physical Therapy (Done)     Point: Mobility training (Done)    Learning Progress Summary     Learner Status Readiness Method Response Comment Documented by    Patient Done Acceptance E,TB,D VU,NR   07/19/18 1402     Done Acceptance E,TB VU,DU   07/18/18 1005     Done Acceptance E,TB,D VU,NR   07/17/18 1418          Point: Home exercise program (Done)    Learning Progress Summary     Learner Status Readiness Method Response Comment Documented by    Patient Done Acceptance E,TB,D VU,NR   07/19/18 1402     Done Acceptance E,TB VU,DU   07/18/18 1005          Point: Body mechanics (Done)    Learning Progress Summary     Learner Status Readiness Method Response Comment Documented by    Patient Done Acceptance E,TB,D VU,NR   07/19/18 1402     Done Acceptance E,TB VU,DU   07/18/18 1005     Done Acceptance E,TB,D VU,NR   07/17/18 1418          Point: Precautions (Done)    Learning Progress Summary     Learner Status Readiness Method Response Comment Documented by    Patient Done Acceptance E,TB,D VU,NR   07/19/18 1402     Done Acceptance E,TB VU,DU   07/18/18 1005     Done Acceptance E,TB,D VU,NR   07/17/18 1418                      User Key     Initials Effective Dates Name Provider Type Discipline     04/03/18 -  Cecy Otero, PT Physical Therapist PT     03/07/18 -  Agueda Lucas, PTA Physical Therapy Assistant PT     03/07/18 -  René Patel PTA Physical Therapy Assistant PT                    PT Recommendation and Plan     Plan of Care Reviewed With: patient  Progress: improving  Outcome Summary: Pt tolerated treatment well  this date. Increased gait distance to 75ft w/ Rw and CGA. Reported pain in her back, which pt states is chronic. PT will continue to address functional mobility deficits as tolerated.          Outcome Measures     Row Name 07/19/18 1400 07/18/18 1000 07/17/18 1400       How much help from another person do you currently need...    Turning from your back to your side while in flat bed without using bedrails? 3  -SM 3  -CW 3  -CH    Moving from lying on back to sitting on the side of a flat bed without bedrails? 3  -SM 3  -CW 3  -CH    Moving to and from a bed to a chair (including a wheelchair)? 3  -SM 3  -CW 3  -CH    Standing up from a chair using your arms (e.g., wheelchair, bedside chair)? 3  -SM 3  -CW 3  -CH    Climbing 3-5 steps with a railing? 2  -SM 2  -CW 2  -CH    To walk in hospital room? 3  -SM 3  -CW 3  -CH    AM-PAC 6 Clicks Score 17  -SM 17  -CW 17  -CH       Functional Assessment    Outcome Measure Options AM-PAC 6 Clicks Basic Mobility (PT)  -SM AM-PAC 6 Clicks Basic Mobility (PT)  -CW AM-PAC 6 Clicks Basic Mobility (PT)  -CH      User Key  (r) = Recorded By, (t) = Taken By, (c) = Cosigned By    Initials Name Provider Type     Cecy Otero, PT Physical Therapist     Agueda Lucas, PTA Physical Therapy Assistant    CW René Patel, PTA Physical Therapy Assistant           Time Calculation:         PT Charges     Row Name 07/19/18 1404             Time Calculation    Start Time 1310  -SM      Stop Time 1326  -SM      Time Calculation (min) 16 min  -SM      PT Received On 07/19/18  -      PT - Next Appointment 07/20/18  -        User Key  (r) = Recorded By, (t) = Taken By, (c) = Cosigned By    Initials Name Provider Type     Agueda Lucas PTA Physical Therapy Assistant        Therapy Suggested Charges     Code   Minutes Charges    None           Therapy Charges for Today     Code Description Service Date Service Provider Modifiers Qty    74227983501 HC PT THER PROC EA 15  MIN 7/19/2018 Agueda Lucas, PTA GP 1    65358359696 HC PT THER SUPP EA 15 MIN 7/19/2018 Agueda Lucas, PTA GP 1          PT G-Codes  Outcome Measure Options: AM-PAC 6 Clicks Basic Mobility (PT)    Agueda Lucas, ARAM  7/19/2018

## 2018-07-19 NOTE — PROGRESS NOTES
Name: Denisse Chavez ADMIT: 7/15/2018   : 1945  PCP: Agueda Rodriguez MD    MRN: [id number] LOS: 3 days   AGE/SEX: 72 y.o. female  [facility]   Subjective   CC: right lower extremity pain  No acute events.  RLE is feeling much better.  Taking PO well.  No c/n/v/d.  Had borderline fever last night.  Worked with PT.    Objective   Vital Signs  Temp:  [97.7 °F (36.5 °C)-100.6 °F (38.1 °C)] 98 °F (36.7 °C)  Heart Rate:  [68-76] 68  Resp:  [16-18] 16  BP: (140-158)/(68-76) 150/69  SpO2:  [94 %-97 %] 94 %  on   ;   Device (Oxygen Therapy): room air  Body mass index is 41.15 kg/m².    Physical Exam   Constitutional: She is oriented to person, place, and time. No distress.   HENT:   Head: Normocephalic and atraumatic.   Mouth/Throat: Oropharynx is clear and moist.   Eyes: Pupils are equal, round, and reactive to light. Conjunctivae and EOM are normal.   Neck: Normal range of motion. Neck supple.   Cardiovascular: Normal rate, regular rhythm and intact distal pulses.    Pulmonary/Chest: Effort normal and breath sounds normal.   Abdominal: Soft. Bowel sounds are normal.   Musculoskeletal: She exhibits edema (BLE with chronic stasis changes). She exhibits no tenderness.   Neurological: She is alert and oriented to person, place, and time.   Skin: Skin is warm and dry. She is not diaphoretic.   Erythema around right ankle resolved  Right plantar surface diabetic wound measuring about 3 x 2 cm that is not infected.     Psychiatric: She has a normal mood and affect. Her behavior is normal.   Nursing note and vitals reviewed.     Results Review:       I reviewed the patient's new clinical results.    Results from last 7 days  Lab Units 18  0707 18  0603 18  0609 18  0430   WBC 10*3/mm3 8.06 9.78 14.25* 16.01*   HEMOGLOBIN g/dL 12.5 11.5* 12.2 12.0   PLATELETS 10*3/mm3 206 177 163 175       Results from last 7 days  Lab Units 18  0707 18  0603 18  0609 18  0430    SODIUM mmol/L 135* 135* 133* 135*   POTASSIUM mmol/L 3.6 3.6 3.1* 3.7   CHLORIDE mmol/L 100 100 97* 100   CO2 mmol/L 22.5 22.2 22.9 20.4*   BUN mg/dL 14 14 15 19   CREATININE mg/dL 0.83 0.89 0.84 0.94   GLUCOSE mg/dL 152* 148* 85 247*   Estimated Creatinine Clearance: 68.6 mL/min (by C-G formula based on SCr of 0.83 mg/dL).    Results from last 7 days  Lab Units 07/19/18  0707 07/18/18  0603 07/17/18  0609 07/16/18  0430 07/15/18  2326   CALCIUM mg/dL 8.5* 8.1* 8.3* 7.8* 9.0   ALBUMIN g/dL  --   --   --  3.50 4.10         amLODIPine 10 mg Oral Daily   aspirin 81 mg Oral Daily   atorvastatin 20 mg Oral Daily   cefepime 1 g Intravenous Q8H   docusate sodium 100 mg Oral BID   enoxaparin 40 mg Subcutaneous Q12H   insulin NPH 20 Units Subcutaneous QAM   insulin NPH 20 Units Subcutaneous Nightly   insulin regular 15 Units Subcutaneous Daily With Breakfast & Dinner   isosorbide mononitrate 60 mg Oral Daily   metoprolol succinate XL 25 mg Oral Daily   vancomycin 1,000 mg Intravenous Q12H       Pharmacy to dose vancomycin    Diet Regular; Cardiac, Consistent Carbohydrate, Renal      Assessment/Plan      Active Hospital Problems    Diagnosis Date Noted   • **Cellulitis of right leg [L03.115] 07/16/2018   • Sepsis (CMS/[facility]) [A41.9] 07/16/2018   • Viral upper respiratory infection [J06.9, B97.89] 07/16/2018   • Chronic kidney disease, stage III (moderate) [N18.3] 10/13/2016   • Obstructive sleep apnea on autoCPAP [G47.30] 09/04/2016   • Diabetes mellitus type 2, uncontrolled, with complications (CMS/[facility]) [E11.8, E11.65] 03/08/2016   • Essential hypertension [I10] 03/08/2016      Resolved Hospital Problems    Diagnosis Date Noted Date Resolved   No resolved problems to display.   Right lower extremity cellulitis  - complicated by uncontrolled diabetes and chronic kidney disease  - had secondary sepsis with fever 103.8, white blood cell count elevation, pro-calcitonin elevation, lactic acid elevation.  - continue vancomycin and  cefepime with plan to switch to PO abx at discharge  - Right lower extremity venous duplex negative for DVT    Upper respiratory infection  - likely a viral illness, though RVP is negative  - continue supportive care  - likely the source of the fever    Type 2 diabetes  - Uncontrolled  - endocrinology is following, appreciate recs    HTN  - continue amlodipine    CAD  - Stable  - continue metoprolol, Imdur, statin, aspirin  - Cardiologist is Dr. Tang    DVT Prophylaxis  - lovenox    Disposition  LENORE Saturday    Rudi Resendez MD  [facility]  07/19/18  2:18 PM

## 2018-07-20 LAB
ANION GAP SERPL CALCULATED.3IONS-SCNC: 11.8 MMOL/L
BACTERIA SPEC AEROBE CULT: NORMAL
BACTERIA SPEC AEROBE CULT: NORMAL
BASOPHILS # BLD AUTO: 0.02 10*3/MM3 (ref 0–0.2)
BASOPHILS NFR BLD AUTO: 0.2 % (ref 0–1.5)
BUN BLD-MCNC: 13 MG/DL (ref 8–23)
BUN/CREAT SERPL: 16.7 (ref 7–25)
CALCIUM SPEC-SCNC: 8.4 MG/DL (ref 8.6–10.5)
CHLORIDE SERPL-SCNC: 100 MMOL/L (ref 98–107)
CO2 SERPL-SCNC: 22.2 MMOL/L (ref 22–29)
CREAT BLD-MCNC: 0.78 MG/DL (ref 0.57–1)
DEPRECATED RDW RBC AUTO: 44.1 FL (ref 37–54)
EOSINOPHIL # BLD AUTO: 0.26 10*3/MM3 (ref 0–0.7)
EOSINOPHIL NFR BLD AUTO: 3 % (ref 0.3–6.2)
ERYTHROCYTE [DISTWIDTH] IN BLOOD BY AUTOMATED COUNT: 14.1 % (ref 11.7–13)
GFR SERPL CREATININE-BSD FRML MDRD: 73 ML/MIN/1.73
GLUCOSE BLD-MCNC: 180 MG/DL (ref 65–99)
GLUCOSE BLDC GLUCOMTR-MCNC: 195 MG/DL (ref 70–130)
GLUCOSE BLDC GLUCOMTR-MCNC: 212 MG/DL (ref 70–130)
GLUCOSE BLDC GLUCOMTR-MCNC: 248 MG/DL (ref 70–130)
GLUCOSE BLDC GLUCOMTR-MCNC: 256 MG/DL (ref 70–130)
HCT VFR BLD AUTO: 36.9 % (ref 35.6–45.5)
HGB BLD-MCNC: 12 G/DL (ref 11.9–15.5)
IMM GRANULOCYTES # BLD: 0.06 10*3/MM3 (ref 0–0.03)
IMM GRANULOCYTES NFR BLD: 0.7 % (ref 0–0.5)
LYMPHOCYTES # BLD AUTO: 1.75 10*3/MM3 (ref 0.9–4.8)
LYMPHOCYTES NFR BLD AUTO: 20.2 % (ref 19.6–45.3)
MCH RBC QN AUTO: 27.8 PG (ref 26.9–32)
MCHC RBC AUTO-ENTMCNC: 32.5 G/DL (ref 32.4–36.3)
MCV RBC AUTO: 85.4 FL (ref 80.5–98.2)
MONOCYTES # BLD AUTO: 0.53 10*3/MM3 (ref 0.2–1.2)
MONOCYTES NFR BLD AUTO: 6.1 % (ref 5–12)
NEUTROPHILS # BLD AUTO: 6.12 10*3/MM3 (ref 1.9–8.1)
NEUTROPHILS NFR BLD AUTO: 70.5 % (ref 42.7–76)
PLATELET # BLD AUTO: 245 10*3/MM3 (ref 140–500)
PMV BLD AUTO: 10.9 FL (ref 6–12)
POTASSIUM BLD-SCNC: 3.6 MMOL/L (ref 3.5–5.2)
RBC # BLD AUTO: 4.32 10*6/MM3 (ref 3.9–5.2)
SODIUM BLD-SCNC: 134 MMOL/L (ref 136–145)
WBC NRBC COR # BLD: 8.68 10*3/MM3 (ref 4.5–10.7)

## 2018-07-20 PROCEDURE — 63710000001 INSULIN REGULAR HUMAN PER 5 UNITS: Performed by: INTERNAL MEDICINE

## 2018-07-20 PROCEDURE — 85025 COMPLETE CBC W/AUTO DIFF WBC: CPT | Performed by: INTERNAL MEDICINE

## 2018-07-20 PROCEDURE — 25010000002 ENOXAPARIN PER 10 MG: Performed by: HOSPITALIST

## 2018-07-20 PROCEDURE — 82962 GLUCOSE BLOOD TEST: CPT

## 2018-07-20 PROCEDURE — 25010000002 CEFEPIME PER 500 MG: Performed by: INTERNAL MEDICINE

## 2018-07-20 PROCEDURE — 97110 THERAPEUTIC EXERCISES: CPT

## 2018-07-20 PROCEDURE — 99233 SBSQ HOSP IP/OBS HIGH 50: CPT | Performed by: INTERNAL MEDICINE

## 2018-07-20 PROCEDURE — 63710000001 INSULIN ISOPHANE HUMAN PER 5 UNITS: Performed by: INTERNAL MEDICINE

## 2018-07-20 PROCEDURE — 25010000002 VANCOMYCIN PER 500 MG: Performed by: HOSPITALIST

## 2018-07-20 PROCEDURE — 80048 BASIC METABOLIC PNL TOTAL CA: CPT | Performed by: INTERNAL MEDICINE

## 2018-07-20 RX ADMIN — TEMAZEPAM 15 MG: 15 CAPSULE ORAL at 22:15

## 2018-07-20 RX ADMIN — CEFEPIME HYDROCHLORIDE 1 G: 1 INJECTION, POWDER, FOR SOLUTION INTRAMUSCULAR; INTRAVENOUS at 22:06

## 2018-07-20 RX ADMIN — HUMAN INSULIN 15 UNITS: 100 INJECTION, SOLUTION SUBCUTANEOUS at 08:55

## 2018-07-20 RX ADMIN — ENOXAPARIN SODIUM 40 MG: 40 INJECTION SUBCUTANEOUS at 05:27

## 2018-07-20 RX ADMIN — HUMAN INSULIN 3 UNITS: 100 INJECTION, SOLUTION SUBCUTANEOUS at 12:48

## 2018-07-20 RX ADMIN — ENOXAPARIN SODIUM 40 MG: 40 INJECTION SUBCUTANEOUS at 17:21

## 2018-07-20 RX ADMIN — HUMAN INSULIN 20 UNITS: 100 INJECTION, SUSPENSION SUBCUTANEOUS at 22:15

## 2018-07-20 RX ADMIN — METOPROLOL SUCCINATE 25 MG: 25 TABLET, FILM COATED, EXTENDED RELEASE ORAL at 09:00

## 2018-07-20 RX ADMIN — CEFEPIME HYDROCHLORIDE 1 G: 1 INJECTION, POWDER, FOR SOLUTION INTRAMUSCULAR; INTRAVENOUS at 05:28

## 2018-07-20 RX ADMIN — HUMAN INSULIN 2 UNITS: 100 INJECTION, SOLUTION SUBCUTANEOUS at 08:56

## 2018-07-20 RX ADMIN — HUMAN INSULIN 20 UNITS: 100 INJECTION, SUSPENSION SUBCUTANEOUS at 08:56

## 2018-07-20 RX ADMIN — HUMAN INSULIN 3 UNITS: 100 INJECTION, SOLUTION SUBCUTANEOUS at 17:21

## 2018-07-20 RX ADMIN — VANCOMYCIN HYDROCHLORIDE 1000 MG: 1 INJECTION, SOLUTION INTRAVENOUS at 02:26

## 2018-07-20 RX ADMIN — VANCOMYCIN HYDROCHLORIDE 1000 MG: 1 INJECTION, SOLUTION INTRAVENOUS at 12:48

## 2018-07-20 RX ADMIN — ISOSORBIDE MONONITRATE 60 MG: 60 TABLET, EXTENDED RELEASE ORAL at 09:00

## 2018-07-20 RX ADMIN — HUMAN INSULIN 15 UNITS: 100 INJECTION, SOLUTION SUBCUTANEOUS at 17:21

## 2018-07-20 RX ADMIN — ATORVASTATIN CALCIUM 20 MG: 20 TABLET, FILM COATED ORAL at 09:00

## 2018-07-20 RX ADMIN — DOCUSATE SODIUM 100 MG: 100 CAPSULE, LIQUID FILLED ORAL at 09:00

## 2018-07-20 RX ADMIN — AMLODIPINE BESYLATE 10 MG: 10 TABLET ORAL at 09:00

## 2018-07-20 RX ADMIN — CEFEPIME HYDROCHLORIDE 1 G: 1 INJECTION, POWDER, FOR SOLUTION INTRAMUSCULAR; INTRAVENOUS at 15:55

## 2018-07-20 RX ADMIN — DOCUSATE SODIUM 100 MG: 100 CAPSULE, LIQUID FILLED ORAL at 22:15

## 2018-07-20 RX ADMIN — ASPIRIN 81 MG: 81 TABLET ORAL at 09:00

## 2018-07-20 NOTE — PROGRESS NOTES
72 y.o.   LOS: 4 days   Patient Care Team:  Agueda Rodriguez MD as PCP - General (Internal Medicine)  Wally TYLER MD as PCP - Claims Attributed  Wally TYLER MD as Consulting Physician (Endocrinology)  Warner Tang MD as Consulting Physician (Cardiology)  Sergio Eagle MD as Consulting Physician (Urology)  Candis Cruz, RN as Care Coordinator (Population Health)    Chief Complaint:  Uncontrolled type 2 diabetes mellitus    Chief Complaint   Patient presents with   • Shortness of Breath   • Fever   • Nausea       Subjective     HPI  Patient's blood sugars have improved significantly after changing the insulin dose  Patient is also eating better  Her blood sugars are in the 100-200 range  She has not had any further hypoglycemia    Interval History:     Patient is a 72-year-old white female with a past history of uncontrolled type 2 diabetes mellitus on insulin regimen admitted to the hospital for fever nausea and shortness of breath.  She was also reporting fatigue for the past several days  Patient  had chronic right diabetic foot ulcer followed by podiatry and apparently was seen by podiatry Tuesday last week and was debrided and some topical anybody cream was applied  Subsequently patient reported that she has not been feeling well she is feeling fatigued and weak and started having upper respiratory symptoms and had a elevated temperature of 103.8  Associates symptom was shortness of breath and mild cough     After a few days patient noticed swelling and redness and warmth over the right lower extremity and came to the emergency room yesterday and was evaluated and admitted to the hospital  She was started on vancomycin and Zosyn  Lactic acid was elevated at 2.2 and pro calcitonin was elevated at 1.9     Patient's blood sugars were in the 200-300 range and I was concentrated for further managing patient's diabetes during this hospitalization  Patient has been a diabetic for long  time and was successfully managed on NPH and regular insulin regimen  She takes approximately 58 units of NPH in the morning and 42 at bedtime and she takes regular insulin 50 units with breakfast and 48 units at supper  She reports that most of her blood sugars were below 200  She had very few episodes of hypoglycemia  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  Patient is symptoms of peripheral neuropathy and is a diabetic foot ulceration in the right fourth which is being attended by podiatry on a regular basis  Uncontrolled type 2 diabetes mellitus with nephropathy  Patient is chronic kidney disease with elevated microalbuminuria in the past     Review of Systems:      Review of Systems   Constitutional: Positive for fatigue.   Respiratory: Negative.    Cardiovascular: Positive for leg swelling.   Gastrointestinal: Positive for abdominal distention. Negative for abdominal pain.   Skin: Positive for color change and rash.   All other systems reviewed and are negative.    Objective     Vital Signs   Temp:  [97.7 °F (36.5 °C)-99.9 °F (37.7 °C)] 99.9 °F (37.7 °C)  Heart Rate:  [68-78] 78  Resp:  [16-20] 20  BP: (150-162)/(69-77) 157/77    Physical Exam:  Physical Exam   Constitutional: She is oriented to person, place, and time.   HENT:   Head: Atraumatic.   Eyes: Pupils are equal, round, and reactive to light. EOM are normal.   Neck: Normal range of motion. Neck supple.   Cardiovascular: Normal rate, regular rhythm, normal heart sounds and intact distal pulses.    Pulmonary/Chest: Effort normal and breath sounds normal.   Abdominal: Soft. Bowel sounds are normal. She exhibits distension.   Musculoskeletal: She exhibits edema.   Neurological: She is alert and oriented to person, place, and time.   Skin: Rash noted.   Psychiatric: She has a normal mood and affect. Her behavior is normal.   Nursing note and vitals reviewed.  Results Review:     I reviewed the patient's new clinical results.      Glucose    Date/Time Value Ref Range Status   07/20/2018 0603 180 (H) 65 - 99 mg/dL Final   07/19/2018 0707 152 (H) 65 - 99 mg/dL Final   07/18/2018 0603 148 (H) 65 - 99 mg/dL Final     Lab Results (last 72 hours)     Procedure Component Value Units Date/Time    POC Glucose Once [[id number]]  (Abnormal) Collected:  07/20/18 0744    Specimen:  Blood Updated:  07/20/18 0745     Glucose 195 (H) mg/dL     Narrative:       Meter: [id number] : [id number] Leon ADAM    CBC & Differential [[id number]] Collected:  07/20/18 0603    Specimen:  Blood Updated:  07/20/18 0701    Narrative:       The following orders were created for panel order CBC & Differential.  Procedure                               Abnormality         Status                     ---------                               -----------         ------                     CBC Auto Differential[[id number]]        Abnormal            Final result                 Please view results for these tests on the individual orders.    CBC Auto Differential [[id number]]  (Abnormal) Collected:  07/20/18 0603    Specimen:  Blood Updated:  07/20/18 0701     WBC 8.68 10*3/mm3      RBC 4.32 10*6/mm3      Hemoglobin 12.0 g/dL      Hematocrit 36.9 %      MCV 85.4 fL      MCH 27.8 pg      MCHC 32.5 g/dL      RDW 14.1 (H) %      RDW-SD 44.1 fl      MPV 10.9 fL      Platelets 245 10*3/mm3      Neutrophil % 70.5 %      Lymphocyte % 20.2 %      Monocyte % 6.1 %      Eosinophil % 3.0 %      Basophil % 0.2 %      Immature Grans % 0.7 (H) %      Neutrophils, Absolute 6.12 10*3/mm3      Lymphocytes, Absolute 1.75 10*3/mm3      Monocytes, Absolute 0.53 10*3/mm3      Eosinophils, Absolute 0.26 10*3/mm3      Basophils, Absolute 0.02 10*3/mm3      Immature Grans, Absolute 0.06 (H) 10*3/mm3     Basic Metabolic Panel [[id number]]  (Abnormal) Collected:  07/20/18 0603    Specimen:  Blood Updated:  07/20/18 0649     Glucose 180 (H) mg/dL      BUN 13 mg/dL      Creatinine 0.78 mg/dL      Sodium 134 (L)  mmol/L      Potassium 3.6 mmol/L      Chloride 100 mmol/L      CO2 22.2 mmol/L      Calcium 8.4 (L) mg/dL      eGFR Non African Amer 73 mL/min/1.73      BUN/Creatinine Ratio 16.7     Anion Gap 11.8 mmol/L     Narrative:       The MDRD GFR formula is only valid for adults with stable renal function between ages 18 and 70.    Blood Culture - Blood, [[id number]]  (Normal) Collected:  07/15/18 2331    Specimen:  Blood from Arm, Right Updated:  07/19/18 2345     Blood Culture No growth at 4 days    Blood Culture - Blood, [[id number]]  (Normal) Collected:  07/15/18 2326    Specimen:  Blood from Arm, Left Updated:  07/19/18 2345     Blood Culture No growth at 4 days    POC Glucose Once [[id number]]  (Abnormal) Collected:  07/19/18 2129    Specimen:  Blood Updated:  07/19/18 2129     Glucose 152 (H) mg/dL     Narrative:       Meter: [id number] : [id number] Ronal ADAM    POC Glucose Once [[id number]]  (Abnormal) Collected:  07/19/18 1705    Specimen:  Blood Updated:  07/19/18 1705     Glucose 220 (H) mg/dL     Narrative:       Meter: [id number] : [id number] Bj CARL CNA    POC Glucose Once [[id number]]  (Abnormal) Collected:  07/19/18 1109    Specimen:  Blood Updated:  07/19/18 1112     Glucose 279 (H) mg/dL     Narrative:       Meter: [id number] : [id number] Fredo ADAM    Basic Metabolic Panel [[id number]]  (Abnormal) Collected:  07/19/18 0707    Specimen:  Blood Updated:  07/19/18 0748     Glucose 152 (H) mg/dL      BUN 14 mg/dL      Creatinine 0.83 mg/dL      Sodium 135 (L) mmol/L      Potassium 3.6 mmol/L      Chloride 100 mmol/L      CO2 22.5 mmol/L      Calcium 8.5 (L) mg/dL      eGFR Non African Amer 68 mL/min/1.73      BUN/Creatinine Ratio 16.9     Anion Gap 12.5 mmol/L     Narrative:       The MDRD GFR formula is only valid for adults with stable renal function between ages 18 and 70.    CBC & Differential [[id number]] Collected:  07/19/18 0707    Specimen:  Blood Updated:  07/19/18 0720     Narrative:       The following orders were created for panel order CBC & Differential.  Procedure                               Abnormality         Status                     ---------                               -----------         ------                     CBC Auto Differential[[id number]]        Abnormal            Final result                 Please view results for these tests on the individual orders.    CBC Auto Differential [[id number]]  (Abnormal) Collected:  07/19/18 0707    Specimen:  Blood Updated:  07/19/18 0720     WBC 8.06 10*3/mm3      RBC 4.42 10*6/mm3      Hemoglobin 12.5 g/dL      Hematocrit 37.9 %      MCV 85.7 fL      MCH 28.3 pg      MCHC 33.0 g/dL      RDW 14.2 (H) %      RDW-SD 44.9 fl      MPV 10.4 fL      Platelets 206 10*3/mm3      Neutrophil % 68.5 %      Lymphocyte % 20.7 %      Monocyte % 7.4 %      Eosinophil % 3.3 %      Basophil % 0.1 %      Immature Grans % 0.7 (H) %      Neutrophils, Absolute 5.51 10*3/mm3      Lymphocytes, Absolute 1.67 10*3/mm3      Monocytes, Absolute 0.60 10*3/mm3      Eosinophils, Absolute 0.27 10*3/mm3      Basophils, Absolute 0.01 10*3/mm3      Immature Grans, Absolute 0.06 (H) 10*3/mm3     POC Glucose Once [[id number]]  (Abnormal) Collected:  07/19/18 0714    Specimen:  Blood Updated:  07/19/18 0715     Glucose 150 (H) mg/dL     Narrative:       Meter: [id number] : [id number] Fredo Francois NA    POC Glucose Once [[id number]]  (Abnormal) Collected:  07/18/18 2041    Specimen:  Blood Updated:  07/18/18 2042     Glucose 165 (H) mg/dL     Narrative:       Meter: [id number] : [id number] Ronal Myrna NA    POC Glucose Once [[id number]]  (Abnormal) Collected:  07/18/18 1736    Specimen:  Blood Updated:  07/18/18 1747     Glucose 151 (H) mg/dL     Narrative:       Meter: [id number] : [id number] Mike Polo NA    POC Glucose Once [[id number]]  (Abnormal) Collected:  07/18/18 1056    Specimen:  Blood Updated:  07/18/18 1057     Glucose 269 (H) mg/dL      Narrative:       Meter: [id number] : [id number] Ck ADAM    Basic Metabolic Panel [[id number]]  (Abnormal) Collected:  07/18/18 0603    Specimen:  Blood Updated:  07/18/18 0728     Glucose 148 (H) mg/dL      BUN 14 mg/dL      Creatinine 0.89 mg/dL      Sodium 135 (L) mmol/L      Potassium 3.6 mmol/L      Chloride 100 mmol/L      CO2 22.2 mmol/L      Calcium 8.1 (L) mg/dL      eGFR Non African Amer 62 mL/min/1.73      BUN/Creatinine Ratio 15.7     Anion Gap 12.8 mmol/L     Narrative:       The MDRD GFR formula is only valid for adults with stable renal function between ages 18 and 70.    CBC & Differential [[id number]] Collected:  07/18/18 0603    Specimen:  Blood Updated:  07/18/18 0659    Narrative:       The following orders were created for panel order CBC & Differential.  Procedure                               Abnormality         Status                     ---------                               -----------         ------                     CBC Auto Differential[[id number]]        Abnormal            Final result                 Please view results for these tests on the individual orders.    CBC Auto Differential [[id number]]  (Abnormal) Collected:  07/18/18 0603    Specimen:  Blood Updated:  07/18/18 0659     WBC 9.78 10*3/mm3      RBC 4.23 10*6/mm3      Hemoglobin 11.5 (L) g/dL      Hematocrit 37.1 %      MCV 87.7 fL      MCH 27.2 pg      MCHC 31.0 (L) g/dL      RDW 14.2 (H) %      RDW-SD 45.5 fl      MPV 10.9 fL      Platelets 177 10*3/mm3      Neutrophil % 79.1 (H) %      Lymphocyte % 13.0 (L) %      Monocyte % 5.8 %      Eosinophil % 1.8 %      Basophil % 0.1 %      Immature Grans % 0.2 %      Neutrophils, Absolute 7.73 10*3/mm3      Lymphocytes, Absolute 1.27 10*3/mm3      Monocytes, Absolute 0.57 10*3/mm3      Eosinophils, Absolute 0.18 10*3/mm3      Basophils, Absolute 0.01 10*3/mm3      Immature Grans, Absolute 0.02 10*3/mm3     POC Glucose Once [[id number]]  (Abnormal) Collected:  07/18/18  0636    Specimen:  Blood Updated:  07/18/18 0638     Glucose 146 (H) mg/dL     Narrative:       Meter: [id number] : [id number] Rubina ADAM    POC Glucose Once [[id number]]  (Abnormal) Collected:  07/17/18 2044    Specimen:  Blood Updated:  07/17/18 2047     Glucose 158 (H) mg/dL     Narrative:       Meter: [id number] : [id number] Derrellma Rudi ADAM    POC Glucose Once [[id number]]  (Abnormal) Collected:  07/17/18 1601    Specimen:  Blood Updated:  07/17/18 1604     Glucose 233 (H) mg/dL     Narrative:       Meter: [id number] : [id number] Rosy ADAM    Vancomycin, Trough Vancomycin is scheduled at 1200 on 07/17/18. Please draw trough 30 minutes prior to hanging dose (don't draw level while medication is infusing). [[id number]]  (Normal) Collected:  07/17/18 1130    Specimen:  Blood Updated:  07/17/18 1246     Vancomycin Trough 13.40 mcg/mL     POC Glucose Once [[id number]]  (Abnormal) Collected:  07/17/18 1058    Specimen:  Blood Updated:  07/17/18 1103     Glucose 139 (H) mg/dL     Narrative:       Meter: [id number] : [id number] Rosy ADAM        Imaging Results (last 72 hours)     ** No results found for the last 72 hours. **          Medication Review:       Current Facility-Administered Medications:   •  acetaminophen (TYLENOL) tablet 650 mg, 650 mg, Oral, Q4H PRN, Anil Mak MD, 650 mg at 07/18/18 2019  •  amLODIPine (NORVASC) tablet 10 mg, 10 mg, Oral, Daily, Anil Mak MD, 10 mg at 07/19/18 0821  •  aspirin EC tablet 81 mg, 81 mg, Oral, Daily, Anil Mak MD, 81 mg at 07/19/18 0821  •  atorvastatin (LIPITOR) tablet 20 mg, 20 mg, Oral, Daily, Anil Mak MD, 20 mg at 07/19/18 0821  •  cefepime (MAXIPIME) 1 g/100 mL 0.9% NS IVPB (mbp), 1 g, Intravenous, Q8H, Rudi Resendez MD, 1 g at 07/20/18 0528  •  dextrose (D50W) solution 25 g, 25 g, Intravenous, Q15 Min PRN, Anil Mak MD  •  dextrose (GLUTOSE) oral gel 15 g, 15 g, Oral, Q15 Min PRN, Anil Mak MD  •   docusate sodium (COLACE) capsule 100 mg, 100 mg, Oral, BID, Anil Mak MD, 100 mg at 07/19/18 2113  •  enoxaparin (LOVENOX) syringe 40 mg, 40 mg, Subcutaneous, Q12H, Anil Mak MD, 40 mg at 07/20/18 0527  •  glucagon (human recombinant) (GLUCAGEN DIAGNOSTIC) injection 1 mg, 1 mg, Subcutaneous, PRN, Anil Mak MD  •  HYDROcodone-acetaminophen (NORCO) 7.5-325 MG per tablet 1 tablet, 1 tablet, Oral, Q4H PRN, Anil Mak MD  •  insulin NPH (humuLIN N,novoLIN N) injection 20 Units, 20 Units, Subcutaneous, QAM, Wally TYLER MD, 20 Units at 07/19/18 0822  •  insulin NPH (humuLIN N,novoLIN N) injection 20 Units, 20 Units, Subcutaneous, Nightly, Wally TYLER MD, 20 Units at 07/19/18 2112  •  insulin regular (humuLIN R,novoLIN R) injection 15 Units, 15 Units, Subcutaneous, Daily With Breakfast & Dinner, Wally TYLER MD, 15 Units at 07/19/18 1717  •  insulin regular (humuLIN R,novoLIN R) injection 2-5 Units, 2-5 Units, Subcutaneous, 4x Daily AC & at Bedtime PRN, Wally TYLER MD, 3 Units at 07/19/18 2113  •  isosorbide mononitrate (IMDUR) 24 hr tablet 60 mg, 60 mg, Oral, Daily, Anil Mak MD, 60 mg at 07/19/18 0821  •  metoprolol succinate XL (TOPROL-XL) 24 hr tablet 25 mg, 25 mg, Oral, Daily, Anil Mak MD, 25 mg at 07/19/18 0821  •  ondansetron (ZOFRAN) injection 4 mg, 4 mg, Intravenous, Q6H PRN, Anil Mak MD  •  Pharmacy to dose vancomycin, , Does not apply, Continuous PRN, Anil Mak MD  •  sodium chloride 0.9 % flush 1-10 mL, 1-10 mL, Intravenous, PRN, Anil Mak MD  •  Insert peripheral IV, , , Once **AND** sodium chloride 0.9 % flush 10 mL, 10 mL, Intravenous, PRN, Cordell Kemp MD  •  temazepam (RESTORIL) capsule 15 mg, 15 mg, Oral, Nightly PRN, Duy Banda MD, 15 mg at 07/19/18 2155  •  vancomycin (VANCOCIN) in iso-osmotic dextrose IVPB 1 g (premix) 200 mL, 1,000 mg, Intravenous, Q12H, Anil Mak MD, Last Rate: 0 mL/hr at  07/18/18 1358, 1,000 mg at 07/20/18 0226    Assessment/Plan     Patient Active Problem List   Diagnosis   • Type 2 diabetes mellitus (CMS/[facility])   • Diabetes mellitus type 2, uncontrolled, with complications (CMS/[facility])   • Uncontrolled type II diabetes mellitus with nephropathy (CMS/[facility])   • Type II diabetes mellitus with neurological manifestations, uncontrolled (CMS/[facility])   • Uncontrolled type 2 diabetes mellitus with background retinopathy (CMS/[facility])   • Hyperinsulinism   • Abnormal weight gain   • Hyperlipidemia   • Essential hypertension   • Edema of lower extremity   • Angiomyolipoma of kidney   • Memory changes   • Left hip pain   • Left-sided low back pain without sciatica   • Lumbar spinal stenosis   • Encounter for long-term (current) use of insulin (CMS/[facility])   • Obstructive sleep apnea on autoCPAP   • Chronic kidney disease, stage III (moderate)   • Chronic venous insufficiency   • CAD (coronary artery disease)   • Type 2 diabetes mellitus with stage 3 chronic kidney disease, with long-term current use of insulin (CMS/[facility])   • Circadian rhythm sleep disorder, delayed sleep phase type   • Class 3 obesity due to excess calories with serious comorbidity and body mass index (BMI) of 40.0 to 44.9 in adult (CMS/[facility])   • History of colon polyps   • Cellulitis of right leg   • Sepsis (CMS/[facility])   • Viral upper respiratory infection   Uncontrolled type 2 diabetes mellitus with complications  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  Diabetic foot ulceration right foot  Cellulitis right leg  Uncontrolled type 2 diabetes mellitus with nephropathy  Chronic kidney disease stage III  Uncontrolled diabetes mellitus with background diabetic retinopathy  Hyperinsulinism  Chronic insulin management     Patient's blood sugars have been in the 200 300 range  She's not receiving the regular insulin as she was getting at home  She is currently receiving NPH insulin 40 units twice daily along with sliding  "scale     Patient is eating reasonably better  She would like to restart her home regimen  She takes 58 units of NPH in the morning and 42 units at night  She takes 50 units of regular insulin in the morning and 48 units at supper   Considering the hospitalization and multiple interruptions to meal schedule I recommended that I will decrease the insulin during this hospitalization and try and keep her blood sugars below 200 if possible  Patient verbalized understanding  I also advised the patient to consider avoidance of hypoglycemia as one of the goals during this hospitalization     Patient was started on 40 units of NPH in the morning and 30 units at night and the regular insulin 30 units twice daily  Patient did not eat a bedtime snack yesterday    After patient had a hypoglycemic episode today's ago patient's insulin was reduced  She is currently receiving NPH insulin 20 units twice daily and regular insulin 15 units twice daily before meals  Her blood sugars are much better  Majority are in the 100-200 range  Will continue the current dose of insulin  Patient is still concerned that has led to lower leg appears more erythematous today  I advised her to discuss with the ID specialist    The total time spent for old record and lab review and floor time was more than 35 min of which greater than 20 min of time  ( greater than 50% of the total time )  was spent face to face with the patient counseling and coordination of care owas spent on counseling the patient on recommended evaluation and treatment options, instructions for management/treatment and /or follow up  and importance of compliance with chosen management or treatment options         Wally Craft MD FACE.  07/20/18  7:52 AM      EM[facility] Dragon / transcription disclaimer:     \"Dictated utilizing Dragon dictation\".   "

## 2018-07-20 NOTE — THERAPY TREATMENT NOTE
Acute Care - Physical Therapy Treatment Note  [facility]     Patient Name: Denisse Chavez  : 1945  MRN: [id number]  Today's Date: 2018  Onset of Illness/Injury or Date of Surgery: 07/15/18          Admit Date: 7/15/2018    Visit Dx:    ICD-10-CM ICD-9-CM   1. Cellulitis of right leg L03.115 682.6   2. Diabetic ulcer of right foot associated with other specified diabetes mellitus, unspecified part of foot, unspecified ulcer stage (CMS/[facility]) E13.621 250.80    L97.519 707.15   3. Sepsis, due to unspecified organism (CMS/[facility]) A41.9 038.9     995.91   4. Difficulty walking R26.2 719.7     Patient Active Problem List   Diagnosis   • Type 2 diabetes mellitus (CMS/[facility])   • Diabetes mellitus type 2, uncontrolled, with complications (CMS/[facility])   • Uncontrolled type II diabetes mellitus with nephropathy (CMS/[facility])   • Type II diabetes mellitus with neurological manifestations, uncontrolled (CMS/[facility])   • Uncontrolled type 2 diabetes mellitus with background retinopathy (CMS/[facility])   • Hyperinsulinism   • Abnormal weight gain   • Hyperlipidemia   • Essential hypertension   • Edema of lower extremity   • Angiomyolipoma of kidney   • Memory changes   • Left hip pain   • Left-sided low back pain without sciatica   • Lumbar spinal stenosis   • Encounter for long-term (current) use of insulin (CMS/[facility])   • Obstructive sleep apnea on autoCPAP   • Chronic kidney disease, stage III (moderate)   • Chronic venous insufficiency   • CAD (coronary artery disease)   • Type 2 diabetes mellitus with stage 3 chronic kidney disease, with long-term current use of insulin (CMS/[facility])   • Circadian rhythm sleep disorder, delayed sleep phase type   • Class 3 obesity due to excess calories with serious comorbidity and body mass index (BMI) of 40.0 to 44.9 in adult (CMS/[facility])   • History of colon polyps   • Cellulitis of right leg   • Sepsis (CMS/[facility])   • Viral upper respiratory infection       Therapy Treatment          Rehabilitation  Treatment Summary     Row Name 07/20/18 1405             Treatment Time/Intention    Discipline physical therapy assistant  -      Document Type therapy note (daily note)  -SM      Subjective Information no complaints  -SM      Mode of Treatment physical therapy  -SM      Patient Effort good  -SM      Existing Precautions/Restrictions fall  -SM      Recorded by [SM] Agueda Lucas, [facility] 07/20/18 1430      Row Name 07/20/18 1405             Cognitive Assessment/Intervention- PT/OT    Orientation Status (Cognition) oriented x 4  -SM      Follows Commands (Cognition) WFL  -SM      Cognitive Function (Cognitive) WFL  -SM      Personal Safety Interventions fall prevention program maintained;gait belt;nonskid shoes/slippers when out of bed  -SM      Recorded by [SM] Agueda Lucas, [facility] 07/20/18 1430      Row Name 07/20/18 1405             Bed Mobility Assessment/Treatment    Bed Mobility Assessment/Treatment sit-supine  -SM      Supine-Sit [place] (Bed Mobility) not tested   up in chair  -SM      Sit-Supine [place] (Bed Mobility) supervision  -SM      Recorded by [] Agueda Lucas, [facility] 07/20/18 1430      Row Name 07/20/18 1405             Transfer Assessment/Treatment    Transfer Assessment/Treatment sit-stand transfer;stand-sit transfer  -SM      Recorded by [SM] Agueda Lucas, [facility] 07/20/18 1430      Row Name 07/20/18 1405             Sit-Stand Transfer    Sit-Stand [place] (Transfers) contact guard  -      Assistive Device (Sit-Stand Transfers) walker, front-wheeled  -      Recorded by [SM] Agueda Lucas, [facility] 07/20/18 1430      Row Name 07/20/18 1405             Stand-Sit Transfer    Stand-Sit [place] (Transfers) contact guard  -      Assistive Device (Stand-Sit Transfers) walker, front-wheeled  -      Recorded by [SM] Agueda Lucas, [facility] 07/20/18 1430      Row Name 07/20/18 1405             Gait/Stairs Assessment/Training    Gait/Stairs Assessment/Training  gait/ambulation independence;gait/ambulation assistive device;distance ambulated;gait pattern;gait deviations  -      [place] Level (Gait) contact guard  -SM      Assistive Device (Gait) walker, front-wheeled  -      Distance in Feet (Gait) 75  -SM      Deviations/Abnormal Patterns (Gait) charly decreased;stride length decreased  -      Bilateral Gait Deviations forward flexed posture  -      Comment (Gait/Stairs) increased fatigue noted, stating she hasn't gotten much sleep and was tired  -SM      Recorded by [SM] Agueda Lucas, [facility] 07/20/18 1430      Row Name 07/20/18 1405             Positioning and Restraints    Pre-Treatment Position sitting in chair/recliner  -SM      Post Treatment Position bed  -SM      In Bed supine;call light within reach;encouraged to call for assist  -SM      Recorded by [] Agueda Lucas, [facility] 07/20/18 1430      Row Name 07/20/18 1405             Pain Scale: Numbers Pre/Post-Treatment    Pain Scale: Numbers, Pretreatment 0/10 - no pain  -SM      Recorded by [] Agueda Lucas [facility] 07/20/18 1430      Row Name                Wound 07/16/18 Right posterior  diabetic/neuropathic ulceration    Wound - Properties Group Date first assessed: 07/16/18 [BF] Present On Admission : yes [BF] Side: Right [BF] Orientation: posterior [BF] Type: diabetic/neuropathic ulceration [BF] Recorded by:  [BF] Brianne Tatum RN, [facility] 07/16/18 9066      User Key  (r) = Recorded By, (t) = Taken By, (c) = Cosigned By    Initials Name Effective Dates Discipline    BF [facility] -  Nurse    SM Agueda Lucas, [facility] 03/07/18 -  PT          Wound 07/16/18 Right posterior  diabetic/neuropathic ulceration (Active)   Dressing Appearance dry;intact 7/20/2018  9:00 AM   Dressing Care, Wound gauze 7/20/2018  9:00 AM             Physical Therapy Education     Title: PT OT SLP Therapies (Done)     Topic: Physical Therapy (Done)     Point: Mobility training (Done)     Learning Progress Summary     Learner Status Readiness Method Response Comment Documented by    Patient Done Acceptance E,TB VU,NR   07/20/18 1430     Done Acceptance E,TB,D VU,NR   07/19/18 1402     Done Acceptance E,TB VU,DU   07/18/18 1005     Done Acceptance E,TB,D VU,NR   07/17/18 1418          Point: Home exercise program (Done)    Learning Progress Summary     Learner Status Readiness Method Response Comment Documented by    Patient Done Acceptance E,TB VU,NR   07/20/18 1430     Done Acceptance E,TB,D VU,NR   07/19/18 1402     Done Acceptance E,TB VU,DU   07/18/18 1005          Point: Body mechanics (Done)    Learning Progress Summary     Learner Status Readiness Method Response Comment Documented by    Patient Done Acceptance E,TB VU,NR   07/20/18 1430     Done Acceptance E,TB,D VU,NR   07/19/18 1402     Done Acceptance E,TB VU,DU   07/18/18 1005     Done Acceptance E,TB,D VU,NR   07/17/18 1418          Point: Precautions (Done)    Learning Progress Summary     Learner Status Readiness Method Response Comment Documented by    Patient Done Acceptance E,TB VU,NR   07/20/18 1430     Done Acceptance E,TB,D VU,NR   07/19/18 1402     Done Acceptance E,TB VU,DU   07/18/18 1005     Done Acceptance E,TB,D VU,NR   07/17/18 1418                      User Key     Initials Effective Dates Name Provider Type Discipline     04/03/18 -  Cecy Otero, PT Physical Therapist PT     03/07/18 -  Agueda Lucas, ARAM Physical Therapy Assistant PT     03/07/18 -  René Patel PTA Physical Therapy Assistant PT                    PT Recommendation and Plan     Plan of Care Reviewed With: patient  Progress: improving  Outcome Summary: Pt tolerated treatment well this date. Pt stated she did not get much sleep last night and was feeling tired. Fatigue noted by end. Ambulated 75ft w/ Rw and CGA. PT will continue to address functional mobility deficits as tolerated.          Outcome  Measures     Row Name 07/20/18 1400 07/19/18 1400 07/18/18 1000       How much help from another person do you currently need...    Turning from your back to your side while in flat bed without using bedrails? 3  -SM 3  -SM 3  -CW    Moving from lying on back to sitting on the side of a flat bed without bedrails? 3  -SM 3  -SM 3  -CW    Moving to and from a bed to a chair (including a wheelchair)? 3  -SM 3  -SM 3  -CW    Standing up from a chair using your arms (e.g., wheelchair, bedside chair)? 3  -SM 3  -SM 3  -CW    Climbing 3-5 steps with a railing? 2  -SM 2  -SM 2  -CW    To walk in hospital room? 3  -SM 3  -SM 3  -CW    AM-PAC 6 Clicks Score 17  -SM 17  -SM 17  -CW       Functional Assessment    Outcome Measure Options AM-PAC 6 Clicks Basic Mobility (PT)  -SM AM-PAC 6 Clicks Basic Mobility (PT)  -SM AM-PAC 6 Clicks Basic Mobility (PT)  -CW      User Key  (r) = Recorded By, (t) = Taken By, (c) = Cosigned By    Initials Name Provider Type     Agueda Lucas PTA Physical Therapy Assistant    MARITZA Patel PTA Physical Therapy Assistant           Time Calculation:         PT Charges     Row Name 07/20/18 1434             Time Calculation    Start Time 1405  -      Stop Time 1420  -      Time Calculation (min) 15 min  -      PT Received On 07/20/18  -      PT - Next Appointment 07/21/18  -        User Key  (r) = Recorded By, (t) = Taken By, (c) = Cosigned By    Initials Name Provider Type     Agueda Lucas PTA Physical Therapy Assistant        Therapy Suggested Charges     Code   Minutes Charges    None           Therapy Charges for Today     Code Description Service Date Service Provider Modifiers Qty    11426787192 HC PT THER PROC EA 15 MIN 7/19/2018 Agueda Lucas PTA GP 1    06957470431 HC PT THER SUPP EA 15 MIN 7/19/2018 Agueda Lucas PTA GP 1    46369976421 HC PT THER PROC EA 15 MIN 7/20/2018 Agueda Lucas PTA GP 1          PT G-Codes  Outcome Measure  Options: AM-PAC 6 Clicks Basic Mobility (PT)    Agueda Lucas, PTA  7/20/2018

## 2018-07-20 NOTE — PLAN OF CARE
Problem: Fall Risk (Adult)  Goal: Absence of Fall  Outcome: Ongoing (interventions implemented as appropriate)      Problem: Patient Care Overview  Goal: Plan of Care Review  Outcome: Ongoing (interventions implemented as appropriate)   07/20/18 1702   Coping/Psychosocial   Plan of Care Reviewed With patient   Plan of Care Review   Progress improving   OTHER   Outcome Summary Pt up with assist x 1 and cane. No c/o pain. Continue IV abx. plan for d/c to [facility] tomorrow. Will continue to monitor.      Goal: Individualization and Mutuality  Outcome: Ongoing (interventions implemented as appropriate)    Goal: Discharge Needs Assessment  Outcome: Ongoing (interventions implemented as appropriate)    Goal: Interprofessional Rounds/Family Conf  Outcome: Ongoing (interventions implemented as appropriate)      Problem: Infection, Risk/Actual (Adult)  Goal: Infection Prevention/Resolution  Outcome: Ongoing (interventions implemented as appropriate)      Problem: Skin Injury Risk (Adult)  Goal: Skin Health and Integrity  Outcome: Ongoing (interventions implemented as appropriate)

## 2018-07-20 NOTE — PLAN OF CARE
Problem: Fall Risk (Adult)  Goal: Absence of Fall  Outcome: Ongoing (interventions implemented as appropriate)      Problem: Patient Care Overview  Goal: Plan of Care Review   07/20/18 0518   Coping/Psychosocial   Plan of Care Reviewed With patient   Plan of Care Review   Progress improving   OTHER   Outcome Summary Continue IV antibx...waiting placement to SNU. Assist x1 with cane. Appeared to have a restful night.        Problem: Infection, Risk/Actual (Adult)  Goal: Infection Prevention/Resolution  Outcome: Ongoing (interventions implemented as appropriate)      Problem: Skin Injury Risk (Adult)  Goal: Skin Health and Integrity  Outcome: Ongoing (interventions implemented as appropriate)

## 2018-07-20 NOTE — PLAN OF CARE
Problem: Patient Care Overview  Goal: Plan of Care Review  Outcome: Ongoing (interventions implemented as appropriate)   07/20/18 1430   Coping/Psychosocial   Plan of Care Reviewed With patient   Plan of Care Review   Progress improving   OTHER   Outcome Summary Pt tolerated treatment well this date. Pt stated she did not get much sleep last night and was feeling tired. Fatigue noted by end. Ambulated 75ft w/ Rw and CGA. PT will continue to address functional mobility deficits as tolerated.

## 2018-07-20 NOTE — PROGRESS NOTES
Name: Denisse Chavez ADMIT: 7/15/2018   : 1945  PCP: Agueda Rodriguez MD    MRN: [id number] LOS: 4 days   AGE/SEX: 72 y.o. female  ROOM: [facility]   Subjective   CC: right lower extremity pain  No acute events.  Overall feeling well.  Fatigues easily.  Taking PO well.  No c/n/v/d.  Had borderline fever last night.  Worked with PT.    Objective   Vital Signs  Temp:  [97 °F (36.1 °C)-99.9 °F (37.7 °C)] 97 °F (36.1 °C)  Heart Rate:  [70-78] 77  Resp:  [18-20] 18  BP: (157-183)/(70-77) 183/70  SpO2:  [95 %-98 %] 95 %  on   ;   Device (Oxygen Therapy): room air  Body mass index is 41.15 kg/m².    Physical Exam   Constitutional: She is oriented to person, place, and time. No distress.   HENT:   Head: Normocephalic and atraumatic.   Mouth/Throat: Oropharynx is clear and moist.   Eyes: Pupils are equal, round, and reactive to light. Conjunctivae and EOM are normal.   Neck: Normal range of motion. Neck supple.   Cardiovascular: Normal rate, regular rhythm and intact distal pulses.    Pulmonary/Chest: Effort normal and breath sounds normal.   Abdominal: Soft. Bowel sounds are normal.   Musculoskeletal: She exhibits edema (BLE with chronic stasis changes). She exhibits no tenderness.   Neurological: She is alert and oriented to person, place, and time.   Skin: Skin is warm and dry. She is not diaphoretic.   Erythema around right ankle resolved  Right plantar surface diabetic wound measuring about 3 x 2 cm that is not infected.     Psychiatric: She has a normal mood and affect. Her behavior is normal.   Nursing note and vitals reviewed.     Results Review:       I reviewed the patient's new clinical results.    Results from last 7 days  Lab Units 18  0603 18  0707 18  0603 18  0609   WBC 10*3/mm3 8.68 8.06 9.78 14.25*   HEMOGLOBIN g/dL 12.0 12.5 11.5* 12.2   PLATELETS 10*3/mm3 245 206 177 163       Results from last 7 days  Lab Units 18  0603 18  0707 18  0603  07/17/18  0609   SODIUM mmol/L 134* 135* 135* 133*   POTASSIUM mmol/L 3.6 3.6 3.6 3.1*   CHLORIDE mmol/L 100 100 100 97*   CO2 mmol/L 22.2 22.5 22.2 22.9   BUN mg/dL 13 14 14 15   CREATININE mg/dL 0.78 0.83 0.89 0.84   GLUCOSE mg/dL 180* 152* 148* 85   Estimated Creatinine Clearance: 71.1 mL/min (by C-G formula based on SCr of 0.78 mg/dL).    Results from last 7 days  Lab Units 07/20/18  0603 07/19/18  0707 07/18/18  0603 07/17/18  0609 07/16/18  0430 07/15/18  2326   CALCIUM mg/dL 8.4* 8.5* 8.1* 8.3* 7.8* 9.0   ALBUMIN g/dL  --   --   --   --  3.50 4.10         amLODIPine 10 mg Oral Daily   aspirin 81 mg Oral Daily   atorvastatin 20 mg Oral Daily   cefepime 1 g Intravenous Q8H   docusate sodium 100 mg Oral BID   enoxaparin 40 mg Subcutaneous Q12H   insulin NPH 20 Units Subcutaneous QAM   insulin NPH 20 Units Subcutaneous Nightly   insulin regular 15 Units Subcutaneous Daily With Breakfast & Dinner   isosorbide mononitrate 60 mg Oral Daily   metoprolol succinate XL 25 mg Oral Daily   vancomycin 1,000 mg Intravenous Q12H       Pharmacy to dose vancomycin    Diet Regular; Cardiac, Consistent Carbohydrate, Renal      Assessment/Plan      Active Hospital Problems    Diagnosis Date Noted   • **Cellulitis of right leg [L03.115] 07/16/2018   • Sepsis (CMS/[facility]) [A41.9] 07/16/2018   • Viral upper respiratory infection [J06.9, B97.89] 07/16/2018   • Chronic kidney disease, stage III (moderate) [N18.3] 10/13/2016   • Obstructive sleep apnea on autoCPAP [G47.30] 09/04/2016   • Diabetes mellitus type 2, uncontrolled, with complications (CMS/[facility]) [E11.8, E11.65] 03/08/2016   • Essential hypertension [I10] 03/08/2016      Resolved Hospital Problems    Diagnosis Date Noted Date Resolved   No resolved problems to display.   Right lower extremity cellulitis  - complicated by uncontrolled diabetes and chronic kidney disease  - had secondary sepsis with fever 103.8, white blood cell count elevation, pro-calcitonin elevation, lactic  acid elevation.  - continue vancomycin and cefepime with plan to switch to PO abx at discharge  - Right lower extremity venous duplex negative for DVT    Upper respiratory infection  - likely a viral illness, though RVP is negative  - continue supportive care  - likely the source of the fever    Type 2 diabetes  - Uncontrolled  - endocrinology is following, appreciate recs    HTN  - continue amlodipine    CAD  - Stable  - continue metoprolol, Imdur, statin, aspirin  - Cardiologist is Dr. Tang    DVT Prophylaxis  - lovenox    Disposition  [facility] tomorrow    Rudi Resendez MD  [facility]  07/20/18  3:28 PM

## 2018-07-20 NOTE — PROGRESS NOTES
Continued Stay Note  [facility]     Patient Name: Denisse Chavez  MRN: [id number]  Today's Date: 7/20/2018    Admit Date: 7/15/2018          Discharge Plan     Row Name 07/20/18 0955       Plan    Plan [facility]--SNF. Bed available on Saturday. 30-day signed and in packet. Packet on chart. Family to transport per private auto. Continue to follow.    Patient/Family in Agreement with Plan yes              Discharge Codes    No documentation.       Expected Discharge Date and Time     Expected Discharge Date Expected Discharge Time    Jul 21, 2018             Christi Pagan RN

## 2018-07-21 VITALS
DIASTOLIC BLOOD PRESSURE: 71 MMHG | HEIGHT: 62 IN | TEMPERATURE: 97.9 F | WEIGHT: 225 LBS | BODY MASS INDEX: 41.41 KG/M2 | SYSTOLIC BLOOD PRESSURE: 146 MMHG | HEART RATE: 63 BPM | RESPIRATION RATE: 16 BRPM | OXYGEN SATURATION: 98 %

## 2018-07-21 PROBLEM — A41.9 SEPSIS: Status: RESOLVED | Noted: 2018-07-16 | Resolved: 2018-07-21

## 2018-07-21 LAB
ANION GAP SERPL CALCULATED.3IONS-SCNC: 12.8 MMOL/L
BASOPHILS # BLD AUTO: 0.03 10*3/MM3 (ref 0–0.2)
BASOPHILS NFR BLD AUTO: 0.3 % (ref 0–1.5)
BUN BLD-MCNC: 13 MG/DL (ref 8–23)
BUN/CREAT SERPL: 16.5 (ref 7–25)
CALCIUM SPEC-SCNC: 8.8 MG/DL (ref 8.6–10.5)
CHLORIDE SERPL-SCNC: 105 MMOL/L (ref 98–107)
CO2 SERPL-SCNC: 21.2 MMOL/L (ref 22–29)
CREAT BLD-MCNC: 0.79 MG/DL (ref 0.57–1)
DEPRECATED RDW RBC AUTO: 45.6 FL (ref 37–54)
EOSINOPHIL # BLD AUTO: 0.34 10*3/MM3 (ref 0–0.7)
EOSINOPHIL NFR BLD AUTO: 3.5 % (ref 0.3–6.2)
ERYTHROCYTE [DISTWIDTH] IN BLOOD BY AUTOMATED COUNT: 14.3 % (ref 11.7–13)
GFR SERPL CREATININE-BSD FRML MDRD: 72 ML/MIN/1.73
GLUCOSE BLD-MCNC: 166 MG/DL (ref 65–99)
GLUCOSE BLDC GLUCOMTR-MCNC: 180 MG/DL (ref 70–130)
GLUCOSE BLDC GLUCOMTR-MCNC: 247 MG/DL (ref 70–130)
HCT VFR BLD AUTO: 36.7 % (ref 35.6–45.5)
HGB BLD-MCNC: 11.7 G/DL (ref 11.9–15.5)
IMM GRANULOCYTES # BLD: 0.14 10*3/MM3 (ref 0–0.03)
IMM GRANULOCYTES NFR BLD: 1.4 % (ref 0–0.5)
LYMPHOCYTES # BLD AUTO: 1.69 10*3/MM3 (ref 0.9–4.8)
LYMPHOCYTES NFR BLD AUTO: 17.3 % (ref 19.6–45.3)
MCH RBC QN AUTO: 27.7 PG (ref 26.9–32)
MCHC RBC AUTO-ENTMCNC: 31.9 G/DL (ref 32.4–36.3)
MCV RBC AUTO: 87 FL (ref 80.5–98.2)
MONOCYTES # BLD AUTO: 0.72 10*3/MM3 (ref 0.2–1.2)
MONOCYTES NFR BLD AUTO: 7.4 % (ref 5–12)
NEUTROPHILS # BLD AUTO: 7 10*3/MM3 (ref 1.9–8.1)
NEUTROPHILS NFR BLD AUTO: 71.5 % (ref 42.7–76)
NRBC BLD MANUAL-RTO: 0 /100 WBC (ref 0–0)
PLATELET # BLD AUTO: 242 10*3/MM3 (ref 140–500)
PMV BLD AUTO: 11.1 FL (ref 6–12)
POTASSIUM BLD-SCNC: 4 MMOL/L (ref 3.5–5.2)
RBC # BLD AUTO: 4.22 10*6/MM3 (ref 3.9–5.2)
SODIUM BLD-SCNC: 139 MMOL/L (ref 136–145)
WBC NRBC COR # BLD: 9.78 10*3/MM3 (ref 4.5–10.7)

## 2018-07-21 PROCEDURE — 63710000001 INSULIN REGULAR HUMAN PER 5 UNITS: Performed by: INTERNAL MEDICINE

## 2018-07-21 PROCEDURE — 63710000001 INSULIN ISOPHANE HUMAN PER 5 UNITS: Performed by: INTERNAL MEDICINE

## 2018-07-21 PROCEDURE — 25010000002 CEFEPIME PER 500 MG: Performed by: INTERNAL MEDICINE

## 2018-07-21 PROCEDURE — 25010000002 VANCOMYCIN PER 500 MG: Performed by: HOSPITALIST

## 2018-07-21 PROCEDURE — 82962 GLUCOSE BLOOD TEST: CPT

## 2018-07-21 PROCEDURE — 80048 BASIC METABOLIC PNL TOTAL CA: CPT | Performed by: INTERNAL MEDICINE

## 2018-07-21 PROCEDURE — 85025 COMPLETE CBC W/AUTO DIFF WBC: CPT | Performed by: INTERNAL MEDICINE

## 2018-07-21 PROCEDURE — 25010000002 ENOXAPARIN PER 10 MG: Performed by: HOSPITALIST

## 2018-07-21 PROCEDURE — 99233 SBSQ HOSP IP/OBS HIGH 50: CPT | Performed by: INTERNAL MEDICINE

## 2018-07-21 RX ORDER — DOXYCYCLINE HYCLATE 50 MG/1
100 CAPSULE ORAL EVERY 12 HOURS SCHEDULED
Qty: 20 CAPSULE | Refills: 0
Start: 2018-07-21 | End: 2018-07-26

## 2018-07-21 RX ORDER — CEPHALEXIN 500 MG/1
500 CAPSULE ORAL 2 TIMES DAILY
Qty: 10 CAPSULE | Refills: 0
Start: 2018-07-21 | End: 2018-07-26

## 2018-07-21 RX ADMIN — HUMAN INSULIN 15 UNITS: 100 INJECTION, SOLUTION SUBCUTANEOUS at 08:28

## 2018-07-21 RX ADMIN — ENOXAPARIN SODIUM 40 MG: 40 INJECTION SUBCUTANEOUS at 05:26

## 2018-07-21 RX ADMIN — ISOSORBIDE MONONITRATE 60 MG: 60 TABLET, EXTENDED RELEASE ORAL at 08:27

## 2018-07-21 RX ADMIN — ASPIRIN 81 MG: 81 TABLET ORAL at 08:27

## 2018-07-21 RX ADMIN — AMLODIPINE BESYLATE 10 MG: 10 TABLET ORAL at 08:27

## 2018-07-21 RX ADMIN — DOCUSATE SODIUM 100 MG: 100 CAPSULE, LIQUID FILLED ORAL at 08:27

## 2018-07-21 RX ADMIN — ATORVASTATIN CALCIUM 20 MG: 20 TABLET, FILM COATED ORAL at 08:27

## 2018-07-21 RX ADMIN — METOPROLOL SUCCINATE 25 MG: 25 TABLET, FILM COATED, EXTENDED RELEASE ORAL at 08:27

## 2018-07-21 RX ADMIN — HUMAN INSULIN 20 UNITS: 100 INJECTION, SUSPENSION SUBCUTANEOUS at 08:28

## 2018-07-21 RX ADMIN — CEFEPIME HYDROCHLORIDE 1 G: 1 INJECTION, POWDER, FOR SOLUTION INTRAMUSCULAR; INTRAVENOUS at 05:26

## 2018-07-21 RX ADMIN — VANCOMYCIN HYDROCHLORIDE 1000 MG: 1 INJECTION, SOLUTION INTRAVENOUS at 02:12

## 2018-07-21 NOTE — DISCHARGE SUMMARY
Date of Admission: 7/15/2018  Date of Discharge:  7/21/2018  Primary Care Physician: Agueda Rodriguez MD     Discharge Diagnosis:  Active Hospital Problems    Diagnosis Date Noted   • **Cellulitis of right leg [L03.115] 07/16/2018   • Viral upper respiratory infection [J06.9, B97.89] 07/16/2018   • Chronic kidney disease, stage III (moderate) [N18.3] 10/13/2016   • Obstructive sleep apnea on autoCPAP [G47.30] 09/04/2016   • Diabetes mellitus type 2, uncontrolled, with complications (CMS/[facility]) [E11.8, E11.65] 03/08/2016   • Essential hypertension [I10] 03/08/2016      Resolved Hospital Problems    Diagnosis Date Noted Date Resolved   • Sepsis (CMS/[facility]) [A41.9] 07/16/2018 07/21/2018       Presenting Problem/History of Present Illness:  Cellulitis of right leg [L03.115]  Sepsis, due to unspecified organism (CMS/[facility]) [A41.9]  Diabetic ulcer of right foot associated with other specified diabetes mellitus, unspecified part of foot, unspecified ulcer stage (CMS/[facility]) [E13.621, L97.519]     Hospital Course:  The patient is a 72 y.o. female with a history of DM and chronic right foot ulcer who presented with fever and right leg erythema.  Please see admission H&P from 7/16/18 for further details.  She was found to have RLE cellulitis and was started on vancomycin and cefepime, of which she received 5 days and will finish the remainder of her antibiotics with keflex and doxycycline. Her symptoms improved and her erythema resolved.  Her lower extremity ulcer did not appear to be infected and here was no evidence of osteomyelitis.  She additionally had an upper respiratory infection which improved with supportive care.  She will be discharged to rehab for continued strengthening.  Of note, she does have a long history of diabetes, and is on insulin at home.  She follows with endocrinology, and Dr. Craft did see her while she was hospitalized.  She did have some hypoglycemia and her NPH was reduced from 58 QAM/ 42 QHS to  "20 units twice daily and her regular insulin was reduced to 15 units twice daily.  She will be discharged on this new regimen along with a sliding scale.  Her regimen will likely need adjustment as her acute issues resolve.  She can follow up with endocrinology in a few weeks.    Exam Today:  Blood pressure 146/71, pulse 63, temperature 97.9 °F (36.6 °C), temperature source Oral, resp. rate 16, height 157.5 cm (62\"), weight 102 kg (225 lb), SpO2 98 %.  Constitutional: She is oriented to person, place, and time. No distress.   Head: Normocephalic and atraumatic.   Mouth/Throat: Oropharynx is clear and moist.   Eyes: Pupils are equal, round, and reactive to light. Conjunctivae and EOM are normal.   Neck: Normal range of motion. Neck supple.   Cardiovascular: Normal rate, regular rhythm and intact distal pulses.    Pulmonary/Chest: Effort normal and breath sounds normal.   Abdominal: Soft. Bowel sounds are normal.   Musculoskeletal: She exhibits BLE edema with chronic venous stasis changes. She exhibits no tenderness.   Neurological: She is alert and oriented to person, place, and time.   Skin: Skin is warm and dry. She is not diaphoretic.   Erythema around right ankle resolved  Right plantar surface diabetic wound measuring about 3 x 2 cm that is not infected.  Psychiatric: She has a normal mood and affect. Her behavior is normal.   Nursing note and vitals reviewed.    Consults:   Consults     Date and Time Order Name Status Description    7/16/2018 1007 Inpatient Endocrinology Consult Completed     7/16/2018 0038 [facility] (on-call MD unless specified) Completed            Discharge Disposition:  Skilled Nursing Facility (DC - External)    Discharge Medications:     Discharge Medications      New Medications      Instructions Start Date   cephalexin 500 MG capsule  Commonly known as:  KEFLEX   500 mg, Oral, 2 Times Daily      doxycycline 50 MG capsule  Commonly known as:  VIBRAMYCIN   100 mg, Oral, Every 12 Hours " Scheduled         Changes to Medications      Instructions Start Date   insulin  UNIT/ML injection  Commonly known as:  NOVOLIN N RELION  What changed:  See the new instructions.   INJECT 20 UNITS SUBCUTANEOUSLY IN THE MORNING AND 20 UNITS IN THE EVENING      insulin regular 100 UNIT/ML injection  Commonly known as:  humuLIN R,novoLIN R  What changed:  See the new instructions.   2-5 Units, Subcutaneous, 4 Times Daily Before Meals & Nightly PRN      insulin regular 100 UNIT/ML injection  Commonly known as:  humuLIN R,novoLIN R  What changed:  You were already taking a medication with the same name, and this prescription was added. Make sure you understand how and when to take each.   15 Units, Subcutaneous, Daily With Breakfast & Dinner         Continue These Medications      Instructions Start Date   amLODIPine 10 MG tablet  Commonly known as:  NORVASC   TAKE ONE TABLET BY MOUTH ONCE DAILY      aspirin 81 MG tablet   Oral, Daily      docusate sodium 100 MG capsule  Commonly known as:  COLACE   100 mg, Oral, 2 Times Daily      furosemide 40 MG tablet  Commonly known as:  LASIX   TAKE ONE TABLET BY MOUTH ONCE DAILY      isosorbide mononitrate 60 MG 24 hr tablet  Commonly known as:  IMDUR   TAKE ONE TABLET BY MOUTH ONCE DAILY      metoprolol succinate XL 25 MG 24 hr tablet  Commonly known as:  TOPROL-XL   TAKE ONE TABLET BY MOUTH ONCE DAILY      ROLLATOR misc   1 Units, Does not apply, As Needed      simvastatin 40 MG tablet  Commonly known as:  ZOCOR   TAKE ONE-HALF TABLET BY MOUTH ONCE DAILY         Stop These Medications    acetaminophen-codeine 300-30 MG per tablet  Commonly known as:  TYLENOL #3            Discharge Diet:   Diet Instructions     Diet: Consistent Carbohydrate, Cardiac; Thin       Discharge Diet:   Consistent Carbohydrate  Cardiac       Fluid Consistency:  Thin          Activity at Discharge:   Activity Instructions     Activity as Tolerated             Follow-up Appointments:  Future  Appointments  Date Time Provider Department Center   7/24/2018 2:00 PM [facility]   8/13/2018 1:15 PM Wally TYLER MD [facility] None   10/31/2018 9:40 AM [facility] None   11/7/2018 2:00 PM Agueda Rodriguez MD [facility] None   4/24/2019 11:00 AM Alfredito Mueller MD [facility] None   6/26/2019 11:45 AM Warner Tang MD [facility] None     Additional Instructions for the Follow-ups that You Need to Schedule     Discharge Follow-up with Specialty: Podiatry    As directed      Specialty:  Podiatry    Follow Up Details:  As scheduled         Discharge Follow-up with Specified Provider: House physician or medical director at SNF    As directed      To:  House physician or medical director at SNF    Follow Up Details:  1-3d                 Rudi Resendez MD  07/21/18  11:49 AM    Time Spent on Discharge Activities: Greater than 30 minutes.

## 2018-07-21 NOTE — PROGRESS NOTES
72 y.o.   LOS: 5 days   Patient Care Team:  Agueda Rodriguez MD as PCP - General (Internal Medicine)  Wally TYLER MD as PCP - Claims Attributed  Wally TYLER MD as Consulting Physician (Endocrinology)  Warner Tang MD as Consulting Physician (Cardiology)  Sergio Eagle MD as Consulting Physician (Urology)  Candis Cruz, RN as Care Coordinator (Population Health)    Chief Complaint:  Uncontrolled type 2 diabetes mellitus with cellulitis right lower extremity    Chief Complaint   Patient presents with   • Shortness of Breath   • Fever   • Nausea       Subjective     HPI  Right lower extremity appears better  It is not Swollen Or red  Patient's blood sugars have also improved  She is currently receiving NPH and regular insulin  She might be going to the rehabilitation place today      Interval History:     Patient is a 72-year-old white female with a past history of uncontrolled type 2 diabetes mellitus on insulin regimen admitted to the hospital for fever nausea and shortness of breath.  She was also reporting fatigue for the past several days  Patient  had chronic right diabetic foot ulcer followed by podiatry and apparently was seen by podiatry Tuesday last week and was debrided and some topical anybody cream was applied  Subsequently patient reported that she has not been feeling well she is feeling fatigued and weak and started having upper respiratory symptoms and had a elevated temperature of 103.8  Associates symptom was shortness of breath and mild cough     After a few days patient noticed swelling and redness and warmth over the right lower extremity and came to the emergency room yesterday and was evaluated and admitted to the hospital  She was started on vancomycin and Zosyn  Lactic acid was elevated at 2.2 and pro calcitonin was elevated at 1.9     Patient's blood sugars were in the 200-300 range and I was concentrated for further managing patient's diabetes during this  hospitalization  Patient has been a diabetic for long time and was successfully managed on NPH and regular insulin regimen  She takes approximately 58 units of NPH in the morning and 42 at bedtime and she takes regular insulin 50 units with breakfast and 48 units at supper  She reports that most of her blood sugars were below 200  She had very few episodes of hypoglycemia  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  Patient is symptoms of peripheral neuropathy and is a diabetic foot ulceration in the right fourth which is being attended by podiatry on a regular basis  Uncontrolled type 2 diabetes mellitus with nephropathy  Patient is chronic kidney disease with elevated microalbuminuria in the past     Review of Systems:      Review of Systems   Constitutional: Positive for fatigue.   Respiratory: Negative.    Cardiovascular: Positive for leg swelling.   Gastrointestinal: Positive for abdominal distention. Negative for abdominal pain.   Skin: Positive for color change and rash.   All other systems reviewed and are negative.    Objective     Vital Signs   Temp:  [96.7 °F (35.9 °C)-98.6 °F (37 °C)] 96.7 °F (35.9 °C)  Heart Rate:  [60-74] 60  Resp:  [16-18] 16  BP: (149-173)/(62-71) 173/71    Physical Exam:  Physical Exam   Constitutional: She is oriented to person, place, and time.   HENT:   Head: Atraumatic.   Eyes: Pupils are equal, round, and reactive to light. EOM are normal.   Neck: Normal range of motion. Neck supple.   Cardiovascular: Normal rate, regular rhythm, normal heart sounds and intact distal pulses.    Pulmonary/Chest: Effort normal and breath sounds normal.   Abdominal: Soft. Bowel sounds are normal. She exhibits distension.   Musculoskeletal: She exhibits edema.   Neurological: She is alert and oriented to person, place, and time.   Skin: Rash noted.   Psychiatric: She has a normal mood and affect. Her behavior is normal.   Nursing note and vitals reviewed.  Results Review:     I reviewed  the patient's new clinical results.      Glucose   Date/Time Value Ref Range Status   07/21/2018 0518 166 (H) 65 - 99 mg/dL Final   07/20/2018 0603 180 (H) 65 - 99 mg/dL Final   07/19/2018 0707 152 (H) 65 - 99 mg/dL Final     Lab Results (last 72 hours)     Procedure Component Value Units Date/Time    CBC & Differential [[id number]] Collected:  07/21/18 0518    Specimen:  Blood Updated:  07/21/18 0727    Narrative:       The following orders were created for panel order CBC & Differential.  Procedure                               Abnormality         Status                     ---------                               -----------         ------                     Scan Slide[[id number]]                                                                  CBC Auto Differential[[id number]]        Abnormal            Final result                 Please view results for these tests on the individual orders.    CBC Auto Differential [[id number]]  (Abnormal) Collected:  07/21/18 0518    Specimen:  Blood Updated:  07/21/18 0727     WBC 9.78 10*3/mm3      RBC 4.22 10*6/mm3      Hemoglobin 11.7 (L) g/dL      Hematocrit 36.7 %      MCV 87.0 fL      MCH 27.7 pg      MCHC 31.9 (L) g/dL      RDW 14.3 (H) %      RDW-SD 45.6 fl      MPV 11.1 fL      Platelets 242 10*3/mm3      Neutrophil % 71.5 %      Lymphocyte % 17.3 (L) %      Monocyte % 7.4 %      Eosinophil % 3.5 %      Basophil % 0.3 %      Immature Grans % 1.4 (H) %      Neutrophils, Absolute 7.00 10*3/mm3      Lymphocytes, Absolute 1.69 10*3/mm3      Monocytes, Absolute 0.72 10*3/mm3      Eosinophils, Absolute 0.34 10*3/mm3      Basophils, Absolute 0.03 10*3/mm3      Immature Grans, Absolute 0.14 (H) 10*3/mm3      nRBC 0.0 /100 WBC     POC Glucose Once [[id number]]  (Abnormal) Collected:  07/21/18 0714    Specimen:  Blood Updated:  07/21/18 0716     Glucose 180 (H) mg/dL     Narrative:       Meter: [id number] : [id number] Ronal Bryant CNA    Basic Metabolic Panel [[id number]]   (Abnormal) Collected:  07/21/18 0518    Specimen:  Blood Updated:  07/21/18 0607     Glucose 166 (H) mg/dL      BUN 13 mg/dL      Creatinine 0.79 mg/dL      Sodium 139 mmol/L      Potassium 4.0 mmol/L      Chloride 105 mmol/L      CO2 21.2 (L) mmol/L      Calcium 8.8 mg/dL      eGFR Non African Amer 72 mL/min/1.73      BUN/Creatinine Ratio 16.5     Anion Gap 12.8 mmol/L     Narrative:       The MDRD GFR formula is only valid for adults with stable renal function between ages 18 and 70.    Blood Culture - Blood, [[id number]]  (Normal) Collected:  07/15/18 2331    Specimen:  Blood from Arm, Right Updated:  07/20/18 2345     Blood Culture No growth at 5 days    Blood Culture - Blood, [[id number]]  (Normal) Collected:  07/15/18 2326    Specimen:  Blood from Arm, Left Updated:  07/20/18 2345     Blood Culture No growth at 5 days    POC Glucose Once [[id number]]  (Abnormal) Collected:  07/20/18 2013    Specimen:  Blood Updated:  07/20/18 2015     Glucose 256 (H) mg/dL     Narrative:       Meter: [id number] : [id number] Jake Johns NA    POC Glucose Once [[id number]]  (Abnormal) Collected:  07/20/18 1702    Specimen:  Blood Updated:  07/20/18 1703     Glucose 212 (H) mg/dL     Narrative:       Meter: [id number] : [id number] Quintero Mulu NA    POC Glucose Once [[id number]]  (Abnormal) Collected:  07/20/18 1212    Specimen:  Blood Updated:  07/20/18 1214     Glucose 248 (H) mg/dL     Narrative:       Meter: [id number] : [id number] Quintero Mulu NA    POC Glucose Once [[id number]]  (Abnormal) Collected:  07/20/18 0744    Specimen:  Blood Updated:  07/20/18 0745     Glucose 195 (H) mg/dL     Narrative:       Meter: [id number] : [id number] Quintero Mulu NA    CBC & Differential [[id number]] Collected:  07/20/18 0603    Specimen:  Blood Updated:  07/20/18 0701    Narrative:       The following orders were created for panel order CBC & Differential.  Procedure                               Abnormality         Status                      ---------                               -----------         ------                     CBC Auto Differential[[id number]]        Abnormal            Final result                 Please view results for these tests on the individual orders.    CBC Auto Differential [[id number]]  (Abnormal) Collected:  07/20/18 0603    Specimen:  Blood Updated:  07/20/18 0701     WBC 8.68 10*3/mm3      RBC 4.32 10*6/mm3      Hemoglobin 12.0 g/dL      Hematocrit 36.9 %      MCV 85.4 fL      MCH 27.8 pg      MCHC 32.5 g/dL      RDW 14.1 (H) %      RDW-SD 44.1 fl      MPV 10.9 fL      Platelets 245 10*3/mm3      Neutrophil % 70.5 %      Lymphocyte % 20.2 %      Monocyte % 6.1 %      Eosinophil % 3.0 %      Basophil % 0.2 %      Immature Grans % 0.7 (H) %      Neutrophils, Absolute 6.12 10*3/mm3      Lymphocytes, Absolute 1.75 10*3/mm3      Monocytes, Absolute 0.53 10*3/mm3      Eosinophils, Absolute 0.26 10*3/mm3      Basophils, Absolute 0.02 10*3/mm3      Immature Grans, Absolute 0.06 (H) 10*3/mm3     Basic Metabolic Panel [[id number]]  (Abnormal) Collected:  07/20/18 0603    Specimen:  Blood Updated:  07/20/18 0649     Glucose 180 (H) mg/dL      BUN 13 mg/dL      Creatinine 0.78 mg/dL      Sodium 134 (L) mmol/L      Potassium 3.6 mmol/L      Chloride 100 mmol/L      CO2 22.2 mmol/L      Calcium 8.4 (L) mg/dL      eGFR Non African Amer 73 mL/min/1.73      BUN/Creatinine Ratio 16.7     Anion Gap 11.8 mmol/L     Narrative:       The MDRD GFR formula is only valid for adults with stable renal function between ages 18 and 70.    POC Glucose Once [[id number]]  (Abnormal) Collected:  07/19/18 2129    Specimen:  Blood Updated:  07/19/18 2129     Glucose 152 (H) mg/dL     Narrative:       Meter: [id number] : [id number] Ronal ADAM    POC Glucose Once [[id number]]  (Abnormal) Collected:  07/19/18 1705    Specimen:  Blood Updated:  07/19/18 1705     Glucose 220 (H) mg/dL     Narrative:       Meter: [id number] : [id number] Ray  Ora SIDDHARTHA CNA    POC Glucose Once [[id number]]  (Abnormal) Collected:  07/19/18 1109    Specimen:  Blood Updated:  07/19/18 1112     Glucose 279 (H) mg/dL     Narrative:       Meter: [id number] : [id number] Fredo ADAM    Basic Metabolic Panel [[id number]]  (Abnormal) Collected:  07/19/18 0707    Specimen:  Blood Updated:  07/19/18 0748     Glucose 152 (H) mg/dL      BUN 14 mg/dL      Creatinine 0.83 mg/dL      Sodium 135 (L) mmol/L      Potassium 3.6 mmol/L      Chloride 100 mmol/L      CO2 22.5 mmol/L      Calcium 8.5 (L) mg/dL      eGFR Non African Amer 68 mL/min/1.73      BUN/Creatinine Ratio 16.9     Anion Gap 12.5 mmol/L     Narrative:       The MDRD GFR formula is only valid for adults with stable renal function between ages 18 and 70.    CBC & Differential [[id number]] Collected:  07/19/18 0707    Specimen:  Blood Updated:  07/19/18 0720    Narrative:       The following orders were created for panel order CBC & Differential.  Procedure                               Abnormality         Status                     ---------                               -----------         ------                     CBC Auto Differential[[id number]]        Abnormal            Final result                 Please view results for these tests on the individual orders.    CBC Auto Differential [[id number]]  (Abnormal) Collected:  07/19/18 0707    Specimen:  Blood Updated:  07/19/18 0720     WBC 8.06 10*3/mm3      RBC 4.42 10*6/mm3      Hemoglobin 12.5 g/dL      Hematocrit 37.9 %      MCV 85.7 fL      MCH 28.3 pg      MCHC 33.0 g/dL      RDW 14.2 (H) %      RDW-SD 44.9 fl      MPV 10.4 fL      Platelets 206 10*3/mm3      Neutrophil % 68.5 %      Lymphocyte % 20.7 %      Monocyte % 7.4 %      Eosinophil % 3.3 %      Basophil % 0.1 %      Immature Grans % 0.7 (H) %      Neutrophils, Absolute 5.51 10*3/mm3      Lymphocytes, Absolute 1.67 10*3/mm3      Monocytes, Absolute 0.60 10*3/mm3      Eosinophils, Absolute 0.27 10*3/mm3       Basophils, Absolute 0.01 10*3/mm3      Immature Grans, Absolute 0.06 (H) 10*3/mm3     POC Glucose Once [[id number]]  (Abnormal) Collected:  07/19/18 0714    Specimen:  Blood Updated:  07/19/18 0715     Glucose 150 (H) mg/dL     Narrative:       Meter: [id number] : [id number] Fredo Francois NA    POC Glucose Once [[id number]]  (Abnormal) Collected:  07/18/18 2041    Specimen:  Blood Updated:  07/18/18 2042     Glucose 165 (H) mg/dL     Narrative:       Meter: [id number] : [id number] Ronal Myrna NA    POC Glucose Once [[id number]]  (Abnormal) Collected:  07/18/18 1736    Specimen:  Blood Updated:  07/18/18 1747     Glucose 151 (H) mg/dL     Narrative:       Meter: [id number] : [id number] Mike Wildestity NA    POC Glucose Once [[id number]]  (Abnormal) Collected:  07/18/18 1056    Specimen:  Blood Updated:  07/18/18 1057     Glucose 269 (H) mg/dL     Narrative:       Meter: [id number] : [id number]  Tricia NA        Imaging Results (last 72 hours)     ** No results found for the last 72 hours. **          Medication Review:       Current Facility-Administered Medications:   •  acetaminophen (TYLENOL) tablet 650 mg, 650 mg, Oral, Q4H PRN, Anil Mak MD, 650 mg at 07/18/18 2019  •  amLODIPine (NORVASC) tablet 10 mg, 10 mg, Oral, Daily, Anil Mak MD, 10 mg at 07/21/18 0827  •  aspirin EC tablet 81 mg, 81 mg, Oral, Daily, Anil Mak MD, 81 mg at 07/21/18 0827  •  atorvastatin (LIPITOR) tablet 20 mg, 20 mg, Oral, Daily, Anil Mak MD, 20 mg at 07/21/18 0827  •  cefepime (MAXIPIME) 1 g/100 mL 0.9% NS IVPB (mbp), 1 g, Intravenous, Q8H, Rudi Resendez MD, 1 g at 07/21/18 0526  •  dextrose (D50W) solution 25 g, 25 g, Intravenous, Q15 Min PRN, Anil Mak MD  •  dextrose (GLUTOSE) oral gel 15 g, 15 g, Oral, Q15 Min PRN, Anil Mak MD  •  docusate sodium (COLACE) capsule 100 mg, 100 mg, Oral, BID, Anil Mak MD, 100 mg at 07/21/18 0827  •  enoxaparin (LOVENOX) syringe 40 mg, 40  mg, Subcutaneous, Q12H, Anil Mak MD, 40 mg at 07/21/18 0526  •  glucagon (human recombinant) (GLUCAGEN DIAGNOSTIC) injection 1 mg, 1 mg, Subcutaneous, PRN, Anil Mak MD  •  HYDROcodone-acetaminophen (NORCO) 7.5-325 MG per tablet 1 tablet, 1 tablet, Oral, Q4H PRN, Anil Mak MD  •  insulin NPH (humuLIN N,novoLIN N) injection 20 Units, 20 Units, Subcutaneous, QAM, Wally TYLER MD, 20 Units at 07/21/18 0828  •  insulin NPH (humuLIN N,novoLIN N) injection 20 Units, 20 Units, Subcutaneous, Nightly, Wally TYLER MD, 20 Units at 07/20/18 2215  •  insulin regular (humuLIN R,novoLIN R) injection 15 Units, 15 Units, Subcutaneous, Daily With Breakfast & Dinner, Wally TYLER MD, 15 Units at 07/21/18 0828  •  insulin regular (humuLIN R,novoLIN R) injection 2-5 Units, 2-5 Units, Subcutaneous, 4x Daily AC & at Bedtime PRN, Wally TYLER MD, 3 Units at 07/20/18 1721  •  isosorbide mononitrate (IMDUR) 24 hr tablet 60 mg, 60 mg, Oral, Daily, Anil Mak MD, 60 mg at 07/21/18 0827  •  metoprolol succinate XL (TOPROL-XL) 24 hr tablet 25 mg, 25 mg, Oral, Daily, Anil Mak MD, 25 mg at 07/21/18 0827  •  ondansetron (ZOFRAN) injection 4 mg, 4 mg, Intravenous, Q6H PRN, Anil Mak MD  •  Pharmacy to dose vancomycin, , Does not apply, Continuous PRN, Anil Mak MD  •  sodium chloride 0.9 % flush 1-10 mL, 1-10 mL, Intravenous, PRN, Anil Mak MD  •  Insert peripheral IV, , , Once **AND** sodium chloride 0.9 % flush 10 mL, 10 mL, Intravenous, PRN, Cordell Kemp MD  •  temazepam (RESTORIL) capsule 15 mg, 15 mg, Oral, Nightly PRN, Duy Banda MD, 15 mg at 07/20/18 1425  •  vancomycin (VANCOCIN) in iso-osmotic dextrose IVPB 1 g (premix) 200 mL, 1,000 mg, Intravenous, Q12H, Anil Mak MD, Last Rate: 0 mL/hr at 07/18/18 1358, 1,000 mg at 07/21/18 0212    Assessment/Plan     Patient Active Problem List   Diagnosis   • Type 2 diabetes mellitus (CMS/HCC)   •  Diabetes mellitus type 2, uncontrolled, with complications (CMS/[facility])   • Uncontrolled type II diabetes mellitus with nephropathy (CMS/[facility])   • Type II diabetes mellitus with neurological manifestations, uncontrolled (CMS/[facility])   • Uncontrolled type 2 diabetes mellitus with background retinopathy (CMS/[facility])   • Hyperinsulinism   • Abnormal weight gain   • Hyperlipidemia   • Essential hypertension   • Edema of lower extremity   • Angiomyolipoma of kidney   • Memory changes   • Left hip pain   • Left-sided low back pain without sciatica   • Lumbar spinal stenosis   • Encounter for long-term (current) use of insulin (CMS/[facility])   • Obstructive sleep apnea on autoCPAP   • Chronic kidney disease, stage III (moderate)   • Chronic venous insufficiency   • CAD (coronary artery disease)   • Type 2 diabetes mellitus with stage 3 chronic kidney disease, with long-term current use of insulin (CMS/[facility])   • Circadian rhythm sleep disorder, delayed sleep phase type   • Class 3 obesity due to excess calories with serious comorbidity and body mass index (BMI) of 40.0 to 44.9 in adult (CMS/[facility])   • History of colon polyps   • Cellulitis of right leg   • Sepsis (CMS/[facility])   • Viral upper respiratory infection     Uncontrolled type 2 diabetes mellitus with complications  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  Diabetic foot ulceration right foot  Cellulitis right leg  Uncontrolled type 2 diabetes mellitus with nephropathy  Chronic kidney disease stage III  Uncontrolled diabetes mellitus with background diabetic retinopathy  Hyperinsulinism  Chronic insulin management     Patient's blood sugars have been in the 200 300 range  She's not receiving the regular insulin as she was getting at home  She is currently receiving NPH insulin 40 units twice daily along with sliding scale     Patient is eating reasonably better  She would like to restart her home regimen  She takes 58 units of NPH in the morning and 42 units at night  She  "takes 50 units of regular insulin in the morning and 48 units at supper   Considering the hospitalization and multiple interruptions to meal schedule I recommended that I will decrease the insulin during this hospitalization and try and keep her blood sugars below 200 if possible  Patient verbalized understanding  I also advised the patient to consider avoidance of hypoglycemia as one of the goals during this hospitalization     Patient's blood sugars have improved significantly  She's currently taking NPH insulin 20 units twice daily and regular insulin 15 units twice daily  She is eating reasonably better  She is apparently going to rehabilitation place today  I advised the patient to continue the current regimen from the hospital at the rehabilitation place as well  Once she is discharged and goes home from the rehabilitation she may be able to continue this dosing or return to the home dosing if the blood sugars are going higher  Both patient and her son verbalized understanding    The total time spent for old record and lab review and floor time was more than 35 min of which greater than 20 min of time  ( greater than 50% of the total time )  was spent face to face with the patient counseling and coordination of care owas spent on counseling the patient on recommended evaluation and treatment options, instructions for management/treatment and /or follow up  and importance of compliance with chosen management or treatment options         Wally Craft MD FACE.  07/21/18  9:54 AM      EMR Dragon / transcription disclaimer:     \"Dictated utilizing Dragon dictation\".   "

## 2018-07-21 NOTE — PLAN OF CARE
Problem: Fall Risk (Adult)  Goal: Absence of Fall  Outcome: Ongoing (interventions implemented as appropriate)      Problem: Patient Care Overview  Goal: Plan of Care Review  Outcome: Ongoing (interventions implemented as appropriate)   07/21/18 0626   Coping/Psychosocial   Plan of Care Reviewed With patient   Plan of Care Review   Progress improving   OTHER   Outcome Summary Pt had a good night. Anticipating d/c to rehab today. IV antibiotics continues. No c/o pain. VSS. Up with assist of 1. Dressing over diabetic ulcer on right foot changed at 0530. Continue to monitor.       Problem: Infection, Risk/Actual (Adult)  Goal: Infection Prevention/Resolution  Outcome: Ongoing (interventions implemented as appropriate)      Problem: Skin Injury Risk (Adult)  Goal: Skin Health and Integrity  Outcome: Ongoing (interventions implemented as appropriate)

## 2018-07-23 ENCOUNTER — PATIENT OUTREACH (OUTPATIENT)
Dept: CASE MANAGEMENT | Facility: OTHER | Age: 73
End: 2018-07-23

## 2018-07-23 NOTE — PROGRESS NOTES
Case Management Discharge Note    Final Note: Orders received to [place]. Confirmed with Yenny/Carlos patient arrived to [facility]. Family transported per private auto.    Destination - Selection Complete     Service Request Status Selected Specialties Address Phone Number Fax Number    [facility] [address] [address] [phone]      Durable Medical Equipment     No service has been selected for the patient.      Dialysis/Infusion     No service has been selected for the patient.      Home Medical Care     No service has been selected for the patient.      Social Care     No service has been selected for the patient.        Other: Family Support Services    Final Discharge Disposition Code: 03 - skilled nursing facility (SNF)

## 2018-07-23 NOTE — OUTREACH NOTE
Skilled Nursing Facility Discharge Flowsheet:     Skilled Nursing Facility Discharge Assessment 7/23/2018   Acute Facility Discharged From [facility]   Acute Discharge Date 7/21/2018   Name of the Skilled Nursing Facility? [facility]   Tier Level of the Skilled Nursing Facility 4   Purpose of SNF Admission PT;OT;SN;WC   Estimated length of stay for the patient? To be determined   Who is the insurance provider or payor of patient stay? Medicare

## 2018-08-01 ENCOUNTER — PATIENT OUTREACH (OUTPATIENT)
Dept: CASE MANAGEMENT | Facility: OTHER | Age: 73
End: 2018-08-01

## 2018-08-01 NOTE — OUTREACH NOTE
Skilled Nursing Facility Discharge Flowsheet:     Skilled Nursing Facility Discharge Assessment 8/1/2018   Acute Facility Discharged From [facility]   Acute Discharge Date 7/21/2018   Name of the Skilled Nursing Facility? [facility]   Tier Level of the Skilled Nursing Facility 4   Purpose of SNF Admission PT;OT;SN;WC   Estimated length of stay for the patient? To be determined   Who is the insurance provider or payor of patient stay? Medicare   Progression of Patient? Continues with skilled therapies and care

## 2018-08-02 ENCOUNTER — TELEPHONE (OUTPATIENT)
Dept: INTERNAL MEDICINE | Facility: CLINIC | Age: 73
End: 2018-08-02

## 2018-08-02 NOTE — TELEPHONE ENCOUNTER
Called for verbal okay for PT/OT for eval and treat, since the pt is being released tomorrow.     Verbal okay given.

## 2018-08-03 DIAGNOSIS — Z79.4 TYPE 2 DIABETES MELLITUS WITH COMPLICATION, WITH LONG-TERM CURRENT USE OF INSULIN (HCC): Primary | ICD-10-CM

## 2018-08-03 DIAGNOSIS — E11.8 TYPE 2 DIABETES MELLITUS WITH COMPLICATION, WITH LONG-TERM CURRENT USE OF INSULIN (HCC): Primary | ICD-10-CM

## 2018-08-06 ENCOUNTER — PATIENT OUTREACH (OUTPATIENT)
Dept: CASE MANAGEMENT | Facility: OTHER | Age: 73
End: 2018-08-06

## 2018-08-06 NOTE — OUTREACH NOTE
Skilled Nursing Facility Discharge Flowsheet:     Skilled Nursing Facility Discharge Assessment 8/6/2018   Acute Facility Discharged From [facility]   Acute Discharge Date 8/3/2018   Name of the Skilled Nursing Facility? [facility]   Tier Level of the Skilled Nursing Facility -   Purpose of SNF Admission PT;OT;SN;WC   Estimated length of stay for the patient? -   Who is the insurance provider or payor of patient stay? Medicare   Progression of Patient? -   Skilled Nursing Discharge Date? 8/3/2018   Where was the patient discharged to? Home   Home Health Agency at discharge? Yes   Name of Home Health Agency? [facility]   DME Needed at Discharge? No   Are there any medication concerns at Discharge No   Does the patient have a follow-up appointment? Yes   Comments Regarding F/U Appt. ? PCP, Dr. Rodriguez 8/9/18

## 2018-08-07 ENCOUNTER — TELEPHONE (OUTPATIENT)
Dept: INTERNAL MEDICINE | Facility: CLINIC | Age: 73
End: 2018-08-07

## 2018-08-07 NOTE — TELEPHONE ENCOUNTER
PT called and stated that she does not require any further PT treatment, the eval was completed today, is independent with all immobility and care for herself.

## 2018-08-09 ENCOUNTER — EPISODE CHANGES (OUTPATIENT)
Dept: CASE MANAGEMENT | Facility: OTHER | Age: 73
End: 2018-08-09

## 2018-08-09 ENCOUNTER — OFFICE VISIT (OUTPATIENT)
Dept: INTERNAL MEDICINE | Facility: CLINIC | Age: 73
End: 2018-08-09

## 2018-08-09 VITALS
BODY MASS INDEX: 39.75 KG/M2 | WEIGHT: 216 LBS | RESPIRATION RATE: 18 BRPM | OXYGEN SATURATION: 98 % | DIASTOLIC BLOOD PRESSURE: 66 MMHG | HEART RATE: 91 BPM | SYSTOLIC BLOOD PRESSURE: 128 MMHG | HEIGHT: 62 IN

## 2018-08-09 DIAGNOSIS — Z09 HOSPITAL DISCHARGE FOLLOW-UP: Primary | ICD-10-CM

## 2018-08-09 DIAGNOSIS — IMO0002 UNCONTROLLED TYPE 2 DIABETES MELLITUS WITH COMPLICATION, WITH LONG-TERM CURRENT USE OF INSULIN: ICD-10-CM

## 2018-08-09 DIAGNOSIS — L03.115 CELLULITIS OF RIGHT LEG: ICD-10-CM

## 2018-08-09 DIAGNOSIS — I10 ESSENTIAL HYPERTENSION: ICD-10-CM

## 2018-08-09 DIAGNOSIS — E78.2 MIXED HYPERLIPIDEMIA: ICD-10-CM

## 2018-08-09 PROCEDURE — 99214 OFFICE O/P EST MOD 30 MIN: CPT | Performed by: INTERNAL MEDICINE

## 2018-08-09 NOTE — PROGRESS NOTES
Chief Complaint  Denisse Chavez is a 72 y.o. female who presents for Cellulitis (Hospitalized for cellulitis) and Follow-up (Rehab)  .    History of Present Illness   Yasmin is here for hospital follow up from her right leg cellulitis.  Her blood cultures were negative.  She was treated with IV abx.  She was seen by her endocrinologist while in the hospital.  Her last A1c was 7.5.  She has a chronic diabetic foot ulcer that they did not feel was infected.  She has had this for years and has been followed by her podiatrist for this for years.  She had just seen him the week before she was hospitalized.  She has a follow up with him in a few weeks.   She was in rehab after her discharge for about two weeks.  She feels like her strength is good now.   No cp or palp or dizziness or soa.        Review of Systems   Constitution: Positive for weight loss. Negative for chills, fever and malaise/fatigue.   Cardiovascular: Positive for leg swelling (chronic, wears ALEC hose). Negative for chest pain, dyspnea on exertion and palpitations.   Respiratory: Negative for shortness of breath.    Skin: Negative for color change, poor wound healing and rash.   Neurological: Negative for dizziness and light-headedness.       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 (CMS/HCC)   • Type II diabetes mellitus with neurological manifestations, uncontrolled (CMS/HCC)   • Uncontrolled type 2 diabetes mellitus with background retinopathy (CMS/HCC)   • Hyperinsulinism   • Abnormal weight gain   • Hyperlipidemia   • Essential hypertension   • Edema of lower extremity   • Angiomyolipoma of kidney   • Memory changes   • Left hip pain   • Left-sided low back pain without sciatica   • Lumbar spinal stenosis   • Encounter for long-term (current) use of insulin (CMS/HCC)   •  Obstructive sleep apnea on autoCPAP   • Chronic kidney disease, stage III (moderate)   • Chronic venous insufficiency   • CAD (coronary artery disease)   • Type 2 diabetes mellitus with stage 3 chronic kidney disease, with long-term current use of insulin (CMS/[facility])   • Circadian rhythm sleep disorder, delayed sleep phase type   • Class 3 obesity due to excess calories with serious comorbidity and body mass index (BMI) of 40.0 to 44.9 in adult (CMS/[facility])   • History of colon polyps   • Cellulitis of right leg   • Viral upper respiratory infection       Past Medical History:   Diagnosis Date   • Angiomyolipoma of kidney 3/30/2016   • CAD (coronary artery disease)     MI in 1996, treated medically.  PET 6/2016 with small-medium sized lateral infarct, no ischemia.  This wall motion abnormality was seen on echo as well.   • Chronic venous insufficiency    • Hyperlipidemia    • Hypertension    • Low back pain    • Mass of ovary 3/30/2016   • Morbid obesity (CMS/[facility])    • Sleep apnea    • Type 2 diabetes mellitus (CMS/[facility])    • Uterine leiomyoma 3/30/2016       Past Surgical History:   Procedure Laterality Date   • CATARACT EXTRACTION      X2    • HERNIA REPAIR     • HYSTERECTOMY     • TONSILLECTOMY         Family History   Problem Relation Age of Onset   • Diabetes Mother    • Heart attack Mother    • Hypertension Mother    • Hypothyroidism Mother    • Diabetes type II Son        Social History     Social History   • Marital status: Single     Spouse name: N/A   • Number of children: 3   • Years of education: N/A     Occupational History   • retired from [employer]      Social History Main Topics   • Smoking status: Former Smoker     Packs/day: 0.25     Years: 2.00     Types: Cigarettes     Quit date: 1970   • Smokeless tobacco: Never Used      Comment: LIGHT USAGE   • Alcohol use No      Comment: CAFFEINE USE: 10 -12 CUPS OF COFFEE DAILY.    • Drug use: No   • Sexual activity: Defer     Other Topics Concern   • Not on file  "    Social History Narrative   • No narrative on file       Current Outpatient Prescriptions on File Prior to Visit   Medication Sig Dispense Refill   • amLODIPine (NORVASC) 10 MG tablet TAKE ONE TABLET BY MOUTH ONCE DAILY 90 tablet 3   • aspirin 81 MG tablet Take  by mouth daily.     • docusate sodium (COLACE) 100 MG capsule Take 100 mg by mouth 2 (two) times a day.     • furosemide (LASIX) 40 MG tablet TAKE ONE TABLET BY MOUTH ONCE DAILY 90 tablet 3   • insulin NPH (NOVOLIN N RELION) 100 UNIT/ML injection INJECT 25 UNITS SUBCUTANEOUSLY IN THE MORNING AND 25 UNITS IN THE EVENING 40 mL 3   • insulin regular (humuLIN R,novoLIN R) 100 UNIT/ML injection Inject 18 Units under the skin Daily With Breakfast & Dinner.  12   • isosorbide mononitrate (IMDUR) 60 MG 24 hr tablet TAKE ONE TABLET BY MOUTH ONCE DAILY 30 tablet 3   • metoprolol succinate XL (TOPROL-XL) 25 MG 24 hr tablet TAKE ONE TABLET BY MOUTH ONCE DAILY 30 tablet 6   • Misc. Devices (ROLLATOR) misc 1 Units as needed (for walking). 1 each 0   • simvastatin (ZOCOR) 40 MG tablet TAKE ONE-HALF TABLET BY MOUTH ONCE DAILY 45 tablet 3     No current facility-administered medications on file prior to visit.        Allergies   Allergen Reactions   • Ace Inhibitors Other (See Comments)     Hyperkalemia/ROB   • Angiotensin Receptor Blockers Other (See Comments)     Pt unable to remember   • Sulfa Antibiotics Itching     rash       Vitals:    08/09/18 1108   BP: 128/66   Pulse: 91   Resp: 18   SpO2: 98%   Weight: 98 kg (216 lb)   Height: 157.5 cm (62.01\")       Body mass index is 39.5 kg/m².    Objective   Physical Exam   Constitutional: She is oriented to person, place, and time. She appears well-developed and well-nourished. No distress.   HENT:   Head: Normocephalic and atraumatic.   Eyes: Conjunctivae are normal. No scleral icterus.   Neck: Normal range of motion. Neck supple.   Cardiovascular: Normal rate, regular rhythm and normal heart sounds.  Exam reveals no " gallop and no friction rub.    No murmur heard.  Pulmonary/Chest: Effort normal and breath sounds normal. No respiratory distress. She has no wheezes. She has no rales.   Musculoskeletal: She exhibits edema (  Jovani hose on).   Lymphadenopathy:     She has no cervical adenopathy.   Neurological: She is alert and oriented to person, place, and time. No cranial nerve deficit.   Psychiatric: She has a normal mood and affect. Her behavior is normal. Judgment and thought content normal.           Assessment/Plan   Diagnoses and all orders for this visit:    Hospital discharge follow-up    Cellulitis of right leg    Uncontrolled type 2 diabetes mellitus with complication, with long-term current use of insulin (CMS/[facility])    Essential hypertension    Mixed hyperlipidemia        Discussion/Summary  Yasmin is here for hospital follow up.  She is doing well since her discharge.  She is home and getting around ok.  She has some home health coming to check on her but she completed her PT.  She is checking her bs and has a follow up with endo soon.  Her bp is controlled.  No new signs of infection.  She will continue to see her podiatrist for her chronic foot ulcer.    No Follow-up on file.      Current outpatient and discharge medications have been reconciled for the patient.  Reviewed by: Agueda Rodriguez MD

## 2018-08-14 ENCOUNTER — RESULTS ENCOUNTER (OUTPATIENT)
Dept: ENDOCRINOLOGY | Age: 73
End: 2018-08-14

## 2018-08-14 DIAGNOSIS — E11.8 TYPE 2 DIABETES MELLITUS WITH COMPLICATION, WITH LONG-TERM CURRENT USE OF INSULIN (HCC): ICD-10-CM

## 2018-08-14 DIAGNOSIS — Z79.4 TYPE 2 DIABETES MELLITUS WITH COMPLICATION, WITH LONG-TERM CURRENT USE OF INSULIN (HCC): ICD-10-CM

## 2018-08-22 ENCOUNTER — HOSPITAL ENCOUNTER (OUTPATIENT)
Dept: CARDIOLOGY | Facility: HOSPITAL | Age: 73
Discharge: HOME | End: 2018-08-22
Attending: INTERNAL MEDICINE | Admitting: INTERNAL MEDICINE

## 2018-08-22 DIAGNOSIS — I35.8 AORTIC VALVE SCLEROSIS: ICD-10-CM

## 2018-08-22 LAB
BH CV ECHO MEAS - ACS: 1.8 CM
BH CV ECHO MEAS - AO MAX PG (FULL): 2.3 MMHG
BH CV ECHO MEAS - AO MAX PG: 11.2 MMHG
BH CV ECHO MEAS - AO MEAN PG (FULL): 1.4 MMHG
BH CV ECHO MEAS - AO MEAN PG: 6.1 MMHG
BH CV ECHO MEAS - AO ROOT AREA (BSA CORRECTED): 1.5
BH CV ECHO MEAS - AO ROOT AREA: 7.3 CM^2
BH CV ECHO MEAS - AO ROOT DIAM: 3 CM
BH CV ECHO MEAS - AO V2 MAX: 167.2 CM/SEC
BH CV ECHO MEAS - AO V2 MEAN: 115.1 CM/SEC
BH CV ECHO MEAS - AO V2 VTI: 40.9 CM
BH CV ECHO MEAS - AVA(I,A): 2.4 CM^2
BH CV ECHO MEAS - AVA(I,D): 2.4 CM^2
BH CV ECHO MEAS - AVA(V,A): 2.3 CM^2
BH CV ECHO MEAS - AVA(V,D): 2.3 CM^2
BH CV ECHO MEAS - BSA(HAYCOCK): 2.1 M^2
BH CV ECHO MEAS - BSA: 2 M^2
BH CV ECHO MEAS - BZI_BMI: 39.5 KILOGRAMS/M^2
BH CV ECHO MEAS - BZI_METRIC_HEIGHT: 157.5 CM
BH CV ECHO MEAS - BZI_METRIC_WEIGHT: 98 KG
BH CV ECHO MEAS - EDV(MOD-SP2): 73 ML
BH CV ECHO MEAS - EDV(MOD-SP4): 70 ML
BH CV ECHO MEAS - EDV(TEICH): 134.7 ML
BH CV ECHO MEAS - EF(CUBED): 59.7 %
BH CV ECHO MEAS - EF(MOD-BP): 59 %
BH CV ECHO MEAS - EF(TEICH): 50.9 %
BH CV ECHO MEAS - ESV(MOD-SP2): 30 ML
BH CV ECHO MEAS - ESV(MOD-SP4): 28 ML
BH CV ECHO MEAS - ESV(TEICH): 66.1 ML
BH CV ECHO MEAS - FS: 26.2 %
BH CV ECHO MEAS - IVS/LVPW: 1
BH CV ECHO MEAS - IVSD: 1.1 CM
BH CV ECHO MEAS - LAT PEAK E' VEL: 18 CM/SEC
BH CV ECHO MEAS - LV DIASTOLIC VOL/BSA (35-75): 35.4 ML/M^2
BH CV ECHO MEAS - LV MASS(C)D: 220.9 GRAMS
BH CV ECHO MEAS - LV MASS(C)DI: 111.8 GRAMS/M^2
BH CV ECHO MEAS - LV MAX PG: 8.9 MMHG
BH CV ECHO MEAS - LV MEAN PG: 4.7 MMHG
BH CV ECHO MEAS - LV SYSTOLIC VOL/BSA (12-30): 14.2 ML/M^2
BH CV ECHO MEAS - LV V1 MAX: 149.1 CM/SEC
BH CV ECHO MEAS - LV V1 MEAN: 101 CM/SEC
BH CV ECHO MEAS - LV V1 VTI: 38.4 CM
BH CV ECHO MEAS - LVIDD: 5.3 CM
BH CV ECHO MEAS - LVIDS: 3.9 CM
BH CV ECHO MEAS - LVLD AP2: 6.6 CM
BH CV ECHO MEAS - LVLD AP4: 6.6 CM
BH CV ECHO MEAS - LVLS AP2: 5.9 CM
BH CV ECHO MEAS - LVLS AP4: 5.8 CM
BH CV ECHO MEAS - LVOT AREA (M): 2.5 CM^2
BH CV ECHO MEAS - LVOT AREA: 2.5 CM^2
BH CV ECHO MEAS - LVOT DIAM: 1.8 CM
BH CV ECHO MEAS - LVPWD: 1.1 CM
BH CV ECHO MEAS - MED PEAK E' VEL: 30 CM/SEC
BH CV ECHO MEAS - MR MAX PG: 69.6 MMHG
BH CV ECHO MEAS - MR MAX VEL: 417.1 CM/SEC
BH CV ECHO MEAS - MV A DUR: 0.12 SEC
BH CV ECHO MEAS - MV A MAX VEL: 117.5 CM/SEC
BH CV ECHO MEAS - MV DEC SLOPE: 492.8 CM/SEC^2
BH CV ECHO MEAS - MV DEC TIME: 0.24 SEC
BH CV ECHO MEAS - MV E MAX VEL: 142 CM/SEC
BH CV ECHO MEAS - MV E/A: 1.2
BH CV ECHO MEAS - MV MAX PG: 8.8 MMHG
BH CV ECHO MEAS - MV MEAN PG: 4 MMHG
BH CV ECHO MEAS - MV P1/2T MAX VEL: 136.9 CM/SEC
BH CV ECHO MEAS - MV P1/2T: 81.4 MSEC
BH CV ECHO MEAS - MV V2 MAX: 148.1 CM/SEC
BH CV ECHO MEAS - MV V2 MEAN: 94.6 CM/SEC
BH CV ECHO MEAS - MV V2 VTI: 49.6 CM
BH CV ECHO MEAS - MVA P1/2T LCG: 1.6 CM^2
BH CV ECHO MEAS - MVA(P1/2T): 2.7 CM^2
BH CV ECHO MEAS - MVA(VTI): 2 CM^2
BH CV ECHO MEAS - PA ACC TIME: 0.13 SEC
BH CV ECHO MEAS - PA MAX PG (FULL): 6.1 MMHG
BH CV ECHO MEAS - PA MAX PG: 7.5 MMHG
BH CV ECHO MEAS - PA PR(ACCEL): 18.8 MMHG
BH CV ECHO MEAS - PA V2 MAX: 137.2 CM/SEC
BH CV ECHO MEAS - PULM A REVS DUR: 0.12 SEC
BH CV ECHO MEAS - PULM A REVS VEL: 24.7 CM/SEC
BH CV ECHO MEAS - PULM DIAS VEL: 73.2 CM/SEC
BH CV ECHO MEAS - PULM S/D: 1
BH CV ECHO MEAS - PULM SYS VEL: 74.2 CM/SEC
BH CV ECHO MEAS - PVA(V,A): 1.3 CM^2
BH CV ECHO MEAS - PVA(V,D): 1.3 CM^2
BH CV ECHO MEAS - QP/QS: 0.35
BH CV ECHO MEAS - RAP SYSTOLE: 3 MMHG
BH CV ECHO MEAS - RV MAX PG: 1.4 MMHG
BH CV ECHO MEAS - RV MEAN PG: 0.74 MMHG
BH CV ECHO MEAS - RV V1 MAX: 60.1 CM/SEC
BH CV ECHO MEAS - RV V1 MEAN: 38.8 CM/SEC
BH CV ECHO MEAS - RV V1 VTI: 11.5 CM
BH CV ECHO MEAS - RVOT AREA: 2.9 CM^2
BH CV ECHO MEAS - RVOT DIAM: 1.9 CM
BH CV ECHO MEAS - RVSP: 32 MMHG
BH CV ECHO MEAS - SI(AO): 150.6 ML/M^2
BH CV ECHO MEAS - SI(CUBED): 44.7 ML/M^2
BH CV ECHO MEAS - SI(LVOT): 49.4 ML/M^2
BH CV ECHO MEAS - SI(MOD-SP2): 21.8 ML/M^2
BH CV ECHO MEAS - SI(MOD-SP4): 21.3 ML/M^2
BH CV ECHO MEAS - SI(TEICH): 34.7 ML/M^2
BH CV ECHO MEAS - SUP REN AO DIAM: 1.7 CM
BH CV ECHO MEAS - SV(AO): 297.5 ML
BH CV ECHO MEAS - SV(CUBED): 88.4 ML
BH CV ECHO MEAS - SV(LVOT): 97.6 ML
BH CV ECHO MEAS - SV(MOD-SP2): 43 ML
BH CV ECHO MEAS - SV(MOD-SP4): 42 ML
BH CV ECHO MEAS - SV(RVOT): 33.9 ML
BH CV ECHO MEAS - SV(TEICH): 68.5 ML
BH CV ECHO MEAS - TAPSE (>1.6): 2 CM2
BH CV ECHO MEAS - TR MAX VEL: 267.1 CM/SEC
BH CV ECHO MEASUREMENTS AVERAGE E/E' RATIO: 5.92
BH CV XLRA - RV BASE: 2.9 CM
BH CV XLRA - TDI S': 15 CM/SEC
LEFT ATRIUM VOLUME INDEX: 27 ML/M2
LV EF 2D ECHO EST: 59 %
SINUS: 2.8 CM
STJ: 2.7 CM

## 2018-08-22 PROCEDURE — 93306 TTE W/DOPPLER COMPLETE: CPT

## 2018-08-22 PROCEDURE — 93306 TTE W/DOPPLER COMPLETE: CPT | Performed by: INTERNAL MEDICINE

## 2018-08-23 LAB
ALBUMIN SERPL-MCNC: 4.2 G/DL (ref 3.5–5.2)
ALBUMIN/CREAT UR: 2021.6 MG/G CREAT (ref 0–30)
ALBUMIN/GLOB SERPL: 1.5 G/DL
ALP SERPL-CCNC: 100 U/L (ref 39–117)
ALT SERPL-CCNC: 23 U/L (ref 1–33)
AST SERPL-CCNC: 18 U/L (ref 1–32)
BILIRUB SERPL-MCNC: 0.5 MG/DL (ref 0.1–1.2)
BUN SERPL-MCNC: 21 MG/DL (ref 8–23)
BUN/CREAT SERPL: 21.9 (ref 7–25)
CALCIUM SERPL-MCNC: 9.9 MG/DL (ref 8.6–10.5)
CHLORIDE SERPL-SCNC: 104 MMOL/L (ref 98–107)
CO2 SERPL-SCNC: 26.1 MMOL/L (ref 22–29)
CREAT SERPL-MCNC: 0.96 MG/DL (ref 0.57–1)
CREAT UR-MCNC: 37 MG/DL
GLOBULIN SER CALC-MCNC: 2.8 GM/DL
GLUCOSE SERPL-MCNC: 201 MG/DL (ref 65–99)
HBA1C MFR BLD: 8.1 % (ref 4.8–5.6)
MICROALBUMIN UR-MCNC: 748 UG/ML
POTASSIUM SERPL-SCNC: 4.5 MMOL/L (ref 3.5–5.2)
PROT SERPL-MCNC: 7 G/DL (ref 6–8.5)
SODIUM SERPL-SCNC: 140 MMOL/L (ref 136–145)
TSH SERPL DL<=0.005 MIU/L-ACNC: 2.96 MIU/ML (ref 0.27–4.2)

## 2018-08-29 ENCOUNTER — HOSPITAL ENCOUNTER (OUTPATIENT)
Facility: HOSPITAL | Age: 73
Setting detail: HOSPITAL OUTPATIENT SURGERY
Discharge: HOME | End: 2018-08-29
Attending: SURGERY | Admitting: SURGERY

## 2018-08-29 ENCOUNTER — ANESTHESIA (OUTPATIENT)
Dept: GASTROENTEROLOGY | Facility: HOSPITAL | Age: 73
End: 2018-08-29

## 2018-08-29 ENCOUNTER — ANESTHESIA EVENT (OUTPATIENT)
Dept: GASTROENTEROLOGY | Facility: HOSPITAL | Age: 73
End: 2018-08-29

## 2018-08-29 VITALS
HEIGHT: 62 IN | DIASTOLIC BLOOD PRESSURE: 64 MMHG | OXYGEN SATURATION: 98 % | WEIGHT: 217.6 LBS | TEMPERATURE: 98.6 F | HEART RATE: 74 BPM | BODY MASS INDEX: 40.04 KG/M2 | SYSTOLIC BLOOD PRESSURE: 160 MMHG | RESPIRATION RATE: 12 BRPM

## 2018-08-29 DIAGNOSIS — Z86.010 HISTORY OF COLON POLYPS: ICD-10-CM

## 2018-08-29 LAB — GLUCOSE BLDC GLUCOMTR-MCNC: 126 MG/DL (ref 70–130)

## 2018-08-29 PROCEDURE — 82962 GLUCOSE BLOOD TEST: CPT

## 2018-08-29 PROCEDURE — 25010000002 PROPOFOL 1000 MG/ML EMULSION: Performed by: ANESTHESIOLOGY

## 2018-08-29 PROCEDURE — 25010000002 PROPOFOL 10 MG/ML EMULSION: Performed by: ANESTHESIOLOGY

## 2018-08-29 PROCEDURE — 88305 TISSUE EXAM BY PATHOLOGIST: CPT | Performed by: SURGERY

## 2018-08-29 PROCEDURE — 45385 COLONOSCOPY W/LESION REMOVAL: CPT | Performed by: SURGERY

## 2018-08-29 RX ORDER — PROPOFOL 10 MG/ML
VIAL (ML) INTRAVENOUS AS NEEDED
Status: DISCONTINUED | OUTPATIENT
Start: 2018-08-29 | End: 2018-08-29 | Stop reason: SURG

## 2018-08-29 RX ORDER — SODIUM CHLORIDE 0.9 % (FLUSH) 0.9 %
3 SYRINGE (ML) INJECTION AS NEEDED
Status: DISCONTINUED | OUTPATIENT
Start: 2018-08-29 | End: 2018-08-29 | Stop reason: HOSPADM

## 2018-08-29 RX ORDER — SODIUM CHLORIDE, SODIUM LACTATE, POTASSIUM CHLORIDE, CALCIUM CHLORIDE 600; 310; 30; 20 MG/100ML; MG/100ML; MG/100ML; MG/100ML
1000 INJECTION, SOLUTION INTRAVENOUS CONTINUOUS
Status: DISCONTINUED | OUTPATIENT
Start: 2018-08-29 | End: 2018-08-29 | Stop reason: HOSPADM

## 2018-08-29 RX ORDER — LIDOCAINE HYDROCHLORIDE 10 MG/ML
INJECTION, SOLUTION INFILTRATION; PERINEURAL AS NEEDED
Status: DISCONTINUED | OUTPATIENT
Start: 2018-08-29 | End: 2018-08-29 | Stop reason: SURG

## 2018-08-29 RX ORDER — LIDOCAINE HYDROCHLORIDE 10 MG/ML
0.5 INJECTION, SOLUTION INFILTRATION; PERINEURAL ONCE AS NEEDED
Status: DISCONTINUED | OUTPATIENT
Start: 2018-08-29 | End: 2018-08-29 | Stop reason: HOSPADM

## 2018-08-29 RX ADMIN — PROPOFOL 140 MG: 10 INJECTION, EMULSION INTRAVENOUS at 10:59

## 2018-08-29 RX ADMIN — PROPOFOL 140 MCG/KG/MIN: 10 INJECTION, EMULSION INTRAVENOUS at 10:59

## 2018-08-29 RX ADMIN — LIDOCAINE HYDROCHLORIDE 30 MG: 10 INJECTION, SOLUTION INFILTRATION; PERINEURAL at 10:59

## 2018-08-29 RX ADMIN — SODIUM CHLORIDE, POTASSIUM CHLORIDE, SODIUM LACTATE AND CALCIUM CHLORIDE 1000 ML: 600; 310; 30; 20 INJECTION, SOLUTION INTRAVENOUS at 10:28

## 2018-08-29 NOTE — ANESTHESIA PREPROCEDURE EVALUATION
Anesthesia Evaluation     Patient summary reviewed   NPO Solid Status: > 8 hours  NPO Liquid Status: > 8 hours           Airway   Mallampati: II  TM distance: >3 FB  Dental      Pulmonary    (+) sleep apnea on CPAP,   Cardiovascular     Rhythm: regular  Rate: normal    (+) hypertension, past MI  >12 months, CAD, PVD, hyperlipidemia,       Neuro/Psych  GI/Hepatic/Renal/Endo    (+) obesity,   diabetes mellitus using insulin,     Musculoskeletal     Abdominal    Substance History      OB/GYN          Other                        Anesthesia Plan    ASA 3     MAC   total IV anesthesia  Anesthetic plan and risks discussed with patient.

## 2018-08-29 NOTE — ANESTHESIA POSTPROCEDURE EVALUATION
"Patient: Denisse Chavez    Procedure Summary     Date:  08/29/18 Room / Location:  [facility] ENDOSCOPY 1 /  [facility]    Anesthesia Start:  1056 Anesthesia Stop:  1128    Procedure:  COLONOSCOPY to CECUM WITH HOT SNARE POLYPECTOMY (N/A ) Diagnosis:       History of colon polyps      (History of colon polyps [Z86.010])    Surgeon:  Neal Reilly MD Provider:  Sarah Martinez MD    Anesthesia Type:  MAC ASA Status:  3          Anesthesia Type: MAC  Last vitals  BP   160/64 (08/29/18 1150)   Temp   37 °C (98.6 °F) (08/29/18 1131)   Pulse   74 (08/29/18 1150)   Resp   12 (08/29/18 1150)     SpO2   98 % (08/29/18 1150)     Post Anesthesia Care and Evaluation    Patient location during evaluation: bedside  Patient participation: complete - patient participated  Level of consciousness: awake and alert  Pain management: adequate  Airway patency: patent  Anesthetic complications: No anesthetic complications  PONV Status: none  Cardiovascular status: acceptable  Respiratory status: acceptable  Hydration status: acceptable    Comments: /64   Pulse 74   Temp 37 °C (98.6 °F) (Oral)   Resp 12   Ht 157.5 cm (62\")   Wt 98.7 kg (217 lb 9.6 oz)   SpO2 98%   BMI 39.80 kg/m²         "

## 2018-08-30 LAB
CYTO UR: NORMAL
LAB AP CASE REPORT: NORMAL
PATH REPORT.FINAL DX SPEC: NORMAL
PATH REPORT.GROSS SPEC: NORMAL

## 2018-09-05 ENCOUNTER — DOCUMENTATION (OUTPATIENT)
Dept: SURGERY | Facility: CLINIC | Age: 73
End: 2018-09-05

## 2018-09-05 NOTE — PROGRESS NOTES
Colonoscopy secondary to personal history of polyps on 8/29/2018 demonstrated 10 polyps.    Pathology: All polyps were tubular adenomas    Recommendation: Based on number of polyps 2 year surveillance recommended

## 2018-09-06 ENCOUNTER — TELEPHONE (OUTPATIENT)
Dept: SURGERY | Facility: CLINIC | Age: 73
End: 2018-09-06

## 2018-09-06 NOTE — TELEPHONE ENCOUNTER
----- Message from Neal Reilly MD sent at 9/5/2018  3:31 PM EDT -----  Please let her know that she had 10 benign polyps.  Based on the number of polyps of recommend surveillance colonoscopy in 2 years-put in computer for reminder

## 2018-09-11 ENCOUNTER — OFFICE VISIT (OUTPATIENT)
Dept: ENDOCRINOLOGY | Age: 73
End: 2018-09-11

## 2018-09-11 VITALS
HEIGHT: 62 IN | SYSTOLIC BLOOD PRESSURE: 136 MMHG | HEART RATE: 73 BPM | DIASTOLIC BLOOD PRESSURE: 68 MMHG | WEIGHT: 219 LBS | BODY MASS INDEX: 40.3 KG/M2

## 2018-09-11 DIAGNOSIS — IMO0002 UNCONTROLLED TYPE 2 DIABETES MELLITUS WITH DIABETIC NEUROPATHY, WITH LONG-TERM CURRENT USE OF INSULIN: ICD-10-CM

## 2018-09-11 DIAGNOSIS — N18.30 CHRONIC KIDNEY DISEASE, STAGE III (MODERATE) (HCC): ICD-10-CM

## 2018-09-11 DIAGNOSIS — IMO0002 UNCONTROLLED TYPE 2 DIABETES MELLITUS WITH BACKGROUND RETINOPATHY: ICD-10-CM

## 2018-09-11 DIAGNOSIS — Z79.4 TYPE 2 DIABETES MELLITUS WITH STAGE 3 CHRONIC KIDNEY DISEASE, WITH LONG-TERM CURRENT USE OF INSULIN (HCC): ICD-10-CM

## 2018-09-11 DIAGNOSIS — N18.30 TYPE 2 DIABETES MELLITUS WITH STAGE 3 CHRONIC KIDNEY DISEASE, WITH LONG-TERM CURRENT USE OF INSULIN (HCC): ICD-10-CM

## 2018-09-11 DIAGNOSIS — IMO0002 UNCONTROLLED TYPE 2 DIABETES MELLITUS WITH COMPLICATION, WITH LONG-TERM CURRENT USE OF INSULIN: Primary | ICD-10-CM

## 2018-09-11 DIAGNOSIS — IMO0002 UNCONTROLLED TYPE II DIABETES MELLITUS WITH NEPHROPATHY: ICD-10-CM

## 2018-09-11 DIAGNOSIS — Z79.4 ENCOUNTER FOR LONG-TERM (CURRENT) USE OF INSULIN (HCC): ICD-10-CM

## 2018-09-11 DIAGNOSIS — E16.1 HYPERINSULINISM: ICD-10-CM

## 2018-09-11 DIAGNOSIS — R63.5 ABNORMAL WEIGHT GAIN: ICD-10-CM

## 2018-09-11 DIAGNOSIS — E11.22 TYPE 2 DIABETES MELLITUS WITH STAGE 3 CHRONIC KIDNEY DISEASE, WITH LONG-TERM CURRENT USE OF INSULIN (HCC): ICD-10-CM

## 2018-09-11 PROCEDURE — 99214 OFFICE O/P EST MOD 30 MIN: CPT | Performed by: INTERNAL MEDICINE

## 2018-09-11 RX ORDER — ACETAMINOPHEN AND CODEINE PHOSPHATE 300; 30 MG/1; MG/1
TABLET ORAL
COMMUNITY
Start: 2018-09-05 | End: 2018-12-11 | Stop reason: SDUPTHER

## 2018-09-11 NOTE — PROGRESS NOTES
72 y.o.    Patient Care Team:  Agueda Rodriguez MD as PCP - General (Internal Medicine)  Agueda Rodriguez MD as PCP - Claims Attributed  Wally Craft MD as Consulting Physician (Endocrinology)  Warner Tang MD as Consulting Physician (Cardiology)  Sergio Eagle MD as Consulting Physician (Urology)  Candis Cruz, RN as Care Coordinator (Population Health)    Chief Complaint:      F/U TYPE 2 DIABETES, UNCONTROLLED.  HERE TO DISCUSS LAB RESULTS.  Subjective     HPI   Patient is a 72-year-old white female with a past history of uncontrolled type 2 diabetes mellitus with complications came for follow-up    Uncontrolled type 2 diabetes mellitus  Patient is currently taking NPH insulin 25 units twice daily and regular insulin 18 units twice daily  She reports that most of her blood sugars are below 300  Majority are in the 100-200 range  Hypoglycemia  Patient occasionally has a progress mL with a blood sugar dropping into 70-80 range and patient becomes symptomatic  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  Patient is significantly symptomatic of tingling numbness and burning in both lower extremities  Uncontrolled type 2 diabetes mellitus with retinopathy  Patient is status post laser treatments in the past and has regular ophthalmology follow-up  Uncontrolled type 2 diabetes mellitus with nephropathy  Patient has microalbuminuria and elevated creatinine with chronic kidney disease  She has regular follow-up with nephrology  Abnormal weight gain  Patient currently weighs 219 pounds with a BMI of 40.  Patient has been gaining weight slowly       Interval History November 3, 2016       Summary  Patient reported that she was diagnosed in 1996 with type 2 diabetes and has been on several different medications until 10 years ago when she started taking Lantus 44 units at bedtime in addition Novolin regular insulin 15 units before breakfast and lunch and 20 units before dinner and  metformin and Amaryl. Despite all this medication the blood sugars tend to be in the 150-300-400 range and hence the consultation.  Patient reported that she is not very compliant with her diet or activity. She does take her insulin injections on time.  She is occasionally known to miss her lunch.  Due to the extremely high cost of the insulins patient will switch to NPH and regular insulins and previous visit.  Patient reports that his tolerated this which very well.  she managed to increase the insulin NPH to 40 units in the morning and 30 units at night and regular insulin to 40 units at breakfast and supper. Despite which her blood sugars continue to be high  Her blood sugars have improved to some extent but still fluctuating between 150-400.  Fasting blood sugars appear to be consistent mostly less than 150  Diabetic neuropathy  Patient reports significant symptoms of tingling and numbness in both lower extremities  Diabetic nephropathy  Patient is known to have microalbuminuria and elevated creatinine next line diabetic retinopathy uncontrolled  Patient has history of background diabetic retinopathy.  Hypoglycemia patient has had experience with hypoglycemia but not in the past one month.  Hypertension patient is currently on medication  Hyperlipidemia patient is currently on medication.  interval history  Patient reported occasional hypoglycemia usually at night and early morning hours   The following portions of the patient's history were reviewed and updated as appropriate: allergies, current medications, past family history, past medical history, past social history, past surgical history and problem list.    Past Medical History:   Diagnosis Date   • Angiomyolipoma of kidney 3/30/2016   • CAD (coronary artery disease)     MI in 1996, treated medically.  PET 6/2016 with small-medium sized lateral infarct, no ischemia.  This wall motion abnormality was seen on echo as well.   • Chronic venous insufficiency     • Hyperlipidemia    • Hypertension    • Low back pain    • Mass of ovary 3/30/2016   • Morbid obesity (CMS/HCC)    • Sleep apnea    • Spinal stenosis    • Type 2 diabetes mellitus (CMS/HCC)    • Uterine leiomyoma 3/30/2016     Family History   Problem Relation Age of Onset   • Diabetes Mother    • Heart attack Mother    • Hypertension Mother    • Hypothyroidism Mother    • Diabetes type II Son      Social History     Social History   • Marital status: Single     Spouse name: N/A   • Number of children: 3   • Years of education: N/A     Occupational History   • retired from [employer]      Social History Main Topics   • Smoking status: Former Smoker     Packs/day: 0.25     Years: 2.00     Types: Cigarettes     Quit date: 1970   • Smokeless tobacco: Never Used      Comment: LIGHT USAGE   • Alcohol use No      Comment: CAFFEINE USE: 10 -12 CUPS OF COFFEE DAILY.    • Drug use: No   • Sexual activity: Defer     Other Topics Concern   • Not on file     Social History Narrative   • No narrative on file     Allergies   Allergen Reactions   • Ace Inhibitors Other (See Comments)     Hyperkalemia/ROB   • Angiotensin Receptor Blockers Other (See Comments)     Pt unable to remember   • Keflex [Cephalexin] Rash   • Sulfa Antibiotics Itching     rash       Current Outpatient Prescriptions:   •  amLODIPine (NORVASC) 10 MG tablet, TAKE ONE TABLET BY MOUTH ONCE DAILY, Disp: 90 tablet, Rfl: 3  •  aspirin 81 MG tablet, Take  by mouth daily., Disp: , Rfl:   •  docusate sodium (COLACE) 100 MG capsule, Take 100 mg by mouth 2 (two) times a day., Disp: , Rfl:   •  furosemide (LASIX) 40 MG tablet, TAKE ONE TABLET BY MOUTH ONCE DAILY, Disp: 90 tablet, Rfl: 3  •  insulin NPH (NOVOLIN N RELION) 100 UNIT/ML injection, INJECT 25 UNITS SUBCUTANEOUSLY IN THE MORNING AND 25 UNITS IN THE EVENING, Disp: 40 mL, Rfl: 3  •  insulin regular (humuLIN R,novoLIN R) 100 UNIT/ML injection, Inject 18 Units under the skin Daily With Breakfast & Dinner., Disp: ,  "Rfl: 12  •  isosorbide mononitrate (IMDUR) 60 MG 24 hr tablet, TAKE ONE TABLET BY MOUTH ONCE DAILY, Disp: 30 tablet, Rfl: 3  •  metoprolol succinate XL (TOPROL-XL) 25 MG 24 hr tablet, TAKE ONE TABLET BY MOUTH ONCE DAILY, Disp: 30 tablet, Rfl: 6  •  Misc. Devices (ROLLATOR) misc, 1 Units as needed (for walking)., Disp: 1 each, Rfl: 0  •  simvastatin (ZOCOR) 40 MG tablet, TAKE ONE-HALF TABLET BY MOUTH ONCE DAILY, Disp: 45 tablet, Rfl: 3        Review of Systems   Constitutional: Negative for chills, fatigue and fever.   Cardiovascular: Negative for chest pain and palpitations.   Gastrointestinal: Negative for abdominal pain, constipation, diarrhea, nausea and vomiting.   Endocrine: Positive for cold intolerance. Negative for heat intolerance.   All other systems reviewed and are negative.      Objective       Vitals:    09/11/18 1400   BP: 136/68   Pulse: 73   Weight: 99.3 kg (219 lb)   Height: 157.5 cm (62\")     Body mass index is 40.06 kg/m².      Physical Exam   Constitutional: She is oriented to person, place, and time. She appears well-developed.   HENT:   Head: Normocephalic and atraumatic.   Eyes: Pupils are equal, round, and reactive to light. EOM are normal.   Neck: Normal range of motion. Neck supple. No thyromegaly present.   Cardiovascular: Normal rate, regular rhythm, normal heart sounds and intact distal pulses.    Pulmonary/Chest: Effort normal and breath sounds normal.   Abdominal: Soft. Bowel sounds are normal. She exhibits distension. There is no tenderness.   Musculoskeletal: Normal range of motion. She exhibits no edema.   Neurological: She is alert and oriented to person, place, and time.   Skin: Skin is warm and dry.   Psychiatric: She has a normal mood and affect. Her behavior is normal.   Nursing note and vitals reviewed.    Results Review:     I reviewed the patient's new clinical results.    Medical records reviewed  Summary:      Admission on 08/29/2018, Discharged on 08/29/2018   Component " Date Value Ref Range Status   • Glucose 08/29/2018 126  70 - 130 mg/dL Final   • Case Report 08/29/2018    Final                    Value:Surgical Pathology Report                         Case: [id number]                                  Authorizing Provider:  Neal Reilly MD       Collected:           08/29/2018 11:13 AM          Ordering Location:     [facility]  Received:            08/29/2018 01:54 PM                                 ENDO SUITES                                                                  Pathologist:           Shaw Lowery MD                                                       Specimens:   1) - Large Intestine, Transverse Colon, transverse polyps                                           2) - Large Intestine, Left / Descending Colon, DESCENDING POLYPS                          • Final Diagnosis 08/29/2018    Final                    Value:This result contains rich text formatting which cannot be displayed here.   • Gross Description 08/29/2018    Final                    Value:This result contains rich text formatting which cannot be displayed here.   • Microscopic Description 08/29/2018    Final                    Value:This result contains rich text formatting which cannot be displayed here.     Lab Results   Component Value Date    HGBA1C 8.10 (H) 08/22/2018    HGBA1C 7.50 (H) 04/25/2018    HGBA1C 7.30 (H) 02/13/2018     Lab Results   Component Value Date    MICROALBUR 748.0 08/22/2018    CREATININE 0.96 08/22/2018     Imaging Results (most recent)     None          Assessment and Plan:    Denisse was seen today for diabetes.    Diagnoses and all orders for this visit:    Uncontrolled type 2 diabetes mellitus with complication, with long-term current use of insulin (CMS/[facility])    Hyperinsulinism    Type 2 diabetes mellitus with stage 3 chronic kidney disease, with long-term current use of insulin (CMS/[facility])    Uncontrolled type 2 diabetes mellitus with diabetic  "neuropathy, with long-term current use of insulin (CMS/[facility])    Uncontrolled type 2 diabetes mellitus with background retinopathy (CMS/[facility])    Uncontrolled type II diabetes mellitus with nephropathy (CMS/[facility])    Chronic kidney disease, stage III (moderate)    Encounter for long-term (current) use of insulin (CMS/[facility])    Abnormal weight gain    Patient's glucose log reviewed  For the past 2 weeks her blood sugars are in the 200-300 range  Prior to that these to be in the  range  Patient is unable to give a clear explanation  She had been compliant with her insulin regimen  She is eating about the same except that she started eating oatmeal for breakfast which she has not done before    Patient is currently taking NPH insulin 25 units twice daily and regular insulin 18 units twice daily    Patient has background diabetic retinopathy  I advised the patient to increase insulin NPH 28 units before breakfast and 25 units at bedtime  She will also increase the regular insulin 22 units before breakfast and 18 units before supper    Patient will return to follow-up in 3-4 months.    The total time spent  was more than 25 min of which greater than 15 min of time ( greater than 50% of the total time )  was spent face to face with the patient counseling and coordination of care on recommended evaluation and treatment options, instructions for management/treatment and /or follow up  and importance of compliance with chosen management or treatment options      Wally Craft MD. FACE    09/11/18      EMR Dragon / transcription disclaimer:     \"Dictated utilizing Dragon dictation\".         "

## 2018-09-11 NOTE — PATIENT INSTRUCTIONS
NPH 28 UNITS AT BREAKFAST AND 25 UNITS AT BEDTIME    REGULAR INSULIN 22 UNITS AT BREAKFAST AND 18 UNITS AT SUPPER

## 2018-10-01 RX ORDER — ISOSORBIDE MONONITRATE 60 MG/1
TABLET, EXTENDED RELEASE ORAL
Qty: 90 TABLET | Refills: 1 | Status: SHIPPED | OUTPATIENT
Start: 2018-10-01 | End: 2019-04-18 | Stop reason: SDUPTHER

## 2018-10-19 ENCOUNTER — EPISODE CHANGES (OUTPATIENT)
Dept: CASE MANAGEMENT | Facility: OTHER | Age: 73
End: 2018-10-19

## 2018-10-30 DIAGNOSIS — IMO0002 DIABETES MELLITUS TYPE 2, UNCONTROLLED, WITH COMPLICATIONS: ICD-10-CM

## 2018-10-30 DIAGNOSIS — E78.2 MIXED HYPERLIPIDEMIA: Primary | ICD-10-CM

## 2018-10-30 DIAGNOSIS — N18.30 CHRONIC KIDNEY DISEASE, STAGE III (MODERATE) (HCC): ICD-10-CM

## 2018-10-30 DIAGNOSIS — I10 ESSENTIAL HYPERTENSION: ICD-10-CM

## 2018-10-31 LAB
ALBUMIN SERPL-MCNC: 4.5 G/DL (ref 3.5–5.2)
ALBUMIN/GLOB SERPL: 1.5 G/DL
ALP SERPL-CCNC: 109 U/L (ref 39–117)
ALT SERPL-CCNC: 13 U/L (ref 1–33)
AST SERPL-CCNC: 8 U/L (ref 1–32)
BASOPHILS # BLD AUTO: 0.01 10*3/MM3 (ref 0–0.2)
BASOPHILS NFR BLD AUTO: 0.1 % (ref 0–1.5)
BILIRUB SERPL-MCNC: 0.5 MG/DL (ref 0.1–1.2)
BUN SERPL-MCNC: 17 MG/DL (ref 8–23)
BUN/CREAT SERPL: 16.8 (ref 7–25)
CALCIUM SERPL-MCNC: 10 MG/DL (ref 8.6–10.5)
CHLORIDE SERPL-SCNC: 102 MMOL/L (ref 98–107)
CO2 SERPL-SCNC: 28.1 MMOL/L (ref 22–29)
CREAT SERPL-MCNC: 1.01 MG/DL (ref 0.57–1)
EOSINOPHIL # BLD AUTO: 0.23 10*3/MM3 (ref 0–0.7)
EOSINOPHIL NFR BLD AUTO: 2.2 % (ref 0.3–6.2)
ERYTHROCYTE [DISTWIDTH] IN BLOOD BY AUTOMATED COUNT: 13.7 % (ref 11.7–13)
GLOBULIN SER CALC-MCNC: 3 GM/DL
GLUCOSE SERPL-MCNC: 186 MG/DL (ref 65–99)
HBA1C MFR BLD: 9.06 % (ref 4.8–5.6)
HCT VFR BLD AUTO: 42.3 % (ref 35.6–45.5)
HGB BLD-MCNC: 13.8 G/DL (ref 11.9–15.5)
IMM GRANULOCYTES # BLD: 0.03 10*3/MM3 (ref 0–0.03)
IMM GRANULOCYTES NFR BLD: 0.3 % (ref 0–0.5)
LYMPHOCYTES # BLD AUTO: 1.46 10*3/MM3 (ref 0.9–4.8)
LYMPHOCYTES NFR BLD AUTO: 13.8 % (ref 19.6–45.3)
MCH RBC QN AUTO: 27.5 PG (ref 26.9–32)
MCHC RBC AUTO-ENTMCNC: 32.6 G/DL (ref 32.4–36.3)
MCV RBC AUTO: 84.4 FL (ref 80.5–98.2)
MONOCYTES # BLD AUTO: 0.7 10*3/MM3 (ref 0.2–1.2)
MONOCYTES NFR BLD AUTO: 6.6 % (ref 5–12)
NEUTROPHILS # BLD AUTO: 8.16 10*3/MM3 (ref 1.9–8.1)
NEUTROPHILS NFR BLD AUTO: 77.3 % (ref 42.7–76)
PLATELET # BLD AUTO: 253 10*3/MM3 (ref 140–500)
POTASSIUM SERPL-SCNC: 4.3 MMOL/L (ref 3.5–5.2)
PROT SERPL-MCNC: 7.5 G/DL (ref 6–8.5)
RBC # BLD AUTO: 5.01 10*6/MM3 (ref 3.9–5.2)
SODIUM SERPL-SCNC: 144 MMOL/L (ref 136–145)
WBC # BLD AUTO: 10.56 10*3/MM3 (ref 4.5–10.7)

## 2018-11-01 ENCOUNTER — HOSPITAL ENCOUNTER (OUTPATIENT)
Dept: PAIN MEDICINE | Facility: HOSPITAL | Age: 73
Discharge: HOME | End: 2018-11-01
Admitting: ANESTHESIOLOGY

## 2018-11-01 ENCOUNTER — ANESTHESIA (OUTPATIENT)
Dept: PAIN MEDICINE | Facility: HOSPITAL | Age: 73
End: 2018-11-01

## 2018-11-01 ENCOUNTER — HOSPITAL ENCOUNTER (OUTPATIENT)
Dept: GENERAL RADIOLOGY | Facility: HOSPITAL | Age: 73
Discharge: HOME | End: 2018-11-01

## 2018-11-01 ENCOUNTER — ANESTHESIA EVENT (OUTPATIENT)
Dept: PAIN MEDICINE | Facility: HOSPITAL | Age: 73
End: 2018-11-01

## 2018-11-01 VITALS
RESPIRATION RATE: 16 BRPM | HEART RATE: 73 BPM | SYSTOLIC BLOOD PRESSURE: 170 MMHG | OXYGEN SATURATION: 99 % | DIASTOLIC BLOOD PRESSURE: 65 MMHG

## 2018-11-01 DIAGNOSIS — R52 PAIN: ICD-10-CM

## 2018-11-01 DIAGNOSIS — M48.061 SPINAL STENOSIS OF LUMBAR REGION, UNSPECIFIED WHETHER NEUROGENIC CLAUDICATION PRESENT: Primary | ICD-10-CM

## 2018-11-01 LAB — GLUCOSE BLDC GLUCOMTR-MCNC: 207 MG/DL (ref 70–130)

## 2018-11-01 PROCEDURE — 77003 FLUOROGUIDE FOR SPINE INJECT: CPT

## 2018-11-01 PROCEDURE — C1755 CATHETER, INTRASPINAL: HCPCS

## 2018-11-01 PROCEDURE — 82962 GLUCOSE BLOOD TEST: CPT

## 2018-11-01 PROCEDURE — 25010000002 METHYLPREDNISOLONE PER 80 MG: Performed by: ANESTHESIOLOGY

## 2018-11-01 RX ORDER — FENTANYL CITRATE 50 UG/ML
50 INJECTION, SOLUTION INTRAMUSCULAR; INTRAVENOUS AS NEEDED
Status: DISCONTINUED | OUTPATIENT
Start: 2018-11-01 | End: 2018-11-02 | Stop reason: HOSPADM

## 2018-11-01 RX ORDER — METHYLPREDNISOLONE ACETATE 80 MG/ML
80 INJECTION, SUSPENSION INTRA-ARTICULAR; INTRALESIONAL; INTRAMUSCULAR; SOFT TISSUE ONCE
Status: COMPLETED | OUTPATIENT
Start: 2018-11-01 | End: 2018-11-01

## 2018-11-01 RX ORDER — SODIUM CHLORIDE 0.9 % (FLUSH) 0.9 %
1-10 SYRINGE (ML) INJECTION AS NEEDED
Status: DISCONTINUED | OUTPATIENT
Start: 2018-11-01 | End: 2018-11-02 | Stop reason: HOSPADM

## 2018-11-01 RX ORDER — LIDOCAINE HYDROCHLORIDE 10 MG/ML
1 INJECTION, SOLUTION INFILTRATION; PERINEURAL ONCE AS NEEDED
Status: DISCONTINUED | OUTPATIENT
Start: 2018-11-01 | End: 2018-11-02 | Stop reason: HOSPADM

## 2018-11-01 RX ORDER — MIDAZOLAM HYDROCHLORIDE 1 MG/ML
1 INJECTION INTRAMUSCULAR; INTRAVENOUS AS NEEDED
Status: DISCONTINUED | OUTPATIENT
Start: 2018-11-01 | End: 2018-11-02 | Stop reason: HOSPADM

## 2018-11-01 RX ADMIN — METHYLPREDNISOLONE ACETATE 80 MG: 80 INJECTION, SUSPENSION INTRA-ARTICULAR; INTRALESIONAL; INTRAMUSCULAR; SOFT TISSUE at 10:23

## 2018-11-01 NOTE — H&P
[facility]    History and Physical    Patient Name: Denisse Chavez  :  1945  MRN:  [id number]  Date of Admission: 2018    Subjective     Patient is a 72 y.o. female presents with chief complaint of chronic low back, buttock and leg: bilateral pain.  Her last epidural was in  for spinal stenosis and neuritis and is here today for her fourth lumbar epidural in this 12 month period.    The following portions of the patients history were reviewed and updated as appropriate: current medications, allergies, past medical history, past surgical history, past family history, past social history and problem list                Objective     Past Medical History:   Past Medical History:   Diagnosis Date   • Angiomyolipoma of kidney 3/30/2016   • CAD (coronary artery disease)     MI in , treated medically.  PET 2016 with small-medium sized lateral infarct, no ischemia.  This wall motion abnormality was seen on echo as well.   • Cellulitis     2018 RIGHT LEG   • Chronic venous insufficiency    • Hyperlipidemia    • Hypertension    • Low back pain    • Mass of ovary 3/30/2016   • Morbid obesity (CMS/HCC)    • Sleep apnea    • Spinal stenosis    • Type 2 diabetes mellitus (CMS/HCC)    • Uterine leiomyoma 3/30/2016     Past Surgical History:   Past Surgical History:   Procedure Laterality Date   • CATARACT EXTRACTION      X2    • COLONOSCOPY N/A 2018    Procedure: COLONOSCOPY to CECUM WITH HOT SNARE POLYPECTOMY;  Surgeon: Neal Reilly MD;  Location: [facility] ENDOSCOPY;  Service: Gastroenterology   • HERNIA REPAIR     • HYSTERECTOMY     • TONSILLECTOMY       Family History:   Family History   Problem Relation Age of Onset   • Diabetes Mother    • Heart attack Mother    • Hypertension Mother    • Hypothyroidism Mother    • Diabetes type II Son      Social History:   Social History   Substance Use Topics   • Smoking status: Former Smoker     Packs/day: 0.25     Years: 2.00     Types: Cigarettes      Quit date: 1970   • Smokeless tobacco: Never Used      Comment: LIGHT USAGE   • Alcohol use No      Comment: CAFFEINE USE: 10 -12 CUPS OF COFFEE DAILY.        Vital Signs Range for the last 24 hours  Temperature:     Temp Source:     BP:     Pulse:     Respirations:     SPO2:     O2 Amount (l/min):     O2 Devices     Weight:           --------------------------------------------------------------------------------    Current Outpatient Prescriptions   Medication Sig Dispense Refill   • acetaminophen-codeine (TYLENOL #3) 300-30 MG per tablet      • amLODIPine (NORVASC) 10 MG tablet TAKE ONE TABLET BY MOUTH ONCE DAILY 90 tablet 3   • aspirin 81 MG tablet Take  by mouth daily.     • docusate sodium (COLACE) 100 MG capsule Take 100 mg by mouth 2 (two) times a day.     • furosemide (LASIX) 40 MG tablet TAKE ONE TABLET BY MOUTH ONCE DAILY 90 tablet 3   • insulin NPH (NOVOLIN N RELION) 100 UNIT/ML injection INJECT 25 UNITS SUBCUTANEOUSLY IN THE MORNING AND 25 UNITS IN THE EVENING 40 mL 3   • insulin regular (humuLIN R,novoLIN R) 100 UNIT/ML injection Inject 18 Units under the skin Daily With Breakfast & Dinner.  12   • isosorbide mononitrate (IMDUR) 60 MG 24 hr tablet TAKE 1 TABLET BY MOUTH ONCE DAILY 90 tablet 1   • metoprolol succinate XL (TOPROL-XL) 25 MG 24 hr tablet TAKE ONE TABLET BY MOUTH ONCE DAILY 30 tablet 6   • Misc. Devices (ROLLATOR) misc 1 Units as needed (for walking). 1 each 0   • simvastatin (ZOCOR) 40 MG tablet TAKE ONE-HALF TABLET BY MOUTH ONCE DAILY 45 tablet 3     No current facility-administered medications for this encounter.        --------------------------------------------------------------------------------  Assessment/Plan      Anesthesia Evaluation                  Airway   Mallampati: II  Dental    (+) upper dentures    Pulmonary - normal exam   (+) recent URI resolved, sleep apnea,   Cardiovascular - normal exam        Neuro/Psych  GI/Hepatic/Renal/Endo    (+) obesity,        Musculoskeletal     Abdominal    Substance History      OB/GYN          Other                   Diagnosis and Plan    Treatment Plan  ASA 3   Patient has had previous injection/procedure with 25-50% improvement.   Procedures: Lumbar Epidural Steroid Injection(LESI), With fluoroscopy,       Anesthetic plan and risks discussed with patient.          Diagnosis     * Lumbar spinal stenosis [M48.061]     * Lumbar neuritis [M54.16]

## 2018-11-01 NOTE — ANESTHESIA PROCEDURE NOTES
PAIN Epidural block      Patient reassessed immediately prior to procedure    Patient location during procedure: pain clinic  Indication:procedure for pain  Performed By  Anesthesiologist: MAG TREVINO  Preanesthetic Checklist  Completed: patient identified, site marked, surgical consent, pre-op evaluation, timeout performed, IV checked, risks and benefits discussed and monitors and equipment checked  Additional Notes  Post-Op Diagnosis Codes:     * Lumbar spinal stenosis (M48.061)     * Lumbar neuritis (M54.16)  Performed under fluoroscopic guidance  Prep:  Pt Position:prone  Sterile Tech:cap, gloves, mask and sterile barrier  Prep:chlorhexidine gluconate and isopropyl alcohol  Monitoring:blood pressure monitoring, continuous pulse oximetry and EKG  Procedure:  Sedation: no   Approach:midline  Guidance: fluoroscopy  Location:lumbar (Intralaminar)  Level:4-5  Needle Type:Tuohy  Needle Gauge:20  Aspiration:negative  Medications:  Depomedrol:80  Preservative Free Saline:4mL  Comments:No contrast due to renal issues.  Post Assessment:  Post-procedure: Band-aid.  Pt Tolerance:patient tolerated the procedure well with no apparent complications  Complications:no

## 2018-11-07 ENCOUNTER — OFFICE VISIT (OUTPATIENT)
Dept: INTERNAL MEDICINE | Facility: CLINIC | Age: 73
End: 2018-11-07

## 2018-11-07 VITALS
OXYGEN SATURATION: 98 % | HEIGHT: 62 IN | WEIGHT: 213 LBS | DIASTOLIC BLOOD PRESSURE: 72 MMHG | RESPIRATION RATE: 18 BRPM | HEART RATE: 77 BPM | SYSTOLIC BLOOD PRESSURE: 130 MMHG | BODY MASS INDEX: 39.2 KG/M2

## 2018-11-07 DIAGNOSIS — Z12.31 ENCOUNTER FOR SCREENING MAMMOGRAM FOR BREAST CANCER: ICD-10-CM

## 2018-11-07 DIAGNOSIS — E78.2 MIXED HYPERLIPIDEMIA: ICD-10-CM

## 2018-11-07 DIAGNOSIS — E11.22 TYPE 2 DIABETES MELLITUS WITH STAGE 3 CHRONIC KIDNEY DISEASE, WITH LONG-TERM CURRENT USE OF INSULIN (HCC): ICD-10-CM

## 2018-11-07 DIAGNOSIS — IMO0002 UNCONTROLLED TYPE 2 DIABETES MELLITUS WITH BACKGROUND RETINOPATHY: ICD-10-CM

## 2018-11-07 DIAGNOSIS — I10 ESSENTIAL HYPERTENSION: Primary | ICD-10-CM

## 2018-11-07 DIAGNOSIS — I87.2 CHRONIC VENOUS INSUFFICIENCY: ICD-10-CM

## 2018-11-07 DIAGNOSIS — Z79.4 TYPE 2 DIABETES MELLITUS WITH STAGE 3 CHRONIC KIDNEY DISEASE, WITH LONG-TERM CURRENT USE OF INSULIN (HCC): ICD-10-CM

## 2018-11-07 DIAGNOSIS — N18.30 CHRONIC KIDNEY DISEASE, STAGE III (MODERATE) (HCC): ICD-10-CM

## 2018-11-07 DIAGNOSIS — N18.30 TYPE 2 DIABETES MELLITUS WITH STAGE 3 CHRONIC KIDNEY DISEASE, WITH LONG-TERM CURRENT USE OF INSULIN (HCC): ICD-10-CM

## 2018-11-07 PROBLEM — L03.115 CELLULITIS OF RIGHT LEG: Status: RESOLVED | Noted: 2018-07-16 | Resolved: 2018-11-07

## 2018-11-07 PROCEDURE — 90632 HEPA VACCINE ADULT IM: CPT | Performed by: INTERNAL MEDICINE

## 2018-11-07 PROCEDURE — 90471 IMMUNIZATION ADMIN: CPT | Performed by: INTERNAL MEDICINE

## 2018-11-07 PROCEDURE — 99214 OFFICE O/P EST MOD 30 MIN: CPT | Performed by: INTERNAL MEDICINE

## 2018-11-07 NOTE — PROGRESS NOTES
"Chief Complaint  Denisse Chavez is a 72 y.o. female who presents for Hypertension; Hyperlipidemia; Diabetes; and Follow-up (6 month)  .    History of Present Illness   Pat is here for routine follow up on HTN, HLD, CKD, and DM.  She has had a lot of \"runny eyes\".  She is checking her bs multiple times a day.  She has had her flu shot.  No cp or palp or dizziness or soa.  She always has edema but stable.  In general, she is feeling good except for her URI right now.  No myalgias.  She is taking all of her meds as directed.  She doesn't drink a lot of water but drinks tea all day long.   She is due for mammo.      Review of Systems   Constitution: Negative for chills and fever.   Cardiovascular: Positive for leg swelling. Negative for chest pain and dyspnea on exertion.   Respiratory: Negative for shortness of breath.    Endocrine: Negative for polydipsia and polyuria.       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 (CMS/HCC)   • Type II diabetes mellitus with neurological manifestations, uncontrolled (CMS/HCC)   • Uncontrolled type 2 diabetes mellitus with background retinopathy (CMS/HCC)   • Hyperinsulinism   • Hyperlipidemia   • Essential hypertension   • Edema of lower extremity   • Angiomyolipoma of kidney   • Memory changes   • Left hip pain   • Left-sided low back pain without sciatica   • Lumbar spinal stenosis   • Encounter for long-term (current) use of insulin (CMS/HCC)   • Obstructive sleep apnea on autoCPAP   • Chronic kidney disease, stage III (moderate) (CMS/HCC)   • Chronic venous insufficiency   • CAD (coronary artery disease)   • Type 2 diabetes mellitus with stage 3 chronic kidney disease, with long-term current use of insulin (CMS/HCC)   • Circadian rhythm sleep disorder, delayed sleep phase type   • Class 3 obesity due to " excess calories with serious comorbidity and body mass index (BMI) of 40.0 to 44.9 in adult   • History of colon polyps   • Viral upper respiratory infection       Past Medical History:   Diagnosis Date   • Angiomyolipoma of kidney 3/30/2016   • CAD (coronary artery disease)     MI in 1996, treated medically.  PET 6/2016 with small-medium sized lateral infarct, no ischemia.  This wall motion abnormality was seen on echo as well.   • Cellulitis     7/2018 RIGHT LEG   • Chronic venous insufficiency    • Hyperlipidemia    • Hypertension    • Low back pain    • Mass of ovary 3/30/2016   • Morbid obesity (CMS/HCC)    • Sleep apnea    • Spinal stenosis    • Type 2 diabetes mellitus (CMS/HCC)    • Uterine leiomyoma 3/30/2016       Past Surgical History:   Procedure Laterality Date   • CATARACT EXTRACTION      X2    • COLONOSCOPY N/A 8/29/2018    Procedure: COLONOSCOPY to CECUM WITH HOT SNARE POLYPECTOMY;  Surgeon: Neal Reilly MD;  Location: [facility] ENDOSCOPY;  Service: Gastroenterology   • HERNIA REPAIR     • HYSTERECTOMY     • TONSILLECTOMY         Family History   Problem Relation Age of Onset   • Diabetes Mother    • Heart attack Mother    • Hypertension Mother    • Hypothyroidism Mother    • Diabetes type II Son        Social History     Social History   • Marital status: Single     Spouse name: N/A   • Number of children: 3   • Years of education: N/A     Occupational History   • retired from [employer]      Social History Main Topics   • Smoking status: Former Smoker     Packs/day: 0.25     Years: 2.00     Types: Cigarettes     Quit date: 1970   • Smokeless tobacco: Never Used      Comment: LIGHT USAGE   • Alcohol use No      Comment: CAFFEINE USE: 10 -12 CUPS OF COFFEE DAILY.    • Drug use: No   • Sexual activity: Defer     Other Topics Concern   • Not on file     Social History Narrative   • No narrative on file       Current Outpatient Prescriptions on File Prior to Visit   Medication Sig Dispense Refill   •  "acetaminophen-codeine (TYLENOL #3) 300-30 MG per tablet      • amLODIPine (NORVASC) 10 MG tablet TAKE ONE TABLET BY MOUTH ONCE DAILY 90 tablet 3   • aspirin 81 MG tablet Take  by mouth daily.     • docusate sodium (COLACE) 100 MG capsule Take 100 mg by mouth 2 (two) times a day.     • furosemide (LASIX) 40 MG tablet TAKE ONE TABLET BY MOUTH ONCE DAILY 90 tablet 3   • insulin NPH (NOVOLIN N RELION) 100 UNIT/ML injection INJECT 25 UNITS SUBCUTANEOUSLY IN THE MORNING AND 25 UNITS IN THE EVENING 40 mL 3   • insulin regular (humuLIN R,novoLIN R) 100 UNIT/ML injection Inject 18 Units under the skin Daily With Breakfast & Dinner.  12   • isosorbide mononitrate (IMDUR) 60 MG 24 hr tablet TAKE 1 TABLET BY MOUTH ONCE DAILY 90 tablet 1   • metoprolol succinate XL (TOPROL-XL) 25 MG 24 hr tablet TAKE ONE TABLET BY MOUTH ONCE DAILY 30 tablet 6   • Misc. Devices (ROLLATOR) misc 1 Units as needed (for walking). 1 each 0   • simvastatin (ZOCOR) 40 MG tablet TAKE ONE-HALF TABLET BY MOUTH ONCE DAILY 45 tablet 3     No current facility-administered medications on file prior to visit.        Allergies   Allergen Reactions   • Ace Inhibitors Other (See Comments)     Hyperkalemia/ROB   • Angiotensin Receptor Blockers Other (See Comments)     Pt unable to remember   • Keflex [Cephalexin] Rash   • Sulfa Antibiotics Itching     rash       Vitals:    11/07/18 1348   BP: 130/72   Pulse: 77   Resp: 18   SpO2: 98%   Weight: 96.6 kg (213 lb)   Height: 157.5 cm (62.01\")       Body mass index is 38.95 kg/m².    Objective   Physical Exam   Constitutional: She is oriented to person, place, and time. She appears well-developed and well-nourished. No distress.   HENT:   Head: Normocephalic and atraumatic.   Eyes: Conjunctivae are normal. No scleral icterus.   Neck: Normal range of motion. Neck supple.   Cardiovascular: Normal rate and regular rhythm.  Exam reveals no gallop and no friction rub.    Murmur heard.  Pulmonary/Chest: Effort normal and " breath sounds normal. No respiratory distress. She has no wheezes. She has no rales.   Musculoskeletal: She exhibits edema.   Lymphadenopathy:     She has no cervical adenopathy.   Neurological: She is alert and oriented to person, place, and time. No cranial nerve deficit.   Psychiatric: She has a normal mood and affect. Her behavior is normal. Judgment and thought content normal.       Results for orders placed or performed during the hospital encounter of 11/01/18   POC Glucose Once   Result Value Ref Range    Glucose 207 (H) 70 - 130 mg/dL       Assessment/Plan   Diagnoses and all orders for this visit:    Essential hypertension    Mixed hyperlipidemia    Type 2 diabetes mellitus with stage 3 chronic kidney disease, with long-term current use of insulin (CMS/[facility])    Uncontrolled type 2 diabetes mellitus with background retinopathy (CMS/[facility])    Chronic kidney disease, stage III (moderate) (CMS/[facility])    Encounter for screening mammogram for breast cancer  -     Mammo Screening Bilateral With CAD; Future    Chronic venous insufficiency    Other orders  -     Hepatitis A Vaccine Adult IM        Discussion/Summary  Pat is here for routine follow up.  In general, she is feeling well.  Her bp is ok today.  Her A1c has gone up more.  Will forward this note to her endocrinologist.  Her kidney function is stable.  I have really encouraged her to work on getting her bs down. She is checking her bs daily.  She would like her second Hep A vaccine today.  Continue to wear compression hose.  I Have encouraged her to be as active as she can to work on her mobility and bs and bp and weight.  She has had four epidurals this year for her back pain.    Return in about 6 months (around 5/7/2019) for Annual physical, with fasting labs prior.

## 2018-11-09 ENCOUNTER — TRANSCRIBE ORDERS (OUTPATIENT)
Dept: ADMINISTRATIVE | Facility: HOSPITAL | Age: 73
End: 2018-11-09

## 2018-11-14 ENCOUNTER — HOSPITAL ENCOUNTER (OUTPATIENT)
Dept: MAMMOGRAPHY | Facility: HOSPITAL | Age: 73
Discharge: HOME | End: 2018-11-14
Admitting: INTERNAL MEDICINE

## 2018-11-14 DIAGNOSIS — Z12.31 ENCOUNTER FOR SCREENING MAMMOGRAM FOR BREAST CANCER: ICD-10-CM

## 2018-11-14 PROCEDURE — 77067 SCR MAMMO BI INCL CAD: CPT

## 2018-11-27 ENCOUNTER — RESULTS ENCOUNTER (OUTPATIENT)
Dept: ENDOCRINOLOGY | Age: 73
End: 2018-11-27

## 2018-11-27 DIAGNOSIS — IMO0002 UNCONTROLLED TYPE 2 DIABETES MELLITUS WITH COMPLICATION, WITH LONG-TERM CURRENT USE OF INSULIN: Primary | ICD-10-CM

## 2018-11-27 DIAGNOSIS — IMO0002 UNCONTROLLED TYPE 2 DIABETES MELLITUS WITH COMPLICATION, WITH LONG-TERM CURRENT USE OF INSULIN: ICD-10-CM

## 2018-11-28 RX ORDER — ACETAMINOPHEN AND CODEINE PHOSPHATE 300; 30 MG/1; MG/1
TABLET ORAL
Qty: 30 TABLET | Refills: 2 | Status: SHIPPED | OUTPATIENT
Start: 2018-11-28 | End: 2019-03-19 | Stop reason: SDUPTHER

## 2018-12-05 LAB
ALBUMIN SERPL-MCNC: 4.1 G/DL (ref 3.5–5.2)
ALBUMIN/CREAT UR: 1631.1 MG/G CREAT (ref 0–30)
ALBUMIN/GLOB SERPL: 1.5 G/DL
ALP SERPL-CCNC: 101 U/L (ref 39–117)
ALT SERPL-CCNC: 14 U/L (ref 1–33)
AST SERPL-CCNC: 15 U/L (ref 1–32)
BILIRUB SERPL-MCNC: 0.4 MG/DL (ref 0.1–1.2)
BUN SERPL-MCNC: 27 MG/DL (ref 8–23)
BUN/CREAT SERPL: 25.5 (ref 7–25)
CALCIUM SERPL-MCNC: 9.8 MG/DL (ref 8.6–10.5)
CHLORIDE SERPL-SCNC: 98 MMOL/L (ref 98–107)
CO2 SERPL-SCNC: 30.2 MMOL/L (ref 22–29)
CREAT SERPL-MCNC: 1.06 MG/DL (ref 0.57–1)
CREAT UR-MCNC: 16.4 MG/DL
GLOBULIN SER CALC-MCNC: 2.8 GM/DL
GLUCOSE SERPL-MCNC: 208 MG/DL (ref 65–99)
HBA1C MFR BLD: 9.61 % (ref 4.8–5.6)
MICROALBUMIN UR-MCNC: 267.5 UG/ML
POTASSIUM SERPL-SCNC: 4.1 MMOL/L (ref 3.5–5.2)
PROT SERPL-MCNC: 6.9 G/DL (ref 6–8.5)
SODIUM SERPL-SCNC: 142 MMOL/L (ref 136–145)
TSH SERPL DL<=0.005 MIU/L-ACNC: 2.25 MIU/ML (ref 0.27–4.2)

## 2018-12-11 ENCOUNTER — OFFICE VISIT (OUTPATIENT)
Dept: ENDOCRINOLOGY | Age: 73
End: 2018-12-11

## 2018-12-11 VITALS
BODY MASS INDEX: 40.01 KG/M2 | DIASTOLIC BLOOD PRESSURE: 68 MMHG | HEIGHT: 62 IN | HEART RATE: 86 BPM | SYSTOLIC BLOOD PRESSURE: 138 MMHG | WEIGHT: 217.4 LBS

## 2018-12-11 DIAGNOSIS — IMO0002 DIABETES MELLITUS TYPE 2, UNCONTROLLED, WITH COMPLICATIONS: Primary | ICD-10-CM

## 2018-12-11 DIAGNOSIS — Z79.4 ENCOUNTER FOR LONG-TERM (CURRENT) USE OF INSULIN (HCC): ICD-10-CM

## 2018-12-11 DIAGNOSIS — IMO0002 TYPE II DIABETES MELLITUS WITH NEUROLOGICAL MANIFESTATIONS, UNCONTROLLED: ICD-10-CM

## 2018-12-11 DIAGNOSIS — E78.5 HYPERLIPIDEMIA ASSOCIATED WITH TYPE 2 DIABETES MELLITUS (HCC): ICD-10-CM

## 2018-12-11 DIAGNOSIS — N18.30 CHRONIC KIDNEY DISEASE, STAGE III (MODERATE) (HCC): ICD-10-CM

## 2018-12-11 DIAGNOSIS — IMO0002 UNCONTROLLED TYPE 2 DIABETES MELLITUS WITH BACKGROUND RETINOPATHY: ICD-10-CM

## 2018-12-11 DIAGNOSIS — E11.69 HYPERLIPIDEMIA ASSOCIATED WITH TYPE 2 DIABETES MELLITUS (HCC): ICD-10-CM

## 2018-12-11 DIAGNOSIS — R63.5 ABNORMAL WEIGHT GAIN: ICD-10-CM

## 2018-12-11 DIAGNOSIS — IMO0002 UNCONTROLLED TYPE II DIABETES MELLITUS WITH NEPHROPATHY: ICD-10-CM

## 2018-12-11 DIAGNOSIS — E16.1 HYPERINSULINISM: ICD-10-CM

## 2018-12-11 PROCEDURE — 99214 OFFICE O/P EST MOD 30 MIN: CPT | Performed by: INTERNAL MEDICINE

## 2018-12-11 NOTE — PATIENT INSTRUCTIONS
Insulin instructions    Increase NPH to 30 units in the morning and continue 25 at bedtime    Increase the regular insulin to 24 units before breakfast and 20 units before dinner

## 2018-12-11 NOTE — PROGRESS NOTES
73 y.o.    Patient Care Team:  Agueda Rodriguez MD as PCP - General (Internal Medicine)  Agueda Rodriguez MD as PCP - Claims Attributed  Wally Craft MD as Consulting Physician (Endocrinology)  Warner Tang MD as Consulting Physician (Cardiology)  Sergio Eagle MD as Consulting Physician (Urology)    Chief Complaint:      F/U TYPE 2 DIABETES, UNCONTROLLED.  HERE TO DISCUSS LAB RESULTS.  Subjective     HPI   Patient is a 73-year-old white female with a past history of uncontrolled type 2 diabetes mellitus with complications came for follow-up    Uncontrolled type 2 diabetes mellitus  Patient is currently taking NPH insulin 25 units twice daily and regular insulin 18 units twice daily  Her blood sugars are mostly fluctuating between 100 300  Her evening blood sugars are generally elevated  Hypoglycemia  Patient reported occasional hypoglycemia with a blood sugar dropping into 70-80 range  Patient is symptomatic at this level  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  Patient is significant symptoms of tingling numbness and burning in both lower extremities  Uncontrolled type 2 diabetes mellitus with retinopathy  Patient is status post laser treatments in the past and has regular follow-up with ophthalmologist  Uncontrolled type 2 diabetes mellitus with nephropathy  Patient has microalbuminuria and elevated creatinine with chronic kidney disease  She has regular follow-up with a nephrologist  Abnormal weight gain  Patient currently weighs 217 pounds with a BMI of 39.8  Hyperlipidemia associated with diabetes  Patient is currently taking simvastatin 20 mg daily.    Interval History November 3, 2016       Summary  Patient reported that she was diagnosed in 1996 with type 2 diabetes and has been on several different medications until 10 years ago when she started taking Lantus 44 units at bedtime in addition Novolin regular insulin 15 units before breakfast and lunch and 20 units  before dinner and metformin and Amaryl. Despite all this medication the blood sugars tend to be in the 150-300-400 range and hence the consultation.  Patient reported that she is not very compliant with her diet or activity. She does take her insulin injections on time.  She is occasionally known to miss her lunch.  Due to the extremely high cost of the insulins patient will switch to NPH and regular insulins and previous visit.  Patient reports that his tolerated this which very well.  she managed to increase the insulin NPH to 40 units in the morning and 30 units at night and regular insulin to 40 units at breakfast and supper. Despite which her blood sugars continue to be high  Her blood sugars have improved to some extent but still fluctuating between 150-400.  Fasting blood sugars appear to be consistent mostly less than 150  Diabetic neuropathy  Patient reports significant symptoms of tingling and numbness in both lower extremities  Diabetic nephropathy  Patient is known to have microalbuminuria and elevated creatinine next line diabetic retinopathy uncontrolled  Patient has history of background diabetic retinopathy.  Hypoglycemia patient has had experience with hypoglycemia but not in the past one month.  Hypertension patient is currently on medication  Hyperlipidemia patient is currently on medication.  interval history  Patient reported occasional hypoglycemia usually at night and early morning hours         The following portions of the patient's history were reviewed and updated as appropriate: allergies, current medications, past family history, past medical history, past social history, past surgical history and problem list.    Past Medical History:   Diagnosis Date   • Angiomyolipoma of kidney 3/30/2016   • CAD (coronary artery disease)     MI in 1996, treated medically.  PET 6/2016 with small-medium sized lateral infarct, no ischemia.  This wall motion abnormality was seen on echo as well.   •  Cellulitis     2018 RIGHT LEG   • Chronic venous insufficiency    • Hyperlipidemia    • Hypertension    • Low back pain    • Mass of ovary 3/30/2016   • Morbid obesity (CMS/HCC)    • Sleep apnea    • Spinal stenosis    • Type 2 diabetes mellitus (CMS/HCC)    • Uterine leiomyoma 3/30/2016     Family History   Problem Relation Age of Onset   • Diabetes Mother    • Heart attack Mother    • Hypertension Mother    • Hypothyroidism Mother    • Diabetes type II Son      Social History     Socioeconomic History   • Marital status: Single     Spouse name: Not on file   • Number of children: 3   • Years of education: Not on file   • Highest education level: Not on file   Social Needs   • Financial resource strain: Not on file   • Food insecurity - worry: Not on file   • Food insecurity - inability: Not on file   • Transportation needs - medical: Not on file   • Transportation needs - non-medical: Not on file   Occupational History   • Occupation: retired from [employer]   Tobacco Use   • Smoking status: Former Smoker     Packs/day: 0.25     Years: 2.00     Pack years: 0.50     Types: Cigarettes     Last attempt to quit: 1970     Years since quittin.9   • Smokeless tobacco: Never Used   • Tobacco comment: LIGHT USAGE   Substance and Sexual Activity   • Alcohol use: No     Comment: CAFFEINE USE: 10 -12 CUPS OF COFFEE DAILY.    • Drug use: No   • Sexual activity: Defer   Other Topics Concern   • Not on file   Social History Narrative   • Not on file     Allergies   Allergen Reactions   • Ace Inhibitors Other (See Comments)     Hyperkalemia/ROB   • Angiotensin Receptor Blockers Other (See Comments)     Pt unable to remember   • Keflex [Cephalexin] Rash   • Sulfa Antibiotics Itching     rash       Current Outpatient Medications:   •  acetaminophen-codeine (TYLENOL #3) 300-30 MG per tablet, , Disp: , Rfl:   •  acetaminophen-codeine (TYLENOL #3) 300-30 MG per tablet, TAKE 1 TABLET BY MOUTH EVERY 6 HOURS AS NEEDED FOR MODERATE  "PAIN, Disp: 30 tablet, Rfl: 2  •  amLODIPine (NORVASC) 10 MG tablet, TAKE ONE TABLET BY MOUTH ONCE DAILY, Disp: 90 tablet, Rfl: 3  •  aspirin 81 MG tablet, Take  by mouth daily., Disp: , Rfl:   •  docusate sodium (COLACE) 100 MG capsule, Take 100 mg by mouth 2 (two) times a day., Disp: , Rfl:   •  furosemide (LASIX) 40 MG tablet, TAKE ONE TABLET BY MOUTH ONCE DAILY, Disp: 90 tablet, Rfl: 3  •  insulin NPH (NOVOLIN N RELION) 100 UNIT/ML injection, INJECT 25 UNITS SUBCUTANEOUSLY IN THE MORNING AND 25 UNITS IN THE EVENING, Disp: 40 mL, Rfl: 3  •  insulin regular (humuLIN R,novoLIN R) 100 UNIT/ML injection, Inject 18 Units under the skin Daily With Breakfast & Dinner., Disp: , Rfl: 12  •  isosorbide mononitrate (IMDUR) 60 MG 24 hr tablet, TAKE 1 TABLET BY MOUTH ONCE DAILY, Disp: 90 tablet, Rfl: 1  •  metoprolol succinate XL (TOPROL-XL) 25 MG 24 hr tablet, TAKE ONE TABLET BY MOUTH ONCE DAILY, Disp: 30 tablet, Rfl: 6  •  Misc. Devices (ROLLATOR) misc, 1 Units as needed (for walking)., Disp: 1 each, Rfl: 0  •  simvastatin (ZOCOR) 40 MG tablet, TAKE ONE-HALF TABLET BY MOUTH ONCE DAILY, Disp: 45 tablet, Rfl: 3        Review of Systems   Constitutional: Negative for chills, fatigue and fever.   Cardiovascular: Negative for chest pain and palpitations.   Gastrointestinal: Negative for abdominal pain, constipation, diarrhea, nausea and vomiting.   Endocrine: Negative for cold intolerance and heat intolerance.   All other systems reviewed and are negative.      Objective       Vitals:    12/11/18 1510   BP: 138/68   Pulse: 86   Weight: 98.6 kg (217 lb 6.4 oz)   Height: 157.5 cm (62\")     Body mass index is 39.76 kg/m².      Physical Exam   Constitutional: She is oriented to person, place, and time. She appears well-developed.   HENT:   Head: Normocephalic and atraumatic.   Eyes: EOM are normal. Pupils are equal, round, and reactive to light.   Neck: Normal range of motion. Neck supple. No thyromegaly present.   Cardiovascular: " Normal rate, regular rhythm, normal heart sounds and intact distal pulses.   Pulmonary/Chest: Effort normal and breath sounds normal.   Abdominal: Soft. Bowel sounds are normal. She exhibits distension. There is no tenderness.   Musculoskeletal: Normal range of motion. She exhibits no edema.   Neurological: She is alert and oriented to person, place, and time.   Skin: Skin is warm and dry.   Psychiatric: She has a normal mood and affect. Her behavior is normal.   Nursing note and vitals reviewed.    Results Review:     I reviewed the patient's new clinical results.    Medical records reviewed  Summary:      Results Encounter on 11/27/2018   Component Date Value Ref Range Status   • Glucose 12/04/2018 208* 65 - 99 mg/dL Final   • BUN 12/04/2018 27* 8 - 23 mg/dL Final   • Creatinine 12/04/2018 1.06* 0.57 - 1.00 mg/dL Final   • eGFR Non  Am 12/04/2018 51* >60 mL/min/1.73 Final    Comment: The MDRD GFR formula is only valid for adults with stable  renal function between ages 18 and 70.     • eGFR  Am 12/04/2018 62  >60 mL/min/1.73 Final   • BUN/Creatinine Ratio 12/04/2018 25.5* 7.0 - 25.0 Final   • Sodium 12/04/2018 142  136 - 145 mmol/L Final   • Potassium 12/04/2018 4.1  3.5 - 5.2 mmol/L Final   • Chloride 12/04/2018 98  98 - 107 mmol/L Final   • Total CO2 12/04/2018 30.2* 22.0 - 29.0 mmol/L Final   • Calcium 12/04/2018 9.8  8.6 - 10.5 mg/dL Final   • Total Protein 12/04/2018 6.9  6.0 - 8.5 g/dL Final   • Albumin 12/04/2018 4.10  3.50 - 5.20 g/dL Final   • Globulin 12/04/2018 2.8  gm/dL Final   • A/G Ratio 12/04/2018 1.5  g/dL Final   • Total Bilirubin 12/04/2018 0.4  0.1 - 1.2 mg/dL Final   • Alkaline Phosphatase 12/04/2018 101  39 - 117 U/L Final   • AST (SGOT) 12/04/2018 15  1 - 32 U/L Final   • ALT (SGPT) 12/04/2018 14  1 - 33 U/L Final   • Hemoglobin A1C 12/04/2018 9.61* 4.80 - 5.60 % Final    Comment: Hemoglobin A1C Ranges:  Increased Risk for Diabetes  5.7% to 6.4%  Diabetes                     >=  6.5%  Diabetic Goal                < 7.0%     • TSH 12/04/2018 2.250  0.270 - 4.200 mIU/mL Final   • Creatinine, Urine 12/04/2018 16.4  Not Estab. mg/dL Final   • Microalbumin, Urine 12/04/2018 267.5  Not Estab. ug/mL Final   • Microalbumin/Creatinine Ratio 12/04/2018 1,631.1* 0.0 - 30.0 mg/g creat Final    Comment:                      Normal:                0.0 -  30.0                       Albuminuria:          31.0 - 300.0                       Clinical albuminuria:       >300.0       Lab Results   Component Value Date    HGBA1C 9.61 (H) 12/04/2018    HGBA1C 9.06 (H) 10/31/2018    HGBA1C 8.10 (H) 08/22/2018     Lab Results   Component Value Date    MICROALBUR 267.5 12/04/2018    CREATININE 1.06 (H) 12/04/2018     Imaging Results (most recent)     None          Assessment and Plan:    Denisse was seen today for diabetes.    Diagnoses and all orders for this visit:    Diabetes mellitus type 2, uncontrolled, with complications (CMS/[facility])    Uncontrolled type II diabetes mellitus with nephropathy (CMS/[facility])    Type II diabetes mellitus with neurological manifestations, uncontrolled (CMS/[facility])    Uncontrolled type 2 diabetes mellitus with background retinopathy (CMS/[facility])    Chronic kidney disease, stage III (moderate) (CMS/[facility])    Encounter for long-term (current) use of insulin (CMS/[facility])    Hyperinsulinism    Hyperlipidemia associated with type 2 diabetes mellitus (CMS/[facility])    Abnormal weight gain       Patient's glucose log reviewed  Fasting blood sugar is in the 100 range  Blood sugars before dinner and bedtime are usually in the 200-300 range    Patient denied any recent hypoglycemia    I advised the patient to increase the insulin NPH 30 units before breakfast and keep the 25 at bedtime  I also advised her to increase the regular insulin to 24 units before breakfast and 20 before dinner  I also advised her to increase all the 3 insulin doses by 2 units once in 10 days until the blood sugars are below  "200  Hemoglobin A1c is 9.6%  Urine protein and 1631 mg  Patient has regular follow-up with a nephrologist    Patient will return to follow-up in 3 months    The total time spent  was more than 25 min of which greater than 15 min of time (greater than 50% of the total time)  was spent face to face with the patient counseling and coordination of care on recommended evaluation and treatment options, instructions for management/treatment and /or follow up and importance of compliance with chosen management or treatment options    Wally Craft MD. FACE    12/11/18      EMR Dragon / transcription disclaimer:     \"Dictated utilizing Dragon dictation\".         "

## 2018-12-14 DIAGNOSIS — Z79.4 TYPE 2 DIABETES MELLITUS WITH COMPLICATION, WITH LONG-TERM CURRENT USE OF INSULIN (HCC): ICD-10-CM

## 2018-12-14 DIAGNOSIS — E11.8 TYPE 2 DIABETES MELLITUS WITH COMPLICATION, WITH LONG-TERM CURRENT USE OF INSULIN (HCC): ICD-10-CM

## 2018-12-17 RX ORDER — HUMAN INSULIN 100 [IU]/ML
INJECTION, SOLUTION SUBCUTANEOUS
Qty: 30 ML | Refills: 3 | Status: SHIPPED | OUTPATIENT
Start: 2018-12-17 | End: 2019-03-28

## 2018-12-20 PROBLEM — E78.5 HYPERLIPIDEMIA ASSOCIATED WITH TYPE 2 DIABETES MELLITUS: Status: ACTIVE | Noted: 2018-12-20

## 2018-12-20 PROBLEM — E11.69 HYPERLIPIDEMIA ASSOCIATED WITH TYPE 2 DIABETES MELLITUS: Status: ACTIVE | Noted: 2018-12-20

## 2019-01-08 RX ORDER — SIMVASTATIN 40 MG
TABLET ORAL
Qty: 45 TABLET | Refills: 3 | Status: SHIPPED | OUTPATIENT
Start: 2019-01-08 | End: 2020-02-07 | Stop reason: SDUPTHER

## 2019-02-11 DIAGNOSIS — I10 ESSENTIAL HYPERTENSION: ICD-10-CM

## 2019-02-11 RX ORDER — METOPROLOL SUCCINATE 25 MG/1
TABLET, EXTENDED RELEASE ORAL
Qty: 90 TABLET | Refills: 2 | Status: SHIPPED | OUTPATIENT
Start: 2019-02-11 | End: 2019-11-26 | Stop reason: SDUPTHER

## 2019-03-12 ENCOUNTER — TELEPHONE (OUTPATIENT)
Dept: ENDOCRINOLOGY | Age: 74
End: 2019-03-12

## 2019-03-12 NOTE — TELEPHONE ENCOUNTER
----- Message from Monet Marmolejo sent at 3/11/2019 11:31 AM EDT -----  Contact: PATIENT  FYRICKEY PATIENT HAS APPT ON 03/19/2019 FOR FITTING OF HER DIABETIC SHOES  CAN YOU PLEASE HAVE THE PAPERWORK SIGNED BY THEN   PER PATIENT\    PAPERWORK FAXED

## 2019-03-14 ENCOUNTER — LAB (OUTPATIENT)
Dept: ENDOCRINOLOGY | Age: 74
End: 2019-03-14

## 2019-03-14 DIAGNOSIS — Z79.4 TYPE 2 DIABETES MELLITUS WITH COMPLICATION, WITH LONG-TERM CURRENT USE OF INSULIN (HCC): Primary | ICD-10-CM

## 2019-03-14 DIAGNOSIS — E11.8 TYPE 2 DIABETES MELLITUS WITH COMPLICATION, WITH LONG-TERM CURRENT USE OF INSULIN (HCC): Primary | ICD-10-CM

## 2019-03-14 DIAGNOSIS — Z79.4 TYPE 2 DIABETES MELLITUS WITH COMPLICATION, WITH LONG-TERM CURRENT USE OF INSULIN (HCC): ICD-10-CM

## 2019-03-14 DIAGNOSIS — E11.8 TYPE 2 DIABETES MELLITUS WITH COMPLICATION, WITH LONG-TERM CURRENT USE OF INSULIN (HCC): ICD-10-CM

## 2019-03-15 LAB
ALBUMIN SERPL-MCNC: 4.5 G/DL (ref 3.5–5.2)
ALBUMIN/CREAT UR: 1668.6 MG/G CREAT (ref 0–30)
ALBUMIN/GLOB SERPL: 1.8 G/DL
ALP SERPL-CCNC: 88 U/L (ref 39–117)
ALT SERPL-CCNC: 18 U/L (ref 1–33)
AST SERPL-CCNC: 17 U/L (ref 1–32)
BILIRUB SERPL-MCNC: 0.5 MG/DL (ref 0.1–1.2)
BUN SERPL-MCNC: 21 MG/DL (ref 8–23)
BUN/CREAT SERPL: 21.2 (ref 7–25)
CALCIUM SERPL-MCNC: 10 MG/DL (ref 8.6–10.5)
CHLORIDE SERPL-SCNC: 103 MMOL/L (ref 98–107)
CO2 SERPL-SCNC: 26.4 MMOL/L (ref 22–29)
CREAT SERPL-MCNC: 0.99 MG/DL (ref 0.57–1)
CREAT UR-MCNC: 28.7 MG/DL
GLOBULIN SER CALC-MCNC: 2.5 GM/DL
GLUCOSE SERPL-MCNC: 159 MG/DL (ref 65–99)
HBA1C MFR BLD: 9.47 % (ref 4.8–5.6)
MICROALBUMIN UR-MCNC: 478.9 UG/ML
POTASSIUM SERPL-SCNC: 4.8 MMOL/L (ref 3.5–5.2)
PROT SERPL-MCNC: 7 G/DL (ref 6–8.5)
SODIUM SERPL-SCNC: 142 MMOL/L (ref 136–145)
TSH SERPL DL<=0.005 MIU/L-ACNC: 3.48 MIU/ML (ref 0.27–4.2)

## 2019-03-19 RX ORDER — ACETAMINOPHEN AND CODEINE PHOSPHATE 300; 30 MG/1; MG/1
TABLET ORAL
Qty: 30 TABLET | Refills: 2 | Status: SHIPPED | OUTPATIENT
Start: 2019-03-19 | End: 2019-06-24 | Stop reason: SDUPTHER

## 2019-03-19 NOTE — TELEPHONE ENCOUNTER
Tylenol 3# refill.   Narc was signed on 11/7/2018, but is not scanned into the chart. Has an upcoming apt on 5/15/2019  Dmitry needs review.

## 2019-03-28 ENCOUNTER — OFFICE VISIT (OUTPATIENT)
Dept: ENDOCRINOLOGY | Age: 74
End: 2019-03-28

## 2019-03-28 VITALS
HEIGHT: 62 IN | SYSTOLIC BLOOD PRESSURE: 136 MMHG | BODY MASS INDEX: 40.63 KG/M2 | WEIGHT: 220.8 LBS | HEART RATE: 76 BPM | DIASTOLIC BLOOD PRESSURE: 70 MMHG

## 2019-03-28 DIAGNOSIS — R63.5 ABNORMAL WEIGHT GAIN: ICD-10-CM

## 2019-03-28 DIAGNOSIS — IMO0002 TYPE II DIABETES MELLITUS WITH NEUROLOGICAL MANIFESTATIONS, UNCONTROLLED: ICD-10-CM

## 2019-03-28 DIAGNOSIS — IMO0002 UNCONTROLLED TYPE 2 DIABETES MELLITUS WITH BACKGROUND RETINOPATHY: ICD-10-CM

## 2019-03-28 DIAGNOSIS — E16.1 HYPERINSULINISM: ICD-10-CM

## 2019-03-28 DIAGNOSIS — E11.69 HYPERLIPIDEMIA ASSOCIATED WITH TYPE 2 DIABETES MELLITUS (HCC): ICD-10-CM

## 2019-03-28 DIAGNOSIS — IMO0002 UNCONTROLLED TYPE II DIABETES MELLITUS WITH NEPHROPATHY: ICD-10-CM

## 2019-03-28 DIAGNOSIS — N18.30 CHRONIC KIDNEY DISEASE, STAGE III (MODERATE) (HCC): ICD-10-CM

## 2019-03-28 DIAGNOSIS — E78.5 HYPERLIPIDEMIA ASSOCIATED WITH TYPE 2 DIABETES MELLITUS (HCC): ICD-10-CM

## 2019-03-28 DIAGNOSIS — IMO0002 DIABETES MELLITUS TYPE 2, UNCONTROLLED, WITH COMPLICATIONS: Primary | ICD-10-CM

## 2019-03-28 DIAGNOSIS — Z79.4 ENCOUNTER FOR LONG-TERM (CURRENT) USE OF INSULIN (HCC): ICD-10-CM

## 2019-03-28 PROCEDURE — 99214 OFFICE O/P EST MOD 30 MIN: CPT | Performed by: INTERNAL MEDICINE

## 2019-03-28 RX ORDER — ERGOCALCIFEROL 1.25 MG/1
50000 CAPSULE ORAL
COMMUNITY
Start: 2019-03-09

## 2019-04-18 RX ORDER — ISOSORBIDE MONONITRATE 60 MG/1
TABLET, EXTENDED RELEASE ORAL
Qty: 90 TABLET | Refills: 1 | Status: SHIPPED | OUTPATIENT
Start: 2019-04-18 | End: 2019-10-31 | Stop reason: SDUPTHER

## 2019-04-24 ENCOUNTER — OFFICE VISIT (OUTPATIENT)
Dept: SLEEP MEDICINE | Facility: HOSPITAL | Age: 74
End: 2019-04-24
Attending: INTERNAL MEDICINE

## 2019-04-24 VITALS — WEIGHT: 220.8 LBS | HEIGHT: 63 IN | BODY MASS INDEX: 39.12 KG/M2

## 2019-04-24 DIAGNOSIS — G47.33 OBSTRUCTIVE SLEEP APNEA SYNDROME: Primary | Chronic | ICD-10-CM

## 2019-04-24 DIAGNOSIS — E66.01 CLASS 2 SEVERE OBESITY DUE TO EXCESS CALORIES WITH SERIOUS COMORBIDITY AND BODY MASS INDEX (BMI) OF 39.0 TO 39.9 IN ADULT (HCC): ICD-10-CM

## 2019-04-24 PROCEDURE — 99213 OFFICE O/P EST LOW 20 MIN: CPT | Performed by: INTERNAL MEDICINE

## 2019-04-24 PROCEDURE — G0463 HOSPITAL OUTPT CLINIC VISIT: HCPCS

## 2019-04-25 ENCOUNTER — OFFICE VISIT (OUTPATIENT)
Dept: ENDOCRINOLOGY | Age: 74
End: 2019-04-25

## 2019-04-25 VITALS
HEIGHT: 62 IN | BODY MASS INDEX: 41.07 KG/M2 | HEART RATE: 90 BPM | DIASTOLIC BLOOD PRESSURE: 70 MMHG | WEIGHT: 223.2 LBS | SYSTOLIC BLOOD PRESSURE: 136 MMHG

## 2019-04-25 DIAGNOSIS — R63.5 ABNORMAL WEIGHT GAIN: ICD-10-CM

## 2019-04-25 DIAGNOSIS — N18.30 CHRONIC KIDNEY DISEASE, STAGE III (MODERATE) (HCC): ICD-10-CM

## 2019-04-25 DIAGNOSIS — IMO0002 UNCONTROLLED TYPE 2 DIABETES MELLITUS WITH BACKGROUND RETINOPATHY: ICD-10-CM

## 2019-04-25 DIAGNOSIS — E78.5 HYPERLIPIDEMIA ASSOCIATED WITH TYPE 2 DIABETES MELLITUS (HCC): ICD-10-CM

## 2019-04-25 DIAGNOSIS — IMO0002 UNCONTROLLED TYPE II DIABETES MELLITUS WITH NEPHROPATHY: ICD-10-CM

## 2019-04-25 DIAGNOSIS — Z79.4 ENCOUNTER FOR LONG-TERM (CURRENT) USE OF INSULIN (HCC): ICD-10-CM

## 2019-04-25 DIAGNOSIS — IMO0002 UNCONTROLLED TYPE 2 DIABETES MELLITUS WITH COMPLICATION, WITH LONG-TERM CURRENT USE OF INSULIN: Primary | ICD-10-CM

## 2019-04-25 DIAGNOSIS — E11.69 HYPERLIPIDEMIA ASSOCIATED WITH TYPE 2 DIABETES MELLITUS (HCC): ICD-10-CM

## 2019-04-25 DIAGNOSIS — E16.1 HYPERINSULINISM: ICD-10-CM

## 2019-04-25 DIAGNOSIS — IMO0002 UNCONTROLLED TYPE 2 DIABETES MELLITUS WITH DIABETIC NEUROPATHY, WITH LONG-TERM CURRENT USE OF INSULIN: ICD-10-CM

## 2019-04-25 PROCEDURE — 99214 OFFICE O/P EST MOD 30 MIN: CPT | Performed by: INTERNAL MEDICINE

## 2019-04-25 PROCEDURE — 95251 CONT GLUC MNTR ANALYSIS I&R: CPT | Performed by: INTERNAL MEDICINE

## 2019-04-25 PROCEDURE — 95250 CONT GLUC MNTR PHYS/QHP EQP: CPT | Performed by: INTERNAL MEDICINE

## 2019-04-26 DIAGNOSIS — E11.8 TYPE 2 DIABETES MELLITUS WITH COMPLICATION, WITH LONG-TERM CURRENT USE OF INSULIN (HCC): ICD-10-CM

## 2019-04-26 DIAGNOSIS — Z00.00 HEALTH CARE MAINTENANCE: Primary | ICD-10-CM

## 2019-04-26 DIAGNOSIS — I10 ESSENTIAL HYPERTENSION: ICD-10-CM

## 2019-04-26 DIAGNOSIS — Z79.4 TYPE 2 DIABETES MELLITUS WITH COMPLICATION, WITH LONG-TERM CURRENT USE OF INSULIN (HCC): ICD-10-CM

## 2019-04-26 DIAGNOSIS — N18.30 CHRONIC KIDNEY DISEASE, STAGE III (MODERATE) (HCC): ICD-10-CM

## 2019-04-26 DIAGNOSIS — E78.2 MIXED HYPERLIPIDEMIA: ICD-10-CM

## 2019-05-03 PROBLEM — E66.813 CLASS 3 SEVERE OBESITY DUE TO EXCESS CALORIES WITH SERIOUS COMORBIDITY AND BODY MASS INDEX (BMI) OF 45.0 TO 49.9 IN ADULT: Status: ACTIVE | Noted: 2018-04-23

## 2019-05-03 PROBLEM — E66.812 CLASS 2 SEVERE OBESITY DUE TO EXCESS CALORIES WITH SERIOUS COMORBIDITY AND BODY MASS INDEX (BMI) OF 39.0 TO 39.9 IN ADULT: Status: ACTIVE | Noted: 2018-04-23

## 2019-05-03 PROBLEM — E66.01 CLASS 3 SEVERE OBESITY DUE TO EXCESS CALORIES WITH SERIOUS COMORBIDITY AND BODY MASS INDEX (BMI) OF 45.0 TO 49.9 IN ADULT: Status: ACTIVE | Noted: 2018-04-23

## 2019-05-09 PROBLEM — IMO0002 UNCONTROLLED TYPE 2 DIABETES MELLITUS WITH COMPLICATION, WITH LONG-TERM CURRENT USE OF INSULIN: Status: ACTIVE | Noted: 2017-10-24

## 2019-05-13 LAB
ALBUMIN SERPL-MCNC: 4.3 G/DL (ref 3.5–5.2)
ALBUMIN/CREAT UR: 1255.8 MG/G CREAT (ref 0–30)
ALBUMIN/GLOB SERPL: 1.4 G/DL
ALP SERPL-CCNC: 85 U/L (ref 39–117)
ALT SERPL-CCNC: 18 U/L (ref 1–33)
APPEARANCE UR: CLEAR
AST SERPL-CCNC: 16 U/L (ref 1–32)
BACTERIA #/AREA URNS HPF: NORMAL /HPF
BACTERIA UR CULT: ABNORMAL
BASOPHILS # BLD AUTO: 0.03 10*3/MM3 (ref 0–0.2)
BASOPHILS NFR BLD AUTO: 0.3 % (ref 0–1.5)
BILIRUB SERPL-MCNC: 0.5 MG/DL (ref 0.2–1.2)
BILIRUB UR QL STRIP: NEGATIVE
BUN SERPL-MCNC: 23 MG/DL (ref 8–23)
BUN/CREAT SERPL: 20.5 (ref 7–25)
CALCIUM SERPL-MCNC: 10.1 MG/DL (ref 8.6–10.5)
CHLORIDE SERPL-SCNC: 99 MMOL/L (ref 98–107)
CHOLEST SERPL-MCNC: 184 MG/DL (ref 0–200)
CO2 SERPL-SCNC: 30.7 MMOL/L (ref 22–29)
COLOR UR: YELLOW
CREAT SERPL-MCNC: 1.12 MG/DL (ref 0.57–1)
CREAT UR-MCNC: 33.5 MG/DL
EOSINOPHIL # BLD AUTO: 0.18 10*3/MM3 (ref 0–0.4)
EOSINOPHIL NFR BLD AUTO: 1.9 % (ref 0.3–6.2)
EPI CELLS #/AREA URNS HPF: NORMAL /HPF
ERYTHROCYTE [DISTWIDTH] IN BLOOD BY AUTOMATED COUNT: 13.5 % (ref 12.3–15.4)
GLOBULIN SER CALC-MCNC: 3 GM/DL
GLUCOSE SERPL-MCNC: 142 MG/DL (ref 65–99)
GLUCOSE UR QL: NEGATIVE
HBA1C MFR BLD: 9.2 % (ref 4.8–5.6)
HCT VFR BLD AUTO: 42.7 % (ref 34–46.6)
HDLC SERPL-MCNC: 37 MG/DL (ref 40–60)
HGB BLD-MCNC: 13.3 G/DL (ref 12–15.9)
HGB UR QL STRIP: ABNORMAL
IMM GRANULOCYTES # BLD AUTO: 0.04 10*3/MM3 (ref 0–0.05)
IMM GRANULOCYTES NFR BLD AUTO: 0.4 % (ref 0–0.5)
KETONES UR QL STRIP: NEGATIVE
LDLC SERPL CALC-MCNC: 101 MG/DL (ref 0–100)
LEUKOCYTE ESTERASE UR QL STRIP: ABNORMAL
LYMPHOCYTES # BLD AUTO: 2.34 10*3/MM3 (ref 0.7–3.1)
LYMPHOCYTES NFR BLD AUTO: 25.3 % (ref 19.6–45.3)
MCH RBC QN AUTO: 27.3 PG (ref 26.6–33)
MCHC RBC AUTO-ENTMCNC: 31.1 G/DL (ref 31.5–35.7)
MCV RBC AUTO: 87.5 FL (ref 79–97)
MICRO URNS: ABNORMAL
MICROALBUMIN UR-MCNC: 420.7 UG/ML
MONOCYTES # BLD AUTO: 0.75 10*3/MM3 (ref 0.1–0.9)
MONOCYTES NFR BLD AUTO: 8.1 % (ref 5–12)
MUCOUS THREADS URNS QL MICRO: PRESENT /HPF
NEUTROPHILS # BLD AUTO: 5.92 10*3/MM3 (ref 1.7–7)
NEUTROPHILS NFR BLD AUTO: 64 % (ref 42.7–76)
NITRITE UR QL STRIP: NEGATIVE
NRBC BLD AUTO-RTO: 0 /100 WBC (ref 0–0.2)
OTHER ANTIBIOTIC SUSC ISLT: ABNORMAL
PH UR STRIP: 5.5 [PH] (ref 5–7.5)
PLATELET # BLD AUTO: 277 10*3/MM3 (ref 140–450)
POTASSIUM SERPL-SCNC: 4.3 MMOL/L (ref 3.5–5.2)
PROT SERPL-MCNC: 7.3 G/DL (ref 6–8.5)
PROT UR QL STRIP: ABNORMAL
RBC # BLD AUTO: 4.88 10*6/MM3 (ref 3.77–5.28)
RBC #/AREA URNS HPF: NORMAL /HPF
SODIUM SERPL-SCNC: 141 MMOL/L (ref 136–145)
SP GR UR: 1.01 (ref 1–1.03)
TRIGL SERPL-MCNC: 231 MG/DL (ref 0–150)
TSH SERPL DL<=0.005 MIU/L-ACNC: 3.32 MIU/ML (ref 0.27–4.2)
URINALYSIS REFLEX: ABNORMAL
UROBILINOGEN UR STRIP-MCNC: 0.2 MG/DL (ref 0.2–1)
VLDLC SERPL CALC-MCNC: 46.2 MG/DL
WBC # BLD AUTO: 9.26 10*3/MM3 (ref 3.4–10.8)
WBC #/AREA URNS HPF: NORMAL /HPF

## 2019-05-15 ENCOUNTER — OFFICE VISIT (OUTPATIENT)
Dept: INTERNAL MEDICINE | Facility: CLINIC | Age: 74
End: 2019-05-15

## 2019-05-15 VITALS
WEIGHT: 220 LBS | SYSTOLIC BLOOD PRESSURE: 128 MMHG | BODY MASS INDEX: 40.48 KG/M2 | HEART RATE: 68 BPM | DIASTOLIC BLOOD PRESSURE: 58 MMHG | HEIGHT: 62 IN | RESPIRATION RATE: 16 BRPM | OXYGEN SATURATION: 98 %

## 2019-05-15 DIAGNOSIS — N18.30 CHRONIC KIDNEY DISEASE, STAGE III (MODERATE) (HCC): ICD-10-CM

## 2019-05-15 DIAGNOSIS — IMO0002 UNCONTROLLED TYPE 2 DIABETES MELLITUS WITH DIABETIC NEUROPATHY, WITH LONG-TERM CURRENT USE OF INSULIN: ICD-10-CM

## 2019-05-15 DIAGNOSIS — I10 ESSENTIAL HYPERTENSION: ICD-10-CM

## 2019-05-15 DIAGNOSIS — IMO0002 UNCONTROLLED TYPE II DIABETES MELLITUS WITH NEPHROPATHY: ICD-10-CM

## 2019-05-15 DIAGNOSIS — Z00.00 MEDICARE ANNUAL WELLNESS VISIT, SUBSEQUENT: Primary | ICD-10-CM

## 2019-05-15 DIAGNOSIS — E78.2 MIXED HYPERLIPIDEMIA: ICD-10-CM

## 2019-05-15 PROBLEM — J06.9 VIRAL UPPER RESPIRATORY INFECTION: Status: RESOLVED | Noted: 2018-07-16 | Resolved: 2019-05-15

## 2019-05-15 PROCEDURE — 99214 OFFICE O/P EST MOD 30 MIN: CPT | Performed by: INTERNAL MEDICINE

## 2019-05-15 PROCEDURE — G0439 PPPS, SUBSEQ VISIT: HCPCS | Performed by: INTERNAL MEDICINE

## 2019-05-15 RX ORDER — HYDROXYZINE HYDROCHLORIDE 25 MG/1
25 TABLET, FILM COATED ORAL NIGHTLY PRN
Qty: 30 TABLET | Refills: 2 | Status: SHIPPED | OUTPATIENT
Start: 2019-05-15 | End: 2019-09-03 | Stop reason: SDUPTHER

## 2019-05-15 NOTE — PROGRESS NOTES
Medicare Annual Wellness Visit    Chief Complaint:  Annual Exam (AWV)      History of Present Illness    Denisse Chavez is a 73 y.o. female who presents for an Annual Wellness Visit. In addition, we addressed the following health issues:    Mammo:11/14/18   DEXA:2015  C-scope:8/29/18  Dental Exam: dentures  Eye Exam:last month  Exercise: minimal.  She gets up to let the dogs out.    Advanced Care Planning:  Patient has an advance directive - a copy has been provided and is visible in patient header   Immunization History   Administered Date(s) Administered   • Flu Vaccine High Dose PF 65YR+ 10/13/2016, 10/24/2017, 09/23/2018   • Hepatitis A 05/01/2018, 11/07/2018   • Pneumococcal Conjugate 13-Valent (PCV13) 03/18/2015   • Pneumococcal Polysaccharide (PPSV23) 04/13/2016   • Tdap 10/13/2016     Denisse is here for follow up on her HTN, HLD, DM, insomnia.  She sees endo for her DM.  Insomnia is an issue for her.  Once she is awake in the middle of the night, she is awake.  She takes the tylenol 3 at night to help with her back pain and help her sleep. She feels like she needs something to help her go back to sleep in the middle of the night.  It doesn't seem that back pain is keeping her up when she wakes up.  We discussed that most sleep aids are high risk at her age.  She is agreeable to try something like hydroxyzine.     She sees her podiatrist q3mo.  She just saw him last week.  She is wearing zip up compression hose and does not wish to take them off.     Review of Systems   Constitution: Negative for chills, fever and malaise/fatigue.   HENT: Negative for hearing loss, hoarse voice and sore throat.    Eyes: Negative for blurred vision, double vision and visual disturbance.   Cardiovascular: Positive for leg swelling. Negative for chest pain and palpitations.   Respiratory: Negative for cough and shortness of breath.    Endocrine: Negative for polydipsia and polyuria.   Hematologic/Lymphatic: Does not  bruise/bleed easily.   Skin: Negative for rash and suspicious lesions.   Musculoskeletal: Positive for arthritis, back pain and joint pain. Negative for myalgias.   Gastrointestinal: Negative for bloating, abdominal pain, change in bowel habit, constipation, diarrhea, dysphagia, hematochezia, melena, nausea and vomiting.   Genitourinary: Negative for dysuria and hematuria.   Neurological: Negative for dizziness, headaches and light-headedness.   Psychiatric/Behavioral: Negative for depression. The patient is not nervous/anxious.        Past Medical History:   Diagnosis Date   • Angiomyolipoma of kidney 3/30/2016   • CAD (coronary artery disease)     MI in , treated medically.  PET 2016 with small-medium sized lateral infarct, no ischemia.  This wall motion abnormality was seen on echo as well.   • Cellulitis     2018 RIGHT LEG   • Chronic venous insufficiency    • Hyperlipidemia    • Hypertension    • Low back pain    • Mass of ovary 3/30/2016   • Morbid obesity (CMS/HCC)    • Sleep apnea    • Spinal stenosis    • Type 2 diabetes mellitus (CMS/HCC)    • Uterine leiomyoma 3/30/2016       Past Surgical History:   Procedure Laterality Date   • CATARACT EXTRACTION      X2    • COLONOSCOPY N/A 2018    Procedure: COLONOSCOPY to CECUM WITH HOT SNARE POLYPECTOMY;  Surgeon: Neal Reilly MD;  Location: [facility] ENDOSCOPY;  Service: Gastroenterology   • HERNIA REPAIR     • HYSTERECTOMY     • TONSILLECTOMY         Social History     Socioeconomic History   • Marital status: Single     Spouse name: Not on file   • Number of children: 3   • Years of education: Not on file   • Highest education level: Not on file   Occupational History   • Occupation: retired from [employer]   Tobacco Use   • Smoking status: Former Smoker     Packs/day: 0.25     Years: 2.00     Pack years: 0.50     Types: Cigarettes     Last attempt to quit: 1970     Years since quittin.4   • Smokeless tobacco: Never Used   • Tobacco comment: LIGHT  USAGE   Substance and Sexual Activity   • Alcohol use: No     Comment: CAFFEINE USE: 10 -12 CUPS OF COFFEE DAILY.    • Drug use: No   • Sexual activity: Defer       Family History   Problem Relation Age of Onset   • Diabetes Mother    • Heart attack Mother    • Hypertension Mother    • Hypothyroidism Mother    • Diabetes type II Son        Allergies   Allergen Reactions   • Ace Inhibitors Other (See Comments)     Hyperkalemia/ROB   • Angiotensin Receptor Blockers Other (See Comments)     Pt unable to remember   • Keflex [Cephalexin] Rash   • Sulfa Antibiotics Itching     rash       Current Outpatient Medications on File Prior to Visit   Medication Sig Dispense Refill   • acetaminophen-codeine (TYLENOL #3) 300-30 MG per tablet TAKE 1 TABLET BY MOUTH EVERY 6 HOURS AS NEEDED FOR  MODERATE  PAIN 30 tablet 2   • amLODIPine (NORVASC) 10 MG tablet TAKE ONE TABLET BY MOUTH ONCE DAILY 90 tablet 3   • aspirin 81 MG tablet Take  by mouth daily.     • docusate sodium (COLACE) 100 MG capsule Take 100 mg by mouth 2 (two) times a day.     • furosemide (LASIX) 40 MG tablet TAKE ONE TABLET BY MOUTH ONCE DAILY 90 tablet 3   • insulin NPH (NOVOLIN N RELION) 100 UNIT/ML injection INJECT 25 UNITS SUBCUTANEOUSLY IN THE MORNING AND 25 UNITS IN THE EVENING (Patient taking differently: INJECT 32 UNITS SUBCUTANEOUSLY IN THE MORNING AND 25 UNITS IN THE EVENING) 40 mL 3   • isosorbide mononitrate (IMDUR) 60 MG 24 hr tablet TAKE 1 TABLET BY MOUTH ONCE DAILY 90 tablet 1   • metoprolol succinate XL (TOPROL-XL) 25 MG 24 hr tablet TAKE 1 TABLET BY MOUTH ONCE DAILY 90 tablet 2   • Misc. Devices (ROLLATOR) misc 1 Units as needed (for walking). 1 each 0   • simvastatin (ZOCOR) 40 MG tablet TAKE ONE-HALF TABLET BY MOUTH ONCE DAILY 45 tablet 3   • vitamin D (ERGOCALCIFEROL) 50277 units capsule capsule      • [DISCONTINUED] insulin regular (humuLIN R,novoLIN R) 100 UNIT/ML injection Inject 18 Units under the skin Daily With Breakfast & Dinner. (Patient  taking differently: Inject 44 Units under the skin into the appropriate area as directed Daily With Breakfast & Dinner. 24 with breakfast 20 units with dinner)  12     No current facility-administered medications on file prior to visit.        Patient Active Problem List   Diagnosis   • Type 2 diabetes mellitus (CMS/[facility])   • Diabetes mellitus type 2, uncontrolled, with complications (CMS/[facility])   • Uncontrolled type II diabetes mellitus with nephropathy (CMS/[facility])   • Type II diabetes mellitus with neurological manifestations, uncontrolled (CMS/[facility])   • Uncontrolled type 2 diabetes mellitus with background retinopathy (CMS/[facility])   • Hyperinsulinism   • Abnormal weight gain   • Hyperlipidemia   • Essential hypertension   • Edema of lower extremity   • Angiomyolipoma of kidney   • Memory changes   • Left hip pain   • Left-sided low back pain without sciatica   • Lumbar spinal stenosis   • Encounter for long-term (current) use of insulin (CMS/[facility])   • Obstructive sleep apnea on autoCPAP   • Chronic kidney disease, stage III (moderate) (CMS/[facility])   • Chronic venous insufficiency   • CAD (coronary artery disease)   • Uncontrolled type 2 diabetes mellitus with diabetic neuropathy, with long-term current use of insulin (CMS/[facility])   • Circadian rhythm sleep disorder, delayed sleep phase type   • Class 2 severe obesity due to excess calories with serious comorbidity and body mass index (BMI) of 39.0 to 39.9 in adult (CMS/[facility])   • History of colon polyps   • Hyperlipidemia associated with type 2 diabetes mellitus (CMS/[facility])       Health Risk Assessment/Depression Screen/Functional and Cognitive Screening:   The patient has completed a Health Risk Assessment & Depression screen. These have been reviewed with them and have been scanned as a separate documents.    Age-appropriate Screening Schedule:  Refer to the list below for future screening recommendations based on patient's age. Orders for these recommended tests are listed in the  "plan section. The patient has been provided with a written plan.     I have reviewed their problem list, past medical history, family history, social history, and surgical history.     Vitals:    05/15/19 1346   BP: 128/58   Pulse: 68   Resp: 16   SpO2: 98%   Weight: 99.8 kg (220 lb)   Height: 157.5 cm (62\")   PainSc: 0-No pain       Body mass index is 40.24 kg/m².    Physical Exam   Constitutional: She is oriented to person, place, and time. She appears well-developed and well-nourished. No distress.   HENT:   Head: Normocephalic and atraumatic.   Nose: Nose normal.   Mouth/Throat: Oropharynx is clear and moist.   Eyes: Conjunctivae and EOM are normal. Pupils are equal, round, and reactive to light. No scleral icterus.   Neck: Normal range of motion. Neck supple. No thyromegaly present.   Cardiovascular: Normal rate, regular rhythm and normal heart sounds. Exam reveals no gallop and no friction rub.   No murmur heard.  Pulses:       Carotid pulses are 2+ on the right side, and 2+ on the left side.       Femoral pulses are 2+ on the right side, and 2+ on the left side.       Dorsalis pedis pulses are 2+ on the right side, and 2+ on the left side.        Posterior tibial pulses are 2+ on the right side, and 2+ on the left side.   Pulmonary/Chest: Effort normal and breath sounds normal. No respiratory distress. She has no wheezes. She has no rales. Right breast exhibits no mass and no nipple discharge. Left breast exhibits no mass and no nipple discharge.   Abdominal: Soft. Bowel sounds are normal. She exhibits no distension and no mass. There is no tenderness.   Musculoskeletal: Normal range of motion. She exhibits edema (compression socks in place).     Vascular Status -  Her right foot exhibits no edema. Her left foot exhibits no edema.  Skin Integrity  -  Her right foot skin is intact.Her left foot skin is intact..  Lymphadenopathy:     She has no cervical adenopathy.     She has no axillary adenopathy.        " Right: No inguinal and no supraclavicular adenopathy present.        Left: No inguinal and no supraclavicular adenopathy present.   Neurological: She is alert and oriented to person, place, and time. She has normal reflexes. No cranial nerve deficit.   Skin: Skin is warm and dry.   Psychiatric: She has a normal mood and affect. Her speech is normal and behavior is normal. Judgment and thought content normal. Cognition and memory are normal.   Vitals reviewed.      Results for orders placed or performed in visit on 04/26/19   Urine culture, Comprehensive - ,   Result Value Ref Range    Urine Culture Final report (A)     Result 1 Klebsiella pneumoniae (A)     Result 2 Escherichia coli (A)     Result 3 Comment (A)     Susceptibility Testing Comment    Comprehensive Metabolic Panel   Result Value Ref Range    Glucose 142 (H) 65 - 99 mg/dL    BUN 23 8 - 23 mg/dL    Creatinine 1.12 (H) 0.57 - 1.00 mg/dL    eGFR Non African Am 48 (L) >60 mL/min/1.73    eGFR African Am 58 (L) >60 mL/min/1.73    BUN/Creatinine Ratio 20.5 7.0 - 25.0    Sodium 141 136 - 145 mmol/L    Potassium 4.3 3.5 - 5.2 mmol/L    Chloride 99 98 - 107 mmol/L    Total CO2 30.7 (H) 22.0 - 29.0 mmol/L    Calcium 10.1 8.6 - 10.5 mg/dL    Total Protein 7.3 6.0 - 8.5 g/dL    Albumin 4.30 3.50 - 5.20 g/dL    Globulin 3.0 gm/dL    A/G Ratio 1.4 g/dL    Total Bilirubin 0.5 0.2 - 1.2 mg/dL    Alkaline Phosphatase 85 39 - 117 U/L    AST (SGOT) 16 1 - 32 U/L    ALT (SGPT) 18 1 - 33 U/L   Lipid Panel   Result Value Ref Range    Total Cholesterol 184 0 - 200 mg/dL    Triglycerides 231 (H) 0 - 150 mg/dL    HDL Cholesterol 37 (L) 40 - 60 mg/dL    VLDL Cholesterol 46.2 mg/dL    LDL Cholesterol  101 (H) 0 - 100 mg/dL   TSH Rfx On Abnormal To Free T4   Result Value Ref Range    TSH 3.320 0.270 - 4.200 mIU/mL   Hemoglobin A1c   Result Value Ref Range    Hemoglobin A1C 9.20 (H) 4.80 - 5.60 %   Microalbumin / Creatinine Urine Ratio - Urine, Clean Catch   Result Value Ref Range     Creatinine, Urine 33.5 Not Estab. mg/dL    Microalbumin, Urine 420.7 Not Estab. ug/mL    Microalbumin/Creatinine Ratio 1,255.8 (H) 0.0 - 30.0 mg/g creat   Urinalysis With Culture If Indicated - Urine, Clean Catch   Result Value Ref Range    Specific Gravity, UA 1.010 1.005 - 1.030    pH, UA 5.5 5.0 - 7.5    Color, UA Yellow Yellow    Appearance, UA Clear Clear    Leukocytes, UA Trace (A) Negative    Protein 2+ (A) Negative/Trace    Glucose, UA Negative Negative    Ketones Negative Negative    Blood, UA Trace (A) Negative    Bilirubin, UA Negative Negative    Urobilinogen, UA 0.2 0.2 - 1.0 mg/dL    Nitrite, UA Negative Negative    Microscopic Examination See below:     Urinalysis Reflex Comment    Microscopic Examination -   Result Value Ref Range    WBC, UA 0-5 0 - 5 /hpf    RBC, UA 0-2 0 - 2 /hpf    Epithelial Cells (non renal) 0-10 0 - 10 /hpf    Mucus, UA Present Not Estab. /hpf    Bacteria, UA Few None seen/Few /hpf   CBC & Differential   Result Value Ref Range    WBC 9.26 3.40 - 10.80 10*3/mm3    RBC 4.88 3.77 - 5.28 10*6/mm3    Hemoglobin 13.3 12.0 - 15.9 g/dL    Hematocrit 42.7 34.0 - 46.6 %    MCV 87.5 79.0 - 97.0 fL    MCH 27.3 26.6 - 33.0 pg    MCHC 31.1 (L) 31.5 - 35.7 g/dL    RDW 13.5 12.3 - 15.4 %    Platelets 277 140 - 450 10*3/mm3    Neutrophil Rel % 64.0 42.7 - 76.0 %    Lymphocyte Rel % 25.3 19.6 - 45.3 %    Monocyte Rel % 8.1 5.0 - 12.0 %    Eosinophil Rel % 1.9 0.3 - 6.2 %    Basophil Rel % 0.3 0.0 - 1.5 %    Neutrophils Absolute 5.92 1.70 - 7.00 10*3/mm3    Lymphocytes Absolute 2.34 0.70 - 3.10 10*3/mm3    Monocytes Absolute 0.75 0.10 - 0.90 10*3/mm3    Eosinophils Absolute 0.18 0.00 - 0.40 10*3/mm3    Basophils Absolute 0.03 0.00 - 0.20 10*3/mm3    Immature Granulocyte Rel % 0.4 0.0 - 0.5 %    Immature Grans Absolute 0.04 0.00 - 0.05 10*3/mm3    nRBC 0.0 0.0 - 0.2 /100 WBC       Assessment & Plan:    Diagnoses and all orders for this visit:    Medicare annual wellness visit,  subsequent    Uncontrolled type 2 diabetes mellitus with diabetic neuropathy, with long-term current use of insulin (CMS/HCC)    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)    Chronic kidney disease, stage III (moderate) (CMS/[facility])    Essential hypertension    Mixed hyperlipidemia        Discussion/Summary  Denisse is here for her AWV and follow up.  She is up to date on most health maintenance.  She really needs to work on her A1c.  She has regular follow up with her endocrinologist.  Her bp is controlled.  Her lipids are controlled.  Her kidney function is stable.  Unfortunately, she cannot take Ace or ARBs.  She will need follow up in 6 mo.      Follow Up:  No Follow-up on file.     An After Visit Summary and PPPS with all of these plans were given to the patient.

## 2019-06-24 ENCOUNTER — TELEPHONE (OUTPATIENT)
Dept: INTERNAL MEDICINE | Facility: CLINIC | Age: 74
End: 2019-06-24

## 2019-06-24 RX ORDER — ACETAMINOPHEN AND CODEINE PHOSPHATE 300; 30 MG/1; MG/1
1 TABLET ORAL EVERY 6 HOURS PRN
Qty: 30 TABLET | Refills: 2 | Status: SHIPPED | OUTPATIENT
Start: 2019-06-24 | End: 2019-10-07 | Stop reason: SDUPTHER

## 2019-06-24 NOTE — TELEPHONE ENCOUNTER
Patient needs refill on tylenol #3 she doesn't have a contract on file.  CC    I printed out.  SLW    Patient advised to  and sign contract. CLC

## 2019-07-25 ENCOUNTER — OFFICE VISIT (OUTPATIENT)
Dept: CARDIOLOGY | Facility: CLINIC | Age: 74
End: 2019-07-25

## 2019-07-25 VITALS
WEIGHT: 222 LBS | BODY MASS INDEX: 40.85 KG/M2 | HEART RATE: 74 BPM | SYSTOLIC BLOOD PRESSURE: 138 MMHG | DIASTOLIC BLOOD PRESSURE: 62 MMHG | HEIGHT: 62 IN | OXYGEN SATURATION: 97 %

## 2019-07-25 DIAGNOSIS — R01.1 HEART MURMUR: ICD-10-CM

## 2019-07-25 DIAGNOSIS — R60.0 EDEMA OF LOWER EXTREMITY: ICD-10-CM

## 2019-07-25 DIAGNOSIS — E66.01 CLASS 3 SEVERE OBESITY DUE TO EXCESS CALORIES WITH SERIOUS COMORBIDITY AND BODY MASS INDEX (BMI) OF 40.0 TO 44.9 IN ADULT (HCC): ICD-10-CM

## 2019-07-25 DIAGNOSIS — I35.8 AORTIC VALVE SCLEROSIS: ICD-10-CM

## 2019-07-25 DIAGNOSIS — N18.30 CHRONIC KIDNEY DISEASE, STAGE III (MODERATE) (HCC): ICD-10-CM

## 2019-07-25 DIAGNOSIS — I10 ESSENTIAL HYPERTENSION: ICD-10-CM

## 2019-07-25 DIAGNOSIS — G47.33 OBSTRUCTIVE SLEEP APNEA SYNDROME: Chronic | ICD-10-CM

## 2019-07-25 DIAGNOSIS — I25.10 CORONARY ARTERY DISEASE INVOLVING NATIVE CORONARY ARTERY OF NATIVE HEART WITHOUT ANGINA PECTORIS: Primary | ICD-10-CM

## 2019-07-25 DIAGNOSIS — E78.2 MIXED HYPERLIPIDEMIA: ICD-10-CM

## 2019-07-25 DIAGNOSIS — I87.2 CHRONIC VENOUS INSUFFICIENCY: ICD-10-CM

## 2019-07-25 PROBLEM — E66.812 CLASS 2 SEVERE OBESITY DUE TO EXCESS CALORIES WITH SERIOUS COMORBIDITY AND BODY MASS INDEX (BMI) OF 39.0 TO 39.9 IN ADULT: Status: RESOLVED | Noted: 2018-04-23 | Resolved: 2019-07-25

## 2019-07-25 PROBLEM — E11.69 HYPERLIPIDEMIA ASSOCIATED WITH TYPE 2 DIABETES MELLITUS: Status: RESOLVED | Noted: 2018-12-20 | Resolved: 2019-07-25

## 2019-07-25 PROBLEM — E78.5 HYPERLIPIDEMIA ASSOCIATED WITH TYPE 2 DIABETES MELLITUS: Status: RESOLVED | Noted: 2018-12-20 | Resolved: 2019-07-25

## 2019-07-25 PROCEDURE — 99214 OFFICE O/P EST MOD 30 MIN: CPT | Performed by: NURSE PRACTITIONER

## 2019-07-25 PROCEDURE — 93000 ELECTROCARDIOGRAM COMPLETE: CPT | Performed by: NURSE PRACTITIONER

## 2019-07-25 NOTE — PROGRESS NOTES
Date of Office Visit: 2019  Encounter Provider: MEREDITH Alcazar  Place of Service: [facility]  Patient Name: Denisse Chavez  :1945      Chief Complaint   Patient presents with   • Follow-up   :     Dear Dr. Mcdonnell,     HPI: Denisse Chavez is a pleasant 73 y.o. female who presents today for cardiac follow up. She is a new patient to me and his previous records have been reviewed.  She has a known history of type 2 diabetes mellitus, hypertension, obesity, chronic kidney disease stage III, and coronary artery disease.    In , she suffered a myocardial infarction in [place].  She underwent cardiac catheterization which were of reported nonobstructive disease and medical treatment was recommended.  In 2016 she had an echocardiogram which revealed normal EF, grade 2 diastolic dysfunction, and aortic valve calcification without stenosis.  There was hypokinesis of the lateral wall so cardiac PET scan was obtained which revealed a small medium size lateral infarct without ischemia.    She is an established patient of [facility] and was last seen in the office in 2018.  She was noted to have a soft heart murmur so a 2D echocardiogram was obtained on 2018 with the following results: EF 59%, diastolic function normal, aortic valve sclerosis, trace aortic insufficiency, moderate mitral annular calcification, trace mitral insufficiency.    She presents today for follow-up with her son accompanying her.  She continues to experience chronic lower extremity edema which is unchanged and she is compliant with compression hose.  She denies chest pain, shortness of air, PND, orthopnea, cough, palpitations, dizziness, syncope, or bleeding.  Upon first check her blood pressure was elevated and the second check in the office today her blood pressure had improved.  She currently is not checking her blood pressure at home.  She informs me that she will  be establishing care with Dr. Mcdonnell.     I reviewed her blood work from May 2019 which included a CBC which showed normal hemoglobin and hematocrit.  CMP elevated glucose of 142.  Lipid panel total cholesterol 184, triglycerides 231, HDL 37, and .  TSH normal.  Hemoglobin A1c 9.2 elevated.    Past Medical History:   Diagnosis Date   • Angiomyolipoma of kidney 3/30/2016   • Aortic valve sclerosis    • CAD (coronary artery disease)     MI in , treated medically.  PET 2016 with small-medium sized lateral infarct, no ischemia.  This wall motion abnormality was seen on echo as well.   • Cellulitis 2018    RIGHT LEG   • Chronic kidney disease, stage III (moderate) (CMS/HCC) 10/13/2016   • Chronic venous insufficiency    • Heart murmur    • Hyperlipidemia    • Hypertension    • Low back pain    • Mass of ovary 3/30/2016   • Morbid obesity (CMS/[facility])    • Sleep apnea    • Spinal stenosis    • Type 2 diabetes mellitus (CMS/[facility])    • Uterine leiomyoma 3/30/2016       Past Surgical History:   Procedure Laterality Date   • CATARACT EXTRACTION      X2    • COLONOSCOPY N/A 2018    Procedure: COLONOSCOPY to CECUM WITH HOT SNARE POLYPECTOMY;  Surgeon: Neal Reilly MD;  Location: [facility];  Service: Gastroenterology   • HERNIA REPAIR     • HYSTERECTOMY     • TONSILLECTOMY         Social History     Socioeconomic History   • Marital status: Single     Spouse name: Not on file   • Number of children: 3   • Years of education: Not on file   • Highest education level: Not on file   Occupational History   • Occupation: retired from [employer]   Tobacco Use   • Smoking status: Former Smoker     Packs/day: 0.25     Years: 2.00     Pack years: 0.50     Types: Cigarettes     Last attempt to quit: 1970     Years since quittin.5   • Smokeless tobacco: Never Used   • Tobacco comment: LIGHT USAGE   Substance and Sexual Activity   • Alcohol use: No     Comment: CAFFEINE USE: 10 -12 CUPS OF COFFEE DAILY.    • Drug  use: No   • Sexual activity: Defer       Family History   Problem Relation Age of Onset   • Diabetes Mother    • Heart attack Mother    • Hypertension Mother    • Hypothyroidism Mother    • Diabetes type II Son        The following portion of the patient's history were reviewed and updated as appropriate: past medical history, past surgical history, past social history, past family history, allergies, current medications, and problem list.    Review of Systems   Constitution: Negative for chills, diaphoresis, fever, malaise/fatigue, night sweats, weight gain and weight loss.   HENT: Negative for hearing loss, nosebleeds, sore throat and tinnitus.    Eyes: Negative for blurred vision, double vision, pain and visual disturbance.   Cardiovascular: Positive for leg swelling. Negative for chest pain, claudication, cyanosis, dyspnea on exertion, irregular heartbeat, near-syncope, orthopnea, palpitations, paroxysmal nocturnal dyspnea and syncope.   Respiratory: Negative for cough, hemoptysis, shortness of breath, snoring and wheezing.    Endocrine: Negative for cold intolerance, heat intolerance and polyuria.   Hematologic/Lymphatic: Negative for bleeding problem. Does not bruise/bleed easily.   Skin: Negative for color change, dry skin, flushing and itching.   Musculoskeletal: Positive for joint pain. Negative for falls, joint swelling, muscle cramps, muscle weakness and myalgias.   Gastrointestinal: Negative for abdominal pain, constipation, heartburn, melena, nausea and vomiting.   Genitourinary: Positive for frequency. Negative for dysuria and hematuria.   Neurological: Negative for excessive daytime sleepiness, dizziness, light-headedness, loss of balance, numbness, paresthesias, seizures and vertigo.   Psychiatric/Behavioral: Negative for altered mental status, depression, memory loss and substance abuse. The patient does not have insomnia and is not nervous/anxious.    Allergic/Immunologic: Negative for  environmental allergies.       Allergies   Allergen Reactions   • Ace Inhibitors Other (See Comments)     Hyperkalemia/ROB   • Angiotensin Receptor Blockers Other (See Comments)     Pt unable to remember   • Keflex [Cephalexin] Rash   • Sulfa Antibiotics Itching     rash         Current Outpatient Medications:   •  acetaminophen-codeine (TYLENOL #3) 300-30 MG per tablet, Take 1 tablet by mouth Every 6 (Six) Hours As Needed for Moderate Pain ., Disp: 30 tablet, Rfl: 2  •  amLODIPine (NORVASC) 10 MG tablet, TAKE ONE TABLET BY MOUTH ONCE DAILY, Disp: 90 tablet, Rfl: 3  •  aspirin 81 MG tablet, Take  by mouth daily., Disp: , Rfl:   •  docusate sodium (COLACE) 100 MG capsule, Take 100 mg by mouth 2 (two) times a day., Disp: , Rfl:   •  furosemide (LASIX) 40 MG tablet, TAKE ONE TABLET BY MOUTH ONCE DAILY, Disp: 90 tablet, Rfl: 3  •  hydrOXYzine (ATARAX) 25 MG tablet, Take 1 tablet by mouth At Night As Needed (insomnia)., Disp: 30 tablet, Rfl: 2  •  insulin NPH (NOVOLIN N RELION) 100 UNIT/ML injection, INJECT 25 UNITS SUBCUTANEOUSLY IN THE MORNING AND 25 UNITS IN THE EVENING (Patient taking differently: INJECT 32 UNITS SUBCUTANEOUSLY IN THE MORNING AND 25 UNITS IN THE EVENING), Disp: 40 mL, Rfl: 3  •  insulin regular (humuLIN R,novoLIN R) 100 UNIT/ML injection, Inject 30 Units under the skin into the appropriate area as directed 3 (Three) Times a Day Before Meals., Disp: , Rfl:   •  isosorbide mononitrate (IMDUR) 60 MG 24 hr tablet, TAKE 1 TABLET BY MOUTH ONCE DAILY, Disp: 90 tablet, Rfl: 1  •  metoprolol succinate XL (TOPROL-XL) 25 MG 24 hr tablet, TAKE 1 TABLET BY MOUTH ONCE DAILY, Disp: 90 tablet, Rfl: 2  •  Misc. Devices (ROLLATOR) misc, 1 Units as needed (for walking)., Disp: 1 each, Rfl: 0  •  simvastatin (ZOCOR) 40 MG tablet, TAKE ONE-HALF TABLET BY MOUTH ONCE DAILY, Disp: 45 tablet, Rfl: 3  •  vitamin D (ERGOCALCIFEROL) 95091 units capsule capsule, , Disp: , Rfl:         Objective:     Vitals:    07/25/19 1034  "07/25/19 1050   BP: 162/60 138/62   BP Location: Left arm Right arm   Patient Position:  Sitting   Pulse: 74    SpO2: 97%    Weight: 101 kg (222 lb)    Height: 157.5 cm (62\")      Body mass index is 40.6 kg/m².    PHYSICAL EXAM:    Vitals Reviewed.   General Appearance: No acute distress, well developed and well nourished.  Obese.  Eyes: Conjunctiva and lids: No erythema, swelling, or discharge. Sclera non-icteric.   HENT: Atraumatic, normocephalic. External eyes, ears, and nose normal. No hearing loss noted. Mucous membranes normal. Lips not cyanotic. Neck supple with no tenderness.  Respiratory: No signs of respiratory distress. Respiration rhythm and depth normal.   Clear to auscultation. No rales, crackles, rhonchi, or wheezing auscultated.   Cardiovascular:  Heart Rate and Rhythm: Normal, Heart Sounds: Normal S1 and S2. No S3 or S4 noted.  Murmurs: RUSB grade 1/6 systolic murmurs noted. No rubs, thrills, or gallops.   Arterial Pulses: Carotid pulses normal. No carotid bruit noted.   Lower Extremities: Bilateral lower extremity edema noted.  Gastrointestinal:  Abdomen soft, non-distended, non-tender. Normal bowel sounds. No hepatomegaly.   Musculoskeletal: Normal movement of extremities  Skin and Nails: General appearance normal. No pallor, cyanosis, diaphoresis. Skin temperature normal. No clubbing of fingernails.   Psychiatric: Patient alert and oriented to person, place, and time. Speech and behavior appropriate. Normal mood and affect.       ECG 12 Lead  Date/Time: 7/25/2019 10:42 AM  Performed by: Vicky Herrera APRN  Authorized by: Vicky Herrera APRN   Comparison: compared with previous ECG from 7/15/2018  Rhythm: sinus rhythm  Rate: normal  BPM: 73  Conduction: conduction normal  ST Segments: ST segments normal  T Waves: T waves normal  QRS axis: normal  Other findings: left ventricular hypertrophy and poor R wave progression    Clinical impression: abnormal EKG              Assessment:       " Diagnosis Plan   1. Coronary artery disease involving native coronary artery of native heart without angina pectoris     2. Heart murmur     3. Aortic valve sclerosis     4. Edema of lower extremity     5. Chronic venous insufficiency     6. Essential hypertension     7. Mixed hyperlipidemia     8. Obstructive sleep apnea syndrome     9. Chronic kidney disease, stage III (moderate) (CMS/[facility])     10. Class 3 severe obesity due to excess calories with serious comorbidity and body mass index (BMI) of 40.0 to 44.9 in adult (CMS/[facility])            Plan:       1.  Coronary Artery Disease: Denies anginal symptoms.  On good medications for secondary prevention including aspirin, metoprolol, and simvastatin.  She is also on amlodipine and isosorbide.    Coronary Artery Disease  Assessment  • The patient has no angina    Plan  • Lifestyle modifications discussed include adhering to a heart healthy diet, maintenance of a healthy weight, medication compliance, regular exercise and regular monitoring of cholesterol and blood pressure    Subjective - Objective  • There is a history of past MI  • Current antiplatelet therapy includes aspirin 81 mg      2/3.  Heart Murmur: Noted to have a mild right upper sternal border murmur which is consistent with aortic valve sclerosis.  Last echocardiogram did not reveal any evidence of stenosis.    4/5.  Lower Extremity Edema: She has a history of chronic venous insufficiency and developed cellulitis in July 2018.  She wears compression stockings and I have recommended a low-sodium diet.    6.  Hypertension: Blood pressure is well controlled today on second check.  I recommended that she monitor her blood pressure couple times per week and keep a diary.  Call with any consistent elevated blood pressure readings above 140/90.    7.  Hyperlipidemia: She remains on simvastatin and would benefit from an LDL goal less than 70.  She may also benefit from Vascepa with her elevated triglycerides.       8.  Obstructive Sleep Apnea: Compliant with CPAP machine.    9.  Chronic Kidney Disease: Her BUN and creatinine were both normal on her last CMP.    10.  Obesity: BMI is 40.6.  She would benefit from exercise and weight loss.    11.  I recommend follow-up with [facility] in 1 year, unless otherwise needed sooner.     As always, it has been a pleasure to participate in your patient's care. Thank you.       Sincerely,         MEREDITH Munoz        **Dragon Disclaimer:**  Much of this encounter note is an electronic transcription/translation of spoken language to printed text. The electronic translation of spoken language may permit erroneous, or at times, nonsensical words or phrases to be inadvertently transcribed. Although I have reviewed the note for such errors, some may still exist.

## 2019-08-05 ENCOUNTER — OFFICE VISIT (OUTPATIENT)
Dept: ENDOCRINOLOGY | Age: 74
End: 2019-08-05

## 2019-08-05 VITALS
HEIGHT: 62 IN | HEART RATE: 78 BPM | WEIGHT: 224.4 LBS | SYSTOLIC BLOOD PRESSURE: 130 MMHG | DIASTOLIC BLOOD PRESSURE: 66 MMHG | BODY MASS INDEX: 41.3 KG/M2

## 2019-08-05 DIAGNOSIS — IMO0002 UNCONTROLLED TYPE 2 DIABETES MELLITUS WITH COMPLICATION, WITH LONG-TERM CURRENT USE OF INSULIN: Primary | ICD-10-CM

## 2019-08-05 DIAGNOSIS — IMO0002 TYPE II DIABETES MELLITUS WITH NEUROLOGICAL MANIFESTATIONS, UNCONTROLLED: ICD-10-CM

## 2019-08-05 DIAGNOSIS — IMO0002 UNCONTROLLED TYPE 2 DIABETES MELLITUS WITH BACKGROUND RETINOPATHY: ICD-10-CM

## 2019-08-05 DIAGNOSIS — IMO0002 UNCONTROLLED TYPE II DIABETES MELLITUS WITH NEPHROPATHY: ICD-10-CM

## 2019-08-05 DIAGNOSIS — E16.1 HYPERINSULINISM: ICD-10-CM

## 2019-08-05 DIAGNOSIS — Z79.4 ENCOUNTER FOR LONG-TERM (CURRENT) USE OF INSULIN (HCC): ICD-10-CM

## 2019-08-05 DIAGNOSIS — E11.69 HYPERLIPIDEMIA ASSOCIATED WITH TYPE 2 DIABETES MELLITUS (HCC): ICD-10-CM

## 2019-08-05 DIAGNOSIS — R63.5 ABNORMAL WEIGHT GAIN: ICD-10-CM

## 2019-08-05 DIAGNOSIS — N18.30 CHRONIC KIDNEY DISEASE, STAGE III (MODERATE) (HCC): ICD-10-CM

## 2019-08-05 DIAGNOSIS — E78.5 HYPERLIPIDEMIA ASSOCIATED WITH TYPE 2 DIABETES MELLITUS (HCC): ICD-10-CM

## 2019-08-05 PROCEDURE — 99214 OFFICE O/P EST MOD 30 MIN: CPT | Performed by: INTERNAL MEDICINE

## 2019-08-05 NOTE — PROGRESS NOTES
73 y.o.    Patient Care Team:  Surekha Mcdonnell MD as PCP - General (Internal Medicine)  Wally Craft MD as PCP - Claims Attributed  Wally Craft MD as Consulting Physician (Endocrinology)  Warner Tang MD as Consulting Physician (Cardiology)  Sergio Eagle MD as Consulting Physician (Urology)    Chief Complaint:      F/U TYPE 2 DIABETES, UNCONTROLLED.  NO RECENT LABS.  Subjective     HPI   Patient is a 73-year-old white female with a history of uncontrolled type 2 diabetes mellitus with comp occasions came for follow-up    Uncontrolled type 2 diabetes mellitus  Patient is currently taking NPH insulin 40 units in the morning and 20 units at bedtime and regular insulin 30-10-20  She reports that her blood sugars are slightly higher in the 100-250 range  Blood sugars appear to be higher before dinner and bedtime  Hypoglycemia  Patient reported occasional hypoglycemia in the middle of the day  Uncontrolled type 2 diabetes mellitus with peripheral neuropathy  Patient reports symptoms of tingling numbness and burning in both lower extremities  Uncontrolled type 2 diabetes mellitus with retinopathy  Patient is status post laser treatments in the past and has regular follow-up with ophthalmologist  Uncontrolled type 2 diabetes mellitus with nephropathy  Patient has severe microalbuminuria and elevated creatinine  She has chronic kidney disease and has follow-up with the nephrologist  Abnormal weight gain  Patient currently weighs 224 pounds with a BMI of 41  Hyperlipidemia associated with diabetes.  Patient is currently on simvastatin 20 mg daily.  She reports compliance with the medication  Denies any side effects.          Interval History  November 3, 2016       Summary  Patient reported that she was diagnosed in 1996 with type 2 diabetes and has been on several different medications until 10 years ago when she started taking Lantus 44 units at bedtime in addition Novolin regular  insulin 15 units before breakfast and lunch and 20 units before dinner and metformin and Amaryl. Despite all this medication the blood sugars tend to be in the 150-300-400 range and hence the consultation.  Patient reported that she is not very compliant with her diet or activity. She does take her insulin injections on time.  She is occasionally known to miss her lunch.  Due to the extremely high cost of the insulins patient will switch to NPH and regular insulins and previous visit.  Patient reports that his tolerated this which very well.  she managed to increase the insulin NPH to 40 units in the morning and 30 units at night and regular insulin to 40 units at breakfast and supper. Despite which her blood sugars continue to be high  Her blood sugars have improved to some extent but still fluctuating between 150-400.  Fasting blood sugars appear to be consistent mostly less than 150  Diabetic neuropathy  Patient reports significant symptoms of tingling and numbness in both lower extremities  Diabetic nephropathy  Patient is known to have microalbuminuria and elevated creatinine next line diabetic retinopathy uncontrolled  Patient has history of background diabetic retinopathy.  Hypoglycemia patient has had experience with hypoglycemia but not in the past one month.  Hypertension patient is currently on medication  Hyperlipidemia patient is currently on medication.  interval history  Patient reported occasional hypoglycemia usually at night and early morning hours           The following portions of the patient's history were reviewed and updated as appropriate: allergies, current medications, past family history, past medical history, past social history, past surgical history and problem list.    Past Medical History:   Diagnosis Date   • Angiomyolipoma of kidney 3/30/2016   • Aortic valve sclerosis    • CAD (coronary artery disease)     MI in 1996, treated medically.  PET 6/2016 with small-medium sized lateral  infarct, no ischemia.  This wall motion abnormality was seen on echo as well.   • Cellulitis 2018    RIGHT LEG   • Chronic kidney disease, stage III (moderate) (CMS/HCC) 10/13/2016   • Chronic venous insufficiency    • Heart murmur    • Hyperlipidemia    • Hypertension    • Low back pain    • Mass of ovary 3/30/2016   • Morbid obesity (CMS/[facility])    • Sleep apnea    • Spinal stenosis    • Type 2 diabetes mellitus (CMS/[facility])    • Uterine leiomyoma 3/30/2016     Family History   Problem Relation Age of Onset   • Diabetes Mother    • Heart attack Mother    • Hypertension Mother    • Hypothyroidism Mother    • Diabetes type II Son      Social History     Socioeconomic History   • Marital status: Single     Spouse name: Not on file   • Number of children: 3   • Years of education: Not on file   • Highest education level: Not on file   Occupational History   • Occupation: retired from [employer]   Tobacco Use   • Smoking status: Former Smoker     Packs/day: 0.25     Years: 2.00     Pack years: 0.50     Types: Cigarettes     Last attempt to quit: 1970     Years since quittin.6   • Smokeless tobacco: Never Used   • Tobacco comment: LIGHT USAGE   Substance and Sexual Activity   • Alcohol use: No     Comment: CAFFEINE USE: 10 -12 CUPS OF COFFEE DAILY.    • Drug use: No   • Sexual activity: Defer     Allergies   Allergen Reactions   • Ace Inhibitors Other (See Comments)     Hyperkalemia/ROB   • Angiotensin Receptor Blockers Other (See Comments)     Pt unable to remember   • Keflex [Cephalexin] Rash   • Sulfa Antibiotics Itching     rash       Current Outpatient Medications:   •  acetaminophen-codeine (TYLENOL #3) 300-30 MG per tablet, Take 1 tablet by mouth Every 6 (Six) Hours As Needed for Moderate Pain ., Disp: 30 tablet, Rfl: 2  •  amLODIPine (NORVASC) 10 MG tablet, TAKE ONE TABLET BY MOUTH ONCE DAILY, Disp: 90 tablet, Rfl: 3  •  aspirin 81 MG tablet, Take  by mouth daily., Disp: , Rfl:   •  docusate sodium (COLACE) 100 MG  "capsule, Take 100 mg by mouth 2 (two) times a day., Disp: , Rfl:   •  furosemide (LASIX) 40 MG tablet, TAKE ONE TABLET BY MOUTH ONCE DAILY, Disp: 90 tablet, Rfl: 3  •  hydrOXYzine (ATARAX) 25 MG tablet, Take 1 tablet by mouth At Night As Needed (insomnia)., Disp: 30 tablet, Rfl: 2  •  insulin NPH (NOVOLIN N RELION) 100 UNIT/ML injection, INJECT 25 UNITS SUBCUTANEOUSLY IN THE MORNING AND 25 UNITS IN THE EVENING (Patient taking differently: INJECT 32 UNITS SUBCUTANEOUSLY IN THE MORNING AND 25 UNITS IN THE EVENING), Disp: 40 mL, Rfl: 3  •  insulin regular (humuLIN R,novoLIN R) 100 UNIT/ML injection, Inject 30 Units under the skin into the appropriate area as directed 3 (Three) Times a Day Before Meals., Disp: , Rfl:   •  isosorbide mononitrate (IMDUR) 60 MG 24 hr tablet, TAKE 1 TABLET BY MOUTH ONCE DAILY, Disp: 90 tablet, Rfl: 1  •  metoprolol succinate XL (TOPROL-XL) 25 MG 24 hr tablet, TAKE 1 TABLET BY MOUTH ONCE DAILY, Disp: 90 tablet, Rfl: 2  •  Misc. Devices (ROLLATOR) misc, 1 Units as needed (for walking)., Disp: 1 each, Rfl: 0  •  simvastatin (ZOCOR) 40 MG tablet, TAKE ONE-HALF TABLET BY MOUTH ONCE DAILY, Disp: 45 tablet, Rfl: 3  •  vitamin D (ERGOCALCIFEROL) 87683 units capsule capsule, , Disp: , Rfl:         Review of Systems   Constitutional: Negative for chills, fatigue and fever.   Cardiovascular: Negative for chest pain and palpitations.   Gastrointestinal: Negative for abdominal pain, constipation, diarrhea, nausea and vomiting.   Endocrine: Negative for cold intolerance and heat intolerance.   All other systems reviewed and are negative.      Objective       Vitals:    08/05/19 1320   BP: 130/66   Pulse: 78   Weight: 102 kg (224 lb 6.4 oz)   Height: 157.5 cm (62\")     Body mass index is 41.04 kg/m².      Physical Exam   Constitutional: She is oriented to person, place, and time. She appears well-developed.   HENT:   Head: Normocephalic and atraumatic.   Eyes: EOM are normal. Pupils are equal, round, and " reactive to light.   Neck: Normal range of motion. Neck supple. No thyromegaly present.   Cardiovascular: Normal rate, regular rhythm, normal heart sounds and intact distal pulses.   Pulmonary/Chest: Effort normal and breath sounds normal.   Abdominal: Soft. Bowel sounds are normal. She exhibits distension. There is no tenderness.   Musculoskeletal: Normal range of motion. She exhibits no edema.   Neurological: She is alert and oriented to person, place, and time.   Skin: Skin is warm and dry.   Psychiatric: She has a normal mood and affect. Her behavior is normal.   Nursing note and vitals reviewed.    Results Review:     I reviewed the patient's new clinical results.    Medical records reviewed  Summary:      Orders Only on 04/26/2019   Component Date Value Ref Range Status   • WBC 05/08/2019 9.26  3.40 - 10.80 10*3/mm3 Final   • RBC 05/08/2019 4.88  3.77 - 5.28 10*6/mm3 Final   • Hemoglobin 05/08/2019 13.3  12.0 - 15.9 g/dL Final   • Hematocrit 05/08/2019 42.7  34.0 - 46.6 % Final   • MCV 05/08/2019 87.5  79.0 - 97.0 fL Final   • MCH 05/08/2019 27.3  26.6 - 33.0 pg Final   • MCHC 05/08/2019 31.1* 31.5 - 35.7 g/dL Final   • RDW 05/08/2019 13.5  12.3 - 15.4 % Final   • Platelets 05/08/2019 277  140 - 450 10*3/mm3 Final   • Neutrophil Rel % 05/08/2019 64.0  42.7 - 76.0 % Final   • Lymphocyte Rel % 05/08/2019 25.3  19.6 - 45.3 % Final   • Monocyte Rel % 05/08/2019 8.1  5.0 - 12.0 % Final   • Eosinophil Rel % 05/08/2019 1.9  0.3 - 6.2 % Final   • Basophil Rel % 05/08/2019 0.3  0.0 - 1.5 % Final   • Neutrophils Absolute 05/08/2019 5.92  1.70 - 7.00 10*3/mm3 Final   • Lymphocytes Absolute 05/08/2019 2.34  0.70 - 3.10 10*3/mm3 Final   • Monocytes Absolute 05/08/2019 0.75  0.10 - 0.90 10*3/mm3 Final   • Eosinophils Absolute 05/08/2019 0.18  0.00 - 0.40 10*3/mm3 Final   • Basophils Absolute 05/08/2019 0.03  0.00 - 0.20 10*3/mm3 Final   • Immature Granulocyte Rel % 05/08/2019 0.4  0.0 - 0.5 % Final   • Immature Grans  Absolute 05/08/2019 0.04  0.00 - 0.05 10*3/mm3 Final   • nRBC 05/08/2019 0.0  0.0 - 0.2 /100 WBC Final   • Glucose 05/08/2019 142* 65 - 99 mg/dL Final   • BUN 05/08/2019 23  8 - 23 mg/dL Final   • Creatinine 05/08/2019 1.12* 0.57 - 1.00 mg/dL Final   • eGFR Non African Am 05/08/2019 48* >60 mL/min/1.73 Final    Comment: The MDRD GFR formula is only valid for adults with stable  renal function between ages 18 and 70.     • eGFR  Am 05/08/2019 58* >60 mL/min/1.73 Final   • BUN/Creatinine Ratio 05/08/2019 20.5  7.0 - 25.0 Final   • Sodium 05/08/2019 141  136 - 145 mmol/L Final   • Potassium 05/08/2019 4.3  3.5 - 5.2 mmol/L Final   • Chloride 05/08/2019 99  98 - 107 mmol/L Final   • Total CO2 05/08/2019 30.7* 22.0 - 29.0 mmol/L Final   • Calcium 05/08/2019 10.1  8.6 - 10.5 mg/dL Final   • Total Protein 05/08/2019 7.3  6.0 - 8.5 g/dL Final   • Albumin 05/08/2019 4.30  3.50 - 5.20 g/dL Final   • Globulin 05/08/2019 3.0  gm/dL Final   • A/G Ratio 05/08/2019 1.4  g/dL Final   • Total Bilirubin 05/08/2019 0.5  0.2 - 1.2 mg/dL Final   • Alkaline Phosphatase 05/08/2019 85  39 - 117 U/L Final   • AST (SGOT) 05/08/2019 16  1 - 32 U/L Final   • ALT (SGPT) 05/08/2019 18  1 - 33 U/L Final   • Total Cholesterol 05/08/2019 184  0 - 200 mg/dL Final   • Triglycerides 05/08/2019 231* 0 - 150 mg/dL Final   • HDL Cholesterol 05/08/2019 37* 40 - 60 mg/dL Final   • VLDL Cholesterol 05/08/2019 46.2  mg/dL Final   • LDL Cholesterol  05/08/2019 101* 0 - 100 mg/dL Final   • TSH 05/08/2019 3.320  0.270 - 4.200 mIU/mL Final   • Hemoglobin A1C 05/08/2019 9.20* 4.80 - 5.60 % Final    Comment: Hemoglobin A1C Ranges:  Increased Risk for Diabetes  5.7% to 6.4%  Diabetes                     >= 6.5%  Diabetic Goal                < 7.0%     • Creatinine, Urine 05/08/2019 33.5  Not Estab. mg/dL Final   • Microalbumin, Urine 05/08/2019 420.7  Not Estab. ug/mL Final    Comment: Results confirmed on  dilution.     • Microalbumin/Creatinine Ratio  05/08/2019 1,255.8* 0.0 - 30.0 mg/g creat Final    Comment:                      Normal:                0.0 -  30.0                       Albuminuria:          31.0 - 300.0                       Clinical albuminuria:       >300.0     • Specific Gravity, UA 05/08/2019 1.010  1.005 - 1.030 Final   • pH, UA 05/08/2019 5.5  5.0 - 7.5 Final   • Color, UA 05/08/2019 Yellow  Yellow Final   • Appearance, UA 05/08/2019 Clear  Clear Final   • Leukocytes, UA 05/08/2019 Trace* Negative Final   • Protein 05/08/2019 2+* Negative/Trace Final   • Glucose, UA 05/08/2019 Negative  Negative Final   • Ketones 05/08/2019 Negative  Negative Final   • Blood, UA 05/08/2019 Trace* Negative Final   • Bilirubin, UA 05/08/2019 Negative  Negative Final   • Urobilinogen, UA 05/08/2019 0.2  0.2 - 1.0 mg/dL Final   • Nitrite, UA 05/08/2019 Negative  Negative Final   • Microscopic Examination 05/08/2019 See below:   Final    Microscopic was indicated and was performed.   • Urinalysis Reflex 05/08/2019 Comment   Final    This specimen has reflexed to a Urine Culture.   • WBC, UA 05/08/2019 0-5  0 - 5 /hpf Final   • RBC, UA 05/08/2019 0-2  0 - 2 /hpf Final   • Epithelial Cells (non renal) 05/08/2019 0-10  0 - 10 /hpf Final   • Mucus, UA 05/08/2019 Present  Not Estab. /hpf Final   • Bacteria, UA 05/08/2019 Few  None seen/Few /hpf Final   • Urine Culture 05/08/2019 Final report*  Final   • Result 1 05/08/2019 Klebsiella pneumoniae*  Final    Comment: 600  Colonies/mL  Cefazolin <=4 ug/mL  Cefazolin with an ORA <=16 predicts susceptibility to the oral agents  cefaclor, cefdinir, cefpodoxime, cefprozil, cefuroxime, cephalexin,  and loracarbef when used for therapy of uncomplicated urinary tract  infections due to E. coli, Klebsiella pneumoniae, and Proteus  mirabilis.     • Result 2 05/08/2019 Escherichia coli*  Final    Comment: 400 Colonies/mL  Cefazolin <=4 ug/mL  Cefazolin with an ORA <=16 predicts susceptibility to the oral agents  cefaclor,  cefdinir, cefpodoxime, cefprozil, cefuroxime, cephalexin,  and loracarbef when used for therapy of uncomplicated urinary tract  infections due to E. coli, Klebsiella pneumoniae, and Proteus  mirabilis.     • Result 3 05/08/2019 Comment*  Final    Comment: Beta hemolytic Streptococcus, group B  25,000-50,000 colony forming units per mL  Penicillin and ampicillin are drugs of choice for treatment of  beta-hemolytic streptococcal infections. Susceptibility testing of  penicillins and other beta-lactam agents approved by the FDA for  treatment of beta-hemolytic streptococcal infections need not be  performed routinely because nonsusceptible isolates are extremely  rare in any beta-hemolytic streptococcus and have not been reported  for Streptococcus pyogenes (group A). (CLSI)     • Susceptibility Testing 05/08/2019 Comment   Final    Comment:       ** S = Susceptible; I = Intermediate; R = Resistant **                     P = Positive; N = Negative              MICS are expressed in micrograms per mL     Antibiotic                 RSLT#1    RSLT#2    RSLT#3    RSLT#4  Amoxicillin/Clavulanic Acid    S         S  Ampicillin                     R         S  Cefepime                       S         S  Ceftriaxone                    S         S  Cefuroxime                     S         S  Ciprofloxacin                  S         S  Ertapenem                      S         S  Gentamicin                     S         S  Imipenem                       S         S  Levofloxacin                   S         S  Meropenem                      S         S  Nitrofurantoin                 I         S  Piperacillin/Tazobactam        S         S  Tetracycline                   S         S  Tobramycin                     S         S  Trimethoprim/Sulfa             S         S       Lab Results   Component Value Date    HGBA1C 9.20 (H) 05/08/2019    HGBA1C 9.47 (H) 03/14/2019    HGBA1C 9.61 (H) 12/04/2018     Lab Results   Component Value  Date    MICROALBUR 420.7 05/08/2019    CREATININE 1.12 (H) 05/08/2019     Imaging Results (most recent)     None          Assessment and Plan:    Denisse was seen today for diabetes.    Diagnoses and all orders for this visit:    Uncontrolled type 2 diabetes mellitus with complication, with long-term current use of insulin (CMS/[facility])  -     Comprehensive Metabolic Panel  -     Hemoglobin A1c  -     TSH  -     Microalbumin / Creatinine Urine Ratio - Urine, Clean Catch    Uncontrolled type II diabetes mellitus with nephropathy (CMS/[facility])    Type II diabetes mellitus with neurological manifestations, uncontrolled (CMS/[facility])    Uncontrolled type 2 diabetes mellitus with background retinopathy (CMS/[facility])    Hyperinsulinism    Chronic kidney disease, stage III (moderate) (CMS/[facility])    Encounter for long-term (current) use of insulin (CMS/[facility])    Abnormal weight gain    Hyperlipidemia associated with type 2 diabetes mellitus (CMS/[facility])        Glucose log reviewed  Patient's blood sugars are in the 130-200 range  She appears to have higher blood sugars before dinner and bedtime      I advised the patient to increase NPH 46 units in the morning and 26 at bedtime  Increase regular insulin 34-14-26  Patient is advised to be mindful of carb intake should avoid chips at all cost  Patient's last hemoglobin A1c still 9.2%    I recommend that she should aim for at least below 8% as a goal due to her age of 73 and comorbid conditions  Patient and her nephew verbalized understanding    Patient return to follow-up in 3 months.    The total time spent  was more than 25 min of which greater than 15 min of time (greater than 50% of the total time)  was spent face to face with the patient counseling and coordination of care on recommended evaluation and treatment options, instructions for management/treatment and /or follow up and importance of compliance with chosen management or treatment options    Wally Craft MD.  "FACE    08/05/19      EMR Dragon / transcription disclaimer:     \"Dictated utilizing Dragon dictation\".         "

## 2019-08-05 NOTE — PATIENT INSTRUCTIONS
Increase insulin as follows  NPH insulin 46 units before breakfast and 26 units at bedtime  Regular insulin 34 units before breakfast  14 before lunch  26 before dinner

## 2019-08-06 LAB
ALBUMIN SERPL-MCNC: 4 G/DL (ref 3.5–5.2)
ALBUMIN/CREAT UR: 1408.8 MG/G CREAT (ref 0–30)
ALBUMIN/GLOB SERPL: 1.4 G/DL
ALP SERPL-CCNC: 87 U/L (ref 39–117)
ALT SERPL-CCNC: 15 U/L (ref 1–33)
AST SERPL-CCNC: 17 U/L (ref 1–32)
BILIRUB SERPL-MCNC: 0.5 MG/DL (ref 0.2–1.2)
BUN SERPL-MCNC: 19 MG/DL (ref 8–23)
BUN/CREAT SERPL: 19 (ref 7–25)
CALCIUM SERPL-MCNC: 9.5 MG/DL (ref 8.6–10.5)
CHLORIDE SERPL-SCNC: 103 MMOL/L (ref 98–107)
CO2 SERPL-SCNC: 27 MMOL/L (ref 22–29)
CREAT SERPL-MCNC: 1 MG/DL (ref 0.57–1)
CREAT UR-MCNC: 109.7 MG/DL
GLOBULIN SER CALC-MCNC: 2.9 GM/DL
GLUCOSE SERPL-MCNC: 201 MG/DL (ref 65–99)
HBA1C MFR BLD: 8.37 % (ref 4.8–5.6)
MICROALBUMIN UR-MCNC: 1545.5 UG/ML
POTASSIUM SERPL-SCNC: 4.7 MMOL/L (ref 3.5–5.2)
PROT SERPL-MCNC: 6.9 G/DL (ref 6–8.5)
SODIUM SERPL-SCNC: 143 MMOL/L (ref 136–145)
TSH SERPL DL<=0.005 MIU/L-ACNC: 2.21 MIU/ML (ref 0.27–4.2)

## 2019-08-11 PROBLEM — IMO0002 UNCONTROLLED TYPE 2 DIABETES MELLITUS WITH DIABETIC NEUROPATHY, WITH LONG-TERM CURRENT USE OF INSULIN: Status: RESOLVED | Noted: 2017-10-24 | Resolved: 2019-08-11

## 2019-08-15 DIAGNOSIS — Z79.4 TYPE 2 DIABETES MELLITUS WITH COMPLICATION, WITH LONG-TERM CURRENT USE OF INSULIN (HCC): ICD-10-CM

## 2019-08-15 DIAGNOSIS — E11.8 TYPE 2 DIABETES MELLITUS WITH COMPLICATION, WITH LONG-TERM CURRENT USE OF INSULIN (HCC): ICD-10-CM

## 2019-08-20 RX ORDER — AMLODIPINE BESYLATE 10 MG/1
TABLET ORAL
Qty: 90 TABLET | Refills: 3 | Status: SHIPPED | OUTPATIENT
Start: 2019-08-20 | End: 2020-08-19 | Stop reason: ALTCHOICE

## 2019-08-22 DIAGNOSIS — Z79.4 TYPE 2 DIABETES MELLITUS WITH COMPLICATION, WITH LONG-TERM CURRENT USE OF INSULIN (HCC): ICD-10-CM

## 2019-08-22 DIAGNOSIS — E11.8 TYPE 2 DIABETES MELLITUS WITH COMPLICATION, WITH LONG-TERM CURRENT USE OF INSULIN (HCC): ICD-10-CM

## 2019-08-22 RX ORDER — HUMAN INSULIN 100 [IU]/ML
INJECTION, SOLUTION SUBCUTANEOUS
Qty: 30 ML | Refills: 3 | Status: ON HOLD | OUTPATIENT
Start: 2019-08-22 | End: 2019-12-22

## 2019-08-28 RX ORDER — FUROSEMIDE 40 MG/1
40 TABLET ORAL DAILY
Qty: 30 TABLET | Refills: 0 | Status: SHIPPED | OUTPATIENT
Start: 2019-08-28 | End: 2019-10-30 | Stop reason: SDUPTHER

## 2019-09-03 RX ORDER — HYDROXYZINE HYDROCHLORIDE 25 MG/1
25 TABLET, FILM COATED ORAL NIGHTLY PRN
Qty: 30 TABLET | Refills: 2 | Status: SHIPPED | OUTPATIENT
Start: 2019-09-03 | End: 2019-12-14 | Stop reason: SDUPTHER

## 2019-10-04 ENCOUNTER — TRANSCRIBE ORDERS (OUTPATIENT)
Dept: ADMINISTRATIVE | Facility: HOSPITAL | Age: 74
End: 2019-10-04

## 2019-10-04 DIAGNOSIS — Z12.31 VISIT FOR SCREENING MAMMOGRAM: Primary | ICD-10-CM

## 2019-10-07 RX ORDER — ACETAMINOPHEN AND CODEINE PHOSPHATE 300; 30 MG/1; MG/1
TABLET ORAL
Qty: 30 TABLET | Refills: 2 | Status: SHIPPED | OUTPATIENT
Start: 2019-10-07 | End: 2020-01-14 | Stop reason: SDUPTHER

## 2019-10-30 RX ORDER — FUROSEMIDE 40 MG/1
TABLET ORAL
Qty: 30 TABLET | Refills: 0 | Status: SHIPPED | OUTPATIENT
Start: 2019-10-30 | End: 2020-01-20

## 2019-10-31 RX ORDER — ISOSORBIDE MONONITRATE 60 MG/1
60 TABLET, EXTENDED RELEASE ORAL DAILY
Qty: 90 TABLET | Refills: 1 | Status: SHIPPED | OUTPATIENT
Start: 2019-10-31 | End: 2020-06-04

## 2019-11-19 ENCOUNTER — HOSPITAL ENCOUNTER (OUTPATIENT)
Dept: MAMMOGRAPHY | Facility: HOSPITAL | Age: 74
Discharge: HOME | End: 2019-11-19
Admitting: INTERNAL MEDICINE

## 2019-11-19 DIAGNOSIS — Z12.31 VISIT FOR SCREENING MAMMOGRAM: ICD-10-CM

## 2019-11-19 PROCEDURE — 77067 SCR MAMMO BI INCL CAD: CPT

## 2019-11-20 DIAGNOSIS — E78.5 HYPERLIPIDEMIA, UNSPECIFIED HYPERLIPIDEMIA TYPE: Primary | ICD-10-CM

## 2019-11-20 DIAGNOSIS — E11.8 CONTROLLED DIABETES MELLITUS TYPE 2 WITH COMPLICATIONS, UNSPECIFIED WHETHER LONG TERM INSULIN USE (HCC): ICD-10-CM

## 2019-11-20 LAB
ALBUMIN SERPL-MCNC: 4.4 G/DL (ref 3.5–5.2)
ALBUMIN/GLOB SERPL: 1.8 G/DL
ALP SERPL-CCNC: 88 U/L (ref 39–117)
ALT SERPL-CCNC: 16 U/L (ref 1–33)
AST SERPL-CCNC: 18 U/L (ref 1–32)
BILIRUB SERPL-MCNC: 0.5 MG/DL (ref 0.2–1.2)
BUN SERPL-MCNC: 24 MG/DL (ref 8–23)
BUN/CREAT SERPL: 23.3 (ref 7–25)
CALCIUM SERPL-MCNC: 9.7 MG/DL (ref 8.6–10.5)
CHLORIDE SERPL-SCNC: 106 MMOL/L (ref 98–107)
CHOLEST SERPL-MCNC: 167 MG/DL (ref 0–200)
CO2 SERPL-SCNC: 26 MMOL/L (ref 22–29)
CREAT SERPL-MCNC: 1.03 MG/DL (ref 0.57–1)
GLOBULIN SER CALC-MCNC: 2.5 GM/DL
GLUCOSE SERPL-MCNC: 111 MG/DL (ref 65–99)
HBA1C MFR BLD: 7.3 % (ref 4.8–5.6)
HDLC SERPL-MCNC: 35 MG/DL (ref 40–60)
LDLC SERPL CALC-MCNC: 99 MG/DL (ref 0–100)
POTASSIUM SERPL-SCNC: 4.5 MMOL/L (ref 3.5–5.2)
PROT SERPL-MCNC: 6.9 G/DL (ref 6–8.5)
SODIUM SERPL-SCNC: 143 MMOL/L (ref 136–145)
TRIGL SERPL-MCNC: 165 MG/DL (ref 0–150)
VLDLC SERPL CALC-MCNC: 33 MG/DL

## 2019-11-25 ENCOUNTER — OFFICE VISIT (OUTPATIENT)
Dept: INTERNAL MEDICINE | Facility: CLINIC | Age: 74
End: 2019-11-25

## 2019-11-25 VITALS
WEIGHT: 228 LBS | SYSTOLIC BLOOD PRESSURE: 144 MMHG | HEART RATE: 78 BPM | DIASTOLIC BLOOD PRESSURE: 58 MMHG | HEIGHT: 62 IN | BODY MASS INDEX: 41.96 KG/M2 | OXYGEN SATURATION: 98 %

## 2019-11-25 DIAGNOSIS — N18.30 CHRONIC KIDNEY DISEASE, STAGE III (MODERATE) (HCC): ICD-10-CM

## 2019-11-25 DIAGNOSIS — Z23 ENCOUNTER FOR IMMUNIZATION: ICD-10-CM

## 2019-11-25 DIAGNOSIS — E78.2 MIXED HYPERLIPIDEMIA: ICD-10-CM

## 2019-11-25 DIAGNOSIS — I10 ESSENTIAL HYPERTENSION: ICD-10-CM

## 2019-11-25 DIAGNOSIS — IMO0002 TYPE II DIABETES MELLITUS WITH NEUROLOGICAL MANIFESTATIONS, UNCONTROLLED: Primary | ICD-10-CM

## 2019-11-25 PROCEDURE — 90653 IIV ADJUVANT VACCINE IM: CPT | Performed by: INTERNAL MEDICINE

## 2019-11-25 PROCEDURE — 99214 OFFICE O/P EST MOD 30 MIN: CPT | Performed by: INTERNAL MEDICINE

## 2019-11-25 PROCEDURE — G0008 ADMIN INFLUENZA VIRUS VAC: HCPCS | Performed by: INTERNAL MEDICINE

## 2019-11-25 NOTE — PROGRESS NOTES
Subjective     Denisse Chavez is a 74 y.o. female who presents with   Chief Complaint   Patient presents with   • Diabetes   • Hypertension   • Hyperlipidemia       History of Present Illness     DM-2.  Fair control by A1c.  She is followed by endo as well.   HTN.  Increased today.  Discussed checks at home.   HLD.  Good control on simvastatin.    CKD-3.  Reviewed numbers which are stable.    Review of Systems   Respiratory: Negative.    Cardiovascular: Negative.        The following portions of the patient's history were reviewed and updated as appropriate: allergies, current medications and problem list.    Patient Active Problem List    Diagnosis Date Noted   • Aortic valve sclerosis 07/25/2019   • Heart murmur 07/25/2019   • Hyperlipidemia associated with type 2 diabetes mellitus (CMS/[facility]) 12/20/2018   • History of colon polyps 06/13/2018     Note Last Updated: 6/13/2018     Added automatically from request for surgery [id number]     • Circadian rhythm sleep disorder, delayed sleep phase type 04/23/2018   • Class 3 severe obesity due to excess calories with serious comorbidity and body mass index (BMI) of 40.0 to 44.9 in adult (CMS/[facility]) 04/23/2018   • Chronic venous insufficiency    • CAD (coronary artery disease)      Note Last Updated: 6/28/2017     MI in 1996, treated medically.  PET 6/2016 with small-medium sized lateral infarct, no ischemia.  This wall motion abnormality was seen on echo as well.     • Chronic kidney disease, stage III (moderate) (CMS/[facility]) 10/13/2016   • Obstructive sleep apnea on autoCPAP 09/04/2016   • Encounter for long-term (current) use of insulin (CMS/[facility]) 06/24/2016   • Left hip pain 05/16/2016   • Left-sided low back pain without sciatica 05/16/2016   • Lumbar spinal stenosis 05/16/2016   • Edema of lower extremity 03/30/2016     Note Last Updated: 3/30/2016     Impression: 09/23/2015 - Cont to wear her compression socks.  Lasix may help the edema some if we do end up switching from  HCTZ.  Impression: 03/18/2015 - She is being fitted for compression socks soon.;      • Angiomyolipoma of kidney 03/30/2016   • Uncontrolled type II diabetes mellitus with nephropathy (CMS/[facility]) 03/08/2016   • Type II diabetes mellitus with neurological manifestations, uncontrolled (CMS/[facility]) 03/08/2016   • Uncontrolled type 2 diabetes mellitus with complication, with long-term current use of insulin (CMS/[facility]) 03/08/2016   • Hyperinsulinism 03/08/2016   • Abnormal weight gain 03/08/2016   • Hyperlipidemia 03/08/2016   • Essential hypertension 03/08/2016       Current Outpatient Medications on File Prior to Visit   Medication Sig Dispense Refill   • acetaminophen-codeine (TYLENOL #3) 300-30 MG per tablet TAKE 1 TABLET BY MOUTH EVERY 6 HOURS AS NEEDED FOR  MODERATE  PAIN 30 tablet 2   • amLODIPine (NORVASC) 10 MG tablet TAKE 1 TABLET BY MOUTH ONCE DAILY 90 tablet 3   • aspirin 81 MG tablet Take  by mouth daily.     • docusate sodium (COLACE) 100 MG capsule Take 100 mg by mouth 2 (two) times a day.     • furosemide (LASIX) 40 MG tablet TAKE 1 TABLET BY MOUTH ONCE DAILY 30 tablet 0   • hydrOXYzine (ATARAX) 25 MG tablet Take 1 tablet by mouth At Night As Needed (insomnia). 30 tablet 2   • insulin NPH (NOVOLIN N RELION) 100 UNIT/ML injection INJECT 25 UNITS SUBCUTANEOUSLY IN THE MORNING AND 25 UNITS IN THE EVENING 40 mL 3   • insulin regular (humuLIN R,novoLIN R) 100 UNIT/ML injection Inject 30 Units under the skin into the appropriate area as directed 3 (Three) Times a Day Before Meals.     • isosorbide mononitrate (IMDUR) 60 MG 24 hr tablet Take 1 tablet by mouth Daily. 90 tablet 1   • metoprolol succinate XL (TOPROL-XL) 25 MG 24 hr tablet TAKE 1 TABLET BY MOUTH ONCE DAILY 90 tablet 2   • Misc. Devices (ROLLATOR) misc 1 Units as needed (for walking). 1 each 0   • NOVOLIN R RELION 100 UNIT/ML injection INJECT 50 UNITS SUBCUTANEOUSLY IN THE MORNING AND 48 IN THE EVENING 30 mL 3   • simvastatin (ZOCOR) 40 MG tablet TAKE  "ONE-HALF TABLET BY MOUTH ONCE DAILY 45 tablet 3   • vitamin D (ERGOCALCIFEROL) 85008 units capsule capsule        No current facility-administered medications on file prior to visit.        Objective     /58   Pulse 78   Ht 157.5 cm (62.01\")   Wt 103 kg (228 lb)   SpO2 98%   BMI 41.69 kg/m²     Physical Exam   Constitutional: She is oriented to person, place, and time. She appears well-developed and well-nourished.   HENT:   Head: Normocephalic and atraumatic.   Cardiovascular: Normal rate, regular rhythm and normal heart sounds.   Pulmonary/Chest: Effort normal and breath sounds normal.   Neurological: She is alert and oriented to person, place, and time.   Skin: Skin is warm and dry.   Psychiatric: She has a normal mood and affect. Her behavior is normal.       Assessment/Plan   Denisse was seen today for diabetes, hypertension and hyperlipidemia.    Diagnoses and all orders for this visit:    Type II diabetes mellitus with neurological manifestations, uncontrolled (CMS/[facility])    Essential hypertension    Mixed hyperlipidemia    Chronic kidney disease, stage III (moderate) (CMS/[facility])    Encounter for immunization    Other orders  -     Fluzone High Dose =>65Years ([employer] ONLY) ([id number])        Discussion    DM-2.  The patient will continue current regimen.      HTN.  The patient will continue current regimen.    The patient is instructed to monitor at home and call in checks.    HLD.  The patient will continue current regimen.      CKD-3.   We discussed the importance of NSAID avoidance.  We discussed the importance of water consumption.   High dose flu shot today.         Future Appointments   Date Time Provider Department Center   11/26/2019  1:20 PM [facility] None   12/13/2019  3:00 PM Wally Craft MD [facility] None   4/22/2020 11:00 AM Alfredito Mueller MD [facility] None   5/28/2020  9:10 AM [facility] None   6/1/2020  1:30 PM " Surekha Mcdonnell MD [facility] None   7/29/2020  1:30 PM Warner Tang MD [facility] None

## 2019-11-26 ENCOUNTER — LAB (OUTPATIENT)
Dept: ENDOCRINOLOGY | Age: 74
End: 2019-11-26

## 2019-11-26 DIAGNOSIS — Z79.4 TYPE 2 DIABETES MELLITUS WITH COMPLICATION, WITH LONG-TERM CURRENT USE OF INSULIN (HCC): ICD-10-CM

## 2019-11-26 DIAGNOSIS — I10 ESSENTIAL HYPERTENSION: ICD-10-CM

## 2019-11-26 DIAGNOSIS — E11.8 TYPE 2 DIABETES MELLITUS WITH COMPLICATION, WITH LONG-TERM CURRENT USE OF INSULIN (HCC): ICD-10-CM

## 2019-11-26 DIAGNOSIS — E11.8 TYPE 2 DIABETES MELLITUS WITH COMPLICATION, WITH LONG-TERM CURRENT USE OF INSULIN (HCC): Primary | ICD-10-CM

## 2019-11-26 DIAGNOSIS — Z79.4 TYPE 2 DIABETES MELLITUS WITH COMPLICATION, WITH LONG-TERM CURRENT USE OF INSULIN (HCC): Primary | ICD-10-CM

## 2019-11-26 RX ORDER — METOPROLOL SUCCINATE 25 MG/1
25 TABLET, EXTENDED RELEASE ORAL DAILY
Qty: 90 TABLET | Refills: 2 | Status: SHIPPED | OUTPATIENT
Start: 2019-11-26 | End: 2020-03-18 | Stop reason: SDUPTHER

## 2019-12-13 ENCOUNTER — OFFICE VISIT (OUTPATIENT)
Dept: ENDOCRINOLOGY | Age: 74
End: 2019-12-13

## 2019-12-13 VITALS
DIASTOLIC BLOOD PRESSURE: 72 MMHG | HEART RATE: 95 BPM | HEIGHT: 62 IN | WEIGHT: 224 LBS | SYSTOLIC BLOOD PRESSURE: 148 MMHG | BODY MASS INDEX: 41.22 KG/M2

## 2019-12-13 DIAGNOSIS — E78.5 HYPERLIPIDEMIA ASSOCIATED WITH TYPE 2 DIABETES MELLITUS (HCC): ICD-10-CM

## 2019-12-13 DIAGNOSIS — Z79.4 ENCOUNTER FOR LONG-TERM (CURRENT) USE OF INSULIN (HCC): ICD-10-CM

## 2019-12-13 DIAGNOSIS — N18.30 CHRONIC KIDNEY DISEASE, STAGE III (MODERATE) (HCC): ICD-10-CM

## 2019-12-13 DIAGNOSIS — IMO0002 UNCONTROLLED TYPE 2 DIABETES MELLITUS WITH BACKGROUND RETINOPATHY: ICD-10-CM

## 2019-12-13 DIAGNOSIS — E16.1 HYPERINSULINISM: ICD-10-CM

## 2019-12-13 DIAGNOSIS — IMO0002 UNCONTROLLED TYPE 2 DIABETES MELLITUS WITH COMPLICATION, WITH LONG-TERM CURRENT USE OF INSULIN: Primary | ICD-10-CM

## 2019-12-13 DIAGNOSIS — R63.5 ABNORMAL WEIGHT GAIN: ICD-10-CM

## 2019-12-13 DIAGNOSIS — E11.69 HYPERLIPIDEMIA ASSOCIATED WITH TYPE 2 DIABETES MELLITUS (HCC): ICD-10-CM

## 2019-12-13 DIAGNOSIS — IMO0002 UNCONTROLLED TYPE II DIABETES MELLITUS WITH NEPHROPATHY: ICD-10-CM

## 2019-12-13 DIAGNOSIS — IMO0002 UNCONTROLLED TYPE 2 DIABETES MELLITUS WITH DIABETIC NEUROPATHY, WITH LONG-TERM CURRENT USE OF INSULIN: ICD-10-CM

## 2019-12-13 PROCEDURE — 99214 OFFICE O/P EST MOD 30 MIN: CPT | Performed by: INTERNAL MEDICINE

## 2019-12-13 NOTE — PROGRESS NOTES
74 y.o.    Patient Care Team:  Surekha Mcdonnell MD as PCP - General (Internal Medicine)  Wally Craft MD as PCP - Claims Attributed  Wally Craft MD as Consulting Physician (Endocrinology)  Warner Tang MD as Consulting Physician (Cardiology)  Sergio Eagle MD as Consulting Physician (Urology)    Chief Complaint:      F/U TYPE 2 DIABETES, UNCONTROLLED.  HERE TO DISCUSS LAB RESULTS.     Subjective     HPI   Patient is a 74-year-old white female with a history of uncontrolled type 2 diabetes mellitus with complications came for follow-up    Uncontrolled type 2 diabetes mellitus  Patient is currently taking NPH and regular insulin regimen  She reports that blood sugars are mostly between 100-200  She forgot to bring her glucometer today  Hypoglycemia  Denies any recent episodes  Uncontrolled type 2 diabetes mellitus with peripheral neuropathy  Patient reports symptoms of tingling numbness burning in both lower extremities  Uncontrolled type 2 diabetes mellitus with retinopathy  Patient is status post laser treatments in the past and has regular follow-up with ophthalmologist  Uncontrolled type 2 diabetes mellitus with nephropathy  Patient is severe microalbuminuria and elevated creatinine  She has regular follow-up with a nephrologist  Abnormal weight gain  Patient currently weighs 224 pounds which is 4 pounds lighter than before  Hyperlipidemia associated with diabetes  Patient is currently taking simvastatin 20 mg daily.  Reports compliance  Denies any side effects      Interval History      The following portions of the patient's history were reviewed and updated as appropriate: allergies, current medications, past family history, past medical history, past social history, past surgical history and problem list.    Past Medical History:   Diagnosis Date   • Angiomyolipoma of kidney 3/30/2016   • Aortic valve sclerosis    • CAD (coronary artery disease)     MI in 1996, treated  medically.  PET 2016 with small-medium sized lateral infarct, no ischemia.  This wall motion abnormality was seen on echo as well.   • Cellulitis 2018    RIGHT LEG   • Chronic kidney disease, stage III (moderate) (CMS/[facility]) 10/13/2016   • Chronic venous insufficiency    • Heart murmur    • Hyperlipidemia    • Hypertension    • Low back pain    • Mass of ovary 3/30/2016   • Morbid obesity (CMS/[facility])    • Sleep apnea    • Spinal stenosis    • Type 2 diabetes mellitus (CMS/[facility])    • Uterine leiomyoma 3/30/2016     Family History   Problem Relation Age of Onset   • Diabetes Mother    • Heart attack Mother    • Hypertension Mother    • Hypothyroidism Mother    • Diabetes type II Son    • Breast cancer Neg Hx      Social History     Socioeconomic History   • Marital status: Single     Spouse name: Not on file   • Number of children: 3   • Years of education: Not on file   • Highest education level: Not on file   Occupational History   • Occupation: retired from [employer]   Tobacco Use   • Smoking status: Former Smoker     Packs/day: 0.25     Years: 2.00     Pack years: 0.50     Types: Cigarettes     Last attempt to quit: 1970     Years since quittin.9   • Smokeless tobacco: Never Used   • Tobacco comment: LIGHT USAGE   Substance and Sexual Activity   • Alcohol use: No     Comment: CAFFEINE USE: 10 -12 CUPS OF COFFEE DAILY.    • Drug use: No   • Sexual activity: Defer     Allergies   Allergen Reactions   • Ace Inhibitors Other (See Comments)     Hyperkalemia/ROB   • Angiotensin Receptor Blockers Other (See Comments)     Pt unable to remember   • Keflex [Cephalexin] Rash   • Sulfa Antibiotics Itching     rash       Current Outpatient Medications:   •  acetaminophen-codeine (TYLENOL #3) 300-30 MG per tablet, TAKE 1 TABLET BY MOUTH EVERY 6 HOURS AS NEEDED FOR  MODERATE  PAIN, Disp: 30 tablet, Rfl: 2  •  amLODIPine (NORVASC) 10 MG tablet, TAKE 1 TABLET BY MOUTH ONCE DAILY, Disp: 90 tablet, Rfl: 3  •  aspirin 81 MG tablet,  "Take  by mouth daily., Disp: , Rfl:   •  docusate sodium (COLACE) 100 MG capsule, Take 100 mg by mouth 2 (two) times a day., Disp: , Rfl:   •  furosemide (LASIX) 40 MG tablet, TAKE 1 TABLET BY MOUTH ONCE DAILY, Disp: 30 tablet, Rfl: 0  •  hydrOXYzine (ATARAX) 25 MG tablet, Take 1 tablet by mouth At Night As Needed (insomnia)., Disp: 30 tablet, Rfl: 2  •  insulin NPH (NOVOLIN N RELION) 100 UNIT/ML injection, INJECT 25 UNITS SUBCUTANEOUSLY IN THE MORNING AND 25 UNITS IN THE EVENING, Disp: 40 mL, Rfl: 3  •  insulin regular (humuLIN R,novoLIN R) 100 UNIT/ML injection, Inject 30 Units under the skin into the appropriate area as directed 3 (Three) Times a Day Before Meals., Disp: 20 mL, Rfl: 5  •  isosorbide mononitrate (IMDUR) 60 MG 24 hr tablet, Take 1 tablet by mouth Daily., Disp: 90 tablet, Rfl: 1  •  metoprolol succinate XL (TOPROL-XL) 25 MG 24 hr tablet, Take 1 tablet by mouth Daily., Disp: 90 tablet, Rfl: 2  •  Misc. Devices (ROLLATOR) misc, 1 Units as needed (for walking)., Disp: 1 each, Rfl: 0  •  NOVOLIN R RELION 100 UNIT/ML injection, INJECT 50 UNITS SUBCUTANEOUSLY IN THE MORNING AND 48 IN THE EVENING, Disp: 30 mL, Rfl: 3  •  simvastatin (ZOCOR) 40 MG tablet, TAKE ONE-HALF TABLET BY MOUTH ONCE DAILY, Disp: 45 tablet, Rfl: 3  •  vitamin D (ERGOCALCIFEROL) 41338 units capsule capsule, , Disp: , Rfl:         Review of Systems   Constitutional: Negative for chills, fatigue and fever.   Cardiovascular: Negative for chest pain and palpitations.   Gastrointestinal: Negative for abdominal pain, constipation, diarrhea, nausea and vomiting.   Endocrine: Negative for cold intolerance and heat intolerance.   All other systems reviewed and are negative.      Objective       Vitals:    12/13/19 1511   BP: 148/72   Pulse: 95   Weight: 102 kg (224 lb)   Height: 157.5 cm (62\")     Body mass index is 40.97 kg/m².      Physical Exam   Constitutional: She is oriented to person, place, and time. She appears well-developed.   HENT: "   Head: Normocephalic and atraumatic.   Eyes: Pupils are equal, round, and reactive to light. EOM are normal.   Neck: Normal range of motion. Neck supple. No thyromegaly present.   Cardiovascular: Normal rate, regular rhythm, normal heart sounds and intact distal pulses.   Pulmonary/Chest: Effort normal and breath sounds normal.   Abdominal: Soft. Bowel sounds are normal. She exhibits distension. There is no tenderness.   Musculoskeletal: Normal range of motion. She exhibits no edema.   Neurological: She is alert and oriented to person, place, and time.   Skin: Skin is warm and dry.   Psychiatric: She has a normal mood and affect. Her behavior is normal.   Nursing note and vitals reviewed.    Results Review:     I reviewed the patient's new clinical results.    Medical records reviewed  Summary:  Ophthalmology records noted  Patient is status post laser treatments in the past and has regular follow-up it has been stable      Orders Only on 11/20/2019   Component Date Value Ref Range Status   • Glucose 11/20/2019 111* 65 - 99 mg/dL Final   • BUN 11/20/2019 24* 8 - 23 mg/dL Final   • Creatinine 11/20/2019 1.03* 0.57 - 1.00 mg/dL Final   • eGFR Non African Am 11/20/2019 52* >60 mL/min/1.73 Final    Comment: The MDRD GFR formula is only valid for adults with stable  renal function between ages 18 and 70.     • eGFR  Am 11/20/2019 63  >60 mL/min/1.73 Final   • BUN/Creatinine Ratio 11/20/2019 23.3  7.0 - 25.0 Final   • Sodium 11/20/2019 143  136 - 145 mmol/L Final   • Potassium 11/20/2019 4.5  3.5 - 5.2 mmol/L Final   • Chloride 11/20/2019 106  98 - 107 mmol/L Final   • Total CO2 11/20/2019 26.0  22.0 - 29.0 mmol/L Final   • Calcium 11/20/2019 9.7  8.6 - 10.5 mg/dL Final   • Total Protein 11/20/2019 6.9  6.0 - 8.5 g/dL Final   • Albumin 11/20/2019 4.40  3.50 - 5.20 g/dL Final   • Globulin 11/20/2019 2.5  gm/dL Final   • A/G Ratio 11/20/2019 1.8  g/dL Final   • Total Bilirubin 11/20/2019 0.5  0.2 - 1.2 mg/dL Final    • Alkaline Phosphatase 11/20/2019 88  39 - 117 U/L Final   • AST (SGOT) 11/20/2019 18  1 - 32 U/L Final   • ALT (SGPT) 11/20/2019 16  1 - 33 U/L Final   • Total Cholesterol 11/20/2019 167  0 - 200 mg/dL Final   • Triglycerides 11/20/2019 165* 0 - 150 mg/dL Final   • HDL Cholesterol 11/20/2019 35* 40 - 60 mg/dL Final   • VLDL Cholesterol 11/20/2019 33  mg/dL Final   • LDL Cholesterol  11/20/2019 99  0 - 100 mg/dL Final   • Hemoglobin A1C 11/20/2019 7.30* 4.80 - 5.60 % Final    Comment: Hemoglobin A1C Ranges:  Increased Risk for Diabetes  5.7% to 6.4%  Diabetes                     >= 6.5%  Diabetic Goal                < 7.0%       Lab Results   Component Value Date    HGBA1C 7.30 (H) 11/20/2019    HGBA1C 8.37 (H) 08/05/2019    HGBA1C 9.20 (H) 05/08/2019     Lab Results   Component Value Date    MICROALBUR 1,545.5 08/05/2019    CREATININE 1.03 (H) 11/20/2019     Imaging Results (Most Recent)     None                Assessment and Plan:    Denisse was seen today for diabetes.    Diagnoses and all orders for this visit:    Uncontrolled type 2 diabetes mellitus with complication, with long-term current use of insulin (CMS/[facility])    Uncontrolled type II diabetes mellitus with nephropathy (CMS/[facility])    Uncontrolled type 2 diabetes mellitus with diabetic neuropathy, with long-term current use of insulin (CMS/[facility])    Uncontrolled type 2 diabetes mellitus with background retinopathy (CMS/[facility])    Hyperinsulinism    Encounter for long-term (current) use of insulin (CMS/[facility])    Chronic kidney disease, stage III (moderate) (CMS/[facility])    Abnormal weight gain    Hyperlipidemia associated with type 2 diabetes mellitus (CMS/[facility])    Other orders  -     insulin regular (humuLIN R,novoLIN R) 100 UNIT/ML injection; Inject 30 Units under the skin into the appropriate area as directed 3 (Three) Times a Day Before Meals.        Patient forgot to bring her glucometer in  She however reports that majority are betwee n  range  Her  "hemoglobin A1c is 7.3%     patient is currently taking NPH 46 units in the morning and 26 units at night  She takes regular insulin 34-14-26  She is managing to keep her blood sugars reasonably stable  No hypoglycemia reported    Patient return to follow-up in 4 months    Wally Craft MD. FACE    12/13/19      EMR Dragon / transcription disclaimer:     \"Dictated utilizing Dragon dictation\".         "

## 2019-12-16 RX ORDER — HYDROXYZINE HYDROCHLORIDE 25 MG/1
TABLET, FILM COATED ORAL
Qty: 90 TABLET | Refills: 0 | Status: SHIPPED | OUTPATIENT
Start: 2019-12-16 | End: 2020-04-03

## 2019-12-22 ENCOUNTER — APPOINTMENT (OUTPATIENT)
Dept: GENERAL RADIOLOGY | Facility: HOSPITAL | Age: 74
End: 2019-12-22

## 2019-12-22 ENCOUNTER — APPOINTMENT (OUTPATIENT)
Dept: CT IMAGING | Facility: HOSPITAL | Age: 74
End: 2019-12-22

## 2019-12-22 ENCOUNTER — HOSPITAL ENCOUNTER (INPATIENT)
Facility: HOSPITAL | Age: 74
LOS: 4 days | Discharge: NURSING, REHAB OR LONG-TERM CARE | End: 2019-12-26
Attending: EMERGENCY MEDICINE | Admitting: INTERNAL MEDICINE

## 2019-12-22 DIAGNOSIS — R65.20 SEVERE SEPSIS (HCC): ICD-10-CM

## 2019-12-22 DIAGNOSIS — N30.80 EMPHYSEMATOUS CYSTITIS: ICD-10-CM

## 2019-12-22 DIAGNOSIS — A41.9 SEVERE SEPSIS (HCC): ICD-10-CM

## 2019-12-22 DIAGNOSIS — N39.0 COMPLICATED UTI (URINARY TRACT INFECTION): Primary | ICD-10-CM

## 2019-12-22 DIAGNOSIS — R53.1 GENERALIZED WEAKNESS: ICD-10-CM

## 2019-12-22 PROBLEM — E66.9 OBESITY (BMI 30-39.9): Status: ACTIVE | Noted: 2019-12-22

## 2019-12-22 LAB
ALBUMIN SERPL-MCNC: 4.5 G/DL (ref 3.5–5.2)
ALBUMIN/GLOB SERPL: 1.2 G/DL
ALP SERPL-CCNC: 94 U/L (ref 39–117)
ALT SERPL W P-5'-P-CCNC: 18 U/L (ref 1–33)
ANION GAP SERPL CALCULATED.3IONS-SCNC: 18.9 MMOL/L (ref 5–15)
AST SERPL-CCNC: 22 U/L (ref 1–32)
BACTERIA UR QL AUTO: ABNORMAL /HPF
BASOPHILS # BLD AUTO: 0.05 10*3/MM3 (ref 0–0.2)
BASOPHILS NFR BLD AUTO: 0.2 % (ref 0–1.5)
BILIRUB SERPL-MCNC: 0.4 MG/DL (ref 0.2–1.2)
BILIRUB UR QL STRIP: NEGATIVE
BUN BLD-MCNC: 26 MG/DL (ref 8–23)
BUN/CREAT SERPL: 23.6 (ref 7–25)
CALCIUM SPEC-SCNC: 10 MG/DL (ref 8.6–10.5)
CHLORIDE SERPL-SCNC: 99 MMOL/L (ref 98–107)
CLARITY UR: ABNORMAL
CO2 SERPL-SCNC: 22.1 MMOL/L (ref 22–29)
COLOR UR: YELLOW
CREAT BLD-MCNC: 1.1 MG/DL (ref 0.57–1)
D-LACTATE SERPL-SCNC: 1.7 MMOL/L (ref 0.5–2)
D-LACTATE SERPL-SCNC: 4.5 MMOL/L (ref 0.5–2)
DEPRECATED RDW RBC AUTO: 40.9 FL (ref 37–54)
EOSINOPHIL # BLD AUTO: 0.16 10*3/MM3 (ref 0–0.4)
EOSINOPHIL NFR BLD AUTO: 0.7 % (ref 0.3–6.2)
ERYTHROCYTE [DISTWIDTH] IN BLOOD BY AUTOMATED COUNT: 13.9 % (ref 12.3–15.4)
GFR SERPL CREATININE-BSD FRML MDRD: 49 ML/MIN/1.73
GLOBULIN UR ELPH-MCNC: 3.9 GM/DL
GLUCOSE BLD-MCNC: 159 MG/DL (ref 65–99)
GLUCOSE BLDC GLUCOMTR-MCNC: 185 MG/DL (ref 70–130)
GLUCOSE BLDC GLUCOMTR-MCNC: 197 MG/DL (ref 70–130)
GLUCOSE UR STRIP-MCNC: NEGATIVE MG/DL
HCT VFR BLD AUTO: 42.3 % (ref 34–46.6)
HGB BLD-MCNC: 13.7 G/DL (ref 12–15.9)
HGB UR QL STRIP.AUTO: ABNORMAL
HOLD SPECIMEN: NORMAL
HYALINE CASTS UR QL AUTO: ABNORMAL /LPF
IMM GRANULOCYTES # BLD AUTO: 0.11 10*3/MM3 (ref 0–0.05)
IMM GRANULOCYTES NFR BLD AUTO: 0.5 % (ref 0–0.5)
KETONES UR QL STRIP: NEGATIVE
LEUKOCYTE ESTERASE UR QL STRIP.AUTO: ABNORMAL
LYMPHOCYTES # BLD AUTO: 2.31 10*3/MM3 (ref 0.7–3.1)
LYMPHOCYTES NFR BLD AUTO: 10.4 % (ref 19.6–45.3)
MCH RBC QN AUTO: 26.6 PG (ref 26.6–33)
MCHC RBC AUTO-ENTMCNC: 32.4 G/DL (ref 31.5–35.7)
MCV RBC AUTO: 82.1 FL (ref 79–97)
MONOCYTES # BLD AUTO: 0.89 10*3/MM3 (ref 0.1–0.9)
MONOCYTES NFR BLD AUTO: 4 % (ref 5–12)
NEUTROPHILS # BLD AUTO: 18.74 10*3/MM3 (ref 1.7–7)
NEUTROPHILS NFR BLD AUTO: 84.2 % (ref 42.7–76)
NITRITE UR QL STRIP: NEGATIVE
NRBC BLD AUTO-RTO: 0 /100 WBC (ref 0–0.2)
PH UR STRIP.AUTO: 5.5 [PH] (ref 5–8)
PLATELET # BLD AUTO: 302 10*3/MM3 (ref 140–450)
PMV BLD AUTO: 9.6 FL (ref 6–12)
POTASSIUM BLD-SCNC: 4.1 MMOL/L (ref 3.5–5.2)
PROT SERPL-MCNC: 8.4 G/DL (ref 6–8.5)
PROT UR QL STRIP: ABNORMAL
RBC # BLD AUTO: 5.15 10*6/MM3 (ref 3.77–5.28)
RBC # UR: ABNORMAL /HPF
REF LAB TEST METHOD: ABNORMAL
SODIUM BLD-SCNC: 140 MMOL/L (ref 136–145)
SP GR UR STRIP: 1.02 (ref 1–1.03)
SQUAMOUS #/AREA URNS HPF: ABNORMAL /HPF
UROBILINOGEN UR QL STRIP: ABNORMAL
WBC NRBC COR # BLD: 22.26 10*3/MM3 (ref 3.4–10.8)
WBC UR QL AUTO: ABNORMAL /HPF

## 2019-12-22 PROCEDURE — 87186 SC STD MICRODIL/AGAR DIL: CPT | Performed by: EMERGENCY MEDICINE

## 2019-12-22 PROCEDURE — 82962 GLUCOSE BLOOD TEST: CPT

## 2019-12-22 PROCEDURE — 63710000001 INSULIN REGULAR HUMAN PER 5 UNITS: Performed by: HOSPITALIST

## 2019-12-22 PROCEDURE — 63710000001 INSULIN ISOPHANE HUMAN PER 5 UNITS: Performed by: HOSPITALIST

## 2019-12-22 PROCEDURE — 87040 BLOOD CULTURE FOR BACTERIA: CPT | Performed by: EMERGENCY MEDICINE

## 2019-12-22 PROCEDURE — 99285 EMERGENCY DEPT VISIT HI MDM: CPT

## 2019-12-22 PROCEDURE — 87086 URINE CULTURE/COLONY COUNT: CPT | Performed by: EMERGENCY MEDICINE

## 2019-12-22 PROCEDURE — 87150 DNA/RNA AMPLIFIED PROBE: CPT | Performed by: EMERGENCY MEDICINE

## 2019-12-22 PROCEDURE — 81001 URINALYSIS AUTO W/SCOPE: CPT | Performed by: EMERGENCY MEDICINE

## 2019-12-22 PROCEDURE — 25010000002 ONDANSETRON PER 1 MG: Performed by: EMERGENCY MEDICINE

## 2019-12-22 PROCEDURE — 80053 COMPREHEN METABOLIC PANEL: CPT | Performed by: EMERGENCY MEDICINE

## 2019-12-22 PROCEDURE — 25010000002 HYDROMORPHONE 1 MG/ML SOLUTION: Performed by: EMERGENCY MEDICINE

## 2019-12-22 PROCEDURE — 36415 COLL VENOUS BLD VENIPUNCTURE: CPT | Performed by: EMERGENCY MEDICINE

## 2019-12-22 PROCEDURE — 83605 ASSAY OF LACTIC ACID: CPT | Performed by: EMERGENCY MEDICINE

## 2019-12-22 PROCEDURE — 85025 COMPLETE CBC W/AUTO DIFF WBC: CPT | Performed by: EMERGENCY MEDICINE

## 2019-12-22 PROCEDURE — 25010000003 CEFTRIAXONE PER 250 MG: Performed by: EMERGENCY MEDICINE

## 2019-12-22 PROCEDURE — 71045 X-RAY EXAM CHEST 1 VIEW: CPT

## 2019-12-22 PROCEDURE — 74176 CT ABD & PELVIS W/O CONTRAST: CPT

## 2019-12-22 PROCEDURE — 36415 COLL VENOUS BLD VENIPUNCTURE: CPT

## 2019-12-22 RX ORDER — ACETAMINOPHEN 325 MG/1
650 TABLET ORAL EVERY 4 HOURS PRN
Status: DISCONTINUED | OUTPATIENT
Start: 2019-12-22 | End: 2019-12-26 | Stop reason: HOSPADM

## 2019-12-22 RX ORDER — CEFTRIAXONE SODIUM 2 G/50ML
2 INJECTION, SOLUTION INTRAVENOUS EVERY 24 HOURS
Status: DISCONTINUED | OUTPATIENT
Start: 2019-12-23 | End: 2019-12-25

## 2019-12-22 RX ORDER — METOPROLOL SUCCINATE 25 MG/1
25 TABLET, EXTENDED RELEASE ORAL DAILY
Status: DISCONTINUED | OUTPATIENT
Start: 2019-12-22 | End: 2019-12-26 | Stop reason: HOSPADM

## 2019-12-22 RX ORDER — NICOTINE POLACRILEX 4 MG
15 LOZENGE BUCCAL
Status: DISCONTINUED | OUTPATIENT
Start: 2019-12-22 | End: 2019-12-26 | Stop reason: HOSPADM

## 2019-12-22 RX ORDER — ASPIRIN 81 MG/1
81 TABLET ORAL DAILY
Status: DISCONTINUED | OUTPATIENT
Start: 2019-12-23 | End: 2019-12-26 | Stop reason: HOSPADM

## 2019-12-22 RX ORDER — NITROGLYCERIN 0.4 MG/1
0.4 TABLET SUBLINGUAL
Status: DISCONTINUED | OUTPATIENT
Start: 2019-12-22 | End: 2019-12-26 | Stop reason: HOSPADM

## 2019-12-22 RX ORDER — ONDANSETRON 2 MG/ML
4 INJECTION INTRAMUSCULAR; INTRAVENOUS EVERY 6 HOURS PRN
Status: DISCONTINUED | OUTPATIENT
Start: 2019-12-22 | End: 2019-12-26 | Stop reason: HOSPADM

## 2019-12-22 RX ORDER — DEXTROSE MONOHYDRATE 25 G/50ML
25 INJECTION, SOLUTION INTRAVENOUS
Status: DISCONTINUED | OUTPATIENT
Start: 2019-12-22 | End: 2019-12-26 | Stop reason: HOSPADM

## 2019-12-22 RX ORDER — ACETAMINOPHEN 500 MG
1000 TABLET ORAL ONCE
Status: COMPLETED | OUTPATIENT
Start: 2019-12-22 | End: 2019-12-22

## 2019-12-22 RX ORDER — ACETAMINOPHEN 650 MG/1
650 SUPPOSITORY RECTAL EVERY 4 HOURS PRN
Status: DISCONTINUED | OUTPATIENT
Start: 2019-12-22 | End: 2019-12-26 | Stop reason: HOSPADM

## 2019-12-22 RX ORDER — DOCUSATE SODIUM 100 MG/1
100 CAPSULE, LIQUID FILLED ORAL 2 TIMES DAILY
Status: DISCONTINUED | OUTPATIENT
Start: 2019-12-22 | End: 2019-12-26 | Stop reason: HOSPADM

## 2019-12-22 RX ORDER — ACETAMINOPHEN 160 MG/5ML
650 SOLUTION ORAL EVERY 4 HOURS PRN
Status: DISCONTINUED | OUTPATIENT
Start: 2019-12-22 | End: 2019-12-26 | Stop reason: HOSPADM

## 2019-12-22 RX ORDER — SODIUM CHLORIDE 9 MG/ML
75 INJECTION, SOLUTION INTRAVENOUS CONTINUOUS
Status: DISCONTINUED | OUTPATIENT
Start: 2019-12-22 | End: 2019-12-24

## 2019-12-22 RX ORDER — CEFTRIAXONE SODIUM 2 G/50ML
2 INJECTION, SOLUTION INTRAVENOUS ONCE
Status: COMPLETED | OUTPATIENT
Start: 2019-12-22 | End: 2019-12-22

## 2019-12-22 RX ORDER — ONDANSETRON 2 MG/ML
4 INJECTION INTRAMUSCULAR; INTRAVENOUS ONCE
Status: COMPLETED | OUTPATIENT
Start: 2019-12-22 | End: 2019-12-22

## 2019-12-22 RX ORDER — ATORVASTATIN CALCIUM 20 MG/1
20 TABLET, FILM COATED ORAL DAILY
Status: DISCONTINUED | OUTPATIENT
Start: 2019-12-22 | End: 2019-12-26 | Stop reason: HOSPADM

## 2019-12-22 RX ORDER — SODIUM CHLORIDE 0.9 % (FLUSH) 0.9 %
10 SYRINGE (ML) INJECTION AS NEEDED
Status: DISCONTINUED | OUTPATIENT
Start: 2019-12-22 | End: 2019-12-22

## 2019-12-22 RX ORDER — ONDANSETRON 4 MG/1
4 TABLET, FILM COATED ORAL EVERY 6 HOURS PRN
Status: DISCONTINUED | OUTPATIENT
Start: 2019-12-22 | End: 2019-12-26 | Stop reason: HOSPADM

## 2019-12-22 RX ORDER — HYDROMORPHONE HYDROCHLORIDE 1 MG/ML
0.5 INJECTION, SOLUTION INTRAMUSCULAR; INTRAVENOUS; SUBCUTANEOUS ONCE
Status: DISCONTINUED | OUTPATIENT
Start: 2019-12-22 | End: 2019-12-22

## 2019-12-22 RX ADMIN — SODIUM CHLORIDE 100 ML/HR: 9 INJECTION, SOLUTION INTRAVENOUS at 18:30

## 2019-12-22 RX ADMIN — SODIUM CHLORIDE 1000 ML: 9 INJECTION, SOLUTION INTRAVENOUS at 12:29

## 2019-12-22 RX ADMIN — INSULIN HUMAN 26 UNITS: 100 INJECTION, SUSPENSION SUBCUTANEOUS at 20:59

## 2019-12-22 RX ADMIN — SODIUM CHLORIDE, PRESERVATIVE FREE 10 ML: 5 INJECTION INTRAVENOUS at 14:08

## 2019-12-22 RX ADMIN — SODIUM CHLORIDE 1641 ML: 9 INJECTION, SOLUTION INTRAVENOUS at 12:54

## 2019-12-22 RX ADMIN — CEFTRIAXONE SODIUM 2 G: 2 INJECTION, SOLUTION INTRAVENOUS at 12:35

## 2019-12-22 RX ADMIN — ONDANSETRON 4 MG: 2 INJECTION INTRAMUSCULAR; INTRAVENOUS at 15:55

## 2019-12-22 RX ADMIN — DOCUSATE SODIUM 100 MG: 100 CAPSULE, LIQUID FILLED ORAL at 20:59

## 2019-12-22 RX ADMIN — HYDROMORPHONE HYDROCHLORIDE 1 MG: 10 INJECTION INTRAMUSCULAR; INTRAVENOUS; SUBCUTANEOUS at 14:09

## 2019-12-22 RX ADMIN — INSULIN HUMAN 4 UNITS: 100 INJECTION, SOLUTION PARENTERAL at 19:00

## 2019-12-22 RX ADMIN — ONDANSETRON 4 MG: 2 INJECTION INTRAMUSCULAR; INTRAVENOUS at 12:34

## 2019-12-22 RX ADMIN — ACETAMINOPHEN 1000 MG: 500 TABLET, FILM COATED ORAL at 12:34

## 2019-12-23 PROBLEM — B95.5 BACTEREMIA DUE TO STREPTOCOCCUS: Status: ACTIVE | Noted: 2019-12-23

## 2019-12-23 PROBLEM — R78.81 BACTEREMIA DUE TO STREPTOCOCCUS: Status: ACTIVE | Noted: 2019-12-23

## 2019-12-23 LAB
ALBUMIN SERPL-MCNC: 3.6 G/DL (ref 3.5–5.2)
ALBUMIN/GLOB SERPL: 1 G/DL
ALP SERPL-CCNC: 66 U/L (ref 39–117)
ALT SERPL W P-5'-P-CCNC: 17 U/L (ref 1–33)
ANION GAP SERPL CALCULATED.3IONS-SCNC: 14.4 MMOL/L (ref 5–15)
AST SERPL-CCNC: 56 U/L (ref 1–32)
BACTERIA BLD CULT: ABNORMAL
BASOPHILS # BLD AUTO: 0.03 10*3/MM3 (ref 0–0.2)
BASOPHILS NFR BLD AUTO: 0.1 % (ref 0–1.5)
BILIRUB SERPL-MCNC: 0.5 MG/DL (ref 0.2–1.2)
BUN BLD-MCNC: 21 MG/DL (ref 8–23)
BUN/CREAT SERPL: 19.4 (ref 7–25)
CALCIUM SPEC-SCNC: 8.7 MG/DL (ref 8.6–10.5)
CHLORIDE SERPL-SCNC: 104 MMOL/L (ref 98–107)
CO2 SERPL-SCNC: 20.6 MMOL/L (ref 22–29)
CREAT BLD-MCNC: 1.08 MG/DL (ref 0.57–1)
CRP SERPL-MCNC: 17.56 MG/DL (ref 0–0.5)
D-LACTATE SERPL-SCNC: 1.6 MMOL/L (ref 0.5–2)
DEPRECATED RDW RBC AUTO: 39.7 FL (ref 37–54)
EOSINOPHIL # BLD AUTO: 0 10*3/MM3 (ref 0–0.4)
EOSINOPHIL NFR BLD AUTO: 0 % (ref 0.3–6.2)
ERYTHROCYTE [DISTWIDTH] IN BLOOD BY AUTOMATED COUNT: 14 % (ref 12.3–15.4)
GFR SERPL CREATININE-BSD FRML MDRD: 50 ML/MIN/1.73
GLOBULIN UR ELPH-MCNC: 3.5 GM/DL
GLUCOSE BLD-MCNC: 186 MG/DL (ref 65–99)
GLUCOSE BLDC GLUCOMTR-MCNC: 104 MG/DL (ref 70–130)
GLUCOSE BLDC GLUCOMTR-MCNC: 178 MG/DL (ref 70–130)
GLUCOSE BLDC GLUCOMTR-MCNC: 192 MG/DL (ref 70–130)
GLUCOSE BLDC GLUCOMTR-MCNC: 60 MG/DL (ref 70–130)
HBA1C MFR BLD: 6.7 % (ref 4.8–5.6)
HCT VFR BLD AUTO: 37.9 % (ref 34–46.6)
HGB BLD-MCNC: 13 G/DL (ref 12–15.9)
IMM GRANULOCYTES # BLD AUTO: 0.16 10*3/MM3 (ref 0–0.05)
IMM GRANULOCYTES NFR BLD AUTO: 0.7 % (ref 0–0.5)
LYMPHOCYTES # BLD AUTO: 0.65 10*3/MM3 (ref 0.7–3.1)
LYMPHOCYTES NFR BLD AUTO: 3 % (ref 19.6–45.3)
MAGNESIUM SERPL-MCNC: 1.9 MG/DL (ref 1.6–2.4)
MCH RBC QN AUTO: 27.3 PG (ref 26.6–33)
MCHC RBC AUTO-ENTMCNC: 34.3 G/DL (ref 31.5–35.7)
MCV RBC AUTO: 79.6 FL (ref 79–97)
MONOCYTES # BLD AUTO: 0.67 10*3/MM3 (ref 0.1–0.9)
MONOCYTES NFR BLD AUTO: 3.1 % (ref 5–12)
NEUTROPHILS # BLD AUTO: 20.15 10*3/MM3 (ref 1.7–7)
NEUTROPHILS NFR BLD AUTO: 93.1 % (ref 42.7–76)
NRBC BLD AUTO-RTO: 0 /100 WBC (ref 0–0.2)
PLATELET # BLD AUTO: 194 10*3/MM3 (ref 140–450)
PMV BLD AUTO: 10.5 FL (ref 6–12)
POTASSIUM BLD-SCNC: 3.8 MMOL/L (ref 3.5–5.2)
PROCALCITONIN SERPL-MCNC: 10.42 NG/ML (ref 0.1–0.25)
PROT SERPL-MCNC: 7.1 G/DL (ref 6–8.5)
RBC # BLD AUTO: 4.76 10*6/MM3 (ref 3.77–5.28)
SODIUM BLD-SCNC: 139 MMOL/L (ref 136–145)
WBC NRBC COR # BLD: 21.66 10*3/MM3 (ref 3.4–10.8)

## 2019-12-23 PROCEDURE — 63710000001 INSULIN ISOPHANE HUMAN PER 5 UNITS: Performed by: HOSPITALIST

## 2019-12-23 PROCEDURE — 83735 ASSAY OF MAGNESIUM: CPT | Performed by: NURSE PRACTITIONER

## 2019-12-23 PROCEDURE — 25010000002 VANCOMYCIN 10 G RECONSTITUTED SOLUTION: Performed by: NURSE PRACTITIONER

## 2019-12-23 PROCEDURE — 82962 GLUCOSE BLOOD TEST: CPT

## 2019-12-23 PROCEDURE — 83036 HEMOGLOBIN GLYCOSYLATED A1C: CPT | Performed by: HOSPITALIST

## 2019-12-23 PROCEDURE — 84145 PROCALCITONIN (PCT): CPT | Performed by: NURSE PRACTITIONER

## 2019-12-23 PROCEDURE — 86140 C-REACTIVE PROTEIN: CPT | Performed by: NURSE PRACTITIONER

## 2019-12-23 PROCEDURE — 25010000002 ONDANSETRON PER 1 MG: Performed by: HOSPITALIST

## 2019-12-23 PROCEDURE — 83605 ASSAY OF LACTIC ACID: CPT | Performed by: NURSE PRACTITIONER

## 2019-12-23 PROCEDURE — 97162 PT EVAL MOD COMPLEX 30 MIN: CPT

## 2019-12-23 PROCEDURE — 87040 BLOOD CULTURE FOR BACTERIA: CPT | Performed by: NURSE PRACTITIONER

## 2019-12-23 PROCEDURE — 97110 THERAPEUTIC EXERCISES: CPT

## 2019-12-23 PROCEDURE — 85025 COMPLETE CBC W/AUTO DIFF WBC: CPT | Performed by: NURSE PRACTITIONER

## 2019-12-23 PROCEDURE — 80053 COMPREHEN METABOLIC PANEL: CPT | Performed by: NURSE PRACTITIONER

## 2019-12-23 PROCEDURE — 25010000003 CEFTRIAXONE PER 250 MG: Performed by: HOSPITALIST

## 2019-12-23 PROCEDURE — 63710000001 INSULIN REGULAR HUMAN PER 5 UNITS: Performed by: HOSPITALIST

## 2019-12-23 RX ORDER — VANCOMYCIN HYDROCHLORIDE 1 G/200ML
1000 INJECTION, SOLUTION INTRAVENOUS EVERY 12 HOURS
Status: DISCONTINUED | OUTPATIENT
Start: 2019-12-23 | End: 2019-12-23

## 2019-12-23 RX ADMIN — SODIUM CHLORIDE 100 ML/HR: 9 INJECTION, SOLUTION INTRAVENOUS at 02:51

## 2019-12-23 RX ADMIN — VANCOMYCIN HYDROCHLORIDE 2000 MG: 10 INJECTION, POWDER, LYOPHILIZED, FOR SOLUTION INTRAVENOUS at 04:34

## 2019-12-23 RX ADMIN — ONDANSETRON 4 MG: 2 INJECTION INTRAMUSCULAR; INTRAVENOUS at 02:45

## 2019-12-23 RX ADMIN — DOCUSATE SODIUM 100 MG: 100 CAPSULE, LIQUID FILLED ORAL at 19:55

## 2019-12-23 RX ADMIN — INSULIN HUMAN 14 UNITS: 100 INJECTION, SOLUTION PARENTERAL at 12:34

## 2019-12-23 RX ADMIN — ATORVASTATIN CALCIUM 20 MG: 20 TABLET, FILM COATED ORAL at 09:17

## 2019-12-23 RX ADMIN — INSULIN HUMAN 4 UNITS: 100 INJECTION, SOLUTION PARENTERAL at 12:35

## 2019-12-23 RX ADMIN — CEFTRIAXONE SODIUM 2 G: 2 INJECTION, SOLUTION INTRAVENOUS at 12:33

## 2019-12-23 RX ADMIN — ACETAMINOPHEN 650 MG: 325 TABLET, FILM COATED ORAL at 09:34

## 2019-12-23 RX ADMIN — SODIUM CHLORIDE 75 ML/HR: 9 INJECTION, SOLUTION INTRAVENOUS at 17:46

## 2019-12-23 RX ADMIN — METOPROLOL SUCCINATE 25 MG: 25 TABLET, FILM COATED, EXTENDED RELEASE ORAL at 09:17

## 2019-12-23 RX ADMIN — INSULIN HUMAN 34 UNITS: 100 INJECTION, SOLUTION PARENTERAL at 09:21

## 2019-12-23 RX ADMIN — ACETAMINOPHEN 650 MG: 325 TABLET, FILM COATED ORAL at 19:54

## 2019-12-23 RX ADMIN — DOCUSATE SODIUM 100 MG: 100 CAPSULE, LIQUID FILLED ORAL at 09:17

## 2019-12-23 RX ADMIN — ASPIRIN 81 MG: 81 TABLET, COATED ORAL at 09:17

## 2019-12-23 RX ADMIN — INSULIN HUMAN 46 UNITS: 100 INJECTION, SUSPENSION SUBCUTANEOUS at 06:28

## 2019-12-23 RX ADMIN — INSULIN HUMAN 4 UNITS: 100 INJECTION, SOLUTION PARENTERAL at 06:27

## 2019-12-24 LAB
BACTERIA SPEC AEROBE CULT: ABNORMAL
BASOPHILS # BLD AUTO: 0.03 10*3/MM3 (ref 0–0.2)
BASOPHILS NFR BLD AUTO: 0.2 % (ref 0–1.5)
DEPRECATED RDW RBC AUTO: 40.9 FL (ref 37–54)
EOSINOPHIL # BLD AUTO: 0.02 10*3/MM3 (ref 0–0.4)
EOSINOPHIL NFR BLD AUTO: 0.1 % (ref 0.3–6.2)
ERYTHROCYTE [DISTWIDTH] IN BLOOD BY AUTOMATED COUNT: 13.9 % (ref 12.3–15.4)
GLUCOSE BLDC GLUCOMTR-MCNC: 133 MG/DL (ref 70–130)
GLUCOSE BLDC GLUCOMTR-MCNC: 147 MG/DL (ref 70–130)
GLUCOSE BLDC GLUCOMTR-MCNC: 160 MG/DL (ref 70–130)
GLUCOSE BLDC GLUCOMTR-MCNC: 217 MG/DL (ref 70–130)
GLUCOSE BLDC GLUCOMTR-MCNC: 77 MG/DL (ref 70–130)
HCT VFR BLD AUTO: 33.2 % (ref 34–46.6)
HGB BLD-MCNC: 11 G/DL (ref 12–15.9)
IMM GRANULOCYTES # BLD AUTO: 0.15 10*3/MM3 (ref 0–0.05)
IMM GRANULOCYTES NFR BLD AUTO: 1 % (ref 0–0.5)
LYMPHOCYTES # BLD AUTO: 0.76 10*3/MM3 (ref 0.7–3.1)
LYMPHOCYTES NFR BLD AUTO: 5.1 % (ref 19.6–45.3)
MCH RBC QN AUTO: 27.4 PG (ref 26.6–33)
MCHC RBC AUTO-ENTMCNC: 33.1 G/DL (ref 31.5–35.7)
MCV RBC AUTO: 82.8 FL (ref 79–97)
MONOCYTES # BLD AUTO: 0.84 10*3/MM3 (ref 0.1–0.9)
MONOCYTES NFR BLD AUTO: 5.6 % (ref 5–12)
NEUTROPHILS # BLD AUTO: 13.07 10*3/MM3 (ref 1.7–7)
NEUTROPHILS NFR BLD AUTO: 88 % (ref 42.7–76)
NRBC BLD AUTO-RTO: 0 /100 WBC (ref 0–0.2)
PLATELET # BLD AUTO: 157 10*3/MM3 (ref 140–450)
PMV BLD AUTO: 10.6 FL (ref 6–12)
RBC # BLD AUTO: 4.01 10*6/MM3 (ref 3.77–5.28)
WBC NRBC COR # BLD: 14.87 10*3/MM3 (ref 3.4–10.8)

## 2019-12-24 PROCEDURE — 63710000001 INSULIN REGULAR HUMAN PER 5 UNITS: Performed by: HOSPITALIST

## 2019-12-24 PROCEDURE — 85025 COMPLETE CBC W/AUTO DIFF WBC: CPT | Performed by: NURSE PRACTITIONER

## 2019-12-24 PROCEDURE — 63710000001 INSULIN ISOPHANE HUMAN PER 5 UNITS: Performed by: HOSPITALIST

## 2019-12-24 PROCEDURE — 97110 THERAPEUTIC EXERCISES: CPT

## 2019-12-24 PROCEDURE — 25010000003 CEFTRIAXONE PER 250 MG: Performed by: HOSPITALIST

## 2019-12-24 PROCEDURE — 82962 GLUCOSE BLOOD TEST: CPT

## 2019-12-24 RX ADMIN — INSULIN HUMAN 14 UNITS: 100 INJECTION, SOLUTION PARENTERAL at 12:43

## 2019-12-24 RX ADMIN — SODIUM CHLORIDE 75 ML/HR: 9 INJECTION, SOLUTION INTRAVENOUS at 05:50

## 2019-12-24 RX ADMIN — CEFTRIAXONE SODIUM 2 G: 2 INJECTION, SOLUTION INTRAVENOUS at 11:07

## 2019-12-24 RX ADMIN — ACETAMINOPHEN 650 MG: 325 TABLET, FILM COATED ORAL at 00:59

## 2019-12-24 RX ADMIN — INSULIN HUMAN 26 UNITS: 100 INJECTION, SOLUTION PARENTERAL at 17:13

## 2019-12-24 RX ADMIN — DOCUSATE SODIUM 100 MG: 100 CAPSULE, LIQUID FILLED ORAL at 08:02

## 2019-12-24 RX ADMIN — INSULIN HUMAN 26 UNITS: 100 INJECTION, SUSPENSION SUBCUTANEOUS at 21:28

## 2019-12-24 RX ADMIN — INSULIN HUMAN 8 UNITS: 100 INJECTION, SOLUTION PARENTERAL at 12:44

## 2019-12-24 RX ADMIN — DOCUSATE SODIUM 100 MG: 100 CAPSULE, LIQUID FILLED ORAL at 20:39

## 2019-12-24 RX ADMIN — INSULIN HUMAN 4 UNITS: 100 INJECTION, SOLUTION PARENTERAL at 17:14

## 2019-12-24 RX ADMIN — ACETAMINOPHEN 650 MG: 325 TABLET, FILM COATED ORAL at 17:13

## 2019-12-24 RX ADMIN — ATORVASTATIN CALCIUM 20 MG: 20 TABLET, FILM COATED ORAL at 08:02

## 2019-12-24 RX ADMIN — METOPROLOL SUCCINATE 25 MG: 25 TABLET, FILM COATED, EXTENDED RELEASE ORAL at 08:02

## 2019-12-24 RX ADMIN — ACETAMINOPHEN 650 MG: 325 TABLET, FILM COATED ORAL at 08:12

## 2019-12-24 RX ADMIN — ASPIRIN 81 MG: 81 TABLET, COATED ORAL at 08:02

## 2019-12-24 RX ADMIN — INSULIN HUMAN 46 UNITS: 100 INJECTION, SUSPENSION SUBCUTANEOUS at 09:31

## 2019-12-25 LAB
ANION GAP SERPL CALCULATED.3IONS-SCNC: 10.4 MMOL/L (ref 5–15)
BACTERIA SPEC AEROBE CULT: ABNORMAL
BACTERIA SPEC AEROBE CULT: ABNORMAL
BASOPHILS # BLD AUTO: 0.03 10*3/MM3 (ref 0–0.2)
BASOPHILS NFR BLD AUTO: 0.3 % (ref 0–1.5)
BUN BLD-MCNC: 16 MG/DL (ref 8–23)
BUN/CREAT SERPL: 17.8 (ref 7–25)
CALCIUM SPEC-SCNC: 8.2 MG/DL (ref 8.6–10.5)
CHLORIDE SERPL-SCNC: 109 MMOL/L (ref 98–107)
CO2 SERPL-SCNC: 21.6 MMOL/L (ref 22–29)
CREAT BLD-MCNC: 0.9 MG/DL (ref 0.57–1)
DEPRECATED RDW RBC AUTO: 40.7 FL (ref 37–54)
EOSINOPHIL # BLD AUTO: 0.18 10*3/MM3 (ref 0–0.4)
EOSINOPHIL NFR BLD AUTO: 1.7 % (ref 0.3–6.2)
ERYTHROCYTE [DISTWIDTH] IN BLOOD BY AUTOMATED COUNT: 13.8 % (ref 12.3–15.4)
GFR SERPL CREATININE-BSD FRML MDRD: 61 ML/MIN/1.73
GLUCOSE BLD-MCNC: 77 MG/DL (ref 65–99)
GLUCOSE BLDC GLUCOMTR-MCNC: 147 MG/DL (ref 70–130)
GLUCOSE BLDC GLUCOMTR-MCNC: 147 MG/DL (ref 70–130)
GLUCOSE BLDC GLUCOMTR-MCNC: 166 MG/DL (ref 70–130)
GLUCOSE BLDC GLUCOMTR-MCNC: 75 MG/DL (ref 70–130)
GLUCOSE BLDC GLUCOMTR-MCNC: 76 MG/DL (ref 70–130)
GRAM STN SPEC: ABNORMAL
HCT VFR BLD AUTO: 32.3 % (ref 34–46.6)
HGB BLD-MCNC: 10.8 G/DL (ref 12–15.9)
IMM GRANULOCYTES # BLD AUTO: 0.07 10*3/MM3 (ref 0–0.05)
IMM GRANULOCYTES NFR BLD AUTO: 0.7 % (ref 0–0.5)
ISOLATED FROM: ABNORMAL
ISOLATED FROM: ABNORMAL
LYMPHOCYTES # BLD AUTO: 1.23 10*3/MM3 (ref 0.7–3.1)
LYMPHOCYTES NFR BLD AUTO: 11.9 % (ref 19.6–45.3)
MCH RBC QN AUTO: 27.5 PG (ref 26.6–33)
MCHC RBC AUTO-ENTMCNC: 33.4 G/DL (ref 31.5–35.7)
MCV RBC AUTO: 82.2 FL (ref 79–97)
MONOCYTES # BLD AUTO: 0.63 10*3/MM3 (ref 0.1–0.9)
MONOCYTES NFR BLD AUTO: 6.1 % (ref 5–12)
NEUTROPHILS # BLD AUTO: 8.2 10*3/MM3 (ref 1.7–7)
NEUTROPHILS NFR BLD AUTO: 79.3 % (ref 42.7–76)
NRBC BLD AUTO-RTO: 0 /100 WBC (ref 0–0.2)
PLATELET # BLD AUTO: 159 10*3/MM3 (ref 140–450)
PMV BLD AUTO: 10.7 FL (ref 6–12)
POTASSIUM BLD-SCNC: 3.9 MMOL/L (ref 3.5–5.2)
RBC # BLD AUTO: 3.93 10*6/MM3 (ref 3.77–5.28)
SODIUM BLD-SCNC: 141 MMOL/L (ref 136–145)
WBC NRBC COR # BLD: 10.34 10*3/MM3 (ref 3.4–10.8)

## 2019-12-25 PROCEDURE — 82962 GLUCOSE BLOOD TEST: CPT

## 2019-12-25 PROCEDURE — 25010000003 CEFTRIAXONE PER 250 MG: Performed by: HOSPITALIST

## 2019-12-25 PROCEDURE — 63710000001 INSULIN REGULAR HUMAN PER 5 UNITS: Performed by: HOSPITALIST

## 2019-12-25 PROCEDURE — 80048 BASIC METABOLIC PNL TOTAL CA: CPT | Performed by: INTERNAL MEDICINE

## 2019-12-25 PROCEDURE — 25010000002 FUROSEMIDE PER 20 MG: Performed by: INTERNAL MEDICINE

## 2019-12-25 PROCEDURE — 63710000001 INSULIN ISOPHANE HUMAN PER 5 UNITS: Performed by: HOSPITALIST

## 2019-12-25 PROCEDURE — 85025 COMPLETE CBC W/AUTO DIFF WBC: CPT | Performed by: NURSE PRACTITIONER

## 2019-12-25 RX ORDER — FUROSEMIDE 10 MG/ML
40 INJECTION INTRAMUSCULAR; INTRAVENOUS ONCE
Status: COMPLETED | OUTPATIENT
Start: 2019-12-25 | End: 2019-12-25

## 2019-12-25 RX ORDER — LEVOFLOXACIN 750 MG/1
750 TABLET ORAL EVERY 24 HOURS
Status: DISCONTINUED | OUTPATIENT
Start: 2019-12-26 | End: 2019-12-26 | Stop reason: HOSPADM

## 2019-12-25 RX ORDER — FUROSEMIDE 40 MG/1
40 TABLET ORAL DAILY
Status: DISCONTINUED | OUTPATIENT
Start: 2019-12-26 | End: 2019-12-26 | Stop reason: HOSPADM

## 2019-12-25 RX ADMIN — INSULIN HUMAN 4 UNITS: 100 INJECTION, SOLUTION PARENTERAL at 12:30

## 2019-12-25 RX ADMIN — INSULIN HUMAN 26 UNITS: 100 INJECTION, SUSPENSION SUBCUTANEOUS at 20:58

## 2019-12-25 RX ADMIN — INSULIN HUMAN 26 UNITS: 100 INJECTION, SOLUTION PARENTERAL at 17:18

## 2019-12-25 RX ADMIN — INSULIN HUMAN 46 UNITS: 100 INJECTION, SUSPENSION SUBCUTANEOUS at 08:07

## 2019-12-25 RX ADMIN — METOPROLOL SUCCINATE 25 MG: 25 TABLET, FILM COATED, EXTENDED RELEASE ORAL at 08:00

## 2019-12-25 RX ADMIN — CEFTRIAXONE SODIUM 2 G: 2 INJECTION, SOLUTION INTRAVENOUS at 11:38

## 2019-12-25 RX ADMIN — ATORVASTATIN CALCIUM 20 MG: 20 TABLET, FILM COATED ORAL at 08:00

## 2019-12-25 RX ADMIN — ACETAMINOPHEN 650 MG: 325 TABLET, FILM COATED ORAL at 00:12

## 2019-12-25 RX ADMIN — ACETAMINOPHEN 650 MG: 325 TABLET, FILM COATED ORAL at 16:06

## 2019-12-25 RX ADMIN — ACETAMINOPHEN 650 MG: 325 TABLET, FILM COATED ORAL at 23:59

## 2019-12-25 RX ADMIN — DOCUSATE SODIUM 100 MG: 100 CAPSULE, LIQUID FILLED ORAL at 08:07

## 2019-12-25 RX ADMIN — ACETAMINOPHEN 650 MG: 325 TABLET, FILM COATED ORAL at 08:01

## 2019-12-25 RX ADMIN — INSULIN HUMAN 34 UNITS: 100 INJECTION, SOLUTION PARENTERAL at 08:09

## 2019-12-25 RX ADMIN — ASPIRIN 81 MG: 81 TABLET, COATED ORAL at 08:00

## 2019-12-25 RX ADMIN — INSULIN HUMAN 14 UNITS: 100 INJECTION, SOLUTION PARENTERAL at 11:38

## 2019-12-25 RX ADMIN — FUROSEMIDE 40 MG: 40 INJECTION, SOLUTION INTRAMUSCULAR; INTRAVENOUS at 18:42

## 2019-12-25 RX ADMIN — DOCUSATE SODIUM 100 MG: 100 CAPSULE, LIQUID FILLED ORAL at 20:58

## 2019-12-26 VITALS
DIASTOLIC BLOOD PRESSURE: 60 MMHG | HEART RATE: 67 BPM | HEIGHT: 64 IN | WEIGHT: 221.1 LBS | SYSTOLIC BLOOD PRESSURE: 167 MMHG | BODY MASS INDEX: 37.75 KG/M2 | TEMPERATURE: 98.8 F | RESPIRATION RATE: 18 BRPM | OXYGEN SATURATION: 97 %

## 2019-12-26 LAB
BASOPHILS # BLD AUTO: 0.04 10*3/MM3 (ref 0–0.2)
BASOPHILS NFR BLD AUTO: 0.5 % (ref 0–1.5)
DEPRECATED RDW RBC AUTO: 40.5 FL (ref 37–54)
EOSINOPHIL # BLD AUTO: 0.39 10*3/MM3 (ref 0–0.4)
EOSINOPHIL NFR BLD AUTO: 4.6 % (ref 0.3–6.2)
ERYTHROCYTE [DISTWIDTH] IN BLOOD BY AUTOMATED COUNT: 13.9 % (ref 12.3–15.4)
GLUCOSE BLDC GLUCOMTR-MCNC: 179 MG/DL (ref 70–130)
GLUCOSE BLDC GLUCOMTR-MCNC: 52 MG/DL (ref 70–130)
GLUCOSE BLDC GLUCOMTR-MCNC: 69 MG/DL (ref 70–130)
GLUCOSE BLDC GLUCOMTR-MCNC: 82 MG/DL (ref 70–130)
GLUCOSE BLDC GLUCOMTR-MCNC: 92 MG/DL (ref 70–130)
HCT VFR BLD AUTO: 33.6 % (ref 34–46.6)
HGB BLD-MCNC: 11 G/DL (ref 12–15.9)
IMM GRANULOCYTES # BLD AUTO: 0.1 10*3/MM3 (ref 0–0.05)
IMM GRANULOCYTES NFR BLD AUTO: 1.2 % (ref 0–0.5)
LYMPHOCYTES # BLD AUTO: 1.45 10*3/MM3 (ref 0.7–3.1)
LYMPHOCYTES NFR BLD AUTO: 16.9 % (ref 19.6–45.3)
MCH RBC QN AUTO: 26.4 PG (ref 26.6–33)
MCHC RBC AUTO-ENTMCNC: 32.7 G/DL (ref 31.5–35.7)
MCV RBC AUTO: 80.6 FL (ref 79–97)
MONOCYTES # BLD AUTO: 0.86 10*3/MM3 (ref 0.1–0.9)
MONOCYTES NFR BLD AUTO: 10 % (ref 5–12)
NEUTROPHILS # BLD AUTO: 5.73 10*3/MM3 (ref 1.7–7)
NEUTROPHILS NFR BLD AUTO: 66.8 % (ref 42.7–76)
NRBC BLD AUTO-RTO: 0.1 /100 WBC (ref 0–0.2)
PLATELET # BLD AUTO: 225 10*3/MM3 (ref 140–450)
PMV BLD AUTO: 10.5 FL (ref 6–12)
RBC # BLD AUTO: 4.17 10*6/MM3 (ref 3.77–5.28)
WBC NRBC COR # BLD: 8.57 10*3/MM3 (ref 3.4–10.8)

## 2019-12-26 PROCEDURE — 85025 COMPLETE CBC W/AUTO DIFF WBC: CPT | Performed by: NURSE PRACTITIONER

## 2019-12-26 PROCEDURE — 82962 GLUCOSE BLOOD TEST: CPT

## 2019-12-26 PROCEDURE — 63710000001 INSULIN REGULAR HUMAN PER 5 UNITS: Performed by: HOSPITALIST

## 2019-12-26 PROCEDURE — 97110 THERAPEUTIC EXERCISES: CPT

## 2019-12-26 RX ORDER — LEVOFLOXACIN 750 MG/1
750 TABLET ORAL EVERY 24 HOURS
Qty: 5 TABLET | Refills: 0 | Status: SHIPPED | OUTPATIENT
Start: 2019-12-27 | End: 2020-01-01

## 2019-12-26 RX ADMIN — LEVOFLOXACIN 750 MG: 750 TABLET, FILM COATED ORAL at 08:17

## 2019-12-26 RX ADMIN — ATORVASTATIN CALCIUM 20 MG: 20 TABLET, FILM COATED ORAL at 08:17

## 2019-12-26 RX ADMIN — INSULIN HUMAN 4 UNITS: 100 INJECTION, SOLUTION PARENTERAL at 12:17

## 2019-12-26 RX ADMIN — ASPIRIN 81 MG: 81 TABLET, COATED ORAL at 08:17

## 2019-12-26 RX ADMIN — FUROSEMIDE 40 MG: 40 TABLET ORAL at 08:17

## 2019-12-26 RX ADMIN — INSULIN HUMAN 14 UNITS: 100 INJECTION, SOLUTION PARENTERAL at 12:16

## 2019-12-26 RX ADMIN — ACETAMINOPHEN 650 MG: 325 TABLET, FILM COATED ORAL at 08:17

## 2019-12-26 RX ADMIN — DOCUSATE SODIUM 100 MG: 100 CAPSULE, LIQUID FILLED ORAL at 08:17

## 2019-12-26 RX ADMIN — METOPROLOL SUCCINATE 25 MG: 25 TABLET, FILM COATED, EXTENDED RELEASE ORAL at 08:17

## 2019-12-27 ENCOUNTER — READMISSION MANAGEMENT (OUTPATIENT)
Dept: CALL CENTER | Facility: HOSPITAL | Age: 74
End: 2019-12-27

## 2019-12-27 NOTE — OUTREACH NOTE
Prep Survey      Responses   Facility patient discharged from?  [facility]   Is patient eligible?  No   What are the reasons patient is not eligible?  [facility]   Does the patient have one of the following disease processes/diagnoses(primary or secondary)?  Sepsis   Prep survey completed?  Yes          Sarah Dominguez RN

## 2019-12-28 LAB
BACTERIA SPEC AEROBE CULT: NORMAL
BACTERIA SPEC AEROBE CULT: NORMAL

## 2020-01-10 ENCOUNTER — LAB REQUISITION (OUTPATIENT)
Dept: LAB | Facility: HOSPITAL | Age: 75
End: 2020-01-10

## 2020-01-10 DIAGNOSIS — E11.9 TYPE 2 DIABETES MELLITUS WITHOUT COMPLICATIONS (HCC): ICD-10-CM

## 2020-01-10 LAB
ALBUMIN SERPL-MCNC: 4 G/DL (ref 3.5–5.2)
ALBUMIN/GLOB SERPL: 1.2 G/DL
ALP SERPL-CCNC: 67 U/L (ref 39–117)
ALT SERPL W P-5'-P-CCNC: 17 U/L (ref 1–33)
ANION GAP SERPL CALCULATED.3IONS-SCNC: 15.5 MMOL/L (ref 5–15)
AST SERPL-CCNC: 19 U/L (ref 1–32)
BILIRUB SERPL-MCNC: 0.7 MG/DL (ref 0.2–1.2)
BUN BLD-MCNC: 17 MG/DL (ref 8–23)
BUN/CREAT SERPL: 18.3 (ref 7–25)
CALCIUM SPEC-SCNC: 9.3 MG/DL (ref 8.6–10.5)
CHLORIDE SERPL-SCNC: 99 MMOL/L (ref 98–107)
CO2 SERPL-SCNC: 24.5 MMOL/L (ref 22–29)
CREAT BLD-MCNC: 0.93 MG/DL (ref 0.57–1)
DEPRECATED RDW RBC AUTO: 40.7 FL (ref 37–54)
ERYTHROCYTE [DISTWIDTH] IN BLOOD BY AUTOMATED COUNT: 13.8 % (ref 12.3–15.4)
GFR SERPL CREATININE-BSD FRML MDRD: 59 ML/MIN/1.73
GLOBULIN UR ELPH-MCNC: 3.4 GM/DL
GLUCOSE BLD-MCNC: 241 MG/DL (ref 65–99)
HCT VFR BLD AUTO: 38.8 % (ref 34–46.6)
HGB BLD-MCNC: 12.5 G/DL (ref 12–15.9)
MCH RBC QN AUTO: 26.5 PG (ref 26.6–33)
MCHC RBC AUTO-ENTMCNC: 32.2 G/DL (ref 31.5–35.7)
MCV RBC AUTO: 82.4 FL (ref 79–97)
PLATELET # BLD AUTO: 264 10*3/MM3 (ref 140–450)
PMV BLD AUTO: 11 FL (ref 6–12)
POTASSIUM BLD-SCNC: 3.8 MMOL/L (ref 3.5–5.2)
PROT SERPL-MCNC: 7.4 G/DL (ref 6–8.5)
RBC # BLD AUTO: 4.71 10*6/MM3 (ref 3.77–5.28)
SODIUM BLD-SCNC: 139 MMOL/L (ref 136–145)
WBC NRBC COR # BLD: 7.34 10*3/MM3 (ref 3.4–10.8)

## 2020-01-10 PROCEDURE — 85027 COMPLETE CBC AUTOMATED: CPT | Performed by: INTERNAL MEDICINE

## 2020-01-10 PROCEDURE — 80053 COMPREHEN METABOLIC PANEL: CPT | Performed by: INTERNAL MEDICINE

## 2020-01-14 ENCOUNTER — OFFICE VISIT (OUTPATIENT)
Dept: INTERNAL MEDICINE | Facility: CLINIC | Age: 75
End: 2020-01-14

## 2020-01-14 VITALS
SYSTOLIC BLOOD PRESSURE: 140 MMHG | WEIGHT: 214 LBS | DIASTOLIC BLOOD PRESSURE: 56 MMHG | HEIGHT: 64 IN | OXYGEN SATURATION: 98 % | BODY MASS INDEX: 36.54 KG/M2 | HEART RATE: 67 BPM

## 2020-01-14 DIAGNOSIS — N39.0 SEPSIS DUE TO GRAM-NEGATIVE UTI (HCC): Primary | ICD-10-CM

## 2020-01-14 DIAGNOSIS — M54.41 ACUTE RIGHT-SIDED LOW BACK PAIN WITH RIGHT-SIDED SCIATICA: ICD-10-CM

## 2020-01-14 DIAGNOSIS — M79.89 LEG SWELLING: ICD-10-CM

## 2020-01-14 DIAGNOSIS — I87.2 CHRONIC VENOUS INSUFFICIENCY: ICD-10-CM

## 2020-01-14 DIAGNOSIS — A41.50 SEPSIS DUE TO GRAM-NEGATIVE UTI (HCC): Primary | ICD-10-CM

## 2020-01-14 DIAGNOSIS — M48.061 SPINAL STENOSIS OF LUMBAR REGION, UNSPECIFIED WHETHER NEUROGENIC CLAUDICATION PRESENT: ICD-10-CM

## 2020-01-14 PROBLEM — A41.9 SEPSIS WITHOUT ACUTE ORGAN DYSFUNCTION (HCC): Status: RESOLVED | Noted: 2019-12-22 | Resolved: 2020-01-14

## 2020-01-14 PROBLEM — B95.5 BACTEREMIA DUE TO STREPTOCOCCUS: Status: RESOLVED | Noted: 2019-12-23 | Resolved: 2020-01-14

## 2020-01-14 PROBLEM — N30.80 EMPHYSEMATOUS CYSTITIS: Status: RESOLVED | Noted: 2019-12-22 | Resolved: 2020-01-14

## 2020-01-14 PROBLEM — E66.01 CLASS 3 SEVERE OBESITY DUE TO EXCESS CALORIES WITH SERIOUS COMORBIDITY AND BODY MASS INDEX (BMI) OF 40.0 TO 44.9 IN ADULT: Status: RESOLVED | Noted: 2018-04-23 | Resolved: 2020-01-14

## 2020-01-14 PROBLEM — E66.9 OBESITY (BMI 30-39.9): Status: RESOLVED | Noted: 2019-12-22 | Resolved: 2020-01-14

## 2020-01-14 PROBLEM — R78.81 BACTEREMIA DUE TO STREPTOCOCCUS: Status: RESOLVED | Noted: 2019-12-23 | Resolved: 2020-01-14

## 2020-01-14 PROBLEM — L03.115 CELLULITIS OF RIGHT LOWER EXTREMITY: Status: RESOLVED | Noted: 2018-07-16 | Resolved: 2020-01-14

## 2020-01-14 PROBLEM — E66.813 CLASS 3 SEVERE OBESITY DUE TO EXCESS CALORIES WITH SERIOUS COMORBIDITY AND BODY MASS INDEX (BMI) OF 40.0 TO 44.9 IN ADULT: Status: RESOLVED | Noted: 2018-04-23 | Resolved: 2020-01-14

## 2020-01-14 PROCEDURE — 99214 OFFICE O/P EST MOD 30 MIN: CPT | Performed by: INTERNAL MEDICINE

## 2020-01-14 RX ORDER — ACETAMINOPHEN AND CODEINE PHOSPHATE 300; 30 MG/1; MG/1
1 TABLET ORAL 3 TIMES DAILY PRN
Qty: 90 TABLET | Refills: 2 | Status: SHIPPED | OUTPATIENT
Start: 2020-01-14 | End: 2020-05-20

## 2020-01-14 NOTE — PROGRESS NOTES
Subjective     Denisse Chavez is a 74 y.o. female who presents with   Chief Complaint   Patient presents with   • Chronic Venous Insufficiency     Hospital follow up   • Urinary Tract Infection   • Hip Pain     having trouble walking       History of Present Illness     C/o pain in leg.  Right sided LBP.  Radiates down her knee.  Going on for the past.  Pain for the past one month.  Pain is constant and severe.   Walking makes worse.  Hurts at night.  Reviewed last MRI in 2016 which showed severe spinal stenosis.      Patient presents in hospital f/u.  She was admitted for urosepsis.  I have reviewed discharge summary, labs, scans, cardiovascular studies and medication changes.      C/o chronic leg swelling.  Diagnosed with chronic venous insuffiencey in the past.  No CHF history.  No SOA.  She can no longer get compression stockings on because of swelling.  She is on Lasix.        Review of Systems   Constitutional: Negative for fever.   Respiratory: Negative for shortness of breath.    Cardiovascular: Negative for chest pain.   Genitourinary: Negative for dysuria.       The following portions of the patient's history were reviewed and updated as appropriate: allergies, current medications and problem list.    Patient Active Problem List    Diagnosis Date Noted   • Aortic valve sclerosis 07/25/2019   • Heart murmur 07/25/2019   • Hyperlipidemia associated with type 2 diabetes mellitus (CMS/[facility]) 12/20/2018   • History of colon polyps 06/13/2018     Note Last Updated: 6/13/2018     Added automatically from request for surgery [id number]     • Circadian rhythm sleep disorder, delayed sleep phase type 04/23/2018   • Chronic venous insufficiency    • CAD (coronary artery disease)      Note Last Updated: 6/28/2017     MI in 1996, treated medically.  PET 6/2016 with small-medium sized lateral infarct, no ischemia.  This wall motion abnormality was seen on echo as well.     • Chronic kidney disease, stage III (moderate)  (CMS/[facility]) 10/13/2016   • Obstructive sleep apnea on autoCPAP 09/04/2016   • Encounter for long-term (current) use of insulin (CMS/[facility]) 06/24/2016   • Left hip pain 05/16/2016   • Left-sided low back pain without sciatica 05/16/2016   • Lumbar spinal stenosis 05/16/2016   • Angiomyolipoma of kidney 03/30/2016   • Uncontrolled type II diabetes mellitus with nephropathy (CMS/[facility]) 03/08/2016   • Type II diabetes mellitus with neurological manifestations, uncontrolled (CMS/[facility]) 03/08/2016   • Uncontrolled type 2 diabetes mellitus with complication, with long-term current use of insulin (CMS/[facility]) 03/08/2016   • Hyperinsulinism 03/08/2016   • Abnormal weight gain 03/08/2016   • Hyperlipidemia 03/08/2016   • Essential hypertension 03/08/2016       Current Outpatient Medications on File Prior to Visit   Medication Sig Dispense Refill   • amLODIPine (NORVASC) 10 MG tablet TAKE 1 TABLET BY MOUTH ONCE DAILY 90 tablet 3   • aspirin 81 MG tablet Take  by mouth Every Night.     • docusate sodium (COLACE) 100 MG capsule Take 100 mg by mouth 2 (two) times a day.     • furosemide (LASIX) 40 MG tablet TAKE 1 TABLET BY MOUTH ONCE DAILY 30 tablet 0   • hydrOXYzine (ATARAX) 25 MG tablet TAKE 1 TABLET BY MOUTH AT NIGHT AS NEEDED FOR INSOMNIA (Patient taking differently: Take 25 mg by mouth Every Night.) 90 tablet 0   • insulin NPH (humuLIN N,novoLIN N) 100 UNIT/ML injection Inject 46 Units under the skin into the appropriate area as directed Every Morning.     • insulin NPH (humuLIN N,novoLIN N) 100 UNIT/ML injection Inject 26 Units under the skin into the appropriate area as directed Every Night.     • insulin regular (humuLIN R,novoLIN R) 100 UNIT/ML injection Inject 34 Units under the skin into the appropriate area as directed Daily With Breakfast.     • insulin regular (humuLIN R,novoLIN R) 100 UNIT/ML injection Inject 14 Units under the skin into the appropriate area as directed Daily With Lunch.     • insulin regular (humuLIN  "R,novoLIN R) 100 UNIT/ML injection Inject 26 Units under the skin into the appropriate area as directed Daily With Dinner.     • isosorbide mononitrate (IMDUR) 60 MG 24 hr tablet Take 1 tablet by mouth Daily. 90 tablet 1   • metoprolol succinate XL (TOPROL-XL) 25 MG 24 hr tablet Take 1 tablet by mouth Daily. 90 tablet 2   • Misc. Devices (ROLLATOR) misc 1 Units as needed (for walking). 1 each 0   • simvastatin (ZOCOR) 40 MG tablet TAKE ONE-HALF TABLET BY MOUTH ONCE DAILY (Patient taking differently: Take 20 mg by mouth Every Night.) 45 tablet 3   • vitamin D (ERGOCALCIFEROL) 25236 units capsule capsule 50,000 Units Every 30 (Thirty) Days.     • [DISCONTINUED] acetaminophen-codeine (TYLENOL #3) 300-30 MG per tablet TAKE 1 TABLET BY MOUTH EVERY 6 HOURS AS NEEDED FOR  MODERATE  PAIN (Patient taking differently: Take 1 tablet by mouth Every 6 (Six) Hours As Needed.) 30 tablet 2     No current facility-administered medications on file prior to visit.        Objective     /56   Pulse 67   Ht 162.6 cm (64.02\")   Wt 97.1 kg (214 lb)   SpO2 98%   BMI 36.71 kg/m²     Physical Exam   Constitutional: She is oriented to person, place, and time. She appears well-developed and well-nourished.   HENT:   Head: Normocephalic and atraumatic.   Cardiovascular: Normal rate, regular rhythm and normal heart sounds.   Lymphedema bilateral legs.     Pulmonary/Chest: Effort normal and breath sounds normal.   Neurological: She is alert and oriented to person, place, and time.   Skin: Skin is warm and dry.   Psychiatric: She has a normal mood and affect. Her behavior is normal.       Assessment/Plan   Denisse was seen today for chronic venous insufficiency, urinary tract infection and hip pain.    Diagnoses and all orders for this visit:    Sepsis due to gram-negative UTI (CMS/[facility])  -     UA / M With / Rflx Culture([facility] ONLY) - Urine, Clean Catch    Chronic venous insufficiency  -     Ambulatory Referral to Physical Therapy " Evaluate and treat, Lymphedema  -     Duplex Venous Lower Extremity - Bilateral CAR; Future    Acute right-sided low back pain with right-sided sciatica  -     Cancel: XR Spine Lumbar Complete 4+VW  -     Cancel: XR Hip With or Without Pelvis 2 - 3 View Right  -     XR Hip With or Without Pelvis 2 - 3 View Right  -     XR Spine Lumbar 4+ View  -     acetaminophen-codeine (TYLENOL #3) 300-30 MG per tablet; Take 1 tablet by mouth 3 (Three) Times a Day As Needed for Moderate Pain .    Leg swelling  -     Duplex Venous Lower Extremity - Bilateral CAR; Future    Spinal stenosis of lumbar region, unspecified whether neurogenic claudication present        Discussion    F/u UTI sepsis.  Check UA/UC next week.    Chronic venous insufficiency.  Refer to lymphedema clinic.  Check doppler since it has been a while.    Acute on chronic severe LBP.  Check xrays.  Increase tylenol #3 to TID.  Likely set up for epidurals.         Future Appointments   Date Time Provider Department Center   3/11/2020  9:20 AM [facility] None   3/18/2020  9:30 AM Surekha Mcdonnell MD [facility] None   4/6/2020 11:20 AM [facility] None   4/14/2020  1:45 PM Wally Craft MD [facility] None   4/22/2020 11:00 AM Alfredito Mueller MD [facility] None   5/28/2020  9:10 AM [facility] None   6/1/2020  1:30 PM Surekha Mcdonnell MD [facility] None   7/29/2020  1:30 PM Warner Tang MD [facility] None

## 2020-01-15 ENCOUNTER — HOSPITAL ENCOUNTER (OUTPATIENT)
Dept: GENERAL RADIOLOGY | Facility: HOSPITAL | Age: 75
Discharge: HOME | End: 2020-01-15
Admitting: INTERNAL MEDICINE

## 2020-01-15 ENCOUNTER — HOSPITAL ENCOUNTER (OUTPATIENT)
Dept: GENERAL RADIOLOGY | Facility: HOSPITAL | Age: 75
Discharge: HOME | End: 2020-01-15

## 2020-01-15 PROCEDURE — 73502 X-RAY EXAM HIP UNI 2-3 VIEWS: CPT

## 2020-01-15 PROCEDURE — 72110 X-RAY EXAM L-2 SPINE 4/>VWS: CPT

## 2020-01-16 ENCOUNTER — HOSPITAL ENCOUNTER (OUTPATIENT)
Dept: CARDIOLOGY | Facility: HOSPITAL | Age: 75
Discharge: HOME | End: 2020-01-16
Admitting: INTERNAL MEDICINE

## 2020-01-16 DIAGNOSIS — M79.89 LEG SWELLING: ICD-10-CM

## 2020-01-16 DIAGNOSIS — I87.2 CHRONIC VENOUS INSUFFICIENCY: ICD-10-CM

## 2020-01-16 LAB

## 2020-01-16 PROCEDURE — 93970 EXTREMITY STUDY: CPT

## 2020-01-17 ENCOUNTER — TELEPHONE (OUTPATIENT)
Dept: INTERNAL MEDICINE | Facility: CLINIC | Age: 75
End: 2020-01-17

## 2020-01-17 NOTE — TELEPHONE ENCOUNTER
----- Message from Surekha Mcdonnell MD sent at 1/16/2020  1:13 PM EST -----  No clots noted in legs.

## 2020-01-20 DIAGNOSIS — M54.41 ACUTE RIGHT-SIDED LOW BACK PAIN WITH RIGHT-SIDED SCIATICA: Primary | ICD-10-CM

## 2020-01-20 RX ORDER — FUROSEMIDE 40 MG/1
TABLET ORAL
Qty: 30 TABLET | Refills: 0 | Status: SHIPPED | OUTPATIENT
Start: 2020-01-20 | End: 2020-03-16

## 2020-01-22 ENCOUNTER — LAB REQUISITION (OUTPATIENT)
Dept: LAB | Facility: HOSPITAL | Age: 75
End: 2020-01-22

## 2020-01-22 DIAGNOSIS — B96.20 UNSPECIFIED ESCHERICHIA COLI (E. COLI) AS THE CAUSE OF DISEASES CLASSIFIED ELSEWHERE: ICD-10-CM

## 2020-01-22 DIAGNOSIS — N30.80 OTHER CYSTITIS WITHOUT HEMATURIA: ICD-10-CM

## 2020-01-22 DIAGNOSIS — R80.9 PROTEINURIA, UNSPECIFIED TYPE: Primary | ICD-10-CM

## 2020-01-22 LAB
BACTERIA UR QL AUTO: NORMAL /HPF
BILIRUB UR QL STRIP: NEGATIVE
CLARITY UR: CLEAR
COLOR UR: YELLOW
GLUCOSE UR STRIP-MCNC: NEGATIVE MG/DL
HGB UR QL STRIP.AUTO: NEGATIVE
HYALINE CASTS UR QL AUTO: NORMAL /LPF
KETONES UR QL STRIP: NEGATIVE
LEUKOCYTE ESTERASE UR QL STRIP.AUTO: NEGATIVE
NITRITE UR QL STRIP: NEGATIVE
PH UR STRIP.AUTO: 6.5 [PH] (ref 5–8)
PROT UR QL STRIP: ABNORMAL
RBC # UR: NORMAL /HPF
REF LAB TEST METHOD: NORMAL
SP GR UR STRIP: 1.01 (ref 1–1.03)
SQUAMOUS #/AREA URNS HPF: NORMAL /HPF
UROBILINOGEN UR QL STRIP: ABNORMAL
WBC UR QL AUTO: NORMAL /HPF

## 2020-01-22 PROCEDURE — 81001 URINALYSIS AUTO W/SCOPE: CPT | Performed by: INTERNAL MEDICINE

## 2020-01-24 ENCOUNTER — APPOINTMENT (OUTPATIENT)
Dept: OCCUPATIONAL THERAPY | Facility: HOSPITAL | Age: 75
End: 2020-01-24

## 2020-01-30 ENCOUNTER — HOSPITAL ENCOUNTER (OUTPATIENT)
Dept: OCCUPATIONAL THERAPY | Facility: HOSPITAL | Age: 75
Setting detail: THERAPIES SERIES
Discharge: HOME | End: 2020-01-30

## 2020-01-30 DIAGNOSIS — I89.0 LYMPHEDEMA OF BOTH LOWER EXTREMITIES: Primary | ICD-10-CM

## 2020-01-30 PROCEDURE — 97165 OT EVAL LOW COMPLEX 30 MIN: CPT

## 2020-01-30 PROCEDURE — 97535 SELF CARE MNGMENT TRAINING: CPT

## 2020-01-30 NOTE — THERAPY EVALUATION
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 (CMS/HCC)   • Type II diabetes mellitus with neurological manifestations, uncontrolled (CMS/HCC)   • Uncontrolled type 2 diabetes mellitus with complication, with long-term current use of insulin (CMS/HCC)   • Hyperinsulinism   • Abnormal weight gain   • Hyperlipidemia   • Essential hypertension   • Angiomyolipoma of kidney   • Left hip pain   • Left-sided low back pain without sciatica   • Lumbar spinal stenosis   • Encounter for long-term (current) use of insulin (CMS/HCC)   • Obstructive sleep apnea on autoCPAP   • Chronic kidney disease, stage III (moderate) (CMS/HCC)   • Chronic venous insufficiency   • CAD (coronary artery disease)   • Circadian rhythm sleep disorder, delayed sleep phase type   • History of colon polyps   • Hyperlipidemia associated with type 2 diabetes mellitus (CMS/HCC)   • Aortic valve sclerosis   • Heart murmur        Past Medical History:   Diagnosis Date   • Angiomyolipoma of kidney 3/30/2016   • Aortic valve sclerosis    • CAD (coronary artery disease)     MI in , treated medically.  PET 2016 with small-medium sized lateral infarct, no ischemia.  This wall motion abnormality was seen on echo as well.   • Cellulitis 2018    RIGHT LEG   • Chronic kidney disease, stage III (moderate) (CMS/HCC) 10/13/2016   • Chronic venous insufficiency    • Heart murmur    • Hyperlipidemia    • Hypertension    • Low back pain    • Mass of ovary 3/30/2016   • Morbid obesity (CMS/HCC)    • Obesity (BMI 30-39.9) 2019   • Sleep apnea    • Spinal stenosis    • Type 2 diabetes mellitus (CMS/HCC)    • Uterine leiomyoma 3/30/2016        Past Surgical History:   Procedure Laterality Date   • CATARACT  EXTRACTION      X2    • COLONOSCOPY N/A 8/29/2018    Procedure: COLONOSCOPY to CECUM WITH HOT SNARE POLYPECTOMY;  Surgeon: Neal Reilly MD;  Location: [facility] ENDOSCOPY;  Service: Gastroenterology   • HERNIA REPAIR     • HYSTERECTOMY     • TONSILLECTOMY           Visit Dx:     ICD-10-CM ICD-9-CM   1. Lymphedema of both lower extremities I89.0 457.1       Patient History     Row Name 01/30/20 0900             History    Chief Complaint  Swelling  -RE      Date Current Problem(s) Began  -- Dec 2019  -RE      Brief Description of Current Complaint  Pt has had a histoy swelling in her legs for  about 13 years.  It has slowly gotten worse and with her last hospitalizaion was significantly worse.  -RE      What clinical tests have you had for this problem?  -- Doppler  -RE      Results of Clinical Tests  neg  -RE      Surgery/Hospitalization  dec 2019  -RE         Fall Risk Assessment    Any falls in the past year:  No  -RE         Services    Are you currently receiving Home Health services  No  -RE         Daily Activities    Primary Language  English  -RE      Are you able to read  Yes  -RE      Are you able to write  Yes  -RE      How does patient learn best?  Listening  -RE      Teaching needs identified  Home Exercise Program;Management of Condition  -RE      Patient is concerned about/has problems with  Difficulty with self care (i.e. bathing, dressing, toileting:;Performing home management (household chores, shopping, care of dependents);Walking  -RE      Does patient have problems with the following?  None  -RE      Barriers to learning  None  -RE      Functional Status  mobility issues preventing performance of daily activities  -RE      Pt Participated in POC and Goals  Yes  -RE         Safety    Are you being hurt, hit, or frightened by anyone at home or in your life?  No  -RE      Are you being neglected by a caregiver  No  -RE        User Key  (r) = Recorded By, (t) = Taken By, (c) = Cosigned By     Initials Name Provider Type    RE BrianBrooke, OTR Occupational Therapist          Lymphedema     Row Name 01/30/20 0900             Subjective Pain    Able to rate subjective pain?  yes  -RE      Pre-Treatment Pain Level  4  -RE      Post-Treatment Pain Level  4  -RE      Subjective Pain Comment  back pain.  No lymphedema related pain  -RE         Lymphedema Assessment    Lymphedema Classification  RLE:;LLE:;secondary;stage 2 (Spontaneously Irreversible)  -RE      Infections or Cellulitis?  yes  -RE      Infection/Cellulitis Treatment  IV antibiotics  -RE      Lymphedema Precautions  kidney disease  -RE      Lymphedema Assessment Comments  toes to mid thigh--3+  -RE         Lymphedema Edema Assessment    Ptting Edema Category  By grade out of 4  -RE      Pitting Edema  + 2/4;+ 3/4;Moderate  -RE      Stemmer Sign  bilateral:;positive  -RE      Patriot Hump  bilateral:;negative  -RE         Skin Changes/Observations    Skin Observations Comment  thickened skin on toes. large callous on bottom og R foot  -RE         Lymphedema Sensation    Lymphedema Sensation Comments  Emmanuel LEs with neuropathy  -RE         Lymphedema Measurements    Measurement Type(s)  Circumferential  -RE      Circumferential Areas  Lower extremities  -RE         BLE Circumferential (cm)    Measurement Location 1  10 cm above knee  -RE      Left 1  49 cm  -RE      Right 1  50 cm  -RE      Measurement Location 2  Knee  -RE      Left 2  42.5 cm  -RE      Right 2  44.5 cm  -RE      Measurement Location 3  10 cm belwo knee  -RE      Left 3  46.5 cm  -RE      Right 3  51.5 cm  -RE      Measurement Location 4  20 cm below knee  -RE      Left 4  43.5 cm  -RE      Right 4  49 cm  -RE      Measurement Location 5  30 cm belwo knee  -RE      Left 5  35 cm  -RE      Right 5  38 cm  -RE      Measurement Location 6  Ankle  -RE      Left 6  29 cm  -RE      Right 6  30 cm  -RE      Measurement Location 7  Mid foot  -RE      Left 7  25.5 cm  -RE      Right 7  26  "cm  -RE      LLE Circumferential Total  271 cm  -RE      RLE Circumferential Total  289 cm  -RE        User Key  (r) = Recorded By, (t) = Taken By, (c) = Cosigned By    Initials Name Provider Type    Brooke Acharya OTR Occupational Therapist                  Therapy Education  Education Details: Discussed lymphedema treatment including estimated duration. Gave patient \"Healthy Habits for Lymphedema Patients\" and \"What is Lymphedema\" and reviewed with patient. Discussed disease process and what to expect from therapy.  Gave patient an order form for bandages. Discussed treatment options if she can not attend dailyor can not tolerate treatment due to back pain ie bandaging at home.    Given: Symptoms/condition management, Bandaging/dressing change, Edema management  Program: New  How Provided: Verbal, Written  Provided to: Patient, Caregiver  Level of Understanding: Teach back education performed, Verbalized  31272 - OT Self Care/Mgmt Minutes: 15        OT Goals     Row Name 01/30/20 1700          OT Short Term Goals    STG Date to Achieve  02/13/20  -RE     STG 1  Patient independent and compliant with self-wrapping techniques of compression bandages with assistance of caregiver as needed for improved self-management of condition.  -RE     STG 1 Progress  New  -RE     STG 2  Patient will demonstrate proper awareness of “What is Lymphedema?” and \"Healthy Habits\" for improved prevention, management, care of symptoms and ease of transition to self-care of condition.   -RE     STG 2 Progress  New  -RE     STG 3  Patient will demonstrate decreased net edema of bilateral lower extremities >/= 5-10cm  for decrease in edema, symptoms, decreased risk of infection and improved skin care/transition to self-care of condition.   -RE     STG 3 Progress  New  -RE        Long Term Goals    LTG Date to Achieve  02/27/20  -RE     LTG 1  Patient will demonstrate decreased net edema of bilateral lower extremities >/= 11-20 cm  for " decrease in edema, symptoms, decreased risk of infection and improved skin care/transition to self-care of condition.   -RE     LTG 1 Progress  New  -RE     LTG 2  Patient independent and compliant with initial home exercise program focused on diaphragmatic breathing, range of motion, flexibility to decrease edema and improve lymphatic flow for decreased edema and decreased risk of infection.   -RE     LTG 2 Progress  New  -RE     LTG 3  Patient independent and compliant with use and care of compression garments and/or Velcro products with assistance of a caregiver as needed to promote self-care independence.    -RE     LTG 3 Progress  New  -RE        Time Calculation    OT Goal Re-Cert Due Date  04/30/20  -RE       User Key  (r) = Recorded By, (t) = Taken By, (c) = Cosigned By    Initials Name Provider Type    Brooke Acharya OTR Occupational Therapist          OT Assessment/Plan     Row Name 01/30/20 1739          OT Assessment    Functional Limitations  Performance in self-care ADL;Limitation in home management  -RE     Impairments  Pain;Impaired venous circulation;Impaired lymphatic circulation;Sensation;Integumentary integrity  -RE     Assessment Comments  Patient is a 74 y.o. female that presents with signs and symptoms consistent with bilateralLE lymphedema which extends from toes to groin. Edema is firm with 3+ pitting.  The total circumference of the R LE is 289cm and the L LE is 271 cm. she could benefit from Complete Decongestive Therapy to decrease edema, protect and improve skin integrity, decrease the risk of infection and to learn self-care strategies. However she does not have transportation availble for the days she needs to come for treatment.  She also could not tolerate laying on the treatment table due to her back pain.  This could severely limit our ability  To treat her effectively.  She is scheduled for epidurals which could really help.  Family is to discuss option of coming for bandaging  only or full CDT.   -RE     Please refer to paper survey for additional self-reported information  Yes  -RE     OT Diagnosis  yolaned LE lymphedema  -RE     OT Rehab Potential  Good  -RE     Patient/caregiver participated in establishment of treatment plan and goals  Yes  -RE     Patient would benefit from skilled therapy intervention  Yes  -RE        OT Plan    OT Frequency  4x/week  -RE     Predicted Duration of Therapy Intervention (Therapy Eval)  4-6 weeks for a total of 16-24 visits  -RE     Planned CPT's?  OT EVAL LOW COMPLEXITY: 34915;OT THER ACT EA 15 MIN: [id number];OT THER PROC EA 15 MIN: [id number];OT SELF CARE/MGMT/TRAIN 15 MIN: 58850;OT MANUAL THERAPY EA 15 MIN: 18204  -RE     Planned Therapy Interventions (Optional Details)  home exercise program;manual therapy techniques;patient/family education;other (see comments) bandaging and skin care  -RE       User Key  (r) = Recorded By, (t) = Taken By, (c) = Cosigned By    Initials Name Provider Type    RE Brooke Torres OTR Occupational Therapist                    Time Calculation:   OT Start Time: 0900  OT Stop Time: 1000  OT Time Calculation (min): 60 min  Total Timed Code Minutes- OT: 15 minute(s)     Therapy Charges for Today     Code Description Service Date Service Provider Modifiers Qty    [id number]  OT SELF CARE/MGMT/TRAIN EA 15 MIN 1/30/2020 Brooke Torres OTR GO 1    [id number]  OT EVAL LOW COMPLEXITY 3 1/30/2020 Brooke Torres OTR GO 1                    ADY Montaño  1/30/2020

## 2020-01-31 ENCOUNTER — ANESTHESIA (OUTPATIENT)
Dept: PAIN MEDICINE | Facility: HOSPITAL | Age: 75
End: 2020-01-31

## 2020-01-31 ENCOUNTER — ANESTHESIA EVENT (OUTPATIENT)
Dept: PAIN MEDICINE | Facility: HOSPITAL | Age: 75
End: 2020-01-31

## 2020-01-31 ENCOUNTER — HOSPITAL ENCOUNTER (OUTPATIENT)
Dept: GENERAL RADIOLOGY | Facility: HOSPITAL | Age: 75
Discharge: HOME | End: 2020-01-31

## 2020-01-31 ENCOUNTER — HOSPITAL ENCOUNTER (OUTPATIENT)
Dept: PAIN MEDICINE | Facility: HOSPITAL | Age: 75
Discharge: HOME | End: 2020-01-31
Admitting: ANESTHESIOLOGY

## 2020-01-31 VITALS
HEART RATE: 72 BPM | DIASTOLIC BLOOD PRESSURE: 58 MMHG | TEMPERATURE: 98.9 F | SYSTOLIC BLOOD PRESSURE: 172 MMHG | OXYGEN SATURATION: 99 % | RESPIRATION RATE: 16 BRPM

## 2020-01-31 DIAGNOSIS — M48.061 SPINAL STENOSIS OF LUMBAR REGION, UNSPECIFIED WHETHER NEUROGENIC CLAUDICATION PRESENT: Primary | ICD-10-CM

## 2020-01-31 DIAGNOSIS — R52 PAIN: ICD-10-CM

## 2020-01-31 PROCEDURE — 25010000002 METHYLPREDNISOLONE PER 80 MG: Performed by: ANESTHESIOLOGY

## 2020-01-31 PROCEDURE — 77003 FLUOROGUIDE FOR SPINE INJECT: CPT

## 2020-01-31 PROCEDURE — C1755 CATHETER, INTRASPINAL: HCPCS

## 2020-01-31 RX ORDER — FENTANYL CITRATE 50 UG/ML
50 INJECTION, SOLUTION INTRAMUSCULAR; INTRAVENOUS AS NEEDED
Status: DISCONTINUED | OUTPATIENT
Start: 2020-01-31 | End: 2020-02-01 | Stop reason: HOSPADM

## 2020-01-31 RX ORDER — SODIUM CHLORIDE 0.9 % (FLUSH) 0.9 %
1-10 SYRINGE (ML) INJECTION AS NEEDED
Status: DISCONTINUED | OUTPATIENT
Start: 2020-01-31 | End: 2020-02-01 | Stop reason: HOSPADM

## 2020-01-31 RX ORDER — MIDAZOLAM HYDROCHLORIDE 1 MG/ML
1 INJECTION INTRAMUSCULAR; INTRAVENOUS AS NEEDED
Status: DISCONTINUED | OUTPATIENT
Start: 2020-01-31 | End: 2020-02-01 | Stop reason: HOSPADM

## 2020-01-31 RX ORDER — LIDOCAINE HYDROCHLORIDE 10 MG/ML
1 INJECTION, SOLUTION INFILTRATION; PERINEURAL ONCE AS NEEDED
Status: DISCONTINUED | OUTPATIENT
Start: 2020-01-31 | End: 2020-02-01 | Stop reason: HOSPADM

## 2020-01-31 RX ORDER — METHYLPREDNISOLONE ACETATE 80 MG/ML
80 INJECTION, SUSPENSION INTRA-ARTICULAR; INTRALESIONAL; INTRAMUSCULAR; SOFT TISSUE ONCE
Status: COMPLETED | OUTPATIENT
Start: 2020-01-31 | End: 2020-01-31

## 2020-01-31 RX ADMIN — METHYLPREDNISOLONE ACETATE 80 MG: 80 INJECTION, SUSPENSION INTRA-ARTICULAR; INTRALESIONAL; INTRAMUSCULAR; SOFT TISSUE at 12:58

## 2020-01-31 NOTE — ANESTHESIA PROCEDURE NOTES
PAIN Epidural block    Pre-sedation assessment completed: 1/31/2020 12:40 PM    Patient reassessed immediately prior to procedure    Patient location during procedure: pain clinic  Indication:procedure for pain  Performed By  Anesthesiologist: Chip Brandt MD  Preanesthetic Checklist  Completed: patient identified, site marked, surgical consent, pre-op evaluation, timeout performed, IV checked, risks and benefits discussed and monitors and equipment checked  Additional Notes  Post-Op Diagnosis Codes:     * Lumbar spinal stenosis (M48.061)     * Lumbar neuritis (M54.16)  Performed under fluoroscopic guidance  Prep:  Pt Position:prone  Sterile Tech:cap, gloves, mask and sterile barrier  Prep:chlorhexidine gluconate and isopropyl alcohol  Monitoring:blood pressure monitoring, continuous pulse oximetry and EKG  Procedure:Sedation: no     Approach:midline  Guidance: fluoroscopy  Location:lumbar (Intralaminar)  Level:L5-S1  Needle Type:Tuohy  Needle Gauge:20  Aspiration:negative  Medications:  Depomedrol:80  Preservative Free Saline:4mL  Comments:No contrast due to history of kidney disease.  Post Assessment:  Post-procedure: Band-aid.  Pt Tolerance:patient tolerated the procedure well with no apparent complications  Complications:no

## 2020-02-04 DIAGNOSIS — R80.9 PROTEINURIA, UNSPECIFIED TYPE: Primary | ICD-10-CM

## 2020-02-05 LAB
PROT 24H UR-MRATE: 2720 MG/24HOURS (ref 0–150)
PROT UR-MCNC: 136 MG/DL

## 2020-02-07 RX ORDER — SIMVASTATIN 40 MG
20 TABLET ORAL DAILY
Qty: 45 TABLET | Refills: 3 | Status: SHIPPED | OUTPATIENT
Start: 2020-02-07 | End: 2021-03-05

## 2020-03-04 ENCOUNTER — ANESTHESIA EVENT (OUTPATIENT)
Dept: PAIN MEDICINE | Facility: HOSPITAL | Age: 75
End: 2020-03-04

## 2020-03-04 ENCOUNTER — ANESTHESIA (OUTPATIENT)
Dept: PAIN MEDICINE | Facility: HOSPITAL | Age: 75
End: 2020-03-04

## 2020-03-04 ENCOUNTER — HOSPITAL ENCOUNTER (OUTPATIENT)
Dept: PAIN MEDICINE | Facility: HOSPITAL | Age: 75
Discharge: HOME | End: 2020-03-04
Admitting: ANESTHESIOLOGY

## 2020-03-04 ENCOUNTER — HOSPITAL ENCOUNTER (OUTPATIENT)
Dept: GENERAL RADIOLOGY | Facility: HOSPITAL | Age: 75
Discharge: HOME | End: 2020-03-04

## 2020-03-04 VITALS
RESPIRATION RATE: 16 BRPM | DIASTOLIC BLOOD PRESSURE: 94 MMHG | HEART RATE: 56 BPM | OXYGEN SATURATION: 98 % | TEMPERATURE: 98.6 F | SYSTOLIC BLOOD PRESSURE: 189 MMHG

## 2020-03-04 DIAGNOSIS — M54.16 LUMBAR NEURITIS: Primary | ICD-10-CM

## 2020-03-04 DIAGNOSIS — R52 PAIN: ICD-10-CM

## 2020-03-04 DIAGNOSIS — M48.061 SPINAL STENOSIS OF LUMBAR REGION, UNSPECIFIED WHETHER NEUROGENIC CLAUDICATION PRESENT: ICD-10-CM

## 2020-03-04 PROCEDURE — C1755 CATHETER, INTRASPINAL: HCPCS

## 2020-03-04 PROCEDURE — 25010000002 METHYLPREDNISOLONE PER 80 MG: Performed by: ANESTHESIOLOGY

## 2020-03-04 PROCEDURE — 25010000002 MIDAZOLAM PER 1 MG: Performed by: ANESTHESIOLOGY

## 2020-03-04 PROCEDURE — 77003 FLUOROGUIDE FOR SPINE INJECT: CPT

## 2020-03-04 RX ORDER — MIDAZOLAM HYDROCHLORIDE 1 MG/ML
1 INJECTION INTRAMUSCULAR; INTRAVENOUS AS NEEDED
Status: DISCONTINUED | OUTPATIENT
Start: 2020-03-04 | End: 2020-03-05 | Stop reason: HOSPADM

## 2020-03-04 RX ORDER — LIDOCAINE HYDROCHLORIDE 10 MG/ML
0.5 INJECTION, SOLUTION INFILTRATION; PERINEURAL ONCE AS NEEDED
Status: DISCONTINUED | OUTPATIENT
Start: 2020-03-04 | End: 2020-03-05 | Stop reason: HOSPADM

## 2020-03-04 RX ORDER — FENTANYL CITRATE 50 UG/ML
50 INJECTION, SOLUTION INTRAMUSCULAR; INTRAVENOUS AS NEEDED
Status: DISCONTINUED | OUTPATIENT
Start: 2020-03-04 | End: 2020-03-05 | Stop reason: HOSPADM

## 2020-03-04 RX ORDER — LIDOCAINE HYDROCHLORIDE 10 MG/ML
1 INJECTION, SOLUTION INFILTRATION; PERINEURAL ONCE AS NEEDED
Status: DISCONTINUED | OUTPATIENT
Start: 2020-03-04 | End: 2020-03-05 | Stop reason: HOSPADM

## 2020-03-04 RX ORDER — SODIUM CHLORIDE 0.9 % (FLUSH) 0.9 %
3-10 SYRINGE (ML) INJECTION AS NEEDED
Status: DISCONTINUED | OUTPATIENT
Start: 2020-03-04 | End: 2020-03-05 | Stop reason: HOSPADM

## 2020-03-04 RX ORDER — SODIUM CHLORIDE 0.9 % (FLUSH) 0.9 %
1-10 SYRINGE (ML) INJECTION AS NEEDED
Status: DISCONTINUED | OUTPATIENT
Start: 2020-03-04 | End: 2020-03-05 | Stop reason: HOSPADM

## 2020-03-04 RX ORDER — METHYLPREDNISOLONE ACETATE 80 MG/ML
80 INJECTION, SUSPENSION INTRA-ARTICULAR; INTRALESIONAL; INTRAMUSCULAR; SOFT TISSUE ONCE
Status: COMPLETED | OUTPATIENT
Start: 2020-03-04 | End: 2020-03-04

## 2020-03-04 RX ORDER — SODIUM CHLORIDE 0.9 % (FLUSH) 0.9 %
3 SYRINGE (ML) INJECTION EVERY 12 HOURS SCHEDULED
Status: DISCONTINUED | OUTPATIENT
Start: 2020-03-04 | End: 2020-03-05 | Stop reason: HOSPADM

## 2020-03-04 RX ADMIN — METHYLPREDNISOLONE ACETATE 80 MG: 80 INJECTION, SUSPENSION INTRA-ARTICULAR; INTRALESIONAL; INTRAMUSCULAR; SOFT TISSUE at 09:39

## 2020-03-04 RX ADMIN — MIDAZOLAM 1 MG: 1 INJECTION INTRAMUSCULAR; INTRAVENOUS at 08:35

## 2020-03-04 NOTE — H&P
[facility]    History and Physical    Patient Name: Denisse Chavez  :  1945  MRN:  [id number]  Date of Admission: 3/4/2020    Subjective     Patient is a 74 y.o. female presents with chief complaint of chronic low back, hips: bilateral and buttock pain.  Onset of symptoms was gradual starting several months ago.  Symptoms are associated/aggravated by nothing in particular. Symptoms improve with injection  She has low back pain that radiates posteriorly down both of her legs.  It does not go below her knees.  She had an epidural steroid injection done on .  She got at least 30% relief of her pain.  The pain relief has persisted.  She would like repeat epidural steroid injection.    Other medical problems include hypertension coronary artery disease and diabetes mellitus.    She has a severe lower extremity edema which is equal bilaterally.  She says this is been going on for several months at least.  She is going to lymphedema clinic.  Lower extremity Dopplers which were read as normal.  She has an echocardiogram which is about a year old which shows trace regurgitant valves on the aorta, mitral and and tricuspid valves.  She has a plain x-ray which shows degenerative disc disease.  The following portions of the patients history were reviewed and updated as appropriate: current medications, allergies, past medical history, past surgical history, past family history, past social history and problem list                Objective     Past Medical History:   Past Medical History:   Diagnosis Date   • Angiomyolipoma of kidney 3/30/2016   • Aortic valve sclerosis    • CAD (coronary artery disease)     MI in , treated medically.  PET 2016 with small-medium sized lateral infarct, no ischemia.  This wall motion abnormality was seen on echo as well.   • Cellulitis 2018    RIGHT LEG   • Chronic kidney disease, stage III (moderate) (CMS/HCC) 10/13/2016   • Chronic venous insufficiency     • Heart murmur    • Hyperlipidemia    • Hypertension    • Low back pain    • Mass of ovary 3/30/2016   • Morbid obesity (CMS/HCC)    • Obesity (BMI 30-39.9) 2019   • Sleep apnea    • Spinal stenosis    • Type 2 diabetes mellitus (CMS/HCC)    • Uterine leiomyoma 3/30/2016     Past Surgical History:   Past Surgical History:   Procedure Laterality Date   • CATARACT EXTRACTION      X2    • COLONOSCOPY N/A 2018    Procedure: COLONOSCOPY to CECUM WITH HOT SNARE POLYPECTOMY;  Surgeon: Neal Reilly MD;  Location: [facility] ENDOSCOPY;  Service: Gastroenterology   • EPIDURAL BLOCK     • HERNIA REPAIR     • HYSTERECTOMY     • TONSILLECTOMY       Family History:   Family History   Problem Relation Age of Onset   • Diabetes Mother    • Heart attack Mother    • Hypertension Mother    • Hypothyroidism Mother    • Diabetes type II Son    • Breast cancer Neg Hx      Social History:   Social History     Tobacco Use   • Smoking status: Former Smoker     Packs/day: 0.25     Years: 2.00     Pack years: 0.50     Types: Cigarettes     Last attempt to quit: 1970     Years since quittin.2   • Smokeless tobacco: Never Used   • Tobacco comment: LIGHT USAGE   Substance Use Topics   • Alcohol use: No     Comment: CAFFEINE USE: 10 -12 CUPS OF COFFEE DAILY.    • Drug use: No       Vital Signs Range for the last 24 hours  Temperature: Temp:  [37 °C (98.6 °F)] 37 °C (98.6 °F)   Temp Source: Temp src: Oral   BP:     Pulse:     Respirations:     SPO2:     O2 Amount (l/min):     O2 Devices     Weight:           --------------------------------------------------------------------------------    Current Outpatient Medications   Medication Sig Dispense Refill   • acetaminophen-codeine (TYLENOL #3) 300-30 MG per tablet Take 1 tablet by mouth 3 (Three) Times a Day As Needed for Moderate Pain . 90 tablet 2   • amLODIPine (NORVASC) 10 MG tablet TAKE 1 TABLET BY MOUTH ONCE DAILY 90 tablet 3   • docusate sodium (COLACE) 100 MG capsule Take  100 mg by mouth 2 (two) times a day.     • furosemide (LASIX) 40 MG tablet TAKE 1 TABLET BY MOUTH ONCE DAILY 30 tablet 0   • hydrOXYzine (ATARAX) 25 MG tablet TAKE 1 TABLET BY MOUTH AT NIGHT AS NEEDED FOR INSOMNIA (Patient taking differently: Take 25 mg by mouth Every Night.) 90 tablet 0   • insulin NPH (humuLIN N,novoLIN N) 100 UNIT/ML injection Inject 46 Units under the skin into the appropriate area as directed Every Morning.     • insulin NPH (humuLIN N,novoLIN N) 100 UNIT/ML injection Inject 26 Units under the skin into the appropriate area as directed Every Night.     • insulin regular (humuLIN R,novoLIN R) 100 UNIT/ML injection Inject 34 Units under the skin into the appropriate area as directed Daily With Breakfast.     • insulin regular (humuLIN R,novoLIN R) 100 UNIT/ML injection Inject 14 Units under the skin into the appropriate area as directed Daily With Lunch.     • insulin regular (humuLIN R,novoLIN R) 100 UNIT/ML injection Inject 26 Units under the skin into the appropriate area as directed Daily With Dinner.     • isosorbide mononitrate (IMDUR) 60 MG 24 hr tablet Take 1 tablet by mouth Daily. 90 tablet 1   • metoprolol succinate XL (TOPROL-XL) 25 MG 24 hr tablet Take 1 tablet by mouth Daily. 90 tablet 2   • simvastatin (ZOCOR) 40 MG tablet Take 0.5 tablets by mouth Daily. 45 tablet 3   • vitamin D (ERGOCALCIFEROL) 98766 units capsule capsule 50,000 Units Every 30 (Thirty) Days.     • aspirin 81 MG tablet Take  by mouth Every Night.     • Misc. Devices (ROLLATOR) misc 1 Units as needed (for walking). 1 each 0     Current Facility-Administered Medications   Medication Dose Route Frequency Provider Last Rate Last Dose   • fentaNYL citrate (PF) (SUBLIMAZE) injection 50 mcg  50 mcg Intravenous PRN Render, Omar Lorenzo MD       • iopamidol (ISOVUE-M 200) injection 41%  12 mL Epidural Once in imaging Render, Omar Lorenzo MD       • lidocaine (XYLOCAINE) 1 % injection 0.5 mL  0.5 mL Intradermal Once PRN Render,  Omar Lorenzo MD       • lidocaine (XYLOCAINE) 1 % injection 1 mL  1 mL Intradermal Once PRN Render, Omar Lorenzo MD       • methylPREDNISolone acetate (DEPO-medrol) injection 80 mg  80 mg Intra-articular Once Render, Omar Lorenzo MD       • midazolam (VERSED) injection 1 mg  1 mg Intravenous PRN Render, Omar Lorenzo MD       • sodium chloride 0.9 % flush 1-10 mL  1-10 mL Intravenous PRN Meredith, Omar Lorenzo MD       • sodium chloride 0.9 % flush 3 mL  3 mL Intravenous Q12H Render, Omar Lorenzo MD       • sodium chloride 0.9 % flush 3-10 mL  3-10 mL Intravenous PRN Render, Omar Lorenzo MD           --------------------------------------------------------------------------------  Assessment/Plan      Anesthesia Evaluation     NPO Solid Status: > 8 hours      Pain impairs ability to perform ADLs: Exercise/Activity and Ambulation       Airway   Mallampati: II  TM distance: >3 FB  Neck ROM: full  No difficulty expected  Dental - normal exam     Pulmonary - normal exam   (+) a smoker Former, sleep apnea,   Cardiovascular     Rhythm: regular    (+) hypertension, valvular problems/murmurs MR, CAD, peripheral edema, hyperlipidemia,   (-) murmur    PE comment: Was unable to appreciate any murmurs or carotid bruits.  There is marketed lower extremity edema which is equal bilaterally.  This is not new.    Neuro/Psych  (+)  Sensory Deficit,    GI/Hepatic/Renal/Endo    (+) morbid obesity,  renal disease, diabetes mellitus type 2,     ROS Comment: Creatinine clearance is 59    Musculoskeletal     Abdominal   (+) obese,    Substance History      OB/GYN          Other                   Diagnosis and Plan    Treatment Plan  ASA 3   Patient has had previous injection/procedure with 25-50% improvement.   Procedures: With fluoroscopy,           Plan lumbar epidural steroid injection under fluoroscopic guidance.  She understands that exogenous steroids may increase her peripheral edema.  She also understands that there is a risk of bleeding,  infection, dural puncture headache, inadvertent spinal anesthetic and/or allergic adverse reaction of the local anesthetic or steroid.  She understands that there is a cumulative risk of lumbar compression fracture or avascular necrosis of the hips with chronic exogenous steroid administration.  She would like to proceed.    Diagnosis     * Lumbar neuritis [M54.16]     * Lumbago [M54.5]

## 2020-03-04 NOTE — ANESTHESIA PROCEDURE NOTES
PAIN Epidural block    Pre-sedation assessment completed: 3/4/2020 9:34 AM    Patient reassessed immediately prior to procedure    Patient location during procedure: pain clinic  Start Time: 3/4/2020 9:35 AM  Stop Time: 3/4/2020 9:43 AM  Indication:at surgeon's request and procedure for pain  Performed By  Anesthesiologist: Omar Harper MD  Preanesthetic Checklist  Completed: patient identified, surgical consent, pre-op evaluation, timeout performed, risks and benefits discussed and monitors and equipment checked  Additional Notes  Post-Op Diagnosis Codes:     * Lumbar neuritis (M54.16)     * Lumbago (M54.5)    Prep:  Pt Position:prone  Sterile Tech:sterile barrier, mask and gloves  Prep:chlorhexidine gluconate and isopropyl alcohol  Monitoring:blood pressure monitoring, continuous pulse oximetry and EKG  Procedure:Sedation: yes     Approach:right paramedian  Guidance: fluoroscopy  Location:lumbar  Level:L5-S1  Needle Type:Tuohy  Needle Gauge:20 G  Aspiration:negative  Test Dose:negative  Medications:  Depomedrol:80 mg  Preservative Free Saline:2mL  Isovue:2mL    Post Assessment:  Pt Tolerance:patient tolerated the procedure well with no apparent complications  Complications:no

## 2020-03-11 DIAGNOSIS — E11.8 CONTROLLED DIABETES MELLITUS TYPE 2 WITH COMPLICATIONS, UNSPECIFIED WHETHER LONG TERM INSULIN USE (HCC): Primary | ICD-10-CM

## 2020-03-11 DIAGNOSIS — E78.5 HYPERLIPIDEMIA, UNSPECIFIED HYPERLIPIDEMIA TYPE: ICD-10-CM

## 2020-03-11 LAB
ALBUMIN SERPL-MCNC: 4.2 G/DL (ref 3.5–5.2)
ALBUMIN/GLOB SERPL: 1.5 G/DL
ALP SERPL-CCNC: 81 U/L (ref 39–117)
ALT SERPL-CCNC: 34 U/L (ref 1–33)
AST SERPL-CCNC: 32 U/L (ref 1–32)
BILIRUB SERPL-MCNC: 0.5 MG/DL (ref 0.2–1.2)
BUN SERPL-MCNC: 31 MG/DL (ref 8–23)
BUN/CREAT SERPL: 33 (ref 7–25)
CALCIUM SERPL-MCNC: 9.6 MG/DL (ref 8.6–10.5)
CHLORIDE SERPL-SCNC: 101 MMOL/L (ref 98–107)
CHOLEST SERPL-MCNC: 193 MG/DL (ref 0–200)
CO2 SERPL-SCNC: 29.3 MMOL/L (ref 22–29)
CREAT SERPL-MCNC: 0.94 MG/DL (ref 0.57–1)
GLOBULIN SER CALC-MCNC: 2.8 GM/DL
GLUCOSE SERPL-MCNC: 141 MG/DL (ref 65–99)
HBA1C MFR BLD: 7 % (ref 4.8–5.6)
HDLC SERPL-MCNC: 53 MG/DL (ref 40–60)
LDLC SERPL CALC-MCNC: 115 MG/DL (ref 0–100)
POTASSIUM SERPL-SCNC: 4.9 MMOL/L (ref 3.5–5.2)
PROT SERPL-MCNC: 7 G/DL (ref 6–8.5)
SODIUM SERPL-SCNC: 141 MMOL/L (ref 136–145)
TRIGL SERPL-MCNC: 126 MG/DL (ref 0–150)
VLDLC SERPL CALC-MCNC: 25.2 MG/DL

## 2020-03-16 RX ORDER — FUROSEMIDE 40 MG/1
TABLET ORAL
Qty: 90 TABLET | Refills: 3 | Status: SHIPPED | OUTPATIENT
Start: 2020-03-16 | End: 2020-12-09 | Stop reason: DRUGHIGH

## 2020-03-18 ENCOUNTER — OFFICE VISIT (OUTPATIENT)
Dept: INTERNAL MEDICINE | Facility: CLINIC | Age: 75
End: 2020-03-18

## 2020-03-18 VITALS
HEIGHT: 64 IN | OXYGEN SATURATION: 98 % | WEIGHT: 225 LBS | HEART RATE: 75 BPM | BODY MASS INDEX: 38.41 KG/M2 | SYSTOLIC BLOOD PRESSURE: 170 MMHG | DIASTOLIC BLOOD PRESSURE: 62 MMHG

## 2020-03-18 DIAGNOSIS — I10 ESSENTIAL HYPERTENSION: ICD-10-CM

## 2020-03-18 DIAGNOSIS — Z79.4 CONTROLLED TYPE 2 DIABETES MELLITUS WITH COMPLICATION, WITH LONG-TERM CURRENT USE OF INSULIN (HCC): ICD-10-CM

## 2020-03-18 DIAGNOSIS — E78.2 MIXED HYPERLIPIDEMIA: Primary | ICD-10-CM

## 2020-03-18 DIAGNOSIS — E11.8 CONTROLLED TYPE 2 DIABETES MELLITUS WITH COMPLICATION, WITH LONG-TERM CURRENT USE OF INSULIN (HCC): ICD-10-CM

## 2020-03-18 PROCEDURE — 99214 OFFICE O/P EST MOD 30 MIN: CPT | Performed by: INTERNAL MEDICINE

## 2020-03-18 RX ORDER — METOPROLOL SUCCINATE 50 MG/1
50 TABLET, EXTENDED RELEASE ORAL DAILY
Qty: 90 TABLET | Refills: 3 | Status: SHIPPED | OUTPATIENT
Start: 2020-03-18 | End: 2020-05-10 | Stop reason: HOSPADM

## 2020-03-18 NOTE — PROGRESS NOTES
Subjective     Denisse Chavez is a 74 y.o. female who presents with   Chief Complaint   Patient presents with   • Diabetes   • Hypertension   • Hyperlipidemia       History of Present Illness     DM-2.  Fair control by A1c.  She is followed by endo as well.   HTN.  Increased today.  Discussed checks at home.   HLD.  Good control on simvastatin.    CKD-3.  Reviewed numbers which are stable.    Review of Systems   Respiratory: Negative.    Cardiovascular: Negative.        The following portions of the patient's history were reviewed and updated as appropriate: allergies, current medications and problem list.    Patient Active Problem List    Diagnosis Date Noted   • Aortic valve sclerosis 07/25/2019   • Heart murmur 07/25/2019   • Hyperlipidemia associated with type 2 diabetes mellitus (CMS/[facility]) 12/20/2018   • History of colon polyps 06/13/2018     Note Last Updated: 6/13/2018     Added automatically from request for surgery [id number]     • Circadian rhythm sleep disorder, delayed sleep phase type 04/23/2018   • Chronic venous insufficiency    • CAD (coronary artery disease)      Note Last Updated: 6/28/2017     MI in 1996, treated medically.  PET 6/2016 with small-medium sized lateral infarct, no ischemia.  This wall motion abnormality was seen on echo as well.     • Chronic kidney disease, stage III (moderate) (CMS/HCC) 10/13/2016   • Obstructive sleep apnea on autoCPAP 09/04/2016   • Encounter for long-term (current) use of insulin (CMS/[facility]) 06/24/2016   • Left hip pain 05/16/2016   • Left-sided low back pain without sciatica 05/16/2016   • Lumbar spinal stenosis 05/16/2016   • Angiomyolipoma of kidney 03/30/2016   • Uncontrolled type II diabetes mellitus with nephropathy (CMS/[facility]) 03/08/2016   • Type II diabetes mellitus with neurological manifestations, uncontrolled (CMS/[facility]) 03/08/2016   • Uncontrolled type 2 diabetes mellitus with complication, with long-term current use of insulin (CMS/[facility]) 03/08/2016   •  Hyperinsulinism 03/08/2016   • Abnormal weight gain 03/08/2016   • Hyperlipidemia 03/08/2016   • Essential hypertension 03/08/2016       Current Outpatient Medications on File Prior to Visit   Medication Sig Dispense Refill   • acetaminophen-codeine (TYLENOL #3) 300-30 MG per tablet Take 1 tablet by mouth 3 (Three) Times a Day As Needed for Moderate Pain . 90 tablet 2   • amLODIPine (NORVASC) 10 MG tablet TAKE 1 TABLET BY MOUTH ONCE DAILY 90 tablet 3   • aspirin 81 MG tablet Take  by mouth Every Night.     • docusate sodium (COLACE) 100 MG capsule Take 100 mg by mouth 2 (two) times a day.     • furosemide (LASIX) 40 MG tablet Take 1 tablet by mouth once daily 90 tablet 3   • hydrOXYzine (ATARAX) 25 MG tablet TAKE 1 TABLET BY MOUTH AT NIGHT AS NEEDED FOR INSOMNIA (Patient taking differently: Take 25 mg by mouth Every Night.) 90 tablet 0   • insulin NPH (humuLIN N,novoLIN N) 100 UNIT/ML injection Inject 46 Units under the skin into the appropriate area as directed Every Morning.     • insulin NPH (humuLIN N,novoLIN N) 100 UNIT/ML injection Inject 26 Units under the skin into the appropriate area as directed Every Night.     • insulin regular (humuLIN R,novoLIN R) 100 UNIT/ML injection Inject 34 Units under the skin into the appropriate area as directed Daily With Breakfast.     • insulin regular (humuLIN R,novoLIN R) 100 UNIT/ML injection Inject 14 Units under the skin into the appropriate area as directed Daily With Lunch.     • insulin regular (humuLIN R,novoLIN R) 100 UNIT/ML injection Inject 26 Units under the skin into the appropriate area as directed Daily With Dinner.     • isosorbide mononitrate (IMDUR) 60 MG 24 hr tablet Take 1 tablet by mouth Daily. 90 tablet 1   • Misc. Devices (ROLLATOR) misc 1 Units as needed (for walking). 1 each 0   • simvastatin (ZOCOR) 40 MG tablet Take 0.5 tablets by mouth Daily. 45 tablet 3   • vitamin D (ERGOCALCIFEROL) 78846 units capsule capsule 50,000 Units Every 30 (Thirty)  "Days.     • [DISCONTINUED] metoprolol succinate XL (TOPROL-XL) 25 MG 24 hr tablet Take 1 tablet by mouth Daily. 90 tablet 2     No current facility-administered medications on file prior to visit.        Objective     /62   Pulse 75   Ht 162.6 cm (64.02\")   Wt 102 kg (225 lb)   SpO2 98%   BMI 38.60 kg/m²     Physical Exam   Constitutional: She is oriented to person, place, and time. She appears well-developed and well-nourished.   HENT:   Head: Normocephalic and atraumatic.   Pulmonary/Chest: Effort normal.   Neurological: She is alert and oriented to person, place, and time.   Psychiatric: She has a normal mood and affect. Her behavior is normal.       Assessment/Plan   Denisse was seen today for diabetes, hypertension and hyperlipidemia.    Diagnoses and all orders for this visit:    Mixed hyperlipidemia    Controlled type 2 diabetes mellitus with complication, with long-term current use of insulin (CMS/[facility])    Essential hypertension  -     metoprolol succinate XL (TOPROL-XL) 50 MG 24 hr tablet; Take 1 tablet by mouth Daily.        Discussion  DM-2.  The patient will continue current regimen.      HTN.  The patient will continue current regimen.    Increase Toprol XL to 50mg daily.  The patient is instructed to monitor at home and call in checks.    HLD.  The patient will continue current regimen.      CKD-3.   Continue to monitor.         Future Appointments   Date Time Provider Department Center   4/6/2020 11:20 AM [facility] None   4/14/2020  1:45 PM Wally Craft MD [facility] None   4/22/2020 11:00 AM Alfredito Mueller MD [facility] None   5/28/2020  9:10 AM [facility] None   6/1/2020  1:30 PM Surekha Mcdonnell MD [facility] None   7/1/2020  9:30 AM TREATMENT RM 1 [facility]   7/29/2020  1:30 PM Warner Tang MD [facility] None         "

## 2020-04-03 DIAGNOSIS — IMO0002 TYPE II DIABETES MELLITUS WITH NEUROLOGICAL MANIFESTATIONS, UNCONTROLLED: Primary | ICD-10-CM

## 2020-04-03 DIAGNOSIS — E78.2 MIXED HYPERLIPIDEMIA: ICD-10-CM

## 2020-04-03 DIAGNOSIS — I10 ESSENTIAL HYPERTENSION: ICD-10-CM

## 2020-04-03 DIAGNOSIS — R63.5 ABNORMAL WEIGHT GAIN: ICD-10-CM

## 2020-04-03 RX ORDER — HYDROXYZINE HYDROCHLORIDE 25 MG/1
TABLET, FILM COATED ORAL
Qty: 90 TABLET | Refills: 0 | Status: SHIPPED | OUTPATIENT
Start: 2020-04-03 | End: 2020-07-24

## 2020-04-07 LAB
ALBUMIN SERPL-MCNC: 3.9 G/DL (ref 3.5–5.2)
ALBUMIN/GLOB SERPL: 1.4 G/DL
ALP SERPL-CCNC: 80 U/L (ref 39–117)
ALT SERPL-CCNC: 15 U/L (ref 1–33)
AST SERPL-CCNC: 15 U/L (ref 1–32)
BILIRUB SERPL-MCNC: 0.4 MG/DL (ref 0.2–1.2)
BUN SERPL-MCNC: 20 MG/DL (ref 8–23)
BUN/CREAT SERPL: 19.2 (ref 7–25)
CALCIUM SERPL-MCNC: 9.7 MG/DL (ref 8.6–10.5)
CHLORIDE SERPL-SCNC: 104 MMOL/L (ref 98–107)
CO2 SERPL-SCNC: 26 MMOL/L (ref 22–29)
CREAT SERPL-MCNC: 1.04 MG/DL (ref 0.57–1)
GLOBULIN SER CALC-MCNC: 2.8 GM/DL
GLUCOSE SERPL-MCNC: 78 MG/DL (ref 65–99)
HBA1C MFR BLD: 7.2 % (ref 4.8–5.6)
MICROALBUMIN UR-MCNC: 730.3 UG/ML
POTASSIUM SERPL-SCNC: 4.2 MMOL/L (ref 3.5–5.2)
PROT SERPL-MCNC: 6.7 G/DL (ref 6–8.5)
SODIUM SERPL-SCNC: 141 MMOL/L (ref 136–145)
TSH SERPL DL<=0.005 MIU/L-ACNC: 6.17 UIU/ML (ref 0.27–4.2)

## 2020-04-14 ENCOUNTER — RESULTS ENCOUNTER (OUTPATIENT)
Dept: ENDOCRINOLOGY | Age: 75
End: 2020-04-14

## 2020-04-14 ENCOUNTER — OFFICE VISIT (OUTPATIENT)
Dept: ENDOCRINOLOGY | Age: 75
End: 2020-04-14

## 2020-04-14 DIAGNOSIS — Z79.4 ENCOUNTER FOR LONG-TERM (CURRENT) USE OF INSULIN (HCC): ICD-10-CM

## 2020-04-14 DIAGNOSIS — E16.1 HYPERINSULINISM: ICD-10-CM

## 2020-04-14 DIAGNOSIS — E11.69 HYPERLIPIDEMIA ASSOCIATED WITH TYPE 2 DIABETES MELLITUS (HCC): ICD-10-CM

## 2020-04-14 DIAGNOSIS — IMO0002 UNCONTROLLED TYPE 2 DIABETES MELLITUS WITH DIABETIC NEUROPATHY, WITH LONG-TERM CURRENT USE OF INSULIN: ICD-10-CM

## 2020-04-14 DIAGNOSIS — IMO0002 UNCONTROLLED TYPE 2 DIABETES MELLITUS WITH COMPLICATION, WITH LONG-TERM CURRENT USE OF INSULIN: Primary | ICD-10-CM

## 2020-04-14 DIAGNOSIS — E78.5 HYPERLIPIDEMIA ASSOCIATED WITH TYPE 2 DIABETES MELLITUS (HCC): ICD-10-CM

## 2020-04-14 DIAGNOSIS — IMO0002 UNCONTROLLED TYPE II DIABETES MELLITUS WITH NEPHROPATHY: ICD-10-CM

## 2020-04-14 DIAGNOSIS — IMO0002 UNCONTROLLED TYPE 2 DIABETES MELLITUS WITH BACKGROUND RETINOPATHY: ICD-10-CM

## 2020-04-14 DIAGNOSIS — N18.30 CHRONIC KIDNEY DISEASE, STAGE III (MODERATE) (HCC): ICD-10-CM

## 2020-04-14 DIAGNOSIS — IMO0002 UNCONTROLLED TYPE 2 DIABETES MELLITUS WITH COMPLICATION, WITH LONG-TERM CURRENT USE OF INSULIN: ICD-10-CM

## 2020-04-14 PROCEDURE — 99214 OFFICE O/P EST MOD 30 MIN: CPT | Performed by: INTERNAL MEDICINE

## 2020-04-14 NOTE — PROGRESS NOTES
74 y.o.    Patient Care Team:  Surekha Mcdonnell MD as PCP - General (Internal Medicine)  Wally Craft MD as PCP - Claims Attributed  Wally Craft MD as Consulting Physician (Endocrinology)  Warner Tang MD as Consulting Physician (Cardiology)  Sergio Eagle MD as Consulting Physician (Urology)    Chief Complaint:    Uncontrolled type 2 diabetes mellitus with complications  Subjective     HPI   Patient is a 74-year-old white female with a history of uncontrolled type 2 diabetes mellitus with complications came for follow-up    Uncontrolled type 2 diabetes mellitus  Patient is currently on NPH and regular insulin regimen  She reports that majority the blood sugars at 100 200 range  Occasionally they are in the 70 range  Hypoglycemia as above  Uncontrolled type 2 diabetes mellitus with peripheral neuropathy  Patient reports symptoms are tingling numbness burning in both lower extremities  Uncontrolled type 2 diabetes mellitus with retinopathy  Patient is status post laser treatments in the past and has regular follow-up with ophthalmology  Uncontrolled type 2 diabetes mellitus with nephropathy  Patient has severe microalbuminuria and slightly elevated creatinine  She has regular follow-up with a nephrologist  Hyperlipidemia associated with diabetes  Patient is currently on simvastatin 20 mg daily.  Reports compliance and denies side effects      Interval History      The following portions of the patient's history were reviewed and updated as appropriate: allergies, current medications, past family history, past medical history, past social history, past surgical history and problem list.    Past Medical History:   Diagnosis Date   • Angiomyolipoma of kidney 3/30/2016   • Aortic valve sclerosis    • CAD (coronary artery disease)     MI in 1996, treated medically.  PET 6/2016 with small-medium sized lateral infarct, no ischemia.  This wall motion abnormality was seen on echo as well.    • Cellulitis 2018    RIGHT LEG   • Chronic kidney disease, stage III (moderate) (CMS/[facility]) 10/13/2016   • Chronic venous insufficiency    • Heart murmur    • Hyperlipidemia    • Hypertension    • Low back pain    • Mass of ovary 3/30/2016   • Morbid obesity (CMS/[facility])    • Obesity (BMI 30-39.9) 2019   • Sleep apnea    • Spinal stenosis    • Type 2 diabetes mellitus (CMS/[facility])    • Uterine leiomyoma 3/30/2016     Family History   Problem Relation Age of Onset   • Diabetes Mother    • Heart attack Mother    • Hypertension Mother    • Hypothyroidism Mother    • Diabetes type II Son    • Breast cancer Neg Hx      Social History     Socioeconomic History   • Marital status: Single     Spouse name: Not on file   • Number of children: 3   • Years of education: Not on file   • Highest education level: Not on file   Occupational History   • Occupation: retired from [employer]   Tobacco Use   • Smoking status: Former Smoker     Packs/day: 0.25     Years: 2.00     Pack years: 0.50     Types: Cigarettes     Last attempt to quit: 1970     Years since quittin.3   • Smokeless tobacco: Never Used   • Tobacco comment: LIGHT USAGE   Substance and Sexual Activity   • Alcohol use: No     Comment: CAFFEINE USE: 10 -12 CUPS OF COFFEE DAILY.    • Drug use: No   • Sexual activity: Defer     Allergies   Allergen Reactions   • Ace Inhibitors Other (See Comments)     Hyperkalemia/ROB   • Angiotensin Receptor Blockers Other (See Comments)     Pt unable to remember   • Keflex [Cephalexin] Rash     Tolerated ceftriaxone Dec 2019   • Sulfa Antibiotics Itching     rash       Current Outpatient Medications:   •  acetaminophen-codeine (TYLENOL #3) 300-30 MG per tablet, Take 1 tablet by mouth 3 (Three) Times a Day As Needed for Moderate Pain ., Disp: 90 tablet, Rfl: 2  •  amLODIPine (NORVASC) 10 MG tablet, TAKE 1 TABLET BY MOUTH ONCE DAILY, Disp: 90 tablet, Rfl: 3  •  aspirin 81 MG tablet, Take  by mouth Every Night., Disp: , Rfl:   •   docusate sodium (COLACE) 100 MG capsule, Take 100 mg by mouth 2 (two) times a day., Disp: , Rfl:   •  furosemide (LASIX) 40 MG tablet, Take 1 tablet by mouth once daily, Disp: 90 tablet, Rfl: 3  •  hydrOXYzine (ATARAX) 25 MG tablet, TAKE 1 TABLET BY MOUTH AT NIGHT AS NEEDED FOR INSOMNIA, Disp: 90 tablet, Rfl: 0  •  insulin NPH (humuLIN N,novoLIN N) 100 UNIT/ML injection, 46 units in the morning and 26 units in the evening subcu daily, Disp: 30 mL, Rfl: 5  •  insulin regular (humuLIN R,novoLIN R) 100 UNIT/ML injection, 34 units before breakfast, 14 units before lunch, 26 units before dinner subcu daily, Disp: 30 mL, Rfl: 5  •  isosorbide mononitrate (IMDUR) 60 MG 24 hr tablet, Take 1 tablet by mouth Daily., Disp: 90 tablet, Rfl: 1  •  metoprolol succinate XL (TOPROL-XL) 50 MG 24 hr tablet, Take 1 tablet by mouth Daily., Disp: 90 tablet, Rfl: 3  •  Misc. Devices (ROLLATOR) misc, 1 Units as needed (for walking)., Disp: 1 each, Rfl: 0  •  simvastatin (ZOCOR) 40 MG tablet, Take 0.5 tablets by mouth Daily., Disp: 45 tablet, Rfl: 3  •  vitamin D (ERGOCALCIFEROL) 47132 units capsule capsule, 50,000 Units Every 30 (Thirty) Days., Disp: , Rfl:         Review of Systems   Constitutional: Positive for fatigue.   Respiratory: Negative.    Cardiovascular: Negative.    Gastrointestinal: Negative.    Neurological: Positive for dizziness.   All other systems reviewed and are negative.      Objective       There were no vitals filed for this visit.  There is no height or weight on file to calculate BMI.  Vital signs and physical examination not documented due to telephone visit    Physical Exam  Results Review:     I reviewed the patient's new clinical results.    Medical records reviewed  Summary:      Orders Only on 04/03/2020   Component Date Value Ref Range Status   • Glucose 04/06/2020 78  65 - 99 mg/dL Final   • BUN 04/06/2020 20  8 - 23 mg/dL Final   • Creatinine 04/06/2020 1.04* 0.57 - 1.00 mg/dL Final   • eGFR Non African Am  04/06/2020 52* >60 mL/min/1.73 Final    Comment: The MDRD GFR formula is only valid for adults with stable  renal function between ages 18 and 70.     • eGFR  Am 04/06/2020 63  >60 mL/min/1.73 Final   • BUN/Creatinine Ratio 04/06/2020 19.2  7.0 - 25.0 Final   • Sodium 04/06/2020 141  136 - 145 mmol/L Final   • Potassium 04/06/2020 4.2  3.5 - 5.2 mmol/L Final   • Chloride 04/06/2020 104  98 - 107 mmol/L Final   • Total CO2 04/06/2020 26.0  22.0 - 29.0 mmol/L Final   • Calcium 04/06/2020 9.7  8.6 - 10.5 mg/dL Final   • Total Protein 04/06/2020 6.7  6.0 - 8.5 g/dL Final   • Albumin 04/06/2020 3.90  3.50 - 5.20 g/dL Final   • Globulin 04/06/2020 2.8  gm/dL Final   • A/G Ratio 04/06/2020 1.4  g/dL Final   • Total Bilirubin 04/06/2020 0.4  0.2 - 1.2 mg/dL Final   • Alkaline Phosphatase 04/06/2020 80  39 - 117 U/L Final   • AST (SGOT) 04/06/2020 15  1 - 32 U/L Final   • ALT (SGPT) 04/06/2020 15  1 - 33 U/L Final   • Hemoglobin A1C 04/06/2020 7.20* 4.80 - 5.60 % Final    Comment: Hemoglobin A1C Ranges:  Increased Risk for Diabetes  5.7% to 6.4%  Diabetes                     >= 6.5%  Diabetic Goal                < 7.0%     • TSH 04/06/2020 6.170* 0.270 - 4.200 uIU/mL Final   • Microalbumin, Urine 04/06/2020 730.3  Not Estab. ug/mL Final    Comment: Results confirmed on  dilution.       Lab Results   Component Value Date    HGBA1C 7.20 (H) 04/06/2020    HGBA1C 7.00 (H) 03/11/2020    HGBA1C 6.70 (H) 12/23/2019     Lab Results   Component Value Date    MICROALBUR 730.3 04/06/2020    CREATININE 1.04 (H) 04/06/2020     Imaging Results (Most Recent)     None                Assessment and Plan:    Diagnoses and all orders for this visit:    Uncontrolled type 2 diabetes mellitus with complication, with long-term current use of insulin (CMS/[facility])  -     Comprehensive Metabolic Panel; Future  -     Hemoglobin A1c; Future  -     Microalbumin / Creatinine Urine Ratio - Urine, Clean Catch; Future  -     TSH; Future  -     T4, Free;  "Future    Uncontrolled type II diabetes mellitus with nephropathy (CMS/[facility])  -     Comprehensive Metabolic Panel; Future  -     Hemoglobin A1c; Future  -     Microalbumin / Creatinine Urine Ratio - Urine, Clean Catch; Future  -     TSH; Future  -     T4, Free; Future    Uncontrolled type 2 diabetes mellitus with diabetic neuropathy, with long-term current use of insulin (CMS/[facility])    Uncontrolled type 2 diabetes mellitus with background retinopathy (CMS/[facility])    Hyperinsulinism    Encounter for long-term (current) use of insulin (CMS/[facility])    Chronic kidney disease, stage III (moderate) (CMS/[facility])    Hyperlipidemia associated with type 2 diabetes mellitus (CMS/[facility])    Other orders  -     insulin NPH (humuLIN N,novoLIN N) 100 UNIT/ML injection; 46 units in the morning and 26 units in the evening subcu daily  -     insulin regular (humuLIN R,novoLIN R) 100 UNIT/ML injection; 34 units before breakfast, 14 units before lunch, 26 units before dinner subcu daily        Patient reported that her blood sugars are mostly in the 100-200 range  She is currently taking NPH 46 units in the morning and 26 units at night  She takes regular insulin 34-14-26  She is trying to keep her blood sugars under control  Lowest blood sugar was in the 90 range  Patient denied any hypoglycemia    Her hemoglobin A1c went up to 7.2% but at her age I think that is quite acceptable  I advised the patient to continue the current regimen  Prescription sent to pharmacy  She will return to follow-up in 3 months with azeb Craft MD. FACE    04/14/20      EMR Dragon / transcription disclaimer:     \"Dictated utilizing Dragon dictation\".         "

## 2020-04-15 ENCOUNTER — TELEPHONE (OUTPATIENT)
Dept: ENDOCRINOLOGY | Age: 75
End: 2020-04-15

## 2020-04-15 DIAGNOSIS — IMO0002 UNCONTROLLED TYPE 2 DIABETES MELLITUS WITH BACKGROUND RETINOPATHY: Primary | ICD-10-CM

## 2020-04-15 DIAGNOSIS — E16.1 HYPERINSULINISM: ICD-10-CM

## 2020-04-19 ENCOUNTER — RESULTS ENCOUNTER (OUTPATIENT)
Dept: ENDOCRINOLOGY | Age: 75
End: 2020-04-19

## 2020-04-19 DIAGNOSIS — IMO0002 UNCONTROLLED TYPE 2 DIABETES MELLITUS WITH COMPLICATION, WITH LONG-TERM CURRENT USE OF INSULIN: ICD-10-CM

## 2020-04-19 DIAGNOSIS — IMO0002 UNCONTROLLED TYPE II DIABETES MELLITUS WITH NEPHROPATHY: ICD-10-CM

## 2020-04-22 ENCOUNTER — APPOINTMENT (OUTPATIENT)
Dept: SLEEP MEDICINE | Facility: HOSPITAL | Age: 75
End: 2020-04-22

## 2020-05-06 ENCOUNTER — HOSPITAL ENCOUNTER (INPATIENT)
Facility: HOSPITAL | Age: 75
LOS: 3 days | Discharge: HOME | End: 2020-05-10
Attending: EMERGENCY MEDICINE | Admitting: INTERNAL MEDICINE

## 2020-05-06 ENCOUNTER — APPOINTMENT (OUTPATIENT)
Dept: GENERAL RADIOLOGY | Facility: HOSPITAL | Age: 75
End: 2020-05-06

## 2020-05-06 DIAGNOSIS — A41.9 SEPSIS, DUE TO UNSPECIFIED ORGANISM, UNSPECIFIED WHETHER ACUTE ORGAN DYSFUNCTION PRESENT (HCC): Primary | ICD-10-CM

## 2020-05-06 DIAGNOSIS — R50.9 FEVER, UNSPECIFIED FEVER CAUSE: ICD-10-CM

## 2020-05-06 DIAGNOSIS — D72.829 LEUKOCYTOSIS, UNSPECIFIED TYPE: ICD-10-CM

## 2020-05-06 LAB
ALBUMIN SERPL-MCNC: 4.1 G/DL (ref 3.5–5.2)
ALBUMIN/GLOB SERPL: 1.2 G/DL
ALP SERPL-CCNC: 79 U/L (ref 39–117)
ALT SERPL W P-5'-P-CCNC: 14 U/L (ref 1–33)
ANION GAP SERPL CALCULATED.3IONS-SCNC: 15 MMOL/L (ref 5–15)
AST SERPL-CCNC: 25 U/L (ref 1–32)
B PARAPERT DNA SPEC QL NAA+PROBE: NOT DETECTED
B PERT DNA SPEC QL NAA+PROBE: NOT DETECTED
BACTERIA UR QL AUTO: ABNORMAL /HPF
BASOPHILS # BLD AUTO: 0.04 10*3/MM3 (ref 0–0.2)
BASOPHILS NFR BLD AUTO: 0.2 % (ref 0–1.5)
BILIRUB SERPL-MCNC: 0.7 MG/DL (ref 0.2–1.2)
BILIRUB UR QL STRIP: NEGATIVE
BUN BLD-MCNC: 21 MG/DL (ref 8–23)
BUN/CREAT SERPL: 18.6 (ref 7–25)
C PNEUM DNA NPH QL NAA+NON-PROBE: NOT DETECTED
CALCIUM SPEC-SCNC: 10 MG/DL (ref 8.6–10.5)
CHLORIDE SERPL-SCNC: 97 MMOL/L (ref 98–107)
CLARITY UR: CLEAR
CO2 SERPL-SCNC: 24 MMOL/L (ref 22–29)
COLOR UR: YELLOW
CREAT BLD-MCNC: 1.13 MG/DL (ref 0.57–1)
CRP SERPL-MCNC: 2.35 MG/DL (ref 0–0.5)
D-LACTATE SERPL-SCNC: 1.9 MMOL/L (ref 0.5–2)
DEPRECATED RDW RBC AUTO: 40.8 FL (ref 37–54)
EOSINOPHIL # BLD AUTO: 0.04 10*3/MM3 (ref 0–0.4)
EOSINOPHIL NFR BLD AUTO: 0.2 % (ref 0.3–6.2)
ERYTHROCYTE [DISTWIDTH] IN BLOOD BY AUTOMATED COUNT: 13.5 % (ref 12.3–15.4)
FLUAV H1 2009 PAND RNA NPH QL NAA+PROBE: NOT DETECTED
FLUAV H1 HA GENE NPH QL NAA+PROBE: NOT DETECTED
FLUAV H3 RNA NPH QL NAA+PROBE: NOT DETECTED
FLUAV SUBTYP SPEC NAA+PROBE: NOT DETECTED
FLUBV RNA ISLT QL NAA+PROBE: NOT DETECTED
GFR SERPL CREATININE-BSD FRML MDRD: 47 ML/MIN/1.73
GLOBULIN UR ELPH-MCNC: 3.5 GM/DL
GLUCOSE BLD-MCNC: 153 MG/DL (ref 65–99)
GLUCOSE BLDC GLUCOMTR-MCNC: 154 MG/DL (ref 70–130)
GLUCOSE UR STRIP-MCNC: NEGATIVE MG/DL
HADV DNA SPEC NAA+PROBE: NOT DETECTED
HCOV 229E RNA SPEC QL NAA+PROBE: NOT DETECTED
HCOV HKU1 RNA SPEC QL NAA+PROBE: NOT DETECTED
HCOV NL63 RNA SPEC QL NAA+PROBE: NOT DETECTED
HCOV OC43 RNA SPEC QL NAA+PROBE: NOT DETECTED
HCT VFR BLD AUTO: 40.5 % (ref 34–46.6)
HGB BLD-MCNC: 13.6 G/DL (ref 12–15.9)
HGB UR QL STRIP.AUTO: ABNORMAL
HMPV RNA NPH QL NAA+NON-PROBE: NOT DETECTED
HOLD SPECIMEN: NORMAL
HOLD SPECIMEN: NORMAL
HPIV1 RNA SPEC QL NAA+PROBE: NOT DETECTED
HPIV2 RNA SPEC QL NAA+PROBE: NOT DETECTED
HPIV3 RNA NPH QL NAA+PROBE: NOT DETECTED
HPIV4 P GENE NPH QL NAA+PROBE: NOT DETECTED
HYALINE CASTS UR QL AUTO: ABNORMAL /LPF
IMM GRANULOCYTES # BLD AUTO: 0.16 10*3/MM3 (ref 0–0.05)
IMM GRANULOCYTES NFR BLD AUTO: 0.8 % (ref 0–0.5)
KETONES UR QL STRIP: NEGATIVE
LDH SERPL-CCNC: 254 U/L (ref 135–214)
LEUKOCYTE ESTERASE UR QL STRIP.AUTO: ABNORMAL
LIPASE SERPL-CCNC: 12 U/L (ref 13–60)
LYMPHOCYTES # BLD AUTO: 0.76 10*3/MM3 (ref 0.7–3.1)
LYMPHOCYTES NFR BLD AUTO: 3.6 % (ref 19.6–45.3)
M PNEUMO IGG SER IA-ACNC: NOT DETECTED
MCH RBC QN AUTO: 28 PG (ref 26.6–33)
MCHC RBC AUTO-ENTMCNC: 33.6 G/DL (ref 31.5–35.7)
MCV RBC AUTO: 83.5 FL (ref 79–97)
MONOCYTES # BLD AUTO: 0.92 10*3/MM3 (ref 0.1–0.9)
MONOCYTES NFR BLD AUTO: 4.3 % (ref 5–12)
NEUTROPHILS # BLD AUTO: 19.27 10*3/MM3 (ref 1.7–7)
NEUTROPHILS NFR BLD AUTO: 90.9 % (ref 42.7–76)
NITRITE UR QL STRIP: NEGATIVE
NRBC BLD AUTO-RTO: 0.1 /100 WBC (ref 0–0.2)
PH UR STRIP.AUTO: 8 [PH] (ref 5–8)
PLATELET # BLD AUTO: 251 10*3/MM3 (ref 140–450)
PMV BLD AUTO: 10.7 FL (ref 6–12)
POTASSIUM BLD-SCNC: 4 MMOL/L (ref 3.5–5.2)
PROCALCITONIN SERPL-MCNC: 0.38 NG/ML (ref 0.1–0.25)
PROT SERPL-MCNC: 7.6 G/DL (ref 6–8.5)
PROT UR QL STRIP: ABNORMAL
RBC # BLD AUTO: 4.85 10*6/MM3 (ref 3.77–5.28)
RBC # UR: ABNORMAL /HPF
REF LAB TEST METHOD: ABNORMAL
RHINOVIRUS RNA SPEC NAA+PROBE: NOT DETECTED
RSV RNA NPH QL NAA+NON-PROBE: NOT DETECTED
SODIUM BLD-SCNC: 136 MMOL/L (ref 136–145)
SP GR UR STRIP: 1.02 (ref 1–1.03)
SQUAMOUS #/AREA URNS HPF: ABNORMAL /HPF
UROBILINOGEN UR QL STRIP: ABNORMAL
WBC NRBC COR # BLD: 21.19 10*3/MM3 (ref 3.4–10.8)
WBC UR QL AUTO: ABNORMAL /HPF
WHOLE BLOOD HOLD SPECIMEN: NORMAL
WHOLE BLOOD HOLD SPECIMEN: NORMAL

## 2020-05-06 PROCEDURE — 85025 COMPLETE CBC W/AUTO DIFF WBC: CPT | Performed by: EMERGENCY MEDICINE

## 2020-05-06 PROCEDURE — 83690 ASSAY OF LIPASE: CPT | Performed by: EMERGENCY MEDICINE

## 2020-05-06 PROCEDURE — 99285 EMERGENCY DEPT VISIT HI MDM: CPT

## 2020-05-06 PROCEDURE — P9612 CATHETERIZE FOR URINE SPEC: HCPCS

## 2020-05-06 PROCEDURE — 83615 LACTATE (LD) (LDH) ENZYME: CPT | Performed by: EMERGENCY MEDICINE

## 2020-05-06 PROCEDURE — 83605 ASSAY OF LACTIC ACID: CPT | Performed by: EMERGENCY MEDICINE

## 2020-05-06 PROCEDURE — 71045 X-RAY EXAM CHEST 1 VIEW: CPT

## 2020-05-06 PROCEDURE — 0100U HC BIOFIRE FILMARRAY RESP PANEL 2: CPT | Performed by: EMERGENCY MEDICINE

## 2020-05-06 PROCEDURE — 80053 COMPREHEN METABOLIC PANEL: CPT | Performed by: EMERGENCY MEDICINE

## 2020-05-06 PROCEDURE — 84145 PROCALCITONIN (PCT): CPT | Performed by: EMERGENCY MEDICINE

## 2020-05-06 PROCEDURE — 81001 URINALYSIS AUTO W/SCOPE: CPT | Performed by: EMERGENCY MEDICINE

## 2020-05-06 PROCEDURE — 87040 BLOOD CULTURE FOR BACTERIA: CPT | Performed by: EMERGENCY MEDICINE

## 2020-05-06 PROCEDURE — 87635 SARS-COV-2 COVID-19 AMP PRB: CPT | Performed by: EMERGENCY MEDICINE

## 2020-05-06 PROCEDURE — 86140 C-REACTIVE PROTEIN: CPT | Performed by: EMERGENCY MEDICINE

## 2020-05-06 PROCEDURE — 36415 COLL VENOUS BLD VENIPUNCTURE: CPT

## 2020-05-06 PROCEDURE — 82962 GLUCOSE BLOOD TEST: CPT

## 2020-05-06 RX ORDER — SODIUM CHLORIDE 0.9 % (FLUSH) 0.9 %
10 SYRINGE (ML) INJECTION AS NEEDED
Status: DISCONTINUED | OUTPATIENT
Start: 2020-05-06 | End: 2020-05-10 | Stop reason: HOSPADM

## 2020-05-06 RX ORDER — ACETAMINOPHEN 500 MG
1000 TABLET ORAL ONCE
Status: COMPLETED | OUTPATIENT
Start: 2020-05-06 | End: 2020-05-06

## 2020-05-06 RX ORDER — HYDRALAZINE HYDROCHLORIDE 20 MG/ML
10 INJECTION INTRAMUSCULAR; INTRAVENOUS ONCE
Status: COMPLETED | OUTPATIENT
Start: 2020-05-06 | End: 2020-05-07

## 2020-05-06 RX ORDER — ACETAMINOPHEN 500 MG
1000 TABLET ORAL ONCE
Status: DISCONTINUED | OUTPATIENT
Start: 2020-05-06 | End: 2020-05-06

## 2020-05-06 RX ADMIN — ACETAMINOPHEN 1000 MG: 500 TABLET, FILM COATED ORAL at 22:16

## 2020-05-06 RX ADMIN — SODIUM CHLORIDE 1000 ML: 9 INJECTION, SOLUTION INTRAVENOUS at 23:09

## 2020-05-07 ENCOUNTER — APPOINTMENT (OUTPATIENT)
Dept: CT IMAGING | Facility: HOSPITAL | Age: 75
End: 2020-05-07

## 2020-05-07 ENCOUNTER — APPOINTMENT (OUTPATIENT)
Dept: CARDIOLOGY | Facility: HOSPITAL | Age: 75
End: 2020-05-07

## 2020-05-07 PROBLEM — A41.9 SEPSIS: Status: ACTIVE | Noted: 2020-05-07

## 2020-05-07 LAB
ANION GAP SERPL CALCULATED.3IONS-SCNC: 19.4 MMOL/L (ref 5–15)
AORTIC DIMENSIONLESS INDEX: 0.9 (DI)
BASOPHILS # BLD AUTO: 0.09 10*3/MM3 (ref 0–0.2)
BASOPHILS NFR BLD AUTO: 0.4 % (ref 0–1.5)
BH CV ECHO MEAS - ACS: 1.5 CM
BH CV ECHO MEAS - AO MAX PG (FULL): 9.5 MMHG
BH CV ECHO MEAS - AO MAX PG: 21.8 MMHG
BH CV ECHO MEAS - AO MEAN PG (FULL): 3.2 MMHG
BH CV ECHO MEAS - AO MEAN PG: 10.9 MMHG
BH CV ECHO MEAS - AO ROOT AREA (BSA CORRECTED): 1.5
BH CV ECHO MEAS - AO ROOT AREA: 7.7 CM^2
BH CV ECHO MEAS - AO ROOT DIAM: 3.1 CM
BH CV ECHO MEAS - AO V2 MAX: 233.5 CM/SEC
BH CV ECHO MEAS - AO V2 MEAN: 151.2 CM/SEC
BH CV ECHO MEAS - AO V2 VTI: 44.7 CM
BH CV ECHO MEAS - AVA(I,A): 2.9 CM^2
BH CV ECHO MEAS - AVA(I,D): 2.9 CM^2
BH CV ECHO MEAS - AVA(V,A): 2.4 CM^2
BH CV ECHO MEAS - AVA(V,D): 2.4 CM^2
BH CV ECHO MEAS - BSA(HAYCOCK): 2.2 M^2
BH CV ECHO MEAS - BSA: 2.1 M^2
BH CV ECHO MEAS - BZI_BMI: 39.7 KILOGRAMS/M^2
BH CV ECHO MEAS - BZI_METRIC_HEIGHT: 162.6 CM
BH CV ECHO MEAS - BZI_METRIC_WEIGHT: 104.8 KG
BH CV ECHO MEAS - EDV(CUBED): 138.6 ML
BH CV ECHO MEAS - EDV(MOD-SP2): 109 ML
BH CV ECHO MEAS - EDV(MOD-SP4): 119 ML
BH CV ECHO MEAS - EDV(TEICH): 128.1 ML
BH CV ECHO MEAS - EF(CUBED): 77.3 %
BH CV ECHO MEAS - EF(MOD-BP): 72 %
BH CV ECHO MEAS - EF(MOD-SP2): 70.6 %
BH CV ECHO MEAS - EF(MOD-SP4): 72.3 %
BH CV ECHO MEAS - EF(TEICH): 69.1 %
BH CV ECHO MEAS - ESV(CUBED): 31.4 ML
BH CV ECHO MEAS - ESV(MOD-SP2): 32 ML
BH CV ECHO MEAS - ESV(MOD-SP4): 33 ML
BH CV ECHO MEAS - ESV(TEICH): 39.6 ML
BH CV ECHO MEAS - FS: 39 %
BH CV ECHO MEAS - IVS/LVPW: 1.1
BH CV ECHO MEAS - IVSD: 1.2 CM
BH CV ECHO MEAS - LAT PEAK E' VEL: 12.9 CM/SEC
BH CV ECHO MEAS - LV DIASTOLIC VOL/BSA (35-75): 57.2 ML/M^2
BH CV ECHO MEAS - LV MASS(C)D: 230.2 GRAMS
BH CV ECHO MEAS - LV MASS(C)DI: 110.7 GRAMS/M^2
BH CV ECHO MEAS - LV MAX PG: 12.3 MMHG
BH CV ECHO MEAS - LV MEAN PG: 7.7 MMHG
BH CV ECHO MEAS - LV SYSTOLIC VOL/BSA (12-30): 15.9 ML/M^2
BH CV ECHO MEAS - LV V1 MAX: 175.7 CM/SEC
BH CV ECHO MEAS - LV V1 MEAN: 131.9 CM/SEC
BH CV ECHO MEAS - LV V1 VTI: 40.4 CM
BH CV ECHO MEAS - LVIDD: 5.2 CM
BH CV ECHO MEAS - LVIDS: 3.2 CM
BH CV ECHO MEAS - LVLD AP2: 8.2 CM
BH CV ECHO MEAS - LVLD AP4: 7.9 CM
BH CV ECHO MEAS - LVLS AP2: 7 CM
BH CV ECHO MEAS - LVLS AP4: 6.9 CM
BH CV ECHO MEAS - LVOT AREA (M): 3.1 CM^2
BH CV ECHO MEAS - LVOT AREA: 3.3 CM^2
BH CV ECHO MEAS - LVOT DIAM: 2 CM
BH CV ECHO MEAS - LVPWD: 1.1 CM
BH CV ECHO MEAS - MED PEAK E' VEL: 7.07 CM/SEC
BH CV ECHO MEAS - MR MAX PG: 38.1 MMHG
BH CV ECHO MEAS - MR MAX VEL: 308.6 CM/SEC
BH CV ECHO MEAS - MV A DUR: 0.11 SEC
BH CV ECHO MEAS - MV A MAX VEL: 124.3 CM/SEC
BH CV ECHO MEAS - MV DEC SLOPE: 682.7 CM/SEC^2
BH CV ECHO MEAS - MV DEC TIME: 203 SEC
BH CV ECHO MEAS - MV E MAX VEL: 157 CM/SEC
BH CV ECHO MEAS - MV E/A: 1.3
BH CV ECHO MEAS - MV MAX PG: 6.7 MMHG
BH CV ECHO MEAS - MV MEAN PG: 3.6 MMHG
BH CV ECHO MEAS - MV P1/2T MAX VEL: 150.7 CM/SEC
BH CV ECHO MEAS - MV P1/2T: 64.6 MSEC
BH CV ECHO MEAS - MV V2 MAX: 129 CM/SEC
BH CV ECHO MEAS - MV V2 MEAN: 89.8 CM/SEC
BH CV ECHO MEAS - MV V2 VTI: 37 CM
BH CV ECHO MEAS - MVA P1/2T LCG: 1.5 CM^2
BH CV ECHO MEAS - MVA(P1/2T): 3.4 CM^2
BH CV ECHO MEAS - MVA(VTI): 3.6 CM^2
BH CV ECHO MEAS - PA ACC TIME: 0.08 SEC
BH CV ECHO MEAS - PA MAX PG (FULL): 4 MMHG
BH CV ECHO MEAS - PA MAX PG: 11 MMHG
BH CV ECHO MEAS - PA PR(ACCEL): 42.8 MMHG
BH CV ECHO MEAS - PA V2 MAX: 165.6 CM/SEC
BH CV ECHO MEAS - PULM A REVS DUR: 0.13 SEC
BH CV ECHO MEAS - PULM A REVS VEL: 35.3 CM/SEC
BH CV ECHO MEAS - PULM DIAS VEL: 59.7 CM/SEC
BH CV ECHO MEAS - PULM S/D: 1.2
BH CV ECHO MEAS - PULM SYS VEL: 72.1 CM/SEC
BH CV ECHO MEAS - RAP SYSTOLE: 3 MMHG
BH CV ECHO MEAS - RV BASE (<4.1) - OBSOLETE: 3.6 CM
BH CV ECHO MEAS - RV MAX PG: 7 MMHG
BH CV ECHO MEAS - RV MEAN PG: 4.2 MMHG
BH CV ECHO MEAS - RV V1 MAX: 132.2 CM/SEC
BH CV ECHO MEAS - RV V1 MEAN: 96.7 CM/SEC
BH CV ECHO MEAS - RV V1 VTI: 24.8 CM
BH CV ECHO MEAS - RVSP: 15 MMHG
BH CV ECHO MEAS - SI(AO): 166 ML/M^2
BH CV ECHO MEAS - SI(CUBED): 51.5 ML/M^2
BH CV ECHO MEAS - SI(LVOT): 63.2 ML/M^2
BH CV ECHO MEAS - SI(MOD-SP2): 37 ML/M^2
BH CV ECHO MEAS - SI(MOD-SP4): 41.4 ML/M^2
BH CV ECHO MEAS - SI(TEICH): 42.5 ML/M^2
BH CV ECHO MEAS - SV(AO): 345.1 ML
BH CV ECHO MEAS - SV(CUBED): 107.2 ML
BH CV ECHO MEAS - SV(LVOT): 131.5 ML
BH CV ECHO MEAS - SV(MOD-SP2): 77 ML
BH CV ECHO MEAS - SV(MOD-SP4): 86 ML
BH CV ECHO MEAS - SV(TEICH): 88.5 ML
BH CV ECHO MEAS - TAPSE (>1.6): 3.5 CM2
BH CV ECHO MEAS - TR MAX PG: 12 MMHG
BH CV ECHO MEAS - TR MAX VEL: 173.7 CM/SEC
BH CV ECHO MEASUREMENTS AVERAGE E/E' RATIO: 15.72
BH CV XLRA - RV BASE: 3.6 CM
BH CV XLRA - TDI S': 15.3 CM/SEC
BUN BLD-MCNC: 21 MG/DL (ref 8–23)
BUN/CREAT SERPL: 19.6 (ref 7–25)
CALCIUM SPEC-SCNC: 9.1 MG/DL (ref 8.6–10.5)
CHLORIDE SERPL-SCNC: 103 MMOL/L (ref 98–107)
CO2 SERPL-SCNC: 14.6 MMOL/L (ref 22–29)
CREAT BLD-MCNC: 1.07 MG/DL (ref 0.57–1)
DEPRECATED RDW RBC AUTO: 42.8 FL (ref 37–54)
EOSINOPHIL # BLD AUTO: 0 10*3/MM3 (ref 0–0.4)
EOSINOPHIL NFR BLD AUTO: 0 % (ref 0.3–6.2)
ERYTHROCYTE [DISTWIDTH] IN BLOOD BY AUTOMATED COUNT: 14.1 % (ref 12.3–15.4)
GFR SERPL CREATININE-BSD FRML MDRD: 50 ML/MIN/1.73
GLUCOSE BLD-MCNC: 238 MG/DL (ref 65–99)
GLUCOSE BLDC GLUCOMTR-MCNC: 246 MG/DL (ref 70–130)
GLUCOSE BLDC GLUCOMTR-MCNC: 292 MG/DL (ref 70–130)
GLUCOSE BLDC GLUCOMTR-MCNC: 323 MG/DL (ref 70–130)
GLUCOSE BLDC GLUCOMTR-MCNC: 383 MG/DL (ref 70–130)
HCT VFR BLD AUTO: 38.8 % (ref 34–46.6)
HGB BLD-MCNC: 13.2 G/DL (ref 12–15.9)
IMM GRANULOCYTES # BLD AUTO: 0.31 10*3/MM3 (ref 0–0.05)
IMM GRANULOCYTES NFR BLD AUTO: 1.2 % (ref 0–0.5)
LEFT ATRIUM VOLUME INDEX: 24.2 ML/M2
LV EF 2D ECHO EST: 72 %
LYMPHOCYTES # BLD AUTO: 0.88 10*3/MM3 (ref 0.7–3.1)
LYMPHOCYTES NFR BLD AUTO: 3.5 % (ref 19.6–45.3)
MAXIMAL PREDICTED HEART RATE: 146 BPM
MCH RBC QN AUTO: 28.3 PG (ref 26.6–33)
MCHC RBC AUTO-ENTMCNC: 34 G/DL (ref 31.5–35.7)
MCV RBC AUTO: 83.3 FL (ref 79–97)
MONOCYTES # BLD AUTO: 1.28 10*3/MM3 (ref 0.1–0.9)
MONOCYTES NFR BLD AUTO: 5.1 % (ref 5–12)
NEUTROPHILS # BLD AUTO: 22.66 10*3/MM3 (ref 1.7–7)
NEUTROPHILS NFR BLD AUTO: 89.8 % (ref 42.7–76)
NRBC BLD AUTO-RTO: 0 /100 WBC (ref 0–0.2)
PLATELET # BLD AUTO: 216 10*3/MM3 (ref 140–450)
PMV BLD AUTO: 11.4 FL (ref 6–12)
POTASSIUM BLD-SCNC: 5.1 MMOL/L (ref 3.5–5.2)
RBC # BLD AUTO: 4.66 10*6/MM3 (ref 3.77–5.28)
SARS-COV-2 RNA RESP QL NAA+PROBE: NOT DETECTED
SODIUM BLD-SCNC: 137 MMOL/L (ref 136–145)
STRESS TARGET HR: 124 BPM
WBC NRBC COR # BLD: 25.22 10*3/MM3 (ref 3.4–10.8)

## 2020-05-07 PROCEDURE — 25010000002 VANCOMYCIN 10 G RECONSTITUTED SOLUTION: Performed by: EMERGENCY MEDICINE

## 2020-05-07 PROCEDURE — 36415 COLL VENOUS BLD VENIPUNCTURE: CPT | Performed by: HOSPITALIST

## 2020-05-07 PROCEDURE — 25010000002 HYDRALAZINE PER 20 MG: Performed by: EMERGENCY MEDICINE

## 2020-05-07 PROCEDURE — 82962 GLUCOSE BLOOD TEST: CPT

## 2020-05-07 PROCEDURE — 63710000001 INSULIN LISPRO (HUMAN) PER 5 UNITS: Performed by: NURSE PRACTITIONER

## 2020-05-07 PROCEDURE — 85025 COMPLETE CBC W/AUTO DIFF WBC: CPT | Performed by: HOSPITALIST

## 2020-05-07 PROCEDURE — 80048 BASIC METABOLIC PNL TOTAL CA: CPT | Performed by: HOSPITALIST

## 2020-05-07 PROCEDURE — 99223 1ST HOSP IP/OBS HIGH 75: CPT | Performed by: INTERNAL MEDICINE

## 2020-05-07 PROCEDURE — 25010000002 VANCOMYCIN PER 500 MG: Performed by: NURSE PRACTITIONER

## 2020-05-07 PROCEDURE — 97165 OT EVAL LOW COMPLEX 30 MIN: CPT | Performed by: OCCUPATIONAL THERAPIST

## 2020-05-07 PROCEDURE — 74176 CT ABD & PELVIS W/O CONTRAST: CPT

## 2020-05-07 PROCEDURE — 63710000001 INSULIN ISOPHANE HUMAN PER 5 UNITS: Performed by: NURSE PRACTITIONER

## 2020-05-07 PROCEDURE — 25010000002 ONDANSETRON PER 1 MG: Performed by: NURSE PRACTITIONER

## 2020-05-07 PROCEDURE — 25010000003 CEFTRIAXONE PER 250 MG: Performed by: NURSE PRACTITIONER

## 2020-05-07 PROCEDURE — 93306 TTE W/DOPPLER COMPLETE: CPT | Performed by: INTERNAL MEDICINE

## 2020-05-07 PROCEDURE — 25010000002 PERFLUTREN (DEFINITY) 8.476 MG IN SODIUM CHLORIDE 0.9 % 10 ML INJECTION: Performed by: NURSE PRACTITIONER

## 2020-05-07 PROCEDURE — 25010000002 PIPERACILLIN SOD-TAZOBACTAM PER 1 G: Performed by: EMERGENCY MEDICINE

## 2020-05-07 PROCEDURE — 87040 BLOOD CULTURE FOR BACTERIA: CPT | Performed by: EMERGENCY MEDICINE

## 2020-05-07 PROCEDURE — 93306 TTE W/DOPPLER COMPLETE: CPT

## 2020-05-07 RX ORDER — ACETAMINOPHEN 325 MG/1
650 TABLET ORAL EVERY 4 HOURS PRN
Status: DISCONTINUED | OUTPATIENT
Start: 2020-05-07 | End: 2020-05-10 | Stop reason: HOSPADM

## 2020-05-07 RX ORDER — ISOSORBIDE MONONITRATE 60 MG/1
60 TABLET, EXTENDED RELEASE ORAL DAILY
Status: DISCONTINUED | OUTPATIENT
Start: 2020-05-07 | End: 2020-05-10 | Stop reason: HOSPADM

## 2020-05-07 RX ORDER — FUROSEMIDE 40 MG/1
40 TABLET ORAL DAILY
Status: DISCONTINUED | OUTPATIENT
Start: 2020-05-07 | End: 2020-05-10 | Stop reason: HOSPADM

## 2020-05-07 RX ORDER — SODIUM CHLORIDE 0.9 % (FLUSH) 0.9 %
10 SYRINGE (ML) INJECTION EVERY 12 HOURS SCHEDULED
Status: DISCONTINUED | OUTPATIENT
Start: 2020-05-07 | End: 2020-05-07

## 2020-05-07 RX ORDER — HYDROXYZINE HYDROCHLORIDE 25 MG/1
25 TABLET, FILM COATED ORAL NIGHTLY PRN
Status: DISCONTINUED | OUTPATIENT
Start: 2020-05-07 | End: 2020-05-10 | Stop reason: HOSPADM

## 2020-05-07 RX ORDER — NICOTINE POLACRILEX 4 MG
15 LOZENGE BUCCAL
Status: DISCONTINUED | OUTPATIENT
Start: 2020-05-07 | End: 2020-05-10 | Stop reason: HOSPADM

## 2020-05-07 RX ORDER — ASPIRIN 81 MG/1
81 TABLET ORAL DAILY
Status: DISCONTINUED | OUTPATIENT
Start: 2020-05-07 | End: 2020-05-10 | Stop reason: HOSPADM

## 2020-05-07 RX ORDER — NYSTATIN 100000 [USP'U]/G
POWDER TOPICAL EVERY 12 HOURS SCHEDULED
Status: DISCONTINUED | OUTPATIENT
Start: 2020-05-07 | End: 2020-05-10 | Stop reason: HOSPADM

## 2020-05-07 RX ORDER — CEFTRIAXONE SODIUM 2 G/50ML
2 INJECTION, SOLUTION INTRAVENOUS EVERY 24 HOURS
Status: DISCONTINUED | OUTPATIENT
Start: 2020-05-07 | End: 2020-05-09

## 2020-05-07 RX ORDER — VANCOMYCIN HYDROCHLORIDE 1 G/200ML
1000 INJECTION, SOLUTION INTRAVENOUS EVERY 12 HOURS
Status: DISCONTINUED | OUTPATIENT
Start: 2020-05-07 | End: 2020-05-09

## 2020-05-07 RX ORDER — DEXTROSE MONOHYDRATE 25 G/50ML
25 INJECTION, SOLUTION INTRAVENOUS
Status: DISCONTINUED | OUTPATIENT
Start: 2020-05-07 | End: 2020-05-10 | Stop reason: HOSPADM

## 2020-05-07 RX ORDER — DOCUSATE SODIUM 100 MG/1
100 CAPSULE, LIQUID FILLED ORAL 2 TIMES DAILY
Status: DISCONTINUED | OUTPATIENT
Start: 2020-05-07 | End: 2020-05-10 | Stop reason: HOSPADM

## 2020-05-07 RX ORDER — SODIUM CHLORIDE 0.9 % (FLUSH) 0.9 %
10 SYRINGE (ML) INJECTION AS NEEDED
Status: DISCONTINUED | OUTPATIENT
Start: 2020-05-07 | End: 2020-05-07

## 2020-05-07 RX ORDER — NITROGLYCERIN 0.4 MG/1
0.4 TABLET SUBLINGUAL
Status: DISCONTINUED | OUTPATIENT
Start: 2020-05-07 | End: 2020-05-10 | Stop reason: HOSPADM

## 2020-05-07 RX ORDER — METOPROLOL SUCCINATE 50 MG/1
50 TABLET, EXTENDED RELEASE ORAL DAILY
Status: DISCONTINUED | OUTPATIENT
Start: 2020-05-07 | End: 2020-05-09

## 2020-05-07 RX ORDER — SODIUM CHLORIDE 9 MG/ML
100 INJECTION, SOLUTION INTRAVENOUS CONTINUOUS
Status: DISCONTINUED | OUTPATIENT
Start: 2020-05-07 | End: 2020-05-08

## 2020-05-07 RX ORDER — ONDANSETRON 2 MG/ML
4 INJECTION INTRAMUSCULAR; INTRAVENOUS EVERY 6 HOURS PRN
Status: DISCONTINUED | OUTPATIENT
Start: 2020-05-07 | End: 2020-05-10 | Stop reason: HOSPADM

## 2020-05-07 RX ORDER — ATORVASTATIN CALCIUM 20 MG/1
20 TABLET, FILM COATED ORAL DAILY
Status: DISCONTINUED | OUTPATIENT
Start: 2020-05-07 | End: 2020-05-10 | Stop reason: HOSPADM

## 2020-05-07 RX ORDER — AMLODIPINE BESYLATE 10 MG/1
10 TABLET ORAL DAILY
Status: DISCONTINUED | OUTPATIENT
Start: 2020-05-07 | End: 2020-05-10 | Stop reason: HOSPADM

## 2020-05-07 RX ADMIN — INSULIN LISPRO 8 UNITS: 100 INJECTION, SOLUTION INTRAVENOUS; SUBCUTANEOUS at 17:14

## 2020-05-07 RX ADMIN — NYSTATIN: 100000 POWDER TOPICAL at 08:13

## 2020-05-07 RX ADMIN — AMLODIPINE BESYLATE 10 MG: 10 TABLET ORAL at 08:10

## 2020-05-07 RX ADMIN — HYDRALAZINE HYDROCHLORIDE 10 MG: 20 INJECTION INTRAMUSCULAR; INTRAVENOUS at 00:09

## 2020-05-07 RX ADMIN — SODIUM CHLORIDE 100 ML/HR: 9 INJECTION, SOLUTION INTRAVENOUS at 14:38

## 2020-05-07 RX ADMIN — ASPIRIN 81 MG: 81 TABLET, COATED ORAL at 08:10

## 2020-05-07 RX ADMIN — ACETAMINOPHEN 650 MG: 325 TABLET, FILM COATED ORAL at 22:42

## 2020-05-07 RX ADMIN — DOCUSATE SODIUM 100 MG: 100 CAPSULE, LIQUID FILLED ORAL at 20:22

## 2020-05-07 RX ADMIN — ONDANSETRON 4 MG: 2 INJECTION INTRAMUSCULAR; INTRAVENOUS at 06:58

## 2020-05-07 RX ADMIN — INSULIN LISPRO 6 UNITS: 100 INJECTION, SOLUTION INTRAVENOUS; SUBCUTANEOUS at 11:42

## 2020-05-07 RX ADMIN — VANCOMYCIN HYDROCHLORIDE 2000 MG: 10 INJECTION, POWDER, LYOPHILIZED, FOR SOLUTION INTRAVENOUS at 01:11

## 2020-05-07 RX ADMIN — DOCUSATE SODIUM 100 MG: 100 CAPSULE, LIQUID FILLED ORAL at 08:10

## 2020-05-07 RX ADMIN — SODIUM CHLORIDE, PRESERVATIVE FREE 10 ML: 5 INJECTION INTRAVENOUS at 08:13

## 2020-05-07 RX ADMIN — METOPROLOL SUCCINATE 50 MG: 50 TABLET, EXTENDED RELEASE ORAL at 08:10

## 2020-05-07 RX ADMIN — FUROSEMIDE 40 MG: 40 TABLET ORAL at 08:10

## 2020-05-07 RX ADMIN — SODIUM CHLORIDE, PRESERVATIVE FREE 10 ML: 5 INJECTION INTRAVENOUS at 05:11

## 2020-05-07 RX ADMIN — PERFLUTREN 2 ML: 6.52 INJECTION, SUSPENSION INTRAVENOUS at 14:00

## 2020-05-07 RX ADMIN — NYSTATIN: 100000 POWDER TOPICAL at 20:23

## 2020-05-07 RX ADMIN — ATORVASTATIN CALCIUM 20 MG: 20 TABLET, FILM COATED ORAL at 08:10

## 2020-05-07 RX ADMIN — SODIUM CHLORIDE 100 ML/HR: 9 INJECTION, SOLUTION INTRAVENOUS at 05:09

## 2020-05-07 RX ADMIN — ISOSORBIDE MONONITRATE 60 MG: 60 TABLET ORAL at 08:10

## 2020-05-07 RX ADMIN — INSULIN HUMAN 26 UNITS: 100 INJECTION, SUSPENSION SUBCUTANEOUS at 08:10

## 2020-05-07 RX ADMIN — VANCOMYCIN HYDROCHLORIDE 1000 MG: 1 INJECTION, SOLUTION INTRAVENOUS at 14:38

## 2020-05-07 RX ADMIN — ACETAMINOPHEN 650 MG: 325 TABLET, FILM COATED ORAL at 14:45

## 2020-05-07 RX ADMIN — TAZOBACTAM SODIUM AND PIPERACILLIN SODIUM 3.38 G: 375; 3 INJECTION, SOLUTION INTRAVENOUS at 00:11

## 2020-05-07 RX ADMIN — CEFTRIAXONE SODIUM 2 G: 2 INJECTION, SOLUTION INTRAVENOUS at 05:09

## 2020-05-07 RX ADMIN — INSULIN LISPRO 4 UNITS: 100 INJECTION, SOLUTION INTRAVENOUS; SUBCUTANEOUS at 08:10

## 2020-05-07 NOTE — PROGRESS NOTES
Discharge Planning Assessment  [facility]     Patient Name: Denisse Chavez  MRN: [id number]  Today's Date: 5/7/2020    Admit Date: 5/6/2020    Discharge Needs Assessment     Row Name 05/07/20 1320       Living Environment    Lives With  child(reagan), adult    Name(s) of Who Lives With Patient  son Anil    Current Living Arrangements  home/apartment/condo    Primary Care Provided by  self    Provides Primary Care For  no one    Family Caregiver if Needed  child(reagan), adult    Family Caregiver Names  Anil    Quality of Family Relationships  helpful;involved;supportive    Able to Return to Prior Arrangements  yes       Resource/Environmental Concerns    Resource/Environmental Concerns  none    Transportation Concerns  car, none       Transition Planning    Patient/Family Anticipates Transition to  home with family    Patient/Family Anticipated Services at Transition  none    Transportation Anticipated  family or friend will provide       Discharge Needs Assessment    Readmission Within the Last 30 Days  no previous admission in last 30 days    Concerns to be Addressed  discharge planning    Equipment Needed After Discharge  bipap/cpap;cane, straight;glucometer;rollator        Discharge Plan     Row Name 05/07/20 1322       Plan    Plan  Home with son and HH if needed    Patient/Family in Agreement with Plan  yes    Plan Comments  Met with patient at the bedside. Explained CCP role and verified facesheet. Patient lives in a house with her son Anil. There are 6 steps to enter the home. Patient has a cpap, glucometer, cane, and rollator at home. She does not drive anymore. She is current with PCP and pharmacy. She has used HH in the past but is unsure which agency it was and has been to [facility]. Patient will likely DC back home with son Anil. She is agreeable to HH if needed at [place] but did not want referrals made yet. CCP will follow up tomorrow. Christi Mckeon RN               Demographic Summary     Row Name  05/07/20 1317       General Information    Admission Type  inpatient    Arrived From  emergency department    Referral Source  admission list    Reason for Consult  discharge planning    Preferred Language  English       Contact Information    Permission Granted to Share Info With  family/designee        Functional Status     Row Name 05/07/20 1319       Functional Status    Usual Activity Tolerance  moderate    Current Activity Tolerance  moderate       Functional Status, IADL    Medications  independent    Meal Preparation  independent    Housekeeping  independent    Laundry  independent    Shopping  assistive person       Mental Status    General Appearance WDL  WDL       Mental Status Summary    Recent Changes in Mental Status/Cognitive Functioning  no changes        Psychosocial     Row Name 05/07/20 1319       Behavior WDL    Behavior WDL  WDL       Emotion Mood WDL    Emotion/Mood/Affect WDL  WDL       Speech WDL    Speech WDL  WDL       Perceptual State WDL    Perceptual State WDL  WDL       Thought Process WDL    Thought Process WDL  WDL       Intellectual Performance WDL    Intellectual Performance WDL  WDL       Coping/Stress    Major Change/Loss/Stressor  none    Patient Personal Strengths  able to adapt    Sources of Support  adult child(reagan)    Reaction to Health Status  accepting    Understanding of Condition and Treatment  adequate understanding of medical condition;adequate understanding of treatment       Developmental Stage (Eriksson's)    Developmental Stage  Stage 8 (65 years-death/Late Adulthood) Integrity vs. Despair        Abuse/Neglect     Row Name 05/07/20 1320       Personal Safety    Feels Unsafe at Home or Work/School  no    Feels Threatened by Someone  no    Does Anyone Try to Keep You From Having Contact with Others or Doing Things Outside Your Home?  no    Physical Signs of Abuse Present  no                Christi Mckeon, RN

## 2020-05-07 NOTE — ED NOTES
Nursing report ED to floor  Denisse Chavez  74 y.o.  female    HPI (triage note):   Chief Complaint   Patient presents with   • Fever   • Vomiting       Admitting doctor:   Henry Lorenzo MD    Admitting diagnosis:   The primary encounter diagnosis was Sepsis, due to unspecified organism, unspecified whether acute organ dysfunction present (CMS/[facility]). Diagnoses of Fever, unspecified fever cause and Leukocytosis, unspecified type were also pertinent to this visit.    Code status:   Current Code Status     Date Active Code Status Order ID Comments User Context       5/7/2020 0221 CPR [id number]  Kitty Bone, APRN ED       Questions for Current Code Status     Question Answer Comment    Code Status CPR     Medical Interventions (Level of Support Prior to Arrest) Full           Allergies:   Ace inhibitors; Angiotensin receptor blockers; Keflex [cephalexin]; and Sulfa antibiotics    Weight:       05/06/20 2200   Weight: 102 kg (225 lb)       Most recent vitals:   Vitals:    05/07/20 0014 05/07/20 0030 05/07/20 0100 05/07/20 0200   BP:  (!) 181/63 176/73 172/63   BP Location:  Right arm Right arm    Patient Position:  Lying Lying    Pulse:  88 95 89   Resp:       Temp: 99.9 °F (37.7 °C)      TempSrc: Oral      SpO2:  95% 97% 96%   Weight:       Height:           Active LDAs/IV Access:   Lines, Drains & Airways    Active LDAs     Name:   Placement date:   Placement time:   Site:   Days:    Peripheral IV 05/06/20 2203 Left Antecubital   05/06/20 2203    Antecubital   less than 1                Labs (abnormal labs have a star):   Labs Reviewed   COMPREHENSIVE METABOLIC PANEL - Abnormal; Notable for the following components:       Result Value    Glucose 153 (*)     Creatinine 1.13 (*)     Chloride 97 (*)     eGFR Non  Amer 47 (*)     All other components within normal limits    Narrative:     GFR Normal >60  Chronic Kidney Disease <60  Kidney Failure <15     URINALYSIS W/ MICROSCOPIC IF INDICATED (NO  "CULTURE) - Abnormal; Notable for the following components:    Blood, UA Moderate (2+) (*)     Protein, UA >=300 mg/dL (3+) (*)     Leuk Esterase, UA Trace (*)     All other components within normal limits   LACTATE DEHYDROGENASE - Abnormal; Notable for the following components:     (*)     All other components within normal limits   PROCALCITONIN - Abnormal; Notable for the following components:    Procalcitonin 0.38 (*)     All other components within normal limits    Narrative:     As a Marker for Sepsis (Non-Neonates):   1. <0.5 ng/mL represents a low risk of severe sepsis and/or septic shock.  1. >2 ng/mL represents a high risk of severe sepsis and/or septic shock.    As a Marker for Lower Respiratory Tract Infections that require antibiotic therapy:  PCT on Admission     Antibiotic Therapy             6-12 Hrs later  > 0.5                Strongly Recommended            >0.25 - <0.5         Recommended  0.1 - 0.25           Discouraged                   Remeasure/reassess PCT  <0.1                 Strongly Discouraged          Remeasure/reassess PCT      As 28 day mortality risk marker: \"Change in Procalcitonin Result\" (> 80 % or <=80 %) if Day 0 (or Day 1) and Day 4 values are available. Refer to http://www.[employer]-pct-calculator.com/   Change in PCT <=80 %   A decrease of PCT levels below or equal to 80 % defines a positive change in PCT test result representing a higher risk for 28-day all-cause mortality of patients diagnosed with severe sepsis or septic shock.  Change in PCT > 80 %   A decrease of PCT levels of more than 80 % defines a negative change in PCT result representing a lower risk for 28-day all-cause mortality of patients diagnosed with severe sepsis or septic shock.                Results may be falsely decreased if patient taking Biotin.    C-REACTIVE PROTEIN - Abnormal; Notable for the following components:    C-Reactive Protein 2.35 (*)     All other components within normal limits "   LIPASE - Abnormal; Notable for the following components:    Lipase 12 (*)     All other components within normal limits   CBC WITH AUTO DIFFERENTIAL - Abnormal; Notable for the following components:    WBC 21.19 (*)     Neutrophil % 90.9 (*)     Lymphocyte % 3.6 (*)     Monocyte % 4.3 (*)     Eosinophil % 0.2 (*)     Immature Grans % 0.8 (*)     Neutrophils, Absolute 19.27 (*)     Monocytes, Absolute 0.92 (*)     Immature Grans, Absolute 0.16 (*)     All other components within normal limits   URINALYSIS, MICROSCOPIC ONLY - Abnormal; Notable for the following components:    RBC, UA 3-5 (*)     Squamous Epithelial Cells, UA 3-6 (*)     All other components within normal limits   POCT GLUCOSE FINGERSTICK - Abnormal; Notable for the following components:    Glucose 154 (*)     All other components within normal limits   RESPIRATORY PANEL, PCR - Normal    Narrative:     The coronavirus on the RVP is NOT COVID-19 and is NOT indicative of infection with COVID-19.    SARS-COV-2 PCR ([place] IN-HOUSE PERFORMED), NP SWAB IN TRANSPORT MEDIA - Normal   LACTIC ACID, PLASMA - Normal   BLOOD CULTURE   BLOOD CULTURE   RAINBOW DRAW    Narrative:     The following orders were created for panel order [place] Draw.  Procedure                               Abnormality         Status                     ---------                               -----------         ------                     Light Blue Top[[id number]]                                   Final result               Green Top (Gel)[[id number]]                                  Final result               Lavender Top[[id number]]                                     Final result               Gold Top - SST[[id number]]                                   Final result                 Please view results for these tests on the individual orders.   POCT GLUCOSE FINGERSTICK   LIGHT BLUE TOP   GREEN TOP   LAVENDER TOP   GOLD TOP - SST   CBC AND DIFFERENTIAL    Narrative:     The  following orders were created for panel order CBC & Differential.  Procedure                               Abnormality         Status                     ---------                               -----------         ------                     CBC Auto Differential[[id number]]        Abnormal            Final result                 Please view results for these tests on the individual orders.       EKG:   No orders to display       Meds given in ED:   Medications   sodium chloride 0.9 % flush 10 mL (has no administration in time range)   sodium chloride 0.9 % flush 10 mL (has no administration in time range)   sodium chloride 0.9 % flush 10 mL (has no administration in time range)   nitroglycerin (NITROSTAT) SL tablet 0.4 mg (has no administration in time range)   sodium chloride 0.9 % infusion (has no administration in time range)   acetaminophen (TYLENOL) tablet 650 mg (has no administration in time range)   ondansetron (ZOFRAN) injection 4 mg (has no administration in time range)   Pharmacy to dose vancomycin (has no administration in time range)   Pharmacy to Dose Zosyn (has no administration in time range)   sodium chloride 0.9 % bolus 1,000 mL (0 mL Intravenous Stopped 5/7/20 0119)   acetaminophen (TYLENOL) tablet 1,000 mg (1,000 mg Oral Given 5/6/20 2216)   hydrALAZINE (APRESOLINE) injection 10 mg (10 mg Intravenous Given 5/7/20 0009)   vancomycin 2000 mg/500 mL 0.9% NS IVPB ([facility]) (2,000 mg Intravenous New Bag 5/7/20 0111)   piperacillin-tazobactam (ZOSYN) 3.375 g in iso-osmotic dextrose 50 ml (premix) (0 g Intravenous Stopped 5/7/20 0041)       Imaging results:  Ct Abdomen Pelvis Without Contrast    Result Date: 5/7/2020   1. Urinary bladder does appear distended, suggesting urinary retention. There is also some trace perivesical soft tissue stranding. Correlation with urinalysis and urine cultures is suggested. 2. Trace right pleural effusion and pericardial effusion.  Radiation dose reduction techniques were  utilized, including automated exposure control and exposure modulation based on body size.  This report was finalized on 2020 1:14 AM by Dr. Carlotta Gill M.D.      Xr Chest Ap    Result Date: 2020  No acute findings.  This report was finalized on 2020 11:01 PM by Dr. Carlotta Gill M.D.        Ambulatory status:   - UP with assist. Pt uses a cane at home.  Social issues:   Social History     Socioeconomic History   • Marital status: Single     Spouse name: Not on file   • Number of children: 3   • Years of education: Not on file   • Highest education level: Not on file   Occupational History   • Occupation: retired from [employer]   Tobacco Use   • Smoking status: Former Smoker     Packs/day: 0.25     Years: 2.00     Pack years: 0.50     Types: Cigarettes     Last attempt to quit: 1970     Years since quittin.3   • Smokeless tobacco: Never Used   • Tobacco comment: LIGHT USAGE   Substance and Sexual Activity   • Alcohol use: No     Comment: CAFFEINE USE: 10 -12 CUPS OF COFFEE DAILY.    • Drug use: No   • Sexual activity: Nati Hebert, RN  20 0250

## 2020-05-07 NOTE — PROGRESS NOTES
"Pharmacokinetic Consult - Vancomycin Dosing (Initial Note)    Denisse Chavez is a 74 y.o. female 162.6 cm (64\") 102 kg (225 lb)   Pharmacy consulted to dose vancomycin for Sepsis.  Pharmacy dosing vancomycin per Kitty HARPER's request.   Goal trough: 15 - 20 mcg/mL   Additional Abx Therapy: Ceftriaxone 2 g IV q24h    Anti-Infectives (From admission, onward)    Ordered     Dose/Rate Route Frequency Start Stop    05/07/20 0251  cefTRIAXone (ROCEPHIN) IVPB 2 g     Ordering Provider:  Kitty Bone APRN    2 g  100 mL/hr over 30 Minutes Intravenous Every 24 Hours 05/07/20 0253 05/12/20 0252    05/07/20 0221  Pharmacy to dose vancomycin     Ordering Provider:  Kitty Bone APRN     Does not apply Continuous PRN 05/07/20 0221 05/12/20 0220    05/06/20 2352  vancomycin 2000 mg/500 mL 0.9% NS IVPB ([facility])     Ordering Provider:  Ascencion Villafana MD    20 mg/kg × 102 kg Intravenous Once 05/06/20 2354 05/07/20 0111    05/06/20 2352  piperacillin-tazobactam (ZOSYN) 3.375 g in iso-osmotic dextrose 50 ml (premix)     Ordering Provider:  Ascencion Villafana MD    3.375 g Intravenous Once 05/06/20 2354 05/07/20 0041           Relevant clinical data and objective history reviewed:  Pt PMH significant for T2DM, HLD, CAD, CKD stage III, presented with fever and vomiting.   Creatinine   Date Value Ref Range Status   05/06/2020 1.13 (H) 0.57 - 1.00 mg/dL Final     Estimated Creatinine Clearance: 50.7 mL/min (A) (by C-G formula based on SCr of 1.13 mg/dL (H)).    WBC   Date Value Ref Range Status   05/06/2020 21.19 (H) 3.40 - 10.80 10*3/mm3 Final     Temp Readings from Last 1 Encounters:   05/07/20 99.9 °F (37.7 °C) (Oral)       Baseline culture/source/susceptibility:   5/6 COVID19 Negative  5/6 Blood Cx (2/2): NGTD     Radiology  5/7 CT Abdomen Pelvis w/o contrast: \"Urinary bladder does appear distended, suggesting urinary retention. There is also some trace perivesical soft tissue stranding. Correlation with " "urinalysis and urine cultures is suggested.\"     Lab Results   Component Value Date    ASHKAN 13.40 07/17/2018     No results found for: JAN    Assessment/Plan  Given the patient's vancomycin dose of 2000 mg received at 0111, SCr slightly elevated from baseline, previous trough of ~13.2 when last dosed in 2018 (SCr 0.8 with 2018 dosing), will start vancomycin 1000 mg IV Q12H (9.8 mg/kg).  Vancomycin level due on 5/9 @1230.  Will monitor serum creatinine and vancomycin levels and adjust as needed.Thank you Kitty HARPER for this consult.     Carlitos Moody, PharmD, BCPS  05/07/20 03:02    "

## 2020-05-07 NOTE — ED NOTES
"Pt to ED with c/o fever and vomiting x 1 this evening.  Pts son reports fever started about an hour ago, pt hasn't hasnt tylenol. Pts son states, \"normally this happens when she has cellulitis, but I have noticed an odor with her urine.\" Pt A&Ox3, normally A&ox4.  Pt wearing mask at triage, this RN wearing mask during triage.     Ivonne Villeda, RN  05/06/20 2403    "

## 2020-05-07 NOTE — PLAN OF CARE
Problem: Patient Care Overview  Goal: Plan of Care Review  Flowsheets (Taken 5/7/2020 1322)  Outcome Summary: OT assessmenht this morning. Pt alert and oriented x4. Pleasant and cooperative. Good historian.  Pt independent with ADLs prior to admission. she is currently only limited by lines  and needing supervision for safety in this setting.  No needs identified so OT will sign off.  Pt is in agreement with this plan.  If needs change please reorder. OT wore mask and gloves in pt. Assessment and performed hand hygiene entering /exiting room.

## 2020-05-07 NOTE — PROGRESS NOTES
"DAILY PROGRESS NOTE  [facility]    Patient Identification:  Name: Denisse Chavez  Age: 74 y.o.  Sex: female  :  1945  MRN: [id number]         Primary Care Physician: Surekha Mcdonnell MD    Subjective:  Interval History: She is feeling better today.    Objective:    Scheduled Meds:  amLODIPine 10 mg Oral Daily   aspirin 81 mg Oral Daily   atorvastatin 20 mg Oral Daily   cefTRIAXone 2 g Intravenous Q24H   docusate sodium 100 mg Oral BID   furosemide 40 mg Oral Daily   insulin lispro 0-9 Units Subcutaneous TID AC   insulin NPH 26 Units Subcutaneous QAM   isosorbide mononitrate 60 mg Oral Daily   metoprolol succinate XL 50 mg Oral Daily   nystatin  Topical Q12H   vancomycin 1,000 mg Intravenous Q12H     Continuous Infusions:  Pharmacy to dose vancomycin     sodium chloride 100 mL/hr Last Rate: 100 mL/hr (20 0509)       Vital signs in last 24 hours:  Temp:  [98.9 °F (37.2 °C)-104.3 °F (40.2 °C)] 98.9 °F (37.2 °C)  Heart Rate:  [81-99] 99  Resp:  [16-19] 16  BP: (167-195)/(63-81) 167/73    Intake/Output:    Intake/Output Summary (Last 24 hours) at 2020 1319  Last data filed at 2020 0900  Gross per 24 hour   Intake 1290 ml   Output 100 ml   Net 1190 ml       Exam:  /73 (BP Location: Left arm, Patient Position: Lying)   Pulse 99   Temp 98.9 °F (37.2 °C) (Oral)   Resp 16   Ht 162.6 cm (64\")   Wt 105 kg (231 lb 11.2 oz)   SpO2 97%   BMI 39.77 kg/m²     General Appearance:    Alert, cooperative, no distress   Head:    Normocephalic, without obvious abnormality, atraumatic   Eyes:       Throat:   Lips, tongue, gums normal   Neck:   Supple, symmetrical, trachea midline, no JVD   Lungs:     Clear to auscultation bilaterally, respirations unlabored   Chest Wall:    No tenderness or deformity    Heart:    Regular rate and rhythm, S1 and S2 normal, no murmur,no  Rub or gallop   Abdomen:     Soft, non-tender, bowel sounds active, no masses, no organomegaly    Extremities:   " Extremities normal, atraumatic, no cyanosis or edema   Pulses:      Skin:   Skin is warm and dry,  no rashes or palpable lesions   Neurologic:   no focal deficits noted      Lab Results (last 72 hours)     Procedure Component Value Units Date/Time    POC Glucose Once [[id number]]  (Abnormal) Collected:  05/07/20 1048    Specimen:  Blood Updated:  05/07/20 1050     Glucose 292 mg/dL     Basic Metabolic Panel [[id number]]  (Abnormal) Collected:  05/07/20 0524    Specimen:  Blood from Finger Updated:  05/07/20 0639     Glucose 238 mg/dL      BUN 21 mg/dL      Creatinine 1.07 mg/dL      Sodium 137 mmol/L      Potassium 5.1 mmol/L      Chloride 103 mmol/L      CO2 14.6 mmol/L      Calcium 9.1 mg/dL      eGFR Non African Amer 50 mL/min/1.73      BUN/Creatinine Ratio 19.6     Anion Gap 19.4 mmol/L     Narrative:       GFR Normal >60  Chronic Kidney Disease <60  Kidney Failure <15      CBC & Differential [[id number]] Collected:  05/07/20 0524    Specimen:  Blood from Finger Updated:  05/07/20 0630    Narrative:       The following orders were created for panel order CBC & Differential.  Procedure                               Abnormality         Status                     ---------                               -----------         ------                     CBC Auto Differential[[id number]]        Abnormal            Final result                 Please view results for these tests on the individual orders.    CBC Auto Differential [[id number]]  (Abnormal) Collected:  05/07/20 0524    Specimen:  Blood from Finger Updated:  05/07/20 0630     WBC 25.22 10*3/mm3      RBC 4.66 10*6/mm3      Hemoglobin 13.2 g/dL      Hematocrit 38.8 %      MCV 83.3 fL      MCH 28.3 pg      MCHC 34.0 g/dL      RDW 14.1 %      RDW-SD 42.8 fl      MPV 11.4 fL      Platelets 216 10*3/mm3      Neutrophil % 89.8 %      Lymphocyte % 3.5 %      Monocyte % 5.1 %      Eosinophil % 0.0 %      Basophil % 0.4 %      Immature Grans % 1.2 %      Neutrophils,  Absolute 22.66 10*3/mm3      Lymphocytes, Absolute 0.88 10*3/mm3      Monocytes, Absolute 1.28 10*3/mm3      Eosinophils, Absolute 0.00 10*3/mm3      Basophils, Absolute 0.09 10*3/mm3      Immature Grans, Absolute 0.31 10*3/mm3      nRBC 0.0 /100 WBC     POC Glucose Once [[id number]]  (Abnormal) Collected:  05/07/20 0613    Specimen:  Blood Updated:  05/07/20 0615     Glucose 246 mg/dL     SARS-CoV-2 PCR ([facility] IN-HOUSE PERFORMED), NP SWAB IN TRANSPORT MEDIA - Swab, Nasopharynx [[id number]]  (Normal) Collected:  05/06/20 2241    Specimen:  Swab from Nasopharynx Updated:  05/07/20 0049     COVID19 Not Detected    Blood Culture - Blood, Arm, Right [[id number]] Collected:  05/07/20 0008    Specimen:  Blood from Arm, Right Updated:  05/07/20 0022    Respiratory Panel, PCR - Swab, Nasopharynx [[id number]]  (Normal) Collected:  05/06/20 2241    Specimen:  Swab from Nasopharynx Updated:  05/06/20 2355     ADENOVIRUS, PCR Not Detected     Coronavirus 229E Not Detected     Coronavirus HKU1 Not Detected     Coronavirus NL63 Not Detected     Coronavirus OC43 Not Detected     Human Metapneumovirus Not Detected     Human Rhinovirus/Enterovirus Not Detected     Influenza B PCR Not Detected     Parainfluenza Virus 1 Not Detected     Parainfluenza Virus 2 Not Detected     Parainfluenza Virus 3 Not Detected     Parainfluenza Virus 4 Not Detected     Bordetella pertussis pcr Not Detected     Influenza A H1 2009 PCR Not Detected     Chlamydophila pneumoniae PCR Not Detected     Mycoplasma pneumo by PCR Not Detected     Influenza A PCR Not Detected     Influenza A H3 Not Detected     Influenza A H1 Not Detected     RSV, PCR Not Detected     Bordetella parapertussis PCR Not Detected    Narrative:       The coronavirus on the RVP is NOT COVID-19 and is NOT indicative of infection with COVID-19.     [place] Draw [[id number]] Collected:  05/06/20 2211    Specimen:  Blood Updated:  05/06/20 2315    Narrative:       The following orders were  created for panel order [place] Draw.  Procedure                               Abnormality         Status                     ---------                               -----------         ------                     Light Blue Top[[id number]]                                   Final result               Green Top (Gel)[[id number]]                                  Final result               Lavender Top[[id number]]                                     Final result               Gold Top - SST[[id number]]                                   Final result                 Please view results for these tests on the individual orders.    Light Blue Top [[id number]] Collected:  05/06/20 2211    Specimen:  Blood Updated:  05/06/20 2315     Extra Tube hold for add-on     Comment: Auto resulted       Green Top (Gel) [[id number]] Collected:  05/06/20 2211    Specimen:  Blood Updated:  05/06/20 2315     Extra Tube Hold for add-ons.     Comment: Auto resulted.       Lavender Top [[id number]] Collected:  05/06/20 2211    Specimen:  Blood Updated:  05/06/20 2315     Extra Tube hold for add-on     Comment: Auto resulted       Gold Top - SST [[id number]] Collected:  05/06/20 2211    Specimen:  Blood Updated:  05/06/20 2315     Extra Tube Hold for add-ons.     Comment: Auto resulted.       Urinalysis, Microscopic Only - Urine, Catheter [[id number]]  (Abnormal) Collected:  05/06/20 2248    Specimen:  Urine, Catheter Updated:  05/06/20 2313     RBC, UA 3-5 /HPF      WBC, UA 0-2 /HPF      Bacteria, UA None Seen /HPF      Squamous Epithelial Cells, UA 3-6 /HPF      Hyaline Casts, UA 3-6 /LPF      Methodology Manual Light Microscopy    Urinalysis With Microscopic If Indicated (No Culture) - Urine, Catheter [[id number]]  (Abnormal) Collected:  05/06/20 2248    Specimen:  Urine, Catheter Updated:  05/06/20 2259     Color, UA Yellow     Appearance, UA Clear     pH, UA 8.0     Specific Gravity, UA 1.021     Glucose, UA Negative     Ketones, UA Negative      Bilirubin, UA Negative     Blood, UA Moderate (2+)     Protein, UA >=300 mg/dL (3+)     Leuk Esterase, UA Trace     Nitrite, UA Negative     Urobilinogen, UA 1.0 E.U./dL    CBC & Differential [[id number]] Collected:  05/06/20 2211    Specimen:  Blood Updated:  05/06/20 2259    Narrative:       The following orders were created for panel order CBC & Differential.  Procedure                               Abnormality         Status                     ---------                               -----------         ------                     CBC Auto Differential[[id number]]        Abnormal            Final result                 Please view results for these tests on the individual orders.    CBC Auto Differential [[id number]]  (Abnormal) Collected:  05/06/20 2211    Specimen:  Blood Updated:  05/06/20 2259     WBC 21.19 10*3/mm3      RBC 4.85 10*6/mm3      Hemoglobin 13.6 g/dL      Hematocrit 40.5 %      MCV 83.5 fL      MCH 28.0 pg      MCHC 33.6 g/dL      RDW 13.5 %      RDW-SD 40.8 fl      MPV 10.7 fL      Platelets 251 10*3/mm3      Neutrophil % 90.9 %      Lymphocyte % 3.6 %      Monocyte % 4.3 %      Eosinophil % 0.2 %      Basophil % 0.2 %      Immature Grans % 0.8 %      Neutrophils, Absolute 19.27 10*3/mm3      Lymphocytes, Absolute 0.76 10*3/mm3      Monocytes, Absolute 0.92 10*3/mm3      Eosinophils, Absolute 0.04 10*3/mm3      Basophils, Absolute 0.04 10*3/mm3      Immature Grans, Absolute 0.16 10*3/mm3      nRBC 0.1 /100 WBC     Procalcitonin [[id number]]  (Abnormal) Collected:  05/06/20 2211    Specimen:  Blood Updated:  05/06/20 2257     Procalcitonin 0.38 ng/mL     Narrative:       As a Marker for Sepsis (Non-Neonates):   1. <0.5 ng/mL represents a low risk of severe sepsis and/or septic shock.  1. >2 ng/mL represents a high risk of severe sepsis and/or septic shock.    As a Marker for Lower Respiratory Tract Infections that require antibiotic therapy:  PCT on Admission     Antibiotic Therapy             6-12  "Hrs later  > 0.5                Strongly Recommended            >0.25 - <0.5         Recommended  0.1 - 0.25           Discouraged                   Remeasure/reassess PCT  <0.1                 Strongly Discouraged          Remeasure/reassess PCT      As 28 day mortality risk marker: \"Change in Procalcitonin Result\" (> 80 % or <=80 %) if Day 0 (or Day 1) and Day 4 values are available. Refer to [email or web address]   Change in PCT <=80 %   A decrease of PCT levels below or equal to 80 % defines a positive change in PCT test result representing a higher risk for 28-day all-cause mortality of patients diagnosed with severe sepsis or septic shock.  Change in PCT > 80 %   A decrease of PCT levels of more than 80 % defines a negative change in PCT result representing a lower risk for 28-day all-cause mortality of patients diagnosed with severe sepsis or septic shock.                Results may be falsely decreased if patient taking Biotin.     Comprehensive Metabolic Panel [[id number]]  (Abnormal) Collected:  05/06/20 2211    Specimen:  Blood Updated:  05/06/20 2252     Glucose 153 mg/dL      BUN 21 mg/dL      Creatinine 1.13 mg/dL      Sodium 136 mmol/L      Potassium 4.0 mmol/L      Chloride 97 mmol/L      CO2 24.0 mmol/L      Calcium 10.0 mg/dL      Total Protein 7.6 g/dL      Albumin 4.10 g/dL      ALT (SGPT) 14 U/L      AST (SGOT) 25 U/L      Alkaline Phosphatase 79 U/L      Total Bilirubin 0.7 mg/dL      eGFR Non African Amer 47 mL/min/1.73      Globulin 3.5 gm/dL      A/G Ratio 1.2 g/dL      BUN/Creatinine Ratio 18.6     Anion Gap 15.0 mmol/L     Narrative:       GFR Normal >60  Chronic Kidney Disease <60  Kidney Failure <15      Lactate Dehydrogenase [[id number]]  (Abnormal) Collected:  05/06/20 2211    Specimen:  Blood Updated:  05/06/20 2252      U/L     C-reactive Protein [[id number]]  (Abnormal) Collected:  05/06/20 2211    Specimen:  Blood Updated:  05/06/20 2252     C-Reactive Protein " 2.35 mg/dL     Lipase [[id number]]  (Abnormal) Collected:  05/06/20 2211    Specimen:  Blood Updated:  05/06/20 2252     Lipase 12 U/L     Lactic Acid, Plasma [[id number]]  (Normal) Collected:  05/06/20 2211    Specimen:  Blood Updated:  05/06/20 2237     Lactate 1.9 mmol/L     Blood Culture - Blood, Blood, Venous Line [[id number]] Collected:  05/06/20 2211    Specimen:  Blood, Venous Line Updated:  05/06/20 2220    POC Glucose Once [[id number]]  (Abnormal) Collected:  05/06/20 2153    Specimen:  Blood Updated:  05/06/20 2155     Glucose 154 mg/dL         Data Review:  Results from last 7 days   Lab Units 05/07/20  0524 05/06/20 2211   SODIUM mmol/L 137 136   POTASSIUM mmol/L 5.1 4.0   CHLORIDE mmol/L 103 97*   CO2 mmol/L 14.6* 24.0   BUN mg/dL 21 21   CREATININE mg/dL 1.07* 1.13*   GLUCOSE mg/dL 238* 153*   CALCIUM mg/dL 9.1 10.0     Results from last 7 days   Lab Units 05/07/20 0524 05/06/20 2211   WBC 10*3/mm3 25.22* 21.19*   HEMOGLOBIN g/dL 13.2 13.6   HEMATOCRIT % 38.8 40.5   PLATELETS 10*3/mm3 216 251             No results found for: TROPONINT      Results from last 7 days   Lab Units 05/06/20 2211   ALK PHOS U/L 79   BILIRUBIN mg/dL 0.7   ALT (SGPT) U/L 14   AST (SGOT) U/L 25             Glucose   Date/Time Value Ref Range Status   05/07/2020 1048 292 (H) 70 - 130 mg/dL Final   05/07/2020 0613 246 (H) 70 - 130 mg/dL Final   05/06/2020 2153 154 (H) 70 - 130 mg/dL Final           Past Medical History:   Diagnosis Date   • Angiomyolipoma of kidney 3/30/2016   • Aortic valve sclerosis    • CAD (coronary artery disease)     MI in 1996, treated medically.  PET 6/2016 with small-medium sized lateral infarct, no ischemia.  This wall motion abnormality was seen on echo as well.   • Cellulitis 07/2018    RIGHT LEG   • Chronic kidney disease, stage III (moderate) (CMS/HCC) 10/13/2016   • Chronic venous insufficiency    • Heart murmur    • Hyperlipidemia    • Hypertension    • Low back pain    • Mass of ovary 3/30/2016     • Morbid obesity (CMS/[facility])    • Obesity (BMI 30-39.9) 12/22/2019   • Sleep apnea    • Spinal stenosis    • Type 2 diabetes mellitus (CMS/[facility])    • Uterine leiomyoma 3/30/2016       Assessment:  Active Hospital Problems    Diagnosis  POA   • **Sepsis (CMS/[facility]) [A41.9]  Yes   • Obesity (BMI 30-39.9) [E66.9]  Yes   • Heart murmur [R01.1]  Yes   • Chronic venous insufficiency [I87.2]  Yes   • CAD (coronary artery disease) [I25.10]  Yes   • Chronic kidney disease, stage III (moderate) (CMS/[facility]) [N18.3]  Yes   • Obstructive sleep apnea on autoCPAP [G47.30]  Yes   • 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       Plan:  Continue with IV antibiotics and await results of cultures.  Infectious disease consult noted.  We will get some follow-up lab studies.    Maikel Ladd MD  5/7/2020  13:19

## 2020-05-07 NOTE — NURSING NOTE
Pt reports she started not feeling well today around 1700. She vomited but pt denies pain. Pt reports she is still nauseated. Pt denies cough but her fever is elevated. Pt is an insulin diabetic. Pt reports she is SOA. She has not had any medications for her fever.  Blood sugar is 154 at this time.

## 2020-05-07 NOTE — CONSULTS
Referring Provider: Henry Lorenzo MD  Reason for Consultation:   Chief Complaint   Patient presents with   • Fever   • Vomiting         Subjective   History of present illness:   This very nice 74-year-old we are asked for evaluation opinion regarding sepsis.    Per review the past medical record, the patient was previously omitted to [facility] in December 2019 for emphysematous cystitis.  Blood cultures grew Streptococcus and her urine culture grew E. coli.  There was concern for potential right lower extremity cellulitis as well.  In July 2018 she was admitted for right lower extremity cellulitis    Patient reports that she was in her usual state of health until 5/6/2020 when after eating breakfast she developed hypoglycemia.  Her son and noticed her hypoglycemic and tried to provide her some orange juice but she vomited this and then became febrile to 104.  Her neighbor is a physician who advised that she come to the [facility] emergency department.  Here she was found have a white count over 20,000 and a basically negative CT abdomen pelvis except for some mild distention in her bladder.  She was started on vancomycin and Zosyn and admitted.  She had a second episode of nausea and vomiting while in the emergency department but is since done okay.  She feels fatigued but denies any cardiopulmonary symptoms, urinary symptoms, diarrhea, rashes, arthralgias or sinus symptoms    Past medical history: Hyperlipidemia, hypertension, obesity, diabetes mellitus type 2, coronary artery disease, cataract, epidural block, hernia repair, hysterectomy, tonsillectomy  No family history infectious diseases  Allergies: Patient reports allergies to Keflex which causes rash which he tolerated ceftriaxone in December 2019 as well as Zosyn this admission.  She also has a history of sulfa which caused rash.  Social history: She lives with her son here in [place], [place].  Non-smoker.  She is retired  Patient seen and examined at bedside.     Does not report dyspnea or leg swelling at present   Overnight the primary team had required 2 aliquots of 250cc NS boluses in light of ongoing hypotension     His interdialytic fluid gain has not been significant as patient is not able to eat at present or take PO intake due to dysphagia.     Last Hd was on 2/20  72.2kg bedscale to 70.2kg bedscale  /53 to 115/58     Review of CXR shows bibasilar infiltrates and echocardiogram implies a normal right atrial pressure based on IVC measurements.     fluticasone / salmeterol 250-50 MICROgram(s) Diskus 1Dose(s) two times a day  aspirin enteric coated 81milliGRAM(s) daily  docusate sodium 100milliGRAM(s) daily  heparin  Injectable 5000Unit(s) every 8 hours  acetaminophen   Tablet 650milliGRAM(s) every 6 hours PRN  acetaminophen  Suppository 650milliGRAM(s) every 6 hours PRN  folic acid 1milliGRAM(s) daily  artificial  tears Solution 1Drop(s) every 6 hours  mirtazapine 15milliGRAM(s) at bedtime  gabapentin 100milliGRAM(s) two times a day  metoclopramide 10milliGRAM(s) three times a day with meals  simvastatin 20milliGRAM(s) at bedtime  pantoprazole    Tablet 40milliGRAM(s) before breakfast  piperacillin/tazobactam IVPB. 2.25Gram(s) every 12 hours  vancomycin  IVPB 1000milliGRAM(s) once  ALBUTerol/ipratropium for Nebulization 3milliLiter(s) every 4 hours  levETIRAcetam  IVPB 250milliGRAM(s) every 12 hours  levETIRAcetam  IVPB 250milliGRAM(s) <User Schedule>  insulin lispro (HumaLOG) corrective regimen sliding scale  Before meals and at bedtime  midodrine 5milliGRAM(s) every 8 hours  acetylcysteine 20% Inhalation 5milliLiter(s) every 4 hours  heparin -  Bolus    epoetin nicki Injectable 7000Unit(s) once      Allergies    clonidine (Unknown)    Intolerances        T(C): , Max: 38 (02-22 @ 07:15)  T(F): , Max: 100.4 (02-22 @ 07:15)  HR: 85  BP: 98/48  BP(mean): 62  RR: 11  SpO2: 93%  Wt(kg): --    I & Os for current day (as of 02-22 @ 09:48)  =============================================  IN:    Sodium Chloride 0.9% IV Bolus: 500 ml    IV PiggyBack: 400 ml    Solution: 250 ml    norepinephrine Infusion: 9.2 ml    Total IN: 1159.2 ml  ---------------------------------------------  OUT:    Total OUT: 0 ml  ---------------------------------------------  Total NET: 1159.2 ml          LABS:                        7.1    11.0  )-----------( 254      ( 22 Feb 2017 05:02 )             20.8     22 Feb 2017 05:02    140    |  102    |  47     ----------------------------<  98     3.4     |  25     |  6.54     Ca    8.2        22 Feb 2017 05:02  Phos  3.0       22 Feb 2017 05:02  Mg     1.8       22 Feb 2017 05:02    TPro  6.5    /  Alb  2.1    /  TBili  0.7    /  DBili  x      /  AST  9      /  ALT  7      /  AlkPhos  79     22 Feb 2017 05:02      PT/INR - ( 21 Feb 2017 05:52 )   PT: 19.3 sec;   INR: 1.73                    RADIOLOGY & ADDITIONAL STUDIES: from [facility]    Review of Systems  Pertinent items are noted in HPI, all other systems reviewed and negative    Objective     Physical Exam:   Vital Signs   Temp:  [98.9 °F (37.2 °C)-104.3 °F (40.2 °C)] 98.9 °F (37.2 °C)  Heart Rate:  [81-99] 99  Resp:  [16-19] 16  BP: (167-195)/(63-81) 167/73    GENERAL: Awake and alert, in no acute distress.   HEENT: Oropharynx is clear. Hearing is grossly normal.   EYES: PERRL. No conjunctival injection. No lid lag.   LYMPHATICS: No lymphadenopathy of the neck or inguinal regions.   HEART: Regular rate and regular rhythm.  2+ lymphedema/lower extremity peripheral edema.   LUNGS: Clear to auscultation anteriorly with normal respiratory effort.   GI: Soft, nontender, nondistended. No appreciable organomegaly.   SKIN: Warm and dry without cutaneous eruptions   PSYCHIATRIC: Appropriate mood, affect, insight, and judgment.     Results Review:  Procalcitonin 0.38  CRP 2.35  Creatinine 1.07  Liver function test normal  White count 25.22  Differential is 89% neutrophils, 3% lymphocytes, 5% monocytes  Hemoglobin 13  Platelets 216     Microbiology:  5/6/2020 blood cultures 2/2 no growth to date  5/6/2020 RPP and COVID-19 negative    Radiology:  CT of the abdomen pelvis shows distended urinary bladder with trace perivesicular wall stranding trace right effusion  Chest x-ray independently interpreted no acute PNA    Assessment/Plan   1.  Sepsis  2.  Diabetes mellitus type 2, continue glycemic control efforts to prevent/control infectious complications       Agree with vancomycin and ceftriaxone to cover MRSA as well as commonly acquired community organisms.  I suspect she might had a transient sepsis or occult bacteremia.  Her blood cultures will be very informative in this regard.  Occasionally cellulitis from streptococci can present with unexplained fever and then the area of cellulitis becomes apparent over the next 24 to 48 hours.  We will have to keep a close eye on her lower extremities  that she does have some lymphedema which she reports is unchanged.  I will repeat her procalcitonin and closely follow this.    Thank you for this consult.  We will continue to follow along and tailor antibiotics as the patient's clinical course evolves.      Jese Piña MD  05/07/20  9:17 AM

## 2020-05-07 NOTE — H&P
"    Patient Name:  Denisse Chavez  YOB: 1945  MRN:  [id number]  Admit Date:  5/6/2020  Patient Care Team:  Surekha Mcdonnell MD as PCP - General (Internal Medicine)  Wally Craft MD as PCP - Claims Attributed  Wally Craft MD as Consulting Physician (Endocrinology)  Warner Tang MD as Consulting Physician (Cardiology)  Sergio Eagle MD as Consulting Physician (Urology)      Subjective   History Present Illness     Chief Complaint   Patient presents with   • Fever   • Vomiting     History of Present Illness   Ms. Chavez is a 74-year-old female with history of sleep apnea, diabetes type 2, hyperlipidemia, hypertension, CKD stage III, chronic venous insufficiency, CAD, obesity who presents to the emergency room with fever.  Patient states that today her fever was 104 at home, she had one episode of vomiting after eating some cookies and drinks orange juice due to low blood sugar.  She states she is not been feeling ill in the fever hit her all of a sudden this afternoon.  She does state at that time she felt a little \"disoriented\" and could not think clearly.  She denies any urinary frequency or burning with urination, she has no shortness of breath, no chest pain, no headache.  She has not been around any ill contacts recently.  She says she has not had a fever in size since she was a child, she was hospitalized in December 2019 for UTI and sepsis, but has not had any symptoms or problems since.  She has not been on any recent antibiotics for anything.  In the emergency room 153, , CRP 2.35, lactate 1.9, procalcitonin 0.38.  COVID-19 test was negative, low suspicion.  Potassium 4.0, sodium 136, creatinine 1.13, GFR 47.  Temp was 104.3 initially, Tylenol given down to 99.9.  White blood cell count was 21.19.  Hemoglobin 13.6, hematocrit 40.5, platelets 251.  Patient was given dose of vancomycin and Zosyn for fever of unknown origin at this time.  Blood cultures " "were sent, urinalysis was unremarkable culture was sent.    Review of Systems   Constitutional: Positive for fever (104 at home). Negative for appetite change.   HENT: Negative for nosebleeds and trouble swallowing.    Eyes: Negative for photophobia, redness and visual disturbance.   Respiratory: Negative for cough, chest tightness, shortness of breath and wheezing.    Cardiovascular: Negative for chest pain, palpitations and leg swelling.   Gastrointestinal: Positive for vomiting (once ). Negative for abdominal distention, abdominal pain and nausea.   Endocrine: Negative.    Genitourinary: Negative.    Musculoskeletal: Negative for gait problem and joint swelling.   Skin: Negative.    Neurological: Negative for dizziness, seizures, speech difficulty, light-headedness and headaches.        Mild \"disorientation\"    Hematological: Negative.    Psychiatric/Behavioral: Negative for behavioral problems and confusion.        Personal History     Past Medical History:   Diagnosis Date   • Angiomyolipoma of kidney 3/30/2016   • Aortic valve sclerosis    • CAD (coronary artery disease)     MI in 1996, treated medically.  PET 6/2016 with small-medium sized lateral infarct, no ischemia.  This wall motion abnormality was seen on echo as well.   • Cellulitis 07/2018    RIGHT LEG   • Chronic kidney disease, stage III (moderate) (CMS/HCC) 10/13/2016   • Chronic venous insufficiency    • Heart murmur    • Hyperlipidemia    • Hypertension    • Low back pain    • Mass of ovary 3/30/2016   • Morbid obesity (CMS/HCC)    • Obesity (BMI 30-39.9) 12/22/2019   • Sleep apnea    • Spinal stenosis    • Type 2 diabetes mellitus (CMS/HCC)    • Uterine leiomyoma 3/30/2016     Past Surgical History:   Procedure Laterality Date   • CATARACT EXTRACTION      X2    • COLONOSCOPY N/A 8/29/2018    Procedure: COLONOSCOPY to CECUM WITH HOT SNARE POLYPECTOMY;  Surgeon: Neal Reilly MD;  Location: [facility] ENDOSCOPY;  Service: Gastroenterology   • " EPIDURAL BLOCK     • HERNIA REPAIR     • HYSTERECTOMY     • TONSILLECTOMY       Family History   Problem Relation Age of Onset   • Diabetes Mother    • Heart attack Mother    • Hypertension Mother    • Hypothyroidism Mother    • Diabetes type II Son    • Breast cancer Neg Hx      Social History     Tobacco Use   • Smoking status: Former Smoker     Packs/day: 0.25     Years: 2.00     Pack years: 0.50     Types: Cigarettes     Last attempt to quit: 1970     Years since quittin.3   • Smokeless tobacco: Never Used   • Tobacco comment: LIGHT USAGE   Substance Use Topics   • Alcohol use: No     Comment: CAFFEINE USE: 10 -12 CUPS OF COFFEE DAILY.    • Drug use: No     No current facility-administered medications on file prior to encounter.      Current Outpatient Medications on File Prior to Encounter   Medication Sig Dispense Refill   • acetaminophen-codeine (TYLENOL #3) 300-30 MG per tablet Take 1 tablet by mouth 3 (Three) Times a Day As Needed for Moderate Pain . 90 tablet 2   • amLODIPine (NORVASC) 10 MG tablet TAKE 1 TABLET BY MOUTH ONCE DAILY 90 tablet 3   • aspirin 81 MG tablet Take  by mouth Every Night.     • docusate sodium (COLACE) 100 MG capsule Take 100 mg by mouth 2 (two) times a day.     • furosemide (LASIX) 40 MG tablet Take 1 tablet by mouth once daily 90 tablet 3   • hydrOXYzine (ATARAX) 25 MG tablet TAKE 1 TABLET BY MOUTH AT NIGHT AS NEEDED FOR INSOMNIA 90 tablet 0   • insulin NPH (humuLIN N,novoLIN N) 100 UNIT/ML injection 46 units in the morning and 26 units in the evening subcu daily 30 mL 5   • insulin regular (humuLIN R,novoLIN R) 100 UNIT/ML injection 34 units before breakfast, 14 units before lunch, 26 units before dinner subcu daily 30 mL 5   • isosorbide mononitrate (IMDUR) 60 MG 24 hr tablet Take 1 tablet by mouth Daily. 90 tablet 1   • metoprolol succinate XL (TOPROL-XL) 50 MG 24 hr tablet Take 1 tablet by mouth Daily. 90 tablet 3   • simvastatin (ZOCOR) 40 MG tablet Take 0.5 tablets by  mouth Daily. 45 tablet 3   • vitamin D (ERGOCALCIFEROL) 31255 units capsule capsule 50,000 Units Every 30 (Thirty) Days.     • Misc. Devices (ROLLATOR) misc 1 Units as needed (for walking). 1 each 0     Allergies   Allergen Reactions   • Ace Inhibitors Other (See Comments)     Hyperkalemia/ROB   • Angiotensin Receptor Blockers Other (See Comments)     Pt unable to remember   • Keflex [Cephalexin] Rash     Tolerated ceftriaxone Dec 2019; tolerated zosyn May 2020   • Sulfa Antibiotics Itching     rash       Objective    Objective     Vital Signs  Temp:  [99.9 °F (37.7 °C)-104.3 °F (40.2 °C)] 99.9 °F (37.7 °C)  Heart Rate:  [81-95] 89  Resp:  [18-19] 19  BP: (172-195)/(63-81) 172/63  SpO2:  [91 %-98 %] 96 %  on   ;      Body mass index is 38.62 kg/m².    Physical Exam   Constitutional: She is oriented to person, place, and time. She appears well-developed and well-nourished. No distress.   HENT:   Head: Normocephalic.   Eyes: EOM are normal.   Neck: Normal range of motion. No JVD present.   Cardiovascular: Normal rate and regular rhythm.   Murmur heard.  Patient appears to be normal sinus rhythm on monitor, rate 86.  Some mild bilateral lower extremity edema, which is baseline.   Pulmonary/Chest: Effort normal and breath sounds normal.   Lung sounds clear, sats 97% on room air  Exam, no shortness of breath.   Abdominal: Soft. She exhibits no distension. There is no tenderness.   Musculoskeletal: Normal range of motion.   Neurological: She is alert and oriented to person, place, and time. She has normal strength.   No focal neuro deficits.  Patient alert and oriented x4.  No headache or light sensitivity.   Skin: Skin is warm and dry. Capillary refill takes less than 2 seconds.   Venous stasis color changes in legs, no redness no warmth to touch.   Psychiatric: She has a normal mood and affect. Her speech is normal and behavior is normal. Judgment and thought content normal. Cognition and memory are normal.   Nursing  note and vitals reviewed.      Results Review:  I reviewed the patient's new clinical results.  I reviewed the patient's new imaging results and agree with the interpretation.  I reviewed the patient's other test results and agree with the interpretation  I personally viewed and interpreted the patient's EKG/Telemetry data  Discussed with ED provider.    Lab Results (last 24 hours)     Procedure Component Value Units Date/Time    POC Glucose Once [[id number]]  (Abnormal) Collected:  05/06/20 2153    Specimen:  Blood Updated:  05/06/20 2155     Glucose 154 mg/dL     Comprehensive Metabolic Panel [[id number]]  (Abnormal) Collected:  05/06/20 2211    Specimen:  Blood Updated:  05/06/20 2252     Glucose 153 mg/dL      BUN 21 mg/dL      Creatinine 1.13 mg/dL      Sodium 136 mmol/L      Potassium 4.0 mmol/L      Chloride 97 mmol/L      CO2 24.0 mmol/L      Calcium 10.0 mg/dL      Total Protein 7.6 g/dL      Albumin 4.10 g/dL      ALT (SGPT) 14 U/L      AST (SGOT) 25 U/L      Alkaline Phosphatase 79 U/L      Total Bilirubin 0.7 mg/dL      eGFR Non African Amer 47 mL/min/1.73      Globulin 3.5 gm/dL      A/G Ratio 1.2 g/dL      BUN/Creatinine Ratio 18.6     Anion Gap 15.0 mmol/L     Narrative:       GFR Normal >60  Chronic Kidney Disease <60  Kidney Failure <15      Blood Culture - Blood, Blood, Venous Line [[id number]] Collected:  05/06/20 2211    Specimen:  Blood, Venous Line Updated:  05/06/20 2220    Lactate Dehydrogenase [[id number]]  (Abnormal) Collected:  05/06/20 2211    Specimen:  Blood Updated:  05/06/20 2252      U/L     Procalcitonin [[id number]]  (Abnormal) Collected:  05/06/20 2211    Specimen:  Blood Updated:  05/06/20 2257     Procalcitonin 0.38 ng/mL     Narrative:       As a Marker for Sepsis (Non-Neonates):   1. <0.5 ng/mL represents a low risk of severe sepsis and/or septic shock.  1. >2 ng/mL represents a high risk of severe sepsis and/or septic shock.    As a Marker for Lower Respiratory Tract  "Infections that require antibiotic therapy:  PCT on Admission     Antibiotic Therapy             6-12 Hrs later  > 0.5                Strongly Recommended            >0.25 - <0.5         Recommended  0.1 - 0.25           Discouraged                   Remeasure/reassess PCT  <0.1                 Strongly Discouraged          Remeasure/reassess PCT      As 28 day mortality risk marker: \"Change in Procalcitonin Result\" (> 80 % or <=80 %) if Day 0 (or Day 1) and Day 4 values are available. Refer to [email or web address]   Change in PCT <=80 %   A decrease of PCT levels below or equal to 80 % defines a positive change in PCT test result representing a higher risk for 28-day all-cause mortality of patients diagnosed with severe sepsis or septic shock.  Change in PCT > 80 %   A decrease of PCT levels of more than 80 % defines a negative change in PCT result representing a lower risk for 28-day all-cause mortality of patients diagnosed with severe sepsis or septic shock.                Results may be falsely decreased if patient taking Biotin.     C-reactive Protein [[id number]]  (Abnormal) Collected:  05/06/20 2211    Specimen:  Blood Updated:  05/06/20 2252     C-Reactive Protein 2.35 mg/dL     Lactic Acid, Plasma [[id number]]  (Normal) Collected:  05/06/20 2211    Specimen:  Blood Updated:  05/06/20 2237     Lactate 1.9 mmol/L     Lipase [[id number]]  (Abnormal) Collected:  05/06/20 2211    Specimen:  Blood Updated:  05/06/20 2252     Lipase 12 U/L     CBC & Differential [[id number]] Collected:  05/06/20 2211    Specimen:  Blood Updated:  05/06/20 2259    Narrative:       The following orders were created for panel order CBC & Differential.  Procedure                               Abnormality         Status                     ---------                               -----------         ------                     CBC Auto Differential[[id number]]        Abnormal            Final result                 Please " view results for these tests on the individual orders.    CBC Auto Differential [[id number]]  (Abnormal) Collected:  05/06/20 2211    Specimen:  Blood Updated:  05/06/20 2259     WBC 21.19 10*3/mm3      RBC 4.85 10*6/mm3      Hemoglobin 13.6 g/dL      Hematocrit 40.5 %      MCV 83.5 fL      MCH 28.0 pg      MCHC 33.6 g/dL      RDW 13.5 %      RDW-SD 40.8 fl      MPV 10.7 fL      Platelets 251 10*3/mm3      Neutrophil % 90.9 %      Lymphocyte % 3.6 %      Monocyte % 4.3 %      Eosinophil % 0.2 %      Basophil % 0.2 %      Immature Grans % 0.8 %      Neutrophils, Absolute 19.27 10*3/mm3      Lymphocytes, Absolute 0.76 10*3/mm3      Monocytes, Absolute 0.92 10*3/mm3      Eosinophils, Absolute 0.04 10*3/mm3      Basophils, Absolute 0.04 10*3/mm3      Immature Grans, Absolute 0.16 10*3/mm3      nRBC 0.1 /100 WBC     Respiratory Panel, PCR - Swab, Nasopharynx [[id number]]  (Normal) Collected:  05/06/20 2241    Specimen:  Swab from Nasopharynx Updated:  05/06/20 2355     ADENOVIRUS, PCR Not Detected     Coronavirus 229E Not Detected     Coronavirus HKU1 Not Detected     Coronavirus NL63 Not Detected     Coronavirus OC43 Not Detected     Human Metapneumovirus Not Detected     Human Rhinovirus/Enterovirus Not Detected     Influenza B PCR Not Detected     Parainfluenza Virus 1 Not Detected     Parainfluenza Virus 2 Not Detected     Parainfluenza Virus 3 Not Detected     Parainfluenza Virus 4 Not Detected     Bordetella pertussis pcr Not Detected     Influenza A H1 2009 PCR Not Detected     Chlamydophila pneumoniae PCR Not Detected     Mycoplasma pneumo by PCR Not Detected     Influenza A PCR Not Detected     Influenza A H3 Not Detected     Influenza A H1 Not Detected     RSV, PCR Not Detected     Bordetella parapertussis PCR Not Detected    Narrative:       The coronavirus on the RVP is NOT COVID-19 and is NOT indicative of infection with COVID-19.     SARS-CoV-2 PCR ([facility] IN-HOUSE PERFORMED), NP SWAB IN TRANSPORT MEDIA -  Swab, Nasopharynx [[id number]]  (Normal) Collected:  05/06/20 2241    Specimen:  Swab from Nasopharynx Updated:  05/07/20 0049     COVID19 Not Detected    Urinalysis With Microscopic If Indicated (No Culture) - Urine, Catheter [[id number]]  (Abnormal) Collected:  05/06/20 2248    Specimen:  Urine, Catheter Updated:  05/06/20 2259     Color, UA Yellow     Appearance, UA Clear     pH, UA 8.0     Specific Gravity, UA 1.021     Glucose, UA Negative     Ketones, UA Negative     Bilirubin, UA Negative     Blood, UA Moderate (2+)     Protein, UA >=300 mg/dL (3+)     Leuk Esterase, UA Trace     Nitrite, UA Negative     Urobilinogen, UA 1.0 E.U./dL    Urinalysis, Microscopic Only - Urine, Catheter [[id number]]  (Abnormal) Collected:  05/06/20 2248    Specimen:  Urine, Catheter Updated:  05/06/20 2313     RBC, UA 3-5 /HPF      WBC, UA 0-2 /HPF      Bacteria, UA None Seen /HPF      Squamous Epithelial Cells, UA 3-6 /HPF      Hyaline Casts, UA 3-6 /LPF      Methodology Manual Light Microscopy    Blood Culture - Blood, Arm, Right [[id number]] Collected:  05/07/20 0008    Specimen:  Blood from Arm, Right Updated:  05/07/20 0022          Imaging Results (Last 24 Hours)     Procedure Component Value Units Date/Time    CT Abdomen Pelvis Without Contrast [[id number]] Collected:  05/07/20 0104     Updated:  05/07/20 0117    Narrative:       CT OF THE ABDOMEN AND PELVIS WITHOUT CONTRAST     HISTORY: Fever and vomiting     COMPARISON: 12/22/2019     TECHNIQUE: Axial CT imaging was obtained from the dome the diaphragm to  the symphysis pubis. No IV contrast was administered.     FINDINGS:  There is trace right pleural effusion is noted at the right lung base.  Similar findings were present on prior exam. It has increased when  compared to prior study. There is also a small pericardial effusion  which has increased slightly when compared to prior study. The stomach  appears unremarkable, as is the duodenum. No focal hepatic lesions are  seen.  The gallbladder is unremarkable. 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 This appears stable  compared to prior study. The pancreas is normal within normal limits.  Punctate nonobstructing stone is seen within the inferior pole of the  left kidney. Additional nonobstructing stones are seen within the right  kidney. No distal ureteral or bladder stones are seen. 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 This measures about 7 mm  in size. Vascular calcifications are noted. No free fluid or adenopathy  seen within the pelvis. Uterus appears distended, there may be some  trace perivesical soft tissue stranding. Correlation with urinalysis and  urine cultures is suggested. The uterus is surgically absent. There is  no evidence of mechanical bowel obstruction. The appendix is normal. No  acute osseous abnormalities are seen.       Impression:          1. Urinary bladder does appear distended, suggesting urinary retention.  There is also some trace perivesical soft tissue stranding. Correlation  with urinalysis and urine cultures is suggested.  2. Trace right pleural effusion and pericardial effusion.     Radiation dose reduction techniques were utilized, including automated  exposure control and exposure modulation based on body size.     This report was finalized on 5/7/2020 1:14 AM by Dr. Carlotta Gill M.D.       XR Chest AP [[id number]] Collected:  05/06/20 2300     Updated:  05/06/20 2304    Narrative:       PORTABLE CHEST RADIOGRAPH     HISTORY: Rule out COVID 19.     COMPARISON: None available.     FINDINGS:  Heart size is within normal limits for portable technique. No  pneumothorax, pleural effusion, or acute infiltrate is seen.       Impression:       No acute findings.     This report was finalized on 5/6/2020 11:01 PM by Dr. Carlotta Gill M.D.             Results for orders placed during the hospital encounter of 08/22/18   Adult  Transthoracic Echo Complete W/ Cont if Necessary Per Protocol    Narrative · Calculated right ventricular systolic pressure from tricuspid   regurgitation is 32 mmHg.  · Left ventricular systolic function is normal. Estimated EF = 59%.          No orders to display        Assessment/Plan     Active Hospital Problems    Diagnosis POA   • **Sepsis (CMS/HCC) [A41.9] Yes   • Obesity (BMI 30-39.9) [E66.9] Yes   • Heart murmur [R01.1] Yes   • Chronic venous insufficiency [I87.2] Yes   • CAD (coronary artery disease) [I25.10] Yes     MI in 1996, treated medically.  PET 6/2016 with small-medium sized lateral infarct, no ischemia.  This wall motion abnormality was seen on echo as well.     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 Chavez is a 74-year-old female with history of sleep apnea, diabetes type 2, hyperlipidemia, hypertension, CKD stage III, chronic venous insufficiency, CAD, obesity who presents to the emergency room with fever.    Sepsis  -Infectious disease consult  -Patient given vancomycin and Zosyn in the emergency room, will continue vancomycin and start Rocephin, will defer to infectious disease  -Blood cultures are pending  -Urine culture pending  -Fluid bolus given in the emergency room, will continue IV fluids  -Check 2D echo, patient does have a murmur    Type 2 diabetes  -Accu-Cheks AC with correctional dose insulin    CKD/hypertension/hyperlipidemia  -Continue home medications, chronic conditions stable    Obstructive sleep apnea  -Continuous pulse ox overnight    · I discussed the patient's findings and my recommendations with patient and ED provider.    VTE Prophylaxis - SCDs.  Code Status - Full code.       MEREDITH Anton  [facility]  05/07/20  02:54

## 2020-05-07 NOTE — THERAPY EVALUATION
Acute Care - Occupational Therapy Initial Evaluation  [facility]     Patient Name: Denisse Chavez  : 1945  MRN: [id number]  Today's Date: 2020             Admit Date: 2020       ICD-10-CM ICD-9-CM   1. Sepsis, due to unspecified organism, unspecified whether acute organ dysfunction present (CMS/[facility]) A41.9 038.9     995.91   2. Fever, unspecified fever cause R50.9 780.60   3. 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 (CMS/[facility])   • Type II diabetes mellitus with neurological manifestations, uncontrolled (CMS/[facility])   • Uncontrolled type 2 diabetes mellitus with complication, with long-term current use of insulin (CMS/[facility])   • Hyperinsulinism   • Abnormal weight gain   • Hyperlipidemia   • Essential hypertension   • Angiomyolipoma of kidney   • Left hip pain   • Left-sided low back pain without sciatica   • Lumbar spinal stenosis   • Encounter for long-term (current) use of insulin (CMS/[facility])   • Obstructive sleep apnea on autoCPAP   • Chronic kidney disease, stage III (moderate) (CMS/[facility])   • Chronic venous insufficiency   • CAD (coronary artery disease)   • Uncontrolled type 2 diabetes mellitus with background retinopathy (CMS/[facility])   • Circadian rhythm sleep disorder, delayed sleep phase type   • History of colon polyps   • Hyperlipidemia associated with type 2 diabetes mellitus (CMS/[facility])   • Aortic valve sclerosis   • Heart murmur   • Obesity (BMI 30-39.9)   • Sepsis (CMS/[facility])     Past Medical History:   Diagnosis Date   • Angiomyolipoma of kidney 3/30/2016   • Aortic valve sclerosis    • CAD (coronary artery disease)     MI in , treated medically.  PET 2016 with small-medium sized lateral infarct, no ischemia.  This wall motion abnormality was seen on echo as well.   • Cellulitis 2018    RIGHT LEG   • Chronic kidney disease, stage III (moderate) (CMS/HCC) 10/13/2016   • Chronic  venous insufficiency    • Heart murmur    • Hyperlipidemia    • Hypertension    • Low back pain    • Mass of ovary 3/30/2016   • Morbid obesity (CMS/HCC)    • Obesity (BMI 30-39.9) 12/22/2019   • Sleep apnea    • Spinal stenosis    • Type 2 diabetes mellitus (CMS/HCC)    • Uterine leiomyoma 3/30/2016     Past Surgical History:   Procedure Laterality Date   • CATARACT EXTRACTION      X2    • COLONOSCOPY N/A 8/29/2018    Procedure: COLONOSCOPY to CECUM WITH HOT SNARE POLYPECTOMY;  Surgeon: Neal Reilly MD;  Location: [facility] ENDOSCOPY;  Service: Gastroenterology   • EPIDURAL BLOCK     • HERNIA REPAIR     • HYSTERECTOMY     • TONSILLECTOMY            OT ASSESSMENT FLOWSHEET (last 12 hours)      Occupational Therapy Evaluation     Row Name 05/07/20 1300                   OT Evaluation Time/Intention    Subjective Information  no complaints  -TM        Document Type  evaluation  -TM        Mode of Treatment  occupational therapy  -TM        Patient Effort  excellent  -TM           General Information    Patient Profile Reviewed?  yes  -TM        Prior Level of Function  independent:;ADL's;all household mobility  -TM        Equipment Currently Used at Home  cane, straight  -TM        Equipment Issued to Patient  shower chair  -TM           Cognitive Assessment/Intervention- PT/OT    Orientation Status (Cognition)  oriented x 4  -TM        Follows Commands (Cognition)  follows two step commands  -TM           Transfer Assessment/Treatment    Transfer Assessment/Treatment  bed-chair transfer  -TM           Bed-Chair Transfer    Bed-Chair [place] (Transfers)  contact guard;supervision;verbal cues  -TM           ADL Assessment/Intervention    BADL Assessment/Intervention  upper body dressing;lower body dressing;grooming;feeding  -TM           Upper Body Dressing Assessment/Training    Upper Body Dressing [place] Level  doff;don;suzanna/stan;supervision supervision only for lines  -TM           Lower Body Dressing  Assessment/Training    Lower Body Dressing [place] Level  don;socks;doff;independent  -TM        Lower Body Dressing Position  supported sitting  -TM           Grooming Assessment/Training    [place] Level (Grooming)  hair care, combing/brushing;wash face, hands;set up  -TM        Grooming Position  supported sitting  -TM           Self-Feeding Assessment/Training    [place] Level (Feeding)  feeding skills  -TM           General ROM    GENERAL ROM COMMENTS  AROM WFL RAKEL  UES  -TM           MMT (Manual Muscle Testing)    General MMT Comments  B UES GROSSLY 4-/5  -TM           Positioning and Restraints    Pre-Treatment Position  sitting in chair/recliner  -TM        Post Treatment Position  chair  -TM        In Chair  call light within reach;encouraged to call for assist  -TM           Coping    Observed Emotional State  accepting;calm  -TM        Verbalized Emotional State  acceptance;happiness  -TM           Plan of Care Review    Plan of Care Reviewed With  patient  -TM           Clinical Impression (OT)    Criteria for Skilled Therapeutic Interventions Met (OT Eval)  no problems identified which require skilled intervention  -TM          User Key  (r) = Recorded By, (t) = Taken By, (c) = Cosigned By    Initials Name Effective Dates    TM Vicky Dangelo, OTR 04/13/15 -                OT Recommendation and Plan     Plan of Care Review  Plan of Care Reviewed With: patient  Plan of Care Reviewed With: patient  Outcome Summary: OT assessmenht this morning. Pt alert and oriented x4. Pleasant and cooperative. Good historian.  Pt independent with ADLs prior to admission. she is currently only limited by lines  and needing supervision for safety in this setting.  No needs identified so OT will sign off.  Pt is in agreement with this plan.  If needs change please [id number].    Outcome Measures     Row Name 05/07/20 1300             How much help from another is currently needed...    Putting on and taking  off regular lower body clothing?  3  -TM      Bathing (including washing, rinsing, and drying)  3  -TM      Toileting (which includes using toilet bed pan or urinal)  3  -TM      Putting on and taking off regular upper body clothing  3  -TM      Taking care of personal grooming (such as brushing teeth)  4  -TM      Eating meals  4  -TM      AM-PAC 6 Clicks Score (OT)  20  -TM         Functional Assessment    Outcome Measure Options  AM-PAC 6 Clicks Daily Activity (OT)  -TM        User Key  (r) = Recorded By, (t) = Taken By, (c) = Cosigned By    Initials Name Provider Type    Vicky Dean OTR Occupational Therapist          Time Calculation:   Time Calculation- OT     Row Name 05/07/20 1326             Time Calculation- OT    OT Start Time  1100  -TM      OT Stop Time  1110  -TM      OT Time Calculation (min)  10 min  -TM      Total Timed Code Minutes- OT  10 minute(s)  -TM        User Key  (r) = Recorded By, (t) = Taken By, (c) = Cosigned By    Initials Name Provider Type    Vicky Dean OTR Occupational Therapist        Therapy Charges for Today     Code Description Service Date Service Provider Modifiers Qty    [id number] HC OT EVAL LOW COMPLEXITY 2 5/7/2020 Vicky Dangelo OTR GO 1               ADY Perez  5/7/2020

## 2020-05-07 NOTE — ED PROVIDER NOTES
EMERGENCY DEPARTMENT ENCOUNTER    Room Number:  23/23  Date of encounter:  5/7/2020  PCP: Surekha Mcdonnell MD    HPI:  Context: Denisse Chavez is a 74 y.o. female who presents to the ED c/o chief complaint of fever and vomiting.  History provided by patient.  Patient states she began to have a fever earlier today.  Patient states at 5:00 she had one episode of emesis.  Prior to onset of fever, patient was at baseline without complaint.  Patient denies any runny nose congestion sore throat or cough.  Patient had one-time emesis, nonbloody nonbilious.  Patient denies any abdominal pain, no diarrhea.  Patient denies dysuria, no increase in urinary frequency or urgency.  Patient denies any chest pain or shortness of breath.  No rashes, no headaches, no neck pain.  Patient denies any recent loss of sense of smell or taste.  No recent travel, no exposure to CoVID-19 infection.    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 (CMS/[facility]) 03/08/2016   • Type II diabetes mellitus with neurological manifestations, uncontrolled (CMS/[facility]) 03/08/2016   • Uncontrolled type 2 diabetes mellitus with complication, with long-term current use of insulin (CMS/[facility]) 03/08/2016   • Hyperinsulinism 03/08/2016   • Abnormal weight gain 03/08/2016   • Hyperlipidemia 03/08/2016   • Essential hypertension 03/08/2016   • Angiomyolipoma of kidney 03/30/2016   • Left hip pain 05/16/2016   • Left-sided low back pain without sciatica 05/16/2016   • Lumbar spinal stenosis 05/16/2016   • Encounter for long-term (current) use of insulin (CMS/[facility]) 06/24/2016   • Obstructive sleep apnea on autoCPAP 09/04/2016   • Chronic kidney disease, stage III (moderate) (CMS/[facility]) 10/13/2016   • Chronic venous insufficiency    • CAD (coronary artery disease)    • Uncontrolled type 2 diabetes mellitus with background retinopathy  (CMS/HCC) 10/24/2017   • Circadian rhythm sleep disorder, delayed sleep phase type 04/23/2018   • History of colon polyps 06/13/2018   • Hyperlipidemia associated with type 2 diabetes mellitus (CMS/[facility]) 12/20/2018   • Aortic valve sclerosis 07/25/2019   • Heart murmur 07/25/2019     Resolved Ambulatory Problems     Diagnosis Date Noted   • Type 2 diabetes mellitus (CMS/[facility]) 03/07/2016   • Diabetes mellitus type 2, uncontrolled, with complications (CMS/HCC) 03/08/2016   • Edema of lower extremity 03/30/2016   • Renal function test abnormal 03/30/2016   • Hypercalcemia 03/30/2016   • Hyperkalemia 03/30/2016   • Mass of ovary 03/30/2016   • Uterine leiomyoma 03/30/2016   • Memory changes 04/13/2016   • Class 3 severe obesity due to excess calories with serious comorbidity and body mass index (BMI) of 40.0 to 44.9 in adult (CMS/[facility]) 04/23/2018   • Cellulitis of right lower extremity 07/16/2018   • Sepsis (CMS/HCC) 07/16/2018   • Viral upper respiratory infection 07/16/2018   • Diabetes mellitus type 2, insulin dependent (CMS/[facility]) 01/04/2016   • Sepsis without acute organ dysfunction (CMS/HCC) 12/22/2019   • Emphysematous cystitis 12/22/2019   • Obesity (BMI 30-39.9) 12/22/2019   • Bacteremia due to Streptococcus 12/23/2019     Past Medical History:   Diagnosis Date   • Cellulitis 07/2018   • Hypertension    • Low back pain    • Morbid obesity (CMS/[facility])    • Sleep apnea    • Spinal stenosis        PAST SURGICAL HISTORY  Past Surgical History:   Procedure Laterality Date   • CATARACT EXTRACTION      X2    • COLONOSCOPY N/A 8/29/2018    Procedure: COLONOSCOPY to CECUM WITH HOT SNARE POLYPECTOMY;  Surgeon: Neal Reilly MD;  Location: [facility] ENDOSCOPY;  Service: Gastroenterology   • EPIDURAL BLOCK     • HERNIA REPAIR     • HYSTERECTOMY     • TONSILLECTOMY         FAMILY HISTORY  Family History   Problem Relation Age of Onset   • Diabetes Mother    • Heart attack Mother    • Hypertension Mother    • Hypothyroidism Mother     • Diabetes type II Son    • Breast cancer Neg Hx        SOCIAL HISTORY  Social History     Socioeconomic History   • Marital status: Single     Spouse name: Not on file   • Number of children: 3   • Years of education: Not on file   • Highest education level: Not on file   Occupational History   • Occupation: retired from [employer]   Tobacco Use   • Smoking status: Former Smoker     Packs/day: 0.25     Years: 2.00     Pack years: 0.50     Types: Cigarettes     Last attempt to quit: 1970     Years since quittin.3   • Smokeless tobacco: Never Used   • Tobacco comment: LIGHT USAGE   Substance and Sexual Activity   • Alcohol use: No     Comment: CAFFEINE USE: 10 -12 CUPS OF COFFEE DAILY.    • Drug use: No   • Sexual activity: Defer       ALLERGIES  Ace inhibitors; Angiotensin receptor blockers; Keflex [cephalexin]; and Sulfa antibiotics    The patient's allergies have been reviewed    REVIEW OF SYSTEMS  All systems reviewed and negative except for those discussed in HPI.     PHYSICAL EXAM  I have reviewed the triage vital signs and nursing notes.  ED Triage Vitals   Temp Heart Rate Resp BP SpO2   20   (!) 104.3 °F (40.2 °C) 88 18 (!) 195/81 98 %      Temp src Heart Rate Source Patient Position BP Location FiO2 (%)   20 --   Skin Monitor Sitting Right arm        GENERAL: No acute distress  HENT: NCAT, PERRL, Nares patent  EYES: no scleral icterus  NECK: trachea midline, no ROM limitations. No nuchal rigidity.  Negative Kernig sign.  Negative Brudzinski sign.  CV: regular rhythm, regular rate  RESPIRATORY: normal effort, clear to auscultation bilaterally  ABDOMEN: soft, nondistended, nontender to palpation  : deferred  MUSCULOSKELETAL: no deformity  NEURO: alert, moves all extremities, follows commands  SKIN: warm, dry, nonpitting edema bilateral lower extremities    LAB RESULTS  Recent Results  (from the past 24 hour(s))   POC Glucose Once    Collection Time: 05/06/20  9:53 PM   Result Value Ref Range    Glucose 154 (H) 70 - 130 mg/dL   Comprehensive Metabolic Panel    Collection Time: 05/06/20 10:11 PM   Result Value Ref Range    Glucose 153 (H) 65 - 99 mg/dL    BUN 21 8 - 23 mg/dL    Creatinine 1.13 (H) 0.57 - 1.00 mg/dL    Sodium 136 136 - 145 mmol/L    Potassium 4.0 3.5 - 5.2 mmol/L    Chloride 97 (L) 98 - 107 mmol/L    CO2 24.0 22.0 - 29.0 mmol/L    Calcium 10.0 8.6 - 10.5 mg/dL    Total Protein 7.6 6.0 - 8.5 g/dL    Albumin 4.10 3.50 - 5.20 g/dL    ALT (SGPT) 14 1 - 33 U/L    AST (SGOT) 25 1 - 32 U/L    Alkaline Phosphatase 79 39 - 117 U/L    Total Bilirubin 0.7 0.2 - 1.2 mg/dL    eGFR Non African Amer 47 (L) >60 mL/min/1.73    Globulin 3.5 gm/dL    A/G Ratio 1.2 g/dL    BUN/Creatinine Ratio 18.6 7.0 - 25.0    Anion Gap 15.0 5.0 - 15.0 mmol/L   Light Blue Top    Collection Time: 05/06/20 10:11 PM   Result Value Ref Range    Extra Tube hold for add-on    Green Top (Gel)    Collection Time: 05/06/20 10:11 PM   Result Value Ref Range    Extra Tube Hold for add-ons.    Lavender Top    Collection Time: 05/06/20 10:11 PM   Result Value Ref Range    Extra Tube hold for add-on    Gold Top - SST    Collection Time: 05/06/20 10:11 PM   Result Value Ref Range    Extra Tube Hold for add-ons.    Lactate Dehydrogenase    Collection Time: 05/06/20 10:11 PM   Result Value Ref Range     (H) 135 - 214 U/L   Procalcitonin    Collection Time: 05/06/20 10:11 PM   Result Value Ref Range    Procalcitonin 0.38 (H) 0.10 - 0.25 ng/mL   C-reactive Protein    Collection Time: 05/06/20 10:11 PM   Result Value Ref Range    C-Reactive Protein 2.35 (H) 0.00 - 0.50 mg/dL   Lactic Acid, Plasma    Collection Time: 05/06/20 10:11 PM   Result Value Ref Range    Lactate 1.9 0.5 - 2.0 mmol/L   Lipase    Collection Time: 05/06/20 10:11 PM   Result Value Ref Range    Lipase 12 (L) 13 - 60 U/L   CBC Auto Differential    Collection  Time: 05/06/20 10:11 PM   Result Value Ref Range    WBC 21.19 (H) 3.40 - 10.80 10*3/mm3    RBC 4.85 3.77 - 5.28 10*6/mm3    Hemoglobin 13.6 12.0 - 15.9 g/dL    Hematocrit 40.5 34.0 - 46.6 %    MCV 83.5 79.0 - 97.0 fL    MCH 28.0 26.6 - 33.0 pg    MCHC 33.6 31.5 - 35.7 g/dL    RDW 13.5 12.3 - 15.4 %    RDW-SD 40.8 37.0 - 54.0 fl    MPV 10.7 6.0 - 12.0 fL    Platelets 251 140 - 450 10*3/mm3    Neutrophil % 90.9 (H) 42.7 - 76.0 %    Lymphocyte % 3.6 (L) 19.6 - 45.3 %    Monocyte % 4.3 (L) 5.0 - 12.0 %    Eosinophil % 0.2 (L) 0.3 - 6.2 %    Basophil % 0.2 0.0 - 1.5 %    Immature Grans % 0.8 (H) 0.0 - 0.5 %    Neutrophils, Absolute 19.27 (H) 1.70 - 7.00 10*3/mm3    Lymphocytes, Absolute 0.76 0.70 - 3.10 10*3/mm3    Monocytes, Absolute 0.92 (H) 0.10 - 0.90 10*3/mm3    Eosinophils, Absolute 0.04 0.00 - 0.40 10*3/mm3    Basophils, Absolute 0.04 0.00 - 0.20 10*3/mm3    Immature Grans, Absolute 0.16 (H) 0.00 - 0.05 10*3/mm3    nRBC 0.1 0.0 - 0.2 /100 WBC   Respiratory Panel, PCR - Swab, Nasopharynx    Collection Time: 05/06/20 10:41 PM   Result Value Ref Range    ADENOVIRUS, PCR Not Detected Not Detected    Coronavirus 229E Not Detected Not Detected    Coronavirus HKU1 Not Detected Not Detected    Coronavirus NL63 Not Detected Not Detected    Coronavirus OC43 Not Detected Not Detected    Human Metapneumovirus Not Detected Not Detected    Human Rhinovirus/Enterovirus Not Detected Not Detected    Influenza B PCR Not Detected Not Detected    Parainfluenza Virus 1 Not Detected Not Detected    Parainfluenza Virus 2 Not Detected Not Detected    Parainfluenza Virus 3 Not Detected Not Detected    Parainfluenza Virus 4 Not Detected Not Detected    Bordetella pertussis pcr Not Detected Not Detected    Influenza A H1 2009 PCR Not Detected Not Detected    Chlamydophila pneumoniae PCR Not Detected Not Detected    Mycoplasma pneumo by PCR Not Detected Not Detected    Influenza A PCR Not Detected Not Detected    Influenza A H3 Not Detected  Not Detected    Influenza A H1 Not Detected Not Detected    RSV, PCR Not Detected Not Detected    Bordetella parapertussis PCR Not Detected Not Detected   SARS-CoV-2 PCR ([place] IN-HOUSE PERFORMED), NP SWAB IN TRANSPORT MEDIA - Swab, Nasopharynx    Collection Time: 05/06/20 10:41 PM   Result Value Ref Range    COVID19 Not Detected Not Detected - Ref. Range   Urinalysis With Microscopic If Indicated (No Culture) - Urine, Catheter    Collection Time: 05/06/20 10:48 PM   Result Value Ref Range    Color, UA Yellow Yellow, Straw    Appearance, UA Clear Clear    pH, UA 8.0 5.0 - 8.0    Specific Gravity, UA 1.021 1.005 - 1.030    Glucose, UA Negative Negative    Ketones, UA Negative Negative    Bilirubin, UA Negative Negative    Blood, UA Moderate (2+) (A) Negative    Protein, UA >=300 mg/dL (3+) (A) Negative    Leuk Esterase, UA Trace (A) Negative    Nitrite, UA Negative Negative    Urobilinogen, UA 1.0 E.U./dL 0.2 - 1.0 E.U./dL   Urinalysis, Microscopic Only - Urine, Catheter    Collection Time: 05/06/20 10:48 PM   Result Value Ref Range    RBC, UA 3-5 (A) None Seen, 0-2 /HPF    WBC, UA 0-2 None Seen, 0-2 /HPF    Bacteria, UA None Seen None Seen /HPF    Squamous Epithelial Cells, UA 3-6 (A) None Seen, 0-2 /HPF    Hyaline Casts, UA 3-6 None Seen /LPF    Methodology Manual Light Microscopy        I ordered the above labs and reviewed the results.    RADIOLOGY  Ct Abdomen Pelvis Without Contrast    Result Date: 5/7/2020  CT OF THE ABDOMEN AND PELVIS WITHOUT CONTRAST  HISTORY: Fever and vomiting  COMPARISON: 12/22/2019  TECHNIQUE: Axial CT imaging was obtained from the dome the diaphragm to the symphysis pubis. No IV contrast was administered.  FINDINGS: There is trace right pleural effusion is noted at the right lung base. Similar findings were present on prior exam. It has increased when compared to prior study. There is also a small pericardial effusion which has increased slightly when compared to prior study. The  stomach appears unremarkable, as is the duodenum. No focal hepatic lesions are seen. The gallbladder is unremarkable. 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 This appears stable compared to prior study. The pancreas is normal within normal limits. Punctate nonobstructing stone is seen within the inferior pole of the left kidney. Additional nonobstructing stones are seen within the right kidney. No distal ureteral or bladder stones are seen. 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 This measures about 7 mm in size. Vascular calcifications are noted. No free fluid or adenopathy seen within the pelvis. Uterus appears distended, there may be some trace perivesical soft tissue stranding. Correlation with urinalysis and urine cultures is suggested. The uterus is surgically absent. There is no evidence of mechanical bowel obstruction. The appendix is normal. No acute osseous abnormalities are seen.       1. Urinary bladder does appear distended, suggesting urinary retention. There is also some trace perivesical soft tissue stranding. Correlation with urinalysis and urine cultures is suggested. 2. Trace right pleural effusion and pericardial effusion.  Radiation dose reduction techniques were utilized, including automated exposure control and exposure modulation based on body size.  This report was finalized on 5/7/2020 1:14 AM by Dr. Carlotta Gill M.D.      Xr Chest Ap    Result Date: 5/6/2020  PORTABLE CHEST RADIOGRAPH  HISTORY: Rule out COVID 19.  COMPARISON: None available.  FINDINGS: Heart size is within normal limits for portable technique. No pneumothorax, pleural effusion, or acute infiltrate is seen.      No acute findings.  This report was finalized on 5/6/2020 11:01 PM by Dr. Carlotta Gill M.D.        I ordered the above noted radiological studies. I reviewed the images and results. I agree with the radiologist  interpretation.    PROCEDURES  Procedures    MEDICATIONS GIVEN IN ER  Medications   sodium chloride 0.9 % flush 10 mL (has no administration in time range)   sodium chloride 0.9 % bolus 1,000 mL (0 mL Intravenous Stopped 5/7/20 0119)   acetaminophen (TYLENOL) tablet 1,000 mg (1,000 mg Oral Given 5/6/20 2216)   hydrALAZINE (APRESOLINE) injection 10 mg (10 mg Intravenous Given 5/7/20 0009)   vancomycin 2000 mg/500 mL 0.9% NS IVPB ([facility]) (2,000 mg Intravenous New Bag 5/7/20 0111)   piperacillin-tazobactam (ZOSYN) 3.375 g in iso-osmotic dextrose 50 ml (premix) (0 g Intravenous Stopped 5/7/20 0041)       PROGRESS, DATA ANALYSIS, CONSULTS, AND MEDICAL DECISION MAKING  A complete history and physical exam have been performed.  All available laboratory and imaging results have been reviewed by myself prior to disposition.  Face mask and gloves were worn throughout the patient encounter, unless additional PPE was worn and specified below. Hand hygiene was performed before entering and after leaving the patient room.  [facility]    ED Course as of May 07 0202   Wed May 06, 2020   2213 Patient was placed in face mask in first look. Patient was wearing facemask when I entered the room and throughout our encounter. I wore full protective equipment throughout this patient encounter including a face mask, eye shield, gown and gloves. Hand hygiene was performed before donning protective equipment and after removal when leaving the room.        [JG]   2210 Discussed pertinent information from history and physical exam with patient.  Discussed differential diagnosis.  Discussed plan for ED evaluation/work-up/treatment.  All questions answered.  Patient is agreeable with plan.        [JG]   8192 Patient febrile, severe leukocytosis.  No clear source.  Negative, urine unremarkable.  Empiric treatment with broad-spectrum antibiotics, treating with bank and Zosyn.  Patient is normotensive, lactic acid normal, patient does not require 30 cc/kg  bolus.  Will obtain CT abdomen and pelvis to evaluate for other possible sources.    [JG]   2352 Patient has history of Keflex allergy, discussed with patient, she has received penicillins in the past without difficulty.    [JG]   Thu May 07, 2020   0143 Patient reassessed.  Discussed ED findings, differential diagnosis, and the need for admission for evaluation/treatment.  They are agreeable to admission and all questions were answered.        [JG]   0200 Phone call with Kitty, [employer] for [employer].  Discussed the patient, relevant history, exam, diagnostics, ED findings/progress, and concerns. They agree to admit the patient to telemetry under Dr Lorenzo. Care assumed by the admitting physician at this time.        [JG]      ED Course User Index  [JG] Ascencion Villafana MD       AS OF 02:02 VITALS:    BP - 176/73  HR - 95  TEMP - 99.9 °F (37.7 °C) (Oral)  O2 SATS - 97%    DIAGNOSIS  Final diagnoses:   Sepsis, due to unspecified organism, unspecified whether acute organ dysfunction present (CMS/[facility])   Fever, unspecified fever cause   Leukocytosis, unspecified type     Critical care:  Total critical care time of 32 minutes is exclusive of any other billable procedures and includes time spent with direct patient care and observation, retrospective chart review, management of acute condition, and consultation with other physicians.      DISPOSITION  ADMISSION    Discussed treatment plan and reason for admission with pt/family and admitting physician.  Pt/family voiced understanding of the plan for admission for further testing/treatment as needed.          Ascencion Villafana MD  05/07/20 0208

## 2020-05-08 LAB
ANION GAP SERPL CALCULATED.3IONS-SCNC: 13.6 MMOL/L (ref 5–15)
BASOPHILS # BLD AUTO: 0.04 10*3/MM3 (ref 0–0.2)
BASOPHILS NFR BLD AUTO: 0.3 % (ref 0–1.5)
BUN BLD-MCNC: 23 MG/DL (ref 8–23)
BUN/CREAT SERPL: 21.3 (ref 7–25)
CALCIUM SPEC-SCNC: 7.9 MG/DL (ref 8.6–10.5)
CHLORIDE SERPL-SCNC: 103 MMOL/L (ref 98–107)
CO2 SERPL-SCNC: 20.4 MMOL/L (ref 22–29)
CREAT BLD-MCNC: 1.08 MG/DL (ref 0.57–1)
DEPRECATED RDW RBC AUTO: 40.2 FL (ref 37–54)
EOSINOPHIL # BLD AUTO: 0.11 10*3/MM3 (ref 0–0.4)
EOSINOPHIL NFR BLD AUTO: 0.7 % (ref 0.3–6.2)
ERYTHROCYTE [DISTWIDTH] IN BLOOD BY AUTOMATED COUNT: 13.4 % (ref 12.3–15.4)
GFR SERPL CREATININE-BSD FRML MDRD: 50 ML/MIN/1.73
GLUCOSE BLD-MCNC: 238 MG/DL (ref 65–99)
GLUCOSE BLDC GLUCOMTR-MCNC: 222 MG/DL (ref 70–130)
GLUCOSE BLDC GLUCOMTR-MCNC: 258 MG/DL (ref 70–130)
GLUCOSE BLDC GLUCOMTR-MCNC: 293 MG/DL (ref 70–130)
GLUCOSE BLDC GLUCOMTR-MCNC: 326 MG/DL (ref 70–130)
HCT VFR BLD AUTO: 31.3 % (ref 34–46.6)
HGB BLD-MCNC: 10.5 G/DL (ref 12–15.9)
IMM GRANULOCYTES # BLD AUTO: 0.06 10*3/MM3 (ref 0–0.05)
IMM GRANULOCYTES NFR BLD AUTO: 0.4 % (ref 0–0.5)
LYMPHOCYTES # BLD AUTO: 1.08 10*3/MM3 (ref 0.7–3.1)
LYMPHOCYTES NFR BLD AUTO: 7.2 % (ref 19.6–45.3)
MCH RBC QN AUTO: 27.9 PG (ref 26.6–33)
MCHC RBC AUTO-ENTMCNC: 33.5 G/DL (ref 31.5–35.7)
MCV RBC AUTO: 83.2 FL (ref 79–97)
MONOCYTES # BLD AUTO: 1.03 10*3/MM3 (ref 0.1–0.9)
MONOCYTES NFR BLD AUTO: 6.9 % (ref 5–12)
NEUTROPHILS # BLD AUTO: 12.59 10*3/MM3 (ref 1.7–7)
NEUTROPHILS NFR BLD AUTO: 84.5 % (ref 42.7–76)
NRBC BLD AUTO-RTO: 0 /100 WBC (ref 0–0.2)
PLATELET # BLD AUTO: 177 10*3/MM3 (ref 140–450)
PMV BLD AUTO: 10.2 FL (ref 6–12)
POTASSIUM BLD-SCNC: 3.5 MMOL/L (ref 3.5–5.2)
PROCALCITONIN SERPL-MCNC: 0.96 NG/ML (ref 0.1–0.25)
RBC # BLD AUTO: 3.76 10*6/MM3 (ref 3.77–5.28)
RETICS # AUTO: 0.07 10*6/MM3 (ref 0.02–0.13)
RETICS/RBC NFR AUTO: 1.94 % (ref 0.7–1.9)
SODIUM BLD-SCNC: 137 MMOL/L (ref 136–145)
WBC NRBC COR # BLD: 14.91 10*3/MM3 (ref 3.4–10.8)

## 2020-05-08 PROCEDURE — 80048 BASIC METABOLIC PNL TOTAL CA: CPT | Performed by: HOSPITALIST

## 2020-05-08 PROCEDURE — 63710000001 INSULIN ISOPHANE HUMAN PER 5 UNITS: Performed by: NURSE PRACTITIONER

## 2020-05-08 PROCEDURE — 25010000003 CEFTRIAXONE PER 250 MG: Performed by: NURSE PRACTITIONER

## 2020-05-08 PROCEDURE — 85025 COMPLETE CBC W/AUTO DIFF WBC: CPT | Performed by: HOSPITALIST

## 2020-05-08 PROCEDURE — 99232 SBSQ HOSP IP/OBS MODERATE 35: CPT | Performed by: INTERNAL MEDICINE

## 2020-05-08 PROCEDURE — 63710000001 INSULIN LISPRO (HUMAN) PER 5 UNITS: Performed by: HOSPITALIST

## 2020-05-08 PROCEDURE — 84145 PROCALCITONIN (PCT): CPT | Performed by: INTERNAL MEDICINE

## 2020-05-08 PROCEDURE — 25010000002 VANCOMYCIN PER 500 MG: Performed by: NURSE PRACTITIONER

## 2020-05-08 PROCEDURE — 85045 AUTOMATED RETICULOCYTE COUNT: CPT | Performed by: INTERNAL MEDICINE

## 2020-05-08 PROCEDURE — 82962 GLUCOSE BLOOD TEST: CPT

## 2020-05-08 PROCEDURE — 97140 MANUAL THERAPY 1/> REGIONS: CPT

## 2020-05-08 RX ADMIN — VANCOMYCIN HYDROCHLORIDE 1000 MG: 1 INJECTION, SOLUTION INTRAVENOUS at 12:29

## 2020-05-08 RX ADMIN — ACETAMINOPHEN 650 MG: 325 TABLET, FILM COATED ORAL at 14:21

## 2020-05-08 RX ADMIN — ACETAMINOPHEN 650 MG: 325 TABLET, FILM COATED ORAL at 21:59

## 2020-05-08 RX ADMIN — VANCOMYCIN HYDROCHLORIDE 1000 MG: 1 INJECTION, SOLUTION INTRAVENOUS at 00:23

## 2020-05-08 RX ADMIN — INSULIN LISPRO 7 UNITS: 100 INJECTION, SOLUTION INTRAVENOUS; SUBCUTANEOUS at 00:23

## 2020-05-08 RX ADMIN — INSULIN LISPRO 6 UNITS: 100 INJECTION, SOLUTION INTRAVENOUS; SUBCUTANEOUS at 12:29

## 2020-05-08 RX ADMIN — HYDROXYZINE HYDROCHLORIDE 25 MG: 25 TABLET, FILM COATED ORAL at 00:23

## 2020-05-08 RX ADMIN — INSULIN LISPRO 4 UNITS: 100 INJECTION, SOLUTION INTRAVENOUS; SUBCUTANEOUS at 08:49

## 2020-05-08 RX ADMIN — NYSTATIN: 100000 POWDER TOPICAL at 08:51

## 2020-05-08 RX ADMIN — SODIUM CHLORIDE 100 ML/HR: 9 INJECTION, SOLUTION INTRAVENOUS at 11:30

## 2020-05-08 RX ADMIN — ISOSORBIDE MONONITRATE 60 MG: 60 TABLET ORAL at 08:49

## 2020-05-08 RX ADMIN — DOCUSATE SODIUM 100 MG: 100 CAPSULE, LIQUID FILLED ORAL at 21:24

## 2020-05-08 RX ADMIN — DOCUSATE SODIUM 100 MG: 100 CAPSULE, LIQUID FILLED ORAL at 08:49

## 2020-05-08 RX ADMIN — SODIUM CHLORIDE 100 ML/HR: 9 INJECTION, SOLUTION INTRAVENOUS at 00:24

## 2020-05-08 RX ADMIN — INSULIN LISPRO 6 UNITS: 100 INJECTION, SOLUTION INTRAVENOUS; SUBCUTANEOUS at 22:18

## 2020-05-08 RX ADMIN — INSULIN LISPRO 7 UNITS: 100 INJECTION, SOLUTION INTRAVENOUS; SUBCUTANEOUS at 17:36

## 2020-05-08 RX ADMIN — METOPROLOL SUCCINATE 50 MG: 50 TABLET, EXTENDED RELEASE ORAL at 08:49

## 2020-05-08 RX ADMIN — CEFTRIAXONE SODIUM 2 G: 2 INJECTION, SOLUTION INTRAVENOUS at 04:00

## 2020-05-08 RX ADMIN — HYDROXYZINE HYDROCHLORIDE 25 MG: 25 TABLET, FILM COATED ORAL at 22:00

## 2020-05-08 RX ADMIN — INSULIN HUMAN 26 UNITS: 100 INJECTION, SUSPENSION SUBCUTANEOUS at 08:49

## 2020-05-08 RX ADMIN — ATORVASTATIN CALCIUM 20 MG: 20 TABLET, FILM COATED ORAL at 08:49

## 2020-05-08 RX ADMIN — FUROSEMIDE 40 MG: 40 TABLET ORAL at 08:49

## 2020-05-08 RX ADMIN — NYSTATIN: 100000 POWDER TOPICAL at 21:24

## 2020-05-08 RX ADMIN — ASPIRIN 81 MG: 81 TABLET, COATED ORAL at 08:49

## 2020-05-08 RX ADMIN — AMLODIPINE BESYLATE 10 MG: 10 TABLET ORAL at 08:49

## 2020-05-08 NOTE — THERAPY TREATMENT NOTE
Acute Care - Occupational Therapy Treatment Note  [facility]     Patient Name: Denisse Chavez  : 1945  MRN: [id number]  Today's Date: 2020             Admit Date: 2020       ICD-10-CM ICD-9-CM   1. Sepsis, due to unspecified organism, unspecified whether acute organ dysfunction present (CMS/[facility]) A41.9 038.9     995.91   2. Fever, unspecified fever cause R50.9 780.60   3. 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 (CMS/[facility])   • Type II diabetes mellitus with neurological manifestations, uncontrolled (CMS/[facility])   • Uncontrolled type 2 diabetes mellitus with complication, with long-term current use of insulin (CMS/[facility])   • Hyperinsulinism   • Abnormal weight gain   • Hyperlipidemia   • Essential hypertension   • Angiomyolipoma of kidney   • Left hip pain   • Left-sided low back pain without sciatica   • Lumbar spinal stenosis   • Encounter for long-term (current) use of insulin (CMS/[facility])   • Obstructive sleep apnea on autoCPAP   • Chronic kidney disease, stage III (moderate) (CMS/[facility])   • Chronic venous insufficiency   • CAD (coronary artery disease)   • Uncontrolled type 2 diabetes mellitus with background retinopathy (CMS/[facility])   • Circadian rhythm sleep disorder, delayed sleep phase type   • History of colon polyps   • Hyperlipidemia associated with type 2 diabetes mellitus (CMS/[facility])   • Aortic valve sclerosis   • Heart murmur   • Obesity (BMI 30-39.9)   • Sepsis (CMS/[facility])     Past Medical History:   Diagnosis Date   • Angiomyolipoma of kidney 3/30/2016   • Aortic valve sclerosis    • CAD (coronary artery disease)     MI in , treated medically.  PET 2016 with small-medium sized lateral infarct, no ischemia.  This wall motion abnormality was seen on echo as well.   • Cellulitis 2018    RIGHT LEG   • Chronic kidney disease, stage III (moderate) (CMS/HCC) 10/13/2016   • Chronic venous  "insufficiency    • Heart murmur    • Hyperlipidemia    • Hypertension    • Low back pain    • Mass of ovary 3/30/2016   • Morbid obesity (CMS/HCC)    • Obesity (BMI 30-39.9) 12/22/2019   • Sleep apnea    • Spinal stenosis    • Type 2 diabetes mellitus (CMS/HCC)    • Uterine leiomyoma 3/30/2016     Past Surgical History:   Procedure Laterality Date   • CATARACT EXTRACTION      X2    • COLONOSCOPY N/A 8/29/2018    Procedure: COLONOSCOPY to CECUM WITH HOT SNARE POLYPECTOMY;  Surgeon: Neal Reilly MD;  Location: [facility] ENDOSCOPY;  Service: Gastroenterology   • EPIDURAL BLOCK     • HERNIA REPAIR     • HYSTERECTOMY     • TONSILLECTOMY         Therapy Treatment    Rehabilitation Treatment Summary     Row Name 05/08/20 1115             Treatment Time/Intention    Discipline  occupational therapist  -VS      Document Type  re-evaluation;therapy note (daily note)  -VS      Subjective Information  no complaints  -VS      Mode of Treatment  occupational therapy Lymphedema   -VS      Patient/Family Observations  -- Pt. was sitting reclined in a chair by the bed   -VS      Care Plan Review  care plan/treatment goals reviewed;patient/other agree to care plan  -VS      Patient Effort  adequate  -VS      Comment  Pt. stated that she went to [facility] clinic in January.  Per pt. she has not returned do to cost of treatments and transportation  -VS      Existing Precautions/Restrictions  fall  -VS      Patient Response to Treatment  \"My legs do feel better wrapped\" pt. stated at the end of the session   -VS      Recorded by [VS] Rosi Castro, OTR 05/08/20 1312      Row Name 05/08/20 1115             Cognitive Assessment/Intervention- PT/OT    Orientation Status (Cognition)  oriented x 4  -VS      Follows Commands (Cognition)  WFL  -VS      Recorded by [VS] Rosi Castro OTR 05/08/20 1312      Row Name 05/08/20 1115             ADL Assessment/Intervention    BADL Assessment/Intervention  bathing  -VS      Recorded by " [VS] Rosi Castro, OTR 05/08/20 1312      Row Name 05/08/20 1115             Bathing Assessment/Intervention    Comment (Bathing)  Therapist bathe,dried an appliled lotion to (B)LE toes to knees   -VS      Recorded by [VS] Rosi Castro, OTR 05/08/20 1312      Row Name 05/08/20 1115             Positioning and Restraints    Pre-Treatment Position  sitting in chair/recliner  -VS      Post Treatment Position  chair  -VS      In Chair  notified nsg;reclined;sitting;call light within reach;encouraged to call for assist;RLE elevated;LLE elevated  -VS      Recorded by [VS] Rosi Castro, OTR 05/08/20 1312      Row Name 05/08/20 1115             Pain Assessment    Additional Documentation  Pain Scale: Numbers Pre/Post-Treatment (Group)  -VS      Recorded by [VS] Rosi Castro, OTR 05/08/20 1312      Row Name 05/08/20 1115             Pain Scale: Numbers Pre/Post-Treatment    Pain Scale: Numbers, Pretreatment  0/10 - no pain  -VS      Recorded by [VS] Rosi Castro, OTR 05/08/20 1312      Row Name 05/08/20 1115             Edema Assessment & Management    Location (Edema)  lower extremity, left;lower extremity, right  -VS      Additional Documentation  Edema Symptoms/Interventions (Group)  -VS      Recorded by [VS] Rosi Castro, OTR 05/08/20 1312      Row Name 05/08/20 1115             Lower Extremity Edema Measures, Left    Lower Extremity, Left (Edema)  knee;mid-calf  -VS      Recorded by [VS] Rosi Castro, OTR 05/08/20 1312      Row Name 05/08/20 1115             Lower Extremity Edema Measures, Right    Lower Extremity, Right (Edema)  knee;mid-calf  -VS      Recorded by [VS] Rosi Castro, OTR 05/08/20 1312      Row Name 05/08/20 1115             Edema Symptoms/Interventions    Description (Edema)  localized;pitting  -VS      Pitting Scale (Edema)  3-->moderate  -VS      Associated Symptoms (Edema)  skin creases diminished  -VS      Treatment Interventions (Edema)  compression bandaging, short  stretch;compression wrapping/taping DAHLIA dow, #15 Artiflex, #8 & 10 Bandage toes to knees  -VS      Recorded by [VS] Rosi Castro OTR 05/08/20 1312      Row Name 05/08/20 1115             Plan of Care Review    Plan of Care Reviewed With  patient  -VS      Recorded by [VS] Rosi Castro OTR 05/08/20 1312        User Key  (r) = Recorded By, (t) = Taken By, (c) = Cosigned By    Initials Name Effective Dates Discipline    VS Rosi Castro OTR 03/02/20 -  OT           Rehab Goal Summary     Row Name 05/08/20 1300             Occupational Therapy Goals    Problem Specific Goal Selection (OT)  problem specific goal 1, OT  -VS      Additional Documentation  Problem Specific Goal Selection (OT) (Row)  -VS         Problem Specific Goal 1 (OT)    Problem Specific Goal 1 (OT)  Pt. to tolerate the LE wraps (B)ly, pt's edeam to reduce with (B)LE to assist with mobility and self care skills   -VS      Time Frame (Problem Specific Goal 1, OT)  short term goal (STG);2 weeks  -VS      Progress/Outcome (Problem Specific Goal 1, OT)  continuing progress toward goal  -VS        User Key  (r) = Recorded By, (t) = Taken By, (c) = Cosigned By    Initials Name Provider Type Discipline    VS Rosi Castro, OTR Occupational Therapist OT        Occupational Therapy Education                 Title: PT OT SLP Therapies (Done)     Topic: Occupational Therapy (Done)     Point: Precautions (Done)     Description:   Instruct learner(s) on prescribed precautions during self-care and functional transfers.              Learning Progress Summary           Patient Acceptance, E,D,H, VU,NR by VS at 5/8/2020 1313    Comment:  Therapist instructed to pt. in skin care, wrapping techniques and precaution regarding the Lym[ph bandages.  Pt verbally understood, Written instruction were left in the pat's room for both the RN for over the week end and for the pt. to take home                               User Key     Initials Effective  Dates Name Provider Type Discipline    VS 03/02/20 -  Rosi Castro OTR Occupational Therapist OT                OT Recommendation and Plan  Outcome Summary/Treatment Plan (OT)  Anticipated Discharge Disposition (OT): home with home health, home with assist  Plan of Care Review  Plan of Care Reviewed With: patient  Plan of Care Reviewed With: patient  Outcome Measures     Row Name 05/08/20 1300 05/07/20 1300          How much help from another is currently needed...    Putting on and taking off regular lower body clothing?  3  -VS  3  -TM     Bathing (including washing, rinsing, and drying)  3  -VS  3  -TM     Toileting (which includes using toilet bed pan or urinal)  3  -VS  3  -TM     Putting on and taking off regular upper body clothing  3  -VS  3  -TM     Taking care of personal grooming (such as brushing teeth)  4  -VS  4  -TM     Eating meals  4  -VS  4  -TM     AM-PAC 6 Clicks Score (OT)  20  -VS  20  -TM        Functional Assessment    Outcome Measure Options  AM-PAC 6 Clicks Daily Activity (OT)  -VS  AM-PAC 6 Clicks Daily Activity (OT)  -TM       User Key  (r) = Recorded By, (t) = Taken By, (c) = Cosigned By    Initials Name Provider Type    TM Vicky Dangelo OTR Occupational Therapist    VS Rosi Castro OTR Occupational Therapist           Time Calculation:   Time Calculation- OT     Row Name 05/08/20 1322             Time Calculation- OT    OT Start Time  1030  -VS      OT Stop Time  1112  -VS      OT Time Calculation (min)  42 min  -VS      Total Timed Code Minutes- OT  42 minute(s)  -VS      OT Received On  05/08/20  -VS      OT - Next Appointment  05/11/20  -VS      OT Goal Re-Cert Due Date  05/22/20  -VS        User Key  (r) = Recorded By, (t) = Taken By, (c) = Cosigned By    Initials Name Provider Type    VS Rosi Castro OTR Occupational Therapist        Therapy Charges for Today     Code Description Service Date Service Provider Modifiers Qty    [id number]  OT MANUAL THERAPY EA  15 MIN 5/8/2020 Rosi Castro, OTR GO 3               ADY Willis  5/8/2020

## 2020-05-08 NOTE — PROGRESS NOTES
INFECTIOUS DISEASES PROGRESS NOTE    CC: f/u sepsis    S:   She is feeling better.  She had some chronic back pain today but regularly takes Tylenol for this and is not on change.  No fevers or chills or night sweats.  Tolerating antibiotics nicely    O:  Physical Exam:  Temp:  [97.6 °F (36.4 °C)-98.9 °F (37.2 °C)] 98.9 °F (37.2 °C)  Heart Rate:  [72-99] 72  Resp:  [16] 16  BP: (148-176)/(53-73) 151/53  Physical Exam   Constitutional: She appears well-developed. No distress.   Pulmonary/Chest: Effort normal.   Abdominal: Soft. She exhibits no distension. There is no tenderness.   Neurological: She is alert.   Skin: Skin is warm and dry.   Psychiatric: She has a normal mood and affect. Her behavior is normal.        Diagnostics:    White count 14 9  Hemoglobin 10.5  Platelets 177  Creatinine 1.08  Glucose 222 through 383    Blood cultures no growth to date  COVID-19 negative  Respiratory pathogen panel none detected        Assessment/Plan   1.  Sepsis  2.  Diabetes mellitus type 2, continue glycemic control efforts to prevent/control infectious complications  3.  Lymphedema    Seems to be doing reasonably well.  Continue vancomycin for goal level of 15-20 as well as ceftriaxone 2 g IV every 24 hours.  Blood cultures remain negative and she is afebrile.  Procalcitonin from this morning is pending.  I do not see any obvious source of infection.  Just some mild erythema in the left lower extremity but this is probably more lymphedema than active cellulitis.  Overall seems improved.  If blood cultures remain negative and she remains afebrile with improving leukocytosis probably stop antibiotics tomorrow and allow for discharge.      Jese Piña MD  9:27 AM  05/08/20

## 2020-05-08 NOTE — PLAN OF CARE
Problem: Patient Care Overview  Goal: Plan of Care Review  Flowsheets (Taken 5/8/2020 1317)  Plan of Care Reviewed With: patient  Note:   OT Lymph:  Pt. Is O x 4.  Pt present with moderate edema with (B)LE.  OT washed, dried and applied lotion to (B)LE from toes to knees.  Pt.'s LE were wrapped with Lymph bandages from toes to knees.  Therapist verbally instructed the pt. Is wrapping techniques and provided written instruction which are in the pt's room.  OT will wrap (B)LE Monday - Friday and RNs with wrap on Saturday/Sunday.  Pt. Will benefit for skilled Lymph OT while here in the hospital, OT encouraged the pt. To go to Out Patient [facility] Clinic to continue to assist with decreasing the edema with (B)LE.

## 2020-05-08 NOTE — PLAN OF CARE
Problem: Patient Care Overview  Goal: Plan of Care Review  Outcome: Ongoing (interventions implemented as appropriate)  Flowsheets (Taken 5/8/2020 0558)  Progress: improving  Plan of Care Reviewed With: patient  Outcome Summary: Has slept long intervals. Denies painVSS. Afebrile this shift. L abd fold rash redness much better now pink in color. No other changes. no distress     Problem: Fall Risk (Adult)  Goal: Absence of Fall  Outcome: Ongoing (interventions implemented as appropriate)  Flowsheets (Taken 5/8/2020 0558)  Absence of Fall: making progress toward outcome     Problem: Pain, Chronic (Adult)  Goal: Acceptable Pain/Comfort Level and Functional Ability  Outcome: Ongoing (interventions implemented as appropriate)  Flowsheets (Taken 5/8/2020 0558)  Acceptable Pain/Comfort Level and Functional Ability: making progress toward outcome     Problem: Sepsis/Septic Shock (Adult)  Goal: Signs and Symptoms of Listed Potential Problems Will be Absent, Minimized or Managed (Sepsis/Septic Shock)  Outcome: Ongoing (interventions implemented as appropriate)  Flowsheets (Taken 5/8/2020 0558)  Problems Assessed (Sepsis): glycemic control impaired; infection progression; situational response  Problems Present (Sepsis): glycemic control impaired; situational response     Problem: Fall Risk (Adult)  Goal: Identify Related Risk Factors and Signs and Symptoms  Outcome: Outcome(s) achieved  Flowsheets (Taken 5/8/2020 0558)  Related Risk Factors (Fall Risk): age-related changes; gait/mobility problems; fatigue/slow reaction; environment unfamiliar     Problem: Pain, Chronic (Adult)  Goal: Identify Related Risk Factors and Signs and Symptoms  Outcome: Outcome(s) achieved  Flowsheets (Taken 5/8/2020 0558)  Signs and Symptoms (Chronic Pain): verbalization of pain/discomfort for a prolonged time period

## 2020-05-08 NOTE — PROGRESS NOTES
Continued Stay Note  [facility]     Patient Name: Denisse Chavez  MRN: [id number]  Today's Date: 5/8/2020    Admit Date: 5/6/2020    Discharge Plan     Row Name 05/08/20 1156       Plan    Plan  Home with son    Patient/Family in Agreement with Plan  yes    Plan Comments  Patient plans to return home with her son Anil at [place]. Denies the need for SNF or HH currently. CCP to follow. Christi Mckeon RN        Discharge Codes    No documentation.             Christi Mckeon RN

## 2020-05-08 NOTE — PROGRESS NOTES
Name: Denisse Chavez ADMIT: 2020   : 1945  PCP: Surekha Mcdonnell MD    MRN: [id number] LOS: 1 days   AGE/SEX: 74 y.o. female  ROOM: [facility]     Subjective   Subjective   No fever or chills.  No night sweats.  No chest pain.  No shortness of breath.  No cough.  No wheeze.  No hemoptysis.  Positive of lower extremity edema.    Review of Systems  GI.  No abdominal pain.  No nausea or vomiting.  Normal bowel habits without melena or bleeding per rectum.  .  No dysuria or hematuria but positive frequency.  CNS.  No headache and no focal neurological symptoms.     Objective   Objective   Vital Signs  Temp:  [97.6 °F (36.4 °C)-98.9 °F (37.2 °C)] 98.5 °F (36.9 °C)  Heart Rate:  [72-74] 72  Resp:  [16] 16  BP: (137-176)/(53-60) 137/55  SpO2:  [91 %-97 %] 95 %  on   ;   Device (Oxygen Therapy): room air    Intake/Output Summary (Last 24 hours) at 2020 1505  Last data filed at 2020 1300  Gross per 24 hour   Intake 3398 ml   Output --   Net 3398 ml     Body mass index is 39.65 kg/m².      20  2200 20  0634 20  1329   Weight: 102 kg (225 lb) 105 kg (231 lb 11.2 oz) 105 kg (231 lb)     Physical Exam   Constitutional: She is oriented to person, place, and time. She appears well-developed and well-nourished. No distress.   Morbidly obese   HENT:   Head: Normocephalic and atraumatic.   Mouth/Throat: Oropharynx is clear and moist. No oropharyngeal exudate.   Eyes: Pupils are equal, round, and reactive to light. Conjunctivae and EOM are normal. No scleral icterus.   Neck: Normal range of motion. Neck supple. No JVD present. No thyromegaly present.   Cardiovascular: Normal rate and regular rhythm.   Murmur heard.  Grade 2 to grade 3 systolic murmur.   Pulmonary/Chest: Effort normal and breath sounds normal. No respiratory distress. She has no wheezes. She has no rales.   Abdominal: She exhibits no distension and no mass. There is no tenderness. There is no rebound and no guarding.    Genitourinary:   Genitourinary Comments: No CVA tenderness.   Musculoskeletal: She exhibits edema.   +2 to +3 bilateral lower extremity edema.  Lower extremity wrapped with Ace bandages by lymphedema clinic.   Lymphadenopathy:     She has no cervical adenopathy.   Neurological: She is alert and oriented to person, place, and time.   No acute focal neurological deficit   Skin: Skin is warm and dry. She is not diaphoretic.   Nursing note and vitals reviewed.      Results Review:      Results from last 7 days   Lab Units 05/08/20  0503 05/07/20  0524 05/06/20  2211   SODIUM mmol/L 137 137 136   POTASSIUM mmol/L 3.5 5.1 4.0   CHLORIDE mmol/L 103 103 97*   CO2 mmol/L 20.4* 14.6* 24.0   BUN mg/dL 23 21 21   CREATININE mg/dL 1.08* 1.07* 1.13*   GLUCOSE mg/dL 238* 238* 153*   CALCIUM mg/dL 7.9* 9.1 10.0   AST (SGOT) U/L  --   --  25   ALT (SGPT) U/L  --   --  14     Estimated Creatinine Clearance: 54 mL/min (A) (by C-G formula based on SCr of 1.08 mg/dL (H)).      Results from last 7 days   Lab Units 05/08/20  1212 05/08/20  0603 05/07/20  2134 05/07/20  1605 05/07/20  1048 05/07/20  0613 05/06/20  2153   GLUCOSE mg/dL 258* 222* 323* 383* 292* 246* 154*                       Invalid input(s):  PHOS        Invalid input(s): LDLCALC  Results from last 7 days   Lab Units 05/08/20  0503 05/07/20  0524 05/06/20  2211   WBC 10*3/mm3 14.91* 25.22* 21.19*   HEMOGLOBIN g/dL 10.5* 13.2 13.6   HEMATOCRIT % 31.3* 38.8 40.5   PLATELETS 10*3/mm3 177 216 251   MCV fL 83.2 83.3 83.5   MCH pg 27.9 28.3 28.0   MCHC g/dL 33.5 34.0 33.6   RDW % 13.4 14.1 13.5   RDW-SD fl 40.2 42.8 40.8   MPV fL 10.2 11.4 10.7   NEUTROPHIL % % 84.5* 89.8* 90.9*   LYMPHOCYTE % % 7.2* 3.5* 3.6*   MONOCYTES % % 6.9 5.1 4.3*   EOSINOPHIL % % 0.7 0.0* 0.2*   BASOPHIL % % 0.3 0.4 0.2   IMM GRAN % % 0.4 1.2* 0.8*   NEUTROS ABS 10*3/mm3 12.59* 22.66* 19.27*   LYMPHS ABS 10*3/mm3 1.08 0.88 0.76   MONOS ABS 10*3/mm3 1.03* 1.28* 0.92*   EOS ABS 10*3/mm3 0.11 0.00 0.04    BASOS ABS 10*3/mm3 0.04 0.09 0.04   IMMATURE GRANS (ABS) 10*3/mm3 0.06* 0.31* 0.16*   NRBC /100 WBC 0.0 0.0 0.1             Results from last 7 days   Lab Units 05/08/20  0503 05/06/20  2211   PROCALCITONIN ng/mL 0.96* 0.38*   LACTATE mmol/L  --  1.9     Results from last 7 days   Lab Units 05/06/20  2211   CRP mg/dL 2.35*     Results from last 7 days   Lab Units 05/06/20  2211   LIPASE U/L 12*     Results from last 7 days   Lab Units 05/07/20  0008 05/06/20  2211   BLOODCX  No growth at 24 hours No growth at 24 hours     Results from last 7 days   Lab Units 05/06/20  2241   ADENOVIRUS DETECTION BY PCR  Not Detected   CORONAVIRUS 229E  Not Detected   CORONAVIRUS HKU1  Not Detected   CORONAVIRUS NL63  Not Detected   CORONAVIRUS OC43  Not Detected   HUMAN METAPNEUMOVIRUS  Not Detected   HUMAN RHINOVIRUS/ENTEROVIRUS  Not Detected   INFLUENZA B PCR  Not Detected   PARAINFLUENZA 1  Not Detected   PARAINFLUENZA VIRUS 2  Not Detected   PARAINFLUENZA VIRUS 3  Not Detected   PARAINFLUENZA VIRUS 4  Not Detected   BORDETELLA PERTUSSIS PCR  Not Detected   CHLAMYDOPHILA PNEUMONIAE PCR  Not Detected   MYCOPLAMA PNEUMO PCR  Not Detected   INFLUENZA A PCR  Not Detected   INFLUENZA A H3  Not Detected   INFLUENZA A H1  Not Detected   RSV, PCR  Not Detected     Results from last 7 days   Lab Units 05/06/20  2248   NITRITE UA  Negative   WBC UA /HPF 0-2   BACTERIA UA /HPF None Seen   SQUAM EPITHEL UA /HPF 3-6*           Imaging:  Imaging Results (Last 24 Hours)     ** No results found for the last 24 hours. 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           · Sepsis.  No obvious clinical source of infection.  Negative UA.  Negative CT of abdomen for intra-abdominal infection.  Negative chest x-ray.  Improvement of her curve and leukocytosis on IV Rocephin and vancomycin.  Infectious disease following.  Negative blood cultures till now.  Still elevated procalcitonin.  · History of coronary artery disease/hypertension.  Patient is asymptomatic.  No evidence of angina or congestive heart failure.  Currently on Norvasc/aspirin/Imdur/Lasix/metoprolol will continue the same.  · Stage III chronic renal insufficiency.  Patient clinically euvolemic.  Stable renal function.  · Diabetes mellitus type 2.  Continue NPH insulin and sliding scale insulin check A1c.  · Lymphedema.  Lymphedema nurse wrapping of the lower extremity.  · Anemia.  Will work-up.  · Discussed with patient.  · Anticipate discharge 2 to 3 days.      Terrell Bradley MD  [facility]  05/08/20  15:05

## 2020-05-08 NOTE — THERAPY TREATMENT NOTE
Acute Care - Occupational Therapy Treatment Note  [facility]     Patient Name: Dneisse Chavez  : 1945  MRN: [id number]  Today's Date: 2020             Admit Date: 2020       ICD-10-CM ICD-9-CM   1. Sepsis, due to unspecified organism, unspecified whether acute organ dysfunction present (CMS/[facility]) A41.9 038.9     995.91   2. Fever, unspecified fever cause R50.9 780.60   3. 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 (CMS/[facility])   • Type II diabetes mellitus with neurological manifestations, uncontrolled (CMS/[facility])   • Uncontrolled type 2 diabetes mellitus with complication, with long-term current use of insulin (CMS/[facility])   • Hyperinsulinism   • Abnormal weight gain   • Hyperlipidemia   • Essential hypertension   • Angiomyolipoma of kidney   • Left hip pain   • Left-sided low back pain without sciatica   • Lumbar spinal stenosis   • Encounter for long-term (current) use of insulin (CMS/[facility])   • Obstructive sleep apnea on autoCPAP   • Chronic kidney disease, stage III (moderate) (CMS/[facility])   • Chronic venous insufficiency   • CAD (coronary artery disease)   • Uncontrolled type 2 diabetes mellitus with background retinopathy (CMS/[facility])   • Circadian rhythm sleep disorder, delayed sleep phase type   • History of colon polyps   • Hyperlipidemia associated with type 2 diabetes mellitus (CMS/[facility])   • Aortic valve sclerosis   • Heart murmur   • Obesity (BMI 30-39.9)   • Sepsis (CMS/[facility])     Past Medical History:   Diagnosis Date   • Angiomyolipoma of kidney 3/30/2016   • Aortic valve sclerosis    • CAD (coronary artery disease)     MI in , treated medically.  PET 2016 with small-medium sized lateral infarct, no ischemia.  This wall motion abnormality was seen on echo as well.   • Cellulitis 2018    RIGHT LEG   • Chronic kidney disease, stage III (moderate) (CMS/HCC) 10/13/2016   • Chronic venous  "insufficiency    • Heart murmur    • Hyperlipidemia    • Hypertension    • Low back pain    • Mass of ovary 3/30/2016   • Morbid obesity (CMS/HCC)    • Obesity (BMI 30-39.9) 12/22/2019   • Sleep apnea    • Spinal stenosis    • Type 2 diabetes mellitus (CMS/HCC)    • Uterine leiomyoma 3/30/2016     Past Surgical History:   Procedure Laterality Date   • CATARACT EXTRACTION      X2    • COLONOSCOPY N/A 8/29/2018    Procedure: COLONOSCOPY to CECUM WITH HOT SNARE POLYPECTOMY;  Surgeon: Neal Reilly MD;  Location: [facility] ENDOSCOPY;  Service: Gastroenterology   • EPIDURAL BLOCK     • HERNIA REPAIR     • HYSTERECTOMY     • TONSILLECTOMY         Therapy Treatment    Rehabilitation Treatment Summary     Row Name 05/08/20 1115             Treatment Time/Intention    Discipline  occupational therapist  -VS      Document Type  re-evaluation;therapy note (daily note)  -VS      Subjective Information  no complaints  -VS      Mode of Treatment  occupational therapy Lymphedema   -VS      Patient/Family Observations  -- Pt. was sitting reclined in a chair by the bed   -VS      Care Plan Review  care plan/treatment goals reviewed;patient/other agree to care plan  -VS      Patient Effort  adequate  -VS      Comment  Pt. stated that she went [facility].  Per pt. she has not returned do to cost of treatments and transportation  -VS      Existing Precautions/Restrictions  fall  -VS      Patient Response to Treatment  \"My legs do feel better wrapped\" pt. stated at the end of the session   -VS      Recorded by [VS] Rosi Castro, OTR 05/08/20 1312      Row Name 05/08/20 1115             Cognitive Assessment/Intervention- PT/OT    Orientation Status (Cognition)  oriented x 4  -VS      Follows Commands (Cognition)  WFL  -VS      Recorded by [VS] Rosi Castro OTR 05/08/20 1312      Row Name 05/08/20 1115             ADL Assessment/Intervention    BADL Assessment/Intervention  bathing  -VS      Recorded by " [VS] Rosi Castro, OTR 05/08/20 1312      Row Name 05/08/20 1115             Bathing Assessment/Intervention    Comment (Bathing)  Therapist bathe,dried an appliled lotion to (B)LE toes to knees   -VS      Recorded by [VS] Rosi Castro, OTR 05/08/20 1312      Row Name 05/08/20 1115             Positioning and Restraints    Pre-Treatment Position  sitting in chair/recliner  -VS      Post Treatment Position  chair  -VS      In Chair  notified nsg;reclined;sitting;call light within reach;encouraged to call for assist;RLE elevated;LLE elevated  -VS      Recorded by [VS] Rosi Castro, OTR 05/08/20 1312      Row Name 05/08/20 1115             Pain Assessment    Additional Documentation  Pain Scale: Numbers Pre/Post-Treatment (Group)  -VS      Recorded by [VS] Rosi Castro, OTR 05/08/20 1312      Row Name 05/08/20 1115             Pain Scale: Numbers Pre/Post-Treatment    Pain Scale: Numbers, Pretreatment  0/10 - no pain  -VS      Recorded by [VS] Rosi Castro, OTR 05/08/20 1312      Row Name 05/08/20 1115             Edema Assessment & Management    Location (Edema)  lower extremity, left;lower extremity, right  -VS      Additional Documentation  Edema Symptoms/Interventions (Group)  -VS      Recorded by [VS] Rosi Castro, OTR 05/08/20 1312      Row Name 05/08/20 1115             Lower Extremity Edema Measures, Left    Lower Extremity, Left (Edema)  knee;mid-calf  -VS      Recorded by [VS] Rosi Castro, OTR 05/08/20 1312      Row Name 05/08/20 1115             Lower Extremity Edema Measures, Right    Lower Extremity, Right (Edema)  knee;mid-calf  -VS      Recorded by [VS] Rosi Castro, OTR 05/08/20 1312      Row Name 05/08/20 1115             Edema Symptoms/Interventions    Description (Edema)  localized;pitting  -VS      Pitting Scale (Edema)  3-->moderate  -VS      Associated Symptoms (Edema)  skin creases diminished  -VS      Treatment Interventions (Edema)  compression bandaging, short  stretch;compression wrapping/taping DAHLIA dow, #15 Artiflex, #8 & 10 Bandage toes to knees  -VS      Recorded by [VS] Rosi Castro OTR 05/08/20 1312      Row Name 05/08/20 1115             Plan of Care Review    Plan of Care Reviewed With  patient  -VS      Recorded by [VS] Rosi Castro OTR 05/08/20 1312        User Key  (r) = Recorded By, (t) = Taken By, (c) = Cosigned By    Initials Name Effective Dates Discipline    VS Rosi Castro OTR 03/02/20 -  OT           Rehab Goal Summary     Row Name 05/08/20 1300             Occupational Therapy Goals    Problem Specific Goal Selection (OT)  problem specific goal 1, OT  -VS      Additional Documentation  Problem Specific Goal Selection (OT) (Row)  -VS         Problem Specific Goal 1 (OT)    Problem Specific Goal 1 (OT)  Pt. to tolerate the LE wraps (B)ly, pt's edeam to reduce with (B)LE to assist with mobility and self care skills   -VS      Time Frame (Problem Specific Goal 1, OT)  short term goal (STG);2 weeks  -VS      Progress/Outcome (Problem Specific Goal 1, OT)  continuing progress toward goal  -VS        User Key  (r) = Recorded By, (t) = Taken By, (c) = Cosigned By    Initials Name Provider Type Discipline    VS Rosi Castro, OTR Occupational Therapist OT        Occupational Therapy Education                 Title: PT OT SLP Therapies (Done)     Topic: Occupational Therapy (Done)     Point: Precautions (Done)     Description:   Instruct learner(s) on prescribed precautions during self-care and functional transfers.              Learning Progress Summary           Patient Acceptance, E,D,H, VU,NR by VS at 5/8/2020 1313    Comment:  Therapist instructed to pt. in skin care, wrapping techniques and precaution regarding the Lym[ph bandages.  Pt verbally understood, Written instruction were left in the pat's room for both the RN for over the week end and for the pt. to take home                               User Key     Initials Effective  Dates Name Provider Type Discipline    VS 03/02/20 -  Rosi Castro OTR Occupational Therapist OT                OT Recommendation and Plan  Outcome Summary/Treatment Plan (OT)  Anticipated Discharge Disposition (OT): home with home health, home with assist  Plan of Care Review  Plan of Care Reviewed With: patient  Plan of Care Reviewed With: patient  Outcome Measures     Row Name 05/08/20 1300 05/07/20 1300          How much help from another is currently needed...    Putting on and taking off regular lower body clothing?  3  -VS  3  -TM     Bathing (including washing, rinsing, and drying)  3  -VS  3  -TM     Toileting (which includes using toilet bed pan or urinal)  3  -VS  3  -TM     Putting on and taking off regular upper body clothing  3  -VS  3  -TM     Taking care of personal grooming (such as brushing teeth)  4  -VS  4  -TM     Eating meals  4  -VS  4  -TM     AM-PAC 6 Clicks Score (OT)  20  -VS  20  -TM        Functional Assessment    Outcome Measure Options  AM-PAC 6 Clicks Daily Activity (OT)  -VS  AM-PAC 6 Clicks Daily Activity (OT)  -TM       User Key  (r) = Recorded By, (t) = Taken By, (c) = Cosigned By    Initials Name Provider Type    TM Vicky Dangelo OTR Occupational Therapist    VS Rosi Castro OTR Occupational Therapist           Time Calculation:   Time Calculation- OT     Row Name 05/08/20 1322             Time Calculation- OT    OT Start Time  1030  -VS      OT Stop Time  1112  -VS      OT Time Calculation (min)  42 min  -VS      Total Timed Code Minutes- OT  42 minute(s)  -VS      OT Received On  05/08/20  -VS      OT - Next Appointment  05/11/20  -VS      OT Goal Re-Cert Due Date  05/22/20  -VS        User Key  (r) = Recorded By, (t) = Taken By, (c) = Cosigned By    Initials Name Provider Type    VS Rosi Castro OTR Occupational Therapist        Therapy Charges for Today     Code Description Service Date Service Provider Modifiers Qty    [id number]  OT MANUAL THERAPY EA  15 MIN 5/8/2020 Rosi Castro, OTR GO 3               ADY Willis  5/8/2020

## 2020-05-09 LAB
ANION GAP SERPL CALCULATED.3IONS-SCNC: 11.5 MMOL/L (ref 5–15)
BASOPHILS # BLD AUTO: 0.04 10*3/MM3 (ref 0–0.2)
BASOPHILS NFR BLD AUTO: 0.3 % (ref 0–1.5)
BUN BLD-MCNC: 18 MG/DL (ref 8–23)
BUN/CREAT SERPL: 20 (ref 7–25)
CALCIUM SPEC-SCNC: 8.4 MG/DL (ref 8.6–10.5)
CHLORIDE SERPL-SCNC: 103 MMOL/L (ref 98–107)
CO2 SERPL-SCNC: 24.5 MMOL/L (ref 22–29)
CREAT BLD-MCNC: 0.9 MG/DL (ref 0.57–1)
DEPRECATED RDW RBC AUTO: 40.6 FL (ref 37–54)
EOSINOPHIL # BLD AUTO: 0.41 10*3/MM3 (ref 0–0.4)
EOSINOPHIL NFR BLD AUTO: 3.2 % (ref 0.3–6.2)
ERYTHROCYTE [DISTWIDTH] IN BLOOD BY AUTOMATED COUNT: 13.6 % (ref 12.3–15.4)
FERRITIN SERPL-MCNC: 137 NG/ML (ref 13–150)
FOLATE SERPL-MCNC: 8.8 NG/ML (ref 4.78–24.2)
GFR SERPL CREATININE-BSD FRML MDRD: 61 ML/MIN/1.73
GLUCOSE BLD-MCNC: 187 MG/DL (ref 65–99)
GLUCOSE BLDC GLUCOMTR-MCNC: 160 MG/DL (ref 70–130)
GLUCOSE BLDC GLUCOMTR-MCNC: 230 MG/DL (ref 70–130)
GLUCOSE BLDC GLUCOMTR-MCNC: 237 MG/DL (ref 70–130)
GLUCOSE BLDC GLUCOMTR-MCNC: 263 MG/DL (ref 70–130)
HBA1C MFR BLD: 7.3 % (ref 4.8–5.6)
HCT VFR BLD AUTO: 33.1 % (ref 34–46.6)
HGB BLD-MCNC: 11.2 G/DL (ref 12–15.9)
IMM GRANULOCYTES # BLD AUTO: 0.07 10*3/MM3 (ref 0–0.05)
IMM GRANULOCYTES NFR BLD AUTO: 0.5 % (ref 0–0.5)
IRON 24H UR-MRATE: 21 MCG/DL (ref 37–145)
IRON SATN MFR SERPL: 7 % (ref 20–50)
LYMPHOCYTES # BLD AUTO: 1.57 10*3/MM3 (ref 0.7–3.1)
LYMPHOCYTES NFR BLD AUTO: 12.3 % (ref 19.6–45.3)
MAGNESIUM SERPL-MCNC: 2 MG/DL (ref 1.6–2.4)
MCH RBC QN AUTO: 28.1 PG (ref 26.6–33)
MCHC RBC AUTO-ENTMCNC: 33.8 G/DL (ref 31.5–35.7)
MCV RBC AUTO: 83 FL (ref 79–97)
MONOCYTES # BLD AUTO: 0.98 10*3/MM3 (ref 0.1–0.9)
MONOCYTES NFR BLD AUTO: 7.7 % (ref 5–12)
NEUTROPHILS # BLD AUTO: 9.72 10*3/MM3 (ref 1.7–7)
NEUTROPHILS NFR BLD AUTO: 76 % (ref 42.7–76)
NRBC BLD AUTO-RTO: 0.1 /100 WBC (ref 0–0.2)
PLATELET # BLD AUTO: 178 10*3/MM3 (ref 140–450)
PMV BLD AUTO: 10.4 FL (ref 6–12)
POTASSIUM BLD-SCNC: 3.3 MMOL/L (ref 3.5–5.2)
PROCALCITONIN SERPL-MCNC: 0.55 NG/ML (ref 0.1–0.25)
RBC # BLD AUTO: 3.99 10*6/MM3 (ref 3.77–5.28)
SODIUM BLD-SCNC: 139 MMOL/L (ref 136–145)
TIBC SERPL-MCNC: 302 MCG/DL (ref 298–536)
TRANSFERRIN SERPL-MCNC: 203 MG/DL (ref 200–360)
VANCOMYCIN TROUGH SERPL-MCNC: 17.5 MCG/ML (ref 5–20)
VIT B12 BLD-MCNC: 384 PG/ML (ref 211–946)
WBC NRBC COR # BLD: 12.79 10*3/MM3 (ref 3.4–10.8)

## 2020-05-09 PROCEDURE — 84466 ASSAY OF TRANSFERRIN: CPT | Performed by: INTERNAL MEDICINE

## 2020-05-09 PROCEDURE — 36415 COLL VENOUS BLD VENIPUNCTURE: CPT | Performed by: NURSE PRACTITIONER

## 2020-05-09 PROCEDURE — 83036 HEMOGLOBIN GLYCOSYLATED A1C: CPT | Performed by: INTERNAL MEDICINE

## 2020-05-09 PROCEDURE — 80202 ASSAY OF VANCOMYCIN: CPT | Performed by: NURSE PRACTITIONER

## 2020-05-09 PROCEDURE — 63710000001 INSULIN ISOPHANE HUMAN PER 5 UNITS: Performed by: NURSE PRACTITIONER

## 2020-05-09 PROCEDURE — 85025 COMPLETE CBC W/AUTO DIFF WBC: CPT | Performed by: INTERNAL MEDICINE

## 2020-05-09 PROCEDURE — 83735 ASSAY OF MAGNESIUM: CPT | Performed by: INTERNAL MEDICINE

## 2020-05-09 PROCEDURE — 82728 ASSAY OF FERRITIN: CPT | Performed by: INTERNAL MEDICINE

## 2020-05-09 PROCEDURE — 84145 PROCALCITONIN (PCT): CPT | Performed by: INTERNAL MEDICINE

## 2020-05-09 PROCEDURE — 25010000003 CEFTRIAXONE PER 250 MG: Performed by: NURSE PRACTITIONER

## 2020-05-09 PROCEDURE — 63710000001 INSULIN LISPRO (HUMAN) PER 5 UNITS: Performed by: HOSPITALIST

## 2020-05-09 PROCEDURE — 82746 ASSAY OF FOLIC ACID SERUM: CPT | Performed by: INTERNAL MEDICINE

## 2020-05-09 PROCEDURE — 99232 SBSQ HOSP IP/OBS MODERATE 35: CPT | Performed by: INTERNAL MEDICINE

## 2020-05-09 PROCEDURE — 82607 VITAMIN B-12: CPT | Performed by: INTERNAL MEDICINE

## 2020-05-09 PROCEDURE — 25010000002 VANCOMYCIN PER 500 MG: Performed by: NURSE PRACTITIONER

## 2020-05-09 PROCEDURE — 80048 BASIC METABOLIC PNL TOTAL CA: CPT | Performed by: INTERNAL MEDICINE

## 2020-05-09 PROCEDURE — 83540 ASSAY OF IRON: CPT | Performed by: INTERNAL MEDICINE

## 2020-05-09 PROCEDURE — 82962 GLUCOSE BLOOD TEST: CPT

## 2020-05-09 RX ORDER — POTASSIUM CHLORIDE 1.5 G/1.77G
40 POWDER, FOR SOLUTION ORAL AS NEEDED
Status: DISCONTINUED | OUTPATIENT
Start: 2020-05-09 | End: 2020-05-10 | Stop reason: HOSPADM

## 2020-05-09 RX ORDER — MAGNESIUM SULFATE HEPTAHYDRATE 40 MG/ML
4 INJECTION, SOLUTION INTRAVENOUS AS NEEDED
Status: DISCONTINUED | OUTPATIENT
Start: 2020-05-09 | End: 2020-05-10 | Stop reason: HOSPADM

## 2020-05-09 RX ORDER — METOPROLOL SUCCINATE 100 MG/1
100 TABLET, EXTENDED RELEASE ORAL DAILY
Status: DISCONTINUED | OUTPATIENT
Start: 2020-05-10 | End: 2020-05-10 | Stop reason: HOSPADM

## 2020-05-09 RX ORDER — POTASSIUM CHLORIDE 750 MG/1
40 CAPSULE, EXTENDED RELEASE ORAL AS NEEDED
Status: DISCONTINUED | OUTPATIENT
Start: 2020-05-09 | End: 2020-05-10 | Stop reason: HOSPADM

## 2020-05-09 RX ORDER — MAGNESIUM SULFATE HEPTAHYDRATE 40 MG/ML
2 INJECTION, SOLUTION INTRAVENOUS AS NEEDED
Status: DISCONTINUED | OUTPATIENT
Start: 2020-05-09 | End: 2020-05-10 | Stop reason: HOSPADM

## 2020-05-09 RX ORDER — ALUMINA, MAGNESIA, AND SIMETHICONE 2400; 2400; 240 MG/30ML; MG/30ML; MG/30ML
15 SUSPENSION ORAL ONCE
Status: COMPLETED | OUTPATIENT
Start: 2020-05-09 | End: 2020-05-09

## 2020-05-09 RX ADMIN — SODIUM CHLORIDE, PRESERVATIVE FREE 10 ML: 5 INJECTION INTRAVENOUS at 08:05

## 2020-05-09 RX ADMIN — NYSTATIN: 100000 POWDER TOPICAL at 08:05

## 2020-05-09 RX ADMIN — ACETAMINOPHEN 650 MG: 325 TABLET, FILM COATED ORAL at 18:17

## 2020-05-09 RX ADMIN — POTASSIUM CHLORIDE 40 MEQ: 10 CAPSULE, COATED, EXTENDED RELEASE ORAL at 20:53

## 2020-05-09 RX ADMIN — FUROSEMIDE 40 MG: 40 TABLET ORAL at 08:05

## 2020-05-09 RX ADMIN — ISOSORBIDE MONONITRATE 60 MG: 60 TABLET ORAL at 08:05

## 2020-05-09 RX ADMIN — INSULIN LISPRO 2 UNITS: 100 INJECTION, SOLUTION INTRAVENOUS; SUBCUTANEOUS at 08:04

## 2020-05-09 RX ADMIN — METOPROLOL SUCCINATE 50 MG: 50 TABLET, EXTENDED RELEASE ORAL at 08:05

## 2020-05-09 RX ADMIN — INSULIN HUMAN 26 UNITS: 100 INJECTION, SUSPENSION SUBCUTANEOUS at 08:04

## 2020-05-09 RX ADMIN — POTASSIUM CHLORIDE 40 MEQ: 10 CAPSULE, COATED, EXTENDED RELEASE ORAL at 15:47

## 2020-05-09 RX ADMIN — ATORVASTATIN CALCIUM 20 MG: 20 TABLET, FILM COATED ORAL at 08:05

## 2020-05-09 RX ADMIN — ALUMINUM HYDROXIDE, MAGNESIUM HYDROXIDE, AND DIMETHICONE 15 ML: 400; 400; 40 SUSPENSION ORAL at 00:53

## 2020-05-09 RX ADMIN — DOCUSATE SODIUM 100 MG: 100 CAPSULE, LIQUID FILLED ORAL at 20:58

## 2020-05-09 RX ADMIN — AMLODIPINE BESYLATE 10 MG: 10 TABLET ORAL at 08:05

## 2020-05-09 RX ADMIN — HYDROXYZINE HYDROCHLORIDE 25 MG: 25 TABLET, FILM COATED ORAL at 22:56

## 2020-05-09 RX ADMIN — VANCOMYCIN HYDROCHLORIDE 1000 MG: 1 INJECTION, SOLUTION INTRAVENOUS at 00:53

## 2020-05-09 RX ADMIN — ASPIRIN 81 MG: 81 TABLET, COATED ORAL at 08:05

## 2020-05-09 RX ADMIN — NYSTATIN: 100000 POWDER TOPICAL at 20:54

## 2020-05-09 RX ADMIN — VANCOMYCIN HYDROCHLORIDE 1000 MG: 1 INJECTION, SOLUTION INTRAVENOUS at 13:23

## 2020-05-09 RX ADMIN — ACETAMINOPHEN 650 MG: 325 TABLET, FILM COATED ORAL at 22:56

## 2020-05-09 RX ADMIN — INSULIN LISPRO 4 UNITS: 100 INJECTION, SOLUTION INTRAVENOUS; SUBCUTANEOUS at 17:44

## 2020-05-09 RX ADMIN — INSULIN LISPRO 4 UNITS: 100 INJECTION, SOLUTION INTRAVENOUS; SUBCUTANEOUS at 11:35

## 2020-05-09 RX ADMIN — CEFTRIAXONE SODIUM 2 G: 2 INJECTION, SOLUTION INTRAVENOUS at 03:12

## 2020-05-09 RX ADMIN — INSULIN LISPRO 6 UNITS: 100 INJECTION, SOLUTION INTRAVENOUS; SUBCUTANEOUS at 20:53

## 2020-05-09 NOTE — NURSING NOTE
Patient   Informed RN during change of shift that she called 25 minutes ago and that somebody answered that somebody will be there to come to help her but  nobody come. RN did not received any call . RN helped patient to the bathroom.

## 2020-05-09 NOTE — PROGRESS NOTES
Name: Denisse Chavez ADMIT: 2020   : 1945  PCP: Surekha Mcdonnell MD    MRN: [id number] LOS: 2 days   AGE/SEX: 74 y.o. female  ROOM: [facility]     Subjective   Subjective   No fever or chills.  No night sweats.  No chest pain.  No shortness of breath.  No cough.  No wheeze.  No hemoptysis.  Positive of lower extremity edema.    Review of Systems    GI.  No abdominal pain.  No nausea or vomiting.  Normal bowel habits without melena or bleeding per rectum.  .  No dysuria or hematuria but positive frequency.  CNS.  No headache and no focal neurological symptoms.     Objective   Objective   Vital Signs  Temp:  [98.2 °F (36.8 °C)-98.6 °F (37 °C)] 98.5 °F (36.9 °C)  Heart Rate:  [65-86] 65  Resp:  [16-18] 16  BP: (140-189)/(60-70) 159/64  SpO2:  [91 %-99 %] 98 %  on   ;   Device (Oxygen Therapy): room air    Intake/Output Summary (Last 24 hours) at 2020 1428  Last data filed at 2020 1300  Gross per 24 hour   Intake 1210 ml   Output --   Net 1210 ml     Body mass index is 39.65 kg/m².      20  2200 20  0634 20  1329   Weight: 102 kg (225 lb) 105 kg (231 lb 11.2 oz) 105 kg (231 lb)     Physical Exam   Constitutional: She is oriented to person, place, and time. She appears well-developed and well-nourished. No distress.   Morbidly obese   HENT:   Head: Normocephalic and atraumatic.   Mouth/Throat: Oropharynx is clear and moist. No oropharyngeal exudate.   Eyes: Pupils are equal, round, and reactive to light. Conjunctivae and EOM are normal. No scleral icterus.   Neck: Normal range of motion. Neck supple. No JVD present. No thyromegaly present.   Cardiovascular: Normal rate and regular rhythm.   Murmur heard.  Grade 2 to grade 3 systolic murmur.   Pulmonary/Chest: Effort normal and breath sounds normal. No respiratory distress. She has no wheezes. She has no rales.   Abdominal: She exhibits no distension and no mass. There is no tenderness. There is no rebound and no guarding.    Genitourinary:   Genitourinary Comments: No CVA tenderness.   Musculoskeletal: She exhibits edema.   +2 to +3 bilateral lower extremity edema.  Lower extremity wrapped with Ace bandages by lymphedema clinic.   Lymphadenopathy:     She has no cervical adenopathy.   Neurological: She is alert and oriented to person, place, and time.   No acute focal neurological deficit   Skin: Skin is warm and dry. She is not diaphoretic.   Nursing note and vitals reviewed.      Results Review:      Results from last 7 days   Lab Units 05/09/20  0500 05/08/20  0503 05/07/20  0524 05/06/20  2211   SODIUM mmol/L 139 137 137 136   POTASSIUM mmol/L 3.3* 3.5 5.1 4.0   CHLORIDE mmol/L 103 103 103 97*   CO2 mmol/L 24.5 20.4* 14.6* 24.0   BUN mg/dL 18 23 21 21   CREATININE mg/dL 0.90 1.08* 1.07* 1.13*   GLUCOSE mg/dL 187* 238* 238* 153*   CALCIUM mg/dL 8.4* 7.9* 9.1 10.0   AST (SGOT) U/L  --   --   --  25   ALT (SGPT) U/L  --   --   --  14     Estimated Creatinine Clearance: 64.8 mL/min (by C-G formula based on SCr of 0.9 mg/dL).  Results from last 7 days   Lab Units 05/09/20  0500   HEMOGLOBIN A1C % 7.30*     Results from last 7 days   Lab Units 05/09/20  1119 05/09/20  0644 05/08/20  2158 05/08/20  1628 05/08/20  1212 05/08/20  0603 05/07/20  2134 05/07/20  1605   GLUCOSE mg/dL 230* 160* 293* 326* 258* 222* 323* 383*                       Invalid input(s):  PHOS        Invalid input(s): LDLCALC  Results from last 7 days   Lab Units 05/09/20  0500 05/08/20  0503 05/07/20  0524 05/06/20  2211   WBC 10*3/mm3 12.79* 14.91* 25.22* 21.19*   HEMOGLOBIN g/dL 11.2* 10.5* 13.2 13.6   HEMATOCRIT % 33.1* 31.3* 38.8 40.5   PLATELETS 10*3/mm3 178 177 216 251   MCV fL 83.0 83.2 83.3 83.5   MCH pg 28.1 27.9 28.3 28.0   MCHC g/dL 33.8 33.5 34.0 33.6   RDW % 13.6 13.4 14.1 13.5   RDW-SD fl 40.6 40.2 42.8 40.8   MPV fL 10.4 10.2 11.4 10.7   NEUTROPHIL % % 76.0 84.5* 89.8* 90.9*   LYMPHOCYTE % % 12.3* 7.2* 3.5* 3.6*   MONOCYTES % % 7.7 6.9 5.1 4.3*    EOSINOPHIL % % 3.2 0.7 0.0* 0.2*   BASOPHIL % % 0.3 0.3 0.4 0.2   IMM GRAN % % 0.5 0.4 1.2* 0.8*   NEUTROS ABS 10*3/mm3 9.72* 12.59* 22.66* 19.27*   LYMPHS ABS 10*3/mm3 1.57 1.08 0.88 0.76   MONOS ABS 10*3/mm3 0.98* 1.03* 1.28* 0.92*   EOS ABS 10*3/mm3 0.41* 0.11 0.00 0.04   BASOS ABS 10*3/mm3 0.04 0.04 0.09 0.04   IMMATURE GRANS (ABS) 10*3/mm3 0.07* 0.06* 0.31* 0.16*   NRBC /100 WBC 0.1 0.0 0.0 0.1             Results from last 7 days   Lab Units 05/09/20  0500 05/08/20  0503 05/06/20  2211   PROCALCITONIN ng/mL 0.55* 0.96* 0.38*   LACTATE mmol/L  --   --  1.9     Results from last 7 days   Lab Units 05/06/20  2211   CRP mg/dL 2.35*     Results from last 7 days   Lab Units 05/06/20  2211   LIPASE U/L 12*     Results from last 7 days   Lab Units 05/07/20  0008 05/06/20  2211   BLOODCX  No growth at 2 days No growth at 2 days     Results from last 7 days   Lab Units 05/06/20  2241   ADENOVIRUS DETECTION BY PCR  Not Detected   CORONAVIRUS 229E  Not Detected   CORONAVIRUS HKU1  Not Detected   CORONAVIRUS NL63  Not Detected   CORONAVIRUS OC43  Not Detected   HUMAN METAPNEUMOVIRUS  Not Detected   HUMAN RHINOVIRUS/ENTEROVIRUS  Not Detected   INFLUENZA B PCR  Not Detected   PARAINFLUENZA 1  Not Detected   PARAINFLUENZA VIRUS 2  Not Detected   PARAINFLUENZA VIRUS 3  Not Detected   PARAINFLUENZA VIRUS 4  Not Detected   BORDETELLA PERTUSSIS PCR  Not Detected   CHLAMYDOPHILA PNEUMONIAE PCR  Not Detected   MYCOPLAMA PNEUMO PCR  Not Detected   INFLUENZA A PCR  Not Detected   INFLUENZA A H3  Not Detected   INFLUENZA A H1  Not Detected   RSV, PCR  Not Detected     Results from last 7 days   Lab Units 05/06/20  2248   NITRITE UA  Negative   WBC UA /HPF 0-2   BACTERIA UA /HPF None Seen   SQUAM EPITHEL UA /HPF 3-6*           Imaging:  Imaging Results (Last 24 Hours)     ** No results found for the last 24 hours. 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           · Sepsis.  No obvious clinical source of infection.  Negative UA.  Negative CT of abdomen for intra-abdominal infection.  Negative chest x-ray.  Improvement of her temperature curve and leukocytosis on IV Rocephin and vancomycin.  Infectious disease following.  Negative blood cultures till now.  Still elevated procalcitonin.improving.  Monitor CBC.  Will await infectious disease consultation feedback about switching to p.o. antibiotics.  · History of coronary artery disease/hypertension.  Patient is asymptomatic.  No evidence of angina or congestive heart failure.  Mildly elevated blood pressure currently on Norvasc/aspirin/Imdur/Lasix/metoprolol.  Will increase metoprolol.  · Stage III chronic renal insufficiency.  Patient clinically euvolemic.  Stable renal function.  · Hypokalemia.  Will substitute and check magnesium.  · Diabetes mellitus type 2.  Continue NPH insulin and sliding scale insulin.  A1c 7.3.    · Lymphedema.  Lymphedema nurse wrapping of the lower extremity.  · Chronic disease anemia.  Hemoglobin is stable.  · Discussed with patient.  · Anticipate discharge 2 to 3 days.      Terrell Bradley MD  [facility]  05/09/20  14:28

## 2020-05-09 NOTE — PROGRESS NOTES
INFECTIOUS DISEASES PROGRESS NOTE    CC: f/u sepsis    S: Remains afebrile, no new complaints.  Denies any abdominal pain nausea vomiting or diarrhea.  No shortness of breath cough rhinorrhea or sore throat.  No rash      O:  Physical Exam:  Temp:  [98.2 °F (36.8 °C)-98.6 °F (37 °C)] 98.2 °F (36.8 °C)  Heart Rate:  [70-86] 73  Resp:  [16-18] 18  BP: (137-189)/(55-70) 173/66  Physical Exam   in NAD  RRR, + LE edema  Breathing comfortably on room air  Abdomen is soft nontender nondistended positive bowel sounds bilaterally  No rashes     Diagnostics:    Lab Results   Component Value Date    WBC 12.79 (H) 05/09/2020    HGB 11.2 (L) 05/09/2020    HCT 33.1 (L) 05/09/2020    MCV 83.0 05/09/2020     05/09/2020     Lab Results   Component Value Date    GLUCOSE 187 (H) 05/09/2020    BUN 18 05/09/2020    CREATININE 0.90 05/09/2020    EGFRIFNONA 61 05/09/2020    EGFRIFAFRI 63 04/06/2020    BCR 20.0 05/09/2020    K 3.3 (L) 05/09/2020    CO2 24.5 05/09/2020    CALCIUM 8.4 (L) 05/09/2020    PROTENTOTREF 6.7 04/06/2020    ALBUMIN 4.10 05/06/2020    LABIL2 1.4 04/06/2020    AST 25 05/06/2020    ALT 14 05/06/2020     Blood cultures no growth to date x2  COVID-19 negative  Respiratory pathogen panel none detected    Procal 0.96 -> 0.55    Assessment/Plan   1.  Sepsis  2.  Diabetes mellitus type 2, continue glycemic control efforts to prevent/control infectious complications  3.  Lymphedema    Blood cultures are negative to date.  No evidence of infection on chest x-ray or CT of the abdomen and pelvis.  Echocardiogram without any endocarditis.  Procalcitonin has decreased.  White blood cell count is decreasing.  Discontinue vancomycin and ceftriaxone.     ID will sign off.  Please do not hesitate to call us with further questions or concerns        Radha Bryant MD  9:27 AM  05/09/20

## 2020-05-09 NOTE — PROGRESS NOTES
"Pharmacokinetic Consult - Vancomycin Dosing (Follow-up Note)    Denisse Chavez is on day 3 pharmacy to dose vancomycin for sepsis.  Pharmacy dosing vancomycin per MEREDITH Bone's request.  (ID - Dr Piña)  Goal trough: 15-20 mg/L     Relevant clinical data and objective history reviewed:  74 y.o. female 162.6 cm (64\") 105 kg (231 lb)    Past Medical History:   Diagnosis Date   • Angiomyolipoma of kidney 3/30/2016   • Aortic valve sclerosis    • CAD (coronary artery disease)     MI in 1996, treated medically.  PET 6/2016 with small-medium sized lateral infarct, no ischemia.  This wall motion abnormality was seen on echo as well.   • Cellulitis 07/2018    RIGHT LEG   • Chronic kidney disease, stage III (moderate) (CMS/[facility]) 10/13/2016   • Chronic venous insufficiency    • Heart murmur    • Hyperlipidemia    • Hypertension    • Low back pain    • Mass of ovary 3/30/2016   • Morbid obesity (CMS/[facility])    • Obesity (BMI 30-39.9) 12/22/2019   • Sleep apnea    • Spinal stenosis    • Type 2 diabetes mellitus (CMS/[facility])    • Uterine leiomyoma 3/30/2016     Creatinine   Date Value Ref Range Status   05/09/2020 0.90 0.57 - 1.00 mg/dL Final   05/08/2020 1.08 (H) 0.57 - 1.00 mg/dL Final   05/07/2020 1.07 (H) 0.57 - 1.00 mg/dL Final     BUN   Date Value Ref Range Status   05/09/2020 18 8 - 23 mg/dL Final     Estimated Creatinine Clearance: 64.8 mL/min (by C-G formula based on SCr of 0.9 mg/dL).    Lab Results   Component Value Date    WBC 12.79 (H) 05/09/2020     Temp Readings from Last 3 Encounters:   05/09/20 98.2 °F (36.8 °C) (Oral)   03/04/20 98.6 °F (37 °C) (Oral)   01/31/20 98.9 °F (37.2 °C) (Oral)     Baseline culture/source/susceptibility:     5/6 BC [id number]  5/6 RVP negative  5/6 COVID19 negative   5/6 UA no bacteria seen, 0-2 WBC     Anti-Infectives (From admission, onward)    Ordered     Dose/Rate Route Frequency Start Stop    05/07/20 3444  vancomycin (VANCOCIN) in iso-osmotic dextrose IVPB 1 g (premix) 200 " mL  Review   Ordering Provider:  Kitty Bone APRN    1,000 mg  over 100 Minutes Intravenous Every 12 Hours 05/07/20 1300 05/12/20 1259    05/07/20 0251  cefTRIAXone (ROCEPHIN) IVPB 2 g  Review   Ordering Provider:  Kitty Bone APRN    2 g  100 mL/hr over 30 Minutes Intravenous Every 24 Hours 05/07/20 0253 05/12/20 0252    05/07/20 0221  Pharmacy to dose vancomycin  Review   Ordering Provider:  Kitty Bone APRN     Does not apply Continuous PRN 05/07/20 0221 05/12/20 0220    05/06/20 2352  vancomycin 2000 mg/500 mL 0.9% NS IVPB ([facility])     Ordering Provider:  Ascencion Villafana MD    20 mg/kg × 102 kg Intravenous Once 05/06/20 2354 05/07/20 0111    05/06/20 2352  piperacillin-tazobactam (ZOSYN) 3.375 g in iso-osmotic dextrose 50 ml (premix)     Ordering Provider:  Ascencion Villafana MD    3.375 g Intravenous Once 05/06/20 2354 05/07/20 0041         Lab Results   Component Value Date    [facility] 17.50 05/09/2020     Recent Vancomycin dosing history:  5/7 0111 2000mg IV x1  5/7 1438 1000mg IV q12h (~10mg/kg)  5/8  0023 1229   5/9 0053    5/9 1222 trough = 17.5 mcg/mL (~11.25 hr level)  5/9 1323    Assessment/Plan  Trough at steady state is at goal 15-20. Will monitor serum creatinine at least every 48 hours per dosing recommendations.Consult is through 5/12 so will not recheck level unless acute change in status or consult extended. Pharmacy will continue to follow daily while on vancomycin and adjust as needed.     Sundar Galvez [facility]

## 2020-05-09 NOTE — PLAN OF CARE
Problem: Patient Care Overview  Goal: Plan of Care Review  Outcome: Ongoing (interventions implemented as appropriate)  Flowsheets (Taken 5/9/2020 0421)  Plan of Care Reviewed With: patient  Outcome Summary: Vital signs as charted. Compression wrap on BLE dry and intact. Medicated for heartburn with good results. IV antibiotic as ordered. Fall precautions enforced. Monitored.

## 2020-05-10 ENCOUNTER — READMISSION MANAGEMENT (OUTPATIENT)
Dept: CALL CENTER | Facility: HOSPITAL | Age: 75
End: 2020-05-10

## 2020-05-10 VITALS
TEMPERATURE: 98.5 F | HEIGHT: 64 IN | SYSTOLIC BLOOD PRESSURE: 155 MMHG | RESPIRATION RATE: 16 BRPM | HEART RATE: 63 BPM | WEIGHT: 231 LBS | BODY MASS INDEX: 39.44 KG/M2 | DIASTOLIC BLOOD PRESSURE: 61 MMHG | OXYGEN SATURATION: 96 %

## 2020-05-10 PROBLEM — A41.9 SEPSIS: Status: RESOLVED | Noted: 2020-05-07 | Resolved: 2020-05-10

## 2020-05-10 LAB
ANION GAP SERPL CALCULATED.3IONS-SCNC: 10.2 MMOL/L (ref 5–15)
BASOPHILS # BLD AUTO: 0.04 10*3/MM3 (ref 0–0.2)
BASOPHILS NFR BLD AUTO: 0.4 % (ref 0–1.5)
BUN BLD-MCNC: 15 MG/DL (ref 8–23)
BUN/CREAT SERPL: 20 (ref 7–25)
CALCIUM SPEC-SCNC: 8.6 MG/DL (ref 8.6–10.5)
CHLORIDE SERPL-SCNC: 103 MMOL/L (ref 98–107)
CO2 SERPL-SCNC: 23.8 MMOL/L (ref 22–29)
CREAT BLD-MCNC: 0.75 MG/DL (ref 0.57–1)
DEPRECATED RDW RBC AUTO: 40.2 FL (ref 37–54)
EOSINOPHIL # BLD AUTO: 0.56 10*3/MM3 (ref 0–0.4)
EOSINOPHIL NFR BLD AUTO: 5.4 % (ref 0.3–6.2)
ERYTHROCYTE [DISTWIDTH] IN BLOOD BY AUTOMATED COUNT: 13.3 % (ref 12.3–15.4)
GFR SERPL CREATININE-BSD FRML MDRD: 76 ML/MIN/1.73
GLUCOSE BLD-MCNC: 170 MG/DL (ref 65–99)
GLUCOSE BLDC GLUCOMTR-MCNC: 171 MG/DL (ref 70–130)
GLUCOSE BLDC GLUCOMTR-MCNC: 245 MG/DL (ref 70–130)
GLUCOSE BLDC GLUCOMTR-MCNC: 293 MG/DL (ref 70–130)
HCT VFR BLD AUTO: 32.6 % (ref 34–46.6)
HGB BLD-MCNC: 10.9 G/DL (ref 12–15.9)
IMM GRANULOCYTES # BLD AUTO: 0.07 10*3/MM3 (ref 0–0.05)
IMM GRANULOCYTES NFR BLD AUTO: 0.7 % (ref 0–0.5)
LYMPHOCYTES # BLD AUTO: 1.83 10*3/MM3 (ref 0.7–3.1)
LYMPHOCYTES NFR BLD AUTO: 17.8 % (ref 19.6–45.3)
MCH RBC QN AUTO: 27.7 PG (ref 26.6–33)
MCHC RBC AUTO-ENTMCNC: 33.4 G/DL (ref 31.5–35.7)
MCV RBC AUTO: 82.7 FL (ref 79–97)
MONOCYTES # BLD AUTO: 0.78 10*3/MM3 (ref 0.1–0.9)
MONOCYTES NFR BLD AUTO: 7.6 % (ref 5–12)
NEUTROPHILS # BLD AUTO: 7.02 10*3/MM3 (ref 1.7–7)
NEUTROPHILS NFR BLD AUTO: 68.1 % (ref 42.7–76)
NRBC BLD AUTO-RTO: 0.1 /100 WBC (ref 0–0.2)
PLATELET # BLD AUTO: 215 10*3/MM3 (ref 140–450)
PMV BLD AUTO: 10.3 FL (ref 6–12)
POTASSIUM BLD-SCNC: 3.9 MMOL/L (ref 3.5–5.2)
POTASSIUM BLD-SCNC: 4.1 MMOL/L (ref 3.5–5.2)
RBC # BLD AUTO: 3.94 10*6/MM3 (ref 3.77–5.28)
SODIUM BLD-SCNC: 137 MMOL/L (ref 136–145)
WBC NRBC COR # BLD: 10.3 10*3/MM3 (ref 3.4–10.8)

## 2020-05-10 PROCEDURE — 82962 GLUCOSE BLOOD TEST: CPT

## 2020-05-10 PROCEDURE — 80048 BASIC METABOLIC PNL TOTAL CA: CPT | Performed by: INTERNAL MEDICINE

## 2020-05-10 PROCEDURE — 63710000001 INSULIN LISPRO (HUMAN) PER 5 UNITS: Performed by: HOSPITALIST

## 2020-05-10 PROCEDURE — 84132 ASSAY OF SERUM POTASSIUM: CPT | Performed by: INTERNAL MEDICINE

## 2020-05-10 PROCEDURE — 63710000001 INSULIN ISOPHANE HUMAN PER 5 UNITS: Performed by: NURSE PRACTITIONER

## 2020-05-10 PROCEDURE — 85025 COMPLETE CBC W/AUTO DIFF WBC: CPT | Performed by: INTERNAL MEDICINE

## 2020-05-10 RX ORDER — METOPROLOL SUCCINATE 100 MG/1
100 TABLET, EXTENDED RELEASE ORAL DAILY
Qty: 30 TABLET | Refills: 3 | Status: SHIPPED | OUTPATIENT
Start: 2020-05-11 | End: 2020-11-03 | Stop reason: SDUPTHER

## 2020-05-10 RX ADMIN — DOCUSATE SODIUM 100 MG: 100 CAPSULE, LIQUID FILLED ORAL at 08:14

## 2020-05-10 RX ADMIN — INSULIN LISPRO 2 UNITS: 100 INJECTION, SOLUTION INTRAVENOUS; SUBCUTANEOUS at 08:13

## 2020-05-10 RX ADMIN — INSULIN LISPRO 4 UNITS: 100 INJECTION, SOLUTION INTRAVENOUS; SUBCUTANEOUS at 17:02

## 2020-05-10 RX ADMIN — ASPIRIN 81 MG: 81 TABLET, COATED ORAL at 08:14

## 2020-05-10 RX ADMIN — FUROSEMIDE 40 MG: 40 TABLET ORAL at 08:14

## 2020-05-10 RX ADMIN — ISOSORBIDE MONONITRATE 60 MG: 60 TABLET ORAL at 08:14

## 2020-05-10 RX ADMIN — AMLODIPINE BESYLATE 10 MG: 10 TABLET ORAL at 08:14

## 2020-05-10 RX ADMIN — NYSTATIN: 100000 POWDER TOPICAL at 08:13

## 2020-05-10 RX ADMIN — INSULIN LISPRO 6 UNITS: 100 INJECTION, SOLUTION INTRAVENOUS; SUBCUTANEOUS at 12:02

## 2020-05-10 RX ADMIN — ATORVASTATIN CALCIUM 20 MG: 20 TABLET, FILM COATED ORAL at 08:14

## 2020-05-10 RX ADMIN — METOPROLOL SUCCINATE 100 MG: 100 TABLET, EXTENDED RELEASE ORAL at 08:14

## 2020-05-10 RX ADMIN — INSULIN HUMAN 26 UNITS: 100 INJECTION, SUSPENSION SUBCUTANEOUS at 08:13

## 2020-05-10 NOTE — PROGRESS NOTES
Name: Denisse Chavez ADMIT: 2020   : 1945  PCP: Surekha Mcdonnell MD    MRN: [id number] LOS: 3 days   AGE/SEX: 74 y.o. female  ROOM: [facility]     Subjective   Subjective   No fever or chills.  No night sweats.  No chest pain.  No shortness of breath.  No cough.  No wheeze.  No hemoptysis.  Positive of lower extremity edema.    Review of Systems    GI.  No abdominal pain.  No nausea or vomiting.  Normal bowel habits without melena or bleeding per rectum.  .  No dysuria or hematuria but positive frequency.  CNS.  No headache and no focal neurological symptoms.     Objective   Objective   Vital Signs  Temp:  [98 °F (36.7 °C)-98.5 °F (36.9 °C)] 98.5 °F (36.9 °C)  Heart Rate:  [63-75] 63  Resp:  [16-18] 16  BP: (154-168)/(61-67) 155/61  SpO2:  [94 %-97 %] 96 %  on   ;   Device (Oxygen Therapy): room air    Intake/Output Summary (Last 24 hours) at 5/10/2020 1532  Last data filed at 5/10/2020 1208  Gross per 24 hour   Intake 682 ml   Output --   Net 682 ml     Body mass index is 39.65 kg/m².      20  2200 20  0634 20  1329   Weight: 102 kg (225 lb) 105 kg (231 lb 11.2 oz) 105 kg (231 lb)     Physical Exam   Constitutional: She is oriented to person, place, and time. She appears well-developed and well-nourished. No distress.   Morbidly obese   HENT:   Head: Normocephalic and atraumatic.   Mouth/Throat: Oropharynx is clear and moist. No oropharyngeal exudate.   Eyes: Pupils are equal, round, and reactive to light. Conjunctivae and EOM are normal. No scleral icterus.   Neck: Normal range of motion. Neck supple. No JVD present. No thyromegaly present.   Cardiovascular: Normal rate and regular rhythm.   Murmur heard.  Grade 2 to grade 3 systolic murmur.   Pulmonary/Chest: Effort normal and breath sounds normal. No respiratory distress. She has no wheezes. She has no rales.   Abdominal: She exhibits no distension and no mass. There is no tenderness. There is no rebound and no guarding.    Genitourinary:   Genitourinary Comments: No CVA tenderness.   Musculoskeletal: She exhibits edema.   +2 to +3 bilateral lower extremity edema.  Lower extremity wrapped with Ace bandages by lymphedema clinic.   Lymphadenopathy:     She has no cervical adenopathy.   Neurological: She is alert and oriented to person, place, and time.   No acute focal neurological deficit   Skin: Skin is warm and dry. She is not diaphoretic.   Nursing note and vitals reviewed.      Results Review:      Results from last 7 days   Lab Units 05/10/20  0627 05/10/20  0055 05/09/20  0500 05/08/20  0503 05/07/20  0524 05/06/20  2211   SODIUM mmol/L 137  --  139 137 137 136   POTASSIUM mmol/L 3.9 4.1 3.3* 3.5 5.1 4.0   CHLORIDE mmol/L 103  --  103 103 103 97*   CO2 mmol/L 23.8  --  24.5 20.4* 14.6* 24.0   BUN mg/dL 15  --  18 23 21 21   CREATININE mg/dL 0.75  --  0.90 1.08* 1.07* 1.13*   GLUCOSE mg/dL 170*  --  187* 238* 238* 153*   CALCIUM mg/dL 8.6  --  8.4* 7.9* 9.1 10.0   AST (SGOT) U/L  --   --   --   --   --  25   ALT (SGPT) U/L  --   --   --   --   --  14     Estimated Creatinine Clearance: 72.9 mL/min (by C-G formula based on SCr of 0.75 mg/dL).  Results from last 7 days   Lab Units 05/09/20  0500   HEMOGLOBIN A1C % 7.30*     Results from last 7 days   Lab Units 05/10/20  1144 05/10/20  0605 05/09/20  1939 05/09/20  1628 05/09/20  1119 05/09/20  0644 05/08/20  2158 05/08/20  1628   GLUCOSE mg/dL 293* 171* 263* 237* 230* 160* 293* 326*                 Results from last 7 days   Lab Units 05/09/20  1444   MAGNESIUM mg/dL 2.0           Invalid input(s): LDLCALC  Results from last 7 days   Lab Units 05/10/20  0627 05/09/20  0500 05/08/20  0503 05/07/20  0524 05/06/20  2211   WBC 10*3/mm3 10.30 12.79* 14.91* 25.22* 21.19*   HEMOGLOBIN g/dL 10.9* 11.2* 10.5* 13.2 13.6   HEMATOCRIT % 32.6* 33.1* 31.3* 38.8 40.5   PLATELETS 10*3/mm3 215 178 177 216 251   MCV fL 82.7 83.0 83.2 83.3 83.5   MCH pg 27.7 28.1 27.9 28.3 28.0   MCHC g/dL 33.4 33.8  33.5 34.0 33.6   RDW % 13.3 13.6 13.4 14.1 13.5   RDW-SD fl 40.2 40.6 40.2 42.8 40.8   MPV fL 10.3 10.4 10.2 11.4 10.7   NEUTROPHIL % % 68.1 76.0 84.5* 89.8* 90.9*   LYMPHOCYTE % % 17.8* 12.3* 7.2* 3.5* 3.6*   MONOCYTES % % 7.6 7.7 6.9 5.1 4.3*   EOSINOPHIL % % 5.4 3.2 0.7 0.0* 0.2*   BASOPHIL % % 0.4 0.3 0.3 0.4 0.2   IMM GRAN % % 0.7* 0.5 0.4 1.2* 0.8*   NEUTROS ABS 10*3/mm3 7.02* 9.72* 12.59* 22.66* 19.27*   LYMPHS ABS 10*3/mm3 1.83 1.57 1.08 0.88 0.76   MONOS ABS 10*3/mm3 0.78 0.98* 1.03* 1.28* 0.92*   EOS ABS 10*3/mm3 0.56* 0.41* 0.11 0.00 0.04   BASOS ABS 10*3/mm3 0.04 0.04 0.04 0.09 0.04   IMMATURE GRANS (ABS) 10*3/mm3 0.07* 0.07* 0.06* 0.31* 0.16*   NRBC /100 WBC 0.1 0.1 0.0 0.0 0.1             Results from last 7 days   Lab Units 05/09/20  0500 05/08/20  0503 05/06/20  2211   PROCALCITONIN ng/mL 0.55* 0.96* 0.38*   LACTATE mmol/L  --   --  1.9     Results from last 7 days   Lab Units 05/06/20  2211   CRP mg/dL 2.35*     Results from last 7 days   Lab Units 05/06/20  2211   LIPASE U/L 12*     Results from last 7 days   Lab Units 05/07/20  0008 05/06/20  2211   BLOODCX  No growth at 3 days No growth at 3 days     Results from last 7 days   Lab Units 05/06/20  2241   ADENOVIRUS DETECTION BY PCR  Not Detected   CORONAVIRUS 229E  Not Detected   CORONAVIRUS HKU1  Not Detected   CORONAVIRUS NL63  Not Detected   CORONAVIRUS OC43  Not Detected   HUMAN METAPNEUMOVIRUS  Not Detected   HUMAN RHINOVIRUS/ENTEROVIRUS  Not Detected   INFLUENZA B PCR  Not Detected   PARAINFLUENZA 1  Not Detected   PARAINFLUENZA VIRUS 2  Not Detected   PARAINFLUENZA VIRUS 3  Not Detected   PARAINFLUENZA VIRUS 4  Not Detected   BORDETELLA PERTUSSIS PCR  Not Detected   CHLAMYDOPHILA PNEUMONIAE PCR  Not Detected   MYCOPLAMA PNEUMO PCR  Not Detected   INFLUENZA A PCR  Not Detected   INFLUENZA A H3  Not Detected   INFLUENZA A H1  Not Detected   RSV, PCR  Not Detected     Results from last 7 days   Lab Units 05/06/20  2248   NITRITE UA  Negative      WBC UA /HPF 0-2   BACTERIA UA /HPF None Seen   SQUAM EPITHEL UA /HPF 3-6*           Imaging:  Imaging Results (Last 24 Hours)     ** No results found for the last 24 hours. **             I reviewed the patient's new clinical results / labs / tests / procedures      Assessment/Plan     Active Hospital Problems    Diagnosis  POA   • **Sepsis (CMS/[facility], located within [facility]) [A41.9]  Yes   • Obesity (BMI 30-39.9) [E66.9]  Yes   • Heart murmur [R01.1]  Yes   • Chronic venous insufficiency [I87.2]  Yes   • CAD (coronary artery disease) [I25.10]  Yes   • Chronic kidney disease, stage III (moderate) (CMS/[facility], located within 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) [E11.21, E11.65]  Yes   • Hyperlipidemia [E78.5]  Yes   • Essential hypertension [I10]  Yes      Resolved Hospital Problems   No resolved problems to display.           · Sepsis.  No obvious clinical source of infection.  Negative UA.  Negative CT of abdomen for intra-abdominal infection.  Negative chest x-ray.  Resolved fever and and leukocytosis infectious disease stopped  IV Rocephin and vancomycin.  Infectious disease following.  Negative blood cultures till now.  Still elevated procalcitonin but improving.  Infectious disease do not recommend any more antibiotics.  · History of coronary artery disease/hypertension.  Patient is asymptomatic.  No evidence of angina or congestive heart failure.  Mildly elevated blood pressure currently on Norvasc/aspirin/Imdur/Lasix/metoprolol.  Metoprolol increased yesterday will need to monitor as an outpatient.  · Stage III chronic renal insufficiency.  Patient clinically euvolemic.  Normal renal function today   · hypokalemia.  Solved with substitution.  Normal magnesium.  · Diabetes mellitus type 2.  Continue NPH insulin   A1c 7.3.    · Lymphedema.  Lymphedema nurse wrapping of the lower extremity.  · Chronic disease anemia.  Hemoglobin is stable.  · Discussed  with patient/nurse  · Discharge home today      Terrell Bradley MD  [facility]  05/10/20  15:32

## 2020-05-10 NOTE — DISCHARGE SUMMARY
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  He is referred to the H&P for full details  A very pleasant [age] years old white female with a past history of type 2 diabetes/dyslipidemia/hypertension/stage III chronic renal insufficiency/chronic lower extremity venous insufficiency and lymphedema/coronary artery disease/obesity who presents to the emergency department with fever associated with a single episode of vomiting and low blood sugar no urinary complaints no abdominal pain no diarrhea no bleeding per rectum no melena no shortness of breath no cough no wheeze no hemoptysis.  Physical examination at admission noted the temperature of 99.4 pulse of 89 respiratory rate of 19 blood pressure 172/63 O2 sats 96% the rest of the examination is remarkable for obesity grade 2 systolic murmur bilateral lower extremity edema that is mostly nonpitting venous stasis dermatitis of both lower extremity but no redness or hotness  .  Hospital course initial evaluation the emergency department included a chemistries that were normal except a blood sugar 154 creatinine 1.13 chloride 97 and GFR of 47.   Procalcitonin elevated LDH elevated.  C-reactive protein elevated.  Lactate normal.  Lipase is normal.  CBC significant for a white count of 21.1 with positive left shift.  COVID not detected.  UA negative for UTI.  CT abdomen and pelvis mild distention of the urinary bladder is right pleural effusion.  Chest x-ray with no acute disease.  Was admitted with a diagnosis of sepsis.  Clinically there was no source of infection.  UA was negative.  CT scan of the abdomen was negative for intra-abdominal infection.  Chest x-ray was negative.  Blood cultures were negative by the time of discharge.  She was empirically started on IV Rocephin and vancomycin.  Her fever has resolved and her leukocytosis trended down until it became normal at the day of discharge.  Infectious disease was consulted agreed with the vancomycin and the Rocephin.  Both were stopped after the fever and leukocytosis has resolved with no relapse of the fever.  At this time there was no source of the sepsis that could be identified.  There was no recommendation for continuation of the antibiotic any further.  As far as her history of coronary artery disease and hypertension.  Her blood pressure was mildly elevated and we continued her on the Norvasc aspirin Imdur Lasix and increase the metoprolol and this has improved the blood pressure and it needs to be followed up as an outpatient there was no evidence of angina or congestive heart failure during this admission.  She has a history of stage III chronic renal insufficiency.  She was resuscitated with IV fluid and this has actually normalized by the time of discharge.  She did develop hypokalemia.  Her magnesium was normal and this was substituted prior to discharge.  She has a history of type 2 diabetes and her A1c was 7.3 we continued her on the NPH insulin per home regimen.  She has a history of lymphedema lymphedema nurse was consulted throughout the lower extremities with Ace bandage there was no  evidence of infection or clinically any DVT.  She has a history of anemia of chronic disease her hemoglobin remained stable during this admission.  The patient at the time of discharge was afebrile for more than 24 hours with normalization of her leukocytosis.  She was hemodynamically stable.  Follow-up with primary care physician.    Consultants     Consult Orders (all) (From admission, onward)     Start     Ordered    05/07/20 0255  Inpatient Infectious Diseases Consult  Once,   Status:  Canceled     Specialty:  Infectious Diseases  Provider:  Jese Piña MD    05/07/20 0254 05/07/20 0255  Inpatient Infectious Diseases Consult  Once     Specialty:  Infectious Diseases  Provider:  Jese Piña MD    05/07/20 0254 05/07/20 0146  [facility] (on-call MD unless specified) Details  Once     Specialty:  Hospitalist  Provider:  Henry Lorenzo MD    05/07/20 0145              Procedures     Imaging Results (All)     Procedure Component Value Units Date/Time    CT Abdomen Pelvis Without Contrast [[id number]] Collected:  05/07/20 0104     Updated:  05/07/20 0117    Narrative:       CT OF THE ABDOMEN AND PELVIS WITHOUT CONTRAST     HISTORY: Fever and vomiting     COMPARISON: 12/22/2019     TECHNIQUE: Axial CT imaging was obtained from the dome the diaphragm to  the symphysis pubis. No IV contrast was administered.     FINDINGS:  There is trace right pleural effusion is noted at the right lung base.  Similar findings were present on prior exam. It has increased when  compared to prior study. There is also a small pericardial effusion  which has increased slightly when compared to prior study. The stomach  appears unremarkable, as is the duodenum. No focal hepatic lesions are  seen. The gallbladder is unremarkable. 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 This appears stable  compared to prior study. The pancreas is normal within normal limits.  Punctate  nonobstructing stone is seen within the inferior pole of the  left kidney. Additional nonobstructing stones are seen within the right  kidney. No distal ureteral or bladder stones are seen. 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 This measures about 7 mm  in size. Vascular calcifications are noted. No free fluid or adenopathy  seen within the pelvis. Uterus appears distended, there may be some  trace perivesical soft tissue stranding. Correlation with urinalysis and  urine cultures is suggested. The uterus is surgically absent. There is  no evidence of mechanical bowel obstruction. The appendix is normal. No  acute osseous abnormalities are seen.       Impression:          1. Urinary bladder does appear distended, suggesting urinary retention.  There is also some trace perivesical soft tissue stranding. Correlation  with urinalysis and urine cultures is suggested.  2. Trace right pleural effusion and pericardial effusion.     Radiation dose reduction techniques were utilized, including automated  exposure control and exposure modulation based on body size.     This report was finalized on 5/7/2020 1:14 AM by Dr. Carlotta Gill M.D.       XR Chest AP [[id number]] Collected:  05/06/20 2300     Updated:  05/06/20 2304    Narrative:       PORTABLE CHEST RADIOGRAPH     HISTORY: Rule out COVID 19.     COMPARISON: None available.     FINDINGS:  Heart size is within normal limits for portable technique. No  pneumothorax, pleural effusion, or acute infiltrate is seen.       Impression:       No acute findings.     This report was finalized on 5/6/2020 11:01 PM by Dr. Carlotta Gill M.D.             Pertinent Labs     Results from last 7 days   Lab Units 05/10/20  0627 05/09/20  0500 05/08/20  0503 05/07/20  0524   WBC 10*3/mm3 10.30 12.79* 14.91* 25.22*   HEMOGLOBIN g/dL 10.9* 11.2* 10.5* 13.2   PLATELETS 10*3/mm3 215 178 177 216     Results from last 7 days   Lab Units 05/10/20  0627  05/10/20  0055 05/09/20  0500 05/08/20  0503 05/07/20  0524   SODIUM mmol/L 137  --  139 137 137   POTASSIUM mmol/L 3.9 4.1 3.3* 3.5 5.1   CHLORIDE mmol/L 103  --  103 103 103   CO2 mmol/L 23.8  --  24.5 20.4* 14.6*   BUN mg/dL 15  --  18 23 21   CREATININE mg/dL 0.75  --  0.90 1.08* 1.07*   GLUCOSE mg/dL 170*  --  187* 238* 238*   Estimated Creatinine Clearance: 72.9 mL/min (by C-G formula based on SCr of 0.75 mg/dL).  Results from last 7 days   Lab Units 05/06/20  2211   ALBUMIN g/dL 4.10   BILIRUBIN mg/dL 0.7   ALK PHOS U/L 79   AST (SGOT) U/L 25   ALT (SGPT) U/L 14     Results from last 7 days   Lab Units 05/10/20  0627 05/09/20  1444 05/09/20  0500 05/08/20  0503 05/07/20  0524 05/06/20  2211   CALCIUM mg/dL 8.6  --  8.4* 7.9* 9.1 10.0   ALBUMIN g/dL  --   --   --   --   --  4.10   MAGNESIUM mg/dL  --  2.0  --   --   --   --      Results from last 7 days   Lab Units 05/06/20  2211   LIPASE U/L 12*             Invalid input(s): LDLCALC  Results from last 7 days   Lab Units 05/07/20  0008 05/06/20  2211   BLOODCX  No growth at 3 days No growth at 3 days     Imaging Results (Last 24 Hours)     ** No results found for the last 24 hours. **          Test Results Pending at Discharge      Order Current Status    Blood Culture - Blood, Arm, Right Preliminary result    Blood Culture - Blood, Blood, Venous Line Preliminary result            Discharge Exam   Physical Exam  Vital Signs  Temp:  [98 °F (36.7 °C)-98.5 °F (36.9 °C)] 98.5 °F (36.9 °C)  Heart Rate:  [63-75] 63  Resp:  [16-18] 16  BP: (154-168)/(61-67) 155/61  SpO2:  [94 %-97 %] 96 %  on   ;   Device (Oxygen Therapy): room air     Intake/Output Summary (Last 24 hours) at 5/10/2020 1532  Last data filed at 5/10/2020 1208      Gross per 24 hour   Intake 682 ml   Output --   Net 682 ml      Body mass index is 39.65 kg/m².  Vitals               05/06/20  2200 05/07/20  0634 05/07/20  1329   Weight: 102 kg (225 lb) 105 kg (231 lb 11.2 oz) 105 kg (231 lb)          Physical Exam   Constitutional: She is oriented to person, place, and time. She appears well-developed and well-nourished. No distress.   Morbidly obese   HENT:   Head: Normocephalic and atraumatic.   Mouth/Throat: Oropharynx is clear and moist. No oropharyngeal exudate.   Eyes: Pupils are equal, round, and reactive to light. Conjunctivae and EOM are normal. No scleral icterus.   Neck: Normal range of motion. Neck supple. No JVD present. No thyromegaly present.   Cardiovascular: Normal rate and regular rhythm.   Murmur heard.  Grade 2 to grade 3 systolic murmur.   Pulmonary/Chest: Effort normal and breath sounds normal. No respiratory distress. She has no wheezes. She has no rales.   Abdominal: She exhibits no distension and no mass. There is no tenderness. There is no rebound and no guarding.   Genitourinary:   Genitourinary Comments: No CVA tenderness.   Musculoskeletal: She exhibits edema.   +2 to +3 bilateral lower extremity edema.  Lower extremity wrapped with Ace bandages by lymphedema clinic.   Lymphadenopathy:     She has no cervical adenopathy.   Neurological: She is alert and oriented to person, place, and time.   No acute focal neurological deficit   Skin: Skin is warm and dry. She is not diaphoretic.   Nursing note and vitals reviewed.          Discharge Details        Discharge Medications      Changes to Medications      Instructions Start Date   metoprolol succinate  MG 24 hr tablet  Commonly known as:  TOPROL-XL  What changed:    · medication strength  · how much to take   100 mg, Oral, Daily   Start Date:  May 11, 2020        Continue These Medications      Instructions Start Date   acetaminophen-codeine 300-30 MG per tablet  Commonly known as:  TYLENOL #3   1 tablet, Oral, 3 Times Daily PRN      amLODIPine 10 MG tablet  Commonly known as:  NORVASC   TAKE 1 TABLET BY MOUTH ONCE DAILY      aspirin 81 MG tablet   Oral, Nightly      docusate sodium 100 MG capsule  Commonly known as:   COLACE   100 mg, Oral, 2 Times Daily      furosemide 40 MG tablet  Commonly known as:  LASIX   Take 1 tablet by mouth once daily      hydrOXYzine 25 MG tablet  Commonly known as:  ATARAX   TAKE 1 TABLET BY MOUTH AT NIGHT AS NEEDED FOR INSOMNIA      insulin  UNIT/ML injection  Commonly known as:  humuLIN N,novoLIN N   46 units in the morning and 26 units in the evening subcu daily      insulin regular 100 UNIT/ML injection  Commonly known as:  humuLIN R,novoLIN R   34 units before breakfast, 14 units before lunch, 26 units before dinner subcu daily      isosorbide mononitrate 60 MG 24 hr tablet  Commonly known as:  IMDUR   60 mg, Oral, Daily      Rollator misc   1 Units, Does not apply, As Needed      simvastatin 40 MG tablet  Commonly known as:  ZOCOR   20 mg, Oral, Daily      vitamin D 1.25 MG (40397 UT) capsule capsule  Commonly known as:  ERGOCALCIFEROL   50,000 Units, Every 30 Days             Allergies   Allergen Reactions   • Ace Inhibitors Other (See Comments)     Hyperkalemia/ROB   • Angiotensin Receptor Blockers Other (See Comments)     Pt unable to remember   • Keflex [Cephalexin] Rash     Tolerated ceftriaxone Dec 2019; tolerated zosyn May 2020   • Sulfa Antibiotics Itching     rash         Discharge Disposition:  Condition:     Diet:   Diet Order   Procedures   • Diet Regular; Cardiac, Consistent Carbohydrate       Activity:   Activity Instructions     Activity as Tolerated            Counseling :    CODE STATUS:    Code Status and Medical Interventions:   Ordered at: 05/07/20 0221     Code Status:    CPR     Medical Interventions (Level of Support Prior to Arrest):    Full       Future Appointments   Date Time Provider Department Center   5/28/2020  9:10 AM [facility] None   6/1/2020  1:30 PM Surekha Mcdonnell MD [facility] None   7/1/2020  9:30 AM [facility] RM 1 [facility]   7/29/2020  1:30 PM Warner Tang MD [facility] None   8/5/2020  9:00 AM [facility] None   8/6/2020 11:45 AM Alfredito Mueller MD [facility] None   8/13/2020 10:00 AM Wally Craft MD [facility] END [facility] None     Additional Instructions for the Follow-ups that You Need to Schedule     Discharge Follow-up with PCP   As directed       Currently Documented PCP:    Surekha Mcdonnell MD    PCP Phone Number:    [phone]     Follow Up Details:  1 week.  For sepsis of unclear etiology.  Prevention.           Follow-up Information     Surekha Mcdonnell MD .    Specialty:  Internal Medicine  Why:  1 week.  For sepsis of unclear etiology.  Prevention.  Contact information:  [address] [place] [address]  [phone]                     Time Spent on Discharge:  Greater than 30 minutes      Terrell Bradley MD  [facility]  05/10/20  3:43 PM

## 2020-05-10 NOTE — OUTREACH NOTE
Prep Survey      Responses   [facility] facility patient discharged from?  [place]   Is LACE score < 7 ?  No   Eligibility  [facility]   Date of Admission  05/06/20   Date of Discharge  05/10/20   Discharge Disposition  Home or Self Care   Discharge diagnosis  sepsis d/t unknown etiology, CKD, uncontrolled T2DM   COVID-19 Test Status  Negative   Does the patient have one of the following disease processes/diagnoses(primary or secondary)?  Sepsis   Does the patient have Home health ordered?  No   Is there a DME ordered?  No   Prep survey completed?  Yes          Annabel Brandt RN

## 2020-05-10 NOTE — PLAN OF CARE
Problem: Patient Care Overview  Goal: Plan of Care Review  Outcome: Ongoing (interventions implemented as appropriate)  Flowsheets (Taken 5/10/2020 0406)  Plan of Care Reviewed With: patient  Outcome Summary: Vital signs as charted. Not I  n an respiratory distress, afebrile. Compression wrap on BLE dry and intact. Fall precautions enforced. Moniored,

## 2020-05-11 ENCOUNTER — TRANSITIONAL CARE MANAGEMENT TELEPHONE ENCOUNTER (OUTPATIENT)
Dept: CALL CENTER | Facility: HOSPITAL | Age: 75
End: 2020-05-11

## 2020-05-11 LAB — BACTERIA SPEC AEROBE CULT: NORMAL

## 2020-05-11 NOTE — PROGRESS NOTES
Case Management Discharge Note      Final Note: Patient DC'd home with son              Transportation Services  Private: Car    Final Discharge Disposition Code: 01 - home or self-care

## 2020-05-11 NOTE — OUTREACH NOTE
Call Center TCM Note      Responses   [facility] patient discharged from?  [place]   COVID-19 Test Status  Negative   Does the patient have one of the following disease processes/diagnoses(primary or secondary)?  Sepsis   TCM attempt successful?  No   Unsuccessful attempts  Attempt 1          Luisa Weaver RN    5/11/2020, 13:23

## 2020-05-11 NOTE — OUTREACH NOTE
Call Center TCM Note      Responses   [facility] patient discharged from?  [place]   COVID-19 Test Status  Negative   Does the patient have one of the following disease processes/diagnoses(primary or secondary)?  Sepsis   TCM attempt successful?  No   Unsuccessful attempts  Attempt 2          Luisa Weaver RN    5/11/2020, 15:18

## 2020-05-12 ENCOUNTER — TRANSITIONAL CARE MANAGEMENT TELEPHONE ENCOUNTER (OUTPATIENT)
Dept: CALL CENTER | Facility: HOSPITAL | Age: 75
End: 2020-05-12

## 2020-05-12 LAB — BACTERIA SPEC AEROBE CULT: NORMAL

## 2020-05-12 NOTE — OUTREACH NOTE
Call Center TCM Note      Responses   [facility] patient discharged from?  [place]   COVID-19 Test Status  Negative   Does the patient have one of the following disease processes/diagnoses(primary or secondary)?  Sepsis   TCM attempt successful?  No   Unsuccessful attempts  Attempt 3          Sarah Liao MA    5/12/2020, 14:10

## 2020-05-13 ENCOUNTER — READMISSION MANAGEMENT (OUTPATIENT)
Dept: CALL CENTER | Facility: HOSPITAL | Age: 75
End: 2020-05-13

## 2020-05-13 NOTE — OUTREACH NOTE
Sepsis Week 1 Survey      Responses   [facility] patient discharged from?  [place]   COVID-19 Test Status  Negative   Does the patient have one of the following disease processes/diagnoses(primary or secondary)?  Sepsis   Is there a successful TCM telephone encounter documented?  No   Week 1 attempt successful?  No   Rescheduled  Revoked   Graduated/Revoked comments  Four consecutive unsuccessful attempts.           Felipe Fermin RN

## 2020-05-15 ENCOUNTER — EPISODE CHANGES (OUTPATIENT)
Dept: CASE MANAGEMENT | Facility: OTHER | Age: 75
End: 2020-05-15

## 2020-05-20 ENCOUNTER — EPISODE CHANGES (OUTPATIENT)
Dept: CASE MANAGEMENT | Facility: OTHER | Age: 75
End: 2020-05-20

## 2020-05-20 DIAGNOSIS — M54.41 ACUTE RIGHT-SIDED LOW BACK PAIN WITH RIGHT-SIDED SCIATICA: ICD-10-CM

## 2020-05-20 RX ORDER — ACETAMINOPHEN AND CODEINE PHOSPHATE 300; 30 MG/1; MG/1
TABLET ORAL
Qty: 90 TABLET | Refills: 0 | Status: SHIPPED | OUTPATIENT
Start: 2020-05-20 | End: 2020-07-24

## 2020-05-27 DIAGNOSIS — I10 ESSENTIAL HYPERTENSION: ICD-10-CM

## 2020-05-27 DIAGNOSIS — E11.8 CONTROLLED DIABETES MELLITUS TYPE 2 WITH COMPLICATIONS, UNSPECIFIED WHETHER LONG TERM INSULIN USE (HCC): ICD-10-CM

## 2020-05-27 DIAGNOSIS — E78.2 MIXED HYPERLIPIDEMIA: ICD-10-CM

## 2020-05-27 DIAGNOSIS — Z00.00 MEDICARE ANNUAL WELLNESS VISIT, SUBSEQUENT: Primary | ICD-10-CM

## 2020-05-29 ENCOUNTER — PATIENT OUTREACH (OUTPATIENT)
Dept: CASE MANAGEMENT | Facility: OTHER | Age: 75
End: 2020-05-29

## 2020-05-29 ENCOUNTER — EPISODE CHANGES (OUTPATIENT)
Dept: CASE MANAGEMENT | Facility: OTHER | Age: 75
End: 2020-05-29

## 2020-05-29 LAB
ALBUMIN SERPL-MCNC: 4.3 G/DL (ref 3.5–5.2)
ALBUMIN/GLOB SERPL: 1.6 G/DL
ALP SERPL-CCNC: 78 U/L (ref 39–117)
ALT SERPL-CCNC: 13 U/L (ref 1–33)
APPEARANCE UR: CLEAR
AST SERPL-CCNC: 20 U/L (ref 1–32)
BACTERIA #/AREA URNS HPF: ABNORMAL /HPF
BASOPHILS # BLD AUTO: 0.05 10*3/MM3 (ref 0–0.2)
BASOPHILS NFR BLD AUTO: 0.5 % (ref 0–1.5)
BILIRUB SERPL-MCNC: 0.4 MG/DL (ref 0.2–1.2)
BILIRUB UR QL STRIP: NEGATIVE
BUN SERPL-MCNC: 21 MG/DL (ref 8–23)
BUN/CREAT SERPL: 20.8 (ref 7–25)
CALCIUM SERPL-MCNC: 9.4 MG/DL (ref 8.6–10.5)
CASTS URNS MICRO: ABNORMAL
CHLORIDE SERPL-SCNC: 106 MMOL/L (ref 98–107)
CHOLEST SERPL-MCNC: 155 MG/DL (ref 0–200)
CO2 SERPL-SCNC: 24.6 MMOL/L (ref 22–29)
COLOR UR: YELLOW
CREAT SERPL-MCNC: 1.01 MG/DL (ref 0.57–1)
EOSINOPHIL # BLD AUTO: 0.35 10*3/MM3 (ref 0–0.4)
EOSINOPHIL NFR BLD AUTO: 3.8 % (ref 0.3–6.2)
EPI CELLS #/AREA URNS HPF: ABNORMAL /HPF
ERYTHROCYTE [DISTWIDTH] IN BLOOD BY AUTOMATED COUNT: 13.8 % (ref 12.3–15.4)
GLOBULIN SER CALC-MCNC: 2.7 GM/DL
GLUCOSE SERPL-MCNC: 144 MG/DL (ref 65–99)
GLUCOSE UR QL: NEGATIVE
HBA1C MFR BLD: 7.5 % (ref 4.8–5.6)
HCT VFR BLD AUTO: 37.5 % (ref 34–46.6)
HDLC SERPL-MCNC: 36 MG/DL (ref 40–60)
HGB BLD-MCNC: 12 G/DL (ref 12–15.9)
HGB UR QL STRIP: NEGATIVE
IMM GRANULOCYTES # BLD AUTO: 0.05 10*3/MM3 (ref 0–0.05)
IMM GRANULOCYTES NFR BLD AUTO: 0.5 % (ref 0–0.5)
KETONES UR QL STRIP: NEGATIVE
LDLC SERPL CALC-MCNC: 92 MG/DL (ref 0–100)
LDLC/HDLC SERPL: 2.54 {RATIO}
LEUKOCYTE ESTERASE UR QL STRIP: NEGATIVE
LYMPHOCYTES # BLD AUTO: 1.71 10*3/MM3 (ref 0.7–3.1)
LYMPHOCYTES NFR BLD AUTO: 18.4 % (ref 19.6–45.3)
MCH RBC QN AUTO: 27.5 PG (ref 26.6–33)
MCHC RBC AUTO-ENTMCNC: 32 G/DL (ref 31.5–35.7)
MCV RBC AUTO: 85.8 FL (ref 79–97)
MICROALBUMIN UR-MCNC: 1773.4 UG/ML
MONOCYTES # BLD AUTO: 0.82 10*3/MM3 (ref 0.1–0.9)
MONOCYTES NFR BLD AUTO: 8.8 % (ref 5–12)
NEUTROPHILS # BLD AUTO: 6.31 10*3/MM3 (ref 1.7–7)
NEUTROPHILS NFR BLD AUTO: 68 % (ref 42.7–76)
NITRITE UR QL STRIP: NEGATIVE
NRBC BLD AUTO-RTO: 0 /100 WBC (ref 0–0.2)
PH UR STRIP: 5.5 [PH] (ref 5–8)
PLATELET # BLD AUTO: 256 10*3/MM3 (ref 140–450)
POTASSIUM SERPL-SCNC: 4.2 MMOL/L (ref 3.5–5.2)
PROT SERPL-MCNC: 7 G/DL (ref 6–8.5)
PROT UR QL STRIP: ABNORMAL
RBC # BLD AUTO: 4.37 10*6/MM3 (ref 3.77–5.28)
RBC #/AREA URNS HPF: ABNORMAL /HPF
SODIUM SERPL-SCNC: 143 MMOL/L (ref 136–145)
SP GR UR: 1.02 (ref 1–1.03)
TRIGL SERPL-MCNC: 137 MG/DL (ref 0–150)
TSH SERPL DL<=0.005 MIU/L-ACNC: 3.31 UIU/ML (ref 0.27–4.2)
UROBILINOGEN UR STRIP-MCNC: ABNORMAL MG/DL
VLDLC SERPL CALC-MCNC: 27.4 MG/DL (ref 5–40)
WBC # BLD AUTO: 9.29 10*3/MM3 (ref 3.4–10.8)
WBC #/AREA URNS HPF: ABNORMAL /HPF

## 2020-05-29 NOTE — OUTREACH NOTE
Care Coordination Note    In basket message to Practice Manager, Remedios Dewey, informed of four unsuccessful attempts to contact patient to offer ACM services and availability to assist in the future as needed.     Candis Cruz RN  Ambulatory     5/29/2020, 10:15

## 2020-06-01 ENCOUNTER — EPISODE CHANGES (OUTPATIENT)
Dept: CASE MANAGEMENT | Facility: OTHER | Age: 75
End: 2020-06-01

## 2020-06-01 ENCOUNTER — OFFICE VISIT (OUTPATIENT)
Dept: INTERNAL MEDICINE | Facility: CLINIC | Age: 75
End: 2020-06-01

## 2020-06-01 VITALS
HEIGHT: 64 IN | HEART RATE: 75 BPM | BODY MASS INDEX: 40.46 KG/M2 | DIASTOLIC BLOOD PRESSURE: 60 MMHG | WEIGHT: 237 LBS | SYSTOLIC BLOOD PRESSURE: 130 MMHG | TEMPERATURE: 98.1 F | OXYGEN SATURATION: 95 %

## 2020-06-01 DIAGNOSIS — Z00.00 MEDICARE ANNUAL WELLNESS VISIT, SUBSEQUENT: Primary | ICD-10-CM

## 2020-06-01 DIAGNOSIS — I10 ESSENTIAL HYPERTENSION: ICD-10-CM

## 2020-06-01 DIAGNOSIS — IMO0002 UNCONTROLLED TYPE II DIABETES MELLITUS WITH NEPHROPATHY: ICD-10-CM

## 2020-06-01 DIAGNOSIS — N18.30 STAGE 3 CHRONIC KIDNEY DISEASE (HCC): ICD-10-CM

## 2020-06-01 DIAGNOSIS — E78.2 MIXED HYPERLIPIDEMIA: ICD-10-CM

## 2020-06-01 PROCEDURE — 99213 OFFICE O/P EST LOW 20 MIN: CPT | Performed by: INTERNAL MEDICINE

## 2020-06-01 PROCEDURE — G0439 PPPS, SUBSEQ VISIT: HCPCS | Performed by: INTERNAL MEDICINE

## 2020-06-01 RX ORDER — NYSTATIN 100000 [USP'U]/G
POWDER TOPICAL 3 TIMES DAILY
Qty: 60 G | Refills: 11 | Status: SHIPPED | OUTPATIENT
Start: 2020-06-01

## 2020-06-01 RX ORDER — NYSTATIN 100000 [USP'U]/G
POWDER TOPICAL 4 TIMES DAILY
COMMUNITY
End: 2020-06-01 | Stop reason: SDUPTHER

## 2020-06-01 NOTE — PROGRESS NOTES
Subjective     Denisse Chavez is a 74 y.o. female who presents for an annual wellness visit as well as check up of dm-2, htn, hld.      History of Present Illness     DM-2.  Fair control by A1c.  She is followed by endo as well.   HTN.  Control is good..   HLD.  Good control on simvastatin.    CKD-3.  Reviewed numbers which are stable.    Review of Systems   Respiratory: Negative.    Cardiovascular: Negative.        The following portions of the patient's history were reviewed and updated as appropriate: allergies, current medications, past family history, past medical history, past social history, past surgical history and problem list.  Health maintenance tab was reviewed and updated with the patient.       Patient Active Problem List    Diagnosis Date Noted   • Obesity (BMI 30-39.9) 12/22/2019   • Aortic valve sclerosis 07/25/2019   • Heart murmur 07/25/2019   • Hyperlipidemia associated with type 2 diabetes mellitus (CMS/[facility]) 12/20/2018   • History of colon polyps 06/13/2018     Note Last Updated: 6/13/2018     Added automatically from request for surgery [id number]     • Circadian rhythm sleep disorder, delayed sleep phase type 04/23/2018   • Uncontrolled type 2 diabetes mellitus with background retinopathy (CMS/[facility]) 10/24/2017   • Chronic venous insufficiency    • CAD (coronary artery disease)      Note Last Updated: 6/28/2017     MI in 1996, treated medically.  PET 6/2016 with small-medium sized lateral infarct, no ischemia.  This wall motion abnormality was seen on echo as well.     • Stage 3 chronic kidney disease (CMS/HCC) 10/13/2016   • Obstructive sleep apnea on autoCPAP 09/04/2016   • Encounter for long-term (current) use of insulin (CMS/[facility]) 06/24/2016   • Left hip pain 05/16/2016   • Left-sided low back pain without sciatica 05/16/2016   • Lumbar spinal stenosis 05/16/2016   • Angiomyolipoma of kidney 03/30/2016   • Uncontrolled type II diabetes mellitus with nephropathy (CMS/[facility]) 03/08/2016   • Type  II diabetes mellitus with neurological manifestations, uncontrolled (CMS/[facility]) 03/08/2016   • Uncontrolled type 2 diabetes mellitus with complication, with long-term current use of insulin (CMS/[facility]) 03/08/2016   • Hyperinsulinism 03/08/2016   • Abnormal weight gain 03/08/2016   • Hyperlipidemia 03/08/2016   • Essential hypertension 03/08/2016       Past Medical History:   Diagnosis Date   • Angiomyolipoma of kidney 3/30/2016   • Aortic valve sclerosis    • CAD (coronary artery disease)     MI in 1996, treated medically.  PET 6/2016 with small-medium sized lateral infarct, no ischemia.  This wall motion abnormality was seen on echo as well.   • Cellulitis 07/2018    RIGHT LEG   • Chronic kidney disease, stage III (moderate) (CMS/[facility]) 10/13/2016   • Chronic venous insufficiency    • Heart murmur    • Hyperlipidemia    • Hypertension    • Low back pain    • Mass of ovary 3/30/2016   • Morbid obesity (CMS/[facility])    • Obesity (BMI 30-39.9) 12/22/2019   • Sleep apnea    • Spinal stenosis    • Type 2 diabetes mellitus (CMS/[facility])    • Uterine leiomyoma 3/30/2016       Past Surgical History:   Procedure Laterality Date   • CATARACT EXTRACTION      X2    • COLONOSCOPY N/A 8/29/2018    Procedure: COLONOSCOPY to CECUM WITH HOT SNARE POLYPECTOMY;  Surgeon: Neal Reilly MD;  Location: [facility] ENDOSCOPY;  Service: Gastroenterology   • EPIDURAL BLOCK     • HERNIA REPAIR     • HYSTERECTOMY     • TONSILLECTOMY         Family History   Problem Relation Age of Onset   • Diabetes Mother    • Heart attack Mother    • Hypertension Mother    • Hypothyroidism Mother    • Diabetes type II Son    • Breast cancer Neg Hx        Social History     Socioeconomic History   • Marital status: Single     Spouse name: Not on file   • Number of children: 3   • Years of education: Not on file   • Highest education level: Not on file   Occupational History   • Occupation: retired from [employer]   Tobacco Use   • Smoking status: Former Smoker     Packs/day:  0.25     Years: 2.00     Pack years: 0.50     Types: Cigarettes     Last attempt to quit: 1970     Years since quittin.4   • Smokeless tobacco: Never Used   • Tobacco comment: LIGHT USAGE   Substance and Sexual Activity   • Alcohol use: No   • Drug use: No   • Sexual activity: Defer       Current Outpatient Medications on File Prior to Visit   Medication Sig Dispense Refill   • acetaminophen-codeine (TYLENOL #3) 300-30 MG per tablet TAKE 1 TABLET BY MOUTH THREE TIMES DAILY AS NEEDED FOR MODERATE PAIN 90 tablet 0   • amLODIPine (NORVASC) 10 MG tablet TAKE 1 TABLET BY MOUTH ONCE DAILY 90 tablet 3   • aspirin 81 MG tablet Take  by mouth Every Night.     • docusate sodium (COLACE) 100 MG capsule Take 100 mg by mouth 2 (two) times a day.     • furosemide (LASIX) 40 MG tablet Take 1 tablet by mouth once daily 90 tablet 3   • hydrOXYzine (ATARAX) 25 MG tablet TAKE 1 TABLET BY MOUTH AT NIGHT AS NEEDED FOR INSOMNIA 90 tablet 0   • insulin NPH (humuLIN N,novoLIN N) 100 UNIT/ML injection 46 units in the morning and 26 units in the evening subcu daily 30 mL 5   • insulin regular (humuLIN R,novoLIN R) 100 UNIT/ML injection 34 units before breakfast, 14 units before lunch, 26 units before dinner subcu daily 30 mL 5   • isosorbide mononitrate (IMDUR) 60 MG 24 hr tablet Take 1 tablet by mouth Daily. 90 tablet 1   • metoprolol succinate XL (TOPROL-XL) 100 MG 24 hr tablet Take 1 tablet by mouth Daily. 30 tablet 3   • Misc. Devices (ROLLATOR) misc 1 Units as needed (for walking). 1 each 0   • simvastatin (ZOCOR) 40 MG tablet Take 0.5 tablets by mouth Daily. 45 tablet 3   • vitamin D (ERGOCALCIFEROL) 98278 units capsule capsule 50,000 Units Every 30 (Thirty) Days.     • [DISCONTINUED] nystatin (nystatin) 809358 UNIT/GM powder Apply  topically to the appropriate area as directed 4 (Four) Times a Day.       No current facility-administered medications on file prior to visit.        Allergies   Allergen Reactions   • Ace Inhibitors  "Other (See Comments)     Hyperkalemia/ROB   • Angiotensin Receptor Blockers Other (See Comments)     Pt unable to remember   • Keflex [Cephalexin] Rash     Tolerated ceftriaxone Dec 2019; tolerated zosyn May 2020   • Sulfa Antibiotics Itching     rash       Immunization History   Administered Date(s) Administered   • Fluzone High Dose =>65 Years ([employer] ONLY) 10/13/2016, 10/24/2017, 10/01/2018, 11/25/2019   • Hepatitis A 05/01/2018, 11/07/2018   • Pneumococcal Conjugate 13-Valent (PCV13) 03/18/2015   • Pneumococcal Polysaccharide (PPSV23) 04/13/2016   • Tdap 10/13/2016       Objective     /60   Pulse 75   Temp 98.1 °F (36.7 °C)   Ht 162.6 cm (64\")   Wt 108 kg (237 lb)   SpO2 95%   BMI 40.68 kg/m²     Physical Exam   Constitutional: She is oriented to person, place, and time. She appears well-developed and well-nourished.   HENT:   Head: Normocephalic and atraumatic.   Cardiovascular: Normal rate, regular rhythm and normal heart sounds.   Pulmonary/Chest: Effort normal and breath sounds normal.   Neurological: She is alert and oriented to person, place, and time.   Skin: Skin is warm and dry.   Psychiatric: She has a normal mood and affect. Her behavior is normal.       Assessment/Plan   Denisse was seen today for annual exam.    Diagnoses and all orders for this visit:    Medicare annual wellness visit, subsequent    Uncontrolled type II diabetes mellitus with nephropathy (CMS/[facility])    Essential hypertension    Mixed hyperlipidemia    Stage 3 chronic kidney disease (CMS/[facility])    Other orders  -     nystatin (nystatin) 559459 UNIT/GM powder; Apply  topically to the appropriate area as directed 3 (Three) Times a Day.        Discussion    AWV.  See below for joleen history, PHQ-9, functional ability questionnaire, cognitive impairment screening.  Direct observation of cognitive abilities:  The patient does not exhibit any impairment in cognitive abilities upon direct observation at today's visit.     These " were all reviewed with the patient and the patient was provided with a personal prevention plan of service in patient instructions.  Patient was given advice or information on the following topics:  nutrition, weight management.    Dm-2.  The patient will continue current regimen.      HTN.  The patient will continue current regimen.      HLD.  The patient will continue current regimen.      CKD.   We discussed the importance of NSAID avoidance.  We discussed the importance of water consumption.     Depression Screen:    PHQ-2/PHQ-9 Depression Screening 6/1/2020   Little interest or pleasure in doing things 0   Feeling down, depressed, or hopeless 0   Trouble falling or staying asleep, or sleeping too much 0   Feeling tired or having little energy 0   Poor appetite or overeating 0   Feeling bad about yourself - or that you are a failure or have let yourself or your family down 0   Trouble concentrating on things, such as reading the newspaper or watching television 0   Moving or speaking so slowly that other people could have noticed. Or the opposite - being so fidgety or restless that you have been moving around a lot more than usual 0   Thoughts that you would be better off dead, or of hurting yourself in some way 0   Total Score 0   If you checked off any problems, how difficult have these problems made it for you to do your work, take care of things at home, or get along with other people? Not difficult at all       Fall Risk Screen:  STEADI Fall Risk Assessment was completed, and patient is at LOW risk for falls.Assessment completed on:6/1/2020    Health Habits and Functional/Cognitive screen:  Functional & Cognitive Status 6/1/2020   Do you have difficulty preparing food and eating? No   Do you have difficulty bathing yourself, getting dressed or grooming yourself? No   Do you have difficulty using the toilet? No   Do you have difficulty moving around from place to place? No   Do you have trouble with steps or  getting out of a bed or a chair? No   Current Diet Limited Junk Food   Dental Exam Up to date   Eye Exam Up to date   Exercise (times per week) 0 times per week   Current Exercise Activities Include None   Do you need help using the phone?  No   Are you deaf or do you have serious difficulty hearing?  No   Do you need help with transportation? Yes   Do you need help shopping? No   Do you need help preparing meals?  No   Do you need help with housework?  No   Do you need help with laundry? No   Do you need help taking your medications? No   Do you need help managing money? No   Do you ever drive or ride in a car without wearing a seat belt? No   Have you felt unusual stress, anger or loneliness in the last month? No   Who do you live with? Child   If you need help, do you have trouble finding someone available to you? No   Have you been bothered in the last four weeks by sexual problems? No   Do you have difficulty concentrating, remembering or making decisions? No               Health Maintenance   Topic Date Due   • ZOSTER VACCINE (1 of 2) 11/09/1995   • DIABETIC FOOT EXAM  09/05/2018   • MEDICARE ANNUAL WELLNESS  05/15/2020   • INFLUENZA VACCINE  08/01/2020   • COLONOSCOPY  08/29/2020   • DXA SCAN  09/11/2020   • MAMMOGRAM  11/19/2020   • DIABETIC EYE EXAM  11/27/2020   • HEMOGLOBIN A1C  11/28/2020   • LIPID PANEL  05/28/2021   • URINE MICROALBUMIN  05/28/2021   • TDAP/TD VACCINES (2 - Td) 10/13/2026   • PNEUMOCOCCAL VACCINE (65+ HIGH RISK)  Completed   • HEPATITIS C SCREENING  Completed            Future Appointments   Date Time Provider Department Center   7/1/2020  9:30 AM TREATMENT RM 1 [facility]   7/29/2020  1:30 PM Warner Tang MD [facility] CD [facility] None   8/5/2020  9:00 AM [facility] None   8/6/2020 11:45 AM Alfredito Mueller MD [facility] AND[facility] None   8/13/2020 10:00 AM Wally Craft MD [facility] END [facility] None   12/2/2020  9:10 AM [facility] None    12/9/2020  1:15 PM Surekha Mcdonnell MD [facility] None

## 2020-06-01 NOTE — PATIENT INSTRUCTIONS
Medicare Wellness  Personal Prevention Plan of Service     Date of Office Visit:  2020  Encounter Provider:  Surekha Mcdonnell MD  Place of Service:  [facility] INTERNAL MEDICINE  Patient Name: Denisse Chavez  :  1945    As part of the Medicare Wellness portion of your visit today, we are providing you with this personalized preventive plan of services (PPPS). This plan is based upon recommendations of the [place] Preventive Services Task Force (USPSTF) and the Advisory Committee on Immunization Practices (ACIP).    This lists the preventive care services that should be considered, and provides dates of when you are due. Items listed as completed are up-to-date and do not require any further intervention.    Health Maintenance   Topic Date Due   • ZOSTER VACCINE (1 of 2) 1995   • DIABETIC FOOT EXAM  2018   • MEDICARE ANNUAL WELLNESS  05/15/2020   • INFLUENZA VACCINE  2020   • COLONOSCOPY  2020   • DXA SCAN  2020   • MAMMOGRAM  2020   • DIABETIC EYE EXAM  2020   • HEMOGLOBIN A1C  2020   • LIPID PANEL  2021   • URINE MICROALBUMIN  2021   • TDAP/TD VACCINES (2 - Td) 10/13/2026   • PNEUMOCOCCAL VACCINE (65+ HIGH RISK)  Completed   • HEPATITIS C SCREENING  Completed       No orders of the defined types were placed in this encounter.      Return in about 6 months (around 2020) for Recheck.

## 2020-06-02 ENCOUNTER — RESULTS ENCOUNTER (OUTPATIENT)
Dept: ENDOCRINOLOGY | Age: 75
End: 2020-06-02

## 2020-06-02 DIAGNOSIS — IMO0002 UNCONTROLLED TYPE II DIABETES MELLITUS WITH NEPHROPATHY: ICD-10-CM

## 2020-06-02 DIAGNOSIS — IMO0002 UNCONTROLLED TYPE 2 DIABETES MELLITUS WITH COMPLICATION, WITH LONG-TERM CURRENT USE OF INSULIN: ICD-10-CM

## 2020-06-04 RX ORDER — ISOSORBIDE MONONITRATE 60 MG/1
TABLET, EXTENDED RELEASE ORAL
Qty: 90 TABLET | Refills: 0 | Status: SHIPPED | OUTPATIENT
Start: 2020-06-04 | End: 2020-06-05

## 2020-06-05 RX ORDER — ISOSORBIDE MONONITRATE 60 MG/1
TABLET, EXTENDED RELEASE ORAL
Qty: 90 TABLET | Refills: 0 | Status: SHIPPED | OUTPATIENT
Start: 2020-06-05 | End: 2020-09-11

## 2020-06-30 ENCOUNTER — PREP FOR SURGERY (OUTPATIENT)
Dept: OTHER | Facility: HOSPITAL | Age: 75
End: 2020-06-30

## 2020-06-30 ENCOUNTER — TELEPHONE (OUTPATIENT)
Dept: SURGERY | Facility: CLINIC | Age: 75
End: 2020-06-30

## 2020-06-30 DIAGNOSIS — Z86.010 HX OF ADENOMATOUS POLYP OF COLON: Primary | ICD-10-CM

## 2020-06-30 NOTE — TELEPHONE ENCOUNTER
Pt is due to receive a Recall for:    2yr Colonoscopy (due 09/2020)    Please place orders for surg sched to call & set-up

## 2020-07-01 ENCOUNTER — HOSPITAL ENCOUNTER (OUTPATIENT)
Dept: PAIN MEDICINE | Facility: HOSPITAL | Age: 75
Discharge: HOME | End: 2020-07-01
Admitting: ANESTHESIOLOGY

## 2020-07-01 ENCOUNTER — ANESTHESIA (OUTPATIENT)
Dept: PAIN MEDICINE | Facility: HOSPITAL | Age: 75
End: 2020-07-01

## 2020-07-01 ENCOUNTER — HOSPITAL ENCOUNTER (OUTPATIENT)
Dept: GENERAL RADIOLOGY | Facility: HOSPITAL | Age: 75
Discharge: HOME | End: 2020-07-01

## 2020-07-01 ENCOUNTER — ANESTHESIA EVENT (OUTPATIENT)
Dept: PAIN MEDICINE | Facility: HOSPITAL | Age: 75
End: 2020-07-01

## 2020-07-01 VITALS
BODY MASS INDEX: 40.65 KG/M2 | WEIGHT: 238.1 LBS | HEART RATE: 55 BPM | TEMPERATURE: 98.6 F | HEIGHT: 64 IN | SYSTOLIC BLOOD PRESSURE: 150 MMHG | OXYGEN SATURATION: 98 % | DIASTOLIC BLOOD PRESSURE: 63 MMHG | RESPIRATION RATE: 12 BRPM

## 2020-07-01 DIAGNOSIS — G89.29 CHRONIC LEFT-SIDED LOW BACK PAIN WITHOUT SCIATICA: Primary | ICD-10-CM

## 2020-07-01 DIAGNOSIS — M54.16 LUMBAR NEURITIS: ICD-10-CM

## 2020-07-01 DIAGNOSIS — M54.50 CHRONIC LEFT-SIDED LOW BACK PAIN WITHOUT SCIATICA: Primary | ICD-10-CM

## 2020-07-01 DIAGNOSIS — R52 PAIN: ICD-10-CM

## 2020-07-01 DIAGNOSIS — M48.061 SPINAL STENOSIS OF LUMBAR REGION, UNSPECIFIED WHETHER NEUROGENIC CLAUDICATION PRESENT: ICD-10-CM

## 2020-07-01 PROCEDURE — 77003 FLUOROGUIDE FOR SPINE INJECT: CPT

## 2020-07-01 PROCEDURE — 25010000002 MIDAZOLAM PER 1 MG: Performed by: ANESTHESIOLOGY

## 2020-07-01 PROCEDURE — C1755 CATHETER, INTRASPINAL: HCPCS

## 2020-07-01 RX ORDER — SODIUM CHLORIDE 0.9 % (FLUSH) 0.9 %
3-10 SYRINGE (ML) INJECTION AS NEEDED
Status: DISCONTINUED | OUTPATIENT
Start: 2020-07-01 | End: 2020-07-02 | Stop reason: HOSPADM

## 2020-07-01 RX ORDER — SODIUM CHLORIDE 0.9 % (FLUSH) 0.9 %
3 SYRINGE (ML) INJECTION EVERY 12 HOURS SCHEDULED
Status: DISCONTINUED | OUTPATIENT
Start: 2020-07-01 | End: 2020-07-02 | Stop reason: HOSPADM

## 2020-07-01 RX ORDER — METHYLPREDNISOLONE ACETATE 80 MG/ML
80 INJECTION, SUSPENSION INTRA-ARTICULAR; INTRALESIONAL; INTRAMUSCULAR; SOFT TISSUE ONCE
Status: DISCONTINUED | OUTPATIENT
Start: 2020-07-01 | End: 2020-07-02 | Stop reason: HOSPADM

## 2020-07-01 RX ORDER — MIDAZOLAM HYDROCHLORIDE 1 MG/ML
1 INJECTION INTRAMUSCULAR; INTRAVENOUS AS NEEDED
Status: DISCONTINUED | OUTPATIENT
Start: 2020-07-01 | End: 2020-07-02 | Stop reason: HOSPADM

## 2020-07-01 RX ORDER — LIDOCAINE HYDROCHLORIDE 10 MG/ML
1 INJECTION, SOLUTION INFILTRATION; PERINEURAL ONCE AS NEEDED
Status: DISCONTINUED | OUTPATIENT
Start: 2020-07-01 | End: 2020-07-02 | Stop reason: HOSPADM

## 2020-07-01 RX ORDER — SODIUM CHLORIDE 0.9 % (FLUSH) 0.9 %
1-10 SYRINGE (ML) INJECTION AS NEEDED
Status: DISCONTINUED | OUTPATIENT
Start: 2020-07-01 | End: 2020-07-02 | Stop reason: HOSPADM

## 2020-07-01 RX ORDER — FENTANYL CITRATE 50 UG/ML
50 INJECTION, SOLUTION INTRAMUSCULAR; INTRAVENOUS AS NEEDED
Status: DISCONTINUED | OUTPATIENT
Start: 2020-07-01 | End: 2020-07-02 | Stop reason: HOSPADM

## 2020-07-01 RX ADMIN — MIDAZOLAM 0.5 MG: 1 INJECTION INTRAMUSCULAR; INTRAVENOUS at 09:58

## 2020-07-01 RX ADMIN — MIDAZOLAM 0.5 MG: 1 INJECTION INTRAMUSCULAR; INTRAVENOUS at 10:12

## 2020-07-01 NOTE — H&P
[facility]    History and Physical    Patient Name: Denisse Chavez  :  1945  MRN:  [id number]  Date of Admission: 2020    Subjective     Patient is a 74 y.o. female presents with chief complaint of chronic low back, buttock and leg: bilateral pain R>L.  Her last epidural was in March of this yearfor spinal stenosis and neuritis and is here today for the third in this series.    The following portions of the patients history were reviewed and updated as appropriate: current medications, allergies, past medical history, past surgical history, past family history, past social history and problem list                Objective     Past Medical History:   Past Medical History:   Diagnosis Date   • Angiomyolipoma of kidney 3/30/2016   • Aortic valve sclerosis    • CAD (coronary artery disease)     MI in , treated medically.  PET 2016 with small-medium sized lateral infarct, no ischemia.  This wall motion abnormality was seen on echo as well.   • Cellulitis 2018    RIGHT LEG   • Chronic kidney disease, stage III (moderate) (CMS/HCC) 10/13/2016   • Chronic venous insufficiency    • Heart murmur    • Hyperlipidemia    • Hypertension    • Low back pain    • Mass of ovary 3/30/2016   • Morbid obesity (CMS/HCC)    • Obesity (BMI 30-39.9) 2019   • Sleep apnea    • Spinal stenosis    • Type 2 diabetes mellitus (CMS/HCC)    • Uterine leiomyoma 3/30/2016     Past Surgical History:   Past Surgical History:   Procedure Laterality Date   • CATARACT EXTRACTION      X2    • COLONOSCOPY N/A 2018    Procedure: COLONOSCOPY to CECUM WITH HOT SNARE POLYPECTOMY;  Surgeon: Neal Reilly MD;  Location: [facility] ENDOSCOPY;  Service: Gastroenterology   • EPIDURAL BLOCK     • HERNIA REPAIR     • HYSTERECTOMY     • TONSILLECTOMY       Family History:   Family History   Problem Relation Age of Onset   • Diabetes Mother    • Heart attack Mother    • Hypertension Mother    • Hypothyroidism Mother    • Diabetes  type II Son    • Breast cancer Neg Hx      Social History:   Social History     Tobacco Use   • Smoking status: Former Smoker     Packs/day: 0.25     Years: 2.00     Pack years: 0.50     Types: Cigarettes     Last attempt to quit: 1970     Years since quittin.5   • Smokeless tobacco: Never Used   • Tobacco comment: LIGHT USAGE   Substance Use Topics   • Alcohol use: No   • Drug use: No       Vital Signs Range for the last 24 hours  Temperature: Temp:  [37 °C (98.6 °F)] 37 °C (98.6 °F)   Temp Source: Temp src: Infrared   BP: BP: (176)/(72) 176/72   Pulse: Heart Rate:  [60] 60   Respirations: Resp:  [14] 14   SPO2: SpO2:  [97 %] 97 %   O2 Amount (l/min):     O2 Devices Device (Oxygen Therapy): room air   Weight: Weight:  [108 kg (238 lb 1.6 oz)] 108 kg (238 lb 1.6 oz)         --------------------------------------------------------------------------------    Current Outpatient Medications   Medication Sig Dispense Refill   • acetaminophen-codeine (TYLENOL #3) 300-30 MG per tablet TAKE 1 TABLET BY MOUTH THREE TIMES DAILY AS NEEDED FOR MODERATE PAIN 90 tablet 0   • amLODIPine (NORVASC) 10 MG tablet TAKE 1 TABLET BY MOUTH ONCE DAILY 90 tablet 3   • docusate sodium (COLACE) 100 MG capsule Take 100 mg by mouth 2 (two) times a day.     • furosemide (LASIX) 40 MG tablet Take 1 tablet by mouth once daily 90 tablet 3   • hydrOXYzine (ATARAX) 25 MG tablet TAKE 1 TABLET BY MOUTH AT NIGHT AS NEEDED FOR INSOMNIA 90 tablet 0   • insulin NPH (NovoLIN N ReliOn) 100 UNIT/ML injection INJECT 25 UNITS SUBCUTANEOUSLY IN THE MORNING AND 25 IN THE EVENING 40 mL 0   • insulin regular (humuLIN R,novoLIN R) 100 UNIT/ML injection 34 units before breakfast, 14 units before lunch, 26 units before dinner subcu daily 30 mL 5   • isosorbide mononitrate (IMDUR) 60 MG 24 hr tablet Take 1 tablet by mouth once daily 90 tablet 0   • metoprolol succinate XL (TOPROL-XL) 100 MG 24 hr tablet Take 1 tablet by mouth Daily. 30 tablet 3   • Misc. Devices  (ROLLATOR) misc 1 Units as needed (for walking). 1 each 0   • nystatin (nystatin) 536749 UNIT/GM powder Apply  topically to the appropriate area as directed 3 (Three) Times a Day. 60 g 11   • simvastatin (ZOCOR) 40 MG tablet Take 0.5 tablets by mouth Daily. 45 tablet 3   • vitamin D (ERGOCALCIFEROL) 55207 units capsule capsule 50,000 Units Every 30 (Thirty) Days.     • aspirin 81 MG tablet Take  by mouth Every Night.       Current Facility-Administered Medications   Medication Dose Route Frequency Provider Last Rate Last Dose   • sodium chloride 0.9 % flush 3 mL  3 mL Intravenous Q12H Chip Brandt MD       • sodium chloride 0.9 % flush 3-10 mL  3-10 mL Intravenous PRN Chip Brandt MD           --------------------------------------------------------------------------------  Assessment/Plan      Anesthesia Evaluation                  Airway   Mallampati: III  Dental    (+) upper dentures    Pulmonary - normal exam   (+) recent URI resolved, sleep apnea,   Cardiovascular - normal exam        Neuro/Psych  (-) strength not normal in all 4 extremities, left straight leg raise test, right straight leg raise test  GI/Hepatic/Renal/Endo    (+) obesity,       Musculoskeletal         PE comment: 3+ pitting edema  Abdominal    Substance History      OB/GYN          Other                   Diagnosis and Plan      Treatment Plan  ASA 4   Patient has had previous injection/procedure with 50-75% improvement.   Procedures: Lumbar Epidural Steroid Injection(LESI), With fluoroscopy,       Anesthetic plan and risks discussed with patient.          Diagnosis     * Lumbar neuritis [M54.16]     * Lumbar spinal stenosis [M48.061]

## 2020-07-01 NOTE — ANESTHESIA PROCEDURE NOTES
PAIN Epidural block    Pre-sedation assessment completed: 7/1/2020 9:53 AM    Patient reassessed immediately prior to procedure    Patient location during procedure: pain clinic  Start Time: 7/1/2020 9:56 AM  Stop Time: 7/1/2020 10:26 AM  Indication:procedure for pain  Performed By  Anesthesiologist: Chip Brandt MD  Preanesthetic Checklist  Completed: patient identified, site marked, surgical consent, pre-op evaluation, timeout performed, IV checked, risks and benefits discussed and monitors and equipment checked  Additional Notes  Post-Op Diagnosis Codes:     * Lumbar neuritis (M54.16)     * Lumbar spinal stenosis (M48.  Epidural attempted under fluoroscopic guidance, Loss of resistance obtained with difficulty at L5-S1 and then at L4-5 but unable to inject medication at either level due to pain.   Decided to abort procedure and can reschedule in future if desired.  Prep:  Pt Position:prone  Sterile Tech:cap, gloves, mask and sterile barrier  Prep:chlorhexidine gluconate and isopropyl alcohol  Monitoring:blood pressure monitoring, continuous pulse oximetry and EKG  Procedure:Sedation: yes     Approach:right paramedian  Guidance: fluoroscopy  Location:lumbar (Intralaminar)  Interspace: L5-S1 and L4-5 attempted.  Needle Type:Tuohy  Needle Gauge:20  Aspiration:negative  Medications:  Depomedrol:80  Preservative Free Saline:0mL  Isovue:0mL    Post Assessment:  Post-procedure: Band-aid.  Pt Tolerance:patient tolerated the procedure well with no apparent complications  Complications:no

## 2020-07-13 ENCOUNTER — TELEMEDICINE (OUTPATIENT)
Dept: INTERNAL MEDICINE | Facility: CLINIC | Age: 75
End: 2020-07-13

## 2020-07-13 DIAGNOSIS — Z20.828 EXPOSURE TO VIRAL DISEASE: Primary | ICD-10-CM

## 2020-07-13 PROCEDURE — 99212 OFFICE O/P EST SF 10 MIN: CPT | Performed by: INTERNAL MEDICINE

## 2020-07-13 NOTE — PROGRESS NOTES
Subjective     Denisse Chavez is a 74 y.o. female who presents with   Chief Complaint   Patient presents with   • covid exposure       History of Present Illness     You have chosen to receive care through a telehealth visit.  Do you consent to use a video/audio connection for your medical care today? Yes.    Her son was exposed to COVID-19 on 6/28/2020.  Son is getting tested.  At this point it has been 14 days since he was exposed.  He is asymptomatic.  He is getting tested today.      Review of Systems   Respiratory: Negative.    Cardiovascular: Negative.        The following portions of the patient's history were reviewed and updated as appropriate: allergies, current medications and problem list.    Patient Active Problem List    Diagnosis Date Noted   • Obesity (BMI 30-39.9) 12/22/2019   • Aortic valve sclerosis 07/25/2019   • Heart murmur 07/25/2019   • Hyperlipidemia associated with type 2 diabetes mellitus (CMS/[facility]) 12/20/2018   • History of colon polyps 06/13/2018     Note Last Updated: 6/13/2018     Added automatically from request for surgery [id number]     • Circadian rhythm sleep disorder, delayed sleep phase type 04/23/2018   • Uncontrolled type 2 diabetes mellitus with background retinopathy (CMS/[facility]) 10/24/2017   • Chronic venous insufficiency    • CAD (coronary artery disease)      Note Last Updated: 6/28/2017     MI in 1996, treated medically.  PET 6/2016 with small-medium sized lateral infarct, no ischemia.  This wall motion abnormality was seen on echo as well.     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 (CMS/[facility]) 03/08/2016   • Type II  diabetes mellitus with neurological manifestations, uncontrolled (CMS/[facility]) 03/08/2016   • Uncontrolled type 2 diabetes mellitus with complication, with long-term current use of insulin (CMS/[facility]) 03/08/2016   • Hyperinsulinism 03/08/2016   • Abnormal weight gain 03/08/2016   • Hyperlipidemia 03/08/2016   • Essential hypertension 03/08/2016       Current Outpatient Medications on File Prior to Visit   Medication Sig Dispense Refill   • acetaminophen-codeine (TYLENOL #3) 300-30 MG per tablet TAKE 1 TABLET BY MOUTH THREE TIMES DAILY AS NEEDED FOR MODERATE PAIN 90 tablet 0   • amLODIPine (NORVASC) 10 MG tablet TAKE 1 TABLET BY MOUTH ONCE DAILY 90 tablet 3   • aspirin 81 MG tablet Take  by mouth Every Night.     • docusate sodium (COLACE) 100 MG capsule Take 100 mg by mouth 2 (two) times a day.     • furosemide (LASIX) 40 MG tablet Take 1 tablet by mouth once daily 90 tablet 3   • hydrOXYzine (ATARAX) 25 MG tablet TAKE 1 TABLET BY MOUTH AT NIGHT AS NEEDED FOR INSOMNIA 90 tablet 0   • insulin NPH (NovoLIN N ReliOn) 100 UNIT/ML injection INJECT 25 UNITS SUBCUTANEOUSLY IN THE MORNING AND 25 IN THE EVENING 40 mL 0   • insulin regular (humuLIN R,novoLIN R) 100 UNIT/ML injection 34 units before breakfast, 14 units before lunch, 26 units before dinner subcu daily 30 mL 5   • isosorbide mononitrate (IMDUR) 60 MG 24 hr tablet Take 1 tablet by mouth once daily 90 tablet 0   • metoprolol succinate XL (TOPROL-XL) 100 MG 24 hr tablet Take 1 tablet by mouth Daily. 30 tablet 3   • Misc. Devices (ROLLATOR) misc 1 Units as needed (for walking). 1 each 0   • nystatin (nystatin) 383180 UNIT/GM powder Apply  topically to the appropriate area as directed 3 (Three) Times a Day. 60 g 11   • simvastatin (ZOCOR) 40 MG tablet Take 0.5 tablets by mouth Daily. 45 tablet 3   • vitamin D (ERGOCALCIFEROL) 03552 units capsule capsule 50,000 Units Every 30 (Thirty) Days.       No current facility-administered medications on file prior to visit.         Objective     There were no vitals taken for this visit.    Physical Exam   Psychiatric: She has a normal mood and affect.       Assessment/Plan   Denisse was seen today for covid exposure.    Diagnoses and all orders for this visit:    Exposure to viral disease        Discussion    Patient presents with possible COVID-19 exposure.  Son was exposed two weeks ago and is asymptomatic He is getting tested today.  If he test comes back positive, I will order a test for her as well.  At this point it is not indicated.  She has not been directly exposed.         Future Appointments   Date Time Provider Department Center   8/5/2020  9:00 AM [facility] None   8/6/2020 11:45 AM Alfredito Mueller MD [facility] None   8/13/2020 10:00 AM Wally Craft MD [facility] None   8/19/2020  1:30 PM Warner Tang MD [facility] CD [facility] None   12/2/2020  9:10 AM [facility] None   12/9/2020  1:15 PM Surekha Mcdonnell MD [facility] None

## 2020-07-24 DIAGNOSIS — M54.41 ACUTE RIGHT-SIDED LOW BACK PAIN WITH RIGHT-SIDED SCIATICA: ICD-10-CM

## 2020-07-24 RX ORDER — ACETAMINOPHEN AND CODEINE PHOSPHATE 300; 30 MG/1; MG/1
TABLET ORAL
Qty: 90 TABLET | Refills: 0 | Status: SHIPPED | OUTPATIENT
Start: 2020-07-24 | End: 2020-09-04

## 2020-07-24 RX ORDER — HYDROXYZINE HYDROCHLORIDE 25 MG/1
TABLET, FILM COATED ORAL
Qty: 90 TABLET | Refills: 0 | Status: SHIPPED | OUTPATIENT
Start: 2020-07-24 | End: 2020-10-30

## 2020-08-04 DIAGNOSIS — E16.1 HYPERINSULINISM: Primary | ICD-10-CM

## 2020-08-04 DIAGNOSIS — E78.2 MIXED HYPERLIPIDEMIA: ICD-10-CM

## 2020-08-04 DIAGNOSIS — IMO0002 TYPE II DIABETES MELLITUS WITH NEUROLOGICAL MANIFESTATIONS, UNCONTROLLED: ICD-10-CM

## 2020-08-04 DIAGNOSIS — IMO0002 UNCONTROLLED TYPE 2 DIABETES MELLITUS WITH COMPLICATION, WITH LONG-TERM CURRENT USE OF INSULIN: ICD-10-CM

## 2020-08-06 ENCOUNTER — OFFICE VISIT (OUTPATIENT)
Dept: SLEEP MEDICINE | Facility: HOSPITAL | Age: 75
End: 2020-08-06

## 2020-08-06 VITALS — HEART RATE: 73 BPM | HEIGHT: 64 IN | WEIGHT: 220 LBS | OXYGEN SATURATION: 98 % | BODY MASS INDEX: 37.56 KG/M2

## 2020-08-06 DIAGNOSIS — Z99.89 OSA ON CPAP: Primary | ICD-10-CM

## 2020-08-06 DIAGNOSIS — E66.01 CLASS 2 SEVERE OBESITY DUE TO EXCESS CALORIES WITH SERIOUS COMORBIDITY AND BODY MASS INDEX (BMI) OF 37.0 TO 37.9 IN ADULT (HCC): ICD-10-CM

## 2020-08-06 DIAGNOSIS — G47.33 OSA ON CPAP: Primary | ICD-10-CM

## 2020-08-06 LAB
ALBUMIN SERPL-MCNC: 4.3 G/DL (ref 3.5–5.2)
ALBUMIN/CREAT UR: 1890 MG/G CREAT (ref 0–29)
ALBUMIN/GLOB SERPL: 1.8 G/DL
ALP SERPL-CCNC: 82 U/L (ref 39–117)
ALT SERPL-CCNC: 14 U/L (ref 1–33)
AST SERPL-CCNC: 18 U/L (ref 1–32)
BILIRUB SERPL-MCNC: 0.4 MG/DL (ref 0–1.2)
BUN SERPL-MCNC: 37 MG/DL (ref 8–23)
BUN/CREAT SERPL: 29.4 (ref 7–25)
CALCIUM SERPL-MCNC: 9.7 MG/DL (ref 8.6–10.5)
CHLORIDE SERPL-SCNC: 98 MMOL/L (ref 98–107)
CO2 SERPL-SCNC: 31.5 MMOL/L (ref 22–29)
CREAT SERPL-MCNC: 1.26 MG/DL (ref 0.57–1)
CREAT UR-MCNC: 24.1 MG/DL
GLOBULIN SER CALC-MCNC: 2.4 GM/DL
GLUCOSE SERPL-MCNC: 141 MG/DL (ref 65–99)
HBA1C MFR BLD: 8.8 % (ref 4.8–5.6)
MICROALBUMIN UR-MCNC: 455.5 UG/ML
POTASSIUM SERPL-SCNC: 4 MMOL/L (ref 3.5–5.2)
PROT SERPL-MCNC: 6.7 G/DL (ref 6–8.5)
SODIUM SERPL-SCNC: 142 MMOL/L (ref 136–145)
T4 FREE SERPL-MCNC: 1.08 NG/DL (ref 0.93–1.7)
TSH SERPL DL<=0.005 MIU/L-ACNC: 4.68 UIU/ML (ref 0.27–4.2)

## 2020-08-06 PROCEDURE — 99213 OFFICE O/P EST LOW 20 MIN: CPT | Performed by: INTERNAL MEDICINE

## 2020-08-06 PROCEDURE — G0463 HOSPITAL OUTPT CLINIC VISIT: HCPCS

## 2020-08-06 NOTE — PROGRESS NOTES
"Follow Up Sleep Disorders Center Note     Chief Complaint:  SAWYER     Primary Care Physician: Surekha Mcdonnell MD    Interval History:   The patient is a 74 y.o. female who I last saw in 2019.  She is stable without new complaints.  She goes to bed at midnight and awakens between 8 and 9 AM.  She will use the bathroom during the nighttime.  Sabattus Sleepiness Scale is normal at 2.    The patient was hospitalized in 2019 due to cellulitis and sepsis.    Review of Systems:    A complete review of systems was done and all were negative with the exception of the above    Social History:    Social History     Socioeconomic History   • Marital status: Single     Spouse name: Not on file   • Number of children: 3   • Years of education: Not on file   • Highest education level: Not on file   Occupational History   • Occupation: retired from [employer]   Tobacco Use   • Smoking status: Former Smoker     Packs/day: 0.25     Years: 2.00     Pack years: 0.50     Types: Cigarettes     Last attempt to quit: 1970     Years since quittin.6   • Smokeless tobacco: Never Used   • Tobacco comment: LIGHT USAGE   Substance and Sexual Activity   • Alcohol use: No   • Drug use: No   • Sexual activity: Defer       Allergies:  Ace inhibitors; Angiotensin receptor blockers; Keflex [cephalexin]; and Sulfa antibiotics     Medication Review:  Reviewed.      Vital Signs:    Vitals:    20 1011   Pulse: 73   SpO2: 98%   Weight: 99.8 kg (220 lb)   Height: 162.6 cm (64\")     Body mass index is 37.76 kg/m².    Physical Exam:    Constitutional:  Well developed 74 y.o. female that appears in no apparent distress.  Awake & oriented times 3.  Normal mood with normal recent and remote memory and normal judgement.  Eyes:  Conjunctivae normal.  Oropharynx: Previously, moist mucous membranes without exudate and a large tongue and normal uvula and patent posterior pharyngeal opening and class II-3 Mallampati airway, patient is wearing a " facemask.  She ambulates with a walker     Results Review:  DME is [facility] and she uses a fullface mask.  Downloads between 5/7 and 8/4/2020 compliance 100%.  Average usage is 8 hours and 21 minutes.  Average AHI is normal at 5.2 with a leak of 30 minutes.  Average AutoCPAP pressure is 13.3 and her auto CPAP is 9-17.       Impression:   Obstructive sleep apnea adequately treated with auto CPAP with good compliance and usage and no complaints of hypersomnolence.    Plan:  Good sleep hygiene measures should be maintained.  Weight loss would be beneficial in this patient who is obese by BMI.  The patient is benefiting from the treatment being provided.     The patient will continue auto CPAP and a new prescription will be sent to her DME    The patient will call for any problems and will follow up in 1 year.      Alfredito Mueller MD  Sleep Medicine  08/06/20  10:34

## 2020-08-08 PROBLEM — E66.812 CLASS 2 SEVERE OBESITY DUE TO EXCESS CALORIES WITH SERIOUS COMORBIDITY AND BODY MASS INDEX (BMI) OF 37.0 TO 37.9 IN ADULT: Status: ACTIVE | Noted: 2020-08-08

## 2020-08-08 PROBLEM — E66.01 CLASS 2 SEVERE OBESITY DUE TO EXCESS CALORIES WITH SERIOUS COMORBIDITY AND BODY MASS INDEX (BMI) OF 37.0 TO 37.9 IN ADULT: Status: ACTIVE | Noted: 2020-08-08

## 2020-08-12 DIAGNOSIS — IMO0002 TYPE II DIABETES MELLITUS WITH NEUROLOGICAL MANIFESTATIONS, UNCONTROLLED: ICD-10-CM

## 2020-08-12 DIAGNOSIS — E16.1 HYPERINSULINISM: Primary | ICD-10-CM

## 2020-08-13 DIAGNOSIS — IMO0002 UNCONTROLLED TYPE 2 DIABETES MELLITUS WITH BACKGROUND RETINOPATHY: ICD-10-CM

## 2020-08-13 DIAGNOSIS — IMO0002 TYPE II DIABETES MELLITUS WITH NEUROLOGICAL MANIFESTATIONS, UNCONTROLLED: ICD-10-CM

## 2020-08-13 DIAGNOSIS — E16.1 HYPERINSULINISM: ICD-10-CM

## 2020-08-19 ENCOUNTER — OFFICE VISIT (OUTPATIENT)
Dept: CARDIOLOGY | Facility: CLINIC | Age: 75
End: 2020-08-19

## 2020-08-19 VITALS
HEIGHT: 64 IN | SYSTOLIC BLOOD PRESSURE: 158 MMHG | HEART RATE: 65 BPM | DIASTOLIC BLOOD PRESSURE: 50 MMHG | WEIGHT: 226.4 LBS | BODY MASS INDEX: 38.65 KG/M2

## 2020-08-19 DIAGNOSIS — I10 ESSENTIAL HYPERTENSION: ICD-10-CM

## 2020-08-19 DIAGNOSIS — I35.8 AORTIC VALVE SCLEROSIS: ICD-10-CM

## 2020-08-19 DIAGNOSIS — IMO0002 UNCONTROLLED TYPE 2 DIABETES MELLITUS WITH COMPLICATION, WITH LONG-TERM CURRENT USE OF INSULIN: ICD-10-CM

## 2020-08-19 DIAGNOSIS — E66.01 SEVERE OBESITY (BMI 35.0-39.9) WITH COMORBIDITY (HCC): ICD-10-CM

## 2020-08-19 DIAGNOSIS — I87.2 CHRONIC VENOUS INSUFFICIENCY: ICD-10-CM

## 2020-08-19 DIAGNOSIS — I25.10 CORONARY ARTERY DISEASE INVOLVING NATIVE CORONARY ARTERY OF NATIVE HEART WITHOUT ANGINA PECTORIS: Primary | ICD-10-CM

## 2020-08-19 PROCEDURE — 99213 OFFICE O/P EST LOW 20 MIN: CPT | Performed by: INTERNAL MEDICINE

## 2020-08-19 PROCEDURE — 93000 ELECTROCARDIOGRAM COMPLETE: CPT | Performed by: INTERNAL MEDICINE

## 2020-08-19 RX ORDER — METOPROLOL SUCCINATE 25 MG/1
25 TABLET, EXTENDED RELEASE ORAL DAILY
COMMUNITY
End: 2020-08-27 | Stop reason: SDUPTHER

## 2020-08-19 NOTE — PROGRESS NOTES
Date of Office Visit: 2020  Encounter Provider: Warner Tang MD  Place of Service: [facility]  Patient Name: Denisse Chavez  :1945    Chief Complaint   Patient presents with   • Coronary Artery Disease   :     HPI: Denisse Chavez is a 74 y.o. female who presents today to follow up.  She established care with me in May 2016.    She suffered a myocardial infarction in [place] in . She underwent cardiac catheterization and was reportedly found to have nonobstructive disease for which medical therapy was recommended. Her anginal symptoms were chest heaviness and nausea. In 2016, an echo was checked which showed normal LV systolic function, grade II diastolic dysfunction, aortic valve calcification, but no aortic stenosis. There was hypokinesis of the lateral wall, so I ordered a PET.  This revealed a small-medium sized lateral infarct without ischemia.      She has chronic lower extremity edema; she does use leg wraps.  She denies angina, dyspnea, orthopnea, PND, or palpitations.  From a cardiac standpoint, I'm very happy with how she's doing.     Past Medical History:   Diagnosis Date   • Angiomyolipoma of kidney 3/30/2016   • Aortic valve sclerosis     stable    • CAD (coronary artery disease)     MI in , treated medically.  PET 2016 with small-medium sized lateral infarct, no ischemia.  This wall motion abnormality was seen on echo as well.   • Cellulitis 2018    RIGHT LEG   • Chronic kidney disease, stage III (moderate) (CMS/HCC) 10/13/2016   • Chronic venous insufficiency    • Hyperlipidemia    • Hypertension    • Low back pain    • Mass of ovary 3/30/2016   • Obesity (BMI 30-39.9) 2019   • Sleep apnea    • Spinal stenosis    • Type 2 diabetes mellitus (CMS/HCC)    • Uterine leiomyoma 3/30/2016       Past Surgical History:   Procedure Laterality Date   • CATARACT EXTRACTION      X2    • COLONOSCOPY N/A 2018    Procedure:  COLONOSCOPY to CECUM WITH HOT SNARE POLYPECTOMY;  Surgeon: Neal Reilly MD;  Location: [facility] ENDOSCOPY;  Service: Gastroenterology   • EPIDURAL BLOCK     • HERNIA REPAIR     • HYSTERECTOMY     • TONSILLECTOMY         Social History     Socioeconomic History   • Marital status: Single     Spouse name: Not on file   • Number of children: 3   • Years of education: Not on file   • Highest education level: Not on file   Occupational History   • Occupation: retired from [employer]   Tobacco Use   • Smoking status: Former Smoker     Packs/day: 0.25     Years: 2.00     Pack years: 0.50     Types: Cigarettes     Last attempt to quit: 1970     Years since quittin.6   • Smokeless tobacco: Never Used   • Tobacco comment: LIGHT USAGE   Substance and Sexual Activity   • Alcohol use: No   • Drug use: No   • Sexual activity: Defer       Family History   Problem Relation Age of Onset   • Diabetes Mother    • Heart attack Mother    • Hypertension Mother    • Hypothyroidism Mother    • Diabetes type II Son    • Breast cancer Neg Hx        Review of Systems   Constitution: Positive for malaise/fatigue.   Cardiovascular: Positive for leg swelling. Negative for chest pain and palpitations.   Respiratory: Negative for shortness of breath.    Endocrine: Positive for polyuria.   Musculoskeletal: Positive for joint pain and joint swelling.   Neurological: Negative for light-headedness.   All other systems reviewed and are negative.      Allergies   Allergen Reactions   • Ace Inhibitors Other (See Comments)     Hyperkalemia/ROB   • Angiotensin Receptor Blockers Other (See Comments)     Pt unable to remember   • Keflex [Cephalexin] Rash     Tolerated ceftriaxone Dec 2019; tolerated zosyn May 2020   • Sulfa Antibiotics Itching     rash         Current Outpatient Medications:   •  acetaminophen-codeine (TYLENOL #3) 300-30 MG per tablet, TAKE 1 TABLET BY MOUTH THREE TIMES DAILY AS NEEDED FOR  MODERATE  PAIN, Disp: 90 tablet, Rfl: 0  •   "aspirin 81 MG tablet, Take  by mouth Every Night., Disp: , Rfl:   •  docusate sodium (COLACE) 100 MG capsule, Take 100 mg by mouth 2 (two) times a day., Disp: , Rfl:   •  furosemide (LASIX) 40 MG tablet, Take 1 tablet by mouth once daily, Disp: 90 tablet, Rfl: 3  •  hydrOXYzine (ATARAX) 25 MG tablet, TAKE 1 TABLET BY MOUTH AT NIGHT AS NEEDED FOR INSOMNIA, Disp: 90 tablet, Rfl: 0  •  insulin NPH (NovoLIN N ReliOn) 100 UNIT/ML injection, INJECT 46 UNITS SUBCUTANEOUSLY IN THE MORNING AND 26  IN THE EVENING, Disp: 75 mL, Rfl: 2  •  insulin regular (humuLIN R,novoLIN R) 100 UNIT/ML injection, 34 units before breakfast, 14 units before lunch, 26 units before dinner subcu daily, Disp: 30 mL, Rfl: 5  •  isosorbide mononitrate (IMDUR) 60 MG 24 hr tablet, Take 1 tablet by mouth once daily, Disp: 90 tablet, Rfl: 0  •  metoprolol succinate XL (TOPROL-XL) 100 MG 24 hr tablet, Take 1 tablet by mouth Daily., Disp: 30 tablet, Rfl: 3  •  metoprolol succinate XL (TOPROL-XL) 25 MG 24 hr tablet, Take 25 mg by mouth Daily., Disp: , Rfl:   •  Misc. Devices (ROLLATOR) misc, 1 Units as needed (for walking)., Disp: 1 each, Rfl: 0  •  nystatin (nystatin) 143973 UNIT/GM powder, Apply  topically to the appropriate area as directed 3 (Three) Times a Day., Disp: 60 g, Rfl: 11  •  simvastatin (ZOCOR) 40 MG tablet, Take 0.5 tablets by mouth Daily., Disp: 45 tablet, Rfl: 3  •  vitamin D (ERGOCALCIFEROL) 03769 units capsule capsule, 50,000 Units Every 30 (Thirty) Days., Disp: , Rfl:      Objective:     Vitals:    08/19/20 1341   BP: 158/50   BP Location: Right arm   Pulse: 65   Weight: 103 kg (226 lb 6.4 oz)   Height: 162.6 cm (64\")     Body mass index is 38.86 kg/m².    Physical Exam   Constitutional: She is oriented to person, place, and time.   Obese   HENT:   Head: Normocephalic.   Nose: Nose normal.   Mouth/Throat: Oropharynx is clear and moist.   Eyes: Pupils are equal, round, and reactive to light. Conjunctivae and EOM are normal.   Neck: " Normal range of motion. No JVD present.   Cardiovascular: Normal rate, regular rhythm and intact distal pulses. Exam reveals distant heart sounds.   Murmur heard.   Systolic murmur is present with a grade of 2/6.  Pulmonary/Chest: Effort normal.   Abdominal: Soft. There is no tenderness.   Obesity limits abdominal exam   Musculoskeletal: Normal range of motion. She exhibits no edema.   Neurological: She is alert and oriented to person, place, and time. No cranial nerve deficit.   Skin: Skin is warm and dry. No rash noted.   Psychiatric: She has a normal mood and affect. Her behavior is normal. Judgment and thought content normal.   Vitals reviewed.        ECG 12 Lead  Date/Time: 8/19/2020 2:01 PM  Performed by: Warner Tang MD  Authorized by: Warner Tang MD   Comparison: compared with previous ECG   Similar to previous ECG  Rhythm: sinus rhythm  Conduction: conduction normal  ST Segments: ST segments normal  T Waves: T waves normal  QRS axis: normal  Other: no other findings    Clinical impression: normal ECG              Assessment:       Diagnosis Plan   1. Coronary artery disease involving native coronary artery of native heart without angina pectoris     2. Essential hypertension     3. Aortic valve sclerosis     4. Chronic venous insufficiency     5. Severe obesity (BMI 35.0-39.9) with comorbidity (CMS/[facility])     6. Uncontrolled type 2 diabetes mellitus with complication, with long-term current use of insulin (CMS/[facility])            Plan:       1.  Coronary Artery Disease  Assessment  • The patient has no angina  • She has no angina.  A nuclear stress in 2016 showed no ischemia.  She has normal LV systolic function.  She has evidence of a prior lateral wall infarct.  She's on a statin.    Subjective - Objective  • There is a history of past MI 1996  • Current antiplatelet therapy includes aspirin 81 mg      2.  Her BP was high today.  I rechecked it and got 150/60.  However, she states that Dr Montenegro recently  changed some of her medications and that she has an appointment with him next week.  I will defer this to him.     3.  An echo in 2020 showed sclerosis without stenosis.      4.  She has chronic leg swelling which is multifactorial.  She is on furosemide and she wraps them.    Sincerely,       Warner Tang MD

## 2020-08-20 NOTE — PROGRESS NOTES
Subjective   Denisse Chavez is a 74 y.o. female.     F/u from dr Marley for dm 2, CKD stage 3, hyperlipidemia / testing bs 4  x day / last dm eye exam 3/26/19 with dr Rodriguez/ last dm foot exam today with dr Negrete      Patient is a 75-year-old female who came in for follow-up.  She is new to me.    She has known diabetes mellitus since 1996 and started on insulin in 2000.  She is on Novolin N 46 units every morning and 26 units at bedtime, Novolin R 34 units before breakfast, 14 units before lunch and 26 units before supper. Fasting glucose .  Lunchtime glucose 97-3 95.  Suppertime glucose 102-343.  Bedtime glucose .  She denies severe hypoglycemic episodes.  She has no significant weight changes over the past 6 months.  She denies polyuria polydipsia.  She has nocturia once a night.    Her last eye examination was in March 2020.  She has no retinopathy.    She denies numbness, tingling or burning in her hands or feet.  She has albuminuria on urine sample taken in August 2020 and saw Dr. Montenegro yesterday.    She has coronary artery disease and heart attack back and angioplasty in 1996 she has hypertension.  She is on Toprol-XL, furosemide, and isosorbide dinitrate.  She denies shortness of breath, palpitations or chest pains.  She follows with Dr. Tang    She has hyperlipidemia is on Zocor 40 mg/day.  She denies myalgia.    She has no previous history of thyroid disease.  TSH done in August 2020 is mildly elevated at 4.68 with a normal free T4 at 1.08 ng per DL.    The following portions of the patient's history were reviewed and updated as appropriate: allergies, current medications, past family history, past medical history, past social history, past surgical history and problem list.    Review of Systems   Constitutional: Negative for chills, fatigue and fever.   HENT: Negative.    Eyes: Negative.    Respiratory: Negative.  Negative for chest tightness, shortness of breath and wheezing.   "  Cardiovascular: Positive for leg swelling. Negative for chest pain and palpitations.   Gastrointestinal: Negative.  Negative for abdominal distention, abdominal pain, anal bleeding, blood in stool, constipation and diarrhea.   Endocrine: Negative.    Genitourinary: Negative.  Negative for difficulty urinating, frequency and urgency.   Musculoskeletal: Positive for joint swelling (right knee ). Negative for back pain and neck pain.   Neurological: Negative for dizziness, tremors, seizures, light-headedness, numbness and headaches.   Hematological: Negative for adenopathy. Does not bruise/bleed easily.   Psychiatric/Behavioral: Positive for sleep disturbance (sleep apnea using CPAP machine ). Negative for agitation and confusion. The patient is not nervous/anxious.      Objective      Vitals:    08/27/20 1548   BP: 178/70   BP Location: Left arm   Patient Position: Sitting   Cuff Size: Large Adult   Pulse: 64   SpO2: 98%   Weight: 104 kg (229 lb 3.2 oz)   Height: 162.6 cm (64.02\")     Physical Exam   Constitutional: She is oriented to person, place, and time. She appears well-developed and well-nourished. No distress.   HENT:   Head: Normocephalic.   Right Ear: External ear normal.   Left Ear: External ear normal.   Mouth/Throat: No oropharyngeal exudate.   Eyes: Conjunctivae and EOM are normal. Right eye exhibits no discharge. Left eye exhibits no discharge.   Neck: Normal range of motion. Neck supple. No JVD present. No tracheal deviation present. No thyromegaly present.   Cardiovascular: Normal rate, regular rhythm and normal heart sounds. Exam reveals no gallop and no friction rub.   No murmur heard.  Pulmonary/Chest: Effort normal and breath sounds normal. She has no wheezes. She has no rales.   Abdominal: Soft. Bowel sounds are normal. She exhibits no distension and no mass. There is no tenderness. There is no guarding.   Musculoskeletal: Normal range of motion. She exhibits edema (++). She exhibits no " tenderness or deformity.   Multiple plantar calluses.  No ulcers.   Lymphadenopathy:     She has no cervical adenopathy.   Neurological: She is alert and oriented to person, place, and time.   Intact light touch in lower extremities.   Skin: Skin is warm.   Psychiatric: She has a normal mood and affect. Her behavior is normal.     Orders Only on 08/04/2020   Component Date Value Ref Range Status   • Glucose 08/05/2020 141* 65 - 99 mg/dL Final   • BUN 08/05/2020 37* 8 - 23 mg/dL Final   • Creatinine 08/05/2020 1.26* 0.57 - 1.00 mg/dL Final   • eGFR Non African Am 08/05/2020 42* >60 mL/min/1.73 Final    Comment: The MDRD GFR formula is only valid for adults with stable  renal function between ages 18 and 70.     • eGFR  Am 08/05/2020 50* >60 mL/min/1.73 Final   • BUN/Creatinine Ratio 08/05/2020 29.4* 7.0 - 25.0 Final   • Sodium 08/05/2020 142  136 - 145 mmol/L Final   • Potassium 08/05/2020 4.0  3.5 - 5.2 mmol/L Final   • Chloride 08/05/2020 98  98 - 107 mmol/L Final   • Total CO2 08/05/2020 31.5* 22.0 - 29.0 mmol/L Final   • Calcium 08/05/2020 9.7  8.6 - 10.5 mg/dL Final   • Total Protein 08/05/2020 6.7  6.0 - 8.5 g/dL Final   • Albumin 08/05/2020 4.30  3.50 - 5.20 g/dL Final   • Globulin 08/05/2020 2.4  gm/dL Final   • A/G Ratio 08/05/2020 1.8  g/dL Final   • Total Bilirubin 08/05/2020 0.4  0.0 - 1.2 mg/dL Final   • Alkaline Phosphatase 08/05/2020 82  39 - 117 U/L Final   • AST (SGOT) 08/05/2020 18  1 - 32 U/L Final   • ALT (SGPT) 08/05/2020 14  1 - 33 U/L Final   • TSH 08/05/2020 4.680* 0.270 - 4.200 uIU/mL Final   • Hemoglobin A1C 08/05/2020 8.80* 4.80 - 5.60 % Final    Comment: Hemoglobin A1C Ranges:  Increased Risk for Diabetes  5.7% to 6.4%  Diabetes                     >= 6.5%  Diabetic Goal                < 7.0%     • Creatinine, Urine 08/05/2020 24.1  Not Estab. mg/dL Final   • Microalbumin, Urine 08/05/2020 455.5  Not Estab. ug/mL Final    Comment: Results confirmed on  dilution.     •  Microalbumin/Creatinine Ratio 08/05/2020 1,890* 0 - 29 mg/g creat Final    Comment:                        Normal:                0 -  29                         Moderately increased: 30 - 300                         Severely increased:       >300                **Please note reference interval change**     • Free T4 08/05/2020 1.08  0.93 - 1.70 ng/dL Final    Results may be falsely increased if patient taking Biotin.     Assessment/Plan   Denisse was seen today for diabetes, hyperlipidemia and chronic kidney disease.    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  Continue Zocor 40 mg/day  Continue furosemide, isosorbide mononitrate and Toprol per Dr. Tang.  Continue CPAP.    Copy of my note sent to Dr. Morgan, Dr. Tang, and Dr. Montenegro    RTC 4 mos.

## 2020-08-27 ENCOUNTER — OFFICE VISIT (OUTPATIENT)
Dept: ENDOCRINOLOGY | Age: 75
End: 2020-08-27

## 2020-08-27 VITALS
DIASTOLIC BLOOD PRESSURE: 70 MMHG | HEIGHT: 64 IN | HEART RATE: 64 BPM | BODY MASS INDEX: 39.13 KG/M2 | SYSTOLIC BLOOD PRESSURE: 178 MMHG | OXYGEN SATURATION: 98 % | WEIGHT: 229.2 LBS

## 2020-08-27 DIAGNOSIS — I25.10 CORONARY ARTERY DISEASE INVOLVING NATIVE CORONARY ARTERY OF NATIVE HEART WITHOUT ANGINA PECTORIS: ICD-10-CM

## 2020-08-27 DIAGNOSIS — E78.2 MIXED HYPERLIPIDEMIA: ICD-10-CM

## 2020-08-27 DIAGNOSIS — I10 ESSENTIAL HYPERTENSION: ICD-10-CM

## 2020-08-27 DIAGNOSIS — IMO0002 UNCONTROLLED TYPE II DIABETES MELLITUS WITH NEPHROPATHY: Primary | ICD-10-CM

## 2020-08-27 DIAGNOSIS — I35.8 AORTIC VALVE SCLEROSIS: ICD-10-CM

## 2020-08-27 DIAGNOSIS — G47.33 OSA ON CPAP: ICD-10-CM

## 2020-08-27 DIAGNOSIS — Z99.89 OSA ON CPAP: ICD-10-CM

## 2020-08-27 DIAGNOSIS — N18.30 STAGE 3 CHRONIC KIDNEY DISEASE (HCC): ICD-10-CM

## 2020-08-27 PROCEDURE — 99214 OFFICE O/P EST MOD 30 MIN: CPT | Performed by: INTERNAL MEDICINE

## 2020-08-31 NOTE — PROGRESS NOTES
RM:________     PCP: Surekha Mcdonnell MD    : 1945  AGE: 74 y.o.  EST PATIENT   REASON FOR VISIT/  CC:        WT: ____________ BP: __________L __________R HR______    CHEST PAIN: _____________    SOA: _____________PALPS: _______________     LIGHTHEADED: ___________FATIGUE: ________________ EDEMA __________    ALLERGIES:Ace inhibitors; Angiotensin receptor blockers; Keflex [cephalexin]; and Sulfa antibiotics SMOKING HISTORY:  Social History     Tobacco Use   • Smoking status: Former Smoker     Packs/day: 0.25     Years: 2.00     Pack years: 0.50     Types: Cigarettes     Last attempt to quit: 1970     Years since quittin.6   • Smokeless tobacco: Never Used   • Tobacco comment: LIGHT USAGE   Substance Use Topics   • Alcohol use: No   • Drug use: No     CAFFEINE USE_________________  ALCOHOL ______________________    Below is the patient's most recent value for Albumin, ALT, AST, BUN, Calcium, Chloride, Cholesterol, CO2, Creatinine, GFR, Glucose, HDL, Hematocrit, Hemoglobin, Hemoglobin A1C, LDL, Magnesium, Phosphorus, Platelets, Potassium, PSA, Sodium, Triglycerides, TSH and WBC.   Lab Results   Component Value Date    ALBUMIN 4.30 2020    ALT 14 2020    AST 18 2020    BUN 37 (H) 2020    CALCIUM 9.7 2020    CL 98 2020    CO2 31.5 (H) 2020    CREATININE 1.26 (H) 2020     (H) 2020    HDL 36 (L) 2020    HCT 37.5 2020    HGB 12.0 2020    HGBA1C 8.80 (H) 2020    LDL 92 2020    MG 2.0 2020     2020    K 4.0 2020     2020    TRIG 137 2020    TSH 4.680 (H) 2020    WBC 9.29 2020          NEW DIAGNOSIS/ SURGERY/ HOSP OR ED VISITS: ______________________    __________________________________________________________________      RECENT LABS OR DIAGNOSTIC TESTING:   _____________________________    __________________________________________________________________      ASSESSMENT/ PLAN: _______________________________________________    __________________________________________________________________   DISCHARGE

## 2020-09-04 DIAGNOSIS — M54.41 ACUTE RIGHT-SIDED LOW BACK PAIN WITH RIGHT-SIDED SCIATICA: ICD-10-CM

## 2020-09-04 RX ORDER — ACETAMINOPHEN AND CODEINE PHOSPHATE 300; 30 MG/1; MG/1
TABLET ORAL
Qty: 90 TABLET | Refills: 0 | Status: SHIPPED | OUTPATIENT
Start: 2020-09-04 | End: 2020-10-19

## 2020-09-10 DIAGNOSIS — R68.89 ABNORMAL ENDOCRINE LABORATORY TEST FINDING: Primary | ICD-10-CM

## 2020-09-11 RX ORDER — ISOSORBIDE MONONITRATE 60 MG/1
TABLET, EXTENDED RELEASE ORAL
Qty: 90 TABLET | Refills: 0 | Status: SHIPPED | OUTPATIENT
Start: 2020-09-11 | End: 2020-12-13

## 2020-09-15 ENCOUNTER — RESULTS ENCOUNTER (OUTPATIENT)
Dept: ENDOCRINOLOGY | Age: 75
End: 2020-09-15

## 2020-09-15 DIAGNOSIS — R68.89 ABNORMAL ENDOCRINE LABORATORY TEST FINDING: ICD-10-CM

## 2020-09-24 LAB
T4 FREE SERPL-MCNC: 1.08 NG/DL (ref 0.93–1.7)
THYROPEROXIDASE AB SERPL-ACNC: <9 IU/ML (ref 0–34)
TSH SERPL DL<=0.005 MIU/L-ACNC: 3.1 UIU/ML (ref 0.27–4.2)

## 2020-10-11 ENCOUNTER — RESULTS ENCOUNTER (OUTPATIENT)
Dept: ENDOCRINOLOGY | Age: 75
End: 2020-10-11

## 2020-10-11 DIAGNOSIS — R68.89 ABNORMAL ENDOCRINE LABORATORY TEST FINDING: ICD-10-CM

## 2020-10-19 DIAGNOSIS — M54.41 ACUTE RIGHT-SIDED LOW BACK PAIN WITH RIGHT-SIDED SCIATICA: ICD-10-CM

## 2020-10-19 RX ORDER — ACETAMINOPHEN AND CODEINE PHOSPHATE 300; 30 MG/1; MG/1
TABLET ORAL
Qty: 90 TABLET | Refills: 0 | Status: SHIPPED | OUTPATIENT
Start: 2020-10-19 | End: 2020-12-02

## 2020-10-30 RX ORDER — HYDROXYZINE HYDROCHLORIDE 25 MG/1
TABLET, FILM COATED ORAL
Qty: 90 TABLET | Refills: 0 | Status: SHIPPED | OUTPATIENT
Start: 2020-10-30 | End: 2020-11-03 | Stop reason: SDUPTHER

## 2020-11-03 RX ORDER — METOPROLOL SUCCINATE 100 MG/1
100 TABLET, EXTENDED RELEASE ORAL DAILY
Qty: 90 TABLET | Refills: 0 | Status: SHIPPED | OUTPATIENT
Start: 2020-11-03 | End: 2021-02-05

## 2020-11-03 RX ORDER — HYDROXYZINE HYDROCHLORIDE 25 MG/1
25 TABLET, FILM COATED ORAL NIGHTLY
Qty: 90 TABLET | Refills: 0 | Status: SHIPPED | OUTPATIENT
Start: 2020-11-03 | End: 2021-06-07

## 2020-12-02 DIAGNOSIS — IMO0002 UNCONTROLLED TYPE 2 DIABETES MELLITUS WITH COMPLICATION, WITH LONG-TERM CURRENT USE OF INSULIN: ICD-10-CM

## 2020-12-02 DIAGNOSIS — E78.2 MIXED HYPERLIPIDEMIA: Primary | ICD-10-CM

## 2020-12-02 DIAGNOSIS — M54.41 ACUTE RIGHT-SIDED LOW BACK PAIN WITH RIGHT-SIDED SCIATICA: ICD-10-CM

## 2020-12-02 LAB
ALBUMIN SERPL-MCNC: 4.3 G/DL (ref 3.5–5.2)
ALBUMIN/GLOB SERPL: 1.5 G/DL
ALP SERPL-CCNC: 107 U/L (ref 39–117)
ALT SERPL-CCNC: 15 U/L (ref 1–33)
AST SERPL-CCNC: 18 U/L (ref 1–32)
BILIRUB SERPL-MCNC: 0.6 MG/DL (ref 0–1.2)
BUN SERPL-MCNC: 32 MG/DL (ref 8–23)
BUN/CREAT SERPL: 23.7 (ref 7–25)
CALCIUM SERPL-MCNC: 9.5 MG/DL (ref 8.6–10.5)
CHLORIDE SERPL-SCNC: 91 MMOL/L (ref 98–107)
CHOLEST SERPL-MCNC: 199 MG/DL (ref 0–200)
CO2 SERPL-SCNC: 33 MMOL/L (ref 22–29)
CREAT SERPL-MCNC: 1.35 MG/DL (ref 0.57–1)
GLOBULIN SER CALC-MCNC: 2.9 GM/DL
GLUCOSE SERPL-MCNC: 341 MG/DL (ref 65–99)
HBA1C MFR BLD: 9 % (ref 4.8–5.6)
HDLC SERPL-MCNC: 33 MG/DL (ref 40–60)
LDLC SERPL CALC-MCNC: 116 MG/DL (ref 0–100)
POTASSIUM SERPL-SCNC: 3.5 MMOL/L (ref 3.5–5.2)
PROT SERPL-MCNC: 7.2 G/DL (ref 6–8.5)
SODIUM SERPL-SCNC: 135 MMOL/L (ref 136–145)
TRIGL SERPL-MCNC: 285 MG/DL (ref 0–150)
VLDLC SERPL CALC-MCNC: 50 MG/DL (ref 5–40)

## 2020-12-02 RX ORDER — ACETAMINOPHEN AND CODEINE PHOSPHATE 300; 30 MG/1; MG/1
TABLET ORAL
Qty: 90 TABLET | Refills: 0 | Status: SHIPPED | OUTPATIENT
Start: 2020-12-02 | End: 2021-01-07

## 2020-12-09 ENCOUNTER — OFFICE VISIT (OUTPATIENT)
Dept: INTERNAL MEDICINE | Facility: CLINIC | Age: 75
End: 2020-12-09

## 2020-12-09 VITALS
BODY MASS INDEX: 38.24 KG/M2 | HEART RATE: 72 BPM | DIASTOLIC BLOOD PRESSURE: 64 MMHG | OXYGEN SATURATION: 97 % | WEIGHT: 224 LBS | HEIGHT: 64 IN | SYSTOLIC BLOOD PRESSURE: 142 MMHG

## 2020-12-09 DIAGNOSIS — I10 ESSENTIAL HYPERTENSION: ICD-10-CM

## 2020-12-09 DIAGNOSIS — E78.2 MIXED HYPERLIPIDEMIA: ICD-10-CM

## 2020-12-09 DIAGNOSIS — IMO0002 UNCONTROLLED TYPE II DIABETES MELLITUS WITH NEPHROPATHY: Primary | ICD-10-CM

## 2020-12-09 DIAGNOSIS — Z12.31 ENCOUNTER FOR SCREENING MAMMOGRAM FOR BREAST CANCER: ICD-10-CM

## 2020-12-09 PROCEDURE — 99213 OFFICE O/P EST LOW 20 MIN: CPT | Performed by: INTERNAL MEDICINE

## 2020-12-09 RX ORDER — FUROSEMIDE 80 MG
160 TABLET ORAL 2 TIMES DAILY
COMMUNITY
Start: 2020-11-28 | End: 2021-09-29 | Stop reason: ALTCHOICE

## 2020-12-09 RX ORDER — HYDRALAZINE HYDROCHLORIDE 10 MG/1
10 TABLET, FILM COATED ORAL 3 TIMES DAILY
Qty: 90 TABLET | Refills: 5 | Status: SHIPPED | OUTPATIENT
Start: 2020-12-09 | End: 2021-07-26 | Stop reason: SDUPTHER

## 2020-12-10 NOTE — PROGRESS NOTES
Subjective     Denisse Chavez is a 75 y.o. female who presents with   Chief Complaint   Patient presents with   • Diabetes   • Hypertension   • Hyperlipidemia       History of Present Illness     DM-2.  Control is poor.  She requests new endo because her current one is difficult to get in touch with.    HTN.  Poor control.  We discussed adding another medication.    HLD.  Fair control on current medication.      Review of Systems   Constitutional: Negative for fever.   Respiratory: Negative.    Cardiovascular: Negative.        The following portions of the patient's history were reviewed and updated as appropriate: allergies, current medications and problem list.    Patient Active Problem List    Diagnosis Date Noted   • Severe obesity (BMI 35.0-39.9) with comorbidity (CMS/[facility]) 08/19/2020   • Aortic valve sclerosis 07/25/2019   • Hyperlipidemia associated with type 2 diabetes mellitus (CMS/[facility]) 12/20/2018   • History of colon polyps 06/13/2018     Note Last Updated: 6/13/2018     Added automatically from request for surgery [id number]     • Circadian rhythm sleep disorder, delayed sleep phase type 04/23/2018   • Uncontrolled type 2 diabetes mellitus with background retinopathy (CMS/[facility]) 10/24/2017   • Chronic venous insufficiency    • CAD (coronary artery disease)      Note Last Updated: 6/28/2017     MI in 1996, treated medically.  PET 6/2016 with small-medium sized lateral infarct, no ischemia.  This wall motion abnormality was seen on echo as well.     • Stage 3 chronic kidney disease 10/13/2016   • SAWYER on autoCPAP 09/04/2016   • Encounter for long-term (current) use of insulin (CMS/[facility]) 06/24/2016   • Left hip pain 05/16/2016   • Left-sided low back pain without sciatica 05/16/2016   • Lumbar spinal stenosis 05/16/2016   • Angiomyolipoma of kidney 03/30/2016   • Uncontrolled type II diabetes mellitus with nephropathy (CMS/[facility]) 03/08/2016   • Type II diabetes mellitus with neurological manifestations,  "uncontrolled (CMS/[facility]) 03/08/2016   • Uncontrolled type 2 diabetes mellitus with complication, with long-term current use of insulin (CMS/[facility]) 03/08/2016   • Hyperinsulinism 03/08/2016   • Abnormal weight gain 03/08/2016   • Hyperlipidemia 03/08/2016   • Essential hypertension 03/08/2016       Current Outpatient Medications on File Prior to Visit   Medication Sig Dispense Refill   • acetaminophen-codeine (TYLENOL #3) 300-30 MG per tablet TAKE 1 TABLET BY MOUTH THREE TIMES DAILY AS NEEDED FOR MODERATE PAIN 90 tablet 0   • aspirin 81 MG tablet Take  by mouth Every Night.     • docusate sodium (COLACE) 100 MG capsule Take 100 mg by mouth 2 (two) times a day.     • furosemide (LASIX) 80 MG tablet Take 160 mg by mouth 2 (Two) Times a Day.     • hydrOXYzine (ATARAX) 25 MG tablet Take 1 tablet by mouth Every Night. 90 tablet 0   • insulin NPH (NovoLIN N ReliOn) 100 UNIT/ML injection INJECT 46 UNITS SUBCUTANEOUSLY IN THE MORNING AND 26  IN THE EVENING 75 mL 2   • insulin regular (humuLIN R,novoLIN R) 100 UNIT/ML injection 34 units before breakfast, 14 units before lunch, 26 units before dinner subcu daily 30 mL 5   • isosorbide mononitrate (IMDUR) 60 MG 24 hr tablet Take 1 tablet by mouth once daily 90 tablet 0   • metoprolol succinate XL (TOPROL-XL) 100 MG 24 hr tablet Take 1 tablet by mouth Daily. 90 tablet 0   • Misc. Devices (ROLLATOR) misc 1 Units as needed (for walking). 1 each 0   • nystatin (nystatin) 081618 UNIT/GM powder Apply  topically to the appropriate area as directed 3 (Three) Times a Day. 60 g 11   • simvastatin (ZOCOR) 40 MG tablet Take 0.5 tablets by mouth Daily. 45 tablet 3   • vitamin D (ERGOCALCIFEROL) 94011 units capsule capsule 50,000 Units Every 7 (Seven) Days.       No current facility-administered medications on file prior to visit.        Objective     /64   Pulse 72   Ht 162.6 cm (64.02\")   Wt 102 kg (224 lb)   SpO2 97%   BMI 38.43 kg/m²     Physical Exam  Constitutional:       " Appearance: She is well-developed.   HENT:      Head: Normocephalic and atraumatic.   Pulmonary:      Effort: Pulmonary effort is normal.   Neurological:      Mental Status: She is alert and oriented to person, place, and time.   Psychiatric:         Behavior: Behavior normal.         Assessment/Plan   Diagnoses and all orders for this visit:    1. Uncontrolled type II diabetes mellitus with nephropathy (CMS/HCC) (Primary)  -     Ambulatory Referral to Endocrinology    2. Essential hypertension    3. Mixed hyperlipidemia    4. Encounter for screening mammogram for breast cancer  -     Mammo Screening Digital Tomosynthesis Bilateral With CAD; Future    Other orders  -     hydrALAZINE (APRESOLINE) 10 MG tablet; Take 1 tablet by mouth 3 (Three) Times a Day.  Dispense: 90 tablet; Refill: 5        Discussion    DM-2.  Poor control.  Referral is placed to new endo.   HTN.  Add Hydralazine to her regimen.  The patient is instructed to monitor at home and call in checks.    HLD.  The patient will continue current regimen.             Future Appointments   Date Time Provider Department Center   12/23/2020  9:30 AM [facility] None   6/2/2021 10:00 AM [facility]   6/9/2021 11:15 AM Surekha Mcdonnell MD [facility]   8/11/2021 10:15 AM Alfredito Mueller MD [facility] None   8/25/2021  1:45 PM Warner Tang MD [facility] None

## 2020-12-13 RX ORDER — ISOSORBIDE MONONITRATE 60 MG/1
TABLET, EXTENDED RELEASE ORAL
Qty: 90 TABLET | Refills: 0 | Status: SHIPPED | OUTPATIENT
Start: 2020-12-13 | End: 2021-04-05

## 2020-12-16 DIAGNOSIS — R68.89 ABNORMAL ENDOCRINE LABORATORY TEST FINDING: Primary | ICD-10-CM

## 2020-12-21 ENCOUNTER — RESULTS ENCOUNTER (OUTPATIENT)
Dept: ENDOCRINOLOGY | Age: 75
End: 2020-12-21

## 2020-12-21 DIAGNOSIS — R68.89 ABNORMAL ENDOCRINE LABORATORY TEST FINDING: ICD-10-CM

## 2021-01-07 DIAGNOSIS — M54.41 ACUTE RIGHT-SIDED LOW BACK PAIN WITH RIGHT-SIDED SCIATICA: ICD-10-CM

## 2021-01-07 RX ORDER — ACETAMINOPHEN AND CODEINE PHOSPHATE 300; 30 MG/1; MG/1
TABLET ORAL
Qty: 90 TABLET | Refills: 0 | Status: SHIPPED | OUTPATIENT
Start: 2021-01-07 | End: 2021-02-12

## 2021-02-05 RX ORDER — METOPROLOL SUCCINATE 100 MG/1
TABLET, EXTENDED RELEASE ORAL
Qty: 90 TABLET | Refills: 0 | Status: SHIPPED | OUTPATIENT
Start: 2021-02-05 | End: 2021-05-19

## 2021-02-12 DIAGNOSIS — M54.41 ACUTE RIGHT-SIDED LOW BACK PAIN WITH RIGHT-SIDED SCIATICA: ICD-10-CM

## 2021-02-12 RX ORDER — ACETAMINOPHEN AND CODEINE PHOSPHATE 300; 30 MG/1; MG/1
TABLET ORAL
Qty: 90 TABLET | Refills: 0 | Status: SHIPPED | OUTPATIENT
Start: 2021-02-12 | End: 2021-03-19

## 2021-03-02 DIAGNOSIS — Z23 IMMUNIZATION DUE: ICD-10-CM

## 2021-03-05 RX ORDER — SIMVASTATIN 40 MG
TABLET ORAL
Qty: 45 TABLET | Refills: 1 | Status: SHIPPED | OUTPATIENT
Start: 2021-03-05 | End: 2021-06-09 | Stop reason: SDUPTHER

## 2021-03-19 DIAGNOSIS — M54.41 ACUTE RIGHT-SIDED LOW BACK PAIN WITH RIGHT-SIDED SCIATICA: ICD-10-CM

## 2021-03-19 RX ORDER — ACETAMINOPHEN AND CODEINE PHOSPHATE 300; 30 MG/1; MG/1
TABLET ORAL
Qty: 90 TABLET | Refills: 0 | Status: SHIPPED | OUTPATIENT
Start: 2021-03-19 | End: 2021-04-23

## 2021-04-05 RX ORDER — ISOSORBIDE MONONITRATE 60 MG/1
TABLET, EXTENDED RELEASE ORAL
Qty: 90 TABLET | Refills: 0 | Status: SHIPPED | OUTPATIENT
Start: 2021-04-05 | End: 2021-07-21 | Stop reason: SDUPTHER

## 2021-04-22 DIAGNOSIS — M54.41 ACUTE RIGHT-SIDED LOW BACK PAIN WITH RIGHT-SIDED SCIATICA: ICD-10-CM

## 2021-04-23 RX ORDER — ACETAMINOPHEN AND CODEINE PHOSPHATE 300; 30 MG/1; MG/1
TABLET ORAL
Qty: 90 TABLET | Refills: 0 | Status: SHIPPED | OUTPATIENT
Start: 2021-04-23 | End: 2021-06-07

## 2021-05-19 RX ORDER — METOPROLOL SUCCINATE 100 MG/1
TABLET, EXTENDED RELEASE ORAL
Qty: 90 TABLET | Refills: 0 | Status: SHIPPED | OUTPATIENT
Start: 2021-05-19 | End: 2021-09-10

## 2021-06-02 DIAGNOSIS — E78.2 MIXED HYPERLIPIDEMIA: ICD-10-CM

## 2021-06-02 DIAGNOSIS — IMO0002 UNCONTROLLED TYPE II DIABETES MELLITUS WITH NEPHROPATHY: Primary | ICD-10-CM

## 2021-06-02 DIAGNOSIS — I10 ESSENTIAL HYPERTENSION: ICD-10-CM

## 2021-06-03 LAB
ALBUMIN SERPL-MCNC: 4.5 G/DL (ref 3.5–5.2)
ALBUMIN/CREAT UR: 861 MG/G CREAT (ref 0–29)
ALBUMIN/GLOB SERPL: 1.7 G/DL
ALP SERPL-CCNC: 102 U/L (ref 39–117)
ALT SERPL-CCNC: 14 U/L (ref 1–33)
APPEARANCE UR: CLEAR
AST SERPL-CCNC: 19 U/L (ref 1–32)
BACTERIA #/AREA URNS HPF: ABNORMAL /HPF
BASOPHILS # BLD AUTO: 0.03 10*3/MM3 (ref 0–0.2)
BASOPHILS NFR BLD AUTO: 0.3 % (ref 0–1.5)
BILIRUB SERPL-MCNC: 0.5 MG/DL (ref 0–1.2)
BILIRUB UR QL STRIP: NEGATIVE
BUN SERPL-MCNC: 27 MG/DL (ref 8–23)
BUN/CREAT SERPL: 22 (ref 7–25)
CALCIUM SERPL-MCNC: 10.4 MG/DL (ref 8.6–10.5)
CASTS URNS MICRO: ABNORMAL
CHLORIDE SERPL-SCNC: 99 MMOL/L (ref 98–107)
CHOLEST SERPL-MCNC: 208 MG/DL (ref 0–200)
CO2 SERPL-SCNC: 31.3 MMOL/L (ref 22–29)
COLOR UR: YELLOW
CREAT SERPL-MCNC: 1.23 MG/DL (ref 0.57–1)
CREAT UR-MCNC: 60.2 MG/DL
EOSINOPHIL # BLD AUTO: 0.14 10*3/MM3 (ref 0–0.4)
EOSINOPHIL NFR BLD AUTO: 1.5 % (ref 0.3–6.2)
EPI CELLS #/AREA URNS HPF: ABNORMAL /HPF
ERYTHROCYTE [DISTWIDTH] IN BLOOD BY AUTOMATED COUNT: 13.3 % (ref 12.3–15.4)
GLOBULIN SER CALC-MCNC: 2.6 GM/DL
GLUCOSE SERPL-MCNC: 199 MG/DL (ref 65–99)
GLUCOSE UR QL: NEGATIVE
HBA1C MFR BLD: 9 % (ref 4.8–5.6)
HCT VFR BLD AUTO: 43.7 % (ref 34–46.6)
HDLC SERPL-MCNC: 33 MG/DL (ref 40–60)
HGB BLD-MCNC: 14.4 G/DL (ref 12–15.9)
HGB UR QL STRIP: NEGATIVE
IMM GRANULOCYTES # BLD AUTO: 0.05 10*3/MM3 (ref 0–0.05)
IMM GRANULOCYTES NFR BLD AUTO: 0.5 % (ref 0–0.5)
KETONES UR QL STRIP: NEGATIVE
LDLC SERPL CALC-MCNC: 118 MG/DL (ref 0–100)
LEUKOCYTE ESTERASE UR QL STRIP: ABNORMAL
LYMPHOCYTES # BLD AUTO: 1.89 10*3/MM3 (ref 0.7–3.1)
LYMPHOCYTES NFR BLD AUTO: 19.6 % (ref 19.6–45.3)
MCH RBC QN AUTO: 28.8 PG (ref 26.6–33)
MCHC RBC AUTO-ENTMCNC: 33 G/DL (ref 31.5–35.7)
MCV RBC AUTO: 87.4 FL (ref 79–97)
MICROALBUMIN UR-MCNC: 518.4 UG/ML
MONOCYTES # BLD AUTO: 0.71 10*3/MM3 (ref 0.1–0.9)
MONOCYTES NFR BLD AUTO: 7.4 % (ref 5–12)
NEUTROPHILS # BLD AUTO: 6.8 10*3/MM3 (ref 1.7–7)
NEUTROPHILS NFR BLD AUTO: 70.7 % (ref 42.7–76)
NITRITE UR QL STRIP: NEGATIVE
NRBC BLD AUTO-RTO: 0 /100 WBC (ref 0–0.2)
PH UR STRIP: 6.5 [PH] (ref 5–8)
PLATELET # BLD AUTO: 228 10*3/MM3 (ref 140–450)
POTASSIUM SERPL-SCNC: 4.9 MMOL/L (ref 3.5–5.2)
PROT SERPL-MCNC: 7.1 G/DL (ref 6–8.5)
PROT UR QL STRIP: ABNORMAL
RBC # BLD AUTO: 5 10*6/MM3 (ref 3.77–5.28)
RBC #/AREA URNS HPF: ABNORMAL /HPF
SODIUM SERPL-SCNC: 140 MMOL/L (ref 136–145)
SP GR UR: 1.01 (ref 1–1.03)
TRIGL SERPL-MCNC: 328 MG/DL (ref 0–150)
TSH SERPL DL<=0.005 MIU/L-ACNC: 1.91 UIU/ML (ref 0.27–4.2)
UROBILINOGEN UR STRIP-MCNC: ABNORMAL MG/DL
VLDLC SERPL CALC-MCNC: 57 MG/DL (ref 5–40)
WBC # BLD AUTO: 9.62 10*3/MM3 (ref 3.4–10.8)
WBC #/AREA URNS HPF: ABNORMAL /HPF

## 2021-06-05 DIAGNOSIS — M54.41 ACUTE RIGHT-SIDED LOW BACK PAIN WITH RIGHT-SIDED SCIATICA: ICD-10-CM

## 2021-06-07 RX ORDER — ACETAMINOPHEN AND CODEINE PHOSPHATE 300; 30 MG/1; MG/1
TABLET ORAL
Qty: 90 TABLET | Refills: 0 | Status: SHIPPED | OUTPATIENT
Start: 2021-06-07 | End: 2021-07-21 | Stop reason: SDUPTHER

## 2021-06-07 RX ORDER — HYDROXYZINE HYDROCHLORIDE 25 MG/1
TABLET, FILM COATED ORAL
Qty: 90 TABLET | Refills: 0 | Status: SHIPPED | OUTPATIENT
Start: 2021-06-07 | End: 2021-10-04

## 2021-06-09 ENCOUNTER — OFFICE VISIT (OUTPATIENT)
Dept: INTERNAL MEDICINE | Facility: CLINIC | Age: 76
End: 2021-06-09

## 2021-06-09 VITALS
SYSTOLIC BLOOD PRESSURE: 124 MMHG | DIASTOLIC BLOOD PRESSURE: 76 MMHG | OXYGEN SATURATION: 98 % | HEIGHT: 64 IN | WEIGHT: 219 LBS | HEART RATE: 72 BPM | BODY MASS INDEX: 37.39 KG/M2

## 2021-06-09 DIAGNOSIS — Z00.00 MEDICARE ANNUAL WELLNESS VISIT, SUBSEQUENT: Primary | ICD-10-CM

## 2021-06-09 DIAGNOSIS — I10 ESSENTIAL HYPERTENSION: ICD-10-CM

## 2021-06-09 DIAGNOSIS — E78.2 MIXED HYPERLIPIDEMIA: ICD-10-CM

## 2021-06-09 DIAGNOSIS — Z12.31 ENCOUNTER FOR SCREENING MAMMOGRAM FOR BREAST CANCER: ICD-10-CM

## 2021-06-09 DIAGNOSIS — Z78.0 MENOPAUSE: ICD-10-CM

## 2021-06-09 DIAGNOSIS — IMO0002 UNCONTROLLED TYPE 2 DIABETES MELLITUS WITH COMPLICATION, WITH LONG-TERM CURRENT USE OF INSULIN: ICD-10-CM

## 2021-06-09 PROCEDURE — G0439 PPPS, SUBSEQ VISIT: HCPCS | Performed by: INTERNAL MEDICINE

## 2021-06-09 PROCEDURE — 99214 OFFICE O/P EST MOD 30 MIN: CPT | Performed by: INTERNAL MEDICINE

## 2021-06-09 RX ORDER — SYRINGE AND NEEDLE,INSULIN,1ML 31GX15/64"
SYRINGE, EMPTY DISPOSABLE MISCELLANEOUS
COMMUNITY
Start: 2021-05-19

## 2021-06-09 RX ORDER — SPIRONOLACTONE 25 MG/1
25 TABLET ORAL DAILY
COMMUNITY
Start: 2021-05-13

## 2021-06-09 RX ORDER — SIMVASTATIN 40 MG
40 TABLET ORAL NIGHTLY
Qty: 90 TABLET | Refills: 3 | COMMUNITY
Start: 2021-06-09 | End: 2021-07-21 | Stop reason: SDUPTHER

## 2021-06-09 RX ORDER — HUMAN INSULIN 100 [IU]/ML
INJECTION, SUSPENSION SUBCUTANEOUS
Refills: 12 | COMMUNITY
Start: 2021-06-09

## 2021-06-09 NOTE — PATIENT INSTRUCTIONS
Medicare Wellness  Personal Prevention Plan of Service     Date of Office Visit:  2021  Encounter Provider:  Surekha Mcdonnell MD  Place of Service:  [facility]  Patient Name: Denisse Chavez  :  1945    As part of the Medicare Wellness portion of your visit today, we are providing you with this personalized preventive plan of services (PPPS). This plan is based upon recommendations of the [place] Preventive Services Task Force (USPSTF) and the Advisory Committee on Immunization Practices (ACIP).    This lists the preventive care services that should be considered, and provides dates of when you are due. Items listed as completed are up-to-date and do not require any further intervention.    Health Maintenance   Topic Date Due   • ZOSTER VACCINE (1 of 2) Never done   • COLORECTAL CANCER SCREENING  2020   • DXA SCAN  2020   • MAMMOGRAM  2020   • ANNUAL WELLNESS VISIT  2021   • INFLUENZA VACCINE  2021   • DIABETIC FOOT EXAM  10/14/2021   • HEMOGLOBIN A1C  2021   • DIABETIC EYE EXAM  2022   • LIPID PANEL  2022   • URINE MICROALBUMIN  2022   • TDAP/TD VACCINES (2 - Td or Tdap) 10/13/2026   • HEPATITIS C SCREENING  Completed   • COVID-19 Vaccine  Completed   • Pneumococcal Vaccine 65+  Completed       No orders of the defined types were placed in this encounter.      Return in about 1 year (around 2022).

## 2021-06-09 NOTE — PROGRESS NOTES
The ABCs of the Annual Wellness Visit  Subsequent Medicare Wellness Visit    Chief Complaint   Patient presents with   • Medicare Wellness-subsequent       Subjective   History of Present Illness:  Denisse Chavez is a 75 y.o. female who presents for a Subsequent Medicare Wellness Visit.    The following data was reviewed by: Surekha Mcdonnell MD on 06/09/2021:  Common labs    Common Labsle 12/2/20 12/2/20 12/2/20 3/17/21 3/17/21 3/17/21 3/17/21 3/17/21 6/2/21 6/2/21 6/2/21 6/2/21 6/2/21    0927 0927 0927 0943 0943 0943 0943 0943 0943 0943 0943 0943 0943   Glucose 341 (A)    105 (A)     199 (A)      BUN 32 (A)      22 (A)   27 (A)      Creatinine 1.35 (A)     1.0    1.23 (A)      eGFR Non  Am 38 (A)         43 (A)      eGFR  Am 46 (A)         52 (A)      Sodium 135 (A)         140      Potassium 3.5         4.9      Chloride 91 (A)         99      Calcium 9.5         10.4      Total Protein 7.2         7.1      Albumin 4.30   4.2      4.50      Total Bilirubin 0.6   0.5      0.5      Alkaline Phosphatase 107   81      102      AST (SGOT) 18   22      19      ALT (SGPT) 15   12 (A)      14      WBC         9.62       Hemoglobin         14.4       Hematocrit         43.7       Platelets         228       Total Cholesterol  199         208 (A)     Triglycerides  285 (A)         328 (A)     HDL Cholesterol  33 (A)         33 (A)     LDL Cholesterol   116 (A)         118 (A)     Hemoglobin A1C   9.00 (A)     8.5 (A)    9.00 (A)    Microalbumin, Urine             518.4   (A) Abnormal value       Comments are available for some flowsheets but are not being displayed.           DM-2.  Control is poor.  She is followed by endo.  HTN.  Control is good.  HLD.  Control is fair on simvastatin 40mg.       HEALTH RISK ASSESSMENT    Recent Hospitalizations:  No hospitalization(s) within the last year.    Current Medical Providers:  Patient Care Team:  Surekha Mcdonnell MD as PCP - General (Internal Medicine)  Clyde  Warner SHAFER MD as Consulting Physician (Cardiology)  Sergio Eagle MD as Consulting Physician (Urology)  Juan Negrete MD as Consulting Physician (Endocrinology)  Miguel Angel Barrett DPM as Consulting Physician (Podiatry)  Gabriel Montenegro MD as Consulting Physician (Nephrology)  Linda Rodriguez MD (Ophthalmology)    Smoking Status:  Social History     Tobacco Use   Smoking Status Former Smoker   • Packs/day: 0.25   • Years: 2.00   • Pack years: 0.50   • Types: Cigarettes   • Quit date:    • Years since quittin.4   Smokeless Tobacco Never Used   Tobacco Comment    LIGHT USAGE       Alcohol Consumption:  Social History     Substance and Sexual Activity   Alcohol Use No       Depression Screen:   PHQ-2/PHQ-9 Depression Screening 2021   Little interest or pleasure in doing things 0   Feeling down, depressed, or hopeless 0   Trouble falling or staying asleep, or sleeping too much -   Feeling tired or having little energy -   Poor appetite or overeating -   Feeling bad about yourself - or that you are a failure or have let yourself or your family down -   Trouble concentrating on things, such as reading the newspaper or watching television -   Moving or speaking so slowly that other people could have noticed. Or the opposite - being so fidgety or restless that you have been moving around a lot more than usual -   Thoughts that you would be better off dead, or of hurting yourself in some way -   Total Score 0   If you checked off any problems, how difficult have these problems made it for you to do your work, take care of things at home, or get along with other people? -       Fall Risk Screen:  STEADI Fall Risk Assessment was completed, and patient is at LOW risk for falls.Assessment completed on:2021    Health Habits and Functional and Cognitive Screening:  Functional & Cognitive Status 2021   Do you have difficulty preparing food and eating? No   Do you have difficulty bathing  yourself, getting dressed or grooming yourself? No   Do you have difficulty using the toilet? No   Do you have difficulty moving around from place to place? Yes   Do you have trouble with steps or getting out of a bed or a chair? No   Current Diet Well Balanced Diet   Dental Exam Up to date        Dental Exam Comment -   Eye Exam Up to date        Eye Exam Comment -   Exercise (times per week) 2 times per week   Current Exercise Activities Include Housecleaning   Do you need help using the phone?  No   Are you deaf or do you have serious difficulty hearing?  No   Do you need help with transportation? No   Do you need help shopping? No   Do you need help preparing meals?  No   Do you need help with housework?  No   Do you need help with laundry? No   Do you need help taking your medications? No   Do you need help managing money? No   Do you ever drive or ride in a car without wearing a seat belt? No   Have you felt unusual stress, anger or loneliness in the last month? No   Who do you live with? Child   If you need help, do you have trouble finding someone available to you? No   Have you been bothered in the last four weeks by sexual problems? No   Do you have difficulty concentrating, remembering or making decisions? No         Does the patient have evidence of cognitive impairment? No    Asprin use counseling:Taking ASA appropriately as indicated    Age-appropriate Screening Schedule:  Refer to the list below for future screening recommendations based on patient's age, sex and/or medical conditions. Orders for these recommended tests are listed in the plan section. The patient has been provided with a written plan.    Health Maintenance   Topic Date Due   • ZOSTER VACCINE (1 of 2) Never done   • DXA SCAN  09/11/2020   • MAMMOGRAM  11/19/2020   • INFLUENZA VACCINE  08/01/2021   • DIABETIC FOOT EXAM  10/14/2021   • HEMOGLOBIN A1C  12/02/2021   • DIABETIC EYE EXAM  01/05/2022   • LIPID PANEL  06/02/2022   • URINE  "MICROALBUMIN  06/02/2022   • TDAP/TD VACCINES (2 - Td or Tdap) 10/13/2026          The following portions of the patient's history were reviewed and updated as appropriate: allergies, current medications, past family history, past medical history, past social history, past surgical history and problem list.    Outpatient Medications Prior to Visit   Medication Sig Dispense Refill   • acetaminophen-codeine (TYLENOL #3) 300-30 MG per tablet TAKE 1 TABLET BY MOUTH THREE TIMES DAILY AS NEEDED FOR MODERATE PAIN 90 tablet 0   • aspirin 81 MG tablet Take  by mouth Every Night.     • docusate sodium (COLACE) 100 MG capsule Take 100 mg by mouth 2 (two) times a day.     • furosemide (LASIX) 80 MG tablet Take 160 mg by mouth 2 (Two) Times a Day.     • hydrALAZINE (APRESOLINE) 10 MG tablet Take 1 tablet by mouth 3 (Three) Times a Day. 90 tablet 5   • hydrOXYzine (ATARAX) 25 MG tablet TAKE 1 TABLET BY MOUTH ONCE DAILY AT NIGHT 90 tablet 0   • insulin regular (humuLIN R,novoLIN R) 100 UNIT/ML injection 34 units before breakfast, 14 units before lunch, 26 units before dinner subcu daily 30 mL 5   • isosorbide mononitrate (IMDUR) 60 MG 24 hr tablet Take 1 tablet by mouth once daily 90 tablet 0   • metoprolol succinate XL (TOPROL-XL) 100 MG 24 hr tablet Take 1 tablet by mouth once daily 90 tablet 0   • Misc. Devices (ROLLATOR) misc 1 Units as needed (for walking). 1 each 0   • nystatin (nystatin) 705602 UNIT/GM powder Apply  topically to the appropriate area as directed 3 (Three) Times a Day. 60 g 11   • ReliOn Insulin Syringe 31G X 15/64\" 1 ML misc USE 1 FIVE TIMES DAILY     • simvastatin (ZOCOR) 40 MG tablet Take 1 tablet by mouth Every Night. 90 tablet 3   • spironolactone (ALDACTONE) 25 MG tablet Take 25 mg by mouth Daily.     • vitamin D (ERGOCALCIFEROL) 52842 units capsule capsule 50,000 Units Every 7 (Seven) Days.     • simvastatin (ZOCOR) 40 MG tablet Take 1/2 (one-half) tablet by mouth once daily (Patient taking " "differently: Take 40 mg by mouth Every Night.) 45 tablet 1   • insulin NPH (NovoLIN N) 100 UNIT/ML injection Inject  under the skin into the appropriate area as directed 2 (Two) Times a Day Before Meals.  12   • insulin NPH (NovoLIN N ReliOn) 100 UNIT/ML injection INJECT 46 UNITS SUBCUTANEOUSLY IN THE MORNING AND 26  IN THE EVENING 75 mL 2     No facility-administered medications prior to visit.       Patient Active Problem List   Diagnosis   • Uncontrolled type 2 diabetes mellitus with complication, with long-term current use of insulin (CMS/HCC)   • Hyperlipidemia   • Essential hypertension   • Angiomyolipoma of kidney   • Left hip pain   • Left-sided low back pain without sciatica   • Lumbar spinal stenosis   • SAWYER on autoCPAP   • Stage 3 chronic kidney disease (CMS/HCC)   • Chronic venous insufficiency   • CAD (coronary artery disease)   • Circadian rhythm sleep disorder, delayed sleep phase type   • History of colon polyps   • Hyperlipidemia associated with type 2 diabetes mellitus (CMS/HCC)   • Aortic valve sclerosis   • Severe obesity (BMI 35.0-39.9) with comorbidity (CMS/HCC)       Advanced Care Planning:  ACP discussion was held with the patient during this visit. Patient has an advance directive in EMR which is still valid.     Review of Systems   Constitutional: Negative for fever.   Respiratory: Negative.    Cardiovascular: Positive for leg swelling.       Compared to one year ago, the patient feels her physical health is the same.  Compared to one year ago, the patient feels her mental health is the same.    Reviewed chart for potential of high risk medication in the elderly: yes  Reviewed chart for potential of harmful drug interactions in the elderly:yes    Objective         Vitals:    06/09/21 1117   BP: 124/76   Pulse: 72   SpO2: 98%   Weight: 99.3 kg (219 lb)   Height: 162.6 cm (64.02\")   PainSc: 0-No pain       Body mass index is 37.57 kg/m².  Discussed the patient's BMI with her. The BMI is above " average; BMI management plan is completed.    Physical Exam  Constitutional:       Appearance: She is well-developed.   HENT:      Head: Normocephalic and atraumatic.      Right Ear: Hearing and tympanic membrane normal.      Left Ear: Hearing and tympanic membrane normal.      Mouth/Throat:      Pharynx: No oropharyngeal exudate or posterior oropharyngeal erythema.   Cardiovascular:      Rate and Rhythm: Normal rate and regular rhythm.      Heart sounds: Normal heart sounds.   Pulmonary:      Effort: Pulmonary effort is normal.      Breath sounds: Normal breath sounds.   Skin:     General: Skin is warm and dry.   Neurological:      Mental Status: She is alert and oriented to person, place, and time.   Psychiatric:         Behavior: Behavior normal.         Lab Results   Component Value Date     (H) 06/02/2021     (H) 03/17/2021    CHLPL 208 (H) 06/02/2021    TRIG 328 (H) 06/02/2021    HDL 33 (L) 06/02/2021     (H) 06/02/2021    VLDL 57 (H) 06/02/2021    HGBA1C 9.00 (H) 06/02/2021    HGBA1C 8.5 (H) 03/17/2021        Assessment/Plan   Medicare Risks and Personalized Health Plan  CMS Preventative Services Quick Reference  Breast Cancer/Mammogram Screening  Colon Cancer Screening  Immunizations Discussed/Encouraged (specific immunizations; Shingrix )    The above risks/problems have been discussed with the patient.  Pertinent information has been shared with the patient in the After Visit Summary.  Follow up plans and orders are seen below in the Assessment/Plan Section.    Diagnoses and all orders for this visit:    1. Medicare annual wellness visit, subsequent (Primary)    2. Uncontrolled type 2 diabetes mellitus with complication, with long-term current use of insulin (CMS/[facility])    3. Essential hypertension    4. Mixed hyperlipidemia    5. Menopause  -     DEXA Bone Density Axial    6. Encounter for screening mammogram for breast cancer  -     Mammo Screening Digital Tomosynthesis Bilateral With  CAD; Future    DM-2.  Continue insulin and with endo NP.  HTN. Control is good.  The patient is advised to continue current dosage of hydralazine and metoprolol.    HLD.  Control is fair.  The patient is advised to continue current dosage of simvastatin.  Consider change to atorvastatin in future.        Follow Up:  Return in about 1 year (around 6/9/2022).     An After Visit Summary and PPPS were given to the patient.

## 2021-06-14 ENCOUNTER — TELEPHONE (OUTPATIENT)
Dept: INTERNAL MEDICINE | Facility: CLINIC | Age: 76
End: 2021-06-14

## 2021-06-14 ENCOUNTER — OFFICE VISIT (OUTPATIENT)
Dept: INTERNAL MEDICINE | Facility: CLINIC | Age: 76
End: 2021-06-14

## 2021-06-14 VITALS
HEART RATE: 73 BPM | BODY MASS INDEX: 37.39 KG/M2 | DIASTOLIC BLOOD PRESSURE: 64 MMHG | SYSTOLIC BLOOD PRESSURE: 170 MMHG | WEIGHT: 219 LBS | HEIGHT: 64 IN | OXYGEN SATURATION: 98 %

## 2021-06-14 DIAGNOSIS — M25.561 ACUTE PAIN OF RIGHT KNEE: Primary | ICD-10-CM

## 2021-06-14 PROCEDURE — 73560 X-RAY EXAM OF KNEE 1 OR 2: CPT | Performed by: INTERNAL MEDICINE

## 2021-06-14 PROCEDURE — 99213 OFFICE O/P EST LOW 20 MIN: CPT | Performed by: INTERNAL MEDICINE

## 2021-06-14 NOTE — PROGRESS NOTES
Subjective     Denisse Chavez is a 75 y.o. female who presents with   Chief Complaint   Patient presents with   • Knee Pain       History of Present Illness     C/o right knee pain starting two days ago.  Swelling is associated.  Much worse today.  She is having trouble bearing weight.   No injury.        Review of Systems   Respiratory: Negative.    Cardiovascular: Negative.        The following portions of the patient's history were reviewed and updated as appropriate: allergies, current medications and problem list.    Patient Active Problem List    Diagnosis Date Noted   • Severe obesity (BMI 35.0-39.9) with comorbidity (CMS/[facility]) 08/19/2020   • Aortic valve sclerosis 07/25/2019   • Hyperlipidemia associated with type 2 diabetes mellitus (CMS/[facility]) 12/20/2018   • History of colon polyps 06/13/2018     Note Last Updated: 6/13/2018     Added automatically from request for surgery [id number]     • Circadian rhythm sleep disorder, delayed sleep phase type 04/23/2018   • Chronic venous insufficiency    • CAD (coronary artery disease)      Note Last Updated: 6/28/2017     MI in 1996, treated medically.  PET 6/2016 with small-medium sized lateral infarct, no ischemia.  This wall motion abnormality was seen on echo as well.     • Stage 3 chronic kidney disease (CMS/[facility]) 10/13/2016   • SAWYER on autoCPAP 09/04/2016   • Left hip pain 05/16/2016   • Left-sided low back pain without sciatica 05/16/2016   • Lumbar spinal stenosis 05/16/2016   • Angiomyolipoma of kidney 03/30/2016   • Uncontrolled type 2 diabetes mellitus with complication, with long-term current use of insulin (CMS/[facility]) 03/08/2016   • Hyperlipidemia 03/08/2016   • Essential hypertension 03/08/2016       Current Outpatient Medications on File Prior to Visit   Medication Sig Dispense Refill   • acetaminophen-codeine (TYLENOL #3) 300-30 MG per tablet TAKE 1 TABLET BY MOUTH THREE TIMES DAILY AS NEEDED FOR MODERATE PAIN 90 tablet 0   • aspirin 81 MG tablet Take  by  "mouth Every Night.     • docusate sodium (COLACE) 100 MG capsule Take 100 mg by mouth 2 (two) times a day.     • furosemide (LASIX) 80 MG tablet Take 160 mg by mouth 2 (Two) Times a Day.     • hydrALAZINE (APRESOLINE) 10 MG tablet Take 1 tablet by mouth 3 (Three) Times a Day. 90 tablet 5   • hydrOXYzine (ATARAX) 25 MG tablet TAKE 1 TABLET BY MOUTH ONCE DAILY AT NIGHT 90 tablet 0   • insulin NPH (NovoLIN N) 100 UNIT/ML injection Inject  under the skin into the appropriate area as directed 2 (Two) Times a Day Before Meals.  12   • insulin regular (humuLIN R,novoLIN R) 100 UNIT/ML injection 34 units before breakfast, 14 units before lunch, 26 units before dinner subcu daily 30 mL 5   • isosorbide mononitrate (IMDUR) 60 MG 24 hr tablet Take 1 tablet by mouth once daily 90 tablet 0   • metoprolol succinate XL (TOPROL-XL) 100 MG 24 hr tablet Take 1 tablet by mouth once daily 90 tablet 0   • Misc. Devices (ROLLATOR) misc 1 Units as needed (for walking). 1 each 0   • nystatin (nystatin) 300389 UNIT/GM powder Apply  topically to the appropriate area as directed 3 (Three) Times a Day. 60 g 11   • ReliOn Insulin Syringe 31G X 15/64\" 1 ML misc USE 1 FIVE TIMES DAILY     • simvastatin (ZOCOR) 40 MG tablet Take 1 tablet by mouth Every Night. 90 tablet 3   • spironolactone (ALDACTONE) 25 MG tablet Take 25 mg by mouth Daily.     • vitamin D (ERGOCALCIFEROL) 55485 units capsule capsule 50,000 Units Every 7 (Seven) Days.       No current facility-administered medications on file prior to visit.       Objective     /64   Pulse 73   Ht 162.6 cm (64.02\")   Wt 99.3 kg (219 lb)   SpO2 98%   BMI 37.57 kg/m²     Physical Exam  Constitutional:       Appearance: She is well-developed.   HENT:      Head: Normocephalic and atraumatic.   Pulmonary:      Effort: Pulmonary effort is normal.   Musculoskeletal:      Right knee: Swelling present. Tenderness present.   Neurological:      Mental Status: She is alert and oriented to person, " place, and time.   Psychiatric:         Behavior: Behavior normal.     X-rays of the right knee  performed today for following indication:   pain.  X-ray reveal DJD.  There is no available x-ray for comparison.  X-ray sent to radiology for official interpretation and findings.        Assessment/Plan   Diagnoses and all orders for this visit:    1. Acute pain of right knee (Primary)  -     Ambulatory Referral to Sports Medicine  -     XR Knee 1 or 2 View Right (In Office)    Other orders  -     Diclofenac Sodium (VOLTAREN) 1 % gel gel; Apply 4 g topically to the appropriate area as directed 4 (Four) Times a Day As Needed (pain).  Dispense: 100 g; Refill: 0        Discussion    Patient presents with acute pain of the right knee with underlying DJD.  I discussed with the patient a trial of conservative management with:   voltaren gel.   They will clear use of this medicine when they see Dr. Montenegro in a couple days.  Try to get into sports med for a steroid injection.         Future Appointments   Date Time Provider Department Center   6/14/2021  3:15 PM Surekha Mcdonnell MD [facility]   8/11/2021 10:15 AM Alfredito Mueller MD [facility] None   8/25/2021  1:45 PM Warner Tang MD [facility]   12/8/2021  9:55 AM [facility]   12/15/2021 10:45 AM Surekha Mcdonnell MD [facility]

## 2021-06-14 NOTE — TELEPHONE ENCOUNTER
----- Message from Dara Barrett MA sent at 6/14/2021 11:20 AM EDT -----  Regarding: FW: Non-Urgent Medical Question  Contact: [phone]    ----- Message -----  From: Denisse Chavez  Sent: 6/14/2021  10:55 AM EDT  To: Mala Bautista [facility]  Subject: Non-Urgent Medical Question                      Tito,     Mom, Denisse, had been having knee pain in her right knee for about 2 days now.  She can't hardly stand or walk on it at all.  Do you recommend I take her to the ER or do you have another suggestion?  She has not fallen on it so that isn't what caused this.

## 2021-06-15 ENCOUNTER — OFFICE VISIT (OUTPATIENT)
Dept: SPORTS MEDICINE | Facility: CLINIC | Age: 76
End: 2021-06-15

## 2021-06-15 VITALS
OXYGEN SATURATION: 99 % | HEART RATE: 66 BPM | SYSTOLIC BLOOD PRESSURE: 126 MMHG | HEIGHT: 64 IN | WEIGHT: 219 LBS | RESPIRATION RATE: 16 BRPM | DIASTOLIC BLOOD PRESSURE: 78 MMHG | BODY MASS INDEX: 37.39 KG/M2 | TEMPERATURE: 97.8 F

## 2021-06-15 DIAGNOSIS — M17.11 PRIMARY OSTEOARTHRITIS OF RIGHT KNEE: Primary | ICD-10-CM

## 2021-06-15 PROBLEM — E11.69 HYPERLIPIDEMIA ASSOCIATED WITH TYPE 2 DIABETES MELLITUS: Chronic | Status: ACTIVE | Noted: 2018-12-20

## 2021-06-15 PROBLEM — E78.5 HYPERLIPIDEMIA ASSOCIATED WITH TYPE 2 DIABETES MELLITUS: Chronic | Status: ACTIVE | Noted: 2018-12-20

## 2021-06-15 PROCEDURE — 99214 OFFICE O/P EST MOD 30 MIN: CPT | Performed by: FAMILY MEDICINE

## 2021-06-15 PROCEDURE — 20610 DRAIN/INJ JOINT/BURSA W/O US: CPT | Performed by: FAMILY MEDICINE

## 2021-06-15 RX ORDER — TRIAMCINOLONE ACETONIDE 40 MG/ML
40 INJECTION, SUSPENSION INTRA-ARTICULAR; INTRAMUSCULAR ONCE
Status: COMPLETED | OUTPATIENT
Start: 2021-06-15 | End: 2021-06-15

## 2021-06-15 RX ADMIN — TRIAMCINOLONE ACETONIDE 40 MG: 40 INJECTION, SUSPENSION INTRA-ARTICULAR; INTRAMUSCULAR at 12:10

## 2021-06-15 NOTE — PROGRESS NOTES
"Chief Complaint  Knee Pain (Rt knee pain for three days, would like to discuss knee injection for pain . )    Subjective          Denisse Chavez presents to [facility]  History of Present Illness  3 days ago patient began with sudden onset right medial knee pain.  Has not noted any swelling, locking, or giving way.  Pain worse with weightbearing and flexion of the knee.  No known trauma.  X-rays taken 6/14/2021 was reviewed and these show severe tricompartmental arthritis with the medial patellofemoral compartment is being most involved.  Also has some evidence of possible osteoporosis, a DEXA scan has been ordered by Dr. Mcdonnell.  She would like to consider intra-articular steroid injection.  Objective   Vital Signs:   /78 (BP Location: Left arm, Patient Position: Sitting, Cuff Size: Adult)   Pulse 66   Temp 97.8 °F (36.6 °C)   Resp 16   Ht 162.6 cm (64.02\")   Wt 99.3 kg (219 lb)   SpO2 99%   BMI 37.57 kg/m²     Physical Exam  Vitals reviewed.   Constitutional:       Appearance: She is well-developed.   HENT:      Head: Normocephalic and atraumatic.   Eyes:      Conjunctiva/sclera: Conjunctivae normal.      Pupils: Pupils are equal, round, and reactive to light.   Cardiovascular:      Comments: No peripheral edema  Pulmonary:      Effort: Pulmonary effort is normal.   Musculoskeletal:      Comments: Right knee with out effusion erythema.  Patient has tenderness on the medial joint line and anterior patellofemoral compartment.  Patient has pain on flexion extension.  Patient is ambulatory but walks with a limp while using her wheeled walker.   Skin:     General: Skin is warm and dry.   Neurological:      Mental Status: She is alert and oriented to person, place, and time.   Psychiatric:         Behavior: Behavior normal.        Result Review :                XR Knee 1 or 2 View Right (In Office) (06/14/2021 15:17)    I did discuss with the patient is possible she " "could very likely have a meniscal tear but also consider the possibility of insufficiency fracture tibial plateau.  She would like to proceed with intra-articular steroid injection but if she is not seeing any significant improvement over the next week then we will need to check MRI right knee.      Knee Injection Procedure Note    Right knee injection was discussed with the patient in detail, including indication, risks, benefits, and alternatives. Verbal consent was given for the procedure. Injection was performed by MD.  Injection site was identified by physical examination and cleaned with Betadine and alcohol swabs. Prior to needle insertion, ethyl chloride spray was used for surface anesthesia. Sterile technique was used.  A 25-gauge, 1.5\" needle was directed to the joint from a(n) medial and anterior approach. Injectate was passed into the joint space without difficulty. The needle was removed and a simple bandage was applied. The procedure was tolerated well without difficulty.    Injection mixture:  1% lidocaine without epinephrine: 3 mL    40 mg/mL triamcinolone acetonide: 1 mL    Assessment and Plan    Diagnoses and all orders for this visit:    1. Primary osteoarthritis of right knee (Primary)  -     triamcinolone acetonide (KENALOG-40) injection 40 mg    Chronic arthritis right knee with acute exacerbation.  Postinjection instructions given regarding ice and activity.  I did discuss with the patient that it is possible that this injection could temporarily rise her blood sugars, she will keep an eye on this.  If no significant improvement over the next 7 days then she will let me know we will check MRI right knee rule out insufficiency fracture.      Follow Up   No follow-ups on file.  Patient was given instructions and counseling regarding her condition or for health maintenance advice. Please see specific information pulled into the AVS if appropriate.       "

## 2021-07-21 DIAGNOSIS — M54.41 ACUTE RIGHT-SIDED LOW BACK PAIN WITH RIGHT-SIDED SCIATICA: ICD-10-CM

## 2021-07-22 RX ORDER — ACETAMINOPHEN AND CODEINE PHOSPHATE 300; 30 MG/1; MG/1
TABLET ORAL
Qty: 90 TABLET | Refills: 0 | Status: SHIPPED | OUTPATIENT
Start: 2021-07-22 | End: 2021-08-27

## 2021-07-22 RX ORDER — SIMVASTATIN 40 MG
40 TABLET ORAL NIGHTLY
Qty: 90 TABLET | Refills: 0 | Status: SHIPPED | OUTPATIENT
Start: 2021-07-22 | End: 2021-11-15

## 2021-07-22 RX ORDER — ISOSORBIDE MONONITRATE 60 MG/1
60 TABLET, EXTENDED RELEASE ORAL DAILY
Qty: 90 TABLET | Refills: 0 | Status: SHIPPED | OUTPATIENT
Start: 2021-07-22 | End: 2021-11-15

## 2021-07-26 RX ORDER — HYDRALAZINE HYDROCHLORIDE 10 MG/1
10 TABLET, FILM COATED ORAL 3 TIMES DAILY
Qty: 90 TABLET | Refills: 5 | Status: SHIPPED | OUTPATIENT
Start: 2021-07-26 | End: 2022-03-14

## 2021-07-27 ENCOUNTER — HOSPITAL ENCOUNTER (OUTPATIENT)
Dept: MAMMOGRAPHY | Facility: HOSPITAL | Age: 76
Discharge: HOME | End: 2021-07-27
Admitting: INTERNAL MEDICINE

## 2021-07-27 DIAGNOSIS — Z12.31 ENCOUNTER FOR SCREENING MAMMOGRAM FOR BREAST CANCER: ICD-10-CM

## 2021-07-27 PROCEDURE — 77067 SCR MAMMO BI INCL CAD: CPT

## 2021-07-27 PROCEDURE — 77063 BREAST TOMOSYNTHESIS BI: CPT

## 2021-08-11 ENCOUNTER — OFFICE VISIT (OUTPATIENT)
Dept: SLEEP MEDICINE | Facility: HOSPITAL | Age: 76
End: 2021-08-11

## 2021-08-11 VITALS — WEIGHT: 208 LBS | BODY MASS INDEX: 38.28 KG/M2 | HEIGHT: 62 IN | OXYGEN SATURATION: 97 % | HEART RATE: 63 BPM

## 2021-08-11 DIAGNOSIS — G47.33 OSA ON CPAP: Primary | ICD-10-CM

## 2021-08-11 DIAGNOSIS — Z99.89 OSA ON CPAP: Primary | ICD-10-CM

## 2021-08-11 DIAGNOSIS — E66.01 CLASS 2 SEVERE OBESITY DUE TO EXCESS CALORIES WITH SERIOUS COMORBIDITY AND BODY MASS INDEX (BMI) OF 38.0 TO 38.9 IN ADULT (HCC): ICD-10-CM

## 2021-08-11 PROCEDURE — 99213 OFFICE O/P EST LOW 20 MIN: CPT | Performed by: INTERNAL MEDICINE

## 2021-08-11 PROCEDURE — G0463 HOSPITAL OUTPT CLINIC VISIT: HCPCS

## 2021-08-11 NOTE — PROGRESS NOTES
"Follow Up Sleep Disorders Center Note     Chief Complaint:  SAWYER     Primary Care Physician: Surekha Mcdonnell MD    Interval History:   The patient is a 75 y.o. female  who I last saw 2020 and that note was reviewed.  The patient reports she has no new complaints and she is unchanged.  She goes to bed at 1:30 AM and awakens at 7:30 AM.  She will use the restroom during the nighttime.    Self-administered [place] Sleepiness Scale test results: 2  0-5 Lower normal daytime sleepiness  6-10 Higher normal daytime sleepiness  11-12 Mild, 13-15 Moderate, & 16-24 Severe excessive daytime sleepiness    Review of Systems:    A complete review of systems was done and all were negative with the exception of the above    Social History:    Social History     Socioeconomic History   • Marital status: Single     Spouse name: Not on file   • Number of children: 3   • Years of education: Not on file   • Highest education level: Not on file   Tobacco Use   • Smoking status: Former Smoker     Packs/day: 0.25     Years: 2.00     Pack years: 0.50     Types: Cigarettes     Quit date:      Years since quittin.6   • Smokeless tobacco: Never Used   • Tobacco comment: LIGHT USAGE   Vaping Use   • Vaping Use: Never used   Substance and Sexual Activity   • Alcohol use: No   • Drug use: No   • Sexual activity: Defer       Allergies:  Ace inhibitors, Angiotensin receptor blockers, Keflex [cephalexin], and Sulfa antibiotics     Medication Review:  Reviewed.      Vital Signs:    Vitals:    21 0900   Pulse: 63   SpO2: 97%   Weight: 94.3 kg (208 lb)   Height: 157.5 cm (62\")     Body mass index is 38.04 kg/m².    Physical Exam:    Constitutional:  Well developed 75 y.o. female that appears in no apparent distress.  Awake & oriented times 3.  Normal mood with normal recent and remote memory and normal judgement.  Eyes:  Conjunctivae normal.  Oropharynx: Previously, moist mucous membranes without exudate and a large tongue and normal " uvula and patent posterior pharyngeal opening and class II-3 Mallampati airway, patient is wearing a facemask.     Downloaded PAP Data Reviewed For Compliance:  DME is [facility] and she uses a fullface mask.  Downloads between 5/12 and 8/9/2021 compliance 99%.  Average usage is 6 hours and 33 minutes.  Average AHI is normal at 5.9 and she has a leak of 41 minutes.  Average auto CPAP pressure is 12.8 and her auto CPAP is 9-17.    I have reviewed the above results and compared them with the patient's last downloads and reviewed with the patient.    Impression:   Obstructive sleep apnea adequately treated with auto CPAP. The patient appears to be at goal with good compliance and usage. The patient has no complaints of hypersomnolence.    Plan:  Good sleep hygiene measures should be maintained.  Weight loss would be beneficial in this patient who is obese by BMI.      After evaluating the patient and assessing results available, the patient is benefiting from the treatment being provided.     The patient will continue auto CPAP.  After clinical evaluation and review of downloads, I recommend no changes to the patient's pressures.  A new prescription will be sent to the patient's DME.    Vigil recall discussed.  The patient has registered her device.  She does use an ozone  with her tube and mask only.  Her auto CPAP is not exposed to ozone.  She does not use humidity.  She has not noted any particulate matter.  She will continue auto CPAP.    I answered all of the patient's questions.  The patient will call for any problems and will follow up in 1 year.      Alfredito Mueller MD  Sleep Medicine  08/11/21  10:03 EDT

## 2021-08-21 PROBLEM — E66.812 CLASS 2 SEVERE OBESITY DUE TO EXCESS CALORIES WITH SERIOUS COMORBIDITY AND BODY MASS INDEX (BMI) OF 38.0 TO 38.9 IN ADULT: Status: ACTIVE | Noted: 2020-08-19

## 2021-08-27 DIAGNOSIS — M54.41 ACUTE RIGHT-SIDED LOW BACK PAIN WITH RIGHT-SIDED SCIATICA: ICD-10-CM

## 2021-08-27 RX ORDER — ACETAMINOPHEN AND CODEINE PHOSPHATE 300; 30 MG/1; MG/1
TABLET ORAL
Qty: 90 TABLET | Refills: 0 | Status: SHIPPED | OUTPATIENT
Start: 2021-08-27 | End: 2021-10-04

## 2021-09-10 RX ORDER — METOPROLOL SUCCINATE 100 MG/1
TABLET, EXTENDED RELEASE ORAL
Qty: 90 TABLET | Refills: 0 | Status: SHIPPED | OUTPATIENT
Start: 2021-09-10 | End: 2021-12-07

## 2021-09-29 ENCOUNTER — OFFICE VISIT (OUTPATIENT)
Dept: CARDIOLOGY | Facility: CLINIC | Age: 76
End: 2021-09-29

## 2021-09-29 VITALS
WEIGHT: 222 LBS | HEART RATE: 55 BPM | SYSTOLIC BLOOD PRESSURE: 124 MMHG | DIASTOLIC BLOOD PRESSURE: 84 MMHG | HEIGHT: 62 IN | BODY MASS INDEX: 40.85 KG/M2

## 2021-09-29 DIAGNOSIS — E66.01 CLASS 3 SEVERE OBESITY DUE TO EXCESS CALORIES WITH SERIOUS COMORBIDITY AND BODY MASS INDEX (BMI) OF 40.0 TO 44.9 IN ADULT (HCC): ICD-10-CM

## 2021-09-29 DIAGNOSIS — I35.8 AORTIC VALVE SCLEROSIS: ICD-10-CM

## 2021-09-29 DIAGNOSIS — N18.31 STAGE 3A CHRONIC KIDNEY DISEASE (HCC): ICD-10-CM

## 2021-09-29 DIAGNOSIS — E78.2 MIXED HYPERLIPIDEMIA: Chronic | ICD-10-CM

## 2021-09-29 DIAGNOSIS — G47.33 OBSTRUCTIVE SLEEP APNEA: ICD-10-CM

## 2021-09-29 DIAGNOSIS — I10 ESSENTIAL HYPERTENSION: ICD-10-CM

## 2021-09-29 DIAGNOSIS — E78.1 HYPERTRIGLYCERIDEMIA: ICD-10-CM

## 2021-09-29 DIAGNOSIS — I25.10 CORONARY ARTERY DISEASE INVOLVING NATIVE CORONARY ARTERY OF NATIVE HEART WITHOUT ANGINA PECTORIS: Primary | Chronic | ICD-10-CM

## 2021-09-29 DIAGNOSIS — I87.2 CHRONIC VENOUS INSUFFICIENCY: ICD-10-CM

## 2021-09-29 DIAGNOSIS — R60.0 LOWER EXTREMITY EDEMA: ICD-10-CM

## 2021-09-29 PROBLEM — E66.813 CLASS 3 SEVERE OBESITY DUE TO EXCESS CALORIES WITH BODY MASS INDEX (BMI) OF 40.0 TO 44.9 IN ADULT: Status: ACTIVE | Noted: 2020-08-19

## 2021-09-29 PROCEDURE — 93000 ELECTROCARDIOGRAM COMPLETE: CPT | Performed by: NURSE PRACTITIONER

## 2021-09-29 PROCEDURE — 99214 OFFICE O/P EST MOD 30 MIN: CPT | Performed by: NURSE PRACTITIONER

## 2021-09-29 RX ORDER — TORSEMIDE 20 MG/1
40 TABLET ORAL 2 TIMES DAILY
COMMUNITY
Start: 2021-06-22 | End: 2022-08-03 | Stop reason: SDUPTHER

## 2021-09-29 NOTE — PROGRESS NOTES
Date of Office Visit: 2021  Encounter Provider: MEREDITH Alcazar  Place of Service: [facility] CARDIOLOGY  Patient Name: Denisse Chavez  :1945  Primary Cardiologist: Dr. Warner Tang     Chief Complaint   Patient presents with   • Coronary Artery Disease   • Follow-up       HPI: Denisse Chavez is a pleasant 75 y.o. female who presents today for annual cardiac follow-up on coronary artery disease.  I have reviewed her medical records. She has been diagnosed with type 2 diabetes mellitus, hypertension, obesity, chronic kidney disease stage III, and coronary artery disease.    In , she suffered a myocardial infarction in [place].  She underwent cardiac catheterization which were of reported nonobstructive disease and medical treatment was recommended.  In 2016 she had an echocardiogram which revealed normal EF, grade 2 diastolic dysfunction, and aortic valve calcification without stenosis.  There was hypokinesis of the lateral wall so cardiac PET scan was obtained which revealed a small medium size lateral infarct without ischemia.  In 2018, repeat echocardiogram showed the following: EF 59%, diastolic function normal, aortic valve sclerosis, trace aortic insufficiency, moderate mitral annular calcification, trace mitral insufficiency.    She presents today for annual follow-up visit with her son, Anil accompanying her.  When she wakes up in the morning her legs swelling is gone.  Throughout the day she develops worsening lower extremity edema.  She is unable to wrap her legs, go to the lymphedema clinic, does not elevate legs, or wear compression stockings.  Since her last visit her furosemide was changed to torsemide.  She denies chest pain, shortness of air, palpitations, dizziness, syncope, or bleeding.      Past Medical History:   Diagnosis Date   • Angiomyolipoma of kidney 2016   • Aortic valve sclerosis     stable    • Arthritis     • CAD (coronary artery disease)     MI in , treated medically.  PET 2016 with small-medium sized lateral infarct, no ischemia.  This wall motion abnormality was seen on echo as well.   • Cellulitis 2018    RIGHT LEG   • Chronic kidney disease, stage III (moderate) (CMS/HCC) 10/13/2016   • Chronic venous insufficiency    • Hyperlipidemia    • Hypertension    • Hypertriglyceridemia 2021   • Low back pain    • Mass of ovary 3/30/2016   • Obesity (BMI 30-39.9) 2019   • Sleep apnea     on CPAP.  Dr. Mueller   • Spinal stenosis    • Type 2 diabetes mellitus (CMS/[facility])    • Uterine leiomyoma 3/30/2016       Past Surgical History:   Procedure Laterality Date   • CATARACT EXTRACTION Bilateral     X2    • COLONOSCOPY N/A 2018    Procedure: COLONOSCOPY to CECUM WITH HOT SNARE POLYPECTOMY;  Surgeon: Neal Reilly MD;  Location: [facility] ENDOSCOPY;  Service: Gastroenterology   • EPIDURAL BLOCK     • HERNIA REPAIR     • HYSTERECTOMY     • TONSILLECTOMY         Social History     Socioeconomic History   • Marital status: Single     Spouse name: Not on file   • Number of children: 3   • Years of education: Not on file   • Highest education level: Not on file   Tobacco Use   • Smoking status: Former Smoker     Packs/day: 0.25     Years: 2.00     Pack years: 0.50     Types: Cigarettes     Quit date: 1970     Years since quittin.7   • Smokeless tobacco: Never Used   • Tobacco comment: LIGHT USAGE   Vaping Use   • Vaping Use: Never used   Substance and Sexual Activity   • Alcohol use: No   • Drug use: No   • Sexual activity: Defer       Family History   Problem Relation Age of Onset   • Diabetes Mother    • Heart attack Mother    • Hypertension Mother    • Hypothyroidism Mother    • Diabetes type II Son    • Breast cancer Neg Hx        The following portion of the patient's history were reviewed and updated as appropriate: past medical history, past surgical history, past social history, past family  history, allergies, current medications, and problem list.    Review of Systems   Constitutional: Negative for chills, diaphoresis, fever, malaise/fatigue, night sweats, weight gain and weight loss.   HENT: Negative for hearing loss, nosebleeds, sore throat and tinnitus.    Eyes: Negative for blurred vision, double vision, pain and visual disturbance.   Cardiovascular: Positive for leg swelling. Negative for chest pain, claudication, cyanosis, dyspnea on exertion, irregular heartbeat, near-syncope, orthopnea, palpitations, paroxysmal nocturnal dyspnea and syncope.   Respiratory: Negative for cough, hemoptysis, shortness of breath, snoring and wheezing.    Endocrine: Negative for cold intolerance, heat intolerance and polyuria.   Hematologic/Lymphatic: Negative for bleeding problem. Does not bruise/bleed easily.   Skin: Negative for color change, dry skin, flushing and itching.   Musculoskeletal: Negative for falls, joint pain, joint swelling, muscle cramps, muscle weakness and myalgias.   Gastrointestinal: Negative for abdominal pain, constipation, heartburn, melena, nausea and vomiting.   Genitourinary: Positive for frequency. Negative for dysuria and hematuria.   Neurological: Negative for excessive daytime sleepiness, dizziness, light-headedness, loss of balance, numbness, paresthesias, seizures and vertigo.   Psychiatric/Behavioral: Negative for altered mental status, depression, memory loss and substance abuse. The patient does not have insomnia and is not nervous/anxious.    Allergic/Immunologic: Negative for environmental allergies.       Allergies   Allergen Reactions   • Ace Inhibitors Other (See Comments)     Hyperkalemia/ROB   • Angiotensin Receptor Blockers Other (See Comments)     Pt unable to remember   • Keflex [Cephalexin] Rash     Tolerated ceftriaxone Dec 2019; tolerated zosyn May 2020   • Sulfa Antibiotics Itching     rash         Current Outpatient Medications:   •  acetaminophen-codeine (TYLENOL  "#3) 300-30 MG per tablet, TAKE 1 TABLET BY MOUTH THREE TIMES DAILY AS NEEDED FOR MODERATE PAIN, Disp: 90 tablet, Rfl: 0  •  aspirin 81 MG tablet, Take  by mouth Every Night., Disp: , Rfl:   •  Diclofenac Sodium (VOLTAREN) 1 % gel gel, Apply 4 g topically to the appropriate area as directed 4 (Four) Times a Day As Needed (pain)., Disp: 100 g, Rfl: 0  •  docusate sodium (COLACE) 100 MG capsule, Take 100 mg by mouth 2 (two) times a day., Disp: , Rfl:   •  hydrALAZINE (APRESOLINE) 10 MG tablet, Take 1 tablet by mouth 3 (Three) Times a Day., Disp: 90 tablet, Rfl: 5  •  hydrOXYzine (ATARAX) 25 MG tablet, TAKE 1 TABLET BY MOUTH ONCE DAILY AT NIGHT, Disp: 90 tablet, Rfl: 0  •  insulin NPH (NovoLIN N) 100 UNIT/ML injection, Inject  under the skin into the appropriate area as directed 2 (Two) Times a Day Before Meals., Disp: , Rfl: 12  •  isosorbide mononitrate (IMDUR) 60 MG 24 hr tablet, Take 1 tablet by mouth Daily., Disp: 90 tablet, Rfl: 0  •  metoprolol succinate XL (TOPROL-XL) 100 MG 24 hr tablet, Take 1 tablet by mouth once daily, Disp: 90 tablet, Rfl: 0  •  Misc. Devices (ROLLATOR) misc, 1 Units as needed (for walking)., Disp: 1 each, Rfl: 0  •  nystatin (nystatin) 082568 UNIT/GM powder, Apply  topically to the appropriate area as directed 3 (Three) Times a Day., Disp: 60 g, Rfl: 11  •  ReliOn Insulin Syringe 31G X 15/64\" 1 ML misc, USE 1 FIVE TIMES DAILY, Disp: , Rfl:   •  simvastatin (ZOCOR) 40 MG tablet, Take 1 tablet by mouth Every Night., Disp: 90 tablet, Rfl: 0  •  spironolactone (ALDACTONE) 25 MG tablet, Take 25 mg by mouth Daily., Disp: , Rfl:   •  torsemide (DEMADEX) 20 MG tablet, Take 40 mg by mouth 2 (Two) Times a Day., Disp: , Rfl:   •  vitamin D (ERGOCALCIFEROL) 63541 units capsule capsule, 50,000 Units Every 7 (Seven) Days., Disp: , Rfl:         Objective:     Vitals:    09/29/21 0946   BP: 124/84   Pulse: 55   Weight: 101 kg (222 lb)   Height: 157.5 cm (62\")     Body mass index is 40.6 kg/m².    PHYSICAL " EXAM:    Vitals Reviewed.   General Appearance: No acute distress, well developed and well nourished.  Obese.  Eyes: Conjunctiva and lids: No erythema, swelling, or discharge. Sclera non-icteric.   HENT: Atraumatic, normocephalic. External eyes, ears, and nose normal. No hearing loss noted. Mucous membranes normal. Lips not cyanotic. Neck supple with no tenderness.  Respiratory: No signs of respiratory distress. Respiration rhythm and depth normal.   Clear to auscultation. No rales, crackles, rhonchi, or wheezing auscultated.   Cardiovascular:  Heart Rate and Rhythm: Normal, Heart Sounds: Normal S1 and S2. No S3 or S4 noted.  Murmurs: RUSB grade 1/6 systolic murmurs noted. No rubs, thrills, or gallops.   Arterial Pulses: Carotid pulses normal. No carotid bruit noted.   Lower Extremities: Bilateral lower extremity edema noted.  Gastrointestinal:  Abdomen soft, non-distended, non-tender. Normal bowel sounds. No hepatomegaly.   Musculoskeletal: Normal movement of extremities  Skin and Nails: General appearance normal. No pallor, cyanosis, diaphoresis. Skin temperature normal. No clubbing of fingernails.   Psychiatric: Patient alert and oriented to person, place, and time. Speech and behavior appropriate. Normal mood and affect.       ECG 12 Lead    Date/Time: 9/29/2021 9:50 AM  Performed by: Vicky Herrera APRN  Authorized by: Vicky Herrera APRN   Comparison: compared with previous ECG from 8/19/2020  Similar to previous ECG  Rhythm: sinus rhythm  Rate: bradycardic  BPM: 55  Conduction: conduction normal  ST Segments: ST segments normal  T Waves: T waves normal  QRS axis: normal  Other findings: left ventricular hypertrophy    Clinical impression: non-specific ECG              Assessment:       Diagnosis Plan   1. Coronary artery disease involving native coronary artery of native heart without angina pectoris     2. Chronic venous insufficiency     3. Lower extremity edema     4. Aortic valve sclerosis     5.  Essential hypertension     6. Mixed hyperlipidemia     7. Hypertriglyceridemia     8. Obstructive sleep apnea     9. Stage 3a chronic kidney disease (HCC)     10. Class 3 severe obesity due to excess calories with serious comorbidity and body mass index (BMI) of 40.0 to 44.9 in adult (HCC)            Plan:       1.  Coronary Artery Disease: In 1996, she suffered a myocardial infarction and medical treatment was recommended for nonobstructive coronary artery disease.  She denies anginal symptoms.  Continue current medication regimen.    2/3.  Chronic Venous Insufficiency: She has chronic lower extremity edema present which is better in the morning.  I recommended leg elevation and low-sodium diet.  She has been to the lymphedema clinic and is unable to wrap her legs or go to the clinic regularly.  She is not wearing compression stockings.    4. Heart Murmur: Noted to have a mild right upper sternal border murmur which is consistent with aortic valve sclerosis.  Last echocardiogram did not reveal any evidence of stenosis.    5.  Hypertension: Blood pressure well controlled.    6/7.  Hyperlipidemia/Hypertriglyceridemia: She remains on simvastatin.    8.  Obstructive Sleep Apnea: Compliant with CPAP machine.    9.  Chronic Kidney Disease: Follows with her PCP.    10.  Obesity: BMI is 40.6.  She would benefit from exercise and weight loss.    11.  I recommend follow-up with Warner Tang in 1 year, unless otherwise needed sooner.     As always, it has been a pleasure to participate in your patient's care. Thank you.       Sincerely,         MEREDITH Munoz      · Dictated using Dragon  · COVID-19 Precautions - Patient was compliant in wearing a mask. When I saw the patient, I used appropriate personal protective equipment (PPE) including mask (standard procedure).  Additionally, I used gown, gloves, and eye shield if indicated.  Hand hygiene was completed before and after seeing the patient.  I spent 30 minutes  reviewing her medical records/testing/previous office notes/labs, face-to-face interaction with patient, physical examination, formulating the plan of care, and discussion of plan of care with patient.

## 2021-10-03 DIAGNOSIS — M54.41 ACUTE RIGHT-SIDED LOW BACK PAIN WITH RIGHT-SIDED SCIATICA: ICD-10-CM

## 2021-10-04 RX ORDER — HYDROXYZINE HYDROCHLORIDE 25 MG/1
TABLET, FILM COATED ORAL
Qty: 90 TABLET | Refills: 0 | Status: SHIPPED | OUTPATIENT
Start: 2021-10-04 | End: 2022-01-03

## 2021-10-04 RX ORDER — ACETAMINOPHEN AND CODEINE PHOSPHATE 300; 30 MG/1; MG/1
TABLET ORAL
Qty: 90 TABLET | Refills: 0 | Status: SHIPPED | OUTPATIENT
Start: 2021-10-04 | End: 2021-11-29

## 2021-10-20 ENCOUNTER — IMMUNIZATION (OUTPATIENT)
Dept: VACCINE CLINIC | Facility: HOSPITAL | Age: 76
End: 2021-10-20

## 2021-10-20 PROCEDURE — 0004A ADM SARSCOV2 30MCG/0.3ML BOOSTER: CPT | Performed by: INTERNAL MEDICINE

## 2021-10-20 PROCEDURE — 91300 HC SARSCOV02 VAC 30MCG/0.3ML IM: CPT | Performed by: INTERNAL MEDICINE

## 2021-11-15 RX ORDER — ISOSORBIDE MONONITRATE 60 MG/1
TABLET, EXTENDED RELEASE ORAL
Qty: 90 TABLET | Refills: 0 | Status: SHIPPED | OUTPATIENT
Start: 2021-11-15 | End: 2022-02-28

## 2021-11-15 RX ORDER — SIMVASTATIN 40 MG
TABLET ORAL
Qty: 90 TABLET | Refills: 0 | Status: SHIPPED | OUTPATIENT
Start: 2021-11-15 | End: 2021-12-15

## 2021-11-16 ENCOUNTER — HOSPITAL ENCOUNTER (OUTPATIENT)
Dept: BONE DENSITY | Facility: HOSPITAL | Age: 76
Discharge: HOME | End: 2021-11-16
Admitting: INTERNAL MEDICINE

## 2021-11-16 PROCEDURE — 77080 DXA BONE DENSITY AXIAL: CPT

## 2021-11-23 PROBLEM — M81.0 OSTEOPOROSIS: Status: ACTIVE | Noted: 2021-11-23

## 2021-11-29 DIAGNOSIS — M54.41 ACUTE RIGHT-SIDED LOW BACK PAIN WITH RIGHT-SIDED SCIATICA: ICD-10-CM

## 2021-11-29 RX ORDER — ACETAMINOPHEN AND CODEINE PHOSPHATE 300; 30 MG/1; MG/1
TABLET ORAL
Qty: 90 TABLET | Refills: 0 | Status: SHIPPED | OUTPATIENT
Start: 2021-11-29 | End: 2022-01-12

## 2021-12-07 RX ORDER — METOPROLOL SUCCINATE 100 MG/1
TABLET, EXTENDED RELEASE ORAL
Qty: 90 TABLET | Refills: 0 | Status: SHIPPED | OUTPATIENT
Start: 2021-12-07 | End: 2022-04-25

## 2021-12-08 DIAGNOSIS — E78.1 HYPERTRIGLYCERIDEMIA: ICD-10-CM

## 2021-12-08 DIAGNOSIS — IMO0002 UNCONTROLLED TYPE 2 DIABETES MELLITUS WITH COMPLICATION, WITH LONG-TERM CURRENT USE OF INSULIN: Primary | ICD-10-CM

## 2021-12-08 DIAGNOSIS — E78.2 MIXED HYPERLIPIDEMIA: ICD-10-CM

## 2021-12-09 LAB
ALBUMIN SERPL-MCNC: 4.4 G/DL (ref 3.7–4.7)
ALBUMIN/CREAT UR: 238 MG/G CREAT (ref 0–29)
ALBUMIN/GLOB SERPL: 1.8 {RATIO} (ref 1.2–2.2)
ALP SERPL-CCNC: 109 IU/L (ref 44–121)
ALT SERPL-CCNC: 13 IU/L (ref 0–32)
AST SERPL-CCNC: 17 IU/L (ref 0–40)
BILIRUB SERPL-MCNC: 0.4 MG/DL (ref 0–1.2)
BUN SERPL-MCNC: 38 MG/DL (ref 8–27)
BUN/CREAT SERPL: 26 (ref 12–28)
CALCIUM SERPL-MCNC: 10 MG/DL (ref 8.7–10.3)
CHLORIDE SERPL-SCNC: 96 MMOL/L (ref 96–106)
CHOLEST SERPL-MCNC: 167 MG/DL (ref 100–199)
CO2 SERPL-SCNC: 27 MMOL/L (ref 20–29)
CREAT SERPL-MCNC: 1.44 MG/DL (ref 0.57–1)
CREAT UR-MCNC: 62.9 MG/DL
GLOBULIN SER CALC-MCNC: 2.5 G/DL (ref 1.5–4.5)
GLUCOSE SERPL-MCNC: 235 MG/DL (ref 65–99)
HBA1C MFR BLD: 10 % (ref 4.8–5.6)
HDLC SERPL-MCNC: 27 MG/DL
LDLC SERPL CALC-MCNC: 69 MG/DL (ref 0–99)
MICROALBUMIN UR-MCNC: 149.8 UG/ML
POTASSIUM SERPL-SCNC: 4.4 MMOL/L (ref 3.5–5.2)
PROT SERPL-MCNC: 6.9 G/DL (ref 6–8.5)
SODIUM SERPL-SCNC: 139 MMOL/L (ref 134–144)
TRIGL SERPL-MCNC: 454 MG/DL (ref 0–149)
TSH SERPL DL<=0.005 MIU/L-ACNC: 2.34 UIU/ML (ref 0.45–4.5)
VLDLC SERPL CALC-MCNC: 71 MG/DL (ref 5–40)

## 2021-12-15 ENCOUNTER — OFFICE VISIT (OUTPATIENT)
Dept: INTERNAL MEDICINE | Facility: CLINIC | Age: 76
End: 2021-12-15

## 2021-12-15 VITALS
OXYGEN SATURATION: 98 % | WEIGHT: 213 LBS | SYSTOLIC BLOOD PRESSURE: 142 MMHG | HEART RATE: 62 BPM | DIASTOLIC BLOOD PRESSURE: 80 MMHG | BODY MASS INDEX: 39.2 KG/M2 | HEIGHT: 62 IN

## 2021-12-15 DIAGNOSIS — N18.32 STAGE 3B CHRONIC KIDNEY DISEASE (HCC): ICD-10-CM

## 2021-12-15 DIAGNOSIS — E78.2 MIXED HYPERLIPIDEMIA: Chronic | ICD-10-CM

## 2021-12-15 DIAGNOSIS — IMO0002 UNCONTROLLED TYPE 2 DIABETES MELLITUS WITH COMPLICATION, WITH LONG-TERM CURRENT USE OF INSULIN: Primary | Chronic | ICD-10-CM

## 2021-12-15 DIAGNOSIS — I10 ESSENTIAL HYPERTENSION: ICD-10-CM

## 2021-12-15 DIAGNOSIS — M81.0 OSTEOPOROSIS, UNSPECIFIED OSTEOPOROSIS TYPE, UNSPECIFIED PATHOLOGICAL FRACTURE PRESENCE: ICD-10-CM

## 2021-12-15 PROBLEM — E78.5 HYPERLIPIDEMIA ASSOCIATED WITH TYPE 2 DIABETES MELLITUS: Chronic | Status: RESOLVED | Noted: 2018-12-20 | Resolved: 2021-12-15

## 2021-12-15 PROBLEM — E11.69 HYPERLIPIDEMIA ASSOCIATED WITH TYPE 2 DIABETES MELLITUS: Chronic | Status: RESOLVED | Noted: 2018-12-20 | Resolved: 2021-12-15

## 2021-12-15 PROCEDURE — 99214 OFFICE O/P EST MOD 30 MIN: CPT | Performed by: INTERNAL MEDICINE

## 2021-12-15 RX ORDER — ATORVASTATIN CALCIUM 40 MG/1
40 TABLET, FILM COATED ORAL DAILY
Qty: 90 TABLET | Refills: 3 | Status: SHIPPED | OUTPATIENT
Start: 2021-12-15 | End: 2022-12-21

## 2021-12-15 RX ORDER — INSULIN HUMAN 100 [IU]/ML
INJECTION, SOLUTION PARENTERAL
Refills: 12 | COMMUNITY
Start: 2021-12-15

## 2021-12-15 NOTE — PROGRESS NOTES
Subjective     Denisse Chavez is a 76 y.o. female who presents with   Chief Complaint   Patient presents with   • Diabetes   • Hypertension   • Hyperlipidemia       History of Present Illness     The following data was reviewed by: Surekha Mcdonnell MD on 12/15/2021:  Common labs    Common Labsle 6/2/21 6/2/21 6/2/21 6/2/21 6/2/21 8/18/21 8/18/21 8/18/21 8/18/21 8/18/21 12/8/21 12/8/21 12/8/21 12/8/21    0943 0943 0943 0943 0943 [id number] [id number] 0858 0858 0858 0943 0943 0943 0943   Glucose  199 (A)         235 (A)      Glucose         172 (A)        BUN  27 (A)        36 (A) 38 (A)      Creatinine  1.23 (A)      1.3   1.44 (A)      eGFR Non  Am  43 (A)         35 (A)      eGFR  Am  52 (A)         41 (A)      Sodium  140         139      Potassium  4.9         4.4      Chloride  99         96      Calcium  10.4         10.0      Total Protein  7.1         6.9      Albumin  4.50     4.1    4.4      Total Bilirubin  0.5     0.5    0.4      Alkaline Phosphatase  102     97    109      AST (SGOT)  19     24    17      ALT (SGPT)  14     16    13      WBC 9.62                Hemoglobin 14.4                Hematocrit 43.7                Platelets 228                Total Cholesterol   208 (A)         167     Triglycerides   328 (A)         454 (A)     HDL Cholesterol   33 (A)         27 (A)     LDL Cholesterol    118 (A)         69     Hemoglobin A1C    9.00 (A)  9.4 (A)       10.0 (A)    Microalbumin, Urine     518.4         149.8   (A) Abnormal value       Comments are available for some flowsheets but are not being displayed.           DM-2.  Control is poor.  Diet not as good recently.   HTN.  BP is increased today.  HLD.  Triglycerides have increased.    CKD-3b.  Numbers have progressed.    OP.  Discussed treatment for BMD.  I would suggest Prolia.      Review of Systems   Respiratory: Negative.    Cardiovascular: Negative.        The following portions of the patient's history were reviewed and updated as  appropriate: allergies, current medications and problem list.    Patient Active Problem List    Diagnosis Date Noted   • Osteoporosis 11/23/2021   • Hypertriglyceridemia 09/29/2021   • Class 3 severe obesity due to excess calories with body mass index (BMI) of 40.0 to 44.9 in adult ([facility]) 08/19/2020   • Aortic valve sclerosis 07/25/2019   • History of colon polyps 06/13/2018     Note Last Updated: 6/13/2018     Added automatically from request for surgery [id number]     • Circadian rhythm sleep disorder, delayed sleep phase type 04/23/2018   • Chronic venous insufficiency    • CAD (coronary artery disease)      Note Last Updated: 6/28/2017     MI in 1996, treated medically.  PET 6/2016 with small-medium sized lateral infarct, no ischemia.  This wall motion abnormality was seen on echo as well.     • Stage 3 chronic kidney disease ([facility]) 10/13/2016   • SAWYER on autoCPAP 09/04/2016   • Left hip pain 05/16/2016   • Left-sided low back pain without sciatica 05/16/2016   • Lumbar spinal stenosis 05/16/2016   • Angiomyolipoma of kidney 03/30/2016   • Uncontrolled type 2 diabetes mellitus with complication, with long-term current use of insulin ([facility]) 03/08/2016   • Hyperlipidemia 03/08/2016   • Essential hypertension 03/08/2016       Current Outpatient Medications on File Prior to Visit   Medication Sig Dispense Refill   • acetaminophen-codeine (TYLENOL #3) 300-30 MG per tablet TAKE 1 TABLET BY MOUTH THREE TIMES DAILY AS NEEDED FOR MODERATE PAIN 90 tablet 0   • aspirin 81 MG tablet Take  by mouth Every Night.     • Diclofenac Sodium (VOLTAREN) 1 % gel gel Apply 4 g topically to the appropriate area as directed 4 (Four) Times a Day As Needed (pain). 100 g 0   • docusate sodium (COLACE) 100 MG capsule Take 100 mg by mouth 2 (two) times a day.     • hydrALAZINE (APRESOLINE) 10 MG tablet Take 1 tablet by mouth 3 (Three) Times a Day. 90 tablet 5   • hydrOXYzine (ATARAX) 25 MG tablet TAKE 1 TABLET BY MOUTH AT NIGHT AS NEEDED FOR  "INSOMNIA 90 tablet 0   • insulin NPH (NovoLIN N) 100 UNIT/ML injection Inject  under the skin into the appropriate area as directed 2 (Two) Times a Day Before Meals.  12   • isosorbide mononitrate (IMDUR) 60 MG 24 hr tablet Take 1 tablet by mouth once daily 90 tablet 0   • metoprolol succinate XL (TOPROL-XL) 100 MG 24 hr tablet Take 1 tablet by mouth once daily 90 tablet 0   • Misc. Devices (ROLLATOR) misc 1 Units as needed (for walking). 1 each 0   • nystatin (nystatin) 217993 UNIT/GM powder Apply  topically to the appropriate area as directed 3 (Three) Times a Day. 60 g 11   • ReliOn Insulin Syringe 31G X 15/64\" 1 ML misc USE 1 FIVE TIMES DAILY     • spironolactone (ALDACTONE) 25 MG tablet Take 25 mg by mouth Daily.     • torsemide (DEMADEX) 20 MG tablet Take 40 mg by mouth 2 (Two) Times a Day.     • vitamin D (ERGOCALCIFEROL) 21144 units capsule capsule 50,000 Units Every 7 (Seven) Days.     • [DISCONTINUED] simvastatin (ZOCOR) 40 MG tablet TAKE 1 TABLET BY MOUTH ONCE DAILY AT NIGHT 90 tablet 0   • insulin regular (HumuLIN R) 100 UNIT/ML injection Inject  under the skin into the appropriate area as directed 3 (Three) Times a Day Before Meals.  12     No current facility-administered medications on file prior to visit.       Objective     /80   Pulse 62   Ht 157.5 cm (62.01\")   Wt 96.6 kg (213 lb)   SpO2 98%   BMI 38.95 kg/m²     Physical Exam  Constitutional:       Appearance: She is well-developed.   HENT:      Head: Normocephalic and atraumatic.   Pulmonary:      Effort: Pulmonary effort is normal.   Neurological:      Mental Status: She is alert and oriented to person, place, and time.   Psychiatric:         Behavior: Behavior normal.         Assessment/Plan   Diagnoses and all orders for this visit:    1. Uncontrolled type 2 diabetes mellitus with complication, with long-term current use of insulin (HCC) (Primary)    2. Essential hypertension    3. Mixed hyperlipidemia    4. Stage 3b chronic kidney " disease (HCC)    5. Osteoporosis, unspecified osteoporosis type, unspecified pathological fracture presence    Other orders  -     atorvastatin (LIPITOR) 40 MG tablet; Take 1 tablet by mouth Daily.  Dispense: 90 tablet; Refill: 3        Discussion    DM-2.  Continue f/u with endo.  HTN.  The patient is instructed to monitor at home and call in checks.    HLD.  Change simvastatin to atorvastatin.    CKD-3b.  See nephrology today as planned.   OP.  I would like her to consider Prolia.  She will read about and let me know.         Future Appointments   Date Time Provider Department Center   6/22/2022  9:20 AM [facility]   6/29/2022 10:45 AM Surekha Mcdonnell MD [facility]   8/10/2022 10:15 AM Alfredito Mueller MD [facility] None   9/28/2022 10:45 AM Warner Tang MD [facility]

## 2022-01-03 RX ORDER — HYDROXYZINE HYDROCHLORIDE 25 MG/1
TABLET, FILM COATED ORAL
Qty: 90 TABLET | Refills: 2 | Status: SHIPPED | OUTPATIENT
Start: 2022-01-03 | End: 2022-11-22

## 2022-01-12 DIAGNOSIS — M54.41 ACUTE RIGHT-SIDED LOW BACK PAIN WITH RIGHT-SIDED SCIATICA: ICD-10-CM

## 2022-01-12 RX ORDER — ACETAMINOPHEN AND CODEINE PHOSPHATE 300; 30 MG/1; MG/1
TABLET ORAL
Qty: 90 TABLET | Refills: 0 | Status: SHIPPED | OUTPATIENT
Start: 2022-01-12 | End: 2022-02-21

## 2022-02-20 DIAGNOSIS — M54.41 ACUTE RIGHT-SIDED LOW BACK PAIN WITH RIGHT-SIDED SCIATICA: ICD-10-CM

## 2022-02-21 RX ORDER — ACETAMINOPHEN AND CODEINE PHOSPHATE 300; 30 MG/1; MG/1
TABLET ORAL
Qty: 90 TABLET | Refills: 0 | Status: SHIPPED | OUTPATIENT
Start: 2022-02-21 | End: 2022-03-30

## 2022-02-28 RX ORDER — ISOSORBIDE MONONITRATE 60 MG/1
TABLET, EXTENDED RELEASE ORAL
Qty: 90 TABLET | Refills: 0 | Status: SHIPPED | OUTPATIENT
Start: 2022-02-28 | End: 2022-06-13

## 2022-03-14 RX ORDER — HYDRALAZINE HYDROCHLORIDE 10 MG/1
TABLET, FILM COATED ORAL
Qty: 270 TABLET | Refills: 0 | Status: SHIPPED | OUTPATIENT
Start: 2022-03-14 | End: 2022-07-05

## 2022-03-30 DIAGNOSIS — M54.41 ACUTE RIGHT-SIDED LOW BACK PAIN WITH RIGHT-SIDED SCIATICA: ICD-10-CM

## 2022-03-30 RX ORDER — ACETAMINOPHEN AND CODEINE PHOSPHATE 300; 30 MG/1; MG/1
TABLET ORAL
Qty: 90 TABLET | Refills: 0 | Status: SHIPPED | OUTPATIENT
Start: 2022-03-30 | End: 2022-05-09

## 2022-04-25 RX ORDER — METOPROLOL SUCCINATE 100 MG/1
TABLET, EXTENDED RELEASE ORAL
Qty: 90 TABLET | Refills: 0 | Status: SHIPPED | OUTPATIENT
Start: 2022-04-25 | End: 2022-07-29

## 2022-05-07 DIAGNOSIS — M54.41 ACUTE RIGHT-SIDED LOW BACK PAIN WITH RIGHT-SIDED SCIATICA: ICD-10-CM

## 2022-05-09 RX ORDER — ACETAMINOPHEN AND CODEINE PHOSPHATE 300; 30 MG/1; MG/1
TABLET ORAL
Qty: 90 TABLET | Refills: 0 | Status: SHIPPED | OUTPATIENT
Start: 2022-05-09 | End: 2022-06-13

## 2022-05-11 ENCOUNTER — IMMUNIZATION (OUTPATIENT)
Dept: VACCINE CLINIC | Facility: HOSPITAL | Age: 77
End: 2022-05-11

## 2022-05-11 DIAGNOSIS — Z23 NEED FOR VACCINATION: Primary | ICD-10-CM

## 2022-05-11 PROCEDURE — 0054A HC ADM SARSCV2 30MCG TRS-SUCR BOOSTER: CPT | Performed by: INTERNAL MEDICINE

## 2022-05-11 PROCEDURE — 91305 HC SARSCOV2 VAC 30 MCG TRS-SUCR PFIZER: CPT | Performed by: INTERNAL MEDICINE

## 2022-06-13 DIAGNOSIS — M54.41 ACUTE RIGHT-SIDED LOW BACK PAIN WITH RIGHT-SIDED SCIATICA: ICD-10-CM

## 2022-06-13 RX ORDER — ACETAMINOPHEN AND CODEINE PHOSPHATE 300; 30 MG/1; MG/1
TABLET ORAL
Qty: 90 TABLET | Refills: 0 | Status: SHIPPED | OUTPATIENT
Start: 2022-06-13 | End: 2022-07-15

## 2022-06-13 RX ORDER — ISOSORBIDE MONONITRATE 60 MG/1
TABLET, EXTENDED RELEASE ORAL
Qty: 90 TABLET | Refills: 0 | Status: SHIPPED | OUTPATIENT
Start: 2022-06-13 | End: 2022-09-27 | Stop reason: SDUPTHER

## 2022-06-20 DIAGNOSIS — M81.0 OSTEOPOROSIS, UNSPECIFIED OSTEOPOROSIS TYPE, UNSPECIFIED PATHOLOGICAL FRACTURE PRESENCE: ICD-10-CM

## 2022-06-20 DIAGNOSIS — E78.2 MIXED HYPERLIPIDEMIA: Primary | ICD-10-CM

## 2022-06-20 DIAGNOSIS — I10 ESSENTIAL HYPERTENSION: ICD-10-CM

## 2022-06-20 DIAGNOSIS — E11.8 CONTROLLED TYPE 2 DIABETES MELLITUS WITH COMPLICATION, WITHOUT LONG-TERM CURRENT USE OF INSULIN: ICD-10-CM

## 2022-06-23 LAB
25(OH)D3+25(OH)D2 SERPL-MCNC: 28 NG/ML (ref 30–100)
ALBUMIN SERPL-MCNC: 4.4 G/DL (ref 3.7–4.7)
ALBUMIN/CREAT UR: 57 MG/G CREAT (ref 0–29)
ALBUMIN/GLOB SERPL: 1.8 {RATIO} (ref 1.2–2.2)
ALP SERPL-CCNC: 82 IU/L (ref 44–121)
ALT SERPL-CCNC: 16 IU/L (ref 0–32)
APPEARANCE UR: CLEAR
AST SERPL-CCNC: 23 IU/L (ref 0–40)
BACTERIA #/AREA URNS HPF: NORMAL /[HPF]
BASOPHILS # BLD AUTO: 0 X10E3/UL (ref 0–0.2)
BASOPHILS NFR BLD AUTO: 0 %
BILIRUB SERPL-MCNC: 0.6 MG/DL (ref 0–1.2)
BILIRUB UR QL STRIP: NEGATIVE
BUN SERPL-MCNC: 36 MG/DL (ref 8–27)
BUN/CREAT SERPL: 23 (ref 12–28)
CALCIUM SERPL-MCNC: 10.1 MG/DL (ref 8.7–10.3)
CASTS URNS QL MICRO: NORMAL /LPF
CHLORIDE SERPL-SCNC: 97 MMOL/L (ref 96–106)
CHOLEST SERPL-MCNC: 166 MG/DL (ref 100–199)
CO2 SERPL-SCNC: 28 MMOL/L (ref 20–29)
COLOR UR: YELLOW
CREAT SERPL-MCNC: 1.56 MG/DL (ref 0.57–1)
CREAT UR-MCNC: 46 MG/DL
EGFRCR SERPLBLD CKD-EPI 2021: 34 ML/MIN/1.73
EOSINOPHIL # BLD AUTO: 0.2 X10E3/UL (ref 0–0.4)
EOSINOPHIL NFR BLD AUTO: 2 %
EPI CELLS #/AREA URNS HPF: NORMAL /HPF (ref 0–10)
ERYTHROCYTE [DISTWIDTH] IN BLOOD BY AUTOMATED COUNT: 13.3 % (ref 11.7–15.4)
GLOBULIN SER CALC-MCNC: 2.5 G/DL (ref 1.5–4.5)
GLUCOSE SERPL-MCNC: 101 MG/DL (ref 65–99)
GLUCOSE UR QL STRIP: NEGATIVE
HBA1C MFR BLD: 8.3 % (ref 4.8–5.6)
HCT VFR BLD AUTO: 40.8 % (ref 34–46.6)
HDLC SERPL-MCNC: 35 MG/DL
HGB BLD-MCNC: 13.3 G/DL (ref 11.1–15.9)
HGB UR QL STRIP: NEGATIVE
IMM GRANULOCYTES # BLD AUTO: 0.1 X10E3/UL (ref 0–0.1)
IMM GRANULOCYTES NFR BLD AUTO: 1 %
KETONES UR QL STRIP: NEGATIVE
LDLC SERPL CALC-MCNC: 90 MG/DL (ref 0–99)
LEUKOCYTE ESTERASE UR QL STRIP: ABNORMAL
LYMPHOCYTES # BLD AUTO: 2.1 X10E3/UL (ref 0.7–3.1)
LYMPHOCYTES NFR BLD AUTO: 20 %
MCH RBC QN AUTO: 28.8 PG (ref 26.6–33)
MCHC RBC AUTO-ENTMCNC: 32.6 G/DL (ref 31.5–35.7)
MCV RBC AUTO: 88 FL (ref 79–97)
MICRO URNS: ABNORMAL
MICROALBUMIN UR-MCNC: 26.2 UG/ML
MONOCYTES # BLD AUTO: 0.9 X10E3/UL (ref 0.1–0.9)
MONOCYTES NFR BLD AUTO: 8 %
NEUTROPHILS # BLD AUTO: 7.4 X10E3/UL (ref 1.4–7)
NEUTROPHILS NFR BLD AUTO: 69 %
NITRITE UR QL STRIP: NEGATIVE
PH UR STRIP: 6.5 [PH] (ref 5–7.5)
PLATELET # BLD AUTO: 237 X10E3/UL (ref 150–450)
POTASSIUM SERPL-SCNC: 4.2 MMOL/L (ref 3.5–5.2)
PROT SERPL-MCNC: 6.9 G/DL (ref 6–8.5)
PROT UR QL STRIP: NEGATIVE
RBC # BLD AUTO: 4.62 X10E6/UL (ref 3.77–5.28)
RBC #/AREA URNS HPF: NORMAL /HPF (ref 0–2)
SODIUM SERPL-SCNC: 142 MMOL/L (ref 134–144)
SP GR UR STRIP: 1.01 (ref 1–1.03)
TRIGL SERPL-MCNC: 243 MG/DL (ref 0–149)
TSH SERPL DL<=0.005 MIU/L-ACNC: 4.1 UIU/ML (ref 0.45–4.5)
UROBILINOGEN UR STRIP-MCNC: 0.2 MG/DL (ref 0.2–1)
VLDLC SERPL CALC-MCNC: 41 MG/DL (ref 5–40)
WBC # BLD AUTO: 10.7 X10E3/UL (ref 3.4–10.8)
WBC #/AREA URNS HPF: NORMAL /HPF (ref 0–5)

## 2022-07-05 RX ORDER — HYDRALAZINE HYDROCHLORIDE 10 MG/1
TABLET, FILM COATED ORAL
Qty: 270 TABLET | Refills: 0 | Status: SHIPPED | OUTPATIENT
Start: 2022-07-05 | End: 2022-10-12

## 2022-07-14 DIAGNOSIS — M54.41 ACUTE RIGHT-SIDED LOW BACK PAIN WITH RIGHT-SIDED SCIATICA: ICD-10-CM

## 2022-07-15 RX ORDER — ACETAMINOPHEN AND CODEINE PHOSPHATE 300; 30 MG/1; MG/1
TABLET ORAL
Qty: 90 TABLET | Refills: 0 | Status: SHIPPED | OUTPATIENT
Start: 2022-07-15 | End: 2022-09-21 | Stop reason: SDUPTHER

## 2022-07-29 RX ORDER — METOPROLOL SUCCINATE 100 MG/1
TABLET, EXTENDED RELEASE ORAL
Qty: 90 TABLET | Refills: 0 | Status: SHIPPED | OUTPATIENT
Start: 2022-07-29 | End: 2022-10-27

## 2022-08-03 ENCOUNTER — OFFICE VISIT (OUTPATIENT)
Dept: INTERNAL MEDICINE | Facility: CLINIC | Age: 77
End: 2022-08-03

## 2022-08-03 VITALS
WEIGHT: 214 LBS | OXYGEN SATURATION: 98 % | HEART RATE: 65 BPM | DIASTOLIC BLOOD PRESSURE: 64 MMHG | HEIGHT: 62 IN | BODY MASS INDEX: 39.38 KG/M2 | SYSTOLIC BLOOD PRESSURE: 132 MMHG

## 2022-08-03 DIAGNOSIS — Z00.00 MEDICARE ANNUAL WELLNESS VISIT, SUBSEQUENT: Primary | ICD-10-CM

## 2022-08-03 DIAGNOSIS — N18.32 STAGE 3B CHRONIC KIDNEY DISEASE: ICD-10-CM

## 2022-08-03 DIAGNOSIS — M81.0 OSTEOPOROSIS, UNSPECIFIED OSTEOPOROSIS TYPE, UNSPECIFIED PATHOLOGICAL FRACTURE PRESENCE: ICD-10-CM

## 2022-08-03 DIAGNOSIS — Z12.31 ENCOUNTER FOR SCREENING MAMMOGRAM FOR BREAST CANCER: ICD-10-CM

## 2022-08-03 PROBLEM — E66.813 CLASS 3 SEVERE OBESITY DUE TO EXCESS CALORIES WITH BODY MASS INDEX (BMI) OF 40.0 TO 44.9 IN ADULT: Status: RESOLVED | Noted: 2020-08-19 | Resolved: 2022-08-03

## 2022-08-03 PROBLEM — E66.01 CLASS 3 SEVERE OBESITY DUE TO EXCESS CALORIES WITH BODY MASS INDEX (BMI) OF 40.0 TO 44.9 IN ADULT: Status: RESOLVED | Noted: 2020-08-19 | Resolved: 2022-08-03

## 2022-08-03 PROCEDURE — 1159F MED LIST DOCD IN RCRD: CPT | Performed by: INTERNAL MEDICINE

## 2022-08-03 PROCEDURE — G0439 PPPS, SUBSEQ VISIT: HCPCS | Performed by: INTERNAL MEDICINE

## 2022-08-03 PROCEDURE — 1170F FXNL STATUS ASSESSED: CPT | Performed by: INTERNAL MEDICINE

## 2022-08-03 PROCEDURE — 99213 OFFICE O/P EST LOW 20 MIN: CPT | Performed by: INTERNAL MEDICINE

## 2022-08-03 PROCEDURE — 1126F AMNT PAIN NOTED NONE PRSNT: CPT | Performed by: INTERNAL MEDICINE

## 2022-08-03 RX ORDER — TORSEMIDE 20 MG/1
80 TABLET ORAL 2 TIMES DAILY
COMMUNITY
Start: 2022-08-03

## 2022-08-03 NOTE — PROGRESS NOTES
The ABCs of the Annual Wellness Visit  Subsequent Medicare Wellness Visit    Chief Complaint   Patient presents with   • Medicare Wellness-subsequent   • Osteoporosis      Subjective    History of Present Illness:  Denisse Chavez is a 76 y.o. female who presents for a Subsequent Medicare Wellness Visit.    The following data was reviewed by: Surekha Mcdonnell MD on 08/03/2022:  Common labs    Common Labsle 8/18/21 8/18/21 8/18/21 8/18/21 8/18/21 12/8/21 12/8/21 12/8/21 12/8/21 6/22/22 6/22/22 6/22/22 6/22/22 6/22/22    0858 0858 0858 0858 0858 0943 0943 0943 0943 0928 0928 0928 0928 0928   Glucose      235 (A)     101 (A)      Glucose    172 (A)             BUN     36 (A) 38 (A)     36 (A)      Creatinine   1.3   1.44 (A)     1.56 (A)      eGFR Non  Am      35 (A)           eGFR  Am      41 (A)           Sodium      139     142      Potassium      4.4     4.2      Chloride      96     97      Calcium      10.0     10.1      Total Protein      6.9     6.9      Albumin  4.1    4.4     4.4      Total Bilirubin  0.5    0.4     0.6      Alkaline Phosphatase  97    109     82      AST (SGOT)  24    17     23      ALT (SGPT)  16    13     16      WBC          10.7       Hemoglobin          13.3       Hematocrit          40.8       Platelets          237       Total Cholesterol       167     166     Triglycerides       454 (A)     243 (A)     HDL Cholesterol       27 (A)     35 (A)     LDL Cholesterol        69     90     Hemoglobin A1C 9.4 (A)       10.0 (A)     8.3 (A)    Microalbumin, Urine         149.8     26.2   (A) Abnormal value       Comments are available for some flowsheets but are not being displayed.           Data reviewed: Radiologic studies dexa     DM-2.  Managed by endo.  It is improved.    HTN.  Good control.  HLD.  Good control.  CKD3b.  Numbers are stable.  Followed by nephrology.      OP.  Discussed calcium and D.  We discussed Prolia  She is not a candidate for bisphosphonates.        The following portions of the patient's history were reviewed and   updated as appropriate: allergies, current medications, past family history, past medical history, past social history, past surgical history and problem list.    Compared to one year ago, the patient feels her physical   health is the same.    Compared to one year ago, the patient feels her mental   health is the same.    Recent Hospitalizations:  She was not admitted to the hospital during the last year.       Current Medical Providers:  Patient Care Team:  Surekha Mcdonnell MD as PCP - General (Internal Medicine)  Warner Tang MD as Consulting Physician (Cardiology)  Sergio Eagle MD as Consulting Physician (Urology)  Juan Negrete MD as Consulting Physician (Endocrinology)  Miguel Angel Barrett DPM as Consulting Physician (Podiatry)  Gabriel Montenegro MD as Consulting Physician (Nephrology)  Linda Rodriguez MD (Ophthalmology)  Ginger Ortega APRN as Nurse Practitioner (Nurse Practitioner)    Outpatient Medications Prior to Visit   Medication Sig Dispense Refill   • acetaminophen-codeine (TYLENOL #3) 300-30 MG per tablet TAKE 1 TABLET BY MOUTH THREE TIMES DAILY AS NEEDED FOR MODERATE PAIN 90 tablet 0   • aspirin 81 MG tablet Take  by mouth Every Night.     • atorvastatin (LIPITOR) 40 MG tablet Take 1 tablet by mouth Daily. 90 tablet 3   • Diclofenac Sodium (VOLTAREN) 1 % gel gel Apply 4 g topically to the appropriate area as directed 4 (Four) Times a Day As Needed (pain). 100 g 0   • docusate sodium (COLACE) 100 MG capsule Take 100 mg by mouth 2 (two) times a day.     • hydrALAZINE (APRESOLINE) 10 MG tablet TAKE 1 TABLET BY MOUTH THREE TIMES DAILY 270 tablet 0   • hydrOXYzine (ATARAX) 25 MG tablet TAKE 1 TABLET BY MOUTH AT NIGHT AS NEEDED FOR INSOMNIA 90 tablet 2   • insulin NPH (NovoLIN N) 100 UNIT/ML injection Inject  under the skin into the appropriate area as directed 2 (Two) Times a Day Before Meals.  12   •  "insulin regular (HumuLIN R) 100 UNIT/ML injection Inject  under the skin into the appropriate area as directed 3 (Three) Times a Day Before Meals.  12   • isosorbide mononitrate (IMDUR) 60 MG 24 hr tablet Take 1 tablet by mouth once daily 90 tablet 0   • metoprolol succinate XL (TOPROL-XL) 100 MG 24 hr tablet Take 1 tablet by mouth once daily 90 tablet 0   • Misc. Devices (ROLLATOR) misc 1 Units as needed (for walking). 1 each 0   • nystatin (nystatin) 449521 UNIT/GM powder Apply  topically to the appropriate area as directed 3 (Three) Times a Day. 60 g 11   • ReliOn Insulin Syringe 31G X 15/64\" 1 ML misc USE 1 FIVE TIMES DAILY     • spironolactone (ALDACTONE) 25 MG tablet Take 25 mg by mouth Daily.     • torsemide (DEMADEX) 20 MG tablet Take 4 tablets by mouth 2 (Two) Times a Day.     • vitamin D (ERGOCALCIFEROL) 17788 units capsule capsule 50,000 Units Every 7 (Seven) Days.     • torsemide (DEMADEX) 20 MG tablet Take 40 mg by mouth 2 (Two) Times a Day.       No facility-administered medications prior to visit.       Opioid medication/s are on active medication list.  and I have evaluated her active treatment plan and pain score trends (see table).  Vitals:    08/03/22 1025   PainSc: 0-No pain     I have reviewed the chart for potential of high risk medication and harmful drug interactions in the elderly.            Aspirin is on active medication list. Aspirin use is indicated based on review of current medical condition/s. Pros and cons of this therapy have been discussed today. Benefits of this medication outweigh potential harm.  Patient has been encouraged to continue taking this medication.  .      Patient Active Problem List   Diagnosis   • Uncontrolled type 2 diabetes mellitus with complication, with long-term current use of insulin   • Hyperlipidemia   • Essential hypertension   • Angiomyolipoma of kidney   • Left hip pain   • Left-sided low back pain without sciatica   • Lumbar spinal stenosis   • SAWYER on " "autoCPAP   • Stage 3 chronic kidney disease (HCC)   • Chronic venous insufficiency   • CAD (coronary artery disease)   • Circadian rhythm sleep disorder, delayed sleep phase type   • History of colon polyps   • Aortic valve sclerosis   • Hypertriglyceridemia   • Osteoporosis     Advance Care Planning  Advance Directive is on file.  ACP discussion was held with the patient during this visit. Patient has an advance directive in EMR which is still valid.     Review of Systems   Cardiovascular: Positive for leg swelling.        Objective    Vitals:    22 1025   BP: 132/64   Pulse: 65   SpO2: 98%   Weight: 97.1 kg (214 lb)   Height: 157.5 cm (62.01\")   PainSc: 0-No pain     Estimated body mass index is 39.13 kg/m² as calculated from the following:    Height as of this encounter: 157.5 cm (62.01\").    Weight as of this encounter: 97.1 kg (214 lb).    Class 2 Severe Obesity (BMI >=35 and <=39.9). Obesity-related health conditions include the following: diabetes mellitus. Obesity is unchanged. BMI is is above average; BMI management plan is completed. We discussed portion control.      Does the patient have evidence of cognitive impairment? No    Physical Exam  Constitutional:       Appearance: She is well-developed.   HENT:      Head: Normocephalic and atraumatic.   Cardiovascular:      Rate and Rhythm: Normal rate and regular rhythm.      Heart sounds: Normal heart sounds.   Pulmonary:      Effort: Pulmonary effort is normal.      Breath sounds: Normal breath sounds.   Musculoskeletal:      Right lower le+ Edema present.      Left lower le+ Edema present.   Skin:     General: Skin is warm and dry.   Neurological:      Mental Status: She is alert and oriented to person, place, and time.   Psychiatric:         Behavior: Behavior normal.       Lab Results   Component Value Date    CHLPL 166 2022    TRIG 243 (H) 2022    HDL 35 (L) 2022    LDL 90 2022    VLDL 41 (H) 2022    HGBA1C " 8.3 (H) 2022            HEALTH RISK ASSESSMENT    Smoking Status:  Social History     Tobacco Use   Smoking Status Former Smoker   • Packs/day: 0.25   • Years: 2.00   • Pack years: 0.50   • Types: Cigarettes   • Quit date: 1970   • Years since quittin.6   Smokeless Tobacco Never Used   Tobacco Comment    LIGHT USAGE     Alcohol Consumption:  Social History     Substance and Sexual Activity   Alcohol Use No     Fall Risk Screen:    STEADI Fall Risk Assessment was completed, and patient is at LOW risk for falls.Assessment completed on:8/3/2022    Depression Screening:  PHQ-2/PHQ-9 Depression Screening 8/3/2022   Retired PHQ-9 Total Score -   Retired Total Score -   Little Interest or Pleasure in Doing Things 0-->not at all   Feeling Down, Depressed or Hopeless 0-->not at all   PHQ-9: Brief Depression Severity Measure Score 0       Health Habits and Functional and Cognitive Screening:  Functional & Cognitive Status 8/3/2022   Do you have difficulty preparing food and eating? No   Do you have difficulty bathing yourself, getting dressed or grooming yourself? No   Do you have difficulty using the toilet? No   Do you have difficulty moving around from place to place? No   Do you have trouble with steps or getting out of a bed or a chair? No   Current Diet Well Balanced Diet   Dental Exam Up to date        Dental Exam Comment -   Eye Exam Up to date        Eye Exam Comment -   Exercise (times per week) 0 times per week   Current Exercises Include No Regular Exercise   Current Exercise Activities Include -   Do you need help using the phone?  No   Are you deaf or do you have serious difficulty hearing?  No   Do you need help with transportation? Yes   Do you need help shopping? No   Do you need help preparing meals?  No   Do you need help with housework?  No   Do you need help with laundry? No   Do you need help taking your medications? No   Do you need help managing money? No   Do you ever drive or ride in a car  without wearing a seat belt? No   Have you felt unusual stress, anger or loneliness in the last month? No   Who do you live with? Child   If you need help, do you have trouble finding someone available to you? No   Have you been bothered in the last four weeks by sexual problems? No   Do you have difficulty concentrating, remembering or making decisions? No       Age-appropriate Screening Schedule:  Refer to the list below for future screening recommendations based on patient's age, sex and/or medical conditions. Orders for these recommended tests are listed in the plan section. The patient has been provided with a written plan.    Health Maintenance   Topic Date Due   • ZOSTER VACCINE (1 of 2) Never done   • MAMMOGRAM  07/27/2022   • INFLUENZA VACCINE  10/01/2022   • HEMOGLOBIN A1C  12/22/2022   • LIPID PANEL  06/22/2023   • URINE MICROALBUMIN  06/22/2023   • DIABETIC EYE EXAM  07/12/2023   • TDAP/TD VACCINES (2 - Td or Tdap) 10/13/2026   • DXA SCAN  11/16/2026              Assessment & Plan   CMS Preventative Services Quick Reference  Risk Factors Identified During Encounter  Immunizations Discussed/Encouraged (specific Immunizations; Shingrix  The above risks/problems have been discussed with the patient.  Follow up actions/plans if indicated are seen below in the Assessment/Plan Section.  Pertinent information has been shared with the patient in the After Visit Summary.    Diagnoses and all orders for this visit:    1. Medicare annual wellness visit, subsequent (Primary)    2. Osteoporosis, unspecified osteoporosis type, unspecified pathological fracture presence  -     Ambulatory Referral to [facility] For Infusion Treatment    3. Encounter for screening mammogram for breast cancer  -     Mammo Screening Digital Tomosynthesis Bilateral With CAD; Future    4. Stage 3b chronic kidney disease (HCC)    OP.  I recommend to get 1200 mg of calcium and 1000 IUs of vitamin D through diet and supplements and to participate in a  weight based exercise to prevent loss of bone mineral density. Bone mineral will be monitored every two years.  Start Prolia.  Discussed with the patient onset on action and the potential side effects including risk of rebound fracture.  The patient will let me know of any side effects from the medication.      CKD3b.  Stable, continue with nephrology.      Follow Up:   Return in about 6 months (around 2/3/2023) for Recheck.     An After Visit Summary and PPPS were made available to the patient.

## 2022-08-03 NOTE — PATIENT INSTRUCTIONS
Medicare Wellness  Personal Prevention Plan of Service     Date of Office Visit:    Encounter Provider:  Surekha Mcdonnell MD  Place of Service:  [facility]  Patient Name: Denisse Chavez  :  1945    As part of the Medicare Wellness portion of your visit today, we are providing you with this personalized preventive plan of services (PPPS). This plan is based upon recommendations of the [place] Preventive Services Task Force (USPSTF) and the Advisory Committee on Immunization Practices (ACIP).    This lists the preventive care services that should be considered, and provides dates of when you are due. Items listed as completed are up-to-date and do not require any further intervention.    Health Maintenance   Topic Date Due    ZOSTER VACCINE (1 of 2) Never done    COLORECTAL CANCER SCREENING  2020    ANNUAL WELLNESS VISIT  2022    MAMMOGRAM  2022    INFLUENZA VACCINE  10/01/2022    HEMOGLOBIN A1C  2022    LIPID PANEL  2023    URINE MICROALBUMIN  2023    DIABETIC EYE EXAM  2023    TDAP/TD VACCINES (2 - Td or Tdap) 10/13/2026    DXA SCAN  2026    HEPATITIS C SCREENING  Completed    COVID-19 Vaccine  Completed    Pneumococcal Vaccine 65+  Completed       No orders of the defined types were placed in this encounter.      No follow-ups on file.

## 2022-08-10 ENCOUNTER — OFFICE VISIT (OUTPATIENT)
Dept: SLEEP MEDICINE | Facility: HOSPITAL | Age: 77
End: 2022-08-10

## 2022-08-10 VITALS — OXYGEN SATURATION: 98 % | BODY MASS INDEX: 39.56 KG/M2 | HEIGHT: 62 IN | HEART RATE: 57 BPM | WEIGHT: 215 LBS

## 2022-08-10 DIAGNOSIS — G47.21 CIRCADIAN RHYTHM SLEEP DISORDER, DELAYED SLEEP PHASE TYPE: ICD-10-CM

## 2022-08-10 DIAGNOSIS — Z99.89 OSA ON CPAP: Primary | ICD-10-CM

## 2022-08-10 DIAGNOSIS — G47.33 OSA ON CPAP: Primary | ICD-10-CM

## 2022-08-10 DIAGNOSIS — E66.01 CLASS 2 SEVERE OBESITY DUE TO EXCESS CALORIES WITH SERIOUS COMORBIDITY AND BODY MASS INDEX (BMI) OF 39.0 TO 39.9 IN ADULT: ICD-10-CM

## 2022-08-10 PROCEDURE — G0463 HOSPITAL OUTPT CLINIC VISIT: HCPCS

## 2022-08-10 PROCEDURE — 99213 OFFICE O/P EST LOW 20 MIN: CPT | Performed by: INTERNAL MEDICINE

## 2022-08-10 NOTE — PROGRESS NOTES
"Follow Up [facility] Disorders [facility] Note     Chief Complaint:  SAWYER     The patient obtained a new DreamStation 2 auto CPAP from [employer] related to the recall 2022    Primary Care Physician: Surekha Mcdonnell MD    Interval History:   The patient is a 76 y.o. female  who I last saw 2021 and that note was reviewed.  The patient states she is doing well without new complaints.  The patient goes to bed at 1:30 AM and awakens at 10 AM.  She will use the restroom during this time.    Review of Systems:    A complete review of systems was done and all were negative with the exception of the above    Social History:    Social History     Socioeconomic History   • Marital status: Single   • Number of children: 3   Tobacco Use   • Smoking status: Former Smoker     Packs/day: 0.25     Years: 2.00     Pack years: 0.50     Types: Cigarettes     Quit date: 1970     Years since quittin.6   • Smokeless tobacco: Never Used   • Tobacco comment: LIGHT USAGE   Vaping Use   • Vaping Use: Never used   Substance and Sexual Activity   • Alcohol use: No   • Drug use: No   • Sexual activity: Defer       Allergies:  Ace inhibitors, Angiotensin receptor blockers, Keflex [cephalexin], and Sulfa antibiotics     Medication Review:  Reviewed.      Vital Signs:    Vitals:    08/10/22 0900   Pulse: 57   SpO2: 98%   Weight: 97.5 kg (215 lb)   Height: 157.5 cm (62\")     Body mass index is 39.32 kg/m².    Physical Exam:    Constitutional:  Well developed 76 y.o. female that appears in no apparent distress.  Awake & oriented times 3.  Normal mood with normal recent and remote memory and normal judgement.  Eyes:  Conjunctivae normal.  Oropharynx: Previously, moist mucous membranes without exudate and a large tongue and normal uvula and patent posterior pharyngeal opening and class II-3 Mallampati airway, patient is wearing a facemask.    Self-administered [place] Sleepiness Scale test results: 1  0-5 Lower normal daytime " sleepiness  6-10 Higher normal daytime sleepiness  11-12 Mild, 13-15 Moderate, & 16-24 Severe excessive daytime sleepiness     Downloaded PAP Data Reviewed For Compliance:  DME is [facility] and she uses a fullface mask.  Downloads between 5/10 and 8/7/2022 complaints 99%.  Average usage is 6 hours and 56 minutes.  Average AHI is mildly abnormal at 7.6 but all subsets are normal and she does not have a significant leak.  Average auto CPAP pressure is 13.7 and her auto CPAP is 9-17    I have reviewed the above results and compared them with the patient's last downloads and reviewed with the patient.    Impression:   Obstructive sleep apnea adequately treated with DreamStation 2 auto CPAP. The patient appears to be at goal with good compliance and usage. The patient has no complaints of hypersomnolence.  Circadian rhythm sleep disorder, delayed sleep phase type    Plan:  Good sleep hygiene measures should be maintained.  Weight loss would be beneficial in this patient who has class II severe obesity by BMI.      After evaluating the patient and assessing results available, the patient is benefiting from the treatment being provided.     The patient will continue DreamStation 2 auto CPAP.  After clinical evaluation and review of downloads, I recommend no changes to the patient's pressures.  A new prescription will be sent to the patient's DME.    I answered all of the patient's questions.  The patient will call for any problems and will follow up in 1 year.      Alfredito Mueller MD  Sleep Medicine  08/10/22  10:50 EDT

## 2022-08-18 ENCOUNTER — TELEPHONE (OUTPATIENT)
Dept: SLEEP MEDICINE | Facility: HOSPITAL | Age: 77
End: 2022-08-18

## 2022-08-26 DIAGNOSIS — M81.0 OSTEOPOROSIS, UNSPECIFIED OSTEOPOROSIS TYPE, UNSPECIFIED PATHOLOGICAL FRACTURE PRESENCE: Primary | ICD-10-CM

## 2022-08-30 ENCOUNTER — APPOINTMENT (OUTPATIENT)
Dept: ONCOLOGY | Facility: HOSPITAL | Age: 77
End: 2022-08-30

## 2022-08-30 ENCOUNTER — APPOINTMENT (OUTPATIENT)
Dept: MAMMOGRAPHY | Facility: HOSPITAL | Age: 77
End: 2022-08-30

## 2022-08-31 ENCOUNTER — HOSPITAL ENCOUNTER (OUTPATIENT)
Dept: MAMMOGRAPHY | Facility: HOSPITAL | Age: 77
Discharge: HOME | End: 2022-08-31
Admitting: INTERNAL MEDICINE

## 2022-08-31 ENCOUNTER — LAB (OUTPATIENT)
Dept: OTHER | Facility: HOSPITAL | Age: 77
End: 2022-08-31

## 2022-08-31 ENCOUNTER — INFUSION (OUTPATIENT)
Dept: ONCOLOGY | Facility: HOSPITAL | Age: 77
End: 2022-08-31

## 2022-08-31 VITALS
HEART RATE: 71 BPM | BODY MASS INDEX: 40.02 KG/M2 | TEMPERATURE: 97.8 F | RESPIRATION RATE: 18 BRPM | DIASTOLIC BLOOD PRESSURE: 58 MMHG | SYSTOLIC BLOOD PRESSURE: 156 MMHG | WEIGHT: 212 LBS | HEIGHT: 61 IN | OXYGEN SATURATION: 98 %

## 2022-08-31 DIAGNOSIS — M81.0 OSTEOPOROSIS, UNSPECIFIED OSTEOPOROSIS TYPE, UNSPECIFIED PATHOLOGICAL FRACTURE PRESENCE: Primary | ICD-10-CM

## 2022-08-31 DIAGNOSIS — M81.0 OSTEOPOROSIS, UNSPECIFIED OSTEOPOROSIS TYPE, UNSPECIFIED PATHOLOGICAL FRACTURE PRESENCE: ICD-10-CM

## 2022-08-31 DIAGNOSIS — Z12.31 ENCOUNTER FOR SCREENING MAMMOGRAM FOR BREAST CANCER: ICD-10-CM

## 2022-08-31 LAB
25(OH)D3 SERPL-MCNC: 28.1 NG/ML (ref 30–100)
ALBUMIN SERPL-MCNC: 4.3 G/DL (ref 3.5–5.2)
ALBUMIN/GLOB SERPL: 1.4 G/DL
ALP SERPL-CCNC: 108 U/L (ref 39–117)
ALT SERPL W P-5'-P-CCNC: 12 U/L (ref 1–33)
ANION GAP SERPL CALCULATED.3IONS-SCNC: 12.8 MMOL/L (ref 5–15)
AST SERPL-CCNC: 20 U/L (ref 1–32)
BILIRUB SERPL-MCNC: 0.6 MG/DL (ref 0–1.2)
BUN SERPL-MCNC: 43 MG/DL (ref 8–23)
BUN/CREAT SERPL: 28.1 (ref 7–25)
CALCIUM SPEC-SCNC: 10 MG/DL (ref 8.6–10.5)
CHLORIDE SERPL-SCNC: 94 MMOL/L (ref 98–107)
CO2 SERPL-SCNC: 31.2 MMOL/L (ref 22–29)
CREAT SERPL-MCNC: 1.53 MG/DL (ref 0.57–1)
EGFRCR SERPLBLD CKD-EPI 2021: 35.1 ML/MIN/1.73
GLOBULIN UR ELPH-MCNC: 3.1 GM/DL
GLUCOSE SERPL-MCNC: 411 MG/DL (ref 65–99)
MAGNESIUM SERPL-MCNC: 2.2 MG/DL (ref 1.6–2.4)
PHOSPHATE SERPL-MCNC: 3.8 MG/DL (ref 2.5–4.5)
POTASSIUM SERPL-SCNC: 4.5 MMOL/L (ref 3.5–5.2)
PROT SERPL-MCNC: 7.4 G/DL (ref 6–8.5)
SODIUM SERPL-SCNC: 138 MMOL/L (ref 136–145)

## 2022-08-31 PROCEDURE — 80053 COMPREHEN METABOLIC PANEL: CPT | Performed by: INTERNAL MEDICINE

## 2022-08-31 PROCEDURE — 83735 ASSAY OF MAGNESIUM: CPT | Performed by: INTERNAL MEDICINE

## 2022-08-31 PROCEDURE — 96372 THER/PROPH/DIAG INJ SC/IM: CPT

## 2022-08-31 PROCEDURE — 77067 SCR MAMMO BI INCL CAD: CPT

## 2022-08-31 PROCEDURE — 77063 BREAST TOMOSYNTHESIS BI: CPT

## 2022-08-31 PROCEDURE — 25010000002 DENOSUMAB 60 MG/ML SOLUTION PREFILLED SYRINGE: Performed by: INTERNAL MEDICINE

## 2022-08-31 PROCEDURE — 84100 ASSAY OF PHOSPHORUS: CPT | Performed by: INTERNAL MEDICINE

## 2022-08-31 PROCEDURE — 82306 VITAMIN D 25 HYDROXY: CPT | Performed by: INTERNAL MEDICINE

## 2022-08-31 RX ADMIN — DENOSUMAB 60 MG: 60 INJECTION SUBCUTANEOUS at 15:23

## 2022-08-31 NOTE — NURSING NOTE
Arrived ambulatory  for prolia injection. Blood sugar today was 411 with labs today and reviewed with patient and she did not take her lunch time insulin today. Instructed to take her insulin when she gets home and verbalized understanding. Indication and side effects reviewed. Denies recent dental work. Labs and medications verified. Prolia administered in  right arm without incidence. Instructed to call prescribing MD for any concerns or questions and instructed on how to schedule future appts.  Pt vu and discharged in stable condition.

## 2022-09-21 DIAGNOSIS — M54.41 ACUTE RIGHT-SIDED LOW BACK PAIN WITH RIGHT-SIDED SCIATICA: ICD-10-CM

## 2022-09-21 RX ORDER — ACETAMINOPHEN AND CODEINE PHOSPHATE 300; 30 MG/1; MG/1
TABLET ORAL
Qty: 90 TABLET | Refills: 0 | Status: CANCELLED | OUTPATIENT
Start: 2022-09-21

## 2022-09-21 RX ORDER — ACETAMINOPHEN AND CODEINE PHOSPHATE 300; 30 MG/1; MG/1
1 TABLET ORAL 3 TIMES DAILY PRN
Qty: 90 TABLET | Refills: 0 | Status: SHIPPED | OUTPATIENT
Start: 2022-09-21 | End: 2022-11-22

## 2022-09-21 NOTE — TELEPHONE ENCOUNTER
Caller: Denisse Chavez    Relationship: Self    Best call back number: [phone]      Requested Prescriptions:   Requested Prescriptions     Pending Prescriptions Disp Refills   • acetaminophen-codeine (TYLENOL #3) 300-30 MG per tablet 90 tablet 0        Pharmacy where request should be sent: [facility] [address] [place] [address] [phone] [facility] [phone] FX     Additional details provided by patient: HAS LESS THAN 3 DAYS.    Does the patient have less than a 3 day supply:  [x] Yes  [] No    Gale Souza, PCT   09/21/22 14:52 EDT

## 2022-09-27 RX ORDER — ISOSORBIDE MONONITRATE 60 MG/1
60 TABLET, EXTENDED RELEASE ORAL DAILY
Qty: 90 TABLET | Refills: 1 | Status: SHIPPED | OUTPATIENT
Start: 2022-09-27 | End: 2023-03-17

## 2022-09-27 NOTE — TELEPHONE ENCOUNTER
Caller: Denisse Chavez    Relationship: Self    Best call back number: [phone]    Requested Prescriptions:   Requested Prescriptions     Pending Prescriptions Disp Refills   • isosorbide mononitrate (IMDUR) 60 MG 24 hr tablet 90 tablet 0     Sig: Take 1 tablet by mouth Daily.        Pharmacy where request should be sent: [facility] [address] [place] [address] [phone] [facility] [phone] FX     Additional details provided by patient: PATIENT HAS A TWO DAY SUPPLY LEFT.    Does the patient have less than a 3 day supply:  [x] Yes  [] No    Nicolle Pichardo Rep   09/27/22 14:33 EDT       
Speaking Coherently

## 2022-09-28 ENCOUNTER — OFFICE VISIT (OUTPATIENT)
Dept: CARDIOLOGY | Facility: CLINIC | Age: 77
End: 2022-09-28

## 2022-09-28 VITALS
HEART RATE: 55 BPM | HEIGHT: 61 IN | SYSTOLIC BLOOD PRESSURE: 130 MMHG | BODY MASS INDEX: 40.78 KG/M2 | DIASTOLIC BLOOD PRESSURE: 56 MMHG | WEIGHT: 216 LBS

## 2022-09-28 DIAGNOSIS — E66.01 CLASS 3 SEVERE OBESITY DUE TO EXCESS CALORIES WITH SERIOUS COMORBIDITY AND BODY MASS INDEX (BMI) OF 40.0 TO 44.9 IN ADULT: ICD-10-CM

## 2022-09-28 DIAGNOSIS — E78.2 MIXED HYPERLIPIDEMIA: Chronic | ICD-10-CM

## 2022-09-28 DIAGNOSIS — N18.32 STAGE 3B CHRONIC KIDNEY DISEASE: ICD-10-CM

## 2022-09-28 DIAGNOSIS — I87.2 CHRONIC VENOUS INSUFFICIENCY: ICD-10-CM

## 2022-09-28 DIAGNOSIS — Z99.89 OSA ON CPAP: ICD-10-CM

## 2022-09-28 DIAGNOSIS — G47.33 OSA ON CPAP: ICD-10-CM

## 2022-09-28 DIAGNOSIS — I25.10 CORONARY ARTERY DISEASE INVOLVING NATIVE CORONARY ARTERY OF NATIVE HEART WITHOUT ANGINA PECTORIS: Primary | Chronic | ICD-10-CM

## 2022-09-28 DIAGNOSIS — I35.8 AORTIC VALVE SCLEROSIS: ICD-10-CM

## 2022-09-28 DIAGNOSIS — I10 ESSENTIAL HYPERTENSION: ICD-10-CM

## 2022-09-28 PROBLEM — E66.812 CLASS 2 SEVERE OBESITY DUE TO EXCESS CALORIES WITH SERIOUS COMORBIDITY AND BODY MASS INDEX (BMI) OF 39.0 TO 39.9 IN ADULT: Status: RESOLVED | Noted: 2020-08-19 | Resolved: 2022-09-28

## 2022-09-28 PROCEDURE — 99214 OFFICE O/P EST MOD 30 MIN: CPT | Performed by: INTERNAL MEDICINE

## 2022-09-28 PROCEDURE — 93000 ELECTROCARDIOGRAM COMPLETE: CPT | Performed by: INTERNAL MEDICINE

## 2022-09-28 NOTE — PROGRESS NOTES
Date of Office Visit: 2022  Encounter Provider: Warner Tang MD  Place of Service: [facility] CARDIOLOGY  Patient Name: Denisse Chavez  :1945    Chief Complaint   Patient presents with   • Coronary Artery Disease   :     HPI: Denisse Chvaez is a 76 y.o. female who presents today to follow up.  She established care with me in May 2016. I have reviewed prior notes and there are no changes except for any new updates described below. I have also reviewed any information entered into the medical record by the patient or by ancillary staff.     She suffered a myocardial infarction in [place] in . She underwent cardiac catheterization and was reportedly found to have nonobstructive disease for which medical therapy was recommended. Her anginal symptoms were chest heaviness and nausea. In 2016, an echo was checked which showed normal LV systolic function, grade II diastolic dysfunction, aortic valve calcification, but no aortic stenosis. There was hypokinesis of the lateral wall, so I ordered a PET.  This revealed a small-medium sized lateral infarct without ischemia.   A repeat echo reported normal LVSF/wall motion, and aortic valve sclerosis.    She has chronic lower extremity edema; she does use leg wraps.  She denies angina, dyspnea, orthopnea, PND, or palpitations.  From a cardiac standpoint, things are stable.     Past Medical History:   Diagnosis Date   • Angiomyolipoma of kidney 2016   • Aortic valve sclerosis     stable    • Arthritis    • CAD (coronary artery disease)     MI in , treated medically.  PET 2016 with small-medium sized lateral infarct, no ischemia.  This wall motion abnormality was seen on echo as well.   • Cellulitis 2018    RIGHT LEG   • Chronic kidney disease, stage III (moderate) ([facility]) 10/13/2016   • Chronic venous insufficiency    • Hyperlipidemia    • Hypertension    • Hypertriglyceridemia 2021   • Low back pain     • Mass of ovary 3/30/2016   • Obesity (BMI 30-39.9) 2019   • Sleep apnea     on CPAP.  Dr. Mueller   • Spinal stenosis    • Type 2 diabetes mellitus (HCC)    • Uterine leiomyoma 3/30/2016         Past Surgical History:   Procedure Laterality Date   • CATARACT EXTRACTION Bilateral     X2    • COLONOSCOPY N/A 2018    Procedure: COLONOSCOPY to CECUM WITH HOT SNARE POLYPECTOMY;  Surgeon: Neal Reilly MD;  Location: [facility] ENDOSCOPY;  Service: Gastroenterology   • EPIDURAL BLOCK     • HERNIA REPAIR     • HYSTERECTOMY     • TONSILLECTOMY         Social History     Socioeconomic History   • Marital status: Single   • Number of children: 3   Tobacco Use   • Smoking status: Former Smoker     Packs/day: 0.25     Years: 2.00     Pack years: 0.50     Types: Cigarettes     Quit date:      Years since quittin.7   • Smokeless tobacco: Never Used   • Tobacco comment: LIGHT USAGE   Vaping Use   • Vaping Use: Never used   Substance and Sexual Activity   • Alcohol use: No   • Drug use: No   • Sexual activity: Defer       Family History   Problem Relation Age of Onset   • Diabetes Mother    • Heart attack Mother    • Hypertension Mother    • Hypothyroidism Mother    • Diabetes type II Son    • Breast cancer Neg Hx        Review of Systems   Constitutional: Positive for malaise/fatigue.   Cardiovascular: Positive for leg swelling. Negative for chest pain and palpitations.   Respiratory: Negative for shortness of breath.    Endocrine: Positive for polyuria.   Musculoskeletal: Positive for joint pain and joint swelling.   Neurological: Negative for light-headedness.   All other systems reviewed and are negative.      Allergies   Allergen Reactions   • Ace Inhibitors Other (See Comments)     Hyperkalemia/ROB   • Angiotensin Receptor Blockers Other (See Comments)     Pt unable to remember   • Keflex [Cephalexin] Rash     Tolerated ceftriaxone Dec 2019; tolerated zosyn May 2020   • Sulfa Antibiotics Itching      "rash         Current Outpatient Medications:   •  acetaminophen-codeine (TYLENOL #3) 300-30 MG per tablet, Take 1 tablet by mouth 3 (Three) Times a Day As Needed for Moderate Pain., Disp: 90 tablet, Rfl: 0  •  aspirin 81 MG tablet, Take  by mouth Every Night., Disp: , Rfl:   •  atorvastatin (LIPITOR) 40 MG tablet, Take 1 tablet by mouth Daily., Disp: 90 tablet, Rfl: 3  •  Diclofenac Sodium (VOLTAREN) 1 % gel gel, Apply 4 g topically to the appropriate area as directed 4 (Four) Times a Day As Needed (pain)., Disp: 100 g, Rfl: 0  •  docusate sodium (COLACE) 100 MG capsule, Take 100 mg by mouth 2 (two) times a day., Disp: , Rfl:   •  hydrALAZINE (APRESOLINE) 10 MG tablet, TAKE 1 TABLET BY MOUTH THREE TIMES DAILY, Disp: 270 tablet, Rfl: 0  •  hydrOXYzine (ATARAX) 25 MG tablet, TAKE 1 TABLET BY MOUTH AT NIGHT AS NEEDED FOR INSOMNIA, Disp: 90 tablet, Rfl: 2  •  insulin NPH (NovoLIN N) 100 UNIT/ML injection, Inject  under the skin into the appropriate area as directed 2 (Two) Times a Day Before Meals., Disp: , Rfl: 12  •  insulin regular (HumuLIN R) 100 UNIT/ML injection, Inject  under the skin into the appropriate area as directed 3 (Three) Times a Day Before Meals., Disp: , Rfl: 12  •  isosorbide mononitrate (IMDUR) 60 MG 24 hr tablet, Take 1 tablet by mouth Daily., Disp: 90 tablet, Rfl: 1  •  metoprolol succinate XL (TOPROL-XL) 100 MG 24 hr tablet, Take 1 tablet by mouth once daily, Disp: 90 tablet, Rfl: 0  •  Misc. Devices (ROLLATOR) misc, 1 Units as needed (for walking)., Disp: 1 each, Rfl: 0  •  nystatin (nystatin) 195512 UNIT/GM powder, Apply  topically to the appropriate area as directed 3 (Three) Times a Day., Disp: 60 g, Rfl: 11  •  ReliOn Insulin Syringe 31G X 15/64\" 1 ML misc, USE 1 FIVE TIMES DAILY, Disp: , Rfl:   •  spironolactone (ALDACTONE) 25 MG tablet, Take 25 mg by mouth Daily., Disp: , Rfl:   •  torsemide (DEMADEX) 20 MG tablet, Take 4 tablets by mouth 2 (Two) Times a Day., Disp: , Rfl:   •  vitamin D " "(ERGOCALCIFEROL) 44912 units capsule capsule, 50,000 Units Every 7 (Seven) Days., Disp: , Rfl:      Objective:     Vitals:    09/28/22 1052   BP: 130/56   BP Location: Right arm   Pulse: 55   Weight: 98 kg (216 lb)   Height: 154.9 cm (61\")     Body mass index is 40.81 kg/m².    Physical Exam  Vitals reviewed.   Constitutional:       Comments: Obese   HENT:      Head: Normocephalic.      Nose: Nose normal.      Mouth/Throat:      Comments: masked  Eyes:      Conjunctiva/sclera: Conjunctivae normal.   Neck:      Vascular: No JVD.   Cardiovascular:      Rate and Rhythm: Normal rate and regular rhythm.      Pulses: Intact distal pulses.      Heart sounds: Heart sounds are distant. Murmur heard.    Systolic murmur is present with a grade of 2/6.  Pulmonary:      Effort: Pulmonary effort is normal.   Abdominal:      Palpations: Abdomen is soft.      Tenderness: There is no abdominal tenderness.      Comments: Obesity limits abdominal exam   Musculoskeletal:         General: Swelling (chronic/doughy appearing swelling) present. Normal range of motion.      Cervical back: Normal range of motion.   Skin:     General: Skin is warm and dry.      Findings: No rash.   Neurological:      General: No focal deficit present.      Mental Status: She is alert.      Cranial Nerves: No cranial nerve deficit.   Psychiatric:         Mood and Affect: Mood normal.         Behavior: Behavior normal.         Thought Content: Thought content normal.           ECG 12 Lead    Date/Time: 9/28/2022 11:05 AM  Performed by: Warner Tang MD  Authorized by: Warner Tang MD   Comparison: compared with previous ECG   Similar to previous ECG  Rhythm: sinus rhythm  Conduction: conduction normal  ST Segments: ST segments normal  T Waves: T waves normal  QRS axis: normal  Other findings: left ventricular hypertrophy    Clinical impression: abnormal EKG              Assessment:       Diagnosis Plan   1. Coronary artery disease involving native coronary " artery of native heart without angina pectoris  ECG 12 Lead   2. Mixed hyperlipidemia     3. Essential hypertension     4. Stage 3b chronic kidney disease (HCC)     5. Aortic valve sclerosis     6. Chronic venous insufficiency     7. SAWYER on autoCPAP     8. Class 3 severe obesity due to excess calories with serious comorbidity and body mass index (BMI) of 40.0 to 44.9 in adult (HCC)            Plan:       1/2.  Coronary Artery Disease  Assessment  • The patient has no angina  • She has no angina.  A nuclear stress in 2016 showed no ischemia.  She has normal LV systolic function.  She has evidence of a prior lateral wall infarct.  She's on a statin.    Subjective - Objective  • There is a history of past MI 1996  • Current antiplatelet therapy includes aspirin 81 mg      3/4.  Her BP is within goal. She follows with Dr Montenegro for her CKD.    5. An echo in 2021 showed sclerosis without stenosis. We'll repeat this in 2024.    6.  She has chronic leg swelling which is multifactorial.  She is on furosemide and she wraps them, and she follows with podiatry.     Sincerely,       Warner Tang MD

## 2022-10-12 RX ORDER — HYDRALAZINE HYDROCHLORIDE 10 MG/1
TABLET, FILM COATED ORAL
Qty: 270 TABLET | Refills: 0 | Status: SHIPPED | OUTPATIENT
Start: 2022-10-12 | End: 2023-01-05

## 2022-10-26 DIAGNOSIS — E78.2 MIXED HYPERLIPIDEMIA: Primary | Chronic | ICD-10-CM

## 2022-10-26 DIAGNOSIS — E11.8 CONTROLLED DIABETES MELLITUS TYPE 2 WITH COMPLICATIONS, UNSPECIFIED WHETHER LONG TERM INSULIN USE: ICD-10-CM

## 2022-10-27 RX ORDER — METOPROLOL SUCCINATE 100 MG/1
TABLET, EXTENDED RELEASE ORAL
Qty: 90 TABLET | Refills: 0 | Status: SHIPPED | OUTPATIENT
Start: 2022-10-27

## 2022-11-21 DIAGNOSIS — M54.41 ACUTE RIGHT-SIDED LOW BACK PAIN WITH RIGHT-SIDED SCIATICA: ICD-10-CM

## 2022-11-22 RX ORDER — HYDROXYZINE HYDROCHLORIDE 25 MG/1
TABLET, FILM COATED ORAL
Qty: 90 TABLET | Refills: 0 | Status: SHIPPED | OUTPATIENT
Start: 2022-11-22 | End: 2023-03-31

## 2022-11-22 RX ORDER — ACETAMINOPHEN AND CODEINE PHOSPHATE 300; 30 MG/1; MG/1
TABLET ORAL
Qty: 90 TABLET | Refills: 0 | Status: SHIPPED | OUTPATIENT
Start: 2022-11-22 | End: 2023-01-06

## 2022-12-21 RX ORDER — ATORVASTATIN CALCIUM 40 MG/1
TABLET, FILM COATED ORAL
Qty: 90 TABLET | Refills: 0 | Status: SHIPPED | OUTPATIENT
Start: 2022-12-21

## 2023-01-04 DIAGNOSIS — M54.41 ACUTE RIGHT-SIDED LOW BACK PAIN WITH RIGHT-SIDED SCIATICA: ICD-10-CM

## 2023-01-05 RX ORDER — HYDRALAZINE HYDROCHLORIDE 10 MG/1
TABLET, FILM COATED ORAL
Qty: 270 TABLET | Refills: 0 | Status: SHIPPED | OUTPATIENT
Start: 2023-01-05 | End: 2023-04-03

## 2023-01-06 RX ORDER — ACETAMINOPHEN AND CODEINE PHOSPHATE 300; 30 MG/1; MG/1
TABLET ORAL
Qty: 90 TABLET | Refills: 0 | Status: SHIPPED | OUTPATIENT
Start: 2023-01-06 | End: 2023-02-24

## 2023-02-08 DIAGNOSIS — E11.9 CONTROLLED TYPE 2 DIABETES MELLITUS WITHOUT COMPLICATION, WITH LONG-TERM CURRENT USE OF INSULIN: ICD-10-CM

## 2023-02-08 DIAGNOSIS — E78.2 MIXED HYPERLIPIDEMIA: Primary | Chronic | ICD-10-CM

## 2023-02-08 DIAGNOSIS — I10 ESSENTIAL HYPERTENSION: ICD-10-CM

## 2023-02-08 DIAGNOSIS — Z79.4 CONTROLLED TYPE 2 DIABETES MELLITUS WITHOUT COMPLICATION, WITH LONG-TERM CURRENT USE OF INSULIN: ICD-10-CM

## 2023-02-09 LAB
ALBUMIN SERPL-MCNC: 4.3 G/DL (ref 3.5–5.2)
ALBUMIN/GLOB SERPL: 1.6 G/DL
ALP SERPL-CCNC: 81 U/L (ref 39–117)
ALT SERPL-CCNC: 16 U/L (ref 1–33)
AST SERPL-CCNC: 29 U/L (ref 1–32)
BASOPHILS # BLD AUTO: 0.03 10*3/MM3 (ref 0–0.2)
BASOPHILS NFR BLD AUTO: 0.3 % (ref 0–1.5)
BILIRUB SERPL-MCNC: 0.7 MG/DL (ref 0–1.2)
BUN SERPL-MCNC: 37 MG/DL (ref 8–23)
BUN/CREAT SERPL: 25.2 (ref 7–25)
CALCIUM SERPL-MCNC: 9.8 MG/DL (ref 8.6–10.5)
CHLORIDE SERPL-SCNC: 98 MMOL/L (ref 98–107)
CHOLEST SERPL-MCNC: 140 MG/DL (ref 0–200)
CO2 SERPL-SCNC: 33.5 MMOL/L (ref 22–29)
CREAT SERPL-MCNC: 1.47 MG/DL (ref 0.57–1)
EGFRCR SERPLBLD CKD-EPI 2021: 36.6 ML/MIN/1.73
EOSINOPHIL # BLD AUTO: 0.24 10*3/MM3 (ref 0–0.4)
EOSINOPHIL NFR BLD AUTO: 2.5 % (ref 0.3–6.2)
ERYTHROCYTE [DISTWIDTH] IN BLOOD BY AUTOMATED COUNT: 13.5 % (ref 12.3–15.4)
GLOBULIN SER CALC-MCNC: 2.7 GM/DL
GLUCOSE SERPL-MCNC: 111 MG/DL (ref 65–99)
HBA1C MFR BLD: 8.5 % (ref 4.8–5.6)
HCT VFR BLD AUTO: 40.7 % (ref 34–46.6)
HDLC SERPL-MCNC: 33 MG/DL (ref 40–60)
HGB BLD-MCNC: 13.3 G/DL (ref 12–15.9)
IMM GRANULOCYTES # BLD AUTO: 0.06 10*3/MM3 (ref 0–0.05)
IMM GRANULOCYTES NFR BLD AUTO: 0.6 % (ref 0–0.5)
LDLC SERPL CALC-MCNC: 70 MG/DL (ref 0–100)
LYMPHOCYTES # BLD AUTO: 2.28 10*3/MM3 (ref 0.7–3.1)
LYMPHOCYTES NFR BLD AUTO: 23.7 % (ref 19.6–45.3)
MCH RBC QN AUTO: 28.4 PG (ref 26.6–33)
MCHC RBC AUTO-ENTMCNC: 32.7 G/DL (ref 31.5–35.7)
MCV RBC AUTO: 87 FL (ref 79–97)
MONOCYTES # BLD AUTO: 0.85 10*3/MM3 (ref 0.1–0.9)
MONOCYTES NFR BLD AUTO: 8.8 % (ref 5–12)
NEUTROPHILS # BLD AUTO: 6.15 10*3/MM3 (ref 1.7–7)
NEUTROPHILS NFR BLD AUTO: 64.1 % (ref 42.7–76)
NRBC BLD AUTO-RTO: 0 /100 WBC (ref 0–0.2)
PLATELET # BLD AUTO: 224 10*3/MM3 (ref 140–450)
POTASSIUM SERPL-SCNC: 4.2 MMOL/L (ref 3.5–5.2)
PROT SERPL-MCNC: 7 G/DL (ref 6–8.5)
RBC # BLD AUTO: 4.68 10*6/MM3 (ref 3.77–5.28)
SODIUM SERPL-SCNC: 142 MMOL/L (ref 136–145)
TRIGL SERPL-MCNC: 226 MG/DL (ref 0–150)
VLDLC SERPL CALC-MCNC: 37 MG/DL (ref 5–40)
WBC # BLD AUTO: 9.61 10*3/MM3 (ref 3.4–10.8)

## 2023-02-21 ENCOUNTER — OFFICE VISIT (OUTPATIENT)
Dept: INTERNAL MEDICINE | Facility: CLINIC | Age: 78
End: 2023-02-21
Payer: MEDICARE

## 2023-02-21 VITALS
HEART RATE: 62 BPM | WEIGHT: 205 LBS | OXYGEN SATURATION: 99 % | DIASTOLIC BLOOD PRESSURE: 60 MMHG | BODY MASS INDEX: 38.71 KG/M2 | HEIGHT: 61 IN | SYSTOLIC BLOOD PRESSURE: 140 MMHG

## 2023-02-21 DIAGNOSIS — N18.31 STAGE 3A CHRONIC KIDNEY DISEASE: ICD-10-CM

## 2023-02-21 DIAGNOSIS — Z79.4 TYPE 2 DIABETES MELLITUS WITH STAGE 3A CHRONIC KIDNEY DISEASE, WITH LONG-TERM CURRENT USE OF INSULIN: ICD-10-CM

## 2023-02-21 DIAGNOSIS — N18.31 TYPE 2 DIABETES MELLITUS WITH STAGE 3A CHRONIC KIDNEY DISEASE, WITH LONG-TERM CURRENT USE OF INSULIN: ICD-10-CM

## 2023-02-21 DIAGNOSIS — E78.2 MIXED HYPERLIPIDEMIA: Chronic | ICD-10-CM

## 2023-02-21 DIAGNOSIS — E11.22 TYPE 2 DIABETES MELLITUS WITH STAGE 3A CHRONIC KIDNEY DISEASE, WITH LONG-TERM CURRENT USE OF INSULIN: ICD-10-CM

## 2023-02-21 DIAGNOSIS — I10 ESSENTIAL HYPERTENSION: Primary | ICD-10-CM

## 2023-02-21 PROCEDURE — 99214 OFFICE O/P EST MOD 30 MIN: CPT | Performed by: INTERNAL MEDICINE

## 2023-02-21 NOTE — PROGRESS NOTES
Subjective     Denisse Chavez is a 77 y.o. female who presents with   Chief Complaint   Patient presents with   • Diabetes   • Hypertension   • Hyperlipidemia       History of Present Illness     The following data was reviewed by: Surekha Mcdonnell MD on 02/21/2023:  Common labs    Common Labs 6/22/22 6/22/22 6/22/22 6/22/22 6/22/22 8/31/22 2/8/23 2/8/23 2/8/23 2/8/23    0928 0928 0928 0928 0928  1003 1003 1003 1003   Glucose  101 (A)    411 (A) 111 (A)      BUN  36 (A)    43 (A) 37 (A)      Creatinine  1.56 (A)    1.53 (A) 1.47 (A)      Sodium  142    138 142      Potassium  4.2    4.5 4.2      Chloride  97    94 (A) 98      Calcium  10.1    10.0 9.8      Total Protein  6.9     7.0      Albumin  4.4    4.30 4.3      Total Bilirubin  0.6    0.6 0.7      Alkaline Phosphatase  82    108 81      AST (SGOT)  23    20 29      ALT (SGPT)  16    12 16      WBC 10.7        9.61    Hemoglobin 13.3        13.3    Hematocrit 40.8        40.7    Platelets 237        224    Total Cholesterol   166     140     Triglycerides   243 (A)     226 (A)     HDL Cholesterol   35 (A)     33 (A)     LDL Cholesterol    90     70     Hemoglobin A1C    8.3 (A)      8.50 (A)   Microalbumin, Urine     26.2        (A) Abnormal value       Comments are available for some flowsheets but are not being displayed.           DM-2.  Control not optimal.  Followed by endo.  HTN.  Control is fairly good.  HLD  Good LDL control.    CKD3a.  Numbers are stable.     Review of Systems   Respiratory: Negative.    Cardiovascular: Negative.        The following portions of the patient's history were reviewed and updated as appropriate: allergies, current medications and problem list.    Patient Active Problem List    Diagnosis Date Noted   • Osteoporosis 11/23/2021   • Hypertriglyceridemia 09/29/2021   • Aortic valve sclerosis 07/25/2019   • History of colon polyps 06/13/2018     Note Last Updated: 6/13/2018     Added automatically from request for surgery  [id number]     • Circadian rhythm sleep disorder, delayed sleep phase type 04/23/2018   • Class 3 severe obesity due to excess calories with serious comorbidity and body mass index (BMI) of 40.0 to 44.9 in adult ([facility]) 04/23/2018   • Chronic venous insufficiency    • CAD (coronary artery disease)      Note Last Updated: 6/28/2017     MI in 1996, treated medically.  PET 6/2016 with small-medium sized lateral infarct, no ischemia.  This wall motion abnormality was seen on echo as well.     • Stage 3 chronic kidney disease ([facility]) 10/13/2016   • SAWYER on autoCPAP 09/04/2016   • Left hip pain 05/16/2016   • Left-sided low back pain without sciatica 05/16/2016   • Lumbar spinal stenosis 05/16/2016   • Angiomyolipoma of kidney 03/30/2016   • Type 2 diabetes mellitus, with long-term current use of insulin ([facility]) 03/08/2016   • Hyperlipidemia 03/08/2016   • Essential hypertension 03/08/2016       Current Outpatient Medications on File Prior to Visit   Medication Sig Dispense Refill   • acetaminophen-codeine (TYLENOL #3) 300-30 MG per tablet TAKE 1 TABLET BY MOUTH THREE TIMES DAILY AS NEEDED FOR MODERATE PAIN 90 tablet 0   • aspirin 81 MG tablet Take  by mouth Every Night.     • atorvastatin (LIPITOR) 40 MG tablet Take 1 tablet by mouth once daily 90 tablet 0   • Diclofenac Sodium (VOLTAREN) 1 % gel gel Apply 4 g topically to the appropriate area as directed 4 (Four) Times a Day As Needed (pain). 100 g 0   • docusate sodium (COLACE) 100 MG capsule Take 100 mg by mouth 2 (two) times a day.     • hydrALAZINE (APRESOLINE) 10 MG tablet TAKE 1 TABLET BY MOUTH THREE TIMES DAILY 270 tablet 0   • hydrOXYzine (ATARAX) 25 MG tablet TAKE 1 TABLET BY MOUTH AT NIGHT AS NEEDED FOR INSOMNIA 90 tablet 0   • insulin NPH (NovoLIN N) 100 UNIT/ML injection Inject  under the skin into the appropriate area as directed 2 (Two) Times a Day Before Meals.  12   • insulin regular (HumuLIN R) 100 UNIT/ML injection Inject  under the skin into the appropriate  "area as directed 3 (Three) Times a Day Before Meals.  12   • isosorbide mononitrate (IMDUR) 60 MG 24 hr tablet Take 1 tablet by mouth Daily. 90 tablet 1   • metoprolol succinate XL (TOPROL-XL) 100 MG 24 hr tablet Take 1 tablet by mouth once daily 90 tablet 0   • Misc. Devices (ROLLATOR) misc 1 Units as needed (for walking). 1 each 0   • nystatin (nystatin) 937050 UNIT/GM powder Apply  topically to the appropriate area as directed 3 (Three) Times a Day. 60 g 11   • ReliOn Insulin Syringe 31G X 15/64\" 1 ML misc USE 1 FIVE TIMES DAILY     • spironolactone (ALDACTONE) 25 MG tablet Take 25 mg by mouth Daily.     • torsemide (DEMADEX) 20 MG tablet Take 4 tablets by mouth 2 (Two) Times a Day.     • vitamin D (ERGOCALCIFEROL) 91692 units capsule capsule 50,000 Units Every 7 (Seven) Days.       No current facility-administered medications on file prior to visit.       Objective     /60   Pulse 62   Ht 154.9 cm (60.98\")   Wt 93 kg (205 lb)   SpO2 99%   BMI 38.75 kg/m²     Physical Exam  Constitutional:       Appearance: She is well-developed.   HENT:      Head: Normocephalic and atraumatic.   Cardiovascular:      Rate and Rhythm: Normal rate and regular rhythm.      Heart sounds: Normal heart sounds.   Pulmonary:      Effort: Pulmonary effort is normal.      Breath sounds: Normal breath sounds.   Skin:     General: Skin is warm and dry.   Neurological:      Mental Status: She is alert and oriented to person, place, and time.   Psychiatric:         Behavior: Behavior normal.         Assessment & Plan   Diagnoses and all orders for this visit:    1. Essential hypertension (Primary)    2. Mixed hyperlipidemia    3. Type 2 diabetes mellitus with stage 3a chronic kidney disease, with long-term current use of insulin (HCC)    4. Stage 3a chronic kidney disease (HCC)        Discussion    HTN.  Control is fairly good.  The patient is advised to continue current dosage of metoprolol.  HLD. Control is good.  The patient is " advised to continue current dosage of atorvastatin.    Dm-2.  Control is not optimal.  The patient is advised to continue current dosage of insulin.  Continue with endo.   CKD3a.  Numbers are stable.               Future Appointments   Date Time Provider Department Center   3/7/2023  2:00 PM REFERRED INJECTION CHAIR EP  [facility]   7/26/2023 10:15 AM Alfredito Mueller MD [facility] None   8/16/2023  9:10 AM [facility]   8/23/2023  1:00 PM Surekha Mcdonnell MD [facility]   9/27/2023 11:00 AM Vicky Herrera APRN [facility]

## 2023-02-24 DIAGNOSIS — M54.41 ACUTE RIGHT-SIDED LOW BACK PAIN WITH RIGHT-SIDED SCIATICA: ICD-10-CM

## 2023-02-24 RX ORDER — ACETAMINOPHEN AND CODEINE PHOSPHATE 300; 30 MG/1; MG/1
TABLET ORAL
Qty: 90 TABLET | Refills: 0 | Status: SHIPPED | OUTPATIENT
Start: 2023-02-24

## 2023-03-03 ENCOUNTER — DOCUMENTATION (OUTPATIENT)
Dept: ONCOLOGY | Facility: HOSPITAL | Age: 78
End: 2023-03-03
Payer: MEDICARE

## 2023-03-07 ENCOUNTER — INFUSION (OUTPATIENT)
Dept: ONCOLOGY | Facility: HOSPITAL | Age: 78
End: 2023-03-07
Payer: MEDICARE

## 2023-03-07 ENCOUNTER — LAB (OUTPATIENT)
Dept: OTHER | Facility: HOSPITAL | Age: 78
End: 2023-03-07
Payer: MEDICARE

## 2023-03-07 VITALS — TEMPERATURE: 97.8 F | BODY MASS INDEX: 37.49 KG/M2 | HEIGHT: 62 IN | RESPIRATION RATE: 18 BRPM

## 2023-03-07 DIAGNOSIS — M81.0 OSTEOPOROSIS, UNSPECIFIED OSTEOPOROSIS TYPE, UNSPECIFIED PATHOLOGICAL FRACTURE PRESENCE: Primary | ICD-10-CM

## 2023-03-07 PROCEDURE — 25010000002 DENOSUMAB 60 MG/ML SOLUTION PREFILLED SYRINGE: Performed by: INTERNAL MEDICINE

## 2023-03-07 PROCEDURE — 96372 THER/PROPH/DIAG INJ SC/IM: CPT

## 2023-03-07 RX ADMIN — DENOSUMAB 60 MG: 60 INJECTION SUBCUTANEOUS at 13:51

## 2023-03-17 RX ORDER — ISOSORBIDE MONONITRATE 60 MG/1
TABLET, EXTENDED RELEASE ORAL
Qty: 90 TABLET | Refills: 0 | Status: SHIPPED | OUTPATIENT
Start: 2023-03-17

## 2023-03-31 RX ORDER — HYDROXYZINE HYDROCHLORIDE 25 MG/1
TABLET, FILM COATED ORAL
Qty: 90 TABLET | Refills: 0 | Status: SHIPPED | OUTPATIENT
Start: 2023-03-31

## 2023-04-03 RX ORDER — HYDRALAZINE HYDROCHLORIDE 10 MG/1
TABLET, FILM COATED ORAL
Qty: 270 TABLET | Refills: 0 | Status: SHIPPED | OUTPATIENT
Start: 2023-04-03

## 2023-04-12 DIAGNOSIS — M54.41 ACUTE RIGHT-SIDED LOW BACK PAIN WITH RIGHT-SIDED SCIATICA: ICD-10-CM

## 2023-04-12 RX ORDER — METOPROLOL SUCCINATE 100 MG/1
TABLET, EXTENDED RELEASE ORAL
Qty: 90 TABLET | Refills: 0 | Status: SHIPPED | OUTPATIENT
Start: 2023-04-12

## 2023-04-12 RX ORDER — ACETAMINOPHEN AND CODEINE PHOSPHATE 300; 30 MG/1; MG/1
TABLET ORAL
Qty: 90 TABLET | Refills: 0 | Status: SHIPPED | OUTPATIENT
Start: 2023-04-12

## 2023-06-17 DIAGNOSIS — M54.41 ACUTE RIGHT-SIDED LOW BACK PAIN WITH RIGHT-SIDED SCIATICA: ICD-10-CM

## 2023-06-19 RX ORDER — ACETAMINOPHEN AND CODEINE PHOSPHATE 300; 30 MG/1; MG/1
TABLET ORAL
Qty: 90 TABLET | Refills: 0 | Status: SHIPPED | OUTPATIENT
Start: 2023-06-19

## 2023-07-26 ENCOUNTER — OFFICE VISIT (OUTPATIENT)
Dept: SLEEP MEDICINE | Facility: HOSPITAL | Age: 78
End: 2023-07-26
Payer: MEDICARE

## 2023-07-26 VITALS — HEART RATE: 70 BPM | HEIGHT: 62 IN | OXYGEN SATURATION: 98 % | BODY MASS INDEX: 39.01 KG/M2 | WEIGHT: 212 LBS

## 2023-07-26 DIAGNOSIS — G47.33 OSA ON CPAP: Primary | ICD-10-CM

## 2023-07-26 DIAGNOSIS — G47.21 CIRCADIAN RHYTHM SLEEP DISORDER, DELAYED SLEEP PHASE TYPE: ICD-10-CM

## 2023-07-26 DIAGNOSIS — Z99.89 OSA ON CPAP: Primary | ICD-10-CM

## 2023-07-26 DIAGNOSIS — E66.01 CLASS 2 SEVERE OBESITY DUE TO EXCESS CALORIES WITH SERIOUS COMORBIDITY AND BODY MASS INDEX (BMI) OF 38.0 TO 38.9 IN ADULT: ICD-10-CM

## 2023-07-26 PROCEDURE — G0463 HOSPITAL OUTPT CLINIC VISIT: HCPCS

## 2023-07-28 DIAGNOSIS — M54.41 ACUTE RIGHT-SIDED LOW BACK PAIN WITH RIGHT-SIDED SCIATICA: ICD-10-CM

## 2023-07-28 RX ORDER — ACETAMINOPHEN AND CODEINE PHOSPHATE 300; 30 MG/1; MG/1
TABLET ORAL
Qty: 90 TABLET | Refills: 0 | Status: SHIPPED | OUTPATIENT
Start: 2023-07-28

## 2023-07-28 RX ORDER — ATORVASTATIN CALCIUM 40 MG/1
TABLET, FILM COATED ORAL
Qty: 90 TABLET | Refills: 0 | Status: SHIPPED | OUTPATIENT
Start: 2023-07-28

## 2023-08-14 DIAGNOSIS — E78.1 HYPERTRIGLYCERIDEMIA: ICD-10-CM

## 2023-08-14 DIAGNOSIS — I10 ESSENTIAL HYPERTENSION: ICD-10-CM

## 2023-08-14 DIAGNOSIS — N18.31 TYPE 2 DIABETES MELLITUS WITH STAGE 3A CHRONIC KIDNEY DISEASE, WITH LONG-TERM CURRENT USE OF INSULIN: ICD-10-CM

## 2023-08-14 DIAGNOSIS — E11.22 TYPE 2 DIABETES MELLITUS WITH STAGE 3A CHRONIC KIDNEY DISEASE, WITH LONG-TERM CURRENT USE OF INSULIN: ICD-10-CM

## 2023-08-14 DIAGNOSIS — E78.2 MIXED HYPERLIPIDEMIA: Primary | Chronic | ICD-10-CM

## 2023-08-14 DIAGNOSIS — Z79.4 TYPE 2 DIABETES MELLITUS WITH STAGE 3A CHRONIC KIDNEY DISEASE, WITH LONG-TERM CURRENT USE OF INSULIN: ICD-10-CM

## 2023-08-17 LAB
ALBUMIN SERPL-MCNC: 4.1 G/DL (ref 3.5–5.2)
ALBUMIN/CREAT UR: 50 MG/G CREAT (ref 0–29)
ALBUMIN/GLOB SERPL: 1.8 G/DL
ALP SERPL-CCNC: 74 U/L (ref 39–117)
ALT SERPL-CCNC: 14 U/L (ref 1–33)
APPEARANCE UR: CLEAR
AST SERPL-CCNC: 14 U/L (ref 1–32)
BACTERIA #/AREA URNS HPF: ABNORMAL /HPF
BASOPHILS # BLD AUTO: 0.04 10*3/MM3 (ref 0–0.2)
BASOPHILS NFR BLD AUTO: 0.4 % (ref 0–1.5)
BILIRUB SERPL-MCNC: 0.6 MG/DL (ref 0–1.2)
BILIRUB UR QL STRIP: NEGATIVE
BUN SERPL-MCNC: 24 MG/DL (ref 8–23)
BUN/CREAT SERPL: 17.8 (ref 7–25)
CALCIUM SERPL-MCNC: 10.4 MG/DL (ref 8.6–10.5)
CASTS URNS MICRO: ABNORMAL
CHLORIDE SERPL-SCNC: 101 MMOL/L (ref 98–107)
CHOLEST SERPL-MCNC: 171 MG/DL (ref 0–200)
CO2 SERPL-SCNC: 30 MMOL/L (ref 22–29)
COLOR UR: YELLOW
CREAT SERPL-MCNC: 1.35 MG/DL (ref 0.57–1)
CREAT UR-MCNC: 21.8 MG/DL
EGFRCR SERPLBLD CKD-EPI 2021: 40.6 ML/MIN/1.73
EOSINOPHIL # BLD AUTO: 0.28 10*3/MM3 (ref 0–0.4)
EOSINOPHIL NFR BLD AUTO: 2.9 % (ref 0.3–6.2)
EPI CELLS #/AREA URNS HPF: ABNORMAL /HPF
ERYTHROCYTE [DISTWIDTH] IN BLOOD BY AUTOMATED COUNT: 13.9 % (ref 12.3–15.4)
GLOBULIN SER CALC-MCNC: 2.3 GM/DL
GLUCOSE SERPL-MCNC: 145 MG/DL (ref 65–99)
GLUCOSE UR QL STRIP: NEGATIVE
HBA1C MFR BLD: 9.5 % (ref 4.8–5.6)
HCT VFR BLD AUTO: 38.5 % (ref 34–46.6)
HDLC SERPL-MCNC: 31 MG/DL (ref 40–60)
HGB BLD-MCNC: 12.8 G/DL (ref 12–15.9)
HGB UR QL STRIP: NEGATIVE
IMM GRANULOCYTES # BLD AUTO: 0.1 10*3/MM3 (ref 0–0.05)
IMM GRANULOCYTES NFR BLD AUTO: 1 % (ref 0–0.5)
KETONES UR QL STRIP: NEGATIVE
LDLC SERPL CALC-MCNC: 98 MG/DL (ref 0–100)
LEUKOCYTE ESTERASE UR QL STRIP: NEGATIVE
LYMPHOCYTES # BLD AUTO: 1.82 10*3/MM3 (ref 0.7–3.1)
LYMPHOCYTES NFR BLD AUTO: 19 % (ref 19.6–45.3)
MCH RBC QN AUTO: 28.8 PG (ref 26.6–33)
MCHC RBC AUTO-ENTMCNC: 33.2 G/DL (ref 31.5–35.7)
MCV RBC AUTO: 86.5 FL (ref 79–97)
MICROALBUMIN UR-MCNC: 10.9 UG/ML
MONOCYTES # BLD AUTO: 0.85 10*3/MM3 (ref 0.1–0.9)
MONOCYTES NFR BLD AUTO: 8.9 % (ref 5–12)
NEUTROPHILS # BLD AUTO: 6.49 10*3/MM3 (ref 1.7–7)
NEUTROPHILS NFR BLD AUTO: 67.8 % (ref 42.7–76)
NITRITE UR QL STRIP: NEGATIVE
NRBC BLD AUTO-RTO: 0 /100 WBC (ref 0–0.2)
PH UR STRIP: 6.5 [PH] (ref 5–8)
PLATELET # BLD AUTO: 212 10*3/MM3 (ref 140–450)
POTASSIUM SERPL-SCNC: 4.8 MMOL/L (ref 3.5–5.2)
PROT SERPL-MCNC: 6.4 G/DL (ref 6–8.5)
PROT UR QL STRIP: NEGATIVE
RBC # BLD AUTO: 4.45 10*6/MM3 (ref 3.77–5.28)
RBC #/AREA URNS HPF: ABNORMAL /HPF
SODIUM SERPL-SCNC: 142 MMOL/L (ref 136–145)
SP GR UR STRIP: 1.01 (ref 1–1.03)
TRIGL SERPL-MCNC: 246 MG/DL (ref 0–150)
TSH SERPL DL<=0.005 MIU/L-ACNC: 4.36 UIU/ML (ref 0.27–4.2)
UROBILINOGEN UR STRIP-MCNC: NORMAL MG/DL
VLDLC SERPL CALC-MCNC: 42 MG/DL (ref 5–40)
WBC # BLD AUTO: 9.58 10*3/MM3 (ref 3.4–10.8)
WBC #/AREA URNS HPF: ABNORMAL /HPF

## 2023-08-23 ENCOUNTER — OFFICE VISIT (OUTPATIENT)
Dept: INTERNAL MEDICINE | Facility: CLINIC | Age: 78
End: 2023-08-23
Payer: MEDICARE

## 2023-08-23 VITALS
OXYGEN SATURATION: 98 % | DIASTOLIC BLOOD PRESSURE: 68 MMHG | BODY MASS INDEX: 39.01 KG/M2 | WEIGHT: 212 LBS | SYSTOLIC BLOOD PRESSURE: 140 MMHG | HEIGHT: 62 IN | HEART RATE: 81 BPM

## 2023-08-23 DIAGNOSIS — M81.0 OSTEOPOROSIS, UNSPECIFIED OSTEOPOROSIS TYPE, UNSPECIFIED PATHOLOGICAL FRACTURE PRESENCE: ICD-10-CM

## 2023-08-23 DIAGNOSIS — M48.061 SPINAL STENOSIS OF LUMBAR REGION, UNSPECIFIED WHETHER NEUROGENIC CLAUDICATION PRESENT: ICD-10-CM

## 2023-08-23 DIAGNOSIS — N18.31 TYPE 2 DIABETES MELLITUS WITH STAGE 3A CHRONIC KIDNEY DISEASE, WITH LONG-TERM CURRENT USE OF INSULIN: ICD-10-CM

## 2023-08-23 DIAGNOSIS — Z00.00 MEDICARE ANNUAL WELLNESS VISIT, SUBSEQUENT: Primary | ICD-10-CM

## 2023-08-23 DIAGNOSIS — E11.22 TYPE 2 DIABETES MELLITUS WITH STAGE 3A CHRONIC KIDNEY DISEASE, WITH LONG-TERM CURRENT USE OF INSULIN: ICD-10-CM

## 2023-08-23 DIAGNOSIS — E78.2 MIXED HYPERLIPIDEMIA: Chronic | ICD-10-CM

## 2023-08-23 DIAGNOSIS — I10 ESSENTIAL HYPERTENSION: ICD-10-CM

## 2023-08-23 DIAGNOSIS — Z12.31 ENCOUNTER FOR SCREENING MAMMOGRAM FOR BREAST CANCER: ICD-10-CM

## 2023-08-23 DIAGNOSIS — Z79.4 TYPE 2 DIABETES MELLITUS WITH STAGE 3A CHRONIC KIDNEY DISEASE, WITH LONG-TERM CURRENT USE OF INSULIN: ICD-10-CM

## 2023-08-23 DIAGNOSIS — Z12.11 COLON CANCER SCREENING: ICD-10-CM

## 2023-08-23 DIAGNOSIS — N18.32 STAGE 3B CHRONIC KIDNEY DISEASE: ICD-10-CM

## 2023-08-23 PROBLEM — E55.9 VITAMIN D DEFICIENCY: Status: ACTIVE | Noted: 2023-08-09

## 2023-08-23 RX ORDER — INSULIN ASPART 100 [IU]/ML
70 INJECTION, SUSPENSION SUBCUTANEOUS 2 TIMES DAILY WITH MEALS
COMMUNITY

## 2023-08-23 NOTE — PATIENT INSTRUCTIONS
Medicare Wellness  Personal Prevention Plan of Service     Date of Office Visit:    Encounter Provider:  Surekha Mcdonnell MD  Place of Service:  [facility] PRIMARY CARE  Patient Name: Denisse Chavez  :  1945    As part of the Medicare Wellness portion of your visit today, we are providing you with this personalized preventive plan of services (PPPS). This plan is based upon recommendations of the [place] Preventive Services Task Force (USPSTF) and the Advisory Committee on Immunization Practices (ACIP).    This lists the preventive care services that should be considered, and provides dates of when you are due. Items listed as completed are up-to-date and do not require any further intervention.    Health Maintenance   Topic Date Due    ZOSTER VACCINE (1 of 2) Never done    COLORECTAL CANCER SCREENING  2020    COVID-19 Vaccine (6 - Pfizer series) 2023    ANNUAL WELLNESS VISIT  2023    INFLUENZA VACCINE  10/01/2023    HEMOGLOBIN A1C  2024    DIABETIC EYE EXAM  2024    LIPID PANEL  2024    URINE MICROALBUMIN  2024    TDAP/TD VACCINES (2 - Td or Tdap) 10/13/2026    DXA SCAN  2026    HEPATITIS C SCREENING  Completed    Pneumococcal Vaccine 65+  Completed       No orders of the defined types were placed in this encounter.      No follow-ups on file.

## 2023-08-23 NOTE — PROGRESS NOTES
The ABCs of the Annual Wellness Visit  Subsequent Medicare Wellness Visit    Subjective    Denisse Chavez is a 77 y.o. female who presents for a Subsequent Medicare Wellness Visit.    The following portions of the patient's history were reviewed and   updated as appropriate: allergies, current medications, past family history, past medical history, past social history, past surgical history, and problem list.    Compared to one year ago, the patient feels her physical   health is the same.    Compared to one year ago, the patient feels her mental   health is the same.    Recent Hospitalizations:  She was not admitted to the hospital during the last year.       Current Medical Providers:  Patient Care Team:  Surekha Mcdonnell MD as PCP - General (Internal Medicine)  Warner Tang MD as Consulting Physician (Cardiology)  Sergio Eagle MD as Consulting Physician (Urology)  Juan Negrete MD as Consulting Physician (Endocrinology)  Miguel Angel Barrett DPM as Consulting Physician (Podiatry)  Gabriel Montenegro MD as Consulting Physician (Nephrology)  Linda Rodriguez MD (Ophthalmology)  Ginger Ortega APRN as Nurse Practitioner (Nurse Practitioner)    Outpatient Medications Prior to Visit   Medication Sig Dispense Refill    acetaminophen-codeine (TYLENOL with CODEINE #3) 300-30 MG per tablet TAKE 1 TABLET BY MOUTH THREE TIMES DAILY AS NEEDED FOR MODERATE PAIN 90 tablet 0    aspirin 81 MG tablet Take  by mouth Every Night.      atorvastatin (LIPITOR) 40 MG tablet Take 1 tablet by mouth once daily 90 tablet 0    Calcium Carbonate 1500 (600 Ca) MG tablet Take 1 tablet by mouth.      Diclofenac Sodium (VOLTAREN) 1 % gel gel Apply 4 g topically to the appropriate area as directed 4 (Four) Times a Day As Needed (pain). 100 g 0    docusate sodium (COLACE) 100 MG capsule Take 1 capsule by mouth 2 (Two) Times a Day.      hydrALAZINE (APRESOLINE) 10 MG tablet TAKE 1 TABLET BY MOUTH THREE TIMES DAILY  "270 tablet 0    hydrOXYzine (ATARAX) 25 MG tablet TAKE 1 TABLET BY MOUTH AT NIGHT AS NEEDED FOR INSOMNIA 90 tablet 0    isosorbide mononitrate (IMDUR) 60 MG 24 hr tablet Take 1 tablet by mouth once daily 90 tablet 0    metoprolol succinate XL (TOPROL-XL) 100 MG 24 hr tablet Take 1 tablet by mouth once daily 90 tablet 0    Misc. Devices (ROLLATOR) misc 1 Units as needed (for walking). 1 each 0    nystatin (nystatin) 188090 UNIT/GM powder Apply  topically to the appropriate area as directed 3 (Three) Times a Day. 60 g 11    ReliOn Insulin Syringe 31G X 15/64\" 1 ML misc USE 1 FIVE TIMES DAILY      spironolactone (ALDACTONE) 25 MG tablet Take 1 tablet by mouth Daily.      torsemide (DEMADEX) 20 MG tablet Take 4 tablets by mouth 2 (Two) Times a Day.      vitamin D (ERGOCALCIFEROL) 16450 units capsule capsule 1 capsule Every 7 (Seven) Days.      insulin regular (HumuLIN R) 100 UNIT/ML injection Inject  under the skin into the appropriate area as directed 3 (Three) Times a Day Before Meals.  12    insulin aspart prot-insulin aspart (novoLOG 70/30) (70-30) 100 UNIT/ML injection Inject 70 Units under the skin into the appropriate area as directed 2 (Two) Times a Day With Meals.      insulin NPH (NovoLIN N) 100 UNIT/ML injection Inject  under the skin into the appropriate area as directed 2 (Two) Times a Day Before Meals. (Patient not taking: Reported on 8/23/2023)  12     No facility-administered medications prior to visit.       Opioid medication/s are on active medication list.  and I have evaluated her active treatment plan and pain score trends (see table).  Vitals:    08/23/23 1251   PainSc: 0-No pain     I have reviewed the chart for potential of high risk medication and harmful drug interactions in the elderly.          Aspirin is on active medication list. Aspirin use is indicated based on review of current medical condition/s. Pros and cons of this therapy have been discussed today. Benefits of this medication " "outweigh potential harm.  Patient has been encouraged to continue taking this medication.  .      Patient Active Problem List   Diagnosis    Type 2 diabetes mellitus, with long-term current use of insulin    Hyperlipidemia    Essential hypertension    Angiomyolipoma of kidney    Lumbar spinal stenosis    SAWYER on autoCPAP    Stage 3 chronic kidney disease    Chronic venous insufficiency    CAD (coronary artery disease)    Circadian rhythm sleep disorder, delayed sleep phase type    Class 2 severe obesity due to excess calories with serious comorbidity and body mass index (BMI) of 38.0 to 38.9 in adult    History of colon polyps    Aortic valve sclerosis    Hypertriglyceridemia    Osteoporosis    Vitamin D deficiency     Advance Care Planning   Advance Care Planning     Advance Directive is on file.  ACP discussion was held with the patient during this visit. Patient has an advance directive in EMR which is still valid.      Objective    Vitals:    08/23/23 1251   BP: 140/68   Pulse: 81   SpO2: 98%   Weight: 96.2 kg (212 lb)   Height: 157.5 cm (62.01\")   PainSc: 0-No pain     Estimated body mass index is 38.77 kg/mý as calculated from the following:    Height as of this encounter: 157.5 cm (62.01\").    Weight as of this encounter: 96.2 kg (212 lb).    Class 2 Severe Obesity (BMI >=35 and <=39.9). Obesity-related health conditions include the following: diabetes mellitus. Obesity is unchanged. BMI is is above average; BMI management plan is completed. We discussed portion control and increasing exercise.      Does the patient have evidence of cognitive impairment? No    Lab Results   Component Value Date    CHLPL 171 08/16/2023    TRIG 246 (H) 08/16/2023    HDL 31 (L) 08/16/2023    LDL 98 08/16/2023    VLDL 42 (H) 08/16/2023    HGBA1C 9.50 (H) 08/16/2023        HEALTH RISK ASSESSMENT    Smoking Status:  Social History     Tobacco Use   Smoking Status Former    Packs/day: 0.25    Years: 2.00    Pack years: 0.50    Types: " Cigarettes    Quit date: 1970    Years since quittin.6   Smokeless Tobacco Never   Tobacco Comments    LIGHT USAGE     Alcohol Consumption:  Social History     Substance and Sexual Activity   Alcohol Use No     Fall Risk Screen:    ALLENADI Fall Risk Assessment was completed, and patient is at LOW risk for falls.Assessment completed on:2023    Depression Screenin/23/2023    12:49 PM   PHQ-2/PHQ-9 Depression Screening   Little Interest or Pleasure in Doing Things 0-->not at all   Feeling Down, Depressed or Hopeless 0-->not at all   PHQ-9: Brief Depression Severity Measure Score 0       Health Habits and Functional and Cognitive Screenin/23/2023    12:51 PM   Functional & Cognitive Status   Do you have difficulty preparing food and eating? No   Do you have difficulty bathing yourself, getting dressed or grooming yourself? No   Do you have difficulty using the toilet? No   Do you have difficulty moving around from place to place? No   Do you have trouble with steps or getting out of a bed or a chair? Yes   Current Diet Well Balanced Diet   Dental Exam Up to date   Eye Exam Up to date   Exercise (times per week) 0 times per week   Current Exercises Include No Regular Exercise   Do you need help using the phone?  No   Do you need help to go to places out of walking distance? Yes   Do you need help shopping? Yes   Do you need help preparing meals?  No   Do you need help with housework?  No   Do you need help with laundry? No   Do you need help taking your medications? No   Do you need help managing money? No   Do you ever drive or ride in a car without wearing a seat belt? No   Have you felt unusual stress, anger or loneliness in the last month? No   Who do you live with? Child   If you need help, do you have trouble finding someone available to you? No   Have you been bothered in the last four weeks by sexual problems? No   Do you have difficulty concentrating, remembering or making decisions?  No       Age-appropriate Screening Schedule:  Refer to the list below for future screening recommendations based on patient's age, sex and/or medical conditions. Orders for these recommended tests are listed in the plan section. The patient has been provided with a written plan.    Health Maintenance   Topic Date Due    ZOSTER VACCINE (1 of 2) Never done    COLORECTAL CANCER SCREENING  08/29/2020    COVID-19 Vaccine (6 - Pfizer series) 03/02/2023    ANNUAL WELLNESS VISIT  08/03/2023    INFLUENZA VACCINE  10/01/2023    HEMOGLOBIN A1C  02/16/2024    DIABETIC EYE EXAM  07/11/2024    LIPID PANEL  08/16/2024    URINE MICROALBUMIN  08/16/2024    TDAP/TD VACCINES (2 - Td or Tdap) 10/13/2026    DXA SCAN  11/16/2026    HEPATITIS C SCREENING  Completed    Pneumococcal Vaccine 65+  Completed                  CMS Preventative Services Quick Reference  Risk Factors Identified During Encounter  Immunizations Discussed/Encouraged: Shingrix and COVID19  The above risks/problems have been discussed with the patient.  Pertinent information has been shared with the patient in the After Visit Summary.  An After Visit Summary and PPPS were made available to the patient.    Follow Up:   Next Medicare Wellness visit to be scheduled in 1 year.       Additional E&M Note during same encounter follows:  Patient has multiple medical problems which are significant and separately identifiable that require additional work above and beyond the Medicare Wellness Visit.      Chief Complaint  Medicare Wellness-subsequent, Hypertension, Hyperlipidemia, and Diabetes    Subjective        HPI  Denisse Chavez is also being seen today for DM-2, htn, hld,     Dm-2.  Not optimal.  Followed by endo.   HTN.   Not optimal.  Renal adjusting meds.   HLD.  Fair LDL control.   CKD3b. Numbers are stable.   Chronic LBP.  Controlled with prn tylenol #3.    OP.  She is maintained on Prolia.   Review of Systems   Respiratory: Negative.     Cardiovascular: Negative.   "    Objective   Vital Signs:  /68   Pulse 81   Ht 157.5 cm (62.01\")   Wt 96.2 kg (212 lb)   SpO2 98%   BMI 38.77 kg/mý     Physical Exam  Constitutional:       Appearance: She is well-developed.   HENT:      Head: Normocephalic and atraumatic.      Right Ear: Hearing and tympanic membrane normal.      Left Ear: Hearing and tympanic membrane normal.      Mouth/Throat:      Pharynx: No oropharyngeal exudate or posterior oropharyngeal erythema.   Cardiovascular:      Rate and Rhythm: Normal rate and regular rhythm.      Heart sounds: Normal heart sounds.   Pulmonary:      Effort: Pulmonary effort is normal.      Breath sounds: Normal breath sounds.   Skin:     General: Skin is warm and dry.   Neurological:      Mental Status: She is alert and oriented to person, place, and time.   Psychiatric:         Behavior: Behavior normal.        The following data was reviewed by: Surekha Mcdonnell MD on 08/23/2023:  Common labs          8/31/2022    14:08 2/8/2023    10:03 8/16/2023    09:02   Common Labs   Glucose 411  111  145    BUN 43  37  24    Creatinine 1.53  1.47  1.35    Sodium 138  142  142    Potassium 4.5  4.2  4.8    Chloride 94  98  101    Calcium 10.0  9.8  10.4    Total Protein  7.0  6.4    Albumin 4.30  4.3  4.1    Total Bilirubin 0.6  0.7  0.6    Alkaline Phosphatase 108  81  74    AST (SGOT) 20  29  14    ALT (SGPT) 12  16  14    WBC  9.61  9.58    Hemoglobin  13.3  12.8    Hematocrit  40.7  38.5    Platelets  224  212    Total Cholesterol  140  171    Triglycerides  226  246    HDL Cholesterol  33  31    LDL Cholesterol   70  98    Hemoglobin A1C  8.50  9.50    Microalbumin, Urine   10.9                 Assessment and Plan   Diagnoses and all orders for this visit:    1. Medicare annual wellness visit, subsequent (Primary)    2. Type 2 diabetes mellitus with stage 3a chronic kidney disease, with long-term current use of insulin    3. Essential hypertension    4. Mixed hyperlipidemia    5. Stage 3b " chronic kidney disease    6. Osteoporosis, unspecified osteoporosis type, unspecified pathological fracture presence  -     DEXA Bone Density Axial; Future    7. Encounter for screening mammogram for breast cancer  -     Mammo Screening Digital Tomosynthesis Bilateral With CAD; Future    8. Colon cancer screening  -     Ambulatory Referral For Screening Colonoscopy    9. Spinal stenosis of lumbar region, unspecified whether neurogenic claudication present    HTN.  Control is fair..  The patient is advised to continue current dosage of metoprolol.  HLD. Control is fair.  The patient is advised to continue current dosage of atorvastatin.    Dm-2.  Control is not optimal.  The patient is advised to continue current dosage of insulin.  Continue with endo.   CKD3b.  Numbers are stable.    OP.  I recommend to get 1200 mg of calcium and 1000 IUs of vitamin D through diet and supplements and to participate in a weight based exercise to prevent loss of bone mineral density. Bone mineral will be monitored every two years.  Continue Prolia.    Chronic low back pain secondary to spinal stenosis.  Control is good.  Update contract for tylenol #3 for prn use.           Follow Up   Return in about 6 months (around 2/23/2024) for Recheck.  Patient was given instructions and counseling regarding her condition or for health maintenance advice. Please see specific information pulled into the AVS if appropriate.

## 2023-09-12 ENCOUNTER — INFUSION (OUTPATIENT)
Dept: ONCOLOGY | Facility: HOSPITAL | Age: 78
End: 2023-09-12
Payer: MEDICARE

## 2023-09-12 DIAGNOSIS — M81.0 OSTEOPOROSIS, UNSPECIFIED OSTEOPOROSIS TYPE, UNSPECIFIED PATHOLOGICAL FRACTURE PRESENCE: Primary | ICD-10-CM

## 2023-09-12 PROCEDURE — 25010000002 DENOSUMAB 60 MG/ML SOLUTION PREFILLED SYRINGE: Performed by: INTERNAL MEDICINE

## 2023-09-12 PROCEDURE — 96372 THER/PROPH/DIAG INJ SC/IM: CPT

## 2023-09-12 RX ADMIN — DENOSUMAB 60 MG: 60 INJECTION SUBCUTANEOUS at 16:03

## 2023-09-12 NOTE — NURSING NOTE
Arrived  for prolia injection. Indication and side effects reviewed. Denies recent dental work. Labs and medications verified. Prolia administered in  arm without incidence. Instructed to call prescribing MD for any concerns or questions and instructed on how to schedule future appts.  Pt vu and discharged in stable condition.

## 2023-09-14 ENCOUNTER — PREP FOR SURGERY (OUTPATIENT)
Dept: OTHER | Facility: HOSPITAL | Age: 78
End: 2023-09-14
Payer: MEDICARE

## 2023-09-14 DIAGNOSIS — Z86.010 HISTORY OF ADENOMATOUS POLYP OF COLON: Primary | ICD-10-CM

## 2023-09-20 RX ORDER — METOPROLOL SUCCINATE 100 MG/1
TABLET, EXTENDED RELEASE ORAL
Qty: 90 TABLET | Refills: 0 | Status: SHIPPED | OUTPATIENT
Start: 2023-09-20

## 2023-09-27 ENCOUNTER — OFFICE VISIT (OUTPATIENT)
Dept: CARDIOLOGY | Facility: CLINIC | Age: 78
End: 2023-09-27
Payer: MEDICARE

## 2023-09-27 VITALS
SYSTOLIC BLOOD PRESSURE: 170 MMHG | HEIGHT: 62 IN | DIASTOLIC BLOOD PRESSURE: 68 MMHG | WEIGHT: 214.2 LBS | HEART RATE: 65 BPM | BODY MASS INDEX: 39.42 KG/M2

## 2023-09-27 DIAGNOSIS — G47.33 OSA ON CPAP: ICD-10-CM

## 2023-09-27 DIAGNOSIS — E66.01 CLASS 2 SEVERE OBESITY DUE TO EXCESS CALORIES WITH SERIOUS COMORBIDITY AND BODY MASS INDEX (BMI) OF 39.0 TO 39.9 IN ADULT: ICD-10-CM

## 2023-09-27 DIAGNOSIS — N18.32 STAGE 3B CHRONIC KIDNEY DISEASE: ICD-10-CM

## 2023-09-27 DIAGNOSIS — I51.7 LVH (LEFT VENTRICULAR HYPERTROPHY): ICD-10-CM

## 2023-09-27 DIAGNOSIS — R94.31 ABNORMAL EKG: ICD-10-CM

## 2023-09-27 DIAGNOSIS — E78.1 HYPERTRIGLYCERIDEMIA: ICD-10-CM

## 2023-09-27 DIAGNOSIS — E78.2 MIXED HYPERLIPIDEMIA: Chronic | ICD-10-CM

## 2023-09-27 DIAGNOSIS — I10 PRIMARY HYPERTENSION: ICD-10-CM

## 2023-09-27 DIAGNOSIS — I25.10 CORONARY ARTERY DISEASE INVOLVING NATIVE CORONARY ARTERY OF NATIVE HEART WITHOUT ANGINA PECTORIS: Primary | Chronic | ICD-10-CM

## 2023-09-27 NOTE — PROGRESS NOTES
Date of Office Visit: 2023  Encounter Provider: MEREDITH Alcazar  Place of Service: [facility]  Patient Name: Denisse Chavez  :1945  Primary Cardiologist: Dr. Warner Tang    Chief Complaint   Patient presents with    Coronary Artery Disease    Annual Exam   :     HPI: Denisse Chavez is a 77 y.o. female who presents today for annual cardiac follow-up on coronary artery disease. I reviewed her medical records.    She has known hypertension, hyperlipidemia/hypertriglyceridemia, type 2 diabetes mellitus, obstructive sleep apnea on CPAP, and obesity.    While in [place] in , she suffered a myocardial infarction and underwent cardiac catheterization which revealed nonobstructive CAD.    In May 2020, echocardiogram showed EF 72%, mild LVH, mild aortic valve calcification, mild mitral annular calcification, mild mitral regurgitation, and trace tricuspid regurgitation.    She has been diagnosed with stage IIIb chronic kidney disease secondary to diabetic/hypertensive glomerular sclerosis, long-term use of NSAIDs, and angiomyolipoma of the kidneys followed by Dr. Montenegro. Her baseline creatinine is between 1.3-1.5 and Dr. Don recommends a blood pressure goal of less than 130/80.    She presents today for annual cardiac follow-up visit and her son, Anil is accompanying her.  She denies chest pain, shortness of air, palpitations, dizziness, syncope, or bleeding.  She has chronic lower extremity edema and wears support hose.  Her blood pressure is elevated on both checks today.  At home her BP is running 130-170s/60s.      Past Medical History:   Diagnosis Date    Angiomyolipoma of kidney 2016    Aortic valve sclerosis     stable     Arthritis     CAD (coronary artery disease)     MI in , treated medically.  PET 2016 with small-medium sized lateral infarct, no ischemia.  This wall motion abnormality was seen on echo as well.    Cellulitis  2018    RIGHT LEG    Chronic kidney disease, stage III (moderate) 10/13/2016    Chronic venous insufficiency     Hyperlipidemia     Hypertension     Hypertriglyceridemia 2021    Low back pain     Mass of ovary 3/30/2016    Obesity (BMI 30-39.9) 2019    Sleep apnea     on CPAP.  Dr. Mueller    Spinal stenosis     Type 2 diabetes mellitus     Uterine leiomyoma 3/30/2016       Past Surgical History:   Procedure Laterality Date    CATARACT EXTRACTION Bilateral     X2     COLONOSCOPY N/A 2018    Procedure: COLONOSCOPY to CECUM WITH HOT SNARE POLYPECTOMY;  Surgeon: Neal Reilly MD;  Location: [facility] ENDOSCOPY;  Service: Gastroenterology    EPIDURAL BLOCK      HERNIA REPAIR      HYSTERECTOMY      TONSILLECTOMY         Social History     Socioeconomic History    Marital status: Single    Number of children: 3   Tobacco Use    Smoking status: Former     Packs/day: 0.25     Years: 2.00     Pack years: 0.50     Types: Cigarettes     Quit date:      Years since quittin.7    Smokeless tobacco: Never    Tobacco comments:     LIGHT USAGE   Vaping Use    Vaping Use: Never used   Substance and Sexual Activity    Alcohol use: No    Drug use: No    Sexual activity: Defer       Family History   Problem Relation Age of Onset    Diabetes Mother     Heart attack Mother     Hypertension Mother     Hypothyroidism Mother     Diabetes type II Son     Breast cancer Neg Hx        The following portion of the patient's history were reviewed and updated as appropriate: past medical history, past surgical history, past social history, past family history, allergies, current medications, and problem list.    Review of Systems   Constitutional: Negative.   Cardiovascular:  Positive for leg swelling.   Respiratory: Negative.     Hematologic/Lymphatic: Negative.    Neurological: Negative.      Allergies   Allergen Reactions    Ace Inhibitors Other (See Comments)     Hyperkalemia/ROB    Angiotensin Receptor Blockers  "Other (See Comments)     Pt unable to remember    Keflex [Cephalexin] Rash     Tolerated ceftriaxone Dec 2019; tolerated zosyn May 2020    Sulfa Antibiotics Itching     rash         Current Outpatient Medications:     acetaminophen-codeine (TYLENOL with CODEINE #3) 300-30 MG per tablet, TAKE 1 TABLET BY MOUTH THREE TIMES DAILY AS NEEDED FOR MODERATE PAIN, Disp: 90 tablet, Rfl: 0    aspirin 81 MG tablet, Take  by mouth Every Night., Disp: , Rfl:     atorvastatin (LIPITOR) 40 MG tablet, Take 1 tablet by mouth once daily, Disp: 90 tablet, Rfl: 0    Calcium Carbonate 1500 (600 Ca) MG tablet, Take 1 tablet by mouth., Disp: , Rfl:     docusate sodium (COLACE) 100 MG capsule, Take 1 capsule by mouth 2 (Two) Times a Day., Disp: , Rfl:     hydrALAZINE (APRESOLINE) 10 MG tablet, TAKE 1 TABLET BY MOUTH THREE TIMES DAILY, Disp: 270 tablet, Rfl: 0    hydrOXYzine (ATARAX) 25 MG tablet, TAKE 1 TABLET BY MOUTH AT NIGHT AS NEEDED FOR INSOMNIA, Disp: 90 tablet, Rfl: 0    insulin aspart protamine-insulin aspart (novoLOG 70/30) injection, Inject 70 Units under the skin into the appropriate area as directed 2 (Two) Times a Day With Meals., Disp: , Rfl:     isosorbide mononitrate (IMDUR) 60 MG 24 hr tablet, Take 1 tablet by mouth once daily, Disp: 90 tablet, Rfl: 0    metoprolol succinate XL (TOPROL-XL) 100 MG 24 hr tablet, Take 1 tablet by mouth once daily, Disp: 90 tablet, Rfl: 0    Misc. Devices (ROLLATOR) misc, 1 Units as needed (for walking)., Disp: 1 each, Rfl: 0    nystatin (nystatin) 171746 UNIT/GM powder, Apply  topically to the appropriate area as directed 3 (Three) Times a Day., Disp: 60 g, Rfl: 11    ReliOn Insulin Syringe 31G X 15/64\" 1 ML misc, USE 1 FIVE TIMES DAILY, Disp: , Rfl:     spironolactone (ALDACTONE) 25 MG tablet, Take 1 tablet by mouth Daily., Disp: , Rfl:     torsemide (DEMADEX) 20 MG tablet, Take 4 tablets by mouth 2 (Two) Times a Day., Disp: , Rfl:     vitamin D (ERGOCALCIFEROL) 77370 units capsule capsule, 1 " "capsule Every 7 (Seven) Days., Disp: , Rfl:          Objective:     Vitals:    09/27/23 1056 09/27/23 1132   BP: 170/70 170/68   BP Location: Left arm Right arm   Patient Position: Sitting Sitting   Cuff Size: Adult    Pulse: 65    Weight: 97.2 kg (214 lb 3.2 oz)    Height: 157.5 cm (62.01\")      Body mass index is 39.17 kg/m².    PHYSICAL EXAM:    Vitals Reviewed.   General Appearance: No acute distress, well developed and well nourished. Obese.   Eyes: Glasses.   HENT: No hearing loss noted.    Respiratory: No signs of respiratory distress. Respiration rhythm and depth normal.  Clear to auscultation.   Cardiovascular:  Jugular Venous Pressure: Normal  Heart Rate and Rhythm: Normal, Heart Sounds: Normal S1 and S2. No S3 or S4 noted.  Murmurs: No murmurs noted. No rubs, thrills, or gallops.   Lower Extremities: Bilateral lower extremity edema noted; wearing compression stockings.  Musculoskeletal: Normal movement of extremities.  Skin: General appearance normal.    Psychiatric: Patient alert and oriented to person, place, and time. Speech and behavior appropriate. Normal mood and affect.       ECG 12 Lead    Date/Time: 9/27/2023 10:58 AM  Performed by: Vicky Herrera APRN  Authorized by: Vicky Herrera APRN   Comparison: compared with previous ECG from 9/28/2022  Similar to previous ECG  Rhythm: sinus rhythm  Rate: normal  BPM: 65  Conduction: conduction normal  ST Depression: V3, V4, V5 and V6  QRS axis: normal  Other findings: left ventricular hypertrophy    Clinical impression: abnormal EKG          Assessment:       Diagnosis Plan   1. Coronary artery disease involving native coronary artery of native heart without angina pectoris        2. Primary hypertension  Adult Transthoracic Echo Complete w/ Color, Spectral and Contrast if Necessary Per Protocol      3. Abnormal EKG  Adult Transthoracic Echo Complete w/ Color, Spectral and Contrast if Necessary Per Protocol      4. LVH (left ventricular hypertrophy) "  Adult Transthoracic Echo Complete w/ Color, Spectral and Contrast if Necessary Per Protocol      5. Stage 3b chronic kidney disease        6. Mixed hyperlipidemia        7. Hypertriglyceridemia        8. SAWYER on autoCPAP        9. Class 2 severe obesity due to excess calories with serious comorbidity and body mass index (BMI) of 39.0 to 39.9 in adult               Plan:       1.  Coronary Artery Disease: Previous MI and nonobstructive CAD noted per cath in 1996.  Denies angina.  Risk factor modification discussed.  Continue aspirin and atorvastatin    2.  Hypertension: Blood pressure elevated today and followed by Dr. Montenegro.  He recommended a goal BP of 130s/80s or less.  I educated her on a low-sodium diet.    3/4.  Abnormal EKG: Possible LVH noted with ST/T abnormalities.  I recommended a repeat echocardiogram as it was last done in 2020 and showed mild LVH.  We will also look for any wall motion abnormalities.    5.  Stage IIIb chronic kidney disease: New baseline creatinine is between 1.3-1.5.  Followed by Dr. Montenegro.     6/7.  Hyperlipidemia/Hypertriglyceridemia: Remains on atorvastatin.    8.  Obstructive sleep apnea: Compliant with CPAP.    9.  Obesity. Body mass index is 39.17 kg/m².     10.  I will call her with the echocardiogram results.  I recommend a 1 year follow-up visit with Dr. Warner Tang.    As always, it has been a pleasure to participate in your patient's care. Thank you.         Sincerely,         MEREDITH Munoz  [facility]      Dictated utilizing Dragon Dictation

## 2023-10-02 DIAGNOSIS — M54.41 ACUTE RIGHT-SIDED LOW BACK PAIN WITH RIGHT-SIDED SCIATICA: ICD-10-CM

## 2023-10-03 RX ORDER — ACETAMINOPHEN AND CODEINE PHOSPHATE 300; 30 MG/1; MG/1
TABLET ORAL
Qty: 90 TABLET | Refills: 0 | Status: SHIPPED | OUTPATIENT
Start: 2023-10-03

## 2023-10-11 ENCOUNTER — HOSPITAL ENCOUNTER (OUTPATIENT)
Dept: CARDIOLOGY | Facility: HOSPITAL | Age: 78
Discharge: HOME | End: 2023-10-11
Admitting: NURSE PRACTITIONER
Payer: MEDICARE

## 2023-10-11 VITALS
SYSTOLIC BLOOD PRESSURE: 138 MMHG | BODY MASS INDEX: 39.38 KG/M2 | HEART RATE: 61 BPM | DIASTOLIC BLOOD PRESSURE: 78 MMHG | WEIGHT: 214 LBS | HEIGHT: 62 IN

## 2023-10-11 DIAGNOSIS — I51.7 LVH (LEFT VENTRICULAR HYPERTROPHY): ICD-10-CM

## 2023-10-11 DIAGNOSIS — R94.31 ABNORMAL EKG: ICD-10-CM

## 2023-10-11 DIAGNOSIS — I10 PRIMARY HYPERTENSION: ICD-10-CM

## 2023-10-11 LAB
BH CV ECHO LEFT VENTRICLE GLOBAL LONGITUDINAL STRAIN: -18.2 %
BH CV ECHO MEAS - ACS: 1.18 CM
BH CV ECHO MEAS - AO MAX PG: 12.6 MMHG
BH CV ECHO MEAS - AO MEAN PG: 7.4 MMHG
BH CV ECHO MEAS - AO ROOT DIAM: 3 CM
BH CV ECHO MEAS - AO V2 MAX: 176.4 CM/SEC
BH CV ECHO MEAS - AO V2 VTI: 53.9 CM
BH CV ECHO MEAS - AVA(I,D): 2.34 CM2
BH CV ECHO MEAS - EDV(CUBED): 56.5 ML
BH CV ECHO MEAS - EDV(MOD-SP2): 116 ML
BH CV ECHO MEAS - EDV(MOD-SP4): 126 ML
BH CV ECHO MEAS - EF(MOD-BP): 64.1 %
BH CV ECHO MEAS - EF(MOD-SP2): 64.7 %
BH CV ECHO MEAS - EF(MOD-SP4): 64.3 %
BH CV ECHO MEAS - ESV(CUBED): 64.1 ML
BH CV ECHO MEAS - ESV(MOD-SP2): 41 ML
BH CV ECHO MEAS - ESV(MOD-SP4): 45 ML
BH CV ECHO MEAS - FS: -4.3 %
BH CV ECHO MEAS - IVS/LVPW: 1.82 CM
BH CV ECHO MEAS - IVSD: 2.43 CM
BH CV ECHO MEAS - LAT PEAK E' VEL: 5.5 CM/SEC
BH CV ECHO MEAS - LV DIASTOLIC VOL/BSA (35-75): 64 CM2
BH CV ECHO MEAS - LV MASS(C)D: 319.2 GRAMS
BH CV ECHO MEAS - LV MAX PG: 3 MMHG
BH CV ECHO MEAS - LV MEAN PG: 5.1 MMHG
BH CV ECHO MEAS - LV SYSTOLIC VOL/BSA (12-30): 22.9 CM2
BH CV ECHO MEAS - LV V1 MAX: 87 CM/SEC
BH CV ECHO MEAS - LV V1 VTI: 42.4 CM
BH CV ECHO MEAS - LVIDD: 3.8 CM
BH CV ECHO MEAS - LVIDS: 4 CM
BH CV ECHO MEAS - LVOT AREA: 3 CM2
BH CV ECHO MEAS - LVOT DIAM: 1.95 CM
BH CV ECHO MEAS - LVPWD: 1.33 CM
BH CV ECHO MEAS - MED PEAK E' VEL: 3.9 CM/SEC
BH CV ECHO MEAS - MV A DUR: 0.14 SEC
BH CV ECHO MEAS - MV A MAX VEL: 125.1 CM/SEC
BH CV ECHO MEAS - MV DEC SLOPE: 208.2 CM/SEC2
BH CV ECHO MEAS - MV DEC TIME: 0.37 SEC
BH CV ECHO MEAS - MV E MAX VEL: 120 CM/SEC
BH CV ECHO MEAS - MV E/A: 0.96
BH CV ECHO MEAS - MV MAX PG: 6.2 MMHG
BH CV ECHO MEAS - MV MEAN PG: 2 MMHG
BH CV ECHO MEAS - MV P1/2T: 133.7 MSEC
BH CV ECHO MEAS - MV V2 VTI: 45.5 CM
BH CV ECHO MEAS - MVA(P1/2T): 1.65 CM2
BH CV ECHO MEAS - MVA(VTI): 2.8 CM2
BH CV ECHO MEAS - PA ACC TIME: 0.18 SEC
BH CV ECHO MEAS - PA V2 MAX: 134.4 CM/SEC
BH CV ECHO MEAS - PULM A REVS DUR: 0.15 SEC
BH CV ECHO MEAS - PULM A REVS VEL: 26.6 CM/SEC
BH CV ECHO MEAS - PULM DIAS VEL: 39.8 CM/SEC
BH CV ECHO MEAS - PULM S/D: 1.42
BH CV ECHO MEAS - PULM SYS VEL: 56.6 CM/SEC
BH CV ECHO MEAS - QP/QS: 0.92
BH CV ECHO MEAS - RV MAX PG: 4.4 MMHG
BH CV ECHO MEAS - RV V1 MAX: 105.4 CM/SEC
BH CV ECHO MEAS - RV V1 VTI: 23.6 CM
BH CV ECHO MEAS - RVOT DIAM: 2.5 CM
BH CV ECHO MEAS - SI(MOD-SP2): 38.1 ML/M2
BH CV ECHO MEAS - SI(MOD-SP4): 41.2 ML/M2
BH CV ECHO MEAS - SV(LVOT): 126.2 ML
BH CV ECHO MEAS - SV(MOD-SP2): 75 ML
BH CV ECHO MEAS - SV(MOD-SP4): 81 ML
BH CV ECHO MEAS - SV(RVOT): 115.5 ML
BH CV ECHO MEAS - TAPSE (>1.6): 1.8 CM
BH CV ECHO MEASUREMENTS AVERAGE E/E' RATIO: 25.53
BH CV XLRA - RV BASE: 3.1 CM
BH CV XLRA - RV LENGTH: 7.5 CM
BH CV XLRA - RV MID: 2.46 CM
BH CV XLRA - TDI S': 11.2 CM/SEC
SINUS: 2.7 CM
STJ: 2.1 CM

## 2023-10-11 PROCEDURE — 93356 MYOCRD STRAIN IMG SPCKL TRCK: CPT

## 2023-10-11 PROCEDURE — 25510000001 PERFLUTREN (DEFINITY) 8.476 MG IN SODIUM CHLORIDE (PF) 0.9 % 10 ML INJECTION: Performed by: NURSE PRACTITIONER

## 2023-10-11 PROCEDURE — 93306 TTE W/DOPPLER COMPLETE: CPT

## 2023-10-11 RX ADMIN — PERFLUTREN 4 ML: 6.52 INJECTION, SUSPENSION INTRAVENOUS at 14:21

## 2023-10-12 NOTE — PROGRESS NOTES
I recently saw her in the office and an EKG was suggestive of left ventricular hypertrophy.  Echocardiogram shows normal EF, moderate concentric hypertrophy so she does have LVH.  I recommend good blood pressure control.  She has aortic valve calcification, but the valve is working well.  She has mitral annular calcification and thickening, but the valve is working well.  Stable echocardiogram.  Just really watch the blood pressure.  Follow-up with Dr. Tang in 1 year as scheduled.  Thank you

## 2023-11-02 ENCOUNTER — PREP FOR SURGERY (OUTPATIENT)
Dept: OTHER | Facility: HOSPITAL | Age: 78
End: 2023-11-02
Payer: MEDICARE

## 2023-11-02 DIAGNOSIS — Z86.010 HISTORY OF ADENOMATOUS POLYP OF COLON: Primary | ICD-10-CM

## 2023-11-02 PROBLEM — Z86.0101 HISTORY OF ADENOMATOUS POLYP OF COLON: Status: ACTIVE | Noted: 2023-11-02

## 2023-11-08 ENCOUNTER — TELEPHONE (OUTPATIENT)
Dept: SURGERY | Facility: CLINIC | Age: 78
End: 2023-11-08
Payer: MEDICARE

## 2023-11-08 NOTE — TELEPHONE ENCOUNTER
Left message asking patient to call our office back to reschedule her colonoscopy that's scheduled on 12/27 with Dr. Ruiz. She will be out that week. I have also sent the patient a [employer] message as well.

## 2023-11-22 ENCOUNTER — HOSPITAL ENCOUNTER (OUTPATIENT)
Dept: MAMMOGRAPHY | Facility: HOSPITAL | Age: 78
Discharge: HOME | End: 2023-11-22
Payer: MEDICARE

## 2023-11-22 ENCOUNTER — HOSPITAL ENCOUNTER (OUTPATIENT)
Dept: BONE DENSITY | Facility: HOSPITAL | Age: 78
Discharge: HOME | End: 2023-11-22
Admitting: INTERNAL MEDICINE
Payer: MEDICARE

## 2023-11-22 DIAGNOSIS — M81.0 OSTEOPOROSIS, UNSPECIFIED OSTEOPOROSIS TYPE, UNSPECIFIED PATHOLOGICAL FRACTURE PRESENCE: ICD-10-CM

## 2023-11-22 DIAGNOSIS — Z12.31 ENCOUNTER FOR SCREENING MAMMOGRAM FOR BREAST CANCER: ICD-10-CM

## 2023-11-22 PROCEDURE — 77063 BREAST TOMOSYNTHESIS BI: CPT

## 2023-11-22 PROCEDURE — 77080 DXA BONE DENSITY AXIAL: CPT

## 2023-11-22 PROCEDURE — 77067 SCR MAMMO BI INCL CAD: CPT

## 2023-12-04 RX ORDER — ISOSORBIDE MONONITRATE 60 MG/1
TABLET, EXTENDED RELEASE ORAL
Qty: 90 TABLET | Refills: 0 | Status: SHIPPED | OUTPATIENT
Start: 2023-12-04

## 2023-12-06 DIAGNOSIS — M54.41 ACUTE RIGHT-SIDED LOW BACK PAIN WITH RIGHT-SIDED SCIATICA: ICD-10-CM

## 2023-12-06 RX ORDER — ACETAMINOPHEN AND CODEINE PHOSPHATE 300; 30 MG/1; MG/1
1 TABLET ORAL 3 TIMES DAILY
Qty: 90 TABLET | Refills: 0 | Status: SHIPPED | OUTPATIENT
Start: 2023-12-06

## 2023-12-11 ENCOUNTER — TELEPHONE (OUTPATIENT)
Dept: SURGERY | Facility: CLINIC | Age: 78
End: 2023-12-11
Payer: MEDICARE

## 2023-12-11 NOTE — TELEPHONE ENCOUNTER
Voicemail left updating patient with new arrival instructions for Colonoscopy on 12/13 with Dr. Ruiz. Patient to arrive @ 1:00 pm for 2:00 pm Colonoscopy. Requested call back to confirm and new instructions sent via [employer].

## 2023-12-13 ENCOUNTER — APPOINTMENT (OUTPATIENT)
Dept: GENERAL RADIOLOGY | Facility: HOSPITAL | Age: 78
End: 2023-12-13
Payer: MEDICARE

## 2023-12-13 ENCOUNTER — ANESTHESIA (OUTPATIENT)
Dept: GASTROENTEROLOGY | Facility: HOSPITAL | Age: 78
End: 2023-12-13
Payer: MEDICARE

## 2023-12-13 ENCOUNTER — ANESTHESIA EVENT (OUTPATIENT)
Dept: GASTROENTEROLOGY | Facility: HOSPITAL | Age: 78
End: 2023-12-13
Payer: MEDICARE

## 2023-12-13 ENCOUNTER — HOSPITAL ENCOUNTER (INPATIENT)
Facility: HOSPITAL | Age: 78
LOS: 2 days | Discharge: HOME | End: 2023-12-15
Attending: STUDENT IN AN ORGANIZED HEALTH CARE EDUCATION/TRAINING PROGRAM | Admitting: INTERNAL MEDICINE
Payer: MEDICARE

## 2023-12-13 DIAGNOSIS — Z86.010 HISTORY OF ADENOMATOUS POLYP OF COLON: ICD-10-CM

## 2023-12-13 PROBLEM — L97.509 DIABETIC FOOT ULCER: Status: ACTIVE | Noted: 2023-12-13

## 2023-12-13 PROBLEM — E11.621 DIABETIC FOOT ULCER: Status: ACTIVE | Noted: 2023-12-13

## 2023-12-13 PROBLEM — Z86.0101 HISTORY OF ADENOMATOUS POLYP OF COLON: Status: RESOLVED | Noted: 2023-11-02 | Resolved: 2023-12-13

## 2023-12-13 PROBLEM — L03.115 CELLULITIS OF RIGHT LEG: Status: ACTIVE | Noted: 2023-12-13

## 2023-12-13 LAB
ANION GAP SERPL CALCULATED.3IONS-SCNC: 14.7 MMOL/L (ref 5–15)
BASOPHILS # BLD AUTO: 0.01 10*3/MM3 (ref 0–0.2)
BASOPHILS NFR BLD AUTO: 0.1 % (ref 0–1.5)
BUN SERPL-MCNC: 21 MG/DL (ref 8–23)
BUN/CREAT SERPL: 18.8 (ref 7–25)
CALCIUM SPEC-SCNC: 8.5 MG/DL (ref 8.6–10.5)
CHLORIDE SERPL-SCNC: 101 MMOL/L (ref 98–107)
CO2 SERPL-SCNC: 21.3 MMOL/L (ref 22–29)
CREAT SERPL-MCNC: 1.12 MG/DL (ref 0.57–1)
D-LACTATE SERPL-SCNC: 2.1 MMOL/L (ref 0.5–2)
D-LACTATE SERPL-SCNC: 2.2 MMOL/L (ref 0.5–2)
DEPRECATED RDW RBC AUTO: 40.1 FL (ref 37–54)
EGFRCR SERPLBLD CKD-EPI 2021: 50.4 ML/MIN/1.73
EOSINOPHIL # BLD AUTO: 0.07 10*3/MM3 (ref 0–0.4)
EOSINOPHIL NFR BLD AUTO: 0.6 % (ref 0.3–6.2)
ERYTHROCYTE [DISTWIDTH] IN BLOOD BY AUTOMATED COUNT: 13 % (ref 12.3–15.4)
GLUCOSE BLDC GLUCOMTR-MCNC: 220 MG/DL (ref 70–130)
GLUCOSE BLDC GLUCOMTR-MCNC: 289 MG/DL (ref 70–130)
GLUCOSE SERPL-MCNC: 315 MG/DL (ref 65–99)
HCT VFR BLD AUTO: 37.5 % (ref 34–46.6)
HGB BLD-MCNC: 12 G/DL (ref 12–15.9)
IMM GRANULOCYTES # BLD AUTO: 0.03 10*3/MM3 (ref 0–0.05)
IMM GRANULOCYTES NFR BLD AUTO: 0.3 % (ref 0–0.5)
LYMPHOCYTES # BLD AUTO: 1.02 10*3/MM3 (ref 0.7–3.1)
LYMPHOCYTES NFR BLD AUTO: 8.8 % (ref 19.6–45.3)
MCH RBC QN AUTO: 27.1 PG (ref 26.6–33)
MCHC RBC AUTO-ENTMCNC: 32 G/DL (ref 31.5–35.7)
MCV RBC AUTO: 84.8 FL (ref 79–97)
MONOCYTES # BLD AUTO: 0.68 10*3/MM3 (ref 0.1–0.9)
MONOCYTES NFR BLD AUTO: 5.9 % (ref 5–12)
NEUTROPHILS NFR BLD AUTO: 84.3 % (ref 42.7–76)
NEUTROPHILS NFR BLD AUTO: 9.74 10*3/MM3 (ref 1.7–7)
NRBC BLD AUTO-RTO: 0 /100 WBC (ref 0–0.2)
PLATELET # BLD AUTO: 190 10*3/MM3 (ref 140–450)
PMV BLD AUTO: 10.4 FL (ref 6–12)
POTASSIUM SERPL-SCNC: 3.6 MMOL/L (ref 3.5–5.2)
RBC # BLD AUTO: 4.42 10*6/MM3 (ref 3.77–5.28)
SODIUM SERPL-SCNC: 137 MMOL/L (ref 136–145)
WBC NRBC COR # BLD AUTO: 11.55 10*3/MM3 (ref 3.4–10.8)

## 2023-12-13 PROCEDURE — 73620 X-RAY EXAM OF FOOT: CPT

## 2023-12-13 PROCEDURE — 25010000002 PIPERACILLIN SOD-TAZOBACTAM PER 1 G: Performed by: INTERNAL MEDICINE

## 2023-12-13 PROCEDURE — 25810000003 SODIUM CHLORIDE 0.9 % SOLUTION: Performed by: STUDENT IN AN ORGANIZED HEALTH CARE EDUCATION/TRAINING PROGRAM

## 2023-12-13 PROCEDURE — 25010000002 LABETALOL 5 MG/ML SOLUTION: Performed by: ANESTHESIOLOGY

## 2023-12-13 PROCEDURE — 25010000002 PROPOFOL 10 MG/ML EMULSION: Performed by: ANESTHESIOLOGY

## 2023-12-13 PROCEDURE — 25010000002 VANCOMYCIN 10 G RECONSTITUTED SOLUTION: Performed by: INTERNAL MEDICINE

## 2023-12-13 PROCEDURE — 73560 X-RAY EXAM OF KNEE 1 OR 2: CPT

## 2023-12-13 PROCEDURE — 83605 ASSAY OF LACTIC ACID: CPT | Performed by: INTERNAL MEDICINE

## 2023-12-13 PROCEDURE — 99231 SBSQ HOSP IP/OBS SF/LOW 25: CPT | Performed by: STUDENT IN AN ORGANIZED HEALTH CARE EDUCATION/TRAINING PROGRAM

## 2023-12-13 PROCEDURE — 0DBH8ZZ EXCISION OF CECUM, VIA NATURAL OR ARTIFICIAL OPENING ENDOSCOPIC: ICD-10-PCS | Performed by: STUDENT IN AN ORGANIZED HEALTH CARE EDUCATION/TRAINING PROGRAM

## 2023-12-13 PROCEDURE — 82948 REAGENT STRIP/BLOOD GLUCOSE: CPT

## 2023-12-13 PROCEDURE — 0DBL8ZZ EXCISION OF TRANSVERSE COLON, VIA NATURAL OR ARTIFICIAL OPENING ENDOSCOPIC: ICD-10-PCS | Performed by: STUDENT IN AN ORGANIZED HEALTH CARE EDUCATION/TRAINING PROGRAM

## 2023-12-13 PROCEDURE — 25810000003 SODIUM CHLORIDE 0.9 % SOLUTION: Performed by: INTERNAL MEDICINE

## 2023-12-13 PROCEDURE — 85025 COMPLETE CBC W/AUTO DIFF WBC: CPT | Performed by: INTERNAL MEDICINE

## 2023-12-13 PROCEDURE — 45385 COLONOSCOPY W/LESION REMOVAL: CPT | Performed by: STUDENT IN AN ORGANIZED HEALTH CARE EDUCATION/TRAINING PROGRAM

## 2023-12-13 PROCEDURE — 80048 BASIC METABOLIC PNL TOTAL CA: CPT | Performed by: INTERNAL MEDICINE

## 2023-12-13 PROCEDURE — 88305 TISSUE EXAM BY PATHOLOGIST: CPT | Performed by: STUDENT IN AN ORGANIZED HEALTH CARE EDUCATION/TRAINING PROGRAM

## 2023-12-13 PROCEDURE — 45380 COLONOSCOPY AND BIOPSY: CPT | Performed by: STUDENT IN AN ORGANIZED HEALTH CARE EDUCATION/TRAINING PROGRAM

## 2023-12-13 PROCEDURE — 0DBM8ZX EXCISION OF DESCENDING COLON, VIA NATURAL OR ARTIFICIAL OPENING ENDOSCOPIC, DIAGNOSTIC: ICD-10-PCS | Performed by: STUDENT IN AN ORGANIZED HEALTH CARE EDUCATION/TRAINING PROGRAM

## 2023-12-13 PROCEDURE — 0DBN8ZX EXCISION OF SIGMOID COLON, VIA NATURAL OR ARTIFICIAL OPENING ENDOSCOPIC, DIAGNOSTIC: ICD-10-PCS | Performed by: STUDENT IN AN ORGANIZED HEALTH CARE EDUCATION/TRAINING PROGRAM

## 2023-12-13 PROCEDURE — 25010000002 GLUCAGON (RDNA) PER 1 MG: Performed by: ANESTHESIOLOGY

## 2023-12-13 PROCEDURE — S0260 H&P FOR SURGERY: HCPCS | Performed by: STUDENT IN AN ORGANIZED HEALTH CARE EDUCATION/TRAINING PROGRAM

## 2023-12-13 PROCEDURE — 87040 BLOOD CULTURE FOR BACTERIA: CPT | Performed by: INTERNAL MEDICINE

## 2023-12-13 RX ORDER — ATORVASTATIN CALCIUM 20 MG/1
40 TABLET, FILM COATED ORAL DAILY
Status: DISCONTINUED | OUTPATIENT
Start: 2023-12-14 | End: 2023-12-15 | Stop reason: HOSPADM

## 2023-12-13 RX ORDER — DEXTROSE MONOHYDRATE 25 G/50ML
25 INJECTION, SOLUTION INTRAVENOUS
Status: DISCONTINUED | OUTPATIENT
Start: 2023-12-13 | End: 2023-12-15 | Stop reason: HOSPADM

## 2023-12-13 RX ORDER — ACETAMINOPHEN AND CODEINE PHOSPHATE 300; 30 MG/1; MG/1
1 TABLET ORAL 3 TIMES DAILY
Status: DISCONTINUED | OUTPATIENT
Start: 2023-12-13 | End: 2023-12-15 | Stop reason: HOSPADM

## 2023-12-13 RX ORDER — IBUPROFEN 600 MG/1
TABLET ORAL AS NEEDED
Status: DISCONTINUED | OUTPATIENT
Start: 2023-12-13 | End: 2023-12-13 | Stop reason: SURG

## 2023-12-13 RX ORDER — METOPROLOL SUCCINATE 100 MG/1
100 TABLET, EXTENDED RELEASE ORAL
Status: DISCONTINUED | OUTPATIENT
Start: 2023-12-13 | End: 2023-12-13

## 2023-12-13 RX ORDER — HYDRALAZINE HYDROCHLORIDE 10 MG/1
10 TABLET, FILM COATED ORAL 3 TIMES DAILY
Status: DISCONTINUED | OUTPATIENT
Start: 2023-12-13 | End: 2023-12-15 | Stop reason: HOSPADM

## 2023-12-13 RX ORDER — PROPOFOL 10 MG/ML
VIAL (ML) INTRAVENOUS CONTINUOUS PRN
Status: DISCONTINUED | OUTPATIENT
Start: 2023-12-13 | End: 2023-12-13 | Stop reason: SURG

## 2023-12-13 RX ORDER — INSULIN LISPRO 100 [IU]/ML
2-9 INJECTION, SOLUTION INTRAVENOUS; SUBCUTANEOUS
Status: DISCONTINUED | OUTPATIENT
Start: 2023-12-14 | End: 2023-12-15 | Stop reason: HOSPADM

## 2023-12-13 RX ORDER — BISACODYL 5 MG/1
5 TABLET, DELAYED RELEASE ORAL DAILY PRN
Status: DISCONTINUED | OUTPATIENT
Start: 2023-12-13 | End: 2023-12-15 | Stop reason: HOSPADM

## 2023-12-13 RX ORDER — ONDANSETRON 4 MG/1
4 TABLET, FILM COATED ORAL EVERY 6 HOURS PRN
Status: DISCONTINUED | OUTPATIENT
Start: 2023-12-13 | End: 2023-12-15 | Stop reason: HOSPADM

## 2023-12-13 RX ORDER — ACETAMINOPHEN 325 MG/1
650 TABLET ORAL EVERY 4 HOURS PRN
Status: DISCONTINUED | OUTPATIENT
Start: 2023-12-13 | End: 2023-12-15 | Stop reason: HOSPADM

## 2023-12-13 RX ORDER — IBUPROFEN 600 MG/1
1 TABLET ORAL
Status: DISCONTINUED | OUTPATIENT
Start: 2023-12-13 | End: 2023-12-15 | Stop reason: HOSPADM

## 2023-12-13 RX ORDER — SPIRONOLACTONE 25 MG/1
25 TABLET ORAL DAILY
Status: DISCONTINUED | OUTPATIENT
Start: 2023-12-14 | End: 2023-12-15 | Stop reason: HOSPADM

## 2023-12-13 RX ORDER — BISACODYL 10 MG
10 SUPPOSITORY, RECTAL RECTAL DAILY PRN
Status: DISCONTINUED | OUTPATIENT
Start: 2023-12-13 | End: 2023-12-15 | Stop reason: HOSPADM

## 2023-12-13 RX ORDER — UREA 10 %
3 LOTION (ML) TOPICAL NIGHTLY PRN
Status: DISCONTINUED | OUTPATIENT
Start: 2023-12-13 | End: 2023-12-15 | Stop reason: HOSPADM

## 2023-12-13 RX ORDER — SODIUM CHLORIDE 0.9 % (FLUSH) 0.9 %
10 SYRINGE (ML) INJECTION AS NEEDED
Status: DISCONTINUED | OUTPATIENT
Start: 2023-12-13 | End: 2023-12-13 | Stop reason: HOSPADM

## 2023-12-13 RX ORDER — TORSEMIDE 20 MG/1
80 TABLET ORAL 2 TIMES DAILY
Status: DISCONTINUED | OUTPATIENT
Start: 2023-12-13 | End: 2023-12-15 | Stop reason: HOSPADM

## 2023-12-13 RX ORDER — SODIUM CHLORIDE 9 MG/ML
1000 INJECTION, SOLUTION INTRAVENOUS CONTINUOUS
Status: ACTIVE | OUTPATIENT
Start: 2023-12-13 | End: 2023-12-15

## 2023-12-13 RX ORDER — SODIUM CHLORIDE 9 MG/ML
75 INJECTION, SOLUTION INTRAVENOUS CONTINUOUS
Status: ACTIVE | OUTPATIENT
Start: 2023-12-14 | End: 2023-12-14

## 2023-12-13 RX ORDER — ASPIRIN 81 MG/1
81 TABLET ORAL DAILY
Status: DISCONTINUED | OUTPATIENT
Start: 2023-12-14 | End: 2023-12-15 | Stop reason: HOSPADM

## 2023-12-13 RX ORDER — LIDOCAINE HYDROCHLORIDE 10 MG/ML
0.5 INJECTION, SOLUTION INFILTRATION; PERINEURAL ONCE AS NEEDED
Status: DISCONTINUED | OUTPATIENT
Start: 2023-12-13 | End: 2023-12-13 | Stop reason: HOSPADM

## 2023-12-13 RX ORDER — ISOSORBIDE MONONITRATE 60 MG/1
60 TABLET, EXTENDED RELEASE ORAL DAILY
Status: DISCONTINUED | OUTPATIENT
Start: 2023-12-14 | End: 2023-12-15 | Stop reason: HOSPADM

## 2023-12-13 RX ORDER — AMOXICILLIN 250 MG
2 CAPSULE ORAL 2 TIMES DAILY
Status: DISCONTINUED | OUTPATIENT
Start: 2023-12-13 | End: 2023-12-15 | Stop reason: HOSPADM

## 2023-12-13 RX ORDER — NICOTINE POLACRILEX 4 MG
15 LOZENGE BUCCAL
Status: DISCONTINUED | OUTPATIENT
Start: 2023-12-13 | End: 2023-12-15 | Stop reason: HOSPADM

## 2023-12-13 RX ORDER — HYDROXYZINE HYDROCHLORIDE 25 MG/1
25 TABLET, FILM COATED ORAL NIGHTLY PRN
Status: DISCONTINUED | OUTPATIENT
Start: 2023-12-13 | End: 2023-12-15 | Stop reason: HOSPADM

## 2023-12-13 RX ORDER — LIDOCAINE HYDROCHLORIDE 20 MG/ML
INJECTION, SOLUTION INFILTRATION; PERINEURAL AS NEEDED
Status: DISCONTINUED | OUTPATIENT
Start: 2023-12-13 | End: 2023-12-13 | Stop reason: SURG

## 2023-12-13 RX ORDER — ONDANSETRON 2 MG/ML
4 INJECTION INTRAMUSCULAR; INTRAVENOUS EVERY 6 HOURS PRN
Status: DISCONTINUED | OUTPATIENT
Start: 2023-12-13 | End: 2023-12-15 | Stop reason: HOSPADM

## 2023-12-13 RX ORDER — POLYETHYLENE GLYCOL 3350 17 G/17G
17 POWDER, FOR SOLUTION ORAL DAILY PRN
Status: DISCONTINUED | OUTPATIENT
Start: 2023-12-13 | End: 2023-12-15 | Stop reason: HOSPADM

## 2023-12-13 RX ORDER — LABETALOL HYDROCHLORIDE 5 MG/ML
10 INJECTION, SOLUTION INTRAVENOUS ONCE
Status: COMPLETED | OUTPATIENT
Start: 2023-12-13 | End: 2023-12-13

## 2023-12-13 RX ORDER — METOPROLOL SUCCINATE 100 MG/1
100 TABLET, EXTENDED RELEASE ORAL DAILY
Status: DISCONTINUED | OUTPATIENT
Start: 2023-12-14 | End: 2023-12-15 | Stop reason: HOSPADM

## 2023-12-13 RX ADMIN — LIDOCAINE HYDROCHLORIDE 60 MG: 20 INJECTION, SOLUTION INFILTRATION; PERINEURAL at 14:11

## 2023-12-13 RX ADMIN — PROPOFOL 200 MCG/KG/MIN: 10 INJECTION, EMULSION INTRAVENOUS at 14:12

## 2023-12-13 RX ADMIN — VANCOMYCIN HYDROCHLORIDE 1750 MG: 10 INJECTION, POWDER, LYOPHILIZED, FOR SOLUTION INTRAVENOUS at 23:34

## 2023-12-13 RX ADMIN — GLUCAGON 1 MG: KIT at 15:12

## 2023-12-13 RX ADMIN — SODIUM CHLORIDE 75 ML/HR: 9 INJECTION, SOLUTION INTRAVENOUS at 23:34

## 2023-12-13 RX ADMIN — SODIUM CHLORIDE 1000 ML: 9 INJECTION, SOLUTION INTRAVENOUS at 13:14

## 2023-12-13 RX ADMIN — LABETALOL HYDROCHLORIDE 10 MG: 5 INJECTION, SOLUTION INTRAVENOUS at 17:16

## 2023-12-13 RX ADMIN — PIPERACILLIN SODIUM AND TAZOBACTAM SODIUM 3.38 G: 3; .375 INJECTION, SOLUTION INTRAVENOUS at 23:34

## 2023-12-13 RX ADMIN — METOPROLOL SUCCINATE 100 MG: 100 TABLET, EXTENDED RELEASE ORAL at 17:33

## 2023-12-13 NOTE — ANESTHESIA PREPROCEDURE EVALUATION
Anesthesia Evaluation     Patient summary reviewed and Nursing notes reviewed   NPO Solid Status: > 8 hours  NPO Liquid Status: > 4 hours           Airway   Mallampati: III  TM distance: >3 FB  Neck ROM: full  Possible difficult intubation  Dental - normal exam     Pulmonary - normal exam    breath sounds clear to auscultation  (+) a smoker Former,sleep apnea on CPAP  Cardiovascular - normal exam    ECG reviewed  Rhythm: regular  Rate: normal    (+) hypertension 2 medications or greater, CAD, hyperlipidemia    ROS comment: EF 64%, no valvular dz by ECHO 10/23    Neuro/Psych  GI/Hepatic/Renal/Endo    (+) obesity, morbid obesity, renal disease- CRI, diabetes mellitus type 2 using insulin    Musculoskeletal     (+) back pain      ROS comment: Lumbar spinal stenosis  Abdominal   (+) obese   Substance History      OB/GYN          Other   arthritis,                       Anesthesia Plan    ASA 3     MAC     intravenous induction     Anesthetic plan, risks, benefits, and alternatives have been provided, discussed and informed consent has been obtained with: patient.        CODE STATUS:

## 2023-12-13 NOTE — SIGNIFICANT NOTE
Patient assisted to restroom per walker. Patient voided and had a small BM. Spoke with Dr. Hein regarding patients BP. Meds given as ordered. Waiting on A to evaluate patient for admission due to cellulitis

## 2023-12-13 NOTE — NURSING NOTE
Dr hoffman returned call. Stated that she had asked Dr Ruiz to place orders for stat cbc and an xray of her right leg. Order had not been placed. Orders received. MD states that she will be over shortly to access pt. Lab and xray notified.

## 2023-12-13 NOTE — OP NOTE
Operative Note :  Polly Ruiz MD    Patient/:   Denisse Chavez / 1945    Procedure Date:   23    Pre-op Diagnosis:  History of adenomatous polyp of colon [Z86.010]  High risk screening colonoscopy  Right lower extremity cellulitis    Post-Operative Diagnosis:  History of adenomatous polyps  High risk screening colonoscopy  Colon polyps  Right lower extremity cellulitis    Procedure:   Flexible colonoscopy to the cecum and terminal ileum  Cold snare polypectomy of cecal polyp x 2  Cold biopsy forcep polypectomy of transverse colon polyp x 1  Cold snare polypectomy of transverse colon polyp x 1  Cold snare biopsy of descending colon polyp x 1  Cold forcep biopsy of sigmoid colon polyp x 4  Cold forcep biopsy of rectosigmoid colon polyp x 1    Surgeon:   Polly Ruiz MD    Assistant:   None    Anesthesia:    MAC (monitored anesthetic care)    Estimated Blood Loss:   Minimal    Specimens:   A- pedunculated cecal polyp x 2, both approximately 4 mm, retrieved completely by cold snare biopsy and sent to pathology  B- sessile approximately 3 mm transverse colon polyp removed completely by cold biopsy forceps, sent to pathology; pedunculated approximately 5 mm transverse colon polyp completely removed by cold snare biopsy, sent to pathology  C-pedunculated descending colon polyp approximately 5 mm completely retrieved by cold snare biopsy, sent to pathology  D- pedunculated sigmoid colon polyps x 4 all measuring between 3 and 4 mm, all completely retrieved by cold biopsy forceps, sent to pathology  E- rectosigmoid polyp at 15 cm measuring approximately 4 mm, pedunculated, retrieved completely with cold biopsy forceps, sent to pathology    Complications:   None    Indications:  High risk screening colonoscopy in patient with history of adenomatous polyps    Findings:  APARNA without palpable abnormalities  Colonoscopy to the terminal ileum, normal-appearing terminal ileum mucosa  Specimens as  above    Description of procedure:  The patient was brought to the endoscopy suite and placed in the left lateral decubitus position.  Continuous propofol anesthesia was administered.  A surgical timeout was completed.  A digital rectal exam was performed, revealing no palpable abnormalities.  An adult colonoscope was then inserted through the anus and passed under direct visualization to the level of the cecum.  The cecum was identified via the ileocecal valve as well as the appendiceal orifice.  The terminal ileum was intubated and the mucosa appeared normal.  The scope was then slowly withdrawn, examining all circumferential walls of the ascending, transverse, descending, and sigmoid colon, as well as the rectum. The patient did have to be given 1 mg of glucagon for retrieval of her sigmoid colon polyps due to significant peristalsis of the colon.    There were two pedunculated cecal polyps, both measuring approximately 4 mm. These were retrieved completely by cold snare biopsy and sent to pathology as specimen A.    There was a sessile appearing transverse colon polyp measuring approximately 3 mm. This was removed completely by cold biopsy forceps and sent to pathology. In close proximity was a pedunculated transverse colon polyp measuring approximately 5 mm.  This was completely removed by cold snare biopsy and sent to pathology in the same specimen jar B.    There was a pedunculated descending colon polyp measuring approximately 5 mm which was completely retrieved by cold snare biopsy and sent to pathology as specimen C.    There were 4 pedunculated sigmoid colon polyps, all measuring between 3 and 4 mm, all completely retrieved by cold biopsy forceps, sent to pathology as specimen D.    There was a rectosigmoid polyp at 15 cm measuring approximately 4 mm, pedunculated, retrieved completely with cold biopsy forceps, sent to pathology as specimen E.    The scope was then withdrawn and the colon desufflated.   The patient had a good bowel prep and was transferred to the recovery area in stable condition.     Recommendations:  I will call the patient with the pathology results of the colon polyps removed as this will determine when the next screening colonoscopy will be due.    Polly Ruiz M.D.  General, Robotic, and Endoscopic Surgery  [facility]    [address], [address]  [place], [place], [address]  P: [phone]  F: [phone]

## 2023-12-13 NOTE — DISCHARGE INSTRUCTIONS
For the next 24 hours patient needs to be with a responsible adult.    For 24 hours DO NOT drive, operate machinery, appliances, drink alcohol, make important decisions or sign legal documents.    Start with a light or bland diet if you are feeling sick to your stomach otherwise advance to regular diet as tolerated.    Follow recommendations on procedure report if provided by your doctor.    Call Dr Ruiz for problems [phone].  Office will call with biopsy results    Problems may include but not limited to: large amounts of bleeding, trouble breathing, repeated vomiting, severe unrelieved pain, fever or chills.

## 2023-12-13 NOTE — ANESTHESIA POSTPROCEDURE EVALUATION
Patient: Denisse Chavez    Procedure Summary       Date: 12/13/23 Room / Location: [facility] ENDOSCOPY 7 /  [facility]    Anesthesia Start: 1354 Anesthesia Stop: 1535    Procedure: COLONOSCOPY to cecum and TI with cold snare and cold biopsy polypectomies Diagnosis:       History of adenomatous polyp of colon      (History of adenomatous polyp of colon [Z86.010])    Surgeons: Polly Ruiz MD Provider: Mahesh Garcia MD    Anesthesia Type: MAC ASA Status: 3            Anesthesia Type: MAC    Vitals  No vitals data found for the desired time range.          Post Anesthesia Care and Evaluation    Patient location during evaluation: bedside  Pain management: adequate    Airway patency: patent  Anesthetic complications: No anesthetic complications    Cardiovascular status: acceptable  Respiratory status: acceptable  Hydration status: acceptable

## 2023-12-13 NOTE — NURSING NOTE
Pt arrived to endo unit and stated that she had fallen sometime at home. She is complaining of a red, rash like area to rle around knee and bottom of right foot. Pt and son (at [facility]) is concerned that it may be infected. Dr Ruiz requested that [facility] be consulted to see if pt should follow up with PCP or needs to be admitted. Contacted [facility] and spoke with Monet and consult placed. Waiting for pt to be seen.

## 2023-12-13 NOTE — H&P
GENERAL SURGERY HISTORY AND PHYSICAL     CHIEF COMPLAINT:    Screening colonoscopy high risk    HPI:    Denisse Chavez is a 78 y.o. female here for screening colonoscopy.  Patient has no family history of colorectal cancer.  She has a history of adenomatous polyps.  She reports no changes to her health history except that she fell yesterday and noted some redness and swelling of her right leg, with pain at the top of the leg where she was wearing compression hose. She has not been seen by anyone for this. Denies other symptoms. Denies head trauma or LOC.      ALLERGIES:   Allergies   Allergen Reactions    Ace Inhibitors Other (See Comments)     Hyperkalemia/ROB    Angiotensin Receptor Blockers Other (See Comments)     Pt unable to remember    Keflex [Cephalexin] Rash     Tolerated ceftriaxone Dec 2019; tolerated zosyn May 2020    Sulfa Antibiotics Itching     rash     MEDICATIONS:    Reviewed in Epic     PHYSICAL EXAM:   Constitutional: Well-developed well-nourished, no acute distress  Eyes: Conjunctiva normal, sclera nonicteric  ENMT: Hearing grossly normal, oral mucosa moist  Neck: Supple, trachea midline  Respiratory: Normal inspiratory effort  Cardiovascular: Regular rate, no peripheral edema, no jugular venous distention  Gastrointestinal: Abd soft, nondistended  Skin:  redness extending from the mid right medial thigh down to the right ankle, with ligature monroe inferior to the knee where patient reports wearing support hose; she is tender above this marking but no where else  Musculoskeletal: Symmetric strength; neuro intact BLE  Psychiatric: Alert and oriented ×3, normal affect     ASSESSMENT/PLAN:  Denisse Chavez is a 78 y.o. female here for high risk screening colonoscopy. She also has RLE cellulitis.  I discussed with her that given her presentation, I will perform colonoscopy but would recommend she see Medicine team prior to discharge for further workup and management.  Patient agreeable.    I  discussed recommendation for colonoscopy.  I obtained informed consent, discussing with the patient the benefits, risks of the procedure (including but not limited to: unexpected bleeding possibly requiring hospitalization and/or an unplanned repeat colonoscopy, perforation possibly requiring surgical treatment, missed lesions, complications of sedation/anesthesia), alternatives, and postprocedure expectations.  I provided opportunity for the patient to ask questions and answered them to the best of my ability.  The patient wishes to proceed with colonoscopy.    Polly Ruiz MD  General, Robotic and Endoscopic Surgery  [facility]    [address] [place] [address], [address]  [place], [place], [address]  P: [phone]  F: [phone]

## 2023-12-14 ENCOUNTER — APPOINTMENT (OUTPATIENT)
Dept: CARDIOLOGY | Facility: HOSPITAL | Age: 78
End: 2023-12-14
Payer: MEDICARE

## 2023-12-14 ENCOUNTER — APPOINTMENT (OUTPATIENT)
Dept: MRI IMAGING | Facility: HOSPITAL | Age: 78
End: 2023-12-14
Payer: MEDICARE

## 2023-12-14 LAB
ANION GAP SERPL CALCULATED.3IONS-SCNC: 12.4 MMOL/L (ref 5–15)
BH CV LOWER ARTERIAL LEFT ABI RATIO: 0.91
BH CV LOWER ARTERIAL LEFT CALF RATIO: 1.48
BH CV LOWER ARTERIAL LEFT DORSALIS PEDIS SYS MAX: 159
BH CV LOWER ARTERIAL LEFT GREAT TOE SYS MAX: 59
BH CV LOWER ARTERIAL LEFT LOW THIGH RATIO: NORMAL
BH CV LOWER ARTERIAL LEFT LOW THIGH SYS MAX: NORMAL
BH CV LOWER ARTERIAL LEFT POPLITEAL SYS MAX: 257
BH CV LOWER ARTERIAL LEFT POST TIBIAL SYS MAX: 131
BH CV LOWER ARTERIAL LEFT TBI RATIO: 0.34
BH CV LOWER ARTERIAL RIGHT ABI RATIO: 0.87
BH CV LOWER ARTERIAL RIGHT CALF RATIO: 1.18
BH CV LOWER ARTERIAL RIGHT DORSALIS PEDIS SYS MAX: 152
BH CV LOWER ARTERIAL RIGHT GREAT TOE SYS MAX: 96
BH CV LOWER ARTERIAL RIGHT LOW THIGH RATIO: NORMAL
BH CV LOWER ARTERIAL RIGHT LOW THIGH SYS MAX: NORMAL
BH CV LOWER ARTERIAL RIGHT POPLITEAL SYS MAX: 206
BH CV LOWER ARTERIAL RIGHT POST TIBIAL SYS MAX: 103
BH CV LOWER ARTERIAL RIGHT TBI RATIO: 0.55
BUN SERPL-MCNC: 20 MG/DL (ref 8–23)
BUN/CREAT SERPL: 19.8 (ref 7–25)
CALCIUM SPEC-SCNC: 8 MG/DL (ref 8.6–10.5)
CHLORIDE SERPL-SCNC: 104 MMOL/L (ref 98–107)
CO2 SERPL-SCNC: 24.6 MMOL/L (ref 22–29)
CREAT SERPL-MCNC: 1.01 MG/DL (ref 0.57–1)
D-LACTATE SERPL-SCNC: 1.5 MMOL/L (ref 0.5–2)
DEPRECATED RDW RBC AUTO: 41 FL (ref 37–54)
EGFRCR SERPLBLD CKD-EPI 2021: 57.1 ML/MIN/1.73
ERYTHROCYTE [DISTWIDTH] IN BLOOD BY AUTOMATED COUNT: 13.3 % (ref 12.3–15.4)
GLUCOSE BLDC GLUCOMTR-MCNC: 162 MG/DL (ref 70–130)
GLUCOSE BLDC GLUCOMTR-MCNC: 204 MG/DL (ref 70–130)
GLUCOSE BLDC GLUCOMTR-MCNC: 239 MG/DL (ref 70–130)
GLUCOSE BLDC GLUCOMTR-MCNC: 268 MG/DL (ref 70–130)
GLUCOSE SERPL-MCNC: 220 MG/DL (ref 65–99)
HBA1C MFR BLD: 9.2 % (ref 4.8–5.6)
HCT VFR BLD AUTO: 36.7 % (ref 34–46.6)
HGB BLD-MCNC: 11.4 G/DL (ref 12–15.9)
MCH RBC QN AUTO: 26.6 PG (ref 26.6–33)
MCHC RBC AUTO-ENTMCNC: 31.1 G/DL (ref 31.5–35.7)
MCV RBC AUTO: 85.5 FL (ref 79–97)
PLATELET # BLD AUTO: 200 10*3/MM3 (ref 140–450)
PMV BLD AUTO: 10.5 FL (ref 6–12)
POTASSIUM SERPL-SCNC: 3.3 MMOL/L (ref 3.5–5.2)
RBC # BLD AUTO: 4.29 10*6/MM3 (ref 3.77–5.28)
SODIUM SERPL-SCNC: 141 MMOL/L (ref 136–145)
UPPER ARTERIAL LEFT ARM BRACHIAL SYS MAX: 174
VANCOMYCIN SERPL-MCNC: 19.4 MCG/ML (ref 5–40)
WBC NRBC COR # BLD AUTO: 8.56 10*3/MM3 (ref 3.4–10.8)

## 2023-12-14 PROCEDURE — 99233 SBSQ HOSP IP/OBS HIGH 50: CPT | Performed by: STUDENT IN AN ORGANIZED HEALTH CARE EDUCATION/TRAINING PROGRAM

## 2023-12-14 PROCEDURE — 85027 COMPLETE CBC AUTOMATED: CPT | Performed by: INTERNAL MEDICINE

## 2023-12-14 PROCEDURE — 93923 UPR/LXTR ART STDY 3+ LVLS: CPT

## 2023-12-14 PROCEDURE — 83605 ASSAY OF LACTIC ACID: CPT | Performed by: INTERNAL MEDICINE

## 2023-12-14 PROCEDURE — 25010000002 ENOXAPARIN PER 10 MG: Performed by: INTERNAL MEDICINE

## 2023-12-14 PROCEDURE — 82948 REAGENT STRIP/BLOOD GLUCOSE: CPT

## 2023-12-14 PROCEDURE — 83036 HEMOGLOBIN GLYCOSYLATED A1C: CPT | Performed by: INTERNAL MEDICINE

## 2023-12-14 PROCEDURE — 25810000003 SODIUM CHLORIDE 0.9 % SOLUTION 250 ML FLEX CONT: Performed by: INTERNAL MEDICINE

## 2023-12-14 PROCEDURE — A9577 INJ MULTIHANCE: HCPCS | Performed by: STUDENT IN AN ORGANIZED HEALTH CARE EDUCATION/TRAINING PROGRAM

## 2023-12-14 PROCEDURE — 80048 BASIC METABOLIC PNL TOTAL CA: CPT | Performed by: INTERNAL MEDICINE

## 2023-12-14 PROCEDURE — 63710000001 INSULIN ISOPHANE & REGULAR PER 5 UNITS: Performed by: HOSPITALIST

## 2023-12-14 PROCEDURE — 25010000002 VANCOMYCIN 750 MG RECONSTITUTED SOLUTION 1 EACH VIAL: Performed by: INTERNAL MEDICINE

## 2023-12-14 PROCEDURE — 63710000001 INSULIN LISPRO (HUMAN) PER 5 UNITS: Performed by: INTERNAL MEDICINE

## 2023-12-14 PROCEDURE — 73720 MRI LWR EXTREMITY W/O&W/DYE: CPT

## 2023-12-14 PROCEDURE — 80202 ASSAY OF VANCOMYCIN: CPT | Performed by: INTERNAL MEDICINE

## 2023-12-14 PROCEDURE — 0 GADOBENATE DIMEGLUMINE 529 MG/ML SOLUTION: Performed by: STUDENT IN AN ORGANIZED HEALTH CARE EDUCATION/TRAINING PROGRAM

## 2023-12-14 RX ORDER — ENOXAPARIN SODIUM 100 MG/ML
40 INJECTION SUBCUTANEOUS EVERY 24 HOURS
Status: DISCONTINUED | OUTPATIENT
Start: 2023-12-14 | End: 2023-12-15 | Stop reason: HOSPADM

## 2023-12-14 RX ADMIN — INSULIN LISPRO 4 UNITS: 100 INJECTION, SOLUTION INTRAVENOUS; SUBCUTANEOUS at 12:35

## 2023-12-14 RX ADMIN — HYDRALAZINE HYDROCHLORIDE 10 MG: 10 TABLET, FILM COATED ORAL at 11:25

## 2023-12-14 RX ADMIN — INSULIN HUMAN 70 UNITS: 100 INJECTION, SUSPENSION SUBCUTANEOUS at 14:21

## 2023-12-14 RX ADMIN — ENOXAPARIN SODIUM 40 MG: 100 INJECTION SUBCUTANEOUS at 12:35

## 2023-12-14 RX ADMIN — SPIRONOLACTONE 25 MG: 25 TABLET ORAL at 11:24

## 2023-12-14 RX ADMIN — GADOBENATE DIMEGLUMINE 19 ML: 529 INJECTION, SOLUTION INTRAVENOUS at 10:26

## 2023-12-14 RX ADMIN — METOPROLOL SUCCINATE 100 MG: 100 TABLET, EXTENDED RELEASE ORAL at 11:25

## 2023-12-14 RX ADMIN — VANCOMYCIN HYDROCHLORIDE 750 MG: 750 INJECTION, POWDER, LYOPHILIZED, FOR SOLUTION INTRAVENOUS at 12:36

## 2023-12-14 RX ADMIN — ACETAMINOPHEN AND CODEINE PHOSPHATE 1 TABLET: 300; 30 TABLET ORAL at 20:48

## 2023-12-14 RX ADMIN — HYDRALAZINE HYDROCHLORIDE 10 MG: 10 TABLET, FILM COATED ORAL at 01:37

## 2023-12-14 RX ADMIN — ACETAMINOPHEN AND CODEINE PHOSPHATE 1 TABLET: 300; 30 TABLET ORAL at 01:37

## 2023-12-14 RX ADMIN — ISOSORBIDE MONONITRATE 60 MG: 60 TABLET, EXTENDED RELEASE ORAL at 11:25

## 2023-12-14 RX ADMIN — TORSEMIDE 80 MG: 20 TABLET ORAL at 11:25

## 2023-12-14 RX ADMIN — INSULIN LISPRO 6 UNITS: 100 INJECTION, SOLUTION INTRAVENOUS; SUBCUTANEOUS at 17:36

## 2023-12-14 RX ADMIN — HYDRALAZINE HYDROCHLORIDE 10 MG: 10 TABLET, FILM COATED ORAL at 20:48

## 2023-12-14 RX ADMIN — ACETAMINOPHEN AND CODEINE PHOSPHATE 1 TABLET: 300; 30 TABLET ORAL at 11:24

## 2023-12-14 RX ADMIN — ASPIRIN 81 MG: 81 TABLET, COATED ORAL at 11:24

## 2023-12-14 RX ADMIN — ATORVASTATIN CALCIUM 40 MG: 20 TABLET, FILM COATED ORAL at 11:24

## 2023-12-14 RX ADMIN — DOCUSATE SODIUM 50MG AND SENNOSIDES 8.6MG 2 TABLET: 8.6; 5 TABLET, FILM COATED ORAL at 11:26

## 2023-12-14 RX ADMIN — TORSEMIDE 80 MG: 20 TABLET ORAL at 20:48

## 2023-12-14 RX ADMIN — HYDROXYZINE HYDROCHLORIDE 25 MG: 25 TABLET ORAL at 01:40

## 2023-12-14 RX ADMIN — TORSEMIDE 80 MG: 20 TABLET ORAL at 01:37

## 2023-12-14 NOTE — PROGRESS NOTES
"[facility] Clinical Pharmacy Services: Enoxaparin Consult    Denisse Chavez has a pharmacy consult to dose prophylactic enoxaparin per Dr. Gan's request.     Indication: VTE Prophylaxis  Home Anticoagulation: none     Relevant clinical data and objective history reviewed:  78 y.o. female 162.6 cm (64\") 92.1 kg (203 lb 0.7 oz)   Body mass index is 34.85 kg/m².   Results from last 7 days   Lab Units 12/14/23  0500   PLATELETS 10*3/mm3 200     Estimated Creatinine Clearance: 50.5 mL/min (A) (by C-G formula based on SCr of 1.01 mg/dL (H)).    Assessment/Plan    Will start patient on 40mg subcutaneous every 24 hours, adjusted for renal function. Consult order will be discontinued but pharmacy will continue to follow.     Jeronimo Tavares, [facility]  Clinical Pharmacist   "

## 2023-12-14 NOTE — PLAN OF CARE
Goal Outcome Evaluation:      Pt is A&O x4. MRI and Doppler done of left leg. Medicated per orders. No complaints at this time. Plan of care on going.

## 2023-12-14 NOTE — PROGRESS NOTES
"General Surgery Progress Note    CC: RLE pain    S: Patient states she is doing well except that her right leg still hurts when it is touched. She denies any other symptoms and is otherwise feeling well after her colonoscopy. I asked her about her right plantar ulcer, which she referred to as a callus, and she states that she has had it for a while and that occasionally it will open and bleed but that it has not been open recently. She says she sees podiatry 3-4 times annually, and has an appointment scheduled in the next few weeks for her regular diabetic foot check.      Per RN, patient is stable with improved hypertension after medication.     O:/63 (BP Location: Left arm, Patient Position: Lying)   Pulse 75   Resp 16   Ht 162.6 cm (64\")   Wt 91.2 kg (201 lb)   SpO2 100%   BMI 34.50 kg/m²     Intake & Output (last day)         12/13 0701  12/14 0700    I.V. (mL/kg) 1300 (14.3)    Total Intake(mL/kg) 1300 (14.3)    Net +1300         Urine Unmeasured Occurrence 1 x    Stool Unmeasured Occurrence 1 x            GENERAL: awake, alert, resting comfortably in bed, interactive, cooperative, answering questions appropriately  HEENT: EOMI, clear sclera, moist mucus membranes   CHEST: normal work of breathing on room air  CARDIAC: regular rate   ABDOMEN: nondistended  EXTREMITIES: PADRON; RLE with stable appearing blanching erythema extending from right medial thigh down the leg to above the ankle, with relative sparing inferior to the knee at the site of reported compression hose elastic; there is tenderness above the knee; there is no tenderness of the foot; there is normal dorsiflexion, plantarflexion; there is mild pitting edema of the right foot; the right foot is nontender; there is a dry firm right plantar ulcer that does not appear to have surrounding erythema, tenderness, or any drainage; throughout the RLE there is no fluctuance or crepitus to suggest drainable fluid collection or necrotizing infection "   SKIN: warm and dry            LABS  Results from last 7 days   Lab Units 12/13/23  1729   WBC 10*3/mm3 11.55*   HEMOGLOBIN g/dL 12.0   HEMATOCRIT % 37.5   PLATELETS 10*3/mm3 190         Imaging:   XR Knee 1 or 2 View Right - images and report independently reviewed; soft tissue swelling of the right knee without acute fractures     XR Foot 2 View Right -  images and report independently reviewed; forefoot soft tissue swelling with soft tissue lucency at the forefoot concerning for possible soft tissue gas/infection; no acute fractures       A/P: 78 y.o. female with diabetes type 2 complicated by hyperglycemia and neuropathy who presented for high risk screening colonoscopy, incidentally noted to have new right lower extremity cellulitis after a fall the day prior to admission, with chronic appearing right plantar ulcer.  She has no indication for surgical debridement at this time. Appreciate Medicine evaluation for further management.     Polly Ruiz MD  General, Robotic and Endoscopic Surgery  [facility]    [address], [address]  [place], [place], [address]  P: [phone]  F: [phone]

## 2023-12-14 NOTE — PROGRESS NOTES
"DAILY PROGRESS NOTE  [facility]    Patient Identification:  Name: Denisse Chavez  Age: 78 y.o.  Sex: female  :  1945  MRN: [id number]         Primary Care Physician: Surekha Mcdonnell MD    Subjective:  Interval History: She complains of right leg swelling and pain.    Objective:    Scheduled Meds:acetaminophen-codeine, 1 tablet, Oral, TID  aspirin, 81 mg, Oral, Daily  atorvastatin, 40 mg, Oral, Daily  enoxaparin, 40 mg, Subcutaneous, Q24H  hydrALAZINE, 10 mg, Oral, TID  insulin lispro, 2-9 Units, Subcutaneous, 4x Daily AC & at Bedtime  insulin lispro protamine-insulin lispro, 70 Units, Subcutaneous, BID With Meals  isosorbide mononitrate, 60 mg, Oral, Daily  metoprolol succinate XL, 100 mg, Oral, Daily  senna-docusate sodium, 2 tablet, Oral, BID  spironolactone, 25 mg, Oral, Daily  torsemide, 80 mg, Oral, BID  vancomycin, 750 mg, Intravenous, Q12H      Continuous Infusions:Pharmacy to dose vancomycin,   sodium chloride, 1,000 mL, Last Rate: Stopped (23 184)        Vital signs in last 24 hours:  Temp:  [97.3 °F (36.3 °C)-99 °F (37.2 °C)] 97.3 °F (36.3 °C)  Heart Rate:  [58-79] 60  Resp:  [16-22] 18  BP: (136-204)/(51-87) 191/62    Intake/Output:    Intake/Output Summary (Last 24 hours) at 2023 1316  Last data filed at 2023 1846  Gross per 24 hour   Intake 1300 ml   Output --   Net 1300 ml       Exam:  BP (!) 191/62   Pulse 60   Temp 97.3 °F (36.3 °C) (Oral)   Resp 18   Ht 162.6 cm (64\")   Wt 92.1 kg (203 lb 0.7 oz)   SpO2 96%   BMI 34.85 kg/m²     General Appearance:    Alert, cooperative, no distress   Head:    Normocephalic, without obvious abnormality, atraumatic   Eyes:       Throat:   Lips, tongue, gums normal   Neck:   Supple, symmetrical, trachea midline, no JVD   Lungs:     Clear to auscultation bilaterally, respirations unlabored   Chest Wall:    No tenderness or deformity    Heart:    Regular rate and rhythm, S1 and S2 normal, no murmur,no  Rub or " gallop   Abdomen:     Soft, nontender, bowel sounds active, no masses, no organomegaly    Extremities:   Extremities normal, atraumatic, no cyanosis or edema   Pulses:      Skin:   Skin is warm and dry, cellulitis and swelling of right leg   Neurologic:   no focal deficits noted      Lab Results (last 72 hours)       Procedure Component Value Units Date/Time    POC Glucose Once [[id number]]  (Abnormal) Collected: 12/14/23 1102    Specimen: Blood Updated: 12/14/23 1104     Glucose 239 mg/dL     POC Glucose Once [[id number]]  (Abnormal) Collected: 12/14/23 0552    Specimen: Blood Updated: 12/14/23 0553     Glucose 204 mg/dL     Vancomycin, Random [[id number]]  (Normal) Collected: 12/14/23 0500    Specimen: Blood Updated: 12/14/23 0542     Vancomycin Random 19.40 mcg/mL     Narrative:      Therapeutic Ranges for Vancomycin    Vancomycin Random   5.0-40.0 mcg/mL  Vancomycin Trough   5.0-20.0 mcg/mL  Vancomycin Peak     20.0-40.0 mcg/mL    Basic Metabolic Panel [[id number]]  (Abnormal) Collected: 12/14/23 0500    Specimen: Blood Updated: 12/14/23 0542     Glucose 220 mg/dL      BUN 20 mg/dL      Creatinine 1.01 mg/dL      Sodium 141 mmol/L      Potassium 3.3 mmol/L      Chloride 104 mmol/L      CO2 24.6 mmol/L      Calcium 8.0 mg/dL      BUN/Creatinine Ratio 19.8     Anion Gap 12.4 mmol/L      eGFR 57.1 mL/min/1.73     Narrative:      GFR Normal >60  Chronic Kidney Disease <60  Kidney Failure <15    The GFR formula is only valid for adults with stable renal function between ages 18 and 70.    STAT Lactic Acid, Reflex [[id number]]  (Normal) Collected: 12/14/23 0500    Specimen: Blood Updated: 12/14/23 0539     Lactate 1.5 mmol/L     Hemoglobin A1c [[id number]]  (Abnormal) Collected: 12/14/23 0500    Specimen: Blood Updated: 12/14/23 0536     Hemoglobin A1C 9.20 %     Narrative:      Hemoglobin A1C Ranges:    Increased Risk for Diabetes  5.7% to 6.4%  Diabetes                     >= 6.5%  Diabetic Goal                < 7.0%     CBC (No Diff) [[id number]]  (Abnormal) Collected: 12/14/23 0500    Specimen: Blood Updated: 12/14/23 0524     WBC 8.56 10*3/mm3      RBC 4.29 10*6/mm3      Hemoglobin 11.4 g/dL      Hematocrit 36.7 %      MCV 85.5 fL      MCH 26.6 pg      MCHC 31.1 g/dL      RDW 13.3 %      RDW-SD 41.0 fl      MPV 10.5 fL      Platelets 200 10*3/mm3     STAT Lactic Acid, Reflex [[id number]]  (Abnormal) Collected: 12/13/23 2326    Specimen: Blood Updated: 12/13/23 2355     Lactate 2.2 mmol/L     Blood Culture - Blood, Arm, Right [[id number]] Collected: 12/13/23 2212    Specimen: Blood from Arm, Right Updated: 12/13/23 2225    Lactic Acid, Plasma [[id number]]  (Abnormal) Collected: 12/13/23 2126    Specimen: Blood Updated: 12/13/23 2205     Lactate 2.1 mmol/L     Basic Metabolic Panel [[id number]]  (Abnormal) Collected: 12/13/23 2126    Specimen: Blood Updated: 12/13/23 2200     Glucose 315 mg/dL      BUN 21 mg/dL      Creatinine 1.12 mg/dL      Sodium 137 mmol/L      Potassium 3.6 mmol/L      Chloride 101 mmol/L      CO2 21.3 mmol/L      Calcium 8.5 mg/dL      BUN/Creatinine Ratio 18.8     Anion Gap 14.7 mmol/L      eGFR 50.4 mL/min/1.73     Narrative:      GFR Normal >60  Chronic Kidney Disease <60  Kidney Failure <15    The GFR formula is only valid for adults with stable renal function between ages 18 and 70.    Blood Culture - Blood, Arm, Left [[id number]] Collected: 12/13/23 2126    Specimen: Blood from Arm, Left Updated: 12/13/23 2137    Tissue Pathology Exam [[id number]] Collected: 12/13/23 1436    Specimen: Polyp from Large Intestine, Cecum; Polyp from Large Intestine, Transverse Colon; Polyp from Large Intestine, Left / Descending Colon; Polyp from Large Intestine, Sigmoid Colon; Polyp from Large Intestine, Rectum Updated: 12/13/23 2129    POC Glucose Once [[id number]]  (Abnormal) Collected: 12/13/23 2114    Specimen: Blood Updated: 12/13/23 2115     Glucose 289 mg/dL     POC Glucose Once [[id number]]  (Abnormal) Collected: 12/13/23  1752    Specimen: Blood Updated: 12/13/23 1754     Glucose 220 mg/dL     CBC & Differential [[id number]]  (Abnormal) Collected: 12/13/23 1729    Specimen: Blood Updated: 12/13/23 1746    Narrative:      The following orders were created for panel order CBC & Differential.  Procedure                               Abnormality         Status                     ---------                               -----------         ------                     CBC Auto Differential[[id number]]        Abnormal            Final result                 Please view results for these tests on the individual orders.    CBC Auto Differential [[id number]]  (Abnormal) Collected: 12/13/23 1729    Specimen: Blood Updated: 12/13/23 1746     WBC 11.55 10*3/mm3      RBC 4.42 10*6/mm3      Hemoglobin 12.0 g/dL      Hematocrit 37.5 %      MCV 84.8 fL      MCH 27.1 pg      MCHC 32.0 g/dL      RDW 13.0 %      RDW-SD 40.1 fl      MPV 10.4 fL      Platelets 190 10*3/mm3      Neutrophil % 84.3 %      Lymphocyte % 8.8 %      Monocyte % 5.9 %      Eosinophil % 0.6 %      Basophil % 0.1 %      Immature Grans % 0.3 %      Neutrophils, Absolute 9.74 10*3/mm3      Lymphocytes, Absolute 1.02 10*3/mm3      Monocytes, Absolute 0.68 10*3/mm3      Eosinophils, Absolute 0.07 10*3/mm3      Basophils, Absolute 0.01 10*3/mm3      Immature Grans, Absolute 0.03 10*3/mm3      nRBC 0.0 /100 WBC           Data Review:  Results from last 7 days   Lab Units 12/14/23  0500 12/13/23  2126   SODIUM mmol/L 141 137   POTASSIUM mmol/L 3.3* 3.6   CHLORIDE mmol/L 104 101   CO2 mmol/L 24.6 21.3*   BUN mg/dL 20 21   CREATININE mg/dL 1.01* 1.12*   GLUCOSE mg/dL 220* 315*   CALCIUM mg/dL 8.0* 8.5*     Results from last 7 days   Lab Units 12/14/23  0500 12/13/23  1729   WBC 10*3/mm3 8.56 11.55*   HEMOGLOBIN g/dL 11.4* 12.0   HEMATOCRIT % 36.7 37.5   PLATELETS 10*3/mm3 200 190         Results from last 7 days   Lab Units 12/14/23  0500   HEMOGLOBIN A1C % 9.20*     No results found for:  "\"TROPONINT\"            Invalid input(s): \"PROT\", \"LABALBU\"      Results from last 7 days   Lab Units 12/14/23  0500   HEMOGLOBIN A1C % 9.20*     Glucose   Date/Time Value Ref Range Status   12/14/2023 1102 239 (H) 70 - 130 mg/dL Final   12/14/2023 0552 204 (H) 70 - 130 mg/dL Final   12/13/2023 2114 289 (H) 70 - 130 mg/dL Final   12/13/2023 1752 220 (H) 70 - 130 mg/dL Final           Past Medical History:   Diagnosis Date    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  PET 6/2016 with small-medium sized lateral infarct, no ischemia.  This wall motion abnormality was seen on echo as well.    Cellulitis 07/2018    RIGHT LEG    Chronic kidney disease, stage III (moderate) 10/13/2016    Chronic venous insufficiency     Hyperlipidemia     Hypertension     Hypertriglyceridemia 9/29/2021    Low back pain     Mass of ovary 3/30/2016    Obesity (BMI 30-39.9) 12/22/2019    Sleep apnea     on CPAP.  Dr. Mueller    Spinal stenosis     Type 2 diabetes mellitus     Uterine leiomyoma 3/30/2016       Assessment:  Active Hospital Problems    Diagnosis  POA    Cellulitis of right leg [L03.115]  Yes    Diabetic foot ulcer [E11.621, L97.509]  Yes    Hypertriglyceridemia [E78.1]  Yes    CAD (coronary artery disease) [I25.10]  Yes    Stage 3 chronic kidney disease [N18.30]  Yes    Type 2 diabetes mellitus, with long-term current use of insulin [E11.9, Z79.4]  Not Applicable    Hyperlipidemia [E78.5]  Yes    Essential hypertension [I10]  Yes      Resolved Hospital Problems    Diagnosis Date Resolved POA    **History of adenomatous polyp of colon [Z86.010] 12/13/2023 Not Applicable       Plan:  Continue with broad-spectrum antibiotics as per infectious disease.  Follow-up on labs and cultures.  Report of MRI and vascular studies noted.    Maikel Ladd MD  12/14/2023  13:16 EST   "

## 2023-12-14 NOTE — CONSULTS
Referring Provider: Polly Ruiz MD  Reason for Consultation:     cellulitis, diabetic foot ulcer         Subjective   History of present illness: Patient is a 78-year-old female with past medical history of CKD, venous insufficiency, obesity, type 2 diabetes and coronary artery disease who presents for screening colonoscopy.  ID consulted for cellulitis, diabetic foot ulcer.    Patient admitted for screening colonoscopy however had a fall the day prior on 12/12 with subsequent development of redness and swelling of the right lower extremity.  She has been wearing compression hose and developed pain over the top of the compression hose prior to admission.  Patient underwent screening colonoscopy however medicine was consulted for cellulitis and medicine consulted ID.  Patient reports a chronic foot ulcer    On the right foot as well which has not noticed any redness or drainage.  She has remained afebrile with current WBC of 8.  Lactate initially 2.1 and normalized to 1.5.  Started on vancomycin and Zosyn.  Does report a rash history to [facility].    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  PET 6/2016 with small-medium sized lateral infarct, no ischemia.  This wall motion abnormality was seen on echo as well.    Cellulitis 07/2018    RIGHT LEG    Chronic kidney disease, stage III (moderate) 10/13/2016    Chronic venous insufficiency     Hyperlipidemia     Hypertension     Hypertriglyceridemia 9/29/2021    Low back pain     Mass of ovary 3/30/2016    Obesity (BMI 30-39.9) 12/22/2019    Sleep apnea     on CPAP.  Dr. Mueller    Spinal stenosis     Type 2 diabetes mellitus     Uterine leiomyoma 3/30/2016       Past Surgical History:   Procedure Laterality Date    CATARACT EXTRACTION Bilateral     X2     COLONOSCOPY N/A 8/29/2018    Procedure: COLONOSCOPY to  CECUM WITH HOT SNARE POLYPECTOMY;  Surgeon: Neal Reilly MD;  Location:  [facility];  Service: Gastroenterology    COLONOSCOPY N/A 12/13/2023    Procedure: COLONOSCOPY to cecum and TI with cold snare and cold biopsy polypectomies;  Surgeon: Polly Ruiz MD;  Location:  [facility];  Service: General;  Laterality: N/A;  pre: screening  post: polyps    EPIDURAL BLOCK      HERNIA REPAIR      HYSTERECTOMY      TONSILLECTOMY         family history includes Diabetes in her mother; Diabetes type II in her son; Heart attack in her mother; Hypertension in her mother; Hypothyroidism in her mother.     reports that she quit smoking about 53 years ago. Her smoking use included cigarettes. She has a 0.50 pack-year smoking history. She has never used smokeless tobacco. She reports that she does not drink alcohol and does not use drugs.     Allergies   Allergen Reactions    Ace Inhibitors Other (See Comments)     Hyperkalemia/ROB    Angiotensin Receptor Blockers Other (See Comments)     Pt unable to remember    Keflex [Cephalexin] Rash     Tolerated ceftriaxone Dec 2019; tolerated zosyn May 2020    Sulfa Antibiotics Itching     rash       Medication:  Antibiotics:  Anti-Infectives (From admission, onward)      Ordered     Dose/Rate Route Frequency Start Stop    12/14/23 0753  vancomycin 750 mg in sodium chloride 0.9 % 250 mL IVPB-VTB        Ordering Provider: Radha Bryant MD    750 mg  333.3 mL/hr over 45 Minutes Intravenous Every 12 Hours 12/14/23 1200 12/18/23 2359    12/13/23 2103  vancomycin 1750 mg/500 mL 0.9% NS IVPB ([facility])        Ordering Provider: Jennifer Gan MD    20 mg/kg × 91.2 kg  over 105 Minutes Intravenous Once 12/13/23 2200 12/14/23 0119    12/13/23 2059  piperacillin-tazobactam (ZOSYN) 3.375 g in iso-osmotic dextrose 50 ml (premix)        Ordering Provider: Jennifer Gan MD    3.375 g  over 30 Minutes Intravenous Once 12/13/23 2145 12/14/23 0004    12/13/23 2059  Pharmacy to  dose vancomycin        Ordering Provider: Jennifer Gan MD     Does not apply Continuous PRN 12/13/23 2059 12/18/23 2058              Objective     Physical Exam:   Vital Signs   Temp:  [97.3 °F (36.3 °C)-99 °F (37.2 °C)] 97.3 °F (36.3 °C)  Heart Rate:  [58-85] 58  Resp:  [16-22] 18  BP: (136-204)/(51-87) 149/51    GENERAL: Awake and alert, in no acute distress.   HEENT: Oropharynx is clear. Hearing is grossly normal.   EYES: PERRL. No conjunctival injection. No lid lag.   LUNGS: Normal work of breathing  SKIN: Swelling of the right lower extremity with erythema at the proximal line where her compression hose and.  PSYCHIATRIC: Appropriate mood, affect, insight, and judgment.     Results Review:   I reviewed the patient's new clinical results.  I reviewed the patient's new imaging results and agree with the interpretation.  I reviewed the patient's other test results and agree with the interpretation    Lab Results   Component Value Date    WBC 8.56 12/14/2023    HGB 11.4 (L) 12/14/2023    HCT 36.7 12/14/2023    MCV 85.5 12/14/2023     12/14/2023       Lab Results   Component Value Date    VANCOTROUGH 17.50 05/09/2020    VANCORANDOM 19.40 12/14/2023       Lab Results   Component Value Date    GLUCOSE 220 (H) 12/14/2023    BUN 20 12/14/2023    CREATININE 1.01 (H) 12/14/2023    EGFRIFNONA 35 (L) 12/08/2021    EGFRIFAFRI 41 (L) 12/08/2021    BCR 19.8 12/14/2023    CO2 24.6 12/14/2023    CALCIUM 8.0 (L) 12/14/2023    PROTENTOTREF 6.4 08/16/2023    ALBUMIN 4.1 08/16/2023    LABIL2 1.8 08/16/2023    AST 14 08/16/2023    ALT 14 08/16/2023         Estimated Creatinine Clearance: 50.5 mL/min (A) (by C-G formula based on SCr of 1.01 mg/dL (H)).      Microbiology:  12/13 blood cultures in process    Radiology:  12/13 right lower extremity x-ray report reviewed with soft tissue swelling of the forefoot with no acute fracture, erosions or dislocations.  Arthritis of the right knee.    Assessment     #Right lower  extremity cellulitis  #Type 2 diabetes  #CKD  #Obesity, BMI 34    BRITTANY and MRI of the foot has been ordered.  Stop Zosyn and continue vancomycin goal -600.  Continue to follow vancomycin levels for therapeutic drug monitoring.      Thank you for this consult.  We will continue to follow along and tailor antibiotics as the patient's clinical course evolves.

## 2023-12-14 NOTE — PROGRESS NOTES
"[facility] Clinical Pharmacy Services: Vancomycin Monitoring Note    Denisse Chavez is a 78 y.o. female who is on day 2/5 of pharmacy to dose vancomycin for Skin and Soft Tissue.    Previous Vancomycin Dose:   1750 mg load last night  Updated Cultures and Sensitivities: Blood cx: pending  Results from last 7 days   Lab Units 12/14/23  0500   VANCOMYCIN RM mcg/mL 19.40     Vitals/Labs  Ht: 162.6 cm (64\"); Wt: 92.1 kg (203 lb 0.7 oz)   Temp Readings from Last 1 Encounters:   12/13/23 98.4 °F (36.9 °C) (Oral)     Estimated Creatinine Clearance: 50.5 mL/min (A) (by C-G formula based on SCr of 1.01 mg/dL (H)).       Results from last 7 days   Lab Units 12/14/23  0500 12/13/23  2126 12/13/23  1729   CREATININE mg/dL 1.01* 1.12*  --    WBC 10*3/mm3 8.56  --  11.55*     Assessment/Plan    Change Vancomycin Dose: 750 mg IV every 12 hours w/1st dose of this regimen at 1200 today; provides a predicted  mg/L.hr   Next Level Date and Time: Vanc Trough on 12/15/25 at 1130  We will continue to monitor patient changes and renal function     Thank you for involving pharmacy in this patient's care. Please contact pharmacy with any questions or concerns.       Jeronimo Tavares [facility]  Clinical Pharmacist         "

## 2023-12-14 NOTE — H&P
HISTORY AND PHYSICAL   [facility]        Date of Admission: 2023  Patient Identification:  Name: Denisse Chavez  Age: 78 y.o.  Sex: female  :  1945  MRN: [id number]                     Primary Care Physician: Surekha Mcdonnell MD    Chief Complaint:  78 year old female who was in endoscopy for a colonoscopy today; after the procedure attention was called to her right leg which was painful and red; she has a history of diabetes and has a diabetic foot ulcer on the bottom of her right foot; she also fell recently; she has had chills but denies fever; she has been followed by podiatry    History of Present Illness:   As above    Past Medical History:  Past Medical History:   Diagnosis Date    Angiomyolipoma of kidney 2016    Aortic valve sclerosis     stable 2020    Arthritis     CAD (coronary artery disease)     MI in , treated medically.  PET 2016 with small-medium sized lateral infarct, no ischemia.  This wall motion abnormality was seen on echo as well.    Cellulitis 2018    RIGHT LEG    Chronic kidney disease, stage III (moderate) 10/13/2016    Chronic venous insufficiency     Hyperlipidemia     Hypertension     Hypertriglyceridemia 2021    Low back pain     Mass of ovary 3/30/2016    Obesity (BMI 30-39.9) 2019    Sleep apnea     on CPAP.  Dr. Mueller    Spinal stenosis     Type 2 diabetes mellitus     Uterine leiomyoma 3/30/2016     Past Surgical History:  Past Surgical History:   Procedure Laterality Date    CATARACT EXTRACTION Bilateral     X2     COLONOSCOPY N/A 2018    Procedure: COLONOSCOPY to CECUM WITH HOT SNARE POLYPECTOMY;  Surgeon: Neal Reilly MD;  Location: [facility] ENDOSCOPY;  Service: Gastroenterology    EPIDURAL BLOCK      HERNIA REPAIR      HYSTERECTOMY      TONSILLECTOMY        Home Meds:  Medications Prior to Admission   Medication Sig Dispense Refill Last Dose    atorvastatin (LIPITOR) 40 MG tablet Take 1 tablet by mouth once  "daily 90 tablet 0 12/12/2023    hydrALAZINE (APRESOLINE) 10 MG tablet TAKE 1 TABLET BY MOUTH THREE TIMES DAILY 270 tablet 0 12/12/2023    insulin aspart protamine-insulin aspart (novoLOG 70/30) injection Inject 70 Units under the skin into the appropriate area as directed 2 (Two) Times a Day With Meals.   12/12/2023 at 0900    isosorbide mononitrate (IMDUR) 60 MG 24 hr tablet Take 1 tablet by mouth once daily 90 tablet 0 12/12/2023    metoprolol succinate XL (TOPROL-XL) 100 MG 24 hr tablet Take 1 tablet by mouth once daily 90 tablet 0 12/12/2023    spironolactone (ALDACTONE) 25 MG tablet Take 1 tablet by mouth Daily.   12/12/2023    acetaminophen-codeine (TYLENOL with CODEINE #3) 300-30 MG per tablet Take 1 tablet by mouth 3 times a day. 90 tablet 0     aspirin 81 MG tablet Take  by mouth Every Night.   12/6/2023    Calcium Carbonate 1500 (600 Ca) MG tablet Take 1 tablet by mouth.       docusate sodium (COLACE) 100 MG capsule Take 1 capsule by mouth 2 (Two) Times a Day.       hydrOXYzine (ATARAX) 25 MG tablet TAKE 1 TABLET BY MOUTH AT NIGHT AS NEEDED FOR INSOMNIA 90 tablet 0     Misc. Devices (ROLLATOR) misc 1 Units as needed (for walking). 1 each 0     nystatin (nystatin) 070216 UNIT/GM powder Apply  topically to the appropriate area as directed 3 (Three) Times a Day. 60 g 11     torsemide (DEMADEX) 20 MG tablet Take 4 tablets by mouth 2 (Two) Times a Day.       vitamin D (ERGOCALCIFEROL) 86799 units capsule capsule 1 capsule Every 7 (Seven) Days.       [DISCONTINUED] ReliOn Insulin Syringe 31G X 15/64\" 1 ML misc USE 1 FIVE TIMES DAILY          Allergies:  Allergies   Allergen Reactions    Ace Inhibitors Other (See Comments)     Hyperkalemia/ROB    Angiotensin Receptor Blockers Other (See Comments)     Pt unable to remember    Keflex [Cephalexin] Rash     Tolerated ceftriaxone Dec 2019; tolerated zosyn May 2020    Sulfa Antibiotics Itching     rash     Immunizations:  Immunization History   Administered Date(s) " Administered    COVID-19 (PFIZER) BIVALENT 12+YRS 2022    COVID-19 (PFIZER) Purple Cap Monovalent 2021, 03/10/2021, 10/20/2021, 2022    Covid-19 (Pfizer) Gray Cap Monovalent 2022    Fluzone High Dose =>65 Years ([employer] ONLY) 10/13/2016, 10/24/2017, 10/01/2018, 2019, 2021, 2022    Fluzone High-Dose 65+yrs 2021, 2022, 10/03/2023    Hepatitis A 2018, 2018    Influenza, Unspecified 10/27/2020, 2021    Pneumococcal Conjugate 13-Valent (PCV13) 2015    Pneumococcal Polysaccharide (PPSV23) 2016    Tdap 10/13/2016     Social History:   Social History     Social History Narrative    Pt drinks 6 cups of tea daily.      Social History     Socioeconomic History    Marital status: Single    Number of children: 3   Tobacco Use    Smoking status: Former     Packs/day: 0.25     Years: 2.00     Additional pack years: 0.00     Total pack years: 0.50     Types: Cigarettes     Quit date: 1970     Years since quittin.9    Smokeless tobacco: Never    Tobacco comments:     LIGHT USAGE   Vaping Use    Vaping Use: Never used   Substance and Sexual Activity    Alcohol use: No    Drug use: No    Sexual activity: Defer       Family History:  Family History   Problem Relation Age of Onset    Diabetes Mother     Heart attack Mother     Hypertension Mother     Hypothyroidism Mother     Diabetes type II Son     Breast cancer Neg Hx         Review of Systems  See history of present illness and past medical history.  Patient denies headache, dizziness, syncope trauma, change in vision, change in hearing, change in taste, changes in weight, changes in appetite, focal weakness, numbness, or paresthesia.  Patient denies chest pain, palpitations, dyspnea, orthopnea, PND, cough, sinus pressure, rhinorrhea, epistaxis, hemoptysis, nausea, vomiting,hematemesis, diarrhea, constipation or hematochezia.  Denies cold or heat intolerance, polydipsia, polyuria, polyphagia.  "Denies hematuria, pyuria, dysuria, hesitancy, frequency or urgency. Denies consumption of raw and under cooked meats foods or change in water source.  Denies fever, chills, sweats, night sweats.  Denies missing any routine medications. Remainder of ROS is negative.    Objective:  T Max 24 hrs: Temp (24hrs), Av °F (37.2 °C), Min:99 °F (37.2 °C), Max:99 °F (37.2 °C)    Vitals Ranges:   Temp:  [99 °F (37.2 °C)] 99 °F (37.2 °C)  Heart Rate:  [62-85] 79  Resp:  [16-22] 18  BP: (136-204)/(55-87) 154/55      Exam:  /55 (BP Location: Right arm, Patient Position: Lying)   Pulse 79   Temp 99 °F (37.2 °C) (Oral)   Resp 18   Ht 162.6 cm (64\")   Wt 90.5 kg (199 lb 9.6 oz)   SpO2 98%   BMI 34.26 kg/m²     General Appearance:    Alert, cooperative, no distress, appears stated age   Head:    Normocephalic, without obvious abnormality, atraumatic   Eyes:    PERRL, conjunctivae/corneas clear, EOM's intact, both eyes   Ears:    Normal external ear canals, both ears   Nose:   Nares normal, septum midline, mucosa normal, no drainage    or sinus tenderness   Throat:   Lips, mucosa, and tongue normal   Neck:   Supple, symmetrical, trachea midline, no adenopathy;     thyroid:  no enlargement/tenderness/nodules; no carotid    bruit or JVD   Back:     Symmetric, no curvature, ROM normal, no CVA tenderness   Lungs:     Decreased breath sounds bilaterally, respirations unlabored   Chest Wall:    No tenderness or deformity    Heart:    Regular rate and rhythm, S1 and S2 normal, no murmur, rub   or gallop   Abdomen:     Soft, nontender, bowel sounds active all four quadrants,     no masses, no hepatomegaly, no splenomegaly   Extremities:   Extremities normal, atraumatic; erythema, warmth right leg below the knee; ulceration ventral surface of midfoot; mildly tender to palpation                       .    Data Review:  Labs in chart were reviewed.  WBC   Date Value Ref Range Status   2023 11.55 (H) 3.40 - 10.80 10*3/mm3 " Final     Hemoglobin   Date Value Ref Range Status   12/13/2023 12.0 12.0 - 15.9 g/dL Final     Hematocrit   Date Value Ref Range Status   12/13/2023 37.5 34.0 - 46.6 % Final     Platelets   Date Value Ref Range Status   12/13/2023 190 140 - 450 10*3/mm3 Final     Sodium   Date Value Ref Range Status   12/13/2023 137 136 - 145 mmol/L Final     Potassium   Date Value Ref Range Status   12/13/2023 3.6 3.5 - 5.2 mmol/L Final     Chloride   Date Value Ref Range Status   12/13/2023 101 98 - 107 mmol/L Final     CO2   Date Value Ref Range Status   12/13/2023 21.3 (L) 22.0 - 29.0 mmol/L Final     BUN   Date Value Ref Range Status   12/13/2023 21 8 - 23 mg/dL Final     Creatinine   Date Value Ref Range Status   12/13/2023 1.12 (H) 0.57 - 1.00 mg/dL Final     Glucose   Date Value Ref Range Status   12/13/2023 315 (H) 65 - 99 mg/dL Final     Calcium   Date Value Ref Range Status   12/13/2023 8.5 (L) 8.6 - 10.5 mg/dL Final                Imaging Results (All)       Procedure Component Value Units Date/Time    XR Knee 1 or 2 View Right [[id number]] Collected: 12/13/23 1812     Updated: 12/13/23 1823    Narrative:      XR KNEE 1 OR 2 VW RIGHT-12/13/2023     HISTORY: Fell, right knee pain.     There is soft tissue swelling of the right knee. There is moderately  severe medial tibiofemoral joint space narrowing. Hypertrophic changes  are seen in the knee. Small loose bodies are seen posteriorly. No acute  fractures are seen.       Impression:      1. Degenerative arthritis of the right knee with loose bodies  posteriorly.  2. No acute bony abnormality is seen.        This report was finalized on 12/13/2023 6:20 PM by Dr. Guero Russell M.D on Workstation: [id number]       XR Foot 2 View Right [[id number]] Collected: 12/13/23 1753     Updated: 12/13/23 1758    Narrative:      XR FOOT 2 VW RIGHT-     INDICATIONS: Trauma.     TECHNIQUE: 2 VIEWS OF THE RIGHT FOOT      COMPARISON: None available     FINDINGS:     Conspicuous soft  tissue swelling at the forefoot may be evidence of  inflammation, infection, injury, with soft tissue lucency at the plantar  aspect of the forefoot, that may represent soft tissue gas/gas-producing  infection, correlate clinically. Arterial calcifications are present.  Dystrophic soft tissue calcifications are also noted. No acute fracture,  erosion, or dislocation is identified. Increased hallux valgus  angulation is seen. Moderate calcaneal spurring is present.  Follow-up/further evaluation can be obtained as indications persist.          Impression:         As described.           This report was finalized on 12/13/2023 5:55 PM by Dr. Boris Castro M.D on Workstation: [id number]                 Assessment:  Active Hospital Problems    Diagnosis  POA    Cellulitis of right leg [L03.115]  Yes    Diabetic foot ulcer [E11.621, L97.509]  Yes      Resolved Hospital Problems    Diagnosis Date Resolved POA    **History of adenomatous polyp of colon [Z86.010] 12/13/2023 Not Applicable   Diabetes  Ckd3  Cad  Sleep apnea  Obesity     Plan:  Ask id to see her  Xrays and labs noted  Check mri foot  Check sen  Antibiotics started  Cultures ordered  Accu checks, insulin slidng scale  Dw patient, family, dr marquez and nurse in endo    Jennifer Leroy Gan MD  12/13/2023  22:11 EST

## 2023-12-14 NOTE — PROGRESS NOTES
I came to see the patient but she was out of the room for MRI.  Reviewed chart including Hospitalist, ID, and Pharmacist recommendations. VSS, labs acceptable.  MRI and LE doppler pending.  Will continue to follow.    Polly Ruiz M.D.  General, Robotic, and Endoscopic Surgery  [facility] [place], operated by [facility]    [address], [address]  [place], [place], [address]  P: [phone]  F: [phone]

## 2023-12-14 NOTE — PROGRESS NOTES
"[facility] Clinical Pharmacy Services: Vancomycin Pharmacokinetic Initial Consult Note    Denisse Chavez is a 78 y.o. female who is on day 1 of pharmacy to dose vancomycin.    Indication: Skin and Soft Tissue  Consulting Provider: Dr. Gan  Planned Duration of Therapy: 5d  Loading Dose Ordered or Given: 1750 mg on 12/13 at 2334  MRSA PCR performed: none  Culture/Source: blood  Target: Dose by Levels  Pertinent Vanc Dosing History:   Other Antimicrobials: Zosyn    Vitals/Labs  Ht: 162.6 cm (64\"); Wt: 90.5 kg (199 lb 9.6 oz)  Temp Readings from Last 1 Encounters:   12/13/23 98.4 °F (36.9 °C) (Oral)    Estimated Creatinine Clearance: 45.1 mL/min (A) (by C-G formula based on SCr of 1.12 mg/dL (H)).       Results from last 7 days   Lab Units 12/13/23  2126 12/13/23  1729   CREATININE mg/dL 1.12*  --    WBC 10*3/mm3  --  11.55*     Assessment/Plan:    Vancomycin Dose:   Intermittent dosing per levels due to abnormal Scr for now  Vanc Random has been ordered for 12/14 at 1200     Pharmacy will follow patient's kidney function and will adjust doses and obtain levels as necessary. Thank you for involving pharmacy in this patient's care. Please contact pharmacy with any questions or concerns.                           Hetal Justin [facility]  Clinical Pharmacist    "

## 2023-12-14 NOTE — CASE MANAGEMENT/SOCIAL WORK
Discharge Planning Assessment  [facility]     Patient Name: Denisse Chavez  MRN: [id number]  Today's Date: 12/14/2023    Admit Date: 12/13/2023    Plan: Home with family   Discharge Needs Assessment       Row Name 12/14/23 1528       Living Environment    People in Home child(reagan), adult    Current Living Arrangements home    Potentially Unsafe Housing Conditions none    Primary Care Provided by self    Provides Primary Care For no one    Family Caregiver if Needed child(reagan), adult    Quality of Family Relationships helpful;involved;supportive    Able to Return to Prior Arrangements yes       Resource/Environmental Concerns    Resource/Environmental Concerns none    Transportation Concerns none       Transition Planning    Patient/Family Anticipates Transition to home with family    Patient/Family Anticipated Services at Transition none    Transportation Anticipated family or friend will provide       Discharge Needs Assessment    Readmission Within the Last 30 Days no previous admission in last 30 days    Equipment Currently Used at Home cane, straight;walker, rolling    Concerns to be Addressed denies needs/concerns at this time    Anticipated Changes Related to Illness none                   Discharge Plan       Row Name 12/14/23 1529       Plan    Plan Home with family    Patient/Family in Agreement with Plan yes    Provided Post Acute Provider List? N/A    Provided Post Acute Provider Quality & Resource List? N/A    Plan Comments CCP met with patient at bedside. Introduced self and explained role of CCP. Patient confirmed the information on her face sheet is accurate. Patients PCP is Surekha Mcdonnell. Patient is enrolled into [facility]. Patient lives at home with her son. She states she is independent at home using a cane and uses a rolling walker when out of the house. She states her son does assist her sometimes. Patient has a history of HH and SNF but cannot remember the name. Patient plans to discharge home  with son and states son can transport. CCP to follow.      Row Name 12/14/23 1528       Plan    Provided Post Acute Provider List? --    Provided Post Acute Provider Quality & Resource List? --                  Continued Care and Services - Admitted Since 12/13/2023    Coordination has not been started for this encounter.          Demographic Summary       Row Name 12/14/23 1528       General Information    Admission Type inpatient    Arrived From emergency department    Referral Source admission list    Reason for Consult discharge planning    Preferred Language English                   Functional Status       Row Name 12/14/23 1528       Functional Status    Usual Activity Tolerance moderate    Current Activity Tolerance moderate       Functional Status, IADL    Medications independent    Meal Preparation independent    Housekeeping independent    Laundry independent    Shopping independent       Mental Status    General Appearance WDL WDL       Mental Status Summary    Recent Changes in Mental Status/Cognitive Functioning no changes       Employment/    Employment Status retired                   Psychosocial    No documentation.                  Abuse/Neglect    No documentation.                  Legal    No documentation.                  Substance Abuse    No documentation.                  Patient Forms    No documentation.

## 2023-12-15 ENCOUNTER — READMISSION MANAGEMENT (OUTPATIENT)
Dept: CALL CENTER | Facility: HOSPITAL | Age: 78
End: 2023-12-15
Payer: MEDICARE

## 2023-12-15 VITALS
HEIGHT: 64 IN | RESPIRATION RATE: 16 BRPM | WEIGHT: 204 LBS | BODY MASS INDEX: 34.83 KG/M2 | OXYGEN SATURATION: 100 % | HEART RATE: 59 BPM | SYSTOLIC BLOOD PRESSURE: 166 MMHG | TEMPERATURE: 98.4 F | DIASTOLIC BLOOD PRESSURE: 57 MMHG

## 2023-12-15 LAB
ANION GAP SERPL CALCULATED.3IONS-SCNC: 10.8 MMOL/L (ref 5–15)
BASOPHILS # BLD AUTO: 0.03 10*3/MM3 (ref 0–0.2)
BASOPHILS NFR BLD AUTO: 0.3 % (ref 0–1.5)
BUN SERPL-MCNC: 22 MG/DL (ref 8–23)
BUN/CREAT SERPL: 17.9 (ref 7–25)
CALCIUM SPEC-SCNC: 8.7 MG/DL (ref 8.6–10.5)
CHLORIDE SERPL-SCNC: 104 MMOL/L (ref 98–107)
CO2 SERPL-SCNC: 29.2 MMOL/L (ref 22–29)
CREAT SERPL-MCNC: 1.23 MG/DL (ref 0.57–1)
DEPRECATED RDW RBC AUTO: 41.2 FL (ref 37–54)
EGFRCR SERPLBLD CKD-EPI 2021: 45.1 ML/MIN/1.73
EOSINOPHIL # BLD AUTO: 0.39 10*3/MM3 (ref 0–0.4)
EOSINOPHIL NFR BLD AUTO: 4.5 % (ref 0.3–6.2)
ERYTHROCYTE [DISTWIDTH] IN BLOOD BY AUTOMATED COUNT: 13.1 % (ref 12.3–15.4)
GLUCOSE BLDC GLUCOMTR-MCNC: 140 MG/DL (ref 70–130)
GLUCOSE BLDC GLUCOMTR-MCNC: 66 MG/DL (ref 70–130)
GLUCOSE SERPL-MCNC: 97 MG/DL (ref 65–99)
HCT VFR BLD AUTO: 37.4 % (ref 34–46.6)
HGB BLD-MCNC: 12.4 G/DL (ref 12–15.9)
IMM GRANULOCYTES # BLD AUTO: 0.07 10*3/MM3 (ref 0–0.05)
IMM GRANULOCYTES NFR BLD AUTO: 0.8 % (ref 0–0.5)
LAB AP CASE REPORT: NORMAL
LYMPHOCYTES # BLD AUTO: 1.69 10*3/MM3 (ref 0.7–3.1)
LYMPHOCYTES NFR BLD AUTO: 19.6 % (ref 19.6–45.3)
MCH RBC QN AUTO: 28.4 PG (ref 26.6–33)
MCHC RBC AUTO-ENTMCNC: 33.2 G/DL (ref 31.5–35.7)
MCV RBC AUTO: 85.6 FL (ref 79–97)
MONOCYTES # BLD AUTO: 0.67 10*3/MM3 (ref 0.1–0.9)
MONOCYTES NFR BLD AUTO: 7.8 % (ref 5–12)
NEUTROPHILS NFR BLD AUTO: 5.77 10*3/MM3 (ref 1.7–7)
NEUTROPHILS NFR BLD AUTO: 67 % (ref 42.7–76)
NRBC BLD AUTO-RTO: 0 /100 WBC (ref 0–0.2)
PATH REPORT.FINAL DX SPEC: NORMAL
PATH REPORT.GROSS SPEC: NORMAL
PLATELET # BLD AUTO: 232 10*3/MM3 (ref 140–450)
PMV BLD AUTO: 10.2 FL (ref 6–12)
POTASSIUM SERPL-SCNC: 3.9 MMOL/L (ref 3.5–5.2)
RBC # BLD AUTO: 4.37 10*6/MM3 (ref 3.77–5.28)
SODIUM SERPL-SCNC: 144 MMOL/L (ref 136–145)
WBC NRBC COR # BLD AUTO: 8.62 10*3/MM3 (ref 3.4–10.8)

## 2023-12-15 PROCEDURE — 99231 SBSQ HOSP IP/OBS SF/LOW 25: CPT | Performed by: STUDENT IN AN ORGANIZED HEALTH CARE EDUCATION/TRAINING PROGRAM

## 2023-12-15 PROCEDURE — 25810000003 SODIUM CHLORIDE 0.9 % SOLUTION 250 ML FLEX CONT: Performed by: INTERNAL MEDICINE

## 2023-12-15 PROCEDURE — 85025 COMPLETE CBC W/AUTO DIFF WBC: CPT | Performed by: HOSPITALIST

## 2023-12-15 PROCEDURE — 63710000001 INSULIN ISOPHANE & REGULAR PER 5 UNITS: Performed by: HOSPITALIST

## 2023-12-15 PROCEDURE — 25010000002 VANCOMYCIN 750 MG RECONSTITUTED SOLUTION 1 EACH VIAL: Performed by: INTERNAL MEDICINE

## 2023-12-15 PROCEDURE — 99232 SBSQ HOSP IP/OBS MODERATE 35: CPT | Performed by: STUDENT IN AN ORGANIZED HEALTH CARE EDUCATION/TRAINING PROGRAM

## 2023-12-15 PROCEDURE — 82948 REAGENT STRIP/BLOOD GLUCOSE: CPT

## 2023-12-15 PROCEDURE — 25010000002 ENOXAPARIN PER 10 MG: Performed by: INTERNAL MEDICINE

## 2023-12-15 PROCEDURE — 80048 BASIC METABOLIC PNL TOTAL CA: CPT | Performed by: HOSPITALIST

## 2023-12-15 RX ORDER — DICLOXACILLIN SODIUM 250 MG/1
500 CAPSULE ORAL EVERY 6 HOURS SCHEDULED
Status: DISCONTINUED | OUTPATIENT
Start: 2023-12-15 | End: 2023-12-15 | Stop reason: HOSPADM

## 2023-12-15 RX ADMIN — METOPROLOL SUCCINATE 100 MG: 100 TABLET, EXTENDED RELEASE ORAL at 08:37

## 2023-12-15 RX ADMIN — ASPIRIN 81 MG: 81 TABLET, COATED ORAL at 08:37

## 2023-12-15 RX ADMIN — VANCOMYCIN HYDROCHLORIDE 750 MG: 750 INJECTION, POWDER, LYOPHILIZED, FOR SOLUTION INTRAVENOUS at 01:00

## 2023-12-15 RX ADMIN — HYDRALAZINE HYDROCHLORIDE 10 MG: 10 TABLET, FILM COATED ORAL at 08:37

## 2023-12-15 RX ADMIN — TORSEMIDE 80 MG: 20 TABLET ORAL at 08:37

## 2023-12-15 RX ADMIN — ACETAMINOPHEN AND CODEINE PHOSPHATE 1 TABLET: 300; 30 TABLET ORAL at 08:37

## 2023-12-15 RX ADMIN — ISOSORBIDE MONONITRATE 60 MG: 60 TABLET, EXTENDED RELEASE ORAL at 08:37

## 2023-12-15 RX ADMIN — SPIRONOLACTONE 25 MG: 25 TABLET ORAL at 08:37

## 2023-12-15 RX ADMIN — DOCUSATE SODIUM 50MG AND SENNOSIDES 8.6MG 2 TABLET: 8.6; 5 TABLET, FILM COATED ORAL at 08:37

## 2023-12-15 RX ADMIN — INSULIN HUMAN 70 UNITS: 100 INJECTION, SUSPENSION SUBCUTANEOUS at 08:36

## 2023-12-15 RX ADMIN — ATORVASTATIN CALCIUM 40 MG: 20 TABLET, FILM COATED ORAL at 08:37

## 2023-12-15 RX ADMIN — ENOXAPARIN SODIUM 40 MG: 100 INJECTION SUBCUTANEOUS at 08:37

## 2023-12-15 NOTE — PROGRESS NOTES
"General Surgery Progress Note    CC: RLE pain    S: Patient states she is feeling well today. Says her right leg pain is much better. Still reports some swelling.  No NV or fevers.    O:/57 (BP Location: Left arm, Patient Position: Lying)   Pulse 59   Temp 98.4 °F (36.9 °C) (Oral)   Resp 16   Ht 162.6 cm (64\")   Wt 92.5 kg (204 lb)   SpO2 100%   BMI 35.02 kg/m²     Intake & Output (last day)         12/14 0701  12/15 0700 12/15 0701  12/16 0700    I.V. (mL/kg)      Total Intake(mL/kg)      Net            Urine Unmeasured Occurrence  1 x            GENERAL: awake, alert, resting comfortably in bed interactive and cooperative  HEENT: EOMI, clear sclera, moist mucus membranes   CHEST: normal work of breathing on room air  CARDIAC: regular rate    ABDOMEN: nondistended  EXTREMITIES: PADRON, BLE pitting edema; RLE erythema is significantly improved, well within the confines of the prior skin markings; there is minimal tenderness, no fluctuance or crepitus, and stable edema    Skin: warm and dry    LABS  Results from last 7 days   Lab Units 12/15/23  0659 12/14/23  0500 12/13/23  1729   WBC 10*3/mm3 8.62 8.56 11.55*   HEMOGLOBIN g/dL 12.4 11.4* 12.0   HEMATOCRIT % 37.4 36.7 37.5   PLATELETS 10*3/mm3 232 200 190     Results from last 7 days   Lab Units 12/15/23  0659 12/14/23  0500 12/13/23  2126   SODIUM mmol/L 144 141 137   POTASSIUM mmol/L 3.9 3.3* 3.6   CHLORIDE mmol/L 104 104 101   CO2 mmol/L 29.2* 24.6 21.3*   BUN mg/dL 22 20 21   CREATININE mg/dL 1.23* 1.01* 1.12*   CALCIUM mg/dL 8.7 8.0* 8.5*   GLUCOSE mg/dL 97 220* 315*             A/P: 78 y.o. female with  diabetes type 2 complicated by hyperglycemia and neuropathy who presented for high risk screening colonoscopy, incidentally noted to have new right lower extremity cellulitis after a fall the day prior to admission, with chronic appearing right plantar ulcer. Patient's cellulitis is significantly improved on vancomycin. ID is following. No surgical " needs for her RLE infection at this time. I will follow up with her regarding her colonoscopy results as an outpatient.    Polly Ruiz MD  General, Robotic and Endoscopic Surgery  [facility], operated by [facility]    [address], [address]  [place], [place], [address]  P: [phone]  F: [phone]

## 2023-12-15 NOTE — PROGRESS NOTES
LOS: 2 days     Chief Complaint: Cellulitis    Interval History: Patient reports improvement in erythema and swelling of the right lower extremity today.  Denies any pain.  Tolerating antibiotics well.    Vital Signs  Temp:  [96.7 °F (35.9 °C)-98.4 °F (36.9 °C)] 98.4 °F (36.9 °C)  Heart Rate:  [57-60] 59  Resp:  [16-18] 16  BP: (121-191)/(49-62) 166/57    Physical Exam:  General: In no acute distress  HEENT: Oropharynx clear, moist mucous membranes  Respiratory: Normal work of breathing  Skin: Improved erythema of the right lower extremity  Extremities: Bilateral lower extremity edema  Access: Peripheral IV    Antibiotics:  Anti-Infectives (From admission, onward)      Ordered     Dose/Rate Route Frequency Start Stop    12/14/23 0753  vancomycin 750 mg in sodium chloride 0.9 % 250 mL IVPB-VTB        Ordering Provider: Radha Bryant MD    750 mg  333.3 mL/hr over 45 Minutes Intravenous Every 12 Hours 12/14/23 1200 12/18/23 2359    12/13/23 2103  vancomycin 1750 mg/500 mL 0.9% NS IVPB ([facility])        Ordering Provider: Jennifer Gan MD    20 mg/kg × 91.2 kg  over 105 Minutes Intravenous Once 12/13/23 2200 12/14/23 0119    12/13/23 2059  piperacillin-tazobactam (ZOSYN) 3.375 g in iso-osmotic dextrose 50 ml (premix)        Ordering Provider: Jennifer Gan MD    3.375 g  over 30 Minutes Intravenous Once 12/13/23 2145 12/14/23 0004    12/13/23 2059  Pharmacy to dose vancomycin        Ordering Provider: Jennifer Gan MD     Does not apply Continuous PRN 12/13/23 2059 12/18/23 2058             Results Review:     I reviewed the patient's new clinical results.    Lab Results   Component Value Date    WBC 8.62 12/15/2023    HGB 12.4 12/15/2023    HCT 37.4 12/15/2023    MCV 85.6 12/15/2023     12/15/2023     Lab Results   Component Value Date    GLUCOSE 97 12/15/2023    BUN 22 12/15/2023    CREATININE 1.23 (H) 12/15/2023    EGFRIFNONA 35 (L) 12/08/2021    EGFRIFAFRI 41 (L) 12/08/2021    BCR 17.9  12/15/2023    CO2 29.2 (H) 12/15/2023    CALCIUM 8.7 12/15/2023    PROTENTOTREF 6.4 08/16/2023    ALBUMIN 4.1 08/16/2023    LABIL2 1.8 08/16/2023    AST 14 08/16/2023    ALT 14 08/16/2023       Microbiology:  12/13 blood cultures no growth to date    Radiology:  12/14 MRI right foot report reviewed with chronic degenerative changes of the second and third metatarsal joints with no abscess or osteomyelitis present.        Assessment    #Right lower extremity cellulitis  #Type 2 diabetes  #CKD  #Obesity, BMI 34    MRI negative for deeper infection.  Erythema improving.  Remains afebrile with no leukocytosis.  With clinical improvement we will plan to transition to oral therapy with Dicloxacillin 500 mg 4 times daily for a 7-day course in the setting of her keflex allergy.    Thank you for allowing me to be involved in the care of this patient. Infectious diseases will sign off at this time with antibiotics plan in place, but please call me at [phone] if any further ID questions or new ID concerns.

## 2023-12-15 NOTE — DISCHARGE SUMMARY
PHYSICIAN DISCHARGE SUMMARY                                                                        [facility]    Patient Identification:  Name: Denisse Chavez  Age: 78 y.o.  Sex: female  :  1945  MRN: [id number]  Primary Care Physician: Surekha Mcdonnell MD    Admit date: 2023  Discharge date and time:12/15/2023  Discharged Condition: good    Discharge Diagnoses:  Active Hospital Problems    Diagnosis  POA    Cellulitis of right leg [L03.115]  Yes    Diabetic foot ulcer [E11.621, L97.509]  Yes    Hypertriglyceridemia [E78.1]  Yes    CAD (coronary artery disease) [I25.10]  Yes    Stage 3 chronic kidney disease [N18.30]  Yes    Type 2 diabetes mellitus, with long-term current use of insulin [E11.9, Z79.4]  Not Applicable    Hyperlipidemia [E78.5]  Yes    Essential hypertension [I10]  Yes      Resolved Hospital Problems    Diagnosis Date Resolved POA    **History of adenomatous polyp of colon [Z86.010] 2023 Not Applicable          PMHX:   Past Medical History:   Diagnosis Date    Angiomyolipoma of kidney 2016    Aortic valve sclerosis     stable 2020    Arthritis     CAD (coronary artery disease)     MI in , treated medically.  PET 2016 with small-medium sized lateral infarct, no ischemia.  This wall motion abnormality was seen on echo as well.    Cellulitis 2018    RIGHT LEG    Chronic kidney disease, stage III (moderate) 10/13/2016    Chronic venous insufficiency     Hyperlipidemia     Hypertension     Hypertriglyceridemia 2021    Low back pain     Mass of ovary 3/30/2016    Obesity (BMI 30-39.9) 2019    Sleep apnea     on CPAP.  Dr. Mueller    Spinal stenosis     Type 2 diabetes mellitus     Uterine leiomyoma 3/30/2016     PSHX:   Past Surgical History:   Procedure Laterality Date    CATARACT EXTRACTION Bilateral     X2     COLONOSCOPY N/A 2018    Procedure: COLONOSCOPY to CECUM  WITH HOT SNARE POLYPECTOMY;  Surgeon: Neal Reilly MD;  Location: [facility] ENDOSCOPY;  Service: Gastroenterology    COLONOSCOPY N/A 12/13/2023    Procedure: COLONOSCOPY to cecum and TI with cold snare and cold biopsy polypectomies;  Surgeon: Polly Ruiz MD;  Location: [facility] ENDOSCOPY;  Service: General;  Laterality: N/A;  pre: screening  post: polyps    EPIDURAL BLOCK      HERNIA REPAIR      HYSTERECTOMY      TONSILLECTOMY         Hospital Course: Denisse Chavez  is a   78 year old female who was in endoscopy for a colonoscopy today; after the procedure attention was called to her right leg which was painful and red; she has a history of diabetes and has a diabetic foot ulcer on the bottom of her right foot; she also fell recently; she has had chills but denies fever; she has been followed by podiatry.  The patient was admitted to the hospital and seen by infectious disease and started on antibiotics for cellulitis of the right lower extremity.  It was improving and after being in the hospital for couple days she looked well enough to go home on some oral antibiotics with dicloxacillin per recommendation from infectious disease.  She will follow-up with her primary care in 1 week for ongoing care.       Consults:     Consults       Date and Time Order Name Status Description    12/13/2023  8:59 PM Inpatient Infectious Diseases Consult Completed     12/13/2023  3:40 PM Inpatient Internal Medicine Consult            Results from last 7 days   Lab Units 12/15/23  0659   WBC 10*3/mm3 8.62   HEMOGLOBIN g/dL 12.4   HEMATOCRIT % 37.4   PLATELETS 10*3/mm3 232     Results from last 7 days   Lab Units 12/15/23  0659   SODIUM mmol/L 144   POTASSIUM mmol/L 3.9   CHLORIDE mmol/L 104   CO2 mmol/L 29.2*   BUN mg/dL 22   CREATININE mg/dL 1.23*   GLUCOSE mg/dL 97   CALCIUM mg/dL 8.7     Significant Diagnostic Studies:   WBC   Date Value Ref Range Status   12/15/2023 8.62 3.40 - 10.80 10*3/mm3 Final     Hemoglobin   Date  "Value Ref Range Status   12/15/2023 12.4 12.0 - 15.9 g/dL Final     Hematocrit   Date Value Ref Range Status   12/15/2023 37.4 34.0 - 46.6 % Final     Platelets   Date Value Ref Range Status   12/15/2023 232 140 - 450 10*3/mm3 Final     Sodium   Date Value Ref Range Status   12/15/2023 144 136 - 145 mmol/L Final     Potassium   Date Value Ref Range Status   12/15/2023 3.9 3.5 - 5.2 mmol/L Final     Chloride   Date Value Ref Range Status   12/15/2023 104 98 - 107 mmol/L Final     CO2   Date Value Ref Range Status   12/15/2023 29.2 (H) 22.0 - 29.0 mmol/L Final     BUN   Date Value Ref Range Status   12/15/2023 22 8 - 23 mg/dL Final     Creatinine   Date Value Ref Range Status   12/15/2023 1.23 (H) 0.57 - 1.00 mg/dL Final     Glucose   Date Value Ref Range Status   12/15/2023 97 65 - 99 mg/dL Final     Calcium   Date Value Ref Range Status   12/15/2023 8.7 8.6 - 10.5 mg/dL Final     No results found for: \"AST\", \"ALT\", \"ALKPHOS\"  No results found for: \"APTT\", \"INR\"  No results found for: \"COLORU\", \"CLARITYU\", \"SPECGRAV\", \"PHUR\", \"PROTEINUR\", \"GLUCOSEU\", \"KETONESU\", \"BLOODU\", \"NITRITE\", \"LEUKOCYTESUR\", \"BILIRUBINUR\", \"UROBILINOGEN\", \"RBCUA\", \"WBCUA\", \"BACTERIA\", \"UACOMMENT\"  No results found for: \"TROPONINT\", \"TROPONINI\", \"BNP\"  No components found for: \"HGBA1C;2\"  No components found for: \"TSH;2\"  Imaging Results (All)       Procedure Component Value Units Date/Time    MRI Foot Right With & Without Contrast [[id number]] Collected: 12/14/23 1151     Updated: 12/14/23 1307    Narrative:      MRI RIGHT MID AND FOREFOOT WITH AND WITHOUT CONTRAST     HISTORY: Chronic callus along the plantar surface of the foot for many  years with intermittent infection at that site.     TECHNIQUE: MRI of the right mid and forefoot is provided and correlated  with foot x-rays 12/13/2023. 19 mL MultiHance contrast was used.     FINDINGS: Claw toe deformities at the 2nd through 4th toes is observed.  There is rather extreme dorsiflexion at the " 2nd and 3d  metatarsophalangeal joints.     Mild diffuse soft tissue edema is observed throughout the foot with more  prominent subcutaneous edema over the dorsum of the foot. Foot  musculature is atrophic.     There is an area of soft tissue loss and skin contour concavity along  the plantar surface of the foot lateral to the 3rd metatarsal head. A  marker overlies this defect. No focal soft tissue edema is present nor  is there any enhancement or fluid collection deep to this area.     Marrow signal throughout the anterior hindfoot, midfoot, and forefoot is  normal. There is no abnormal joint effusion or tendon sheath effusion.     The Lisfranc ligament complex is intact. The joints appear normal across  the midfoot and in the visualized anterior hindfoot.       Impression:      Muscular atrophy in the right foot with a skin contour  defect and loss of subcutaneous fat along the plantar side of the foot  superficial to the 3rd metatarsal head and lateral to that site. There  are also chronic hyperextension deformities at several  metatarsophalangeal joints and degenerative change at the 2nd and 3rd  metatarsophalangeal joint. No abscess or osteomyelitis is present.     This report was finalized on 12/14/2023 1:04 PM by Dr. Anil Booth M.D on Workstation: [id number]       XR Knee 1 or 2 View Right [[id number]] Collected: 12/13/23 1812     Updated: 12/13/23 1823    Narrative:      XR KNEE 1 OR 2 VW RIGHT-12/13/2023     HISTORY: Fell, right knee pain.     There is soft tissue swelling of the right knee. There is moderately  severe medial tibiofemoral joint space narrowing. Hypertrophic changes  are seen in the knee. Small loose bodies are seen posteriorly. No acute  fractures are seen.       Impression:      1. Degenerative arthritis of the right knee with loose bodies  posteriorly.  2. No acute bony abnormality is seen.        This report was finalized on 12/13/2023 6:20 PM by Dr. Guero Russell M.D on  Workstation: [id number]       XR Foot 2 View Right [[id number]] Collected: 12/13/23 1753     Updated: 12/13/23 1758    Narrative:      XR FOOT 2 VW RIGHT-     INDICATIONS: Trauma.     TECHNIQUE: 2 VIEWS OF THE RIGHT FOOT      COMPARISON: None available     FINDINGS:     Conspicuous soft tissue swelling at the forefoot may be evidence of  inflammation, infection, injury, with soft tissue lucency at the plantar  aspect of the forefoot, that may represent soft tissue gas/gas-producing  infection, correlate clinically. Arterial calcifications are present.  Dystrophic soft tissue calcifications are also noted. No acute fracture,  erosion, or dislocation is identified. Increased hallux valgus  angulation is seen. Moderate calcaneal spurring is present.  Follow-up/further evaluation can be obtained as indications persist.          Impression:         As described.           This report was finalized on 12/13/2023 5:55 PM by Dr. Boris Castro M.D on Workstation: [id number]             Lab Results (last 7 days)       Procedure Component Value Units Date/Time    POC Glucose Once [[id number]]  (Abnormal) Collected: 12/15/23 1125    Specimen: Blood Updated: 12/15/23 1126     Glucose 140 mg/dL     Tissue Pathology Exam [[id number]] Collected: 12/13/23 1436    Specimen: Polyp from Large Intestine, Cecum; Polyp from Large Intestine, Transverse Colon; Polyp from Large Intestine, Left / Descending Colon; Polyp from Large Intestine, Sigmoid Colon; Polyp from Large Intestine, Rectum Updated: 12/15/23 1108     Case Report --     Surgical Pathology Report                         Case: [id number]                                  Authorizing Provider:  Polly Ruiz MD       Collected:           12/13/2023 02:36 PM          Ordering Location:     [facility]  Received:            12/13/2023 09:29 PM                                 ENDO SUITES                                                                  Pathologist:            Nilsa Ingram MD                                                          Specimens:   1) - Large Intestine, Cecum, Two 4mm cecal polyps                                                   2) - Large Intestine, Transverse Colon, Transverse colon polyps at 100 cm                           3) - Large Intestine, Left / Descending Colon, descending polyp                                     4) - Large Intestine, Sigmoid Colon, sigmoid colon polyps                                           5) - Large Intestine, Rectum, rectosigmoid polyp at 15 cm                                   Final Diagnosis --     1.  Colon, cecum, biopsy:   A.  Fragments of tubular adenoma.    2.  Colon, transverse 100 cm, biopsy:   A.  Fragments of tubulovillous adenoma.    3.  Colon, descending, biopsy:   A.  Fragments of tubular adenoma.    4.  Colon, sigmoid, biopsy:   A.  Tubular adenoma.   B.  Fragments of developing hyperplastic polyp.    5.  Colon, rectosigmoid at 15 cm, biopsy:   A.  Developing hyperplastic polyp.       Gross Description --     1. Large Intestine, Cecum.  The specimen is received in formalin labeled with the patient's name and further designated 'two 4 mm cecal polyps ' are multiple small fragments of gray-tan tissue. The specimen is submitted for embedding as received.      2. Large Intestine, Transverse Colon.  The specimen is received in formalin labeled with the patient's name and further designated 'transverse colon polyps at 100 cm ' are multiple small fragments of gray-tan tissue. The specimen is submitted for embedding as received.      3. Large Intestine, Left / Descending Colon.  The specimen is received in formalin labeled with the patient's name and further designated 'descending polyp ' is a small fragment of gray-tan tissue. The specimen is bisected and submitted for embedding as received.      4. Large Intestine, Sigmoid Colon.  The specimen is received in formalin labeled with the patient's name and  further designated 'sigmoid colon polyps ' are multiple small fragments of gray-tan tissue. The specimen is submitted for embedding as received.      5. Large Intestine, Rectum.  The specimen is received in formalin labeled with the patient's name and further designated 'rectosigmoid polyp at 15 cm ' is a small fragment of gray-tan tissue. The specimen is submitted for embedding as received.          Basic Metabolic Panel [[id number]]  (Abnormal) Collected: 12/15/23 0659    Specimen: Blood Updated: 12/15/23 0748     Glucose 97 mg/dL      BUN 22 mg/dL      Creatinine 1.23 mg/dL      Sodium 144 mmol/L      Potassium 3.9 mmol/L      Chloride 104 mmol/L      CO2 29.2 mmol/L      Calcium 8.7 mg/dL      BUN/Creatinine Ratio 17.9     Anion Gap 10.8 mmol/L      eGFR 45.1 mL/min/1.73     Narrative:      GFR Normal >60  Chronic Kidney Disease <60  Kidney Failure <15    The GFR formula is only valid for adults with stable renal function between ages 18 and 70.    CBC & Differential [[id number]]  (Abnormal) Collected: 12/15/23 0659    Specimen: Blood Updated: 12/15/23 0738    Narrative:      The following orders were created for panel order CBC & Differential.  Procedure                               Abnormality         Status                     ---------                               -----------         ------                     CBC Auto Differential[[id number]]        Abnormal            Final result                 Please view results for these tests on the individual orders.    CBC Auto Differential [[id number]]  (Abnormal) Collected: 12/15/23 0659    Specimen: Blood Updated: 12/15/23 0738     WBC 8.62 10*3/mm3      RBC 4.37 10*6/mm3      Hemoglobin 12.4 g/dL      Hematocrit 37.4 %      MCV 85.6 fL      MCH 28.4 pg      MCHC 33.2 g/dL      RDW 13.1 %      RDW-SD 41.2 fl      MPV 10.2 fL      Platelets 232 10*3/mm3      Neutrophil % 67.0 %      Lymphocyte % 19.6 %      Monocyte % 7.8 %      Eosinophil % 4.5 %      Basophil %  0.3 %      Immature Grans % 0.8 %      Neutrophils, Absolute 5.77 10*3/mm3      Lymphocytes, Absolute 1.69 10*3/mm3      Monocytes, Absolute 0.67 10*3/mm3      Eosinophils, Absolute 0.39 10*3/mm3      Basophils, Absolute 0.03 10*3/mm3      Immature Grans, Absolute 0.07 10*3/mm3      nRBC 0.0 /100 WBC     POC Glucose Once [[id number]]  (Abnormal) Collected: 12/15/23 0643    Specimen: Blood Updated: 12/15/23 0644     Glucose 66 mg/dL     Blood Culture - Blood, Arm, Right [[id number]]  (Normal) Collected: 12/13/23 2212    Specimen: Blood from Arm, Right Updated: 12/14/23 2232     Blood Culture No growth at 24 hours    Blood Culture - Blood, Arm, Left [[id number]]  (Normal) Collected: 12/13/23 2126    Specimen: Blood from Arm, Left Updated: 12/14/23 2145     Blood Culture No growth at 24 hours    POC Glucose Once [[id number]]  (Abnormal) Collected: 12/14/23 1911    Specimen: Blood Updated: 12/14/23 1913     Glucose 162 mg/dL     POC Glucose Once [[id number]]  (Abnormal) Collected: 12/14/23 1638    Specimen: Blood Updated: 12/14/23 1640     Glucose 268 mg/dL     POC Glucose Once [[id number]]  (Abnormal) Collected: 12/14/23 1102    Specimen: Blood Updated: 12/14/23 1104     Glucose 239 mg/dL     POC Glucose Once [[id number]]  (Abnormal) Collected: 12/14/23 0552    Specimen: Blood Updated: 12/14/23 0553     Glucose 204 mg/dL     Vancomycin, Random [[id number]]  (Normal) Collected: 12/14/23 0500    Specimen: Blood Updated: 12/14/23 0542     Vancomycin Random 19.40 mcg/mL     Narrative:      Therapeutic Ranges for Vancomycin    Vancomycin Random   5.0-40.0 mcg/mL  Vancomycin Trough   5.0-20.0 mcg/mL  Vancomycin Peak     20.0-40.0 mcg/mL    Basic Metabolic Panel [[id number]]  (Abnormal) Collected: 12/14/23 0500    Specimen: Blood Updated: 12/14/23 0542     Glucose 220 mg/dL      BUN 20 mg/dL      Creatinine 1.01 mg/dL      Sodium 141 mmol/L      Potassium 3.3 mmol/L      Chloride 104 mmol/L      CO2 24.6 mmol/L      Calcium 8.0 mg/dL       BUN/Creatinine Ratio 19.8     Anion Gap 12.4 mmol/L      eGFR 57.1 mL/min/1.73     Narrative:      GFR Normal >60  Chronic Kidney Disease <60  Kidney Failure <15    The GFR formula is only valid for adults with stable renal function between ages 18 and 70.    STAT Lactic Acid, Reflex [[id number]]  (Normal) Collected: 12/14/23 0500    Specimen: Blood Updated: 12/14/23 0539     Lactate 1.5 mmol/L     Hemoglobin A1c [[id number]]  (Abnormal) Collected: 12/14/23 0500    Specimen: Blood Updated: 12/14/23 0536     Hemoglobin A1C 9.20 %     Narrative:      Hemoglobin A1C Ranges:    Increased Risk for Diabetes  5.7% to 6.4%  Diabetes                     >= 6.5%  Diabetic Goal                < 7.0%    CBC (No Diff) [[id number]]  (Abnormal) Collected: 12/14/23 0500    Specimen: Blood Updated: 12/14/23 0524     WBC 8.56 10*3/mm3      RBC 4.29 10*6/mm3      Hemoglobin 11.4 g/dL      Hematocrit 36.7 %      MCV 85.5 fL      MCH 26.6 pg      MCHC 31.1 g/dL      RDW 13.3 %      RDW-SD 41.0 fl      MPV 10.5 fL      Platelets 200 10*3/mm3     STAT Lactic Acid, Reflex [[id number]]  (Abnormal) Collected: 12/13/23 2326    Specimen: Blood Updated: 12/13/23 2355     Lactate 2.2 mmol/L     Lactic Acid, Plasma [[id number]]  (Abnormal) Collected: 12/13/23 2126    Specimen: Blood Updated: 12/13/23 2205     Lactate 2.1 mmol/L     Basic Metabolic Panel [[id number]]  (Abnormal) Collected: 12/13/23 2126    Specimen: Blood Updated: 12/13/23 2200     Glucose 315 mg/dL      BUN 21 mg/dL      Creatinine 1.12 mg/dL      Sodium 137 mmol/L      Potassium 3.6 mmol/L      Chloride 101 mmol/L      CO2 21.3 mmol/L      Calcium 8.5 mg/dL      BUN/Creatinine Ratio 18.8     Anion Gap 14.7 mmol/L      eGFR 50.4 mL/min/1.73     Narrative:      GFR Normal >60  Chronic Kidney Disease <60  Kidney Failure <15    The GFR formula is only valid for adults with stable renal function between ages 18 and 70.    POC Glucose Once [[id number]]  (Abnormal) Collected: 12/13/23  "2114    Specimen: Blood Updated: 12/13/23 2115     Glucose 289 mg/dL     POC Glucose Once [[id number]]  (Abnormal) Collected: 12/13/23 1752    Specimen: Blood Updated: 12/13/23 1754     Glucose 220 mg/dL     CBC & Differential [[id number]]  (Abnormal) Collected: 12/13/23 1729    Specimen: Blood Updated: 12/13/23 1746    Narrative:      The following orders were created for panel order CBC & Differential.  Procedure                               Abnormality         Status                     ---------                               -----------         ------                     CBC Auto Differential[[id number]]        Abnormal            Final result                 Please view results for these tests on the individual orders.    CBC Auto Differential [[id number]]  (Abnormal) Collected: 12/13/23 1729    Specimen: Blood Updated: 12/13/23 1746     WBC 11.55 10*3/mm3      RBC 4.42 10*6/mm3      Hemoglobin 12.0 g/dL      Hematocrit 37.5 %      MCV 84.8 fL      MCH 27.1 pg      MCHC 32.0 g/dL      RDW 13.0 %      RDW-SD 40.1 fl      MPV 10.4 fL      Platelets 190 10*3/mm3      Neutrophil % 84.3 %      Lymphocyte % 8.8 %      Monocyte % 5.9 %      Eosinophil % 0.6 %      Basophil % 0.1 %      Immature Grans % 0.3 %      Neutrophils, Absolute 9.74 10*3/mm3      Lymphocytes, Absolute 1.02 10*3/mm3      Monocytes, Absolute 0.68 10*3/mm3      Eosinophils, Absolute 0.07 10*3/mm3      Basophils, Absolute 0.01 10*3/mm3      Immature Grans, Absolute 0.03 10*3/mm3      nRBC 0.0 /100 WBC           /57 (BP Location: Left arm, Patient Position: Lying)   Pulse 59   Temp 98.4 °F (36.9 °C) (Oral)   Resp 16   Ht 162.6 cm (64\")   Wt 92.5 kg (204 lb)   SpO2 100%   BMI 35.02 kg/m²     Discharge Exam:  General Appearance:    Alert, cooperative, no distress                          Head:    Normocephalic, without obvious abnormality, atraumatic                          Eyes:                            Throat:   Lips, tongue, gums " normal                          Neck:   Supple, symmetrical, trachea midline, no JVD                        Lungs:     Clear to auscultation bilaterally, respirations unlabored                Chest Wall:    No tenderness or deformity                        Heart:    Regular rate and rhythm, S1 and S2 normal, no murmur,no  Rub  or gallop                  Abdomen:     Soft, non-tender, bowel sounds active, no masses, no organomegaly                  Extremities:   Extremities normal, atraumatic, no cyanosis or edema                             Skin:   Skin is warm and dry, cellulitis right leg is improving                                neurologic:   no focal deficits noted     Disposition:  Home    Activity as tolerated    Diet as tolerated  Diet Order   Procedures    Diet: Cardiac Diets; Healthy Heart (2-3 Na+); Texture: Regular Texture (IDDSI 7); Fluid Consistency: Thin (IDDSI 0)       Patient Instructions:      Discharge Medications        New Medications        Instructions Start Date   dicloxacillin 500 MG capsule  Commonly known as: DYNAPEN   500 mg, Oral, Every 6 Hours Scheduled             Continue These Medications        Instructions Start Date   acetaminophen-codeine 300-30 MG per tablet  Commonly known as: TYLENOL with CODEINE #3   1 tablet, Oral, 3 times daily      aspirin 81 MG tablet   Oral, Nightly      atorvastatin 40 MG tablet  Commonly known as: LIPITOR   Take 1 tablet by mouth once daily      Calcium Carbonate 1500 (600 Ca) MG tablet   1 tablet, Oral      docusate sodium 100 MG capsule  Commonly known as: COLACE   100 mg, Oral, 2 Times Daily      hydrALAZINE 10 MG tablet  Commonly known as: APRESOLINE   TAKE 1 TABLET BY MOUTH THREE TIMES DAILY      hydrOXYzine 25 MG tablet  Commonly known as: ATARAX   TAKE 1 TABLET BY MOUTH AT NIGHT AS NEEDED FOR INSOMNIA      insulin aspart prot-insulin aspart (70-30) 100 UNIT/ML injection  Commonly known as: novoLOG 70/30   70 Units, Subcutaneous, 2 Times Daily  "With Meals      isosorbide mononitrate 60 MG 24 hr tablet  Commonly known as: IMDUR   Take 1 tablet by mouth once daily      metoprolol succinate  MG 24 hr tablet  Commonly known as: TOPROL-XL   Take 1 tablet by mouth once daily      nystatin 766294 UNIT/GM powder  Commonly known as: nystatin   Topical, 3 Times Daily      Rollator misc   1 Units, Does not apply, As Needed      spironolactone 25 MG tablet  Commonly known as: ALDACTONE   25 mg, Oral, Daily      torsemide 20 MG tablet  Commonly known as: DEMADEX   80 mg, Oral, 2 Times Daily      vitamin D 1.25 MG (10788 UT) capsule capsule  Commonly known as: ERGOCALCIFEROL   50,000 Units, Every 7 Days             Stop These Medications      ReliOn Insulin Syringe 31G X 15/64\" 1 ML misc  Generic drug: Insulin Syringe-Needle U-100            Future Appointments   Date Time Provider Department Center   2/21/2024  9:20 AM [facility]   2/28/2024  1:00 PM Surekha Mcdonnell MD [facility]   3/20/2024  3:00 PM REFERRED INJECTION CHAIR EP  [facility]   7/31/2024 10:30 AM Alfredito Mueller MD [facility] None   8/21/2024  9:20 AM [facility]   8/28/2024 11:15 AM Surekha Mcdonnell MD [facility]   9/25/2024 11:45 AM Warner Tang MD [facility]      Follow-up Information       Polly Ruiz MD. Call in 1 week(s).    Specialty: General Surgery  Why: I will call with the results of your colonoscopy biopsies.  Contact information:  [address]  [facility] [address] [phone]               Surekha Mcdonnell MD Follow up in 1 week(s).    Specialty: Internal Medicine  Contact information:  [address]  [facility] [address] [phone]                           Discharge Order (From admission, onward)       Start     Ordered    12/15/23 1150  Discharge patient  Once        Expected Discharge Date: 12/15/23   Discharge Disposition: Home or Self Care   Physician of " Record for Attribution - Please select from Treatment Team: MAIKEL LADD [[id number]]   Review needed by CMO to determine Physician of Record: No      Question Answer Comment   Physician of Record for Attribution - Please select from Treatment Team MAIKEL LADD    Review needed by CMO to determine Physician of Record No        12/15/23 5160                    Total time spent discharging patient including evaluation,post hospitalization follow up,  medication and post hospitalization instructions and education total time exceeds 30 minutes.    Signed:  Maikel aLdd MD  12/15/2023  12:22 EST

## 2023-12-15 NOTE — PLAN OF CARE
Goal Outcome Evaluation:      Pt resting in bed. No c/o voiced. IV antibx per MAR. VSS. No s/s of distress.

## 2023-12-15 NOTE — CASE MANAGEMENT/SOCIAL WORK
Case Management Discharge Note      Final Note: Home with son. Son transport.    Provided Post Acute Provider List?: N/A  Provided Post Acute Provider Quality & Resource List?: N/A    Selected Continued Care - Discharged on 12/15/2023 Admission date: 12/13/2023 - Discharge disposition: Home or Self Care      Destination    No services have been selected for the patient.                Durable Medical Equipment    No services have been selected for the patient.                Dialysis/Infusion    No services have been selected for the patient.                Home Medical Care    No services have been selected for the patient.                Therapy    No services have been selected for the patient.                Community Resources    No services have been selected for the patient.                Community & DME    No services have been selected for the patient.                    Transportation Services  Private: Car    Final Discharge Disposition Code: 01 - home or self-care

## 2023-12-16 NOTE — OUTREACH NOTE
Prep Survey      Flowsheet Row Responses   [facility] facility patient discharged from? [place]   Is LACE score < 7 ? No   Eligibility [facility]   Date of Admission 12/13/23   Date of Discharge 12/15/23   Discharge Disposition Home or Self Care   Discharge diagnosis Polypectomies   Does the patient have one of the following disease processes/diagnoses(primary or secondary)? Other   Does the patient have Home health ordered? No   Is there a DME ordered? No   Prep survey completed? Yes            SIVAKUMAR AGUILAR - Registered Nurse

## 2023-12-18 ENCOUNTER — TRANSITIONAL CARE MANAGEMENT TELEPHONE ENCOUNTER (OUTPATIENT)
Dept: CALL CENTER | Facility: HOSPITAL | Age: 78
End: 2023-12-18
Payer: MEDICARE

## 2023-12-18 LAB
BACTERIA SPEC AEROBE CULT: NORMAL
BACTERIA SPEC AEROBE CULT: NORMAL

## 2023-12-18 NOTE — OUTREACH NOTE
Call Center TCM Note      Flowsheet Row Responses   [facility] patient discharged from? [place]   Does the patient have one of the following disease processes/diagnoses(primary or secondary)? Other   TCM attempt successful? Yes   Call start time 1652   Call end time 1654   Discharge diagnosis Polypectomies   Person spoke with today (if not patient) and relationship son Anil Cohen reviewed with patient/caregiver? Yes   Is the patient having any side effects they believe may be caused by any medication additions or changes? No   Does the patient have all medications ordered at discharge? Yes   Is the patient taking all medications as directed (includes completed medication regime)? Yes   Does the patient have an appointment with their PCP within 7-14 days of discharge? No appointments available   Nursing Interventions Routed TCM call to PCP office   Has home health visited the patient within 72 hours of discharge? N/A   Psychosocial issues? No   Did the patient receive a copy of their discharge instructions? Yes   Nursing interventions Reviewed instructions with patient   What is the patient's perception of their health status since discharge? Improving   Is the patient/caregiver able to teach back signs and symptoms related to disease process for when to call PCP? Yes   Is the patient/caregiver able to teach back signs and symptoms related to disease process for when to call 911? Yes   Is the patient/caregiver able to teach back the hierarchy of who to call/visit for symptoms/problems? PCP, Specialist, Home health nurse, Urgent Care, ED, 911 Yes   If the patient is a current smoker, are they able to teach back resources for cessation? Not a smoker   TCM call completed? Yes   Wrap up additional comments D/C DX,  RLE cellulitis - Per son Anil pt doing fair. Leg does seem a little better, pt refuses to elevate to assist with edema. Ne rx Dicloxacillin in place. Son will be speaking with PCP ofc personally to  get TCM APPT sched. Declined my assist. **Please update VERBAL RELEASE at next ov**   Call end time [phone]            Sarah Houston MA    12/18/2023, 16:57 EST

## 2023-12-18 NOTE — OUTREACH NOTE
Call Center TCM Note      Flowsheet Row Responses   [facility] patient discharged from? [place]   Does the patient have one of the following disease processes/diagnoses(primary or secondary)? Other   TCM attempt successful? No   Unsuccessful attempts Attempt 1   Call Status Left message            Sarah Houston MA    12/18/2023, 13:37 EST

## 2024-01-02 ENCOUNTER — TELEPHONE (OUTPATIENT)
Dept: SURGERY | Facility: CLINIC | Age: 79
End: 2024-01-02
Payer: MEDICARE

## 2024-01-02 NOTE — TELEPHONE ENCOUNTER
I left a voicemail for the patient regarding her colonoscopy biopsy results and recommendation based on NCCN guidelines for repeat colonoscopy in 3 years.    Polly Ruiz M.D.  General, Robotic, and Endoscopic Surgery  [facility]    [address], [address]  [place], [place], [address]  P: [phone]  F: [phone]

## 2024-01-15 RX ORDER — HYDRALAZINE HYDROCHLORIDE 10 MG/1
TABLET, FILM COATED ORAL
Qty: 270 TABLET | Refills: 0 | Status: SHIPPED | OUTPATIENT
Start: 2024-01-15

## 2024-01-18 RX ORDER — ATORVASTATIN CALCIUM 40 MG/1
TABLET, FILM COATED ORAL
Qty: 90 TABLET | Refills: 0 | Status: SHIPPED | OUTPATIENT
Start: 2024-01-18

## 2024-02-01 DIAGNOSIS — M54.41 ACUTE RIGHT-SIDED LOW BACK PAIN WITH RIGHT-SIDED SCIATICA: ICD-10-CM

## 2024-02-02 RX ORDER — ACETAMINOPHEN AND CODEINE PHOSPHATE 300; 30 MG/1; MG/1
1 TABLET ORAL 3 TIMES DAILY
Qty: 90 TABLET | Refills: 0 | Status: SHIPPED | OUTPATIENT
Start: 2024-02-02

## 2024-02-19 DIAGNOSIS — E11.22 TYPE 2 DIABETES MELLITUS WITH STAGE 3A CHRONIC KIDNEY DISEASE, WITH LONG-TERM CURRENT USE OF INSULIN: ICD-10-CM

## 2024-02-19 DIAGNOSIS — Z79.4 TYPE 2 DIABETES MELLITUS WITH STAGE 3A CHRONIC KIDNEY DISEASE, WITH LONG-TERM CURRENT USE OF INSULIN: ICD-10-CM

## 2024-02-19 DIAGNOSIS — N18.31 TYPE 2 DIABETES MELLITUS WITH STAGE 3A CHRONIC KIDNEY DISEASE, WITH LONG-TERM CURRENT USE OF INSULIN: ICD-10-CM

## 2024-02-19 DIAGNOSIS — E78.2 MIXED HYPERLIPIDEMIA: Primary | Chronic | ICD-10-CM

## 2024-02-19 DIAGNOSIS — I10 ESSENTIAL HYPERTENSION: ICD-10-CM

## 2024-02-21 LAB
ALBUMIN SERPL-MCNC: 3.9 G/DL (ref 3.5–5.2)
ALBUMIN/GLOB SERPL: 1.8 G/DL
ALP SERPL-CCNC: 83 U/L (ref 39–117)
ALT SERPL-CCNC: 19 U/L (ref 1–33)
AST SERPL-CCNC: 17 U/L (ref 1–32)
BASOPHILS # BLD AUTO: 0.04 10*3/MM3 (ref 0–0.2)
BASOPHILS NFR BLD AUTO: 0.3 % (ref 0–1.5)
BILIRUB SERPL-MCNC: 1.3 MG/DL (ref 0–1.2)
BUN SERPL-MCNC: 16 MG/DL (ref 8–23)
BUN/CREAT SERPL: 14.8 (ref 7–25)
CALCIUM SERPL-MCNC: 9 MG/DL (ref 8.6–10.5)
CHLORIDE SERPL-SCNC: 100 MMOL/L (ref 98–107)
CHOLEST SERPL-MCNC: 216 MG/DL (ref 0–200)
CO2 SERPL-SCNC: 21.8 MMOL/L (ref 22–29)
CREAT SERPL-MCNC: 1.08 MG/DL (ref 0.57–1)
EGFRCR SERPLBLD CKD-EPI 2021: 52.7 ML/MIN/1.73
EOSINOPHIL # BLD AUTO: 0.14 10*3/MM3 (ref 0–0.4)
EOSINOPHIL NFR BLD AUTO: 1 % (ref 0.3–6.2)
ERYTHROCYTE [DISTWIDTH] IN BLOOD BY AUTOMATED COUNT: 13.3 % (ref 12.3–15.4)
GLOBULIN SER CALC-MCNC: 2.2 GM/DL
GLUCOSE SERPL-MCNC: 220 MG/DL (ref 65–99)
HBA1C MFR BLD: 8.6 % (ref 4.8–5.6)
HCT VFR BLD AUTO: 37.2 % (ref 34–46.6)
HDLC SERPL-MCNC: 35 MG/DL (ref 40–60)
HGB BLD-MCNC: 12.2 G/DL (ref 12–15.9)
IMM GRANULOCYTES # BLD AUTO: 0.06 10*3/MM3 (ref 0–0.05)
IMM GRANULOCYTES NFR BLD AUTO: 0.4 % (ref 0–0.5)
LDLC SERPL CALC-MCNC: 150 MG/DL (ref 0–100)
LYMPHOCYTES # BLD AUTO: 1.26 10*3/MM3 (ref 0.7–3.1)
LYMPHOCYTES NFR BLD AUTO: 8.9 % (ref 19.6–45.3)
MCH RBC QN AUTO: 28.1 PG (ref 26.6–33)
MCHC RBC AUTO-ENTMCNC: 32.8 G/DL (ref 31.5–35.7)
MCV RBC AUTO: 85.7 FL (ref 79–97)
MONOCYTES # BLD AUTO: 0.87 10*3/MM3 (ref 0.1–0.9)
MONOCYTES NFR BLD AUTO: 6.2 % (ref 5–12)
NEUTROPHILS # BLD AUTO: 11.74 10*3/MM3 (ref 1.7–7)
NEUTROPHILS NFR BLD AUTO: 83.2 % (ref 42.7–76)
NRBC BLD AUTO-RTO: 0 /100 WBC (ref 0–0.2)
PLATELET # BLD AUTO: 170 10*3/MM3 (ref 140–450)
POTASSIUM SERPL-SCNC: 4.6 MMOL/L (ref 3.5–5.2)
PROT SERPL-MCNC: 6.1 G/DL (ref 6–8.5)
RBC # BLD AUTO: 4.34 10*6/MM3 (ref 3.77–5.28)
SODIUM SERPL-SCNC: 133 MMOL/L (ref 136–145)
TRIGL SERPL-MCNC: 167 MG/DL (ref 0–150)
VLDLC SERPL CALC-MCNC: 31 MG/DL (ref 5–40)
WBC # BLD AUTO: 14.11 10*3/MM3 (ref 3.4–10.8)

## 2024-02-28 ENCOUNTER — OFFICE VISIT (OUTPATIENT)
Dept: INTERNAL MEDICINE | Facility: CLINIC | Age: 79
End: 2024-02-28
Payer: MEDICARE

## 2024-02-28 VITALS
WEIGHT: 202.8 LBS | SYSTOLIC BLOOD PRESSURE: 140 MMHG | OXYGEN SATURATION: 97 % | BODY MASS INDEX: 34.62 KG/M2 | HEIGHT: 64 IN | HEART RATE: 75 BPM | RESPIRATION RATE: 12 BRPM | TEMPERATURE: 98.8 F | DIASTOLIC BLOOD PRESSURE: 65 MMHG

## 2024-02-28 DIAGNOSIS — N18.31 TYPE 2 DIABETES MELLITUS WITH STAGE 3A CHRONIC KIDNEY DISEASE, WITH LONG-TERM CURRENT USE OF INSULIN: ICD-10-CM

## 2024-02-28 DIAGNOSIS — Z79.4 TYPE 2 DIABETES MELLITUS WITH STAGE 3A CHRONIC KIDNEY DISEASE, WITH LONG-TERM CURRENT USE OF INSULIN: ICD-10-CM

## 2024-02-28 DIAGNOSIS — I10 ESSENTIAL HYPERTENSION: Primary | ICD-10-CM

## 2024-02-28 DIAGNOSIS — N18.31 STAGE 3A CHRONIC KIDNEY DISEASE: ICD-10-CM

## 2024-02-28 DIAGNOSIS — E78.2 MIXED HYPERLIPIDEMIA: Chronic | ICD-10-CM

## 2024-02-28 DIAGNOSIS — E11.22 TYPE 2 DIABETES MELLITUS WITH STAGE 3A CHRONIC KIDNEY DISEASE, WITH LONG-TERM CURRENT USE OF INSULIN: ICD-10-CM

## 2024-02-28 RX ORDER — ISOSORBIDE MONONITRATE 60 MG/1
TABLET, EXTENDED RELEASE ORAL
Qty: 90 TABLET | Refills: 0 | Status: SHIPPED | OUTPATIENT
Start: 2024-02-28

## 2024-02-29 NOTE — PROGRESS NOTES
Subjective     Denisse Chavez is a 78 y.o. female who presents with   Chief Complaint   Patient presents with    Follow-up     6 Mo. Follow up       History of Present Illness     The following data was reviewed by: Surekha Mcdonnell MD on 02/28/2024:  CMP          12/14/2023    05:00 12/15/2023    06:59 2/21/2024    09:49   CMP   Glucose 220  97  220    BUN 20  22  16    Creatinine 1.01  1.23  1.08    EGFR 57.1  45.1     Sodium 141  144  133    Potassium 3.3  3.9  4.6    Chloride 104  104  100    Calcium 8.0  8.7  9.0    Total Protein   6.1    Albumin   3.9    Globulin   2.2    Total Bilirubin   1.3    Alkaline Phosphatase   83    AST (SGOT)   17    ALT (SGPT)   19    BUN/Creatinine Ratio 19.8  17.9  14.8    Anion Gap 12.4  10.8       CBC          12/14/2023    05:00 12/15/2023    06:59 2/21/2024    09:49   CBC   WBC 8.56  8.62  14.11    RBC 4.29  4.37  4.34    Hemoglobin 11.4  12.4  12.2    Hematocrit 36.7  37.4  37.2    MCV 85.5  85.6  85.7    MCH 26.6  28.4  28.1    MCHC 31.1  33.2  32.8    RDW 13.3  13.1  13.3    Platelets 200  232  170      Lipid Panel          8/16/2023    09:02 2/21/2024    09:49   Lipid Panel   Total Cholesterol 171  216    Triglycerides 246  167    HDL Cholesterol 31  35    VLDL Cholesterol 42  31    LDL Cholesterol  98  150      TSH          8/16/2023    09:02   TSH   TSH 4.360      Most Recent A1C          2/21/2024    09:49   HGBA1C Most Recent   Hemoglobin A1C 8.60      DM-2.  Not optimal.  She recently started Ozempic with endo.   HTN.  Control is good at home.   HLD.  LDL has increased.   CKD3a.  Numbers are stable.    Review of Systems   Respiratory: Negative.     Cardiovascular: Negative.        The following portions of the patient's history were reviewed and updated as appropriate: allergies, current medications and problem list.    Patient Active Problem List    Diagnosis Date Noted    Cellulitis of right leg 12/13/2023    Diabetic foot ulcer 12/13/2023    Vitamin D  deficiency 08/09/2023    Osteoporosis 11/23/2021    Hypertriglyceridemia 09/29/2021    Aortic valve sclerosis 07/25/2019    History of colon polyps 06/13/2018     Note Last Updated: 6/13/2018     Added automatically from request for surgery [id number]      Circadian rhythm sleep disorder, delayed sleep phase type 04/23/2018    Class 2 severe obesity due to excess calories with serious comorbidity and body mass index (BMI) of 39.0 to 39.9 in adult 04/23/2018    Chronic venous insufficiency     CAD (coronary artery disease)      Note Last Updated: 6/28/2017     MI in 1996, treated medically.  PET 6/2016 with small-medium sized lateral infarct, no ischemia.  This wall motion abnormality was seen on echo as well.      Stage 3 chronic kidney disease 10/13/2016    SAWYER on autoCPAP 09/04/2016    Lumbar spinal stenosis 05/16/2016    Angiomyolipoma of kidney 03/30/2016    Type 2 diabetes mellitus, with long-term current use of insulin 03/08/2016    Hyperlipidemia 03/08/2016    Essential hypertension 03/08/2016       Current Outpatient Medications on File Prior to Visit   Medication Sig Dispense Refill    acetaminophen-codeine (TYLENOL with CODEINE #3) 300-30 MG per tablet TAKE 1 TABLET BY MOUTH THREE TIMES DAILY 90 tablet 0    aspirin 81 MG tablet Take  by mouth Every Night.      atorvastatin (LIPITOR) 40 MG tablet Take 1 tablet by mouth once daily 90 tablet 0    Calcium Carbonate 1500 (600 Ca) MG tablet Take 1 tablet by mouth.      dapagliflozin (FARXIGA) 5 MG tablet tablet Take 1 tablet by mouth Daily.      docusate sodium (COLACE) 100 MG capsule Take 1 capsule by mouth 2 (Two) Times a Day.      hydrALAZINE (APRESOLINE) 10 MG tablet TAKE 1 TABLET BY MOUTH THREE TIMES DAILY 270 tablet 0    hydrOXYzine (ATARAX) 25 MG tablet TAKE 1 TABLET BY MOUTH AT NIGHT AS NEEDED FOR INSOMNIA 90 tablet 0    insulin aspart protamine-insulin aspart (novoLOG 70/30) injection Inject 70 Units under the skin into the appropriate area as directed 2  "(Two) Times a Day With Meals.      metoprolol succinate XL (TOPROL-XL) 100 MG 24 hr tablet Take 1 tablet by mouth once daily 90 tablet 0    Misc. Devices (ROLLATOR) misc 1 Units as needed (for walking). 1 each 0    nystatin (nystatin) 443254 UNIT/GM powder Apply  topically to the appropriate area as directed 3 (Three) Times a Day. 60 g 11    Semaglutide,0.25 or 0.5MG/DOS, (OZEMPIC) 2 MG/1.5ML solution pen-injector Inject  under the skin into the appropriate area as directed 1 (One) Time Per Week.      spironolactone (ALDACTONE) 25 MG tablet Take 1 tablet by mouth Daily.      torsemide (DEMADEX) 20 MG tablet Take 4 tablets by mouth 2 (Two) Times a Day.      vitamin D (ERGOCALCIFEROL) 82939 units capsule capsule 1 capsule Every 7 (Seven) Days.       No current facility-administered medications on file prior to visit.       Objective     /65   Pulse 75   Temp 98.8 °F (37.1 °C) (Oral)   Resp 12   Ht 162.6 cm (64.02\")   Wt 92 kg (202 lb 12.8 oz)   SpO2 97%   BMI 34.79 kg/m²     Physical Exam  Constitutional:       Appearance: She is well-developed.   HENT:      Head: Normocephalic and atraumatic.   Cardiovascular:      Rate and Rhythm: Normal rate and regular rhythm.      Heart sounds: Normal heart sounds.   Pulmonary:      Effort: Pulmonary effort is normal.      Breath sounds: Normal breath sounds.   Skin:     General: Skin is warm and dry.   Neurological:      Mental Status: She is alert and oriented to person, place, and time.   Psychiatric:         Behavior: Behavior normal.         Assessment & Plan   Diagnoses and all orders for this visit:    1. Essential hypertension (Primary)    2. Type 2 diabetes mellitus with stage 3a chronic kidney disease, with long-term current use of insulin    3. Mixed hyperlipidemia    4. Stage 3a chronic kidney disease        Discussion    Dm-2.  Control is not optimal.  The patient is advised to continue current dosage of Ozempic, Farxiga and insulin.    HTN.  Control is " good at home.  The patient is advised to continue current dosage of hydralazine, ISMN, and metoprolol.  HLD.  Control is not optimal.  The patient is advised to continue current dosage of atorvastatin.  Continue attention to diet.  CKD3a. Numbers are stable.  Continue to monitor.      F/u as planned in August.           Future Appointments   Date Time Provider Department Center   3/20/2024  3:00 PM REFERRED INJECTION CHAIR EP  [facility]   7/31/2024 10:30 AM Alfredito Mueller MD [facility] None   8/21/2024  9:20 AM [facility]   8/28/2024 11:15 AM Surekha Mcdonnell MD [facility]   9/25/2024 11:45 AM Warner Tang MD [facility]

## 2024-03-11 RX ORDER — METOPROLOL SUCCINATE 100 MG/1
TABLET, EXTENDED RELEASE ORAL
Qty: 90 TABLET | Refills: 0 | Status: SHIPPED | OUTPATIENT
Start: 2024-03-11

## 2024-03-20 ENCOUNTER — INFUSION (OUTPATIENT)
Dept: ONCOLOGY | Facility: HOSPITAL | Age: 79
End: 2024-03-20
Payer: MEDICARE

## 2024-03-20 VITALS — TEMPERATURE: 98.2 F | RESPIRATION RATE: 20 BRPM

## 2024-03-20 DIAGNOSIS — M81.0 OSTEOPOROSIS, UNSPECIFIED OSTEOPOROSIS TYPE, UNSPECIFIED PATHOLOGICAL FRACTURE PRESENCE: Primary | ICD-10-CM

## 2024-03-20 PROCEDURE — 25010000002 DENOSUMAB 60 MG/ML SOLUTION PREFILLED SYRINGE: Performed by: INTERNAL MEDICINE

## 2024-03-20 PROCEDURE — 96372 THER/PROPH/DIAG INJ SC/IM: CPT

## 2024-03-20 RX ADMIN — DENOSUMAB 60 MG: 60 INJECTION SUBCUTANEOUS at 14:23

## 2024-04-10 RX ORDER — HYDRALAZINE HYDROCHLORIDE 10 MG/1
TABLET, FILM COATED ORAL
Qty: 270 TABLET | Refills: 0 | Status: SHIPPED | OUTPATIENT
Start: 2024-04-10

## 2024-04-18 RX ORDER — ATORVASTATIN CALCIUM 40 MG/1
TABLET, FILM COATED ORAL
Qty: 90 TABLET | Refills: 0 | Status: SHIPPED | OUTPATIENT
Start: 2024-04-18

## 2024-05-23 RX ORDER — ISOSORBIDE MONONITRATE 60 MG/1
TABLET, EXTENDED RELEASE ORAL
Qty: 90 TABLET | Refills: 0 | Status: SHIPPED | OUTPATIENT
Start: 2024-05-23

## 2024-06-15 ENCOUNTER — APPOINTMENT (OUTPATIENT)
Dept: GENERAL RADIOLOGY | Facility: HOSPITAL | Age: 79
DRG: 321 | End: 2024-06-15
Payer: MEDICARE

## 2024-06-15 ENCOUNTER — HOSPITAL ENCOUNTER (INPATIENT)
Facility: HOSPITAL | Age: 79
LOS: 7 days | Discharge: NURSING, REHAB OR LONG-TERM CARE | DRG: 321 | End: 2024-06-22
Attending: EMERGENCY MEDICINE | Admitting: HOSPITALIST
Payer: MEDICARE

## 2024-06-15 ENCOUNTER — APPOINTMENT (OUTPATIENT)
Dept: CT IMAGING | Facility: HOSPITAL | Age: 79
DRG: 321 | End: 2024-06-15
Payer: MEDICARE

## 2024-06-15 ENCOUNTER — APPOINTMENT (OUTPATIENT)
Dept: CARDIOLOGY | Facility: HOSPITAL | Age: 79
DRG: 321 | End: 2024-06-15
Payer: MEDICARE

## 2024-06-15 DIAGNOSIS — I25.10 CORONARY ARTERY DISEASE INVOLVING NATIVE CORONARY ARTERY OF NATIVE HEART WITHOUT ANGINA PECTORIS: ICD-10-CM

## 2024-06-15 DIAGNOSIS — R06.00 DYSPNEA, UNSPECIFIED TYPE: ICD-10-CM

## 2024-06-15 DIAGNOSIS — I21.4 NSTEMI (NON-ST ELEVATED MYOCARDIAL INFARCTION): Primary | ICD-10-CM

## 2024-06-15 DIAGNOSIS — N18.9 CHRONIC RENAL IMPAIRMENT, UNSPECIFIED CKD STAGE: ICD-10-CM

## 2024-06-15 DIAGNOSIS — Z86.79 HISTORY OF CORONARY ARTERY DISEASE: ICD-10-CM

## 2024-06-15 DIAGNOSIS — Z95.5 PRESENCE OF DRUG-ELUTING STENT IN LEFT CIRCUMFLEX CORONARY ARTERY: ICD-10-CM

## 2024-06-15 DIAGNOSIS — I50.33 ACUTE ON CHRONIC HEART FAILURE WITH PRESERVED EJECTION FRACTION (HFPEF): ICD-10-CM

## 2024-06-15 DIAGNOSIS — Z86.39 HISTORY OF DIABETES MELLITUS: ICD-10-CM

## 2024-06-15 DIAGNOSIS — R79.89 ELEVATED BRAIN NATRIURETIC PEPTIDE (BNP) LEVEL: ICD-10-CM

## 2024-06-15 DIAGNOSIS — Z95.5 STATUS POST INSERTION OF DRUG-ELUTING STENT INTO LEFT ANTERIOR DESCENDING (LAD) ARTERY: ICD-10-CM

## 2024-06-15 PROBLEM — J18.9 PNA (PNEUMONIA): Status: ACTIVE | Noted: 2024-06-15

## 2024-06-15 LAB
ANION GAP SERPL CALCULATED.3IONS-SCNC: 14.5 MMOL/L (ref 5–15)
B PARAPERT DNA SPEC QL NAA+PROBE: NOT DETECTED
B PERT DNA SPEC QL NAA+PROBE: NOT DETECTED
BACTERIA UR QL AUTO: ABNORMAL /HPF
BASOPHILS # BLD AUTO: 0.01 10*3/MM3 (ref 0–0.2)
BASOPHILS NFR BLD AUTO: 0.1 % (ref 0–1.5)
BH CV ECHO MEAS - ACS: 1.73 CM
BH CV ECHO MEAS - AO MAX PG: 13.3 MMHG
BH CV ECHO MEAS - AO MEAN PG: 8 MMHG
BH CV ECHO MEAS - AO ROOT DIAM: 3.2 CM
BH CV ECHO MEAS - AO V2 MAX: 182.5 CM/SEC
BH CV ECHO MEAS - AO V2 VTI: 44.3 CM
BH CV ECHO MEAS - AVA(I,D): 1.7 CM2
BH CV ECHO MEAS - EDV(CUBED): 130.3 ML
BH CV ECHO MEAS - EDV(MOD-SP2): 89 ML
BH CV ECHO MEAS - EDV(MOD-SP4): 124 ML
BH CV ECHO MEAS - EF(MOD-BP): 61.9 %
BH CV ECHO MEAS - EF(MOD-SP2): 66.3 %
BH CV ECHO MEAS - EF(MOD-SP4): 60.5 %
BH CV ECHO MEAS - ESV(CUBED): 35.9 ML
BH CV ECHO MEAS - ESV(MOD-SP2): 30 ML
BH CV ECHO MEAS - ESV(MOD-SP4): 49 ML
BH CV ECHO MEAS - FS: 35 %
BH CV ECHO MEAS - IVS/LVPW: 1.02 CM
BH CV ECHO MEAS - IVSD: 1.1 CM
BH CV ECHO MEAS - LAT PEAK E' VEL: 5.4 CM/SEC
BH CV ECHO MEAS - LV MASS(C)D: 208.8 GRAMS
BH CV ECHO MEAS - LV MAX PG: 7.6 MMHG
BH CV ECHO MEAS - LV MEAN PG: 3.9 MMHG
BH CV ECHO MEAS - LV V1 MAX: 137.9 CM/SEC
BH CV ECHO MEAS - LV V1 VTI: 31.2 CM
BH CV ECHO MEAS - LVIDD: 5.1 CM
BH CV ECHO MEAS - LVIDS: 3.3 CM
BH CV ECHO MEAS - LVOT AREA: 2.42 CM2
BH CV ECHO MEAS - LVOT DIAM: 1.75 CM
BH CV ECHO MEAS - LVPWD: 1.08 CM
BH CV ECHO MEAS - MED PEAK E' VEL: 6.5 CM/SEC
BH CV ECHO MEAS - MV A DUR: 0.14 SEC
BH CV ECHO MEAS - MV A MAX VEL: 115.2 CM/SEC
BH CV ECHO MEAS - MV DEC SLOPE: 615.4 CM/SEC2
BH CV ECHO MEAS - MV DEC TIME: 0.25 SEC
BH CV ECHO MEAS - MV E MAX VEL: 128 CM/SEC
BH CV ECHO MEAS - MV E/A: 1.11
BH CV ECHO MEAS - MV MAX PG: 10.9 MMHG
BH CV ECHO MEAS - MV MEAN PG: 4.8 MMHG
BH CV ECHO MEAS - MV P1/2T: 71.9 MSEC
BH CV ECHO MEAS - MV V2 VTI: 44.7 CM
BH CV ECHO MEAS - MVA(P1/2T): 3.1 CM2
BH CV ECHO MEAS - MVA(VTI): 1.69 CM2
BH CV ECHO MEAS - PA ACC TIME: 0.16 SEC
BH CV ECHO MEAS - PA V2 MAX: 104.1 CM/SEC
BH CV ECHO MEAS - PULM A REVS DUR: 0.12 SEC
BH CV ECHO MEAS - PULM A REVS VEL: 24.6 CM/SEC
BH CV ECHO MEAS - PULM DIAS VEL: 64.3 CM/SEC
BH CV ECHO MEAS - PULM S/D: 0.75
BH CV ECHO MEAS - PULM SYS VEL: 48.3 CM/SEC
BH CV ECHO MEAS - RV MAX PG: 4.3 MMHG
BH CV ECHO MEAS - RV V1 MAX: 103.7 CM/SEC
BH CV ECHO MEAS - RV V1 VTI: 20.8 CM
BH CV ECHO MEAS - SV(LVOT): 75.5 ML
BH CV ECHO MEAS - SV(MOD-SP2): 59 ML
BH CV ECHO MEAS - SV(MOD-SP4): 75 ML
BH CV ECHO MEAS - TAPSE (>1.6): 2.47 CM
BH CV ECHO MEASUREMENTS AVERAGE E/E' RATIO: 21.51
BH CV XLRA - RV BASE: 3.3 CM
BH CV XLRA - RV LENGTH: 5.8 CM
BH CV XLRA - RV MID: 2.24 CM
BH CV XLRA - TDI S': 11.9 CM/SEC
BILIRUB UR QL STRIP: NEGATIVE
BUN SERPL-MCNC: 18 MG/DL (ref 8–23)
BUN/CREAT SERPL: 14.5 (ref 7–25)
C PNEUM DNA NPH QL NAA+NON-PROBE: NOT DETECTED
CALCIUM SPEC-SCNC: 8.8 MG/DL (ref 8.6–10.5)
CHLORIDE SERPL-SCNC: 96 MMOL/L (ref 98–107)
CLARITY UR: CLEAR
CO2 SERPL-SCNC: 20.5 MMOL/L (ref 22–29)
COLOR UR: YELLOW
CREAT SERPL-MCNC: 1.24 MG/DL (ref 0.57–1)
D-LACTATE SERPL-SCNC: 1.7 MMOL/L (ref 0.5–2)
D-LACTATE SERPL-SCNC: 2.1 MMOL/L (ref 0.5–2)
DEPRECATED RDW RBC AUTO: 42.1 FL (ref 37–54)
EGFRCR SERPLBLD CKD-EPI 2021: 44.6 ML/MIN/1.73
EOSINOPHIL # BLD AUTO: 0 10*3/MM3 (ref 0–0.4)
EOSINOPHIL NFR BLD AUTO: 0 % (ref 0.3–6.2)
ERYTHROCYTE [DISTWIDTH] IN BLOOD BY AUTOMATED COUNT: 13.7 % (ref 12.3–15.4)
FLUAV SUBTYP SPEC NAA+PROBE: NOT DETECTED
FLUBV RNA ISLT QL NAA+PROBE: NOT DETECTED
GEN 5 2HR TROPONIN T REFLEX: 550 NG/L
GLUCOSE BLDC GLUCOMTR-MCNC: 195 MG/DL (ref 70–130)
GLUCOSE BLDC GLUCOMTR-MCNC: 388 MG/DL (ref 70–130)
GLUCOSE BLDC GLUCOMTR-MCNC: 422 MG/DL (ref 70–130)
GLUCOSE BLDC GLUCOMTR-MCNC: 451 MG/DL (ref 70–130)
GLUCOSE SERPL-MCNC: 321 MG/DL (ref 65–99)
GLUCOSE UR STRIP-MCNC: ABNORMAL MG/DL
HADV DNA SPEC NAA+PROBE: NOT DETECTED
HBA1C MFR BLD: 8.4 % (ref 4.8–5.6)
HCOV 229E RNA SPEC QL NAA+PROBE: NOT DETECTED
HCOV HKU1 RNA SPEC QL NAA+PROBE: NOT DETECTED
HCOV NL63 RNA SPEC QL NAA+PROBE: NOT DETECTED
HCOV OC43 RNA SPEC QL NAA+PROBE: NOT DETECTED
HCT VFR BLD AUTO: 38.4 % (ref 34–46.6)
HGB BLD-MCNC: 13.5 G/DL (ref 12–15.9)
HGB UR QL STRIP.AUTO: ABNORMAL
HMPV RNA NPH QL NAA+NON-PROBE: NOT DETECTED
HOLD SPECIMEN: NORMAL
HPIV1 RNA ISLT QL NAA+PROBE: NOT DETECTED
HPIV2 RNA SPEC QL NAA+PROBE: NOT DETECTED
HPIV3 RNA NPH QL NAA+PROBE: NOT DETECTED
HPIV4 P GENE NPH QL NAA+PROBE: NOT DETECTED
HYALINE CASTS UR QL AUTO: ABNORMAL /LPF
IMM GRANULOCYTES # BLD AUTO: 0.05 10*3/MM3 (ref 0–0.05)
IMM GRANULOCYTES NFR BLD AUTO: 0.4 % (ref 0–0.5)
KETONES UR QL STRIP: ABNORMAL
L PNEUMO1 AG UR QL IA: NEGATIVE
LEFT ATRIUM VOLUME INDEX: 26.3 ML/M2
LEUKOCYTE ESTERASE UR QL STRIP.AUTO: NEGATIVE
LYMPHOCYTES # BLD AUTO: 0.67 10*3/MM3 (ref 0.7–3.1)
LYMPHOCYTES NFR BLD AUTO: 5.7 % (ref 19.6–45.3)
M PNEUMO IGG SER IA-ACNC: NOT DETECTED
MAGNESIUM SERPL-MCNC: 2.2 MG/DL (ref 1.6–2.4)
MCH RBC QN AUTO: 30.1 PG (ref 26.6–33)
MCHC RBC AUTO-ENTMCNC: 35.2 G/DL (ref 31.5–35.7)
MCV RBC AUTO: 85.5 FL (ref 79–97)
MONOCYTES # BLD AUTO: 0.71 10*3/MM3 (ref 0.1–0.9)
MONOCYTES NFR BLD AUTO: 6 % (ref 5–12)
NEUTROPHILS NFR BLD AUTO: 10.37 10*3/MM3 (ref 1.7–7)
NEUTROPHILS NFR BLD AUTO: 87.8 % (ref 42.7–76)
NITRITE UR QL STRIP: NEGATIVE
NRBC BLD AUTO-RTO: 0 /100 WBC (ref 0–0.2)
NT-PROBNP SERPL-MCNC: 5512 PG/ML (ref 0–1800)
PH UR STRIP.AUTO: 5.5 [PH] (ref 5–8)
PLATELET # BLD AUTO: 258 10*3/MM3 (ref 140–450)
PMV BLD AUTO: 10.4 FL (ref 6–12)
POTASSIUM SERPL-SCNC: 4 MMOL/L (ref 3.5–5.2)
PROCALCITONIN SERPL-MCNC: 0.49 NG/ML (ref 0–0.25)
PROT UR QL STRIP: ABNORMAL
QT INTERVAL: 436 MS
QTC INTERVAL: 510 MS
RBC # BLD AUTO: 4.49 10*6/MM3 (ref 3.77–5.28)
RBC # UR STRIP: ABNORMAL /HPF
REF LAB TEST METHOD: ABNORMAL
RHINOVIRUS RNA SPEC NAA+PROBE: NOT DETECTED
RSV RNA NPH QL NAA+NON-PROBE: NOT DETECTED
S PNEUM AG SPEC QL LA: NEGATIVE
SARS-COV-2 RNA NPH QL NAA+NON-PROBE: NOT DETECTED
SINUS: 2.7 CM
SODIUM SERPL-SCNC: 131 MMOL/L (ref 136–145)
SP GR UR STRIP: 1.02 (ref 1–1.03)
SQUAMOUS #/AREA URNS HPF: ABNORMAL /HPF
STJ: 2.43 CM
TROPONIN T DELTA: -21 NG/L
TROPONIN T SERPL HS-MCNC: 571 NG/L
UROBILINOGEN UR QL STRIP: ABNORMAL
WBC # UR STRIP: ABNORMAL /HPF
WBC NRBC COR # BLD AUTO: 11.81 10*3/MM3 (ref 3.4–10.8)
WHOLE BLOOD HOLD COAG: NORMAL

## 2024-06-15 PROCEDURE — 85025 COMPLETE CBC W/AUTO DIFF WBC: CPT | Performed by: EMERGENCY MEDICINE

## 2024-06-15 PROCEDURE — 87449 NOS EACH ORGANISM AG IA: CPT | Performed by: HOSPITALIST

## 2024-06-15 PROCEDURE — 71250 CT THORAX DX C-: CPT

## 2024-06-15 PROCEDURE — 25010000002 FUROSEMIDE PER 20 MG: Performed by: EMERGENCY MEDICINE

## 2024-06-15 PROCEDURE — 36415 COLL VENOUS BLD VENIPUNCTURE: CPT

## 2024-06-15 PROCEDURE — 84145 PROCALCITONIN (PCT): CPT | Performed by: EMERGENCY MEDICINE

## 2024-06-15 PROCEDURE — 83036 HEMOGLOBIN GLYCOSYLATED A1C: CPT | Performed by: HOSPITALIST

## 2024-06-15 PROCEDURE — 63710000001 INSULIN GLARGINE PER 5 UNITS: Performed by: HOSPITALIST

## 2024-06-15 PROCEDURE — 93306 TTE W/DOPPLER COMPLETE: CPT

## 2024-06-15 PROCEDURE — 84484 ASSAY OF TROPONIN QUANT: CPT | Performed by: EMERGENCY MEDICINE

## 2024-06-15 PROCEDURE — 93306 TTE W/DOPPLER COMPLETE: CPT | Performed by: INTERNAL MEDICINE

## 2024-06-15 PROCEDURE — 0202U NFCT DS 22 TRGT SARS-COV-2: CPT | Performed by: EMERGENCY MEDICINE

## 2024-06-15 PROCEDURE — 25510000001 PERFLUTREN (DEFINITY) 8.476 MG IN SODIUM CHLORIDE (PF) 0.9 % 10 ML INJECTION: Performed by: INTERNAL MEDICINE

## 2024-06-15 PROCEDURE — 81001 URINALYSIS AUTO W/SCOPE: CPT | Performed by: EMERGENCY MEDICINE

## 2024-06-15 PROCEDURE — 83735 ASSAY OF MAGNESIUM: CPT | Performed by: EMERGENCY MEDICINE

## 2024-06-15 PROCEDURE — 99285 EMERGENCY DEPT VISIT HI MDM: CPT

## 2024-06-15 PROCEDURE — 25010000002 CEFTRIAXONE PER 250 MG: Performed by: HOSPITALIST

## 2024-06-15 PROCEDURE — 83605 ASSAY OF LACTIC ACID: CPT | Performed by: EMERGENCY MEDICINE

## 2024-06-15 PROCEDURE — 87040 BLOOD CULTURE FOR BACTERIA: CPT | Performed by: HOSPITALIST

## 2024-06-15 PROCEDURE — 83880 ASSAY OF NATRIURETIC PEPTIDE: CPT | Performed by: EMERGENCY MEDICINE

## 2024-06-15 PROCEDURE — 82948 REAGENT STRIP/BLOOD GLUCOSE: CPT

## 2024-06-15 PROCEDURE — 93010 ELECTROCARDIOGRAM REPORT: CPT | Performed by: INTERNAL MEDICINE

## 2024-06-15 PROCEDURE — 63710000001 INSULIN LISPRO (HUMAN) PER 5 UNITS: Performed by: HOSPITALIST

## 2024-06-15 PROCEDURE — 93005 ELECTROCARDIOGRAM TRACING: CPT | Performed by: EMERGENCY MEDICINE

## 2024-06-15 PROCEDURE — 25010000002 ENOXAPARIN PER 10 MG: Performed by: INTERNAL MEDICINE

## 2024-06-15 PROCEDURE — 71045 X-RAY EXAM CHEST 1 VIEW: CPT

## 2024-06-15 PROCEDURE — 80048 BASIC METABOLIC PNL TOTAL CA: CPT | Performed by: EMERGENCY MEDICINE

## 2024-06-15 PROCEDURE — 99222 1ST HOSP IP/OBS MODERATE 55: CPT | Performed by: INTERNAL MEDICINE

## 2024-06-15 RX ORDER — INSULIN LISPRO 100 [IU]/ML
3-14 INJECTION, SOLUTION INTRAVENOUS; SUBCUTANEOUS
Status: DISCONTINUED | OUTPATIENT
Start: 2024-06-15 | End: 2024-06-22 | Stop reason: HOSPADM

## 2024-06-15 RX ORDER — FUROSEMIDE 10 MG/ML
80 INJECTION INTRAMUSCULAR; INTRAVENOUS ONCE
Status: COMPLETED | OUTPATIENT
Start: 2024-06-15 | End: 2024-06-15

## 2024-06-15 RX ORDER — SODIUM CHLORIDE 0.9 % (FLUSH) 0.9 %
10 SYRINGE (ML) INJECTION AS NEEDED
Status: DISCONTINUED | OUTPATIENT
Start: 2024-06-15 | End: 2024-06-22 | Stop reason: HOSPADM

## 2024-06-15 RX ORDER — ACETAMINOPHEN 650 MG/1
650 SUPPOSITORY RECTAL EVERY 4 HOURS PRN
Status: DISCONTINUED | OUTPATIENT
Start: 2024-06-15 | End: 2024-06-20 | Stop reason: SDUPTHER

## 2024-06-15 RX ORDER — ACETAMINOPHEN 160 MG/5ML
650 SOLUTION ORAL EVERY 4 HOURS PRN
Status: DISCONTINUED | OUTPATIENT
Start: 2024-06-15 | End: 2024-06-20 | Stop reason: SDUPTHER

## 2024-06-15 RX ORDER — SODIUM CHLORIDE 0.9 % (FLUSH) 0.9 %
10 SYRINGE (ML) INJECTION EVERY 12 HOURS SCHEDULED
Status: DISCONTINUED | OUTPATIENT
Start: 2024-06-15 | End: 2024-06-22 | Stop reason: HOSPADM

## 2024-06-15 RX ORDER — DOCUSATE SODIUM 100 MG/1
100 CAPSULE, LIQUID FILLED ORAL 2 TIMES DAILY
Status: DISCONTINUED | OUTPATIENT
Start: 2024-06-15 | End: 2024-06-22 | Stop reason: HOSPADM

## 2024-06-15 RX ORDER — ENOXAPARIN SODIUM 100 MG/ML
1 INJECTION SUBCUTANEOUS ONCE
Status: COMPLETED | OUTPATIENT
Start: 2024-06-15 | End: 2024-06-15

## 2024-06-15 RX ORDER — ACETAMINOPHEN 500 MG
1000 TABLET ORAL ONCE
Status: COMPLETED | OUTPATIENT
Start: 2024-06-15 | End: 2024-06-15

## 2024-06-15 RX ORDER — ISOSORBIDE MONONITRATE 60 MG/1
60 TABLET, EXTENDED RELEASE ORAL
Status: DISCONTINUED | OUTPATIENT
Start: 2024-06-15 | End: 2024-06-22

## 2024-06-15 RX ORDER — BISACODYL 10 MG
10 SUPPOSITORY, RECTAL RECTAL DAILY PRN
Status: DISCONTINUED | OUTPATIENT
Start: 2024-06-15 | End: 2024-06-22 | Stop reason: HOSPADM

## 2024-06-15 RX ORDER — METOPROLOL SUCCINATE 100 MG/1
100 TABLET, EXTENDED RELEASE ORAL
Status: DISCONTINUED | OUTPATIENT
Start: 2024-06-15 | End: 2024-06-22

## 2024-06-15 RX ORDER — ASPIRIN 325 MG
325 TABLET ORAL ONCE
Status: COMPLETED | OUTPATIENT
Start: 2024-06-15 | End: 2024-06-15

## 2024-06-15 RX ORDER — AMOXICILLIN 250 MG
2 CAPSULE ORAL 2 TIMES DAILY PRN
Status: DISCONTINUED | OUTPATIENT
Start: 2024-06-15 | End: 2024-06-22 | Stop reason: HOSPADM

## 2024-06-15 RX ORDER — NICOTINE POLACRILEX 4 MG
15 LOZENGE BUCCAL
Status: DISCONTINUED | OUTPATIENT
Start: 2024-06-15 | End: 2024-06-22 | Stop reason: HOSPADM

## 2024-06-15 RX ORDER — POLYETHYLENE GLYCOL 3350 17 G/17G
17 POWDER, FOR SOLUTION ORAL DAILY PRN
Status: DISCONTINUED | OUTPATIENT
Start: 2024-06-15 | End: 2024-06-22 | Stop reason: HOSPADM

## 2024-06-15 RX ORDER — HYDRALAZINE HYDROCHLORIDE 10 MG/1
10 TABLET, FILM COATED ORAL 3 TIMES DAILY
Status: DISCONTINUED | OUTPATIENT
Start: 2024-06-15 | End: 2024-06-22

## 2024-06-15 RX ORDER — SPIRONOLACTONE 25 MG/1
25 TABLET ORAL DAILY
Status: DISCONTINUED | OUTPATIENT
Start: 2024-06-15 | End: 2024-06-22 | Stop reason: HOSPADM

## 2024-06-15 RX ORDER — ONDANSETRON 2 MG/ML
4 INJECTION INTRAMUSCULAR; INTRAVENOUS EVERY 6 HOURS PRN
Status: DISCONTINUED | OUTPATIENT
Start: 2024-06-15 | End: 2024-06-22 | Stop reason: HOSPADM

## 2024-06-15 RX ORDER — BISACODYL 5 MG/1
5 TABLET, DELAYED RELEASE ORAL DAILY PRN
Status: DISCONTINUED | OUTPATIENT
Start: 2024-06-15 | End: 2024-06-22 | Stop reason: HOSPADM

## 2024-06-15 RX ORDER — SODIUM CHLORIDE 9 MG/ML
40 INJECTION, SOLUTION INTRAVENOUS AS NEEDED
Status: DISCONTINUED | OUTPATIENT
Start: 2024-06-15 | End: 2024-06-22 | Stop reason: HOSPADM

## 2024-06-15 RX ORDER — ONDANSETRON 4 MG/1
4 TABLET, ORALLY DISINTEGRATING ORAL EVERY 6 HOURS PRN
Status: DISCONTINUED | OUTPATIENT
Start: 2024-06-15 | End: 2024-06-22 | Stop reason: HOSPADM

## 2024-06-15 RX ORDER — ASPIRIN 81 MG/1
81 TABLET ORAL DAILY
Status: DISCONTINUED | OUTPATIENT
Start: 2024-06-15 | End: 2024-06-22 | Stop reason: HOSPADM

## 2024-06-15 RX ORDER — DEXTROSE MONOHYDRATE 25 G/50ML
25 INJECTION, SOLUTION INTRAVENOUS
Status: DISCONTINUED | OUTPATIENT
Start: 2024-06-15 | End: 2024-06-22 | Stop reason: HOSPADM

## 2024-06-15 RX ORDER — ATORVASTATIN CALCIUM 20 MG/1
40 TABLET, FILM COATED ORAL DAILY
Status: DISCONTINUED | OUTPATIENT
Start: 2024-06-15 | End: 2024-06-22 | Stop reason: HOSPADM

## 2024-06-15 RX ORDER — ACETAMINOPHEN 325 MG/1
650 TABLET ORAL EVERY 4 HOURS PRN
Status: DISCONTINUED | OUTPATIENT
Start: 2024-06-15 | End: 2024-06-20 | Stop reason: SDUPTHER

## 2024-06-15 RX ORDER — TORSEMIDE 20 MG/1
80 TABLET ORAL 2 TIMES DAILY
Status: DISCONTINUED | OUTPATIENT
Start: 2024-06-15 | End: 2024-06-22 | Stop reason: HOSPADM

## 2024-06-15 RX ORDER — IBUPROFEN 600 MG/1
1 TABLET ORAL
Status: DISCONTINUED | OUTPATIENT
Start: 2024-06-15 | End: 2024-06-22 | Stop reason: HOSPADM

## 2024-06-15 RX ADMIN — INSULIN LISPRO 14 UNITS: 100 INJECTION, SOLUTION INTRAVENOUS; SUBCUTANEOUS at 16:46

## 2024-06-15 RX ADMIN — INSULIN GLARGINE 20 UNITS: 100 INJECTION, SOLUTION SUBCUTANEOUS at 17:43

## 2024-06-15 RX ADMIN — ASPIRIN 81 MG: 81 TABLET, COATED ORAL at 16:46

## 2024-06-15 RX ADMIN — PERFLUTREN 2 ML: 6.52 INJECTION, SUSPENSION INTRAVENOUS at 11:59

## 2024-06-15 RX ADMIN — ENOXAPARIN SODIUM 90 MG: 100 INJECTION SUBCUTANEOUS at 15:27

## 2024-06-15 RX ADMIN — ACETAMINOPHEN 1000 MG: 500 TABLET ORAL at 10:17

## 2024-06-15 RX ADMIN — HYDRALAZINE HYDROCHLORIDE 10 MG: 10 TABLET ORAL at 17:36

## 2024-06-15 RX ADMIN — CEFTRIAXONE 2000 MG: 2 INJECTION, POWDER, FOR SOLUTION INTRAMUSCULAR; INTRAVENOUS at 16:44

## 2024-06-15 RX ADMIN — Medication 10 ML: at 21:36

## 2024-06-15 RX ADMIN — INSULIN LISPRO 3 UNITS: 100 INJECTION, SOLUTION INTRAVENOUS; SUBCUTANEOUS at 21:33

## 2024-06-15 RX ADMIN — SPIRONOLACTONE 25 MG: 25 TABLET, FILM COATED ORAL at 17:36

## 2024-06-15 RX ADMIN — EMPAGLIFLOZIN 10 MG: 10 TABLET, FILM COATED ORAL at 17:36

## 2024-06-15 RX ADMIN — ASPIRIN 325 MG: 325 TABLET ORAL at 10:17

## 2024-06-15 RX ADMIN — TORSEMIDE 80 MG: 20 TABLET ORAL at 21:33

## 2024-06-15 RX ADMIN — ATORVASTATIN CALCIUM 40 MG: 20 TABLET, FILM COATED ORAL at 16:46

## 2024-06-15 RX ADMIN — METOPROLOL SUCCINATE 100 MG: 100 TABLET, EXTENDED RELEASE ORAL at 17:36

## 2024-06-15 RX ADMIN — FUROSEMIDE 80 MG: 10 INJECTION, SOLUTION INTRAMUSCULAR; INTRAVENOUS at 12:18

## 2024-06-15 RX ADMIN — HYDRALAZINE HYDROCHLORIDE 10 MG: 10 TABLET ORAL at 21:33

## 2024-06-15 RX ADMIN — ISOSORBIDE MONONITRATE 60 MG: 60 TABLET, EXTENDED RELEASE ORAL at 17:36

## 2024-06-15 NOTE — Clinical Note
First balloon inflation max pressure = 20 davina. First balloon inflation duration = 5 seconds. Second inflation of balloon - Max pressure = 20 davina. 2nd Inflation of balloon - Duration = 5 seconds. 2nd inflation was done at 15:18 EDT. The balloon is positioned in the Proximal segment of the vessel. Third inflation of balloon - Max pressure = 20 davina. 3rd Inflation of balloon - Duration = 5 seconds. 3rd inflation was done at 15:18 EDT. The b alloon is positioned in the Proximal segment of the vessel. Fourth inflation of balloon - Max pressure = 20 davina. 4th Inflation of balloon - Duration = 3 seconds. 4th inflation was done at 15:18 EDT. The balloon is positioned in the Proximal segment of the vessel.

## 2024-06-15 NOTE — Clinical Note
First balloon inflation max pressure = 18 davina. Second inflation of balloon - Max pressure = 15 davina. Third inflation of balloon - Max pressure = 18 davina. Location Indication Override (Is Already Calculated Based On Selected Body Location): Area M

## 2024-06-15 NOTE — Clinical Note
Anticoagulation Clinic - Remote Progress Note  AL[facility] MONITOR   Frequency of Monitorin days    Indication: Bilateral PE, stroke syndrome, embolism and thrombosis of unspecified site   Referring Provider: Dr. Rika Sullivan  Initial Warfarin Start Date:   Planned Duration of Therapy: lifelong  Goal INR: 2-3  Current Drug Interactions: doxepin d/c'd on ; ASA    Diet: Low GLV intake  Alcohol: None  Tobacco: None  OTC Pain Medications: APAP    INR History:  Date 8/28 9/4 9/18 10/2 10/9 10/13 10/20 10/27 11/3 11/18 12/4 12/11   Total Weekly Dose 27.5mg 27.5mg 27.5 mg 27.5 mg 27.5 mg 25 mg 25mg 25mg 25mg 25mg 27.5mg 27.5mg   INR 2.5 2.2 2.4 2.8 3.2 3.2 2.2 1.9 2.5 2.7 2.4 3.0   Notes hematoma?;stop doxepin stopped doxepin on       sick; nose bleed sick; nose bleed        Date  1   Total Weekly Dose 27.5mg 27.5mg 27.5mg 27.5mg 27.5mg 27.5mg 27.5 mg 27.5mg 25mg 27.5mg 27.5 mg 27.5 mg   INR 2.5 2.3 2.2 2.5 2.4 2.9 2.6 3.8 2.3 2.3 1.9 2.3   Notes        hold; nose bleeds continue   ill      Date 3/8 3/15 3/26 4/1 4/9 4/13 4/18 4/23 4/30 5/14 5/29   Total Weekly Dose 27.5mg 27.5mg 30mg 30 mg 30mg 30mg 32.5 mg 30mg 30mg 30mg 30 mg   INR 2.1 1.5 2.5 1.8 1.5 1.6 2.5 2.3 2.3 2.3 1.9   Notes   Boost post colonoscopy    Clinic; boost         Phone Interview:  Tablet Strength: 5mg tablets  Current Maintenance Dose: 5mg daily except 2.5mg WedSat  Contact Info: [phone] (Home) // [phone] (Cell)    Patient Findings:   Positives:   Change in health, Change in medications   Negatives:   Signs/symptoms of thrombosis, Signs/symptoms of bleeding, Laboratory test error suspected, Change in alcohol use, Change in activity, Upcoming invasive procedure, Emergency department visit, Upcoming dental procedure, Missed doses, Extra doses, Change in diet/appetite, Hospital admission, Bruising, Other complaints   Comments:   Finishing up acyclovir medicine on  The DP pulses are +1 bilaterally. The PT pulses are +1 bilaterally. The right radial pulse is +2. The right ulnar pulse is +1. The femoral pulses are +2 bilaterally. Wednesday report to doctor on Thursday. She says the shingles have been rough     Plan:  1. INR is subtherapeutic today, at 1.9. Instructed Mrs. Muller to take 5 mg on Wednesday as a BOOST dose and continue warfarin 5mg daily except 2.5mg WedSat.  2. Repeat INR in one week.  3. Verbal information provided over the phone. Mrs. Muller expresses understanding through teach back and has no further questions at this time.     Apollo Vicente [facility]  5/29/2018  9:53 AM

## 2024-06-15 NOTE — Clinical Note
First balloon inflation max pressure = 12 davina. First balloon inflation duration = 6 seconds. Second inflation of balloon - Max pressure = 14 davina. 2nd Inflation of balloon - Duration = 5 seconds. 2nd inflation was done at 16:50 EDT. Third inflation of balloon - Max pressure = 16 davina. 3rd Inflation of balloon - Duration = 8 seconds. 3rd inflation was done at 16:50 EDT. Fourth inflation of balloon - Max pressure = 18 davina. 4th Inflation of  balloon - Duration = 5 seconds. 4th inflation was done at 16:51 EDT.

## 2024-06-15 NOTE — PROGRESS NOTES
Clinical Pharmacy Services: Medication History    Denisse Chavez is a 78 y.o. female presenting to [facility] for   Chief Complaint   Patient presents with    Shortness of Breath    Back Pain       She  has a past medical history of Angiomyolipoma of kidney (03/30/2016), Aortic valve sclerosis, Arthritis, CAD (coronary artery disease), Cellulitis (07/2018), Chronic kidney disease, stage III (moderate) (10/13/2016), Chronic venous insufficiency, Hyperlipidemia, Hypertension, Hypertriglyceridemia (9/29/2021), Low back pain, Mass of ovary (3/30/2016), Obesity (BMI 30-39.9) (12/22/2019), Sleep apnea, Spinal stenosis, Type 2 diabetes mellitus, and Uterine leiomyoma (3/30/2016).    Allergies as of 06/15/2024 - Reviewed 06/15/2024   Allergen Reaction Noted    Ace inhibitors Other (See Comments) 05/13/2016    Angiotensin receptor blockers Other (See Comments) 05/13/2016    Keflex [cephalexin] Rash 08/29/2018    Sulfa antibiotics Itching 10/05/2016       Medication information was obtained from: Patient  Pharmacy and Phone Number:     Prior to Admission Medications       Prescriptions Last Dose Informant Patient Reported? Taking?    acetaminophen-codeine (TYLENOL with CODEINE #3) 300-30 MG per tablet 6/14/2024 Self No Yes    TAKE 1 TABLET BY MOUTH THREE TIMES DAILY    aspirin 81 MG tablet 6/14/2024 Self Yes Yes    Take 1 tablet by mouth Every Night.    atorvastatin (LIPITOR) 40 MG tablet 6/14/2024 Self No Yes    Take 1 tablet by mouth once daily    Patient taking differently:  Take 1 tablet by mouth Daily.    Calcium Carbonate 1500 (600 Ca) MG tablet Past Week Self Yes Yes    Take 1 tablet by mouth.    dapagliflozin (FARXIGA) 5 MG tablet tablet 6/14/2024 Self Yes Yes    Take 1 tablet by mouth Daily.    docusate sodium (COLACE) 100 MG capsule  Self Yes Yes    Take 1 capsule by mouth 2 (Two) Times a Day.    hydrALAZINE (APRESOLINE) 10 MG tablet 6/14/2024 Self No Yes    TAKE 1 TABLET BY MOUTH THREE TIMES DAILY     Patient taking differently:  Take 1 tablet by mouth 3 (Three) Times a Day.    insulin aspart protamine-insulin aspart (novoLOG 70/30) injection 6/14/2024 Self Yes Yes    Inject 70 Units under the skin into the appropriate area as directed 2 (Two) Times a Day With Meals. Patient uses 70U in AM and 60U in PM    isosorbide mononitrate (IMDUR) 60 MG 24 hr tablet Past Week Self No Yes    Take 1 tablet by mouth once daily    Patient taking differently:  1 tablet Daily.    metoprolol succinate XL (TOPROL-XL) 100 MG 24 hr tablet Past Week Self No Yes    Take 1 tablet by mouth once daily    Patient taking differently:  1 tablet Daily.    Semaglutide,0.25 or 0.5MG/DOS, (OZEMPIC) 2 MG/1.5ML solution pen-injector Past Week Self Yes Yes    Inject  under the skin into the appropriate area as directed 1 (One) Time Per Week. Patient takes on Tuesdays    spironolactone (ALDACTONE) 25 MG tablet Past Week Self Yes Yes    Take 1 tablet by mouth Daily.    torsemide (DEMADEX) 20 MG tablet 6/14/2024 Self Yes Yes    Take 4 tablets by mouth 2 (Two) Times a Day.    vitamin D (ERGOCALCIFEROL) 47781 units capsule capsule Past Month Self Yes Yes    1 capsule Every 30 (Thirty) Days. Patient takes one tablet monthly, on the 19th    hydrOXYzine (ATARAX) 25 MG tablet   No No    TAKE 1 TABLET BY MOUTH AT NIGHT AS NEEDED FOR INSOMNIA    Patient taking differently:  Patient has prescription but has not taken for a while              Medication notes: Patient reports she has not taken any medications today.     This medication list is complete to the best of my knowledge as of 6/15/2024    Please call if questions.    Danilo Nolasco   Pharmacy Intern   [phone]    6/15/2024 12:22 EDT

## 2024-06-15 NOTE — Clinical Note
First balloon inflation max pressure = 18 davina. First balloon inflation duration = 5 seconds. Second inflation of balloon - Max pressure = 18 davina. 2nd Inflation of balloon - Duration = 8 seconds. 2nd inflation was done at 17:20 EDT. Third inflation of balloon - Max pressure = 18 davina. 3rd Inflation of balloon - Duration = 5 seconds. 3rd inflation was done at 17:21 EDT. Fourth inflation of balloon - Max pressure = 18 davina. 4th Inflation of  balloon - Duration = 5 seconds. 4th inflation was done at 17:21 EDT.

## 2024-06-15 NOTE — Clinical Note
First balloon inflation max pressure = 14 davina. First balloon inflation duration = 8 seconds. Second inflation of balloon - Max pressure = 16 davina. 2nd Inflation of balloon - Duration = 5 seconds. 2nd inflation was done at 15:13 EDT. The balloon is positioned in the Proximal segment of the vessel. Third inflation of balloon - Max pressure = 18 davina. 3rd Inflation of balloon - Duration = 20 seconds. 3rd inflation was done at 15:13 EDT. The  balloon is positioned in the Proximal segment of the vessel. Fourth inflation of balloon - Max pressure = 16 davina. 4th Inflation of balloon - Duration = 10 seconds.

## 2024-06-15 NOTE — Clinical Note
First balloon inflation max pressure = 14 davina. First balloon inflation duration = 5 seconds. Second inflation of balloon - Max pressure = 14 davina. 2nd Inflation of balloon - Duration = 5 seconds. The balloon is positioned in the Proximal segment of the vessel. Third inflation of balloon - Max pressure = 14 davina. 3rd Inflation of balloon - Duration = 10 seconds. The balloon is positioned in the Proximal segment of the vessel.

## 2024-06-15 NOTE — Clinical Note
Wire inserted in circumflex. Continue Regimen: Topical 0.05% clobetasol solution daily as prescribed Plan: Patient picks most often while reading and during idol activities. Encourage him to reframe from picking Detail Level: Simple Render In Strict Bullet Format?: No

## 2024-06-15 NOTE — Clinical Note
First balloon inflation max pressure = 20 davina. First balloon inflation duration = 8 seconds. Second inflation of balloon - Max pressure = 20 davina. 2nd Inflation of balloon - Duration = 10 seconds.

## 2024-06-15 NOTE — Clinical Note
Hemostasis started on the right radial artery. R-Band was used in achieving hemostasis. Radial compression device applied to vessel. Hemostasis achieved successfully. Closure device additional comment: TR Band applied-18atm

## 2024-06-15 NOTE — Clinical Note
First balloon inflation max pressure = 12 davina. First balloon inflation duration = 5 seconds. Second inflation of balloon - Max pressure = 12 davina. 2nd Inflation of balloon - Duration = 4 seconds. 2nd inflation was done at 15:28 EDT. The balloon is positioned in the Proximal segment of the vessel. Third inflation of balloon - Max pressure = 18 davina. 3rd Inflation of balloon - Duration = 5 seconds. 3rd inflation was done at 15:28 EDT. The b alloon is positioned in the Proximal segment of the vessel.

## 2024-06-15 NOTE — Clinical Note
A 7 fr sheath was successfully inserted using micropuncture technique with ultrasound guidance into the right radial artery. Normal for race

## 2024-06-15 NOTE — ED PROVIDER NOTES
EMERGENCY DEPARTMENT ENCOUNTER    Room Number:  3112/1  PCP: Surekha Mcdonnell MD  Historian: Patient      HPI:  Chief Complaint: Shortness of breath  A complete HPI/ROS/PMH/PSH/SH/FH are unobtainable due to: None    Context: Denisse Chavez is a 78 y.o. female who presents to the ED via private vehicle c/o acute shortness of breath that is mostly constant since yesterday.  Has a little bit of nausea, also having acute on chronic low back pain.  No recent falls, mild dry cough, no fevers or chills, no vomiting or diarrhea.  Denies any increased lower extremity edema.  Did not take her diuretic yesterday or today due to not feeling well.      MEDICAL RECORD REVIEW    External (non-ED) record review: Adult transthoracic echo October 11, 2023 shows ejection fraction 64%              PAST MEDICAL HISTORY  Active Ambulatory Problems     Diagnosis Date Noted    Type 2 diabetes mellitus, with long-term current use of insulin 03/08/2016    Hyperlipidemia 03/08/2016    Essential hypertension 03/08/2016    Angiomyolipoma of kidney 03/30/2016    Lumbar spinal stenosis 05/16/2016    SAWYER on autoCPAP 09/04/2016    Stage 3 chronic kidney disease 10/13/2016    Chronic venous insufficiency     CAD (coronary artery disease)     Circadian rhythm sleep disorder, delayed sleep phase type 04/23/2018    Class 2 severe obesity due to excess calories with serious comorbidity and body mass index (BMI) of 39.0 to 39.9 in adult 04/23/2018    History of colon polyps 06/13/2018    Aortic valve sclerosis 07/25/2019    Hypertriglyceridemia 09/29/2021    Osteoporosis 11/23/2021    Vitamin D deficiency 08/09/2023    Cellulitis of right leg 12/13/2023    Diabetic foot ulcer 12/13/2023     Resolved Ambulatory Problems     Diagnosis Date Noted    Type 2 diabetes mellitus 03/07/2016    Diabetes mellitus type 2, uncontrolled, with complications 03/08/2016    Uncontrolled type II diabetes mellitus with nephropathy 03/08/2016    Type II diabetes  mellitus with neurological manifestations, uncontrolled 03/08/2016    Hyperinsulinism 03/08/2016    Abnormal weight gain 03/08/2016    Edema of lower extremity 03/30/2016    Renal function test abnormal 03/30/2016    Hypercalcemia 03/30/2016    Hyperkalemia 03/30/2016    Mass of ovary 03/30/2016    Uterine leiomyoma 03/30/2016    Memory changes 04/13/2016    Left hip pain 05/16/2016    Left-sided low back pain without sciatica 05/16/2016    Encounter for long-term (current) use of insulin 06/24/2016    Uncontrolled type 2 diabetes mellitus with background retinopathy 10/24/2017    Cellulitis of right lower extremity 07/16/2018    Sepsis 07/16/2018    Viral upper respiratory infection 07/16/2018    Hyperlipidemia associated with type 2 diabetes mellitus 12/20/2018    Diabetes mellitus type 2, insulin dependent 01/04/2016    Sepsis without acute organ dysfunction 12/22/2019    Emphysematous cystitis 12/22/2019    Bacteremia due to Streptococcus 12/23/2019    Sepsis 05/07/2020    Class 2 severe obesity due to excess calories with serious comorbidity and body mass index (BMI) of 39.0 to 39.9 in adult 08/19/2020    History of adenomatous polyp of colon 11/02/2023     Past Medical History:   Diagnosis Date    Arthritis     Cellulitis 07/2018    Chronic kidney disease, stage III (moderate) 10/13/2016    Hypertension     Low back pain     Obesity (BMI 30-39.9) 12/22/2019    Sleep apnea     Spinal stenosis          PAST SURGICAL HISTORY  Past Surgical History:   Procedure Laterality Date    CATARACT EXTRACTION Bilateral     X2     COLONOSCOPY N/A 8/29/2018    Procedure: COLONOSCOPY to CECUM WITH HOT SNARE POLYPECTOMY;  Surgeon: Neal Reilly MD;  Location: [facility] ENDOSCOPY;  Service: Gastroenterology    COLONOSCOPY N/A 12/13/2023    Procedure: COLONOSCOPY to cecum and TI with cold snare and cold biopsy polypectomies;  Surgeon: Polly Ruiz MD;  Location: [facility] ENDOSCOPY;  Service: General;  Laterality: N/A;  pre:  screening  post: polyps    EPIDURAL BLOCK      HERNIA REPAIR      HYSTERECTOMY      TONSILLECTOMY           FAMILY HISTORY  Family History   Problem Relation Age of Onset    Diabetes Mother     Heart attack Mother     Hypertension Mother     Hypothyroidism Mother     Diabetes type II Son     Breast cancer Neg Hx          SOCIAL HISTORY  Social History     Socioeconomic History    Marital status: Single    Number of children: 3   Tobacco Use    Smoking status: Former     Current packs/day: 0.00     Average packs/day: 0.3 packs/day for 2.0 years (0.5 ttl pk-yrs)     Types: Cigarettes     Start date:      Quit date:      Years since quittin.4    Smokeless tobacco: Never    Tobacco comments:     LIGHT USAGE   Vaping Use    Vaping status: Never Used   Substance and Sexual Activity    Alcohol use: No    Drug use: No    Sexual activity: Defer         ALLERGIES  Ace inhibitors, Angiotensin receptor blockers, Keflex [cephalexin], and Sulfa antibiotics        REVIEW OF SYSTEMS  Review of Systems     All systems reviewed and negative except for those discussed in HPI.       PHYSICAL EXAM    I have reviewed the triage vital signs and nursing notes.    ED Triage Vitals   Temp Heart Rate Resp BP SpO2   06/15/24 0825 06/15/24 0825 06/15/24 0825 06/15/24 0906 06/15/24 0825   100.1 °F (37.8 °C) 94 16 175/68 97 %      Temp src Heart Rate Source Patient Position BP Location FiO2 (%)   06/15/24 0825 -- 06/15/24 0906 06/15/24 0906 --   Tympanic  Lying Right arm        Physical Exam  General: No acute distress, nontoxic  HEENT: Mucous membranes moist, atraumatic, EOMI  Neck: Full ROM  Pulm: Symmetric chest rise, nonlabored, lungs slightly diminished bilaterally but no overt wheezes/rales/rhonchi  Cardiovascular: Regular rate and rhythm, intact distal pulses, nonpitting bilateral lower extremity edema in the feet and ankles  GI: Soft, nontender, nondistended, no rebound, no guarding, bowel sounds present  MSK: Full ROM, no  deformity  Skin: Warm, dry  Neuro: Awake, alert, oriented x 4, GCS 15, moving all extremities, no focal deficits  Psych: Calm, cooperative        LAB RESULTS  Recent Results (from the past 24 hour(s))   Basic Metabolic Panel    Collection Time: 06/15/24  9:04 AM    Specimen: Blood   Result Value Ref Range    Glucose 321 (H) 65 - 99 mg/dL    BUN 18 8 - 23 mg/dL    Creatinine 1.24 (H) 0.57 - 1.00 mg/dL    Sodium 131 (L) 136 - 145 mmol/L    Potassium 4.0 3.5 - 5.2 mmol/L    Chloride 96 (L) 98 - 107 mmol/L    CO2 20.5 (L) 22.0 - 29.0 mmol/L    Calcium 8.8 8.6 - 10.5 mg/dL    BUN/Creatinine Ratio 14.5 7.0 - 25.0    Anion Gap 14.5 5.0 - 15.0 mmol/L    eGFR 44.6 (L) >60.0 mL/min/1.73   BNP    Collection Time: 06/15/24  9:04 AM    Specimen: Blood   Result Value Ref Range    proBNP 5,512.0 (H) 0.0 - 1,800.0 pg/mL   High Sensitivity Troponin T    Collection Time: 06/15/24  9:04 AM    Specimen: Blood   Result Value Ref Range    HS Troponin T 571 (C) <14 ng/L   Lactic Acid, Plasma    Collection Time: 06/15/24  9:04 AM    Specimen: Blood   Result Value Ref Range    Lactate 2.1 (C) 0.5 - 2.0 mmol/L   Procalcitonin    Collection Time: 06/15/24  9:04 AM    Specimen: Blood   Result Value Ref Range    Procalcitonin 0.49 (H) 0.00 - 0.25 ng/mL   Magnesium    Collection Time: 06/15/24  9:04 AM    Specimen: Blood   Result Value Ref Range    Magnesium 2.2 1.6 - 2.4 mg/dL   CBC Auto Differential    Collection Time: 06/15/24  9:04 AM    Specimen: Blood   Result Value Ref Range    WBC 11.81 (H) 3.40 - 10.80 10*3/mm3    RBC 4.49 3.77 - 5.28 10*6/mm3    Hemoglobin 13.5 12.0 - 15.9 g/dL    Hematocrit 38.4 34.0 - 46.6 %    MCV 85.5 79.0 - 97.0 fL    MCH 30.1 26.6 - 33.0 pg    MCHC 35.2 31.5 - 35.7 g/dL    RDW 13.7 12.3 - 15.4 %    RDW-SD 42.1 37.0 - 54.0 fl    MPV 10.4 6.0 - 12.0 fL    Platelets 258 140 - 450 10*3/mm3    Neutrophil % 87.8 (H) 42.7 - 76.0 %    Lymphocyte % 5.7 (L) 19.6 - 45.3 %    Monocyte % 6.0 5.0 - 12.0 %    Eosinophil % 0.0  (L) 0.3 - 6.2 %    Basophil % 0.1 0.0 - 1.5 %    Immature Grans % 0.4 0.0 - 0.5 %    Neutrophils, Absolute 10.37 (H) 1.70 - 7.00 10*3/mm3    Lymphocytes, Absolute 0.67 (L) 0.70 - 3.10 10*3/mm3    Monocytes, Absolute 0.71 0.10 - 0.90 10*3/mm3    Eosinophils, Absolute 0.00 0.00 - 0.40 10*3/mm3    Basophils, Absolute 0.01 0.00 - 0.20 10*3/mm3    Immature Grans, Absolute 0.05 0.00 - 0.05 10*3/mm3    nRBC 0.0 0.0 - 0.2 /100 WBC   Red Top    Collection Time: 06/15/24  9:04 AM   Result Value Ref Range    Extra Tube Hold for add-ons.    Light Blue Top    Collection Time: 06/15/24  9:04 AM   Result Value Ref Range    Extra Tube Hold for add-ons.    Respiratory Panel PCR w/COVID-19(SARS-CoV-2) FAUSTO/EFREN/MIRTHA/PAD/COR/ALTAGRACIA In-House, NP Swab in [place], 2 HR TAT - Swab, Nasopharynx    Collection Time: 06/15/24  9:05 AM    Specimen: Nasopharynx; Swab   Result Value Ref Range    ADENOVIRUS, PCR Not Detected Not Detected    Coronavirus 229E Not Detected Not Detected    Coronavirus HKU1 Not Detected Not Detected    Coronavirus NL63 Not Detected Not Detected    Coronavirus OC43 Not Detected Not Detected    COVID19 Not Detected Not Detected - Ref. Range    Human Metapneumovirus Not Detected Not Detected    Human Rhinovirus/Enterovirus Not Detected Not Detected    Influenza A PCR Not Detected Not Detected    Influenza B PCR Not Detected Not Detected    Parainfluenza Virus 1 Not Detected Not Detected    Parainfluenza Virus 2 Not Detected Not Detected    Parainfluenza Virus 3 Not Detected Not Detected    Parainfluenza Virus 4 Not Detected Not Detected    RSV, PCR Not Detected Not Detected    Bordetella pertussis pcr Not Detected Not Detected    Bordetella parapertussis PCR Not Detected Not Detected    Chlamydophila pneumoniae PCR Not Detected Not Detected    Mycoplasma pneumo by PCR Not Detected Not Detected   ECG 12 Lead Dyspnea    Collection Time: 06/15/24 10:00 AM   Result Value Ref Range    QT Interval 436 ms    QTC Interval 510 ms    Adult Transthoracic Echo Complete W/ Cont if Necessary Per Protocol    Collection Time: 06/15/24 11:53 AM   Result Value Ref Range    EF(MOD-bp) 61.9 %    LVIDd 5.1 cm    LVIDs 3.3 cm    IVSd 1.10 cm    LVPWd 1.08 cm    FS 35.0 %    IVS/LVPW 1.02 cm    ESV(cubed) 35.9 ml    EDV(cubed) 130.3 ml    LV mass(C)d 208.8 grams    LVOT area 2.42 cm2    LVOT diam 1.75 cm    EDV(MOD-sp2) 89.0 ml    EDV(MOD-sp4) 124.0 ml    ESV(MOD-sp2) 30.0 ml    ESV(MOD-sp4) 49.0 ml    SV(MOD-sp2) 59.0 ml    SV(MOD-sp4) 75.0 ml    EF(MOD-sp2) 66.3 %    EF(MOD-sp4) 60.5 %    MV E max danny 128.0 cm/sec    MV A max danny 115.2 cm/sec    MV dec time 0.25 sec    MV E/A 1.11     Pulm A Revs Dur 0.12 sec    MV A dur 0.14 sec    LA ESV Index (BP) 26.3 ml/m2    Med Peak E' Danny 6.5 cm/sec    Lat Peak E' Danny 5.4 cm/sec    Avg E/e' ratio 21.51     SV(LVOT) 75.5 ml    RV Base 3.3 cm    RV Mid 2.24 cm    RV Length 5.8 cm    TAPSE (>1.6) 2.47 cm    RV S' 11.9 cm/sec    Pulm Sys Danny 48.3 cm/sec    Pulm [place] Danny 64.3 cm/sec    Pulm S/D 0.75     Pulm A Revs Danny 24.6 cm/sec    LV V1 max 137.9 cm/sec    LV V1 max PG 7.6 mmHg    LV V1 mean PG 3.9 mmHg    LV V1 VTI 31.2 cm    Ao pk danny 182.5 cm/sec    Ao max PG 13.3 mmHg    Ao mean PG 8.0 mmHg    Ao V2 VTI 44.3 cm    HENRY(I,D) 1.70 cm2    MV max PG 10.9 mmHg    MV mean PG 4.8 mmHg    MV V2 VTI 44.7 cm    MV P1/2t 71.9 msec    MVA(P1/2t) 3.1 cm2    MVA(VTI) 1.69 cm2    MV dec slope 615.4 cm/sec2    RV V1 max PG 4.3 mmHg    RV V1 max 103.7 cm/sec    RV V1 VTI 20.8 cm    PA V2 max 104.1 cm/sec    PA acc time 0.16 sec    Ao root diam 3.2 cm    ACS 1.73 cm    Sinus 2.7 cm    STJ 2.43 cm   High Sensitivity Troponin T 2Hr    Collection Time: 06/15/24 12:15 PM    Specimen: Blood   Result Value Ref Range    HS Troponin T 550 (C) <14 ng/L    Troponin T Delta -21 (L) >=-4 - <+4 ng/L   STAT Lactic Acid, Reflex    Collection Time: 06/15/24 12:15 PM    Specimen: Blood   Result Value Ref Range    Lactate 1.7 0.5 - 2.0 mmol/L    Urinalysis With Microscopic If Indicated (No Culture) - Urine, Clean Catch    Collection Time: 06/15/24 12:34 PM    Specimen: Urine, Clean Catch   Result Value Ref Range    Color, UA Yellow Yellow, Straw    Appearance, UA Clear Clear    pH, UA 5.5 5.0 - 8.0    Specific Gravity, UA 1.017 1.005 - 1.030    Glucose, UA >=1000 mg/dL (3+) (A) Negative    Ketones, UA 15 mg/dL (1+) (A) Negative    Bilirubin, UA Negative Negative    Blood, UA Trace (A) Negative    Protein,  mg/dL (2+) (A) Negative    Leuk Esterase, UA Negative Negative    Nitrite, UA Negative Negative    Urobilinogen, UA 1.0 E.U./dL 0.2 - 1.0 E.U./dL   Urinalysis, Microscopic Only - Urine, Clean Catch    Collection Time: 06/15/24 12:34 PM    Specimen: Urine, Clean Catch   Result Value Ref Range    RBC, UA 0-2 None Seen, 0-2 /HPF    WBC, UA 0-2 None Seen, 0-2 /HPF    Bacteria, UA None Seen None Seen /HPF    Squamous Epithelial Cells, UA 3-6 (A) None Seen, 0-2 /HPF    Hyaline Casts, UA 7-12 None Seen /LPF    Methodology Automated Microscopy        Ordered the above labs and independently interpreted results. My findings will be discussed in the medical decision making section below        RADIOLOGY  Adult Transthoracic Echo Complete W/ Cont if Necessary Per Protocol    Result Date: 6/15/2024    Left ventricular ejection fraction appears to be 56 - 60%.   The following left ventricular wall segments are hypokinetic: apical inferior, mid inferior, apical septal, mid inferoseptal and mid anteroseptal.   Left ventricular diastolic function is consistent with (grade II w/high LAP) pseudonormalization.   There is moderate calcification of the aortic valve.   Mild mitral valve stenosis is present. The mitral valve mean gradient is 5 mmHg.     CT Chest Without Contrast Diagnostic    Result Date: 6/15/2024  CT CHEST WITHOUT IV CONTRAST  HISTORY: Possible occult pneumonia  TECHNIQUE: Radiation dose reduction techniques were utilized, including automated exposure  control and exposure modulation based on body size. 3 mm images were obtained through the chest without IV contrast.  COMPARISON: None  FINDINGS: Evaluation is suboptimal without intravenous contrast.  There is small pericardial effusion. No noncalcified mediastinal or axillary adenopathy by size criteria. At least mild to moderate coronary calcification or coronary artery stents. A few scattered sub-6 mm pulm nodules are present bilaterally  There is patchy groundglass opacification within the bilateral lungs, left greater than right, and most notably within the upper lungs. No pleural effusion or pneumothorax. Presumed old left fifth rib fracture posteriorly with extensive callus formation. No suspicious lytic or blastic osseous lesions.      1.  Patchy mild to moderate groundglass opacification is present within the bilateral lungs, left greater than right, favored represent multifocal pneumonia in the appropriate context. Asymmetric edema is less likely. Correlation with patient history is recommended. 2.  A few scattered subcentimeter pulm nodules bilaterally. Follow-up chest CT in 12 months to be considered to ensure per Fleischner criteria.. 3.  Other findings as above    This report was finalized on 6/15/2024 11:44 AM by Dr. Dyllan Beckman M.D on Workstation: [employer]      XR Chest 1 View    Result Date: 6/15/2024  XR CHEST 1 VW-  HISTORY: Female who is 78 years-old, dyspnea  TECHNIQUE: Frontal view of the chest  COMPARISON: 5/6/2020  FINDINGS: The heart size is normal. Pulmonary vasculature appears mildly congested. No focal pulmonary consolidation, pleural effusion, or pneumothorax. Old granulomatous disease is apparent. No acute osseous process.      No focal pulmonary consolidation. Mild pulmonary vascular congestion. Follow-up as clinical indications persist.  This report was finalized on 6/15/2024 8:56 AM by Dr. Boris Castro M.D on Workstation: [employer]       Ordered the above noted radiological  studies.  Independently interpreted by me and my independent review of findings can be found in the ED Course.  See dictation for official radiology interpretation.      PROCEDURES    Procedures    EKG - Per my independent interpretation at 1003:    EKG Time: 1000  Rhythm/Rate: Sinus rhythm with rate of 82  Normal axis  Prolonged QTc of 510  Inferior Q waves, no acute ischemic changes  No STEMI       No emergent changes compared to September 27, 2023      MEDICATIONS GIVEN IN ER    Medications   Enoxaparin Sodium (LOVENOX) syringe 90 mg (has no administration in time range)   aspirin EC tablet 81 mg (has no administration in time range)   atorvastatin (LIPITOR) tablet 40 mg (has no administration in time range)   empagliflozin (JARDIANCE) tablet 10 mg (has no administration in time range)   docusate sodium (COLACE) capsule 100 mg (has no administration in time range)   hydrALAZINE (APRESOLINE) tablet 10 mg (has no administration in time range)   isosorbide mononitrate (IMDUR) 24 hr tablet 60 mg (has no administration in time range)   metoprolol succinate XL (TOPROL-XL) 24 hr tablet 100 mg (has no administration in time range)   spironolactone (ALDACTONE) tablet 25 mg (has no administration in time range)   torsemide (DEMADEX) tablet 80 mg (has no administration in time range)   dextrose (GLUTOSE) oral gel 15 g (has no administration in time range)   dextrose (D50W) (25 g/50 mL) IV injection 25 g (has no administration in time range)   glucagon (GLUCAGEN) injection 1 mg (has no administration in time range)   insulin lispro (HUMALOG/ADMELOG) injection 3-14 Units (has no administration in time range)   sodium chloride 0.9 % flush 10 mL (has no administration in time range)   sodium chloride 0.9 % flush 10 mL (has no administration in time range)   sodium chloride 0.9 % infusion 40 mL (has no administration in time range)   acetaminophen (TYLENOL) tablet 650 mg (has no administration in time range)     Or    acetaminophen (TYLENOL) 160 MG/5ML oral solution 650 mg (has no administration in time range)     Or   acetaminophen (TYLENOL) suppository 650 mg (has no administration in time range)   sennosides-docusate (PERICOLACE) 8.6-50 MG per tablet 2 tablet (has no administration in time range)     And   polyethylene glycol (MIRALAX) packet 17 g (has no administration in time range)     And   bisacodyl (DULCOLAX) EC tablet 5 mg (has no administration in time range)     And   bisacodyl (DULCOLAX) suppository 10 mg (has no administration in time range)   ondansetron ODT (ZOFRAN-ODT) disintegrating tablet 4 mg (has no administration in time range)     Or   ondansetron (ZOFRAN) injection 4 mg (has no administration in time range)   acetaminophen (TYLENOL) tablet 1,000 mg (1,000 mg Oral Given 6/15/24 1017)   aspirin tablet 325 mg (325 mg Oral Given 6/15/24 1017)   furosemide (LASIX) injection 80 mg (80 mg Intravenous Given 6/15/24 1218)   perflutren (DEFINITY) 8.476 mg in Sodium Chloride (PF) 0.9 % 10 mL injection (2 mL Intravenous Given 6/15/24 1159)         PROGRESS, DATA ANALYSIS, CONSULTS, AND MEDICAL DECISION MAKING    Please note that this section constitutes my independent interpretation of clinical data including lab results, radiology, EKG's.  This constitutes my independent professional opinion regarding differential diagnosis and management of this patient.  It may include any factors such as history from outside sources, review of external records, social determinants of health, management of medications, response to those treatments, and discussions with other providers.    Differential Diagnosis and Plan: Initial concern for viral process, pneumonia, heart failure, renal failure, electrolyte abnormalities, arrhythmia, anemia, among others.  Plan for labs, chest x-ray, and reevaluation with results.    Additional sources:  - Discussed/ obtained information from independent historians:       - (Social Determinants  [facility]): None     - Shared decision making:  Patient fully updated on and in agreement with the course and plan moving forward    ED Course as of 06/15/24 1527   Sat Tyree 15, 2024   0850 XR Chest 1 View  Per my independent interpretation of the chest x-ray, no evidence of pneumothorax [DC]   0910 XR Chest 1 View  Radiology report reviewed, no acute emergent findings noted, mild pulmonary vascular congestion noted [DC]   0928 WBC(!): 11.81 [DC]   0928 Hemoglobin: 13.5 [DC]   0928 Platelets: 258 [DC]   0937 Glucose(!): 321 [DC]   0937 BUN: 18 [DC]   0937 Creatinine(!): 1.24  1.08 three months ago [DC]   0938 Sodium(!): 131 [DC]   0938 Potassium: 4.0 [DC]   0938 Magnesium: 2.2 [DC]   0949 Procalcitonin(!): 0.49 [DC]   0949 proBNP(!): 5,512.0  No prior [DC]   0949 Lactate(!!): 2.1 [DC]   0950 HS Troponin T(!!): 571  No prior [DC]   0950 Magnesium: 2.2 [DC]   1006 Respiratory Panel PCR w/COVID-19(SARS-CoV-2) FAUSTO/EFREN/MIRTHA/PAD/COR/ALTAGRACIA In-House, NP Swab in UTM/VTM, 2 HR TAT - Swab, Nasopharynx  Pan-negative [DC]   1020 Discussed with Dr. Lemus, cardiology, discussed patient's clinical course and find today, treatment modalities, and we will give 80 mg of IV Lasix and they will consult [DC]   1029 Discussed with Dr. Ladd, [facility], discussed patient's clinical course and findings today, treatment modalities, and cardiology consult recommendations, and plan for hospitalization.  Will add on a CT of the chest to try to gain some clarity about any potential occult pneumonia not showing up on xray [DC]      ED Course User Index  [DC] Vikash Booker MD       Hospitalization Considered?: yes    Orders Placed During This Visit:  Orders Placed This Encounter   Procedures    Respiratory Panel PCR w/COVID-19(SARS-CoV-2) FAUSTO/EFREN/MIRTHA/PAD/COR/ALTAGRACIA In-House, NP Swab in UTM/VTM, 2 HR TAT - Swab, Nasopharynx    XR Chest 1 View    CT Chest Without Contrast Diagnostic    Basic Metabolic Panel    BNP    High Sensitivity Troponin T    Lactic Acid,  Plasma    Procalcitonin    Magnesium    Urinalysis With Microscopic If Indicated (No Culture) - Urine, Clean Catch    CBC Auto Differential    High Sensitivity Troponin T 2Hr    STAT Lactic Acid, Reflex    Urinalysis, Microscopic Only - Urine, Clean Catch    High Sensitivity Troponin T    Hemoglobin A1c    Basic Metabolic Panel    CBC Auto Differential    NPO Diet NPO Type: Strict NPO    Diet: Cardiac, Diabetic; Healthy Heart (2-3 Na+); Consistent Carbohydrate; Fluid Consistency: Thin (IDDSI 0)    Vital Signs    Intake & Output    Weigh Patient    Oral Care    Saline Lock & Maintain IV Access    Place Sequential Compression Device    Maintain Sequential Compression Device    Code Status and Medical Interventions:    LCG (on-call MD unless specified)    LHA (on-call MD unless specified) Details    Inpatient Cardiology Consult    POC Glucose 4x Daily Before Meals & at Bedtime    ECG 12 Lead Dyspnea    Telemetry Scan    Telemetry Scan    Adult Transthoracic Echo Complete W/ Cont if Necessary Per Protocol    Insert Peripheral IV    Inpatient Admission    CBC & Differential    Extra Tubes    Red Top    Light Blue Top       Additional orders considered but not placed:      Independent interpretation of labs, radiology studies, and discussions with consultants: See ED Course        AS OF 15:27 EDT VITALS:    BP - 150/81  HR - 77  TEMP - 98.1 °F (36.7 °C) (Oral)  02 SATS - 95%          DIAGNOSIS  Final diagnoses:   Dyspnea, unspecified type   Elevated brain natriuretic peptide (BNP) level   NSTEMI (non-ST elevated myocardial infarction)   Chronic renal impairment, unspecified CKD stage   History of coronary artery disease   History of diabetes mellitus         DISPOSITION  ED Disposition       ED Disposition   Decision to Admit    Condition   --    Comment   Level of Care: Telemetry [5]   Diagnosis: NSTEMI (non-ST elevated myocardial infarction) [364777]   Admitting Physician: HENRY GAMBOA [[id number]]   Attending Physician:  HENRY GAMBOA [[id number]]   Certification: I Certify That Inpatient Hospital Services Are Medically Necessary For Greater Than 2 Midnights                  Please note that portions of this document were completed with a voice recognition program.    Note Disclaimer: At [facility], we believe that sharing information builds trust and better relationships. You are receiving this note because you recently visited [facility] [facility]. It is possible you will see health information before a provider has talked with you about it. This kind of information can be easy to misunderstand. To help you fully understand what it means for your health, we urge you to discuss this note with your provider.                       Vikash Booker MD  06/15/24 [id number]

## 2024-06-15 NOTE — Clinical Note
First balloon inflation max pressure = 16 davina. First balloon inflation duration = 15 seconds. Second inflation of balloon - Max pressure = 18 davina. 2nd Inflation of balloon - Duration = 10 seconds.

## 2024-06-15 NOTE — Clinical Note
Atherectomy performed in the left main. Rotational atherectomy was performed. 1st Pass rate = 148 RPM. 1st Pass duration = 20 seconds. 2nd Pass rate = 155 RPM. 2nd Pass duration = 22 seconds. 3rd Pass rate = 143 RPM. 3rd Pass duration = 19 seconds. 4th Pass rate = 142 RPM. 4th Pass duration = 22 seconds. 5th Pass duration = 24 seconds. 6th Pass rate = 145 RPM. 6th Pass duration = 21 seconds.

## 2024-06-15 NOTE — Clinical Note
First balloon inflation max pressure = 20 davina. First balloon inflation duration = 5 seconds. Second inflation of balloon - Max pressure = 20 davina. 2nd Inflation of balloon - Duration = 5 seconds.

## 2024-06-15 NOTE — Clinical Note
First balloon inflation max pressure = 25 davina. First balloon inflation duration = 5 seconds. Second inflation of balloon - Max pressure = 25 davina. 2nd Inflation of balloon - Duration = 5 seconds. 2nd inflation was done at 18:01 EDT. The balloon is positioned in the Proximal segment of the vessel. Third inflation of balloon - Max pressure = 25 davina. 3rd Inflation of balloon - Duration = 5 seconds. 3rd inflation was done at 18:02 EDT. The b alloon is positioned in the Proximal segment of the vessel. Fourth inflation of balloon - Max pressure = 25 davina. 4th Inflation of balloon - Duration = 5 seconds. 4th inflation was done at 18:02 EDT. The balloon is positioned in the Proximal segment of the vessel.

## 2024-06-15 NOTE — Clinical Note
First balloon inflation max pressure = 18 davina. First balloon inflation duration = 5 seconds. Second inflation of balloon - Max pressure = 18 davina. 2nd Inflation of balloon - Duration = 5 seconds. 2nd inflation was done at 18:08 EDT. Third inflation of balloon - Max pressure = 18 davina. 3rd Inflation of balloon - Duration = 5 seconds.

## 2024-06-15 NOTE — CONSULTS
"Patient Name: Denisse Chavez  :1945  78 y.o.    Date of Admission: 6/15/2024  Encounter Provider: Dara Lemus MD  Date of Encounter Visit: 06/15/24  Place of Service: [facility] CARDIOLOGY  Referring Provider: No ref. provider found  Patient Care Team:  Surekha Mcdonnell MD as PCP - General (Internal Medicine)  Warner Tang MD as Cardiologist (Cardiology)  Sergio Eagle MD as Consulting Physician (Urology)  Juan Negrete MD as Consulting Physician (Endocrinology)  Miguel Angel Barrett DPM as Consulting Physician (Podiatry)  Gabriel Montenegro MD as Consulting Physician (Nephrology)  Linda Rodriguez MD (Ophthalmology)  Ginger Ross APRN as Nurse Practitioner (Nurse Practitioner)      Chief complaint:  SOA    History of Present Illness:    This is a patient of Dr. Tang's who has a history of myocardial infarction with balloon angioplasty in .  She denies any stents.  She has chronic lower extremity edema.  She started become short of breath yesterday and her family noticed orthopnea.  She was more comfortable when sitting in a chair.  Her lower extremity edema is actually been a little bit better.  Apparently she has a callus on her foot that is been draining.  She said her anginal equivalent was \"bricks sitting on her chest.\"  She has not had any of those symptoms of late.  She had some chills but denies fever.      She came to the emergency room and chest x-ray is consistent with pulmonary edema.  BNP and troponin are both elevated.  EKG with nonspecific ST changes.      Past Medical History:   Diagnosis Date    Angiomyolipoma of kidney 2016    Aortic valve sclerosis     stable 2020    Arthritis     CAD (coronary artery disease)     MI in , treated medically.  PET 2016 with small-medium sized lateral infarct, no ischemia.  This wall motion abnormality was seen on echo as well.    Cellulitis 2018    RIGHT LEG    Chronic " kidney disease, stage III (moderate) 10/13/2016    Chronic venous insufficiency     Hyperlipidemia     Hypertension     Hypertriglyceridemia 9/29/2021    Low back pain     Mass of ovary 3/30/2016    Obesity (BMI 30-39.9) 12/22/2019    Sleep apnea     on CPAP.  Dr. Mueller    Spinal stenosis     Type 2 diabetes mellitus     Uterine leiomyoma 3/30/2016       Past Surgical History:   Procedure Laterality Date    CATARACT EXTRACTION Bilateral     X2     COLONOSCOPY N/A 8/29/2018    Procedure: COLONOSCOPY to CECUM WITH HOT SNARE POLYPECTOMY;  Surgeon: Neal Reilly MD;  Location:  [facility];  Service: Gastroenterology    COLONOSCOPY N/A 12/13/2023    Procedure: COLONOSCOPY to cecum and TI with cold snare and cold biopsy polypectomies;  Surgeon: Polly Ruiz MD;  Location:  [facility];  Service: General;  Laterality: N/A;  pre: screening  post: polyps    EPIDURAL BLOCK      HERNIA REPAIR      HYSTERECTOMY      TONSILLECTOMY           Prior to Admission medications    Medication Sig Start Date End Date Taking? Authorizing Provider   acetaminophen-codeine (TYLENOL with CODEINE #3) 300-30 MG per tablet TAKE 1 TABLET BY MOUTH THREE TIMES DAILY 2/2/24   Surekha Mcdonnell MD   aspirin 81 MG tablet Take  by mouth Every Night. 3/18/15   Mami Flores MD   atorvastatin (LIPITOR) 40 MG tablet Take 1 tablet by mouth once daily 4/18/24   Surekha Mcdonnell MD   Calcium Carbonate 1500 (600 Ca) MG tablet Take 1 tablet by mouth.    Mami Flores MD   dapagliflozin (FARXIGA) 5 MG tablet tablet Take 1 tablet by mouth Daily. 10/5/23   Mami Flores MD   docusate sodium (COLACE) 100 MG capsule Take 1 capsule by mouth 2 (Two) Times a Day.    Mami Flores MD   hydrALAZINE (APRESOLINE) 10 MG tablet TAKE 1 TABLET BY MOUTH THREE TIMES DAILY 4/10/24   Surekha Mcdonnell MD   hydrOXYzine (ATARAX) 25 MG tablet TAKE 1 TABLET BY MOUTH AT NIGHT AS NEEDED FOR INSOMNIA 3/31/23   Nadiya Norris MD    insulin aspart protamine-insulin aspart (novoLOG 70/30) injection Inject 70 Units under the skin into the appropriate area as directed 2 (Two) Times a Day With Meals.    Mami Flores MD   isosorbide mononitrate (IMDUR) 60 MG 24 hr tablet Take 1 tablet by mouth once daily 24   Surekha Mcdonnell MD   metoprolol succinate XL (TOPROL-XL) 100 MG 24 hr tablet Take 1 tablet by mouth once daily 3/11/24   Surekha Mcdonnell MD   Misc. Devices (ROLLATOR) misc 1 Units as needed (for walking). 16   Cordell Garrett MD   nystatin (nystatin) 350429 UNIT/GM powder Apply  topically to the appropriate area as directed 3 (Three) Times a Day. 20   Surekha Mcdonnell MD   Semaglutide,0.25 or 0.5MG/DOS, (OZEMPIC) 2 MG/1.5ML solution pen-injector Inject  under the skin into the appropriate area as directed 1 (One) Time Per Week.    Mami Flores MD   spironolactone (ALDACTONE) 25 MG tablet Take 1 tablet by mouth Daily. 21   Mami Flores MD   torsemide (DEMADEX) 20 MG tablet Take 4 tablets by mouth 2 (Two) Times a Day. 8/3/22   Surekha Mcdonnell MD   vitamin D (ERGOCALCIFEROL) 28913 units capsule capsule 1 capsule Every 7 (Seven) Days. 3/9/19   Mami Flores MD       Allergies   Allergen Reactions    Ace Inhibitors Other (See Comments)     Hyperkalemia/ROB    Angiotensin Receptor Blockers Other (See Comments)     Pt unable to remember    Keflex [Cephalexin] Rash     Tolerated ceftriaxone Dec 2019; tolerated zosyn May 2020    Sulfa Antibiotics Itching     rash       Social History     Socioeconomic History    Marital status: Single    Number of children: 3   Tobacco Use    Smoking status: Former     Current packs/day: 0.00     Average packs/day: 0.3 packs/day for 2.0 years (0.5 ttl pk-yrs)     Types: Cigarettes     Start date:      Quit date: 1970     Years since quittin.4    Smokeless tobacco: Never    Tobacco comments:     LIGHT USAGE   Vaping Use    Vaping status: Never Used    Substance and Sexual Activity    Alcohol use: No    Drug use: No    Sexual activity: Defer       Family History   Problem Relation Age of Onset    Diabetes Mother     Heart attack Mother     Hypertension Mother     Hypothyroidism Mother     Diabetes type II Son     Breast cancer Neg Hx        REVIEW OF SYSTEMS:   All other systems reviewed and negative.        Objective:     Vitals:    06/15/24 0825 06/15/24 0906   BP:  175/68   BP Location:  Right arm   Patient Position:  Lying   Pulse: 94    Resp: 16    Temp: 100.1 °F (37.8 °C)    TempSrc: Tympanic    SpO2: 97%      There is no height or weight on file to calculate BMI.  No intake or output data in the 24 hours ending 06/15/24 1059    Constitutional: She is oriented to person, place, and time. She appears well-developed. She does not appear ill.   HENT:   Head: Normocephalic and atraumatic. Head is without contusion.   Right Ear: Hearing normal. No drainage.   Left Ear: Hearing normal. No drainage.   Nose: No nasal deformity. No epistaxis.   Eyes: Lids are normal. Right eye exhibits no exudate. Left eye exhibits no exudate.  Neck: No JVD present. Carotid bruit is not present. No tracheal deviation present. No thyroid mass and no thyromegaly present.   Cardiovascular: Normal rate, regular rhythm and normal heart sounds.    Pulses:       Posterior tibial pulses are 2+ on the right side, and 2+ on the left side.   Pulmonary/Chest: Effort normal and breath sounds normal.   Abdominal: Soft. Normal appearance and bowel sounds are normal. There is no tenderness.   Musculoskeletal: No marked joint deformity.  Bilateral lower extremity edema.  Neurological: She is alert and oriented to person, place, and time. She has normal strength.   Skin: Skin is warm, dry and intact. No rash noted.   Psychiatric: She has a normal mood and affect. Her behavior is normal. Thought content normal.   Vitals reviewed      Lab Review:     Results from last 7 days   Lab Units 06/15/24  0904    SODIUM mmol/L 131*   POTASSIUM mmol/L 4.0   CHLORIDE mmol/L 96*   CO2 mmol/L 20.5*   BUN mg/dL 18   CREATININE mg/dL 1.24*   CALCIUM mg/dL 8.8   GLUCOSE mg/dL 321*     Results from last 7 days   Lab Units 06/15/24  0904   HSTROP T ng/L 571*     Results from last 7 days   Lab Units 06/15/24  0904   WBC 10*3/mm3 11.81*   HEMOGLOBIN g/dL 13.5   HEMATOCRIT % 38.4   PLATELETS 10*3/mm3 258         Results from last 7 days   Lab Units 06/15/24  0904   MAGNESIUM mg/dL 2.2         I personally viewed and interpreted the patient's EKG/Telemetry data.        Assessment and Plan:       1.  Acute on chronic diastolic heart failure.  I reviewed her echocardiogram from the fall 2023 and she has grade 2 diastolic dysfunction.  Her LV function was normal and she had age-related valve changes but no significant valve dysfunction.  I agree with IV Lasix.  Monitor renal function as she does have chronic kidney disease.  I am going to check an echocardiogram to evaluate her LV systolic and diastolic function.  2.  Coronary artery disease with history of myocardial infarction and PTCA in 1996.  She is on aspirin and atorvastatin.  She has not had her anginal equivalent.  3.  Non-ST elevation myocardial infarction with markedly elevated troponin.  Check repeat.  Check echocardiogram for any wall motion abnormality.  Consider heart catheterization when more clinically stable and can lay flat on the table.  4.  Systemic hypertension.  5.  Diabetes  6.  Chronic kidney disease followed by Dr. Montenegro  7.  Chronic lower extremity edema.  8.  Obesity.  9.  Diabetes.    Dara Lemus MD  06/15/24  10:59 EDT      Addendum: Second troponin is down.  Repeat troponin in the morning.  N.p.o. after midnight in case she needs to go the Cath Lab tomorrow.  I am going to give her single dose of Lovenox 1 mg/kg today.  Echocardiogram shows overall normal LV function but with some hypokinesis of the mid and apical septum and inferior wall.

## 2024-06-15 NOTE — Clinical Note
Hemostasis started on the right radial artery. R-Band was used in achieving hemostasis. Radial compression device applied to vessel. Hemostasis achieved successfully. Closure device additional comment: Vasc band with 15 cc of air

## 2024-06-15 NOTE — Clinical Note
First balloon inflation max pressure = 18 davina. First balloon inflation duration = 10 seconds. Second inflation of balloon - Max pressure = 18 davina. 2nd Inflation of balloon - Duration = 10 seconds. 2nd inflation was done at 17:25 EDT. Third inflation of balloon - Max pressure = 12 davina. 3rd Inflation of balloon - Duration = 10 seconds. 3rd inflation was done at 17:26 EDT.

## 2024-06-15 NOTE — Clinical Note
First balloon inflation max pressure = 12 davina. First balloon inflation duration = 10 seconds. Second inflation of balloon - Max pressure = 12 davina. 2nd Inflation of balloon - Duration = 12 seconds. 2nd inflation was done at 15:54 EDT. The balloon is positioned in the Proximal segment of the vessel.

## 2024-06-15 NOTE — Clinical Note
First balloon inflation max pressure = 24 davina. First balloon inflation duration = 5 seconds. Second inflation of balloon - Max pressure = 18 davina. 2nd Inflation of balloon - Duration = 5 seconds. Third inflation of balloon - Max pressure = 22 davina. 3rd Inflation of balloon - Duration = 5 seconds. Fourth inflation of balloon - Max pressure = 28 davina. 4th Inflation of balloon - Duration = 30 seconds.

## 2024-06-15 NOTE — Clinical Note
First balloon inflation max pressure = 20 davina. First balloon inflation duration = 8 seconds. Second inflation of balloon - Max pressure = 20 davina. 2nd Inflation of balloon - Duration = 15 seconds. 2nd inflation was done at 16:06 EDT. The balloon is positioned in the Proximal segment of the vessel.

## 2024-06-15 NOTE — H&P
HISTORY AND PHYSICAL   [facility]        Patient Identification:  Name: Denisse Chavez  Age: 78 y.o.  Sex: female  :  1945  MRN: [id number]                     Primary Care Physician: Surekha Mcdonnell MD    Chief Complaint: Shortness of breath    History of Present Illness:      The patient  is a 78 y.o. female who presents to the hospital via private vehicle c/o acute shortness of breath that is mostly constant since yesterday.  Has a little bit of nausea, also having acute on chronic low back pain.  No recent falls, mild dry cough with low-grade fever but no chills, no vomiting or diarrhea.  Denies any increased lower extremity edema.  Did not take her diuretic yesterday or today due to not feeling well.  The patient was evaluated in the ER and appeared to be somewhat fluid overloaded was given some IV Lasix.  Patient had CT scanning of chest which suggested some infiltrates possibly consistent with pneumonia.  The patient was admitted for further evaluation treatment and cardiology was consulted from the emergency room.    Past Medical History:  Past Medical History:   Diagnosis Date    Angiomyolipoma of kidney 2016    Aortic valve sclerosis     stable 2020    Arthritis     CAD (coronary artery disease)     MI in , treated medically.  PET 2016 with small-medium sized lateral infarct, no ischemia.  This wall motion abnormality was seen on echo as well.    Cellulitis 2018    RIGHT LEG    Chronic kidney disease, stage III (moderate) 10/13/2016    Chronic venous insufficiency     Hyperlipidemia     Hypertension     Hypertriglyceridemia 2021    Low back pain     Mass of ovary 3/30/2016    Obesity (BMI 30-39.9) 2019    Sleep apnea     on CPAP.  Dr. Mueller    Spinal stenosis     Type 2 diabetes mellitus     Uterine leiomyoma 3/30/2016     Past Surgical History:  Past Surgical History:   Procedure Laterality Date    CATARACT EXTRACTION Bilateral     X2      COLONOSCOPY N/A 8/29/2018    Procedure: COLONOSCOPY to CECUM WITH HOT SNARE POLYPECTOMY;  Surgeon: Neal Reilly MD;  Location:  [facility];  Service: Gastroenterology    COLONOSCOPY N/A 12/13/2023    Procedure: COLONOSCOPY to cecum and TI with cold snare and cold biopsy polypectomies;  Surgeon: Polly Ruiz MD;  Location:  [facility];  Service: General;  Laterality: N/A;  pre: screening  post: polyps    EPIDURAL BLOCK      HERNIA REPAIR      HYSTERECTOMY      TONSILLECTOMY        Home Meds:  Medications Prior to Admission   Medication Sig Dispense Refill Last Dose    acetaminophen-codeine (TYLENOL with CODEINE #3) 300-30 MG per tablet TAKE 1 TABLET BY MOUTH THREE TIMES DAILY 90 tablet 0 6/14/2024    aspirin 81 MG tablet Take 1 tablet by mouth Every Night.   6/14/2024    atorvastatin (LIPITOR) 40 MG tablet Take 1 tablet by mouth once daily (Patient taking differently: Take 1 tablet by mouth Daily.) 90 tablet 0 6/14/2024    Calcium Carbonate 1500 (600 Ca) MG tablet Take 1 tablet by mouth.   Past Week    dapagliflozin (FARXIGA) 5 MG tablet tablet Take 1 tablet by mouth Daily.   6/14/2024    docusate sodium (COLACE) 100 MG capsule Take 1 capsule by mouth 2 (Two) Times a Day.       hydrALAZINE (APRESOLINE) 10 MG tablet TAKE 1 TABLET BY MOUTH THREE TIMES DAILY (Patient taking differently: Take 1 tablet by mouth 3 (Three) Times a Day.) 270 tablet 0 6/14/2024    insulin aspart protamine-insulin aspart (novoLOG 70/30) injection Inject 70 Units under the skin into the appropriate area as directed 2 (Two) Times a Day With Meals. Patient uses 70U in AM and 60U in PM   6/14/2024    isosorbide mononitrate (IMDUR) 60 MG 24 hr tablet Take 1 tablet by mouth once daily (Patient taking differently: 1 tablet Daily.) 90 tablet 0 Past Week    metoprolol succinate XL (TOPROL-XL) 100 MG 24 hr tablet Take 1 tablet by mouth once daily (Patient taking differently: 1 tablet Daily.) 90 tablet 0 Past Week    Semaglutide,0.25  or 0.5MG/DOS, (OZEMPIC) 2 MG/1.5ML solution pen-injector Inject  under the skin into the appropriate area as directed 1 (One) Time Per Week. Patient takes on Tuesdays   Past Week    spironolactone (ALDACTONE) 25 MG tablet Take 1 tablet by mouth Daily.   Past Week    torsemide (DEMADEX) 20 MG tablet Take 4 tablets by mouth 2 (Two) Times a Day.   6/14/2024    vitamin D (ERGOCALCIFEROL) 42964 units capsule capsule 1 capsule Every 30 (Thirty) Days. Patient takes one tablet monthly, on the 19th   Past Month    hydrOXYzine (ATARAX) 25 MG tablet TAKE 1 TABLET BY MOUTH AT NIGHT AS NEEDED FOR INSOMNIA (Patient taking differently: Patient has prescription but has not taken for a while) 90 tablet 0      Current meds    Current Facility-Administered Medications:     Enoxaparin Sodium (LOVENOX) syringe 90 mg, 1 mg/kg, Subcutaneous, Once, Dara Lemus MD  Allergies:  Allergies   Allergen Reactions    Ace Inhibitors Other (See Comments)     Hyperkalemia/ROB    Angiotensin Receptor Blockers Other (See Comments)     Pt unable to remember    Keflex [Cephalexin] Rash     Tolerated ceftriaxone Dec 2019; tolerated zosyn May 2020    Sulfa Antibiotics Itching     rash     Immunizations:  Immunization History   Administered Date(s) Administered    COVID-19 (PFIZER) BIVALENT 12+YRS 11/02/2022    COVID-19 (PFIZER) Purple Cap Monovalent 02/17/2021, 03/10/2021, 10/20/2021, 11/02/2022    Covid-19 (Pfizer) Gray Cap Monovalent 05/11/2022    Fluzone High Dose =>65 Years ([employer] ONLY) 10/13/2016, 10/24/2017, 10/01/2018, 11/25/2019, 12/14/2021, 11/09/2022    Fluzone High-Dose 65+yrs 12/14/2021, 11/09/2022, 10/03/2023    Hepatitis A 05/01/2018, 11/07/2018    Influenza, Unspecified 10/27/2020, 12/14/2021, 10/03/2023    Pneumococcal Conjugate 13-Valent (PCV13) 03/18/2015    Pneumococcal Polysaccharide (PPSV23) 04/13/2016    Shingrix 02/28/2024, 05/22/2024    Tdap 10/13/2016     Social History:   Social History     Social History Narrative    Pt  drinks 6 cups of tea daily.      Social History     Socioeconomic History    Marital status: Single    Number of children: 3   Tobacco Use    Smoking status: Former     Current packs/day: 0.00     Average packs/day: 0.3 packs/day for 2.0 years (0.5 ttl pk-yrs)     Types: Cigarettes     Start date:      Quit date:      Years since quittin.4    Smokeless tobacco: Never    Tobacco comments:     LIGHT USAGE   Vaping Use    Vaping status: Never Used   Substance and Sexual Activity    Alcohol use: No    Drug use: No    Sexual activity: Defer       Family History:  Family History   Problem Relation Age of Onset    Diabetes Mother     Heart attack Mother     Hypertension Mother     Hypothyroidism Mother     Diabetes type II Son     Breast cancer Neg Hx         Review of Systems  See history of present illness and past medical history.  Patient denies headache, dizziness, syncope, falls, trauma, change in vision, change in hearing, change in taste, changes in weight, changes in appetite, focal weakness, numbness, or paresthesia.  Patient denies chest pain, palpitations, dyspnea, orthopnea, PND, cough, sinus pressure, rhinorrhea, epistaxis, hemoptysis, nausea, vomiting, hematemesis, diarrhea, constipation or hematochezia. Denies cold or heat intolerance, polydipsia, polyuria, polyphagia. Denies hematuria, pyuria, dysuria, hesitancy, frequency or urgency. Denies consumption of raw and under cooked meats foods or change in water source.  Denies fever, chills, sweats, night sweats.  Denies missing any routine medications. Remainder of ROS is negative.    Objective:  tMax 24 hrs: Temp (24hrs), Av.1 °F (37.3 °C), Min:98.1 °F (36.7 °C), Max:100.1 °F (37.8 °C)    Vitals Ranges:   Temp:  [98.1 °F (36.7 °C)-100.1 °F (37.8 °C)] 98.1 °F (36.7 °C)  Heart Rate:  [77-94] 77  Resp:  [16-17] 17  BP: (114-178)/(68-82) 150/81      Exam:  /81 (BP Location: Right arm, Patient Position: Lying)   Pulse 77   Temp 98.1 °F  "(36.7 °C) (Oral)   Resp 17   Ht 162.6 cm (64\")   Wt 91.6 kg (202 lb)   SpO2 95%   BMI 34.67 kg/m²     General Appearance:    Alert, cooperative, no distress, appears stated age   Head:    Normocephalic, without obvious abnormality, atraumatic   Eyes:    PERRL, conjunctivae/corneas clear, EOM's intact, both eyes   Ears:    Normal external ear canals, both ears   Nose:   Nares normal, septum midline, mucosa normal, no drainage    or sinus tenderness   Throat:   Lips, mucosa, and tongue normal   Neck:   Supple, symmetrical, trachea midline, no adenopathy;     thyroid:  no enlargement/tenderness/nodules; no carotid    bruit or JVD   Back:     Symmetric, no curvature, ROM normal, no CVA tenderness   Lungs:     Clear to auscultation bilaterally, respirations unlabored   Chest Wall:    No tenderness or deformity    Heart:    Regular rate and rhythm, S1 and S2 normal, no murmur, rub   or gallop   Abdomen:     Soft, nontender, bowel sounds active all four quadrants,     no masses, no hepatomegaly, no splenomegaly   Extremities:   Extremities normal, atraumatic, no cyanosis or edema   Pulses:   2+ and symmetric all extremities   Skin:   Skin color, texture, turgor normal, no rashes or lesions   Lymph nodes:   Cervical, supraclavicular, and axillary nodes normal   Neurologic:   CNII-XII intact, normal strength, sensation intact throughout      .    Data Review:  Lab Results (last 72 hours)       Procedure Component Value Units Date/Time    High Sensitivity Troponin T 2Hr [[id number]]  (Abnormal) Collected: 06/15/24 1215    Specimen: Blood Updated: 06/15/24 1312     HS Troponin T 550 ng/L      Troponin T Delta -21 ng/L     Narrative:      High Sensitive Troponin T Reference Range:  <14.0 ng/L- Negative Female for AMI  <22.0 ng/L- Negative Male for AMI  >=14 - Abnormal Female indicating possible myocardial injury.  >=22 - Abnormal Male indicating possible myocardial injury.   Clinicians would have to utilize clinical acumen, " EKG, Troponin, and serial changes to determine if it is an Acute Myocardial Infarction or myocardial injury due to an underlying chronic condition.         STAT Lactic Acid, Reflex [[id number]]  (Normal) Collected: 06/15/24 1215    Specimen: Blood Updated: 06/15/24 1304     Lactate 1.7 mmol/L     Urinalysis, Microscopic Only - Urine, Clean Catch [[id number]]  (Abnormal) Collected: 06/15/24 1234    Specimen: Urine, Clean Catch Updated: 06/15/24 1249     RBC, UA 0-2 /HPF      WBC, UA 0-2 /HPF      Bacteria, UA None Seen /HPF      Squamous Epithelial Cells, UA 3-6 /HPF      Hyaline Casts, UA 7-12 /LPF      Methodology Automated Microscopy    Urinalysis With Microscopic If Indicated (No Culture) - Urine, Clean Catch [[id number]]  (Abnormal) Collected: 06/15/24 1234    Specimen: Urine, Clean Catch Updated: 06/15/24 1248     Color, UA Yellow     Appearance, UA Clear     pH, UA 5.5     Specific Gravity, UA 1.017     Glucose, UA >=1000 mg/dL (3+)     Ketones, UA 15 mg/dL (1+)     Bilirubin, UA Negative     Blood, UA Trace     Protein,  mg/dL (2+)     Leuk Esterase, UA Negative     Nitrite, UA Negative     Urobilinogen, UA 1.0 E.U./dL    Respiratory Panel PCR w/COVID-19(SARS-CoV-2) [facility] In-House, NP Swab in UTM/VTM, 2 HR TAT - Swab, Nasopharynx [[id number]]  (Normal) Collected: 06/15/24 0905    Specimen: Swab from Nasopharynx Updated: 06/15/24 1003     ADENOVIRUS, PCR Not Detected     Coronavirus 229E Not Detected     Coronavirus HKU1 Not Detected     Coronavirus NL63 Not Detected     Coronavirus OC43 Not Detected     COVID19 Not Detected     Human Metapneumovirus Not Detected     Human Rhinovirus/Enterovirus Not Detected     Influenza A PCR Not Detected     Influenza B PCR Not Detected     Parainfluenza Virus 1 Not Detected     Parainfluenza Virus 2 Not Detected     Parainfluenza Virus 3 Not Detected     Parainfluenza Virus 4 Not Detected     RSV, PCR Not Detected     Bordetella pertussis pcr Not  Detected     Bordetella parapertussis PCR Not Detected     Chlamydophila pneumoniae PCR Not Detected     Mycoplasma pneumo by PCR Not Detected    Narrative:      In the setting of a positive respiratory panel with a viral infection PLUS a negative procalcitonin without other underlying concern for bacterial infection, consider observing off antibiotics or discontinuation of antibiotics and continue supportive care. If the respiratory panel is positive for atypical bacterial infection (Bordetella pertussis, Chlamydophila pneumoniae, or Mycoplasma pneumoniae), consider antibiotic de-escalation to target atypical bacterial infection.    Lactic Acid, Plasma [[id number]]  (Abnormal) Collected: 06/15/24 0904    Specimen: Blood Updated: 06/15/24 0948     Lactate 2.1 mmol/L     High Sensitivity Troponin T [[id number]]  (Abnormal) Collected: 06/15/24 0904    Specimen: Blood Updated: 06/15/24 0947     HS Troponin T 571 ng/L     Narrative:      High Sensitive Troponin T Reference Range:  <14.0 ng/L- Negative Female for AMI  <22.0 ng/L- Negative Male for AMI  >=14 - Abnormal Female indicating possible myocardial injury.  >=22 - Abnormal Male indicating possible myocardial injury.   Clinicians would have to utilize clinical acumen, EKG, Troponin, and serial changes to determine if it is an Acute Myocardial Infarction or myocardial injury due to an underlying chronic condition.         Extra Tubes [[id number]] Collected: 06/15/24 0904    Specimen: Blood, Venous Line Updated: 06/15/24 0945    Narrative:      The following orders were created for panel order Extra Tubes.  Procedure                               Abnormality         Status                     ---------                               -----------         ------                     Red Top[[id number]]                                          Final result               Light Blue Top[[id number]]                                   Final result                 Please view results  for these tests on the individual orders.    Red Top [[id number]] Collected: 06/15/24 0904    Specimen: Blood Updated: 06/15/24 0945     Extra Tube Hold for add-ons.     Comment: Auto resulted.       Light Blue Top [[id number]] Collected: 06/15/24 0904    Specimen: Blood Updated: 06/15/24 0945     Extra Tube Hold for add-ons.     Comment: Auto resulted       BNP [[id number]]  (Abnormal) Collected: 06/15/24 0904    Specimen: Blood Updated: 06/15/24 0942     proBNP 5,512.0 pg/mL     Narrative:      This assay is used as an aid in the diagnosis of individuals suspected of having heart failure. It can be used as an aid in the diagnosis of acute decompensated heart failure (ADHF) in patients presenting with signs and symptoms of ADHF to the emergency department (ED). In addition, NT-proBNP of <300 pg/mL indicates ADHF is not likely.    Age Range Result Interpretation  NT-proBNP Concentration (pg/mL:      <50             Positive            >450                   Gray                 300-450                    Negative             <300    50-75           Positive            >900                  Gray                300-900                  Negative            <300      >75             Positive            >1800                  Gray                300-1800                  Negative            <300    Procalcitonin [[id number]]  (Abnormal) Collected: 06/15/24 0904    Specimen: Blood Updated: 06/15/24 0942     Procalcitonin 0.49 ng/mL     Narrative:      As a Marker for Sepsis (Non-Neonates):    1. <0.5 ng/mL represents a low risk of severe sepsis and/or septic shock.  2. >2 ng/mL represents a high risk of severe sepsis and/or septic shock.    As a Marker for Lower Respiratory Tract Infections that require antibiotic therapy:    PCT on Admission    Antibiotic Therapy       6-12 Hrs later    >0.5                Strongly Recommended  >0.25 - <0.5        Recommended   0.1 - 0.25          Discouraged              Remeasure/reassess  "PCT  <0.1                Strongly Discouraged     Remeasure/reassess PCT    As 28 day mortality risk marker: \"Change in Procalcitonin Result\" (>80% or <=80%) if Day 0 (or Day 1) and Day 4 values are available. Refer to http://[email or web address]-pct-calculator.com    Change in PCT <=80%  A decrease of PCT levels below or equal to 80% defines a positive change in PCT test result representing a higher risk for 28-day all-cause mortality of patients diagnosed with severe sepsis for septic shock.    Change in PCT >80%  A decrease of PCT levels of more than 80% defines a negative change in PCT result representing a lower risk for 28-day all-cause mortality of patients diagnosed with severe sepsis or septic shock.       Basic Metabolic Panel [[id number]]  (Abnormal) Collected: 06/15/24 0904    Specimen: Blood Updated: 06/15/24 0936     Glucose 321 mg/dL      BUN 18 mg/dL      Creatinine 1.24 mg/dL      Sodium 131 mmol/L      Potassium 4.0 mmol/L      Chloride 96 mmol/L      CO2 20.5 mmol/L      Calcium 8.8 mg/dL      BUN/Creatinine Ratio 14.5     Anion Gap 14.5 mmol/L      eGFR 44.6 mL/min/1.73     Narrative:      GFR Normal >60  Chronic Kidney Disease <60  Kidney Failure <15    The GFR formula is only valid for adults with stable renal function between ages 18 and 70.    Magnesium [[id number]]  (Normal) Collected: 06/15/24 0904    Specimen: Blood Updated: 06/15/24 0936     Magnesium 2.2 mg/dL     CBC & Differential [[id number]]  (Abnormal) Collected: 06/15/24 0904    Specimen: Blood Updated: 06/15/24 0919    Narrative:      The following orders were created for panel order CBC & Differential.  Procedure                               Abnormality         Status                     ---------                               -----------         ------                     CBC Auto Differential[[id number]]        Abnormal            Final result                 Please view results for these tests on the individual orders.    CBC Auto " Differential [[id number]]  (Abnormal) Collected: 06/15/24 0904    Specimen: Blood Updated: 06/15/24 0919     WBC 11.81 10*3/mm3      RBC 4.49 10*6/mm3      Hemoglobin 13.5 g/dL      Hematocrit 38.4 %      MCV 85.5 fL      MCH 30.1 pg      MCHC 35.2 g/dL      RDW 13.7 %      RDW-SD 42.1 fl      MPV 10.4 fL      Platelets 258 10*3/mm3      Neutrophil % 87.8 %      Lymphocyte % 5.7 %      Monocyte % 6.0 %      Eosinophil % 0.0 %      Basophil % 0.1 %      Immature Grans % 0.4 %      Neutrophils, Absolute 10.37 10*3/mm3      Lymphocytes, Absolute 0.67 10*3/mm3      Monocytes, Absolute 0.71 10*3/mm3      Eosinophils, Absolute 0.00 10*3/mm3      Basophils, Absolute 0.01 10*3/mm3      Immature Grans, Absolute 0.05 10*3/mm3      nRBC 0.0 /100 WBC                      Imaging Results (All)       Procedure Component Value Units Date/Time    CT Chest Without Contrast Diagnostic [[id number]] Collected: 06/15/24 1133     Updated: 06/15/24 1147    Narrative:      CT CHEST WITHOUT IV CONTRAST     HISTORY: Possible occult pneumonia     TECHNIQUE: Radiation dose reduction techniques were utilized, including  automated exposure control and exposure modulation based on body size.   3 mm images were obtained through the chest without IV contrast.     COMPARISON: None     FINDINGS:  Evaluation is suboptimal without intravenous contrast.     There is small pericardial effusion. No noncalcified mediastinal or  axillary adenopathy by size criteria. At least mild to moderate coronary  calcification or coronary artery stents. A few scattered sub-6 mm pulm  nodules are present bilaterally     There is patchy groundglass opacification within the bilateral lungs,  left greater than right, and most notably within the upper lungs. No  pleural effusion or pneumothorax. Presumed old left fifth rib fracture  posteriorly with extensive callus formation. No suspicious lytic or  blastic osseous lesions.       Impression:      1.  Patchy mild to moderate  groundglass opacification is present within  the bilateral lungs, left greater than right, favored represent  multifocal pneumonia in the appropriate context. Asymmetric edema is  less likely. Correlation with patient history is recommended.  2.  A few scattered subcentimeter pulm nodules bilaterally. Follow-up  chest CT in 12 months to be considered to ensure per Fleischner  criteria..  3.  Other findings as above           This report was finalized on 6/15/2024 11:44 AM by Dr. Dyllan Beckman M.D on Workstation: [id number]       XR Chest 1 View [[id number]] Collected: 06/15/24 0854     Updated: 06/15/24 0859    Narrative:      XR CHEST 1 VW-     HISTORY: Female who is 78 years-old, dyspnea     TECHNIQUE: Frontal view of the chest     COMPARISON: 5/6/2020     FINDINGS: The heart size is normal. Pulmonary vasculature appears mildly  congested. No focal pulmonary consolidation, pleural effusion, or  pneumothorax. Old granulomatous disease is apparent. No acute osseous  process.       Impression:      No focal pulmonary consolidation. Mild pulmonary vascular  congestion. Follow-up as clinical indications persist.     This report was finalized on 6/15/2024 8:56 AM by Dr. Boris Castro M.D on Workstation: [id number]             Past Medical History:   Diagnosis Date    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  PET 6/2016 with small-medium sized lateral infarct, no ischemia.  This wall motion abnormality was seen on echo as well.    Cellulitis 07/2018    RIGHT LEG    Chronic kidney disease, stage III (moderate) 10/13/2016    Chronic venous insufficiency     Hyperlipidemia     Hypertension     Hypertriglyceridemia 9/29/2021    Low back pain     Mass of ovary 3/30/2016    Obesity (BMI 30-39.9) 12/22/2019    Sleep apnea     on CPAP.  Dr. Mueller    Spinal stenosis     Type 2 diabetes mellitus     Uterine  leiomyoma 3/30/2016       Assessment:  Active Hospital Problems    Diagnosis  POA    **NSTEMI (non-ST elevated myocardial infarction) [I21.4]  Yes    Dyspnea [R06.00]  Unknown    Elevated brain natriuretic peptide (BNP) level [R79.89]  Unknown    PNA (pneumonia) [J18.9]  Unknown    Hypertriglyceridemia [E78.1]  Yes    CAD (coronary artery disease) [I25.10]  Yes    Stage 3 chronic kidney disease [N18.30]  Yes    SAWYER on autoCPAP [G47.33]  Yes    Type 2 diabetes mellitus, with long-term current use of insulin [E11.9, Z79.4]  Not Applicable    Hyperlipidemia [E78.5]  Yes      Resolved Hospital Problems   No resolved problems to display.       Plan:  The patient is admitted to the hospital with cardiology consult and will also start some IV antibiotics and get some cultures.  Will continue with most of her home medicines and home diuretics.  Follow-up on labs.  Possible plan for heart cath tomorrow.    Maikel Ladd MD  6/15/2024  15:18 EDT

## 2024-06-15 NOTE — Clinical Note
Intravascular ultrasound catheter inserted for high resolution imaging=diagonal (second opticross catheter)

## 2024-06-15 NOTE — Clinical Note
First balloon inflation max pressure = 10 davina. First balloon inflation duration = 30 seconds. Second inflation of balloon - Max pressure = 16 davina. 2nd Inflation of balloon - Duration = 30 seconds. 2nd inflation was done at 18:35 EDT.

## 2024-06-15 NOTE — Clinical Note
Rotational atherectomy was performed. 1st Pass rate = 156 RPM. 1st Pass duration = 15 seconds. 2nd Pass rate = 136 RPM. 2nd Pass duration = 18 seconds. 3rd Pass rate = 151 RPM. 3rd Pass duration = 20 seconds. 4th Pass rate = 149 RPM. 4th Pass duration = 23 seconds. 5th Pass duration = 20 seconds. Baseline, 160 rpm

## 2024-06-15 NOTE — ED NOTES
"Nursing report ED to floor  Denisse Chavez  78 y.o.  female    HPI :  HPI (Adult)  Stated Reason for Visit: soa, lower back pain  History Obtained From: patient    Chief Complaint  Chief Complaint   Patient presents with    Shortness of Breath    Back Pain       Admitting doctor:   Maikel Ladd MD    Admitting diagnosis:   The primary encounter diagnosis was Dyspnea, unspecified type. Diagnoses of Elevated brain natriuretic peptide (BNP) level, NSTEMI (non-ST elevated myocardial infarction), Chronic renal impairment, unspecified CKD stage, History of coronary artery disease, and History of diabetes mellitus were also pertinent to this visit.    Code status:   Current Code Status       Date Active Code Status Order ID Comments User Context       Prior            Allergies:   Ace inhibitors, Angiotensin receptor blockers, Keflex [cephalexin], and Sulfa antibiotics    Isolation:   No active isolations    Intake and Output  No intake or output data in the 24 hours ending 06/15/24 1219    Weight:       06/15/24  1153   Weight: 91.6 kg (202 lb)       Most recent vitals:   Vitals:    06/15/24 0825 06/15/24 0906 06/15/24 1153   BP:  175/68 178/68   BP Location:  Right arm    Patient Position:  Lying    Pulse: 94     Resp: 16     Temp: 100.1 °F (37.8 °C)     TempSrc: Tympanic     SpO2: 97%     Weight:   91.6 kg (202 lb)   Height:   162.6 cm (64\")       Active LDAs/IV Access:   Lines, Drains & Airways       Active LDAs       Name Placement date Placement time Site Days    Peripheral IV 06/15/24 1116 Right Antecubital 06/15/24  1116  Antecubital  less than 1                    Labs (abnormal labs have a star):   Labs Reviewed   BASIC METABOLIC PANEL - Abnormal; Notable for the following components:       Result Value    Glucose 321 (*)     Creatinine 1.24 (*)     Sodium 131 (*)     Chloride 96 (*)     CO2 20.5 (*)     eGFR 44.6 (*)     All other components within normal limits    Narrative:     GFR Normal >60  Chronic " Kidney Disease <60  Kidney Failure <15    The GFR formula is only valid for adults with stable renal function between ages 18 and 70.   BNP (IN-HOUSE) - Abnormal; Notable for the following components:    proBNP 5,512.0 (*)     All other components within normal limits    Narrative:     This assay is used as an aid in the diagnosis of individuals suspected of having heart failure. It can be used as an aid in the diagnosis of acute decompensated heart failure (ADHF) in patients presenting with signs and symptoms of ADHF to the emergency department (ED). In addition, NT-proBNP of <300 pg/mL indicates ADHF is not likely.    Age Range Result Interpretation  NT-proBNP Concentration (pg/mL:      <50             Positive            >450                   Gray                 300-450                    Negative             <300    50-75           Positive            >900                  Gray                300-900                  Negative            <300      >75             Positive            >1800                  Gray                300-1800                  Negative            <300   TROPONIN - Abnormal; Notable for the following components:    HS Troponin T 571 (*)     All other components within normal limits    Narrative:     High Sensitive Troponin T Reference Range:  <14.0 ng/L- Negative Female for AMI  <22.0 ng/L- Negative Male for AMI  >=14 - Abnormal Female indicating possible myocardial injury.  >=22 - Abnormal Male indicating possible myocardial injury.   Clinicians would have to utilize clinical acumen, EKG, Troponin, and serial changes to determine if it is an Acute Myocardial Infarction or myocardial injury due to an underlying chronic condition.        LACTIC ACID, PLASMA - Abnormal; Notable for the following components:    Lactate 2.1 (*)     All other components within normal limits   PROCALCITONIN - Abnormal; Notable for the following components:    Procalcitonin 0.49 (*)     All other components  "within normal limits    Narrative:     As a Marker for Sepsis (Non-Neonates):    1. <0.5 ng/mL represents a low risk of severe sepsis and/or septic shock.  2. >2 ng/mL represents a high risk of severe sepsis and/or septic shock.    As a Marker for Lower Respiratory Tract Infections that require antibiotic therapy:    PCT on Admission    Antibiotic Therapy       6-12 Hrs later    >0.5                Strongly Recommended  >0.25 - <0.5        Recommended   0.1 - 0.25          Discouraged              Remeasure/reassess PCT  <0.1                Strongly Discouraged     Remeasure/reassess PCT    As 28 day mortality risk marker: \"Change in Procalcitonin Result\" (>80% or <=80%) if Day 0 (or Day 1) and Day 4 values are available. Refer to [email or web address]pct-[email or web address]    Change in PCT <=80%  A decrease of PCT levels below or equal to 80% defines a positive change in PCT test result representing a higher risk for 28-day all-cause mortality of patients diagnosed with severe sepsis for septic shock.    Change in PCT >80%  A decrease of PCT levels of more than 80% defines a negative change in PCT result representing a lower risk for 28-day all-cause mortality of patients diagnosed with severe sepsis or septic shock.      CBC WITH AUTO DIFFERENTIAL - Abnormal; Notable for the following components:    WBC 11.81 (*)     Neutrophil % 87.8 (*)     Lymphocyte % 5.7 (*)     Eosinophil % 0.0 (*)     Neutrophils, Absolute 10.37 (*)     Lymphocytes, Absolute 0.67 (*)     All other components within normal limits   RESPIRATORY PANEL PCR W/ COVID-19 (SARS-COV-2), NP SWAB IN UTM/VTP, 2 HR TAT - Normal    Narrative:     In the setting of a positive respiratory panel with a viral infection PLUS a negative procalcitonin without other underlying concern for bacterial infection, consider observing off antibiotics or discontinuation of antibiotics and continue supportive care. If the respiratory panel is positive for atypical bacterial " infection (Bordetella pertussis, Chlamydophila pneumoniae, or Mycoplasma pneumoniae), consider antibiotic de-escalation to target atypical bacterial infection.   MAGNESIUM - Normal   URINALYSIS W/ MICROSCOPIC IF INDICATED (NO CULTURE)   HIGH SENSITIVITIY TROPONIN T 2HR   LACTIC ACID, REFLEX   CBC AND DIFFERENTIAL    Narrative:     The following orders were created for panel order CBC & Differential.  Procedure                               Abnormality         Status                     ---------                               -----------         ------                     CBC Auto Differential[[id number]]        Abnormal            Final result                 Please view results for these tests on the individual orders.   EXTRA TUBES    Narrative:     The following orders were created for panel order Extra Tubes.  Procedure                               Abnormality         Status                     ---------                               -----------         ------                     Red Top[[id number]]                                          Final result               Light Blue Top[[id number]]                                   Final result                 Please view results for these tests on the individual orders.   RED TOP   LIGHT BLUE TOP       EKG:   ECG 12 Lead Dyspnea   Preliminary Result   HEART RATE= 82  bpm   RR Interval= 732  ms   SC Interval= 151  ms   P Horizontal Axis= 10  deg   P Front Axis= 27  deg   QRSD Interval= 108  ms   QT Interval= 436  ms   QTcB= 510  ms   QRS Axis= -13  deg   T Wave Axis= 23  deg   - ABNORMAL ECG -   Sinus rhythm   Abnormal R-wave progression, early transition   Left ventricular hypertrophy   Inferior infarct, old   Prolonged QT interval   Artifact in lead(s) V2   Electronically Signed By:    Date and Time of Study: 2024-06-15 10:00:12          Meds given in ED:   Medications   furosemide (LASIX) injection 80 mg (has no administration in time range)   acetaminophen (TYLENOL)  tablet 1,000 mg (1,000 mg Oral Given 6/15/24 1017)   aspirin tablet 325 mg (325 mg Oral Given 6/15/24 1017)   perflutren (DEFINITY) 8.476 mg in Sodium Chloride (PF) 0.9 % 10 mL injection (2 mL Intravenous Given 6/15/24 1159)       Imaging results:  CT Chest Without Contrast Diagnostic    Result Date: 6/15/2024  1.  Patchy mild to moderate groundglass opacification is present within the bilateral lungs, left greater than right, favored represent multifocal pneumonia in the appropriate context. Asymmetric edema is less likely. Correlation with patient history is recommended. 2.  A few scattered subcentimeter pulm nodules bilaterally. Follow-up chest CT in 12 months to be considered to ensure per Fleischner criteria.. 3.  Other findings as above    This report was finalized on 6/15/2024 11:44 AM by Dr. Dyllan Beckman M.D on Workstation: [facility]      XR Chest 1 View    Result Date: 6/15/2024  No focal pulmonary consolidation. Mild pulmonary vascular congestion. Follow-up as clinical indications persist.  This report was finalized on 6/15/2024 8:56 AM by Dr. Boris Castro M.D on Workstation: [facility]       Ambulatory status:   - assist x 1    Social issues:   Social History     Socioeconomic History    Marital status: Single    Number of children: 3   Tobacco Use    Smoking status: Former     Current packs/day: 0.00     Average packs/day: 0.3 packs/day for 2.0 years (0.5 ttl pk-yrs)     Types: Cigarettes     Start date:      Quit date: 1970     Years since quittin.4    Smokeless tobacco: Never    Tobacco comments:     LIGHT USAGE   Vaping Use    Vaping status: Never Used   Substance and Sexual Activity    Alcohol use: No    Drug use: No    Sexual activity: Defer       Peripheral Neurovascular  Peripheral Neurovascular (Adult)  Peripheral Neurovascular WDL: WDL    Neuro Cognitive  Neuro Cognitive (Adult)  Cognitive/Neuro/Behavioral WDL: WDL    Learning  Learning Assessment (Adult)  Learning Readiness and  Ability: no barriers identified  Education Provided  Person Taught: patient    Respiratory  Respiratory WDL  Respiratory WDL: .WDL except, rhythm/pattern  Rhythm/Pattern, Respiratory: shortness of breath, pattern regular, unlabored    Abdominal Pain       Pain Assessments  Pain (Adult)  (0-10) Pain Rating: Rest: 6  Pain Location: back  Pain Side/Orientation: lower    NIH Stroke Scale       Zena Moreira RN  06/15/24 12:19 EDT

## 2024-06-15 NOTE — Clinical Note
First balloon inflation max pressure = 14 davina. First balloon inflation duration = 5 seconds. Second inflation of balloon - Max pressure = 20 davina. 2nd Inflation of balloon - Duration = 5 seconds. The balloon is positioned in the Proximal segment of the vessel. Third inflation of balloon - Max pressure = 20 davina. 3rd Inflation of balloon - Duration = 8 seconds. The balloon is positioned in the Proximal segment of the vessel. Fourth inflati on of balloon - Max pressure = 20 davina. 4th Inflation of balloon - Duration = 5 seconds. The balloon is positioned in the Proximal segment of the vessel.

## 2024-06-16 LAB
ANION GAP SERPL CALCULATED.3IONS-SCNC: 13.9 MMOL/L (ref 5–15)
BASOPHILS # BLD AUTO: 0.02 10*3/MM3 (ref 0–0.2)
BASOPHILS NFR BLD AUTO: 0.2 % (ref 0–1.5)
BUN SERPL-MCNC: 25 MG/DL (ref 8–23)
BUN/CREAT SERPL: 16.9 (ref 7–25)
CALCIUM SPEC-SCNC: 8.5 MG/DL (ref 8.6–10.5)
CHLORIDE SERPL-SCNC: 96 MMOL/L (ref 98–107)
CO2 SERPL-SCNC: 25.1 MMOL/L (ref 22–29)
CREAT SERPL-MCNC: 1.48 MG/DL (ref 0.57–1)
DEPRECATED RDW RBC AUTO: 44.2 FL (ref 37–54)
EGFRCR SERPLBLD CKD-EPI 2021: 36.1 ML/MIN/1.73
EOSINOPHIL # BLD AUTO: 0.09 10*3/MM3 (ref 0–0.4)
EOSINOPHIL NFR BLD AUTO: 0.9 % (ref 0.3–6.2)
ERYTHROCYTE [DISTWIDTH] IN BLOOD BY AUTOMATED COUNT: 14.2 % (ref 12.3–15.4)
GLUCOSE BLDC GLUCOMTR-MCNC: 169 MG/DL (ref 70–130)
GLUCOSE BLDC GLUCOMTR-MCNC: 192 MG/DL (ref 70–130)
GLUCOSE BLDC GLUCOMTR-MCNC: 209 MG/DL (ref 70–130)
GLUCOSE BLDC GLUCOMTR-MCNC: 210 MG/DL (ref 70–130)
GLUCOSE SERPL-MCNC: 204 MG/DL (ref 65–99)
HCT VFR BLD AUTO: 36.5 % (ref 34–46.6)
HGB BLD-MCNC: 11.7 G/DL (ref 12–15.9)
IMM GRANULOCYTES # BLD AUTO: 0.05 10*3/MM3 (ref 0–0.05)
IMM GRANULOCYTES NFR BLD AUTO: 0.5 % (ref 0–0.5)
LYMPHOCYTES # BLD AUTO: 1.27 10*3/MM3 (ref 0.7–3.1)
LYMPHOCYTES NFR BLD AUTO: 12.3 % (ref 19.6–45.3)
MCH RBC QN AUTO: 27.5 PG (ref 26.6–33)
MCHC RBC AUTO-ENTMCNC: 32.1 G/DL (ref 31.5–35.7)
MCV RBC AUTO: 85.9 FL (ref 79–97)
MONOCYTES # BLD AUTO: 0.76 10*3/MM3 (ref 0.1–0.9)
MONOCYTES NFR BLD AUTO: 7.4 % (ref 5–12)
NEUTROPHILS NFR BLD AUTO: 78.7 % (ref 42.7–76)
NEUTROPHILS NFR BLD AUTO: 8.12 10*3/MM3 (ref 1.7–7)
NRBC BLD AUTO-RTO: 0 /100 WBC (ref 0–0.2)
PLATELET # BLD AUTO: 221 10*3/MM3 (ref 140–450)
PMV BLD AUTO: 10.5 FL (ref 6–12)
POTASSIUM SERPL-SCNC: 3.4 MMOL/L (ref 3.5–5.2)
QT INTERVAL: 512 MS
QTC INTERVAL: 520 MS
RBC # BLD AUTO: 4.25 10*6/MM3 (ref 3.77–5.28)
SODIUM SERPL-SCNC: 135 MMOL/L (ref 136–145)
TROPONIN T SERPL HS-MCNC: 1337 NG/L
WBC NRBC COR # BLD AUTO: 10.31 10*3/MM3 (ref 3.4–10.8)

## 2024-06-16 PROCEDURE — 85025 COMPLETE CBC W/AUTO DIFF WBC: CPT | Performed by: HOSPITALIST

## 2024-06-16 PROCEDURE — 84484 ASSAY OF TROPONIN QUANT: CPT | Performed by: INTERNAL MEDICINE

## 2024-06-16 PROCEDURE — 25010000002 FENTANYL CITRATE (PF) 50 MCG/ML SOLUTION: Performed by: INTERNAL MEDICINE

## 2024-06-16 PROCEDURE — 93005 ELECTROCARDIOGRAM TRACING: CPT | Performed by: HOSPITALIST

## 2024-06-16 PROCEDURE — 99232 SBSQ HOSP IP/OBS MODERATE 35: CPT | Performed by: INTERNAL MEDICINE

## 2024-06-16 PROCEDURE — 80048 BASIC METABOLIC PNL TOTAL CA: CPT | Performed by: HOSPITALIST

## 2024-06-16 PROCEDURE — B2111ZZ FLUOROSCOPY OF MULTIPLE CORONARY ARTERIES USING LOW OSMOLAR CONTRAST: ICD-10-PCS | Performed by: INTERNAL MEDICINE

## 2024-06-16 PROCEDURE — 25010000002 HEPARIN (PORCINE) PER 1000 UNITS: Performed by: INTERNAL MEDICINE

## 2024-06-16 PROCEDURE — 25010000002 MIDAZOLAM PER 1 MG: Performed by: INTERNAL MEDICINE

## 2024-06-16 PROCEDURE — 93458 L HRT ARTERY/VENTRICLE ANGIO: CPT | Performed by: INTERNAL MEDICINE

## 2024-06-16 PROCEDURE — 63710000001 INSULIN LISPRO (HUMAN) PER 5 UNITS: Performed by: INTERNAL MEDICINE

## 2024-06-16 PROCEDURE — B2151ZZ FLUOROSCOPY OF LEFT HEART USING LOW OSMOLAR CONTRAST: ICD-10-PCS | Performed by: INTERNAL MEDICINE

## 2024-06-16 PROCEDURE — 25010000002 CEFTRIAXONE PER 250 MG: Performed by: INTERNAL MEDICINE

## 2024-06-16 PROCEDURE — 25510000001 IOPAMIDOL PER 1 ML: Performed by: INTERNAL MEDICINE

## 2024-06-16 PROCEDURE — 82948 REAGENT STRIP/BLOOD GLUCOSE: CPT

## 2024-06-16 PROCEDURE — C1769 GUIDE WIRE: HCPCS | Performed by: INTERNAL MEDICINE

## 2024-06-16 PROCEDURE — 4A023N7 MEASUREMENT OF CARDIAC SAMPLING AND PRESSURE, LEFT HEART, PERCUTANEOUS APPROACH: ICD-10-PCS | Performed by: INTERNAL MEDICINE

## 2024-06-16 PROCEDURE — C1894 INTRO/SHEATH, NON-LASER: HCPCS | Performed by: INTERNAL MEDICINE

## 2024-06-16 PROCEDURE — 63710000001 INSULIN LISPRO (HUMAN) PER 5 UNITS: Performed by: HOSPITALIST

## 2024-06-16 RX ORDER — FENTANYL CITRATE 50 UG/ML
INJECTION, SOLUTION INTRAMUSCULAR; INTRAVENOUS
Status: DISCONTINUED | OUTPATIENT
Start: 2024-06-16 | End: 2024-06-16 | Stop reason: HOSPADM

## 2024-06-16 RX ORDER — HEPARIN SODIUM 1000 [USP'U]/ML
INJECTION, SOLUTION INTRAVENOUS; SUBCUTANEOUS
Status: DISCONTINUED | OUTPATIENT
Start: 2024-06-16 | End: 2024-06-16 | Stop reason: HOSPADM

## 2024-06-16 RX ORDER — SODIUM CHLORIDE 9 MG/ML
100 INJECTION, SOLUTION INTRAVENOUS CONTINUOUS
Status: ACTIVE | OUTPATIENT
Start: 2024-06-16 | End: 2024-06-16

## 2024-06-16 RX ORDER — LIDOCAINE HYDROCHLORIDE 20 MG/ML
INJECTION, SOLUTION INFILTRATION; PERINEURAL
Status: DISCONTINUED | OUTPATIENT
Start: 2024-06-16 | End: 2024-06-16 | Stop reason: HOSPADM

## 2024-06-16 RX ORDER — SODIUM CHLORIDE 9 MG/ML
INJECTION, SOLUTION INTRAVENOUS
Status: COMPLETED | OUTPATIENT
Start: 2024-06-16 | End: 2024-06-16

## 2024-06-16 RX ORDER — VERAPAMIL HYDROCHLORIDE 2.5 MG/ML
INJECTION, SOLUTION INTRAVENOUS
Status: DISCONTINUED | OUTPATIENT
Start: 2024-06-16 | End: 2024-06-16 | Stop reason: HOSPADM

## 2024-06-16 RX ORDER — ACETAMINOPHEN 325 MG/1
650 TABLET ORAL EVERY 4 HOURS PRN
Status: DISCONTINUED | OUTPATIENT
Start: 2024-06-16 | End: 2024-06-22 | Stop reason: HOSPADM

## 2024-06-16 RX ORDER — MIDAZOLAM HYDROCHLORIDE 1 MG/ML
INJECTION INTRAMUSCULAR; INTRAVENOUS
Status: DISCONTINUED | OUTPATIENT
Start: 2024-06-16 | End: 2024-06-16 | Stop reason: HOSPADM

## 2024-06-16 RX ORDER — NITROGLYCERIN 0.4 MG/1
0.4 TABLET SUBLINGUAL
Status: DISCONTINUED | OUTPATIENT
Start: 2024-06-16 | End: 2024-06-20 | Stop reason: SDUPTHER

## 2024-06-16 RX ADMIN — SPIRONOLACTONE 25 MG: 25 TABLET, FILM COATED ORAL at 09:06

## 2024-06-16 RX ADMIN — Medication 10 ML: at 20:51

## 2024-06-16 RX ADMIN — ASPIRIN 81 MG: 81 TABLET, COATED ORAL at 09:06

## 2024-06-16 RX ADMIN — INSULIN LISPRO 3 UNITS: 100 INJECTION, SOLUTION INTRAVENOUS; SUBCUTANEOUS at 06:59

## 2024-06-16 RX ADMIN — CEFTRIAXONE 2000 MG: 2 INJECTION, POWDER, FOR SOLUTION INTRAMUSCULAR; INTRAVENOUS at 15:41

## 2024-06-16 RX ADMIN — TORSEMIDE 80 MG: 20 TABLET ORAL at 09:09

## 2024-06-16 RX ADMIN — METOPROLOL SUCCINATE 100 MG: 100 TABLET, EXTENDED RELEASE ORAL at 09:06

## 2024-06-16 RX ADMIN — Medication 10 ML: at 09:15

## 2024-06-16 RX ADMIN — HYDRALAZINE HYDROCHLORIDE 10 MG: 10 TABLET ORAL at 15:04

## 2024-06-16 RX ADMIN — DOCUSATE SODIUM 100 MG: 100 CAPSULE, LIQUID FILLED ORAL at 21:18

## 2024-06-16 RX ADMIN — EMPAGLIFLOZIN 10 MG: 10 TABLET, FILM COATED ORAL at 09:06

## 2024-06-16 RX ADMIN — ATORVASTATIN CALCIUM 40 MG: 20 TABLET, FILM COATED ORAL at 09:06

## 2024-06-16 RX ADMIN — HYDRALAZINE HYDROCHLORIDE 10 MG: 10 TABLET ORAL at 09:06

## 2024-06-16 RX ADMIN — TORSEMIDE 80 MG: 20 TABLET ORAL at 21:18

## 2024-06-16 RX ADMIN — INSULIN LISPRO 5 UNITS: 100 INJECTION, SOLUTION INTRAVENOUS; SUBCUTANEOUS at 21:18

## 2024-06-16 RX ADMIN — DOCUSATE SODIUM 100 MG: 100 CAPSULE, LIQUID FILLED ORAL at 09:06

## 2024-06-16 RX ADMIN — INSULIN LISPRO 5 UNITS: 100 INJECTION, SOLUTION INTRAVENOUS; SUBCUTANEOUS at 16:32

## 2024-06-16 RX ADMIN — ISOSORBIDE MONONITRATE 60 MG: 60 TABLET, EXTENDED RELEASE ORAL at 09:06

## 2024-06-16 NOTE — PROGRESS NOTES
"DAILY PROGRESS NOTE  [facility]    Patient Identification:  Name: Denisse Chavez  Age: 78 y.o.  Sex: female  :  1945  MRN: [id number]         Primary Care Physician: Surekha Mcdonnell MD    Subjective:  Interval History: She complains about getting very out of breath just going to the bathroom and is still pretty weak.    Objective:    Scheduled Meds:aspirin, 81 mg, Oral, Daily  atorvastatin, 40 mg, Oral, Daily  cefTRIAXone, 2,000 mg, Intravenous, Q24H  docusate sodium, 100 mg, Oral, BID  empagliflozin, 10 mg, Oral, Daily  hydrALAZINE, 10 mg, Oral, TID  insulin lispro, 3-14 Units, Subcutaneous, 4x Daily AC & at Bedtime  isosorbide mononitrate, 60 mg, Oral, Q24H  metoprolol succinate XL, 100 mg, Oral, Q24H  sodium chloride, 10 mL, Intravenous, Q12H  spironolactone, 25 mg, Oral, Daily  torsemide, 80 mg, Oral, BID      Continuous Infusions:     Vital signs in last 24 hours:  Temp:  [97.8 °F (36.6 °C)-99.7 °F (37.6 °C)] 97.8 °F (36.6 °C)  Heart Rate:  [64-78] 69  Resp:  [17-18] 18  BP: (103-156)/(52-81) 103/66    Intake/Output:    Intake/Output Summary (Last 24 hours) at 2024 1301  Last data filed at 2024 1146  Gross per 24 hour   Intake 240 ml   Output 1800 ml   Net -1560 ml       Exam:  /66 (BP Location: Right arm, Patient Position: Lying)   Pulse 69   Temp 97.8 °F (36.6 °C) (Oral)   Resp 18   Ht 162.6 cm (64\")   Wt 91.6 kg (202 lb)   SpO2 94%   BMI 34.67 kg/m²     General Appearance:    Alert, cooperative, no distress   Head:    Normocephalic, without obvious abnormality, atraumatic   Eyes:       Throat:   Lips, tongue, gums normal   Neck:   Supple, symmetrical, trachea midline, no JVD   Lungs:     Clear to auscultation bilaterally, respirations unlabored   Chest Wall:    No tenderness or deformity    Heart:    Regular rate and rhythm, S1 and S2 normal, no murmur,no  Rub or gallop   Abdomen:     Soft, nontender, bowel sounds active, no masses, no organomegaly  "   Extremities:   Extremities normal, atraumatic, no cyanosis or edema   Pulses:      Skin:   Skin is warm and dry,  no rashes or palpable lesions   Neurologic:   no focal deficits noted      Lab Results (last 72 hours)       Procedure Component Value Units Date/Time    POC Glucose Once [[id number]]  (Abnormal) Collected: 06/16/24 1052    Specimen: Blood Updated: 06/16/24 1053     Glucose 169 mg/dL     POC Glucose Once [[id number]]  (Abnormal) Collected: 06/16/24 0644    Specimen: Blood Updated: 06/16/24 0646     Glucose 192 mg/dL     High Sensitivity Troponin T [[id number]]  (Abnormal) Collected: 06/16/24 0425    Specimen: Blood Updated: 06/16/24 0514     HS Troponin T 1,337 ng/L     Narrative:      High Sensitive Troponin T Reference Range:  <14.0 ng/L- Negative Female for AMI  <22.0 ng/L- Negative Male for AMI  >=14 - Abnormal Female indicating possible myocardial injury.  >=22 - Abnormal Male indicating possible myocardial injury.   Clinicians would have to utilize clinical acumen, EKG, Troponin, and serial changes to determine if it is an Acute Myocardial Infarction or myocardial injury due to an underlying chronic condition.         Basic Metabolic Panel [[id number]]  (Abnormal) Collected: 06/16/24 0425    Specimen: Blood Updated: 06/16/24 0510     Glucose 204 mg/dL      BUN 25 mg/dL      Creatinine 1.48 mg/dL      Sodium 135 mmol/L      Potassium 3.4 mmol/L      Chloride 96 mmol/L      CO2 25.1 mmol/L      Calcium 8.5 mg/dL      BUN/Creatinine Ratio 16.9     Anion Gap 13.9 mmol/L      eGFR 36.1 mL/min/1.73     Narrative:      GFR Normal >60  Chronic Kidney Disease <60  Kidney Failure <15    The GFR formula is only valid for adults with stable renal function between ages 18 and 70.    CBC Auto Differential [[id number]]  (Abnormal) Collected: 06/16/24 0425    Specimen: Blood Updated: 06/16/24 0452     WBC 10.31 10*3/mm3      RBC 4.25 10*6/mm3      Hemoglobin 11.7 g/dL      Hematocrit 36.5 %      MCV 85.9 fL      MCH  27.5 pg      MCHC 32.1 g/dL      RDW 14.2 %      RDW-SD 44.2 fl      MPV 10.5 fL      Platelets 221 10*3/mm3      Neutrophil % 78.7 %      Lymphocyte % 12.3 %      Monocyte % 7.4 %      Eosinophil % 0.9 %      Basophil % 0.2 %      Immature Grans % 0.5 %      Neutrophils, Absolute 8.12 10*3/mm3      Lymphocytes, Absolute 1.27 10*3/mm3      Monocytes, Absolute 0.76 10*3/mm3      Eosinophils, Absolute 0.09 10*3/mm3      Basophils, Absolute 0.02 10*3/mm3      Immature Grans, Absolute 0.05 10*3/mm3      nRBC 0.0 /100 WBC     Blood Culture - Blood, Arm, Right [[id number]] Collected: 06/15/24 2111    Specimen: Blood from Arm, Right Updated: 06/15/24 2118    Blood Culture - Blood, Arm, Left [[id number]] Collected: 06/15/24 2110    Specimen: Blood from Arm, Left Updated: 06/15/24 2117    POC Glucose Once [[id number]]  (Abnormal) Collected: 06/15/24 2105    Specimen: Blood Updated: 06/15/24 2106     Glucose 195 mg/dL     S. Pneumo Ag Urine or CSF - Urine, Urine, Clean Catch [[id number]]  (Normal) Collected: 06/15/24 1644    Specimen: Urine, Clean Catch Updated: 06/15/24 1848     Strep Pneumo Ag Negative    Legionella Antigen, Urine - Urine, Urine, Clean Catch [[id number]]  (Normal) Collected: 06/15/24 1644    Specimen: Urine, Clean Catch Updated: 06/15/24 1847     LEGIONELLA ANTIGEN, URINE Negative    POC Glucose Once [[id number]]  (Abnormal) Collected: 06/15/24 1814    Specimen: Blood Updated: 06/15/24 1816     Glucose 388 mg/dL     POC Glucose Once [[id number]]  (Abnormal) Collected: 06/15/24 1549    Specimen: Blood Updated: 06/15/24 1554     Glucose 451 mg/dL     POC Glucose Once [[id number]]  (Abnormal) Collected: 06/15/24 1546    Specimen: Blood Updated: 06/15/24 1554     Glucose 422 mg/dL     Hemoglobin A1c [[id number]]  (Abnormal) Collected: 06/15/24 0904    Specimen: Blood Updated: 06/15/24 1541     Hemoglobin A1C 8.40 %     Narrative:      Hemoglobin A1C Ranges:    Increased Risk for Diabetes  5.7% to 6.4%  Diabetes                      >= 6.5%  Diabetic Goal                < 7.0%    High Sensitivity Troponin T 2Hr [[id number]]  (Abnormal) Collected: 06/15/24 1215    Specimen: Blood Updated: 06/15/24 1312     HS Troponin T 550 ng/L      Troponin T Delta -21 ng/L     Narrative:      High Sensitive Troponin T Reference Range:  <14.0 ng/L- Negative Female for AMI  <22.0 ng/L- Negative Male for AMI  >=14 - Abnormal Female indicating possible myocardial injury.  >=22 - Abnormal Male indicating possible myocardial injury.   Clinicians would have to utilize clinical acumen, EKG, Troponin, and serial changes to determine if it is an Acute Myocardial Infarction or myocardial injury due to an underlying chronic condition.         STAT Lactic Acid, Reflex [[id number]]  (Normal) Collected: 06/15/24 1215    Specimen: Blood Updated: 06/15/24 1304     Lactate 1.7 mmol/L     Urinalysis, Microscopic Only - Urine, Clean Catch [[id number]]  (Abnormal) Collected: 06/15/24 1234    Specimen: Urine, Clean Catch Updated: 06/15/24 1249     RBC, UA 0-2 /HPF      WBC, UA 0-2 /HPF      Bacteria, UA None Seen /HPF      Squamous Epithelial Cells, UA 3-6 /HPF      Hyaline Casts, UA 7-12 /LPF      Methodology Automated Microscopy    Urinalysis With Microscopic If Indicated (No Culture) - Urine, Clean Catch [[id number]]  (Abnormal) Collected: 06/15/24 1234    Specimen: Urine, Clean Catch Updated: 06/15/24 1248     Color, UA Yellow     Appearance, UA Clear     pH, UA 5.5     Specific Gravity, UA 1.017     Glucose, UA >=1000 mg/dL (3+)     Ketones, UA 15 mg/dL (1+)     Bilirubin, UA Negative     Blood, UA Trace     Protein,  mg/dL (2+)     Leuk Esterase, UA Negative     Nitrite, UA Negative     Urobilinogen, UA 1.0 E.U./dL    Respiratory Panel PCR w/COVID-19(SARS-CoV-2) FAUSTO/EFREN/MIRTHA/PAD/COR/ALTAGRACIA In-House, NP Swab in UTM/VTM, 2 HR TAT - Swab, Nasopharynx [[id number]]  (Normal) Collected: 06/15/24 0905    Specimen: Swab from Nasopharynx Updated: 06/15/24 1003     ADENOVIRUS,  PCR Not Detected     Coronavirus 229E Not Detected     Coronavirus HKU1 Not Detected     Coronavirus NL63 Not Detected     Coronavirus OC43 Not Detected     COVID19 Not Detected     Human Metapneumovirus Not Detected     Human Rhinovirus/Enterovirus Not Detected     Influenza A PCR Not Detected     Influenza B PCR Not Detected     Parainfluenza Virus 1 Not Detected     Parainfluenza Virus 2 Not Detected     Parainfluenza Virus 3 Not Detected     Parainfluenza Virus 4 Not Detected     RSV, PCR Not Detected     Bordetella pertussis pcr Not Detected     Bordetella parapertussis PCR Not Detected     Chlamydophila pneumoniae PCR Not Detected     Mycoplasma pneumo by PCR Not Detected    Narrative:      In the setting of a positive respiratory panel with a viral infection PLUS a negative procalcitonin without other underlying concern for bacterial infection, consider observing off antibiotics or discontinuation of antibiotics and continue supportive care. If the respiratory panel is positive for atypical bacterial infection (Bordetella pertussis, Chlamydophila pneumoniae, or Mycoplasma pneumoniae), consider antibiotic de-escalation to target atypical bacterial infection.    Lactic Acid, Plasma [[id number]]  (Abnormal) Collected: 06/15/24 0904    Specimen: Blood Updated: 06/15/24 0948     Lactate 2.1 mmol/L     High Sensitivity Troponin T [[id number]]  (Abnormal) Collected: 06/15/24 0904    Specimen: Blood Updated: 06/15/24 0947     HS Troponin T 571 ng/L     Narrative:      High Sensitive Troponin T Reference Range:  <14.0 ng/L- Negative Female for AMI  <22.0 ng/L- Negative Male for AMI  >=14 - Abnormal Female indicating possible myocardial injury.  >=22 - Abnormal Male indicating possible myocardial injury.   Clinicians would have to utilize clinical acumen, EKG, Troponin, and serial changes to determine if it is an Acute Myocardial Infarction or myocardial injury due to an underlying chronic condition.         Extra Tubes  [[id number]] Collected: 06/15/24 0904    Specimen: Blood, Venous Line Updated: 06/15/24 0945    Narrative:      The following orders were created for panel order Extra Tubes.  Procedure                               Abnormality         Status                     ---------                               -----------         ------                     Red Top[[id number]]                                          Final result               Light Blue Top[[id number]]                                   Final result                 Please view results for these tests on the individual orders.    Red Top [[id number]] Collected: 06/15/24 0904    Specimen: Blood Updated: 06/15/24 0945     Extra Tube Hold for add-ons.     Comment: Auto resulted.       Light Blue Top [[id number]] Collected: 06/15/24 0904    Specimen: Blood Updated: 06/15/24 0945     Extra Tube Hold for add-ons.     Comment: Auto resulted       BNP [[id number]]  (Abnormal) Collected: 06/15/24 0904    Specimen: Blood Updated: 06/15/24 0942     proBNP 5,512.0 pg/mL     Narrative:      This assay is used as an aid in the diagnosis of individuals suspected of having heart failure. It can be used as an aid in the diagnosis of acute decompensated heart failure (ADHF) in patients presenting with signs and symptoms of ADHF to the emergency department (ED). In addition, NT-proBNP of <300 pg/mL indicates ADHF is not likely.    Age Range Result Interpretation  NT-proBNP Concentration (pg/mL:      <50             Positive            >450                   Gray                 300-450                    Negative             <300    50-75           Positive            >900                  Gray                300-900                  Negative            <300      >75             Positive            >1800                  Gray                300-1800                  Negative            <300    Procalcitonin [[id number]]  (Abnormal) Collected: 06/15/24 0904    Specimen: Blood Updated:  "06/15/24 0942     Procalcitonin 0.49 ng/mL     Narrative:      As a Marker for Sepsis (Non-Neonates):    1. <0.5 ng/mL represents a low risk of severe sepsis and/or septic shock.  2. >2 ng/mL represents a high risk of severe sepsis and/or septic shock.    As a Marker for Lower Respiratory Tract Infections that require antibiotic therapy:    PCT on Admission    Antibiotic Therapy       6-12 Hrs later    >0.5                Strongly Recommended  >0.25 - <0.5        Recommended   0.1 - 0.25          Discouraged              Remeasure/reassess PCT  <0.1                Strongly Discouraged     Remeasure/reassess PCT    As 28 day mortality risk marker: \"Change in Procalcitonin Result\" (>80% or <=80%) if Day 0 (or Day 1) and Day 4 values are available. Refer to http://www.[email or web address]pct[facility][email or web address]    Change in PCT <=80%  A decrease of PCT levels below or equal to 80% defines a positive change in PCT test result representing a higher risk for 28-day all-cause mortality of patients diagnosed with severe sepsis for septic shock.    Change in PCT >80%  A decrease of PCT levels of more than 80% defines a negative change in PCT result representing a lower risk for 28-day all-cause mortality of patients diagnosed with severe sepsis or septic shock.       Basic Metabolic Panel [[id number]]  (Abnormal) Collected: 06/15/24 0904    Specimen: Blood Updated: 06/15/24 0936     Glucose 321 mg/dL      BUN 18 mg/dL      Creatinine 1.24 mg/dL      Sodium 131 mmol/L      Potassium 4.0 mmol/L      Chloride 96 mmol/L      CO2 20.5 mmol/L      Calcium 8.8 mg/dL      BUN/Creatinine Ratio 14.5     Anion Gap 14.5 mmol/L      eGFR 44.6 mL/min/1.73     Narrative:      GFR Normal >60  Chronic Kidney Disease <60  Kidney Failure <15    The GFR formula is only valid for adults with stable renal function between ages 18 and 70.    Magnesium [[id number]]  (Normal) Collected: 06/15/24 0904    Specimen: Blood Updated: 06/15/24 0936     Magnesium 2.2 mg/dL     " CBC & Differential [[id number]]  (Abnormal) Collected: 06/15/24 0904    Specimen: Blood Updated: 06/15/24 0919    Narrative:      The following orders were created for panel order CBC & Differential.  Procedure                               Abnormality         Status                     ---------                               -----------         ------                     CBC Auto Differential[[id number]]        Abnormal            Final result                 Please view results for these tests on the individual orders.    CBC Auto Differential [[id number]]  (Abnormal) Collected: 06/15/24 0904    Specimen: Blood Updated: 06/15/24 0919     WBC 11.81 10*3/mm3      RBC 4.49 10*6/mm3      Hemoglobin 13.5 g/dL      Hematocrit 38.4 %      MCV 85.5 fL      MCH 30.1 pg      MCHC 35.2 g/dL      RDW 13.7 %      RDW-SD 42.1 fl      MPV 10.4 fL      Platelets 258 10*3/mm3      Neutrophil % 87.8 %      Lymphocyte % 5.7 %      Monocyte % 6.0 %      Eosinophil % 0.0 %      Basophil % 0.1 %      Immature Grans % 0.4 %      Neutrophils, Absolute 10.37 10*3/mm3      Lymphocytes, Absolute 0.67 10*3/mm3      Monocytes, Absolute 0.71 10*3/mm3      Eosinophils, Absolute 0.00 10*3/mm3      Basophils, Absolute 0.01 10*3/mm3      Immature Grans, Absolute 0.05 10*3/mm3      nRBC 0.0 /100 WBC           Data Review:  Results from last 7 days   Lab Units 06/16/24  0425 06/15/24  0904   SODIUM mmol/L 135* 131*   POTASSIUM mmol/L 3.4* 4.0   CHLORIDE mmol/L 96* 96*   CO2 mmol/L 25.1 20.5*   BUN mg/dL 25* 18   CREATININE mg/dL 1.48* 1.24*   GLUCOSE mg/dL 204* 321*   CALCIUM mg/dL 8.5* 8.8     Results from last 7 days   Lab Units 06/16/24  0425 06/15/24  0904   WBC 10*3/mm3 10.31 11.81*   HEMOGLOBIN g/dL 11.7* 13.5   HEMATOCRIT % 36.5 38.4   PLATELETS 10*3/mm3 221 258         Results from last 7 days   Lab Units 06/15/24  0904   HEMOGLOBIN A1C % 8.40*     Lab Results   Lab Value Date/Time    TROPONINT 1,337 (C) 06/16/2024 0425    TROPONINT 550 (C)  "06/15/2024 1215    TROPONINT 571 (C) 06/15/2024 0904               Invalid input(s): \"PROT\", \"LABALBU\"      Results from last 7 days   Lab Units 06/15/24  0904   HEMOGLOBIN A1C % 8.40*     Glucose   Date/Time Value Ref Range Status   06/16/2024 1052 169 (H) 70 - 130 mg/dL Final   06/16/2024 0644 192 (H) 70 - 130 mg/dL Final   06/15/2024 2105 195 (H) 70 - 130 mg/dL Final   06/15/2024 1814 388 (H) 70 - 130 mg/dL Final   06/15/2024 1549 451 (C) 70 - 130 mg/dL Final   06/15/2024 1546 422 (C) 70 - 130 mg/dL Final           Past Medical History:   Diagnosis Date    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  PET 6/2016 with small-medium sized lateral infarct, no ischemia.  This wall motion abnormality was seen on echo as well.    Cellulitis 07/2018    RIGHT LEG    Chronic kidney disease, stage III (moderate) 10/13/2016    Chronic venous insufficiency     Hyperlipidemia     Hypertension     Hypertriglyceridemia 9/29/2021    Low back pain     Mass of ovary 3/30/2016    Obesity (BMI 30-39.9) 12/22/2019    Sleep apnea     on CPAP.  Dr. Mueller    Spinal stenosis     Type 2 diabetes mellitus     Uterine leiomyoma 3/30/2016       Assessment:  Active Hospital Problems    Diagnosis  POA    **NSTEMI (non-ST elevated myocardial infarction) [I21.4]  Yes    Dyspnea [R06.00]  Unknown    Elevated brain natriuretic peptide (BNP) level [R79.89]  Unknown    PNA (pneumonia) [J18.9]  Unknown    Hypertriglyceridemia [E78.1]  Yes    CAD (coronary artery disease) [I25.10]  Yes    Stage 3 chronic kidney disease [N18.30]  Yes    SAWYER on autoCPAP [G47.33]  Yes    Type 2 diabetes mellitus, with long-term current use of insulin [E11.9, Z79.4]  Not Applicable    Hyperlipidemia [E78.5]  Yes      Resolved Hospital Problems   No resolved problems to display.       Plan:  Continue on current medications.  Follow-up on labs and cultures.  " Continue with antibiotics for pneumonia.  Plans for heart cath today.  Await results of cath and treatment plan from cardiology.    Maikel Ladd MD  6/16/2024  13:01 EDT

## 2024-06-16 NOTE — PROGRESS NOTES
"Patient Name: Denisse Chavez  :1945  78 y.o.      Patient Care Team:  Surekha Mcdonnell MD as PCP - General (Internal Medicine)  Warner Tang MD as Cardiologist (Cardiology)  Sergio Eagle MD as Consulting Physician (Urology)  Juan Negrete MD as Consulting Physician (Endocrinology)  Miguel Angel Barrett DPM as Consulting Physician (Podiatry)  Gabriel Montenegro MD as Consulting Physician (Nephrology)  Linda Rodriguez MD (Ophthalmology)  Ginger Ross APRN as Nurse Practitioner (Nurse Practitioner)    Interval History:   Some diuresis.    Subjective:  Following for heart failure and elevated troponin    Objective   Vital Signs  Temp:  [98.1 °F (36.7 °C)-99.7 °F (37.6 °C)] 98.5 °F (36.9 °C)  Heart Rate:  [64-88] 65  Resp:  [17-18] 18  BP: (112-178)/(52-82) 134/61    Intake/Output Summary (Last 24 hours) at 2024 1039  Last data filed at 2024 0817  Gross per 24 hour   Intake 240 ml   Output 1300 ml   Net -1060 ml     Flowsheet Rows      Flowsheet Row First Filed Value   Admission Height 162.6 cm (64\") Documented at 06/15/2024 1153   Admission Weight 91.6 kg (202 lb) Documented at 06/15/2024 1153            Physical Exam:   General Appearance:    Alert, cooperative, in no acute distress   Lungs:     Clear to auscultation.  Normal respiratory effort and rate.      Heart:    Regular rhythm and normal rate, normal S1 and S2, no murmurs, gallops or rubs.     Chest Wall:    No abnormalities observed   Abdomen:     Soft, nontender, positive bowel sounds.     Extremities:   no cyanosis, clubbing or edema.  No marked joint deformities.  Adequate musculoskeletal strength.       Results Review:    Results from last 7 days   Lab Units 24  0425   SODIUM mmol/L 135*   POTASSIUM mmol/L 3.4*   CHLORIDE mmol/L 96*   CO2 mmol/L 25.1   BUN mg/dL 25*   CREATININE mg/dL 1.48*   GLUCOSE mg/dL 204*   CALCIUM mg/dL 8.5*     Results from last 7 days   Lab Units 24  0425 " 06/15/24  1215 06/15/24  0904   HSTROP T ng/L 1,337* 550* 571*     Results from last 7 days   Lab Units 06/16/24  0425   WBC 10*3/mm3 10.31   HEMOGLOBIN g/dL 11.7*   HEMATOCRIT % 36.5   PLATELETS 10*3/mm3 221             Results from last 7 days   Lab Units 06/15/24  0904   MAGNESIUM mg/dL 2.2             Medication Review:   aspirin, 81 mg, Oral, Daily  atorvastatin, 40 mg, Oral, Daily  cefTRIAXone, 2,000 mg, Intravenous, Q24H  docusate sodium, 100 mg, Oral, BID  empagliflozin, 10 mg, Oral, Daily  hydrALAZINE, 10 mg, Oral, TID  insulin lispro, 3-14 Units, Subcutaneous, 4x Daily AC & at Bedtime  isosorbide mononitrate, 60 mg, Oral, Q24H  metoprolol succinate XL, 100 mg, Oral, Q24H  sodium chloride, 10 mL, Intravenous, Q12H  spironolactone, 25 mg, Oral, Daily  torsemide, 80 mg, Oral, BID              Assessment & Plan     1.  Acute on chronic diastolic heart failure.  I reviewed her echocardiogram from the fall 2023 and she has grade 2 diastolic dysfunction.  Her LV function was normal and she had age-related valve changes but no significant valve dysfunction.    -Repeat echocardiogram on Angelina 15 showed hypokinesis of the inferoapical, mid inferior, apical septal and mid septal walls.  And grade 2 diastolic dysfunction.  -Troponin continues to increase.  -Plan for a heart cath today. No LV gram  2.  Coronary artery disease with history of myocardial infarction and PTCA in 1996.  She is on aspirin and atorvastatin.    3.  Non-ST elevation myocardial infarction with markedly elevated troponin.  Yesterday she could not lay flat.  4.  Systemic hypertension.  5.  Diabetes  6.  Chronic kidney disease followed by Dr. Montenegro  7.  Chronic lower extremity edema.  8.  Obesity.  9.  Diabetes.  10.  Mild mitral stenosis    Dara Lemus MD, [facility]  06/16/24  10:39 EDT

## 2024-06-16 NOTE — PLAN OF CARE
Goal Outcome Evaluation:              Outcome Evaluation: Pt had cath today, no interventions. Cardiac surgery consulted. Pt received IV abx for poss. pneumonia. Still removing air from TR band, no bleeding. VSS, NSR on tele, on RA, no c/o pain. SBA w/ walker when ambulating. Will continue POC and update as needed.

## 2024-06-17 ENCOUNTER — APPOINTMENT (OUTPATIENT)
Dept: CARDIOLOGY | Facility: HOSPITAL | Age: 79
DRG: 321 | End: 2024-06-17
Payer: MEDICARE

## 2024-06-17 PROBLEM — L03.115 CELLULITIS OF RIGHT LEG: Status: RESOLVED | Noted: 2023-12-13 | Resolved: 2024-06-17

## 2024-06-17 PROBLEM — I50.33 ACUTE ON CHRONIC HEART FAILURE WITH PRESERVED EJECTION FRACTION (HFPEF): Status: ACTIVE | Noted: 2024-06-17

## 2024-06-17 LAB
ANION GAP SERPL CALCULATED.3IONS-SCNC: 11 MMOL/L (ref 5–15)
BASOPHILS # BLD AUTO: 0.03 10*3/MM3 (ref 0–0.2)
BASOPHILS NFR BLD AUTO: 0.4 % (ref 0–1.5)
BH CV XLRA MEAS - DIST GSV CALF DIST LEFT: 0.15 CM
BH CV XLRA MEAS - DIST GSV CALF DIST RIGHT: 0.43 CM
BH CV XLRA MEAS - DIST GSV THIGH DIST LEFT: 0.37 CM
BH CV XLRA MEAS - DIST GSV THIGH DIST RIGHT: 0.42 CM
BH CV XLRA MEAS - DIST LSV CALF DIST LEFT: 0.29 CM
BH CV XLRA MEAS - DIST LSV CALF DIST RIGHT: 0.32 CM
BH CV XLRA MEAS - GSV ANKLE DIST LEFT: 0.15 CM
BH CV XLRA MEAS - GSV ANKLE DIST RIGHT: 0.43 CM
BH CV XLRA MEAS - GSV KNEE DIST LEFT: 0.41 CM
BH CV XLRA MEAS - GSV KNEE DIST RIGHT: 0.37 CM
BH CV XLRA MEAS - GSV ORIGIN DIST LEFT: 0.42 CM
BH CV XLRA MEAS - GSV ORIGIN DIST RIGHT: 0.53 CM
BH CV XLRA MEAS - MID GSV CALF LEFT: 0.13 CM
BH CV XLRA MEAS - MID GSV CALF RIGHT: 0.4 CM
BH CV XLRA MEAS - MID GSV THIGH  LEFT: 0.36 CM
BH CV XLRA MEAS - MID GSV THIGH  RIGHT: 0.42 CM
BH CV XLRA MEAS - MID LSV CALF DIST LEFT: 0.37 CM
BH CV XLRA MEAS - MID LSV CALF DIST RIGHT: 0.36 CM
BH CV XLRA MEAS - PROX GSV CALF DIST LEFT: 0.3 CM
BH CV XLRA MEAS - PROX GSV CALF DIST RIGHT: 0.55 CM
BH CV XLRA MEAS - PROX GSV THIGH  LEFT: 0.59 CM
BH CV XLRA MEAS - PROX GSV THIGH  RIGHT: 0.54 CM
BH CV XLRA MEAS - PROX LSV CALF DIST LEFT: 0.74 CM
BH CV XLRA MEAS LEFT DIST CCA EDV: -9.4 CM/SEC
BH CV XLRA MEAS LEFT DIST CCA PSV: -83.3 CM/SEC
BH CV XLRA MEAS LEFT DIST ICA EDV: -17.3 CM/SEC
BH CV XLRA MEAS LEFT DIST ICA PSV: -103 CM/SEC
BH CV XLRA MEAS LEFT ICA/CCA RATIO: 1.26
BH CV XLRA MEAS LEFT MID ICA EDV: -11.8 CM/SEC
BH CV XLRA MEAS LEFT MID ICA PSV: -105 CM/SEC
BH CV XLRA MEAS LEFT PROX CCA EDV: 7 CM/SEC
BH CV XLRA MEAS LEFT PROX CCA PSV: 85 CM/SEC
BH CV XLRA MEAS LEFT PROX ECA PSV: -126 CM/SEC
BH CV XLRA MEAS LEFT PROX ICA EDV: 12.9 CM/SEC
BH CV XLRA MEAS LEFT PROX ICA PSV: 96.2 CM/SEC
BH CV XLRA MEAS LEFT PROX SCLA PSV: 103 CM/SEC
BH CV XLRA MEAS LEFT VERTEBRAL A EDV: 12.3 CM/SEC
BH CV XLRA MEAS LEFT VERTEBRAL A PSV: 57.5 CM/SEC
BH CV XLRA MEAS RIGHT DIST CCA EDV: -6.7 CM/SEC
BH CV XLRA MEAS RIGHT DIST CCA PSV: -83.7 CM/SEC
BH CV XLRA MEAS RIGHT DIST ICA EDV: -18.2 CM/SEC
BH CV XLRA MEAS RIGHT DIST ICA PSV: -85 CM/SEC
BH CV XLRA MEAS RIGHT ICA/CCA RATIO: 1.52
BH CV XLRA MEAS RIGHT MID ICA EDV: -15.2 CM/SEC
BH CV XLRA MEAS RIGHT MID ICA PSV: -127 CM/SEC
BH CV XLRA MEAS RIGHT PROX CCA EDV: 7.5 CM/SEC
BH CV XLRA MEAS RIGHT PROX CCA PSV: 71.5 CM/SEC
BH CV XLRA MEAS RIGHT PROX ECA EDV: -7 CM/SEC
BH CV XLRA MEAS RIGHT PROX ECA PSV: -81.5 CM/SEC
BH CV XLRA MEAS RIGHT PROX ICA EDV: 11.7 CM/SEC
BH CV XLRA MEAS RIGHT PROX ICA PSV: 89.1 CM/SEC
BH CV XLRA MEAS RIGHT PROX SCLA PSV: 114 CM/SEC
BH CV XLRA MEAS RIGHT VERTEBRAL A EDV: -7.9 CM/SEC
BH CV XLRA MEAS RIGHT VERTEBRAL A PSV: -38.8 CM/SEC
BUN SERPL-MCNC: 27 MG/DL (ref 8–23)
BUN/CREAT SERPL: 19.1 (ref 7–25)
CALCIUM SPEC-SCNC: 8.1 MG/DL (ref 8.6–10.5)
CHLORIDE SERPL-SCNC: 101 MMOL/L (ref 98–107)
CO2 SERPL-SCNC: 26 MMOL/L (ref 22–29)
CREAT SERPL-MCNC: 1.41 MG/DL (ref 0.57–1)
DEPRECATED RDW RBC AUTO: 44.2 FL (ref 37–54)
EGFRCR SERPLBLD CKD-EPI 2021: 38.3 ML/MIN/1.73
EOSINOPHIL # BLD AUTO: 0.16 10*3/MM3 (ref 0–0.4)
EOSINOPHIL NFR BLD AUTO: 1.9 % (ref 0.3–6.2)
ERYTHROCYTE [DISTWIDTH] IN BLOOD BY AUTOMATED COUNT: 14.1 % (ref 12.3–15.4)
GLUCOSE BLDC GLUCOMTR-MCNC: 168 MG/DL (ref 70–130)
GLUCOSE BLDC GLUCOMTR-MCNC: 223 MG/DL (ref 70–130)
GLUCOSE BLDC GLUCOMTR-MCNC: 269 MG/DL (ref 70–130)
GLUCOSE BLDC GLUCOMTR-MCNC: 275 MG/DL (ref 70–130)
GLUCOSE SERPL-MCNC: 152 MG/DL (ref 65–99)
HCT VFR BLD AUTO: 37.3 % (ref 34–46.6)
HGB BLD-MCNC: 12.3 G/DL (ref 12–15.9)
IMM GRANULOCYTES # BLD AUTO: 0.05 10*3/MM3 (ref 0–0.05)
IMM GRANULOCYTES NFR BLD AUTO: 0.6 % (ref 0–0.5)
LYMPHOCYTES # BLD AUTO: 1.68 10*3/MM3 (ref 0.7–3.1)
LYMPHOCYTES NFR BLD AUTO: 20 % (ref 19.6–45.3)
MCH RBC QN AUTO: 28.3 PG (ref 26.6–33)
MCHC RBC AUTO-ENTMCNC: 33 G/DL (ref 31.5–35.7)
MCV RBC AUTO: 85.9 FL (ref 79–97)
MONOCYTES # BLD AUTO: 0.77 10*3/MM3 (ref 0.1–0.9)
MONOCYTES NFR BLD AUTO: 9.2 % (ref 5–12)
NEUTROPHILS NFR BLD AUTO: 5.69 10*3/MM3 (ref 1.7–7)
NEUTROPHILS NFR BLD AUTO: 67.9 % (ref 42.7–76)
NRBC BLD AUTO-RTO: 0 /100 WBC (ref 0–0.2)
PLATELET # BLD AUTO: 231 10*3/MM3 (ref 140–450)
PMV BLD AUTO: 10.3 FL (ref 6–12)
POTASSIUM SERPL-SCNC: 3.1 MMOL/L (ref 3.5–5.2)
RBC # BLD AUTO: 4.34 10*6/MM3 (ref 3.77–5.28)
RIGHT ARM BP: NORMAL MMHG
SODIUM SERPL-SCNC: 138 MMOL/L (ref 136–145)
WBC NRBC COR # BLD AUTO: 8.38 10*3/MM3 (ref 3.4–10.8)

## 2024-06-17 PROCEDURE — 63710000001 INSULIN GLARGINE PER 5 UNITS: Performed by: HOSPITALIST

## 2024-06-17 PROCEDURE — 93880 EXTRACRANIAL BILAT STUDY: CPT

## 2024-06-17 PROCEDURE — 99232 SBSQ HOSP IP/OBS MODERATE 35: CPT | Performed by: INTERNAL MEDICINE

## 2024-06-17 PROCEDURE — 25010000002 CEFTRIAXONE PER 250 MG: Performed by: INTERNAL MEDICINE

## 2024-06-17 PROCEDURE — 93970 EXTREMITY STUDY: CPT | Performed by: SURGERY

## 2024-06-17 PROCEDURE — 93970 EXTREMITY STUDY: CPT

## 2024-06-17 PROCEDURE — 82948 REAGENT STRIP/BLOOD GLUCOSE: CPT

## 2024-06-17 PROCEDURE — 63710000001 INSULIN LISPRO (HUMAN) PER 5 UNITS: Performed by: INTERNAL MEDICINE

## 2024-06-17 PROCEDURE — 85025 COMPLETE CBC W/AUTO DIFF WBC: CPT | Performed by: INTERNAL MEDICINE

## 2024-06-17 PROCEDURE — 80048 BASIC METABOLIC PNL TOTAL CA: CPT | Performed by: INTERNAL MEDICINE

## 2024-06-17 PROCEDURE — 93880 EXTRACRANIAL BILAT STUDY: CPT | Performed by: SURGERY

## 2024-06-17 RX ADMIN — TORSEMIDE 80 MG: 20 TABLET ORAL at 22:31

## 2024-06-17 RX ADMIN — SPIRONOLACTONE 25 MG: 25 TABLET, FILM COATED ORAL at 09:32

## 2024-06-17 RX ADMIN — INSULIN GLARGINE 25 UNITS: 100 INJECTION, SOLUTION SUBCUTANEOUS at 22:40

## 2024-06-17 RX ADMIN — CEFTRIAXONE 2000 MG: 2 INJECTION, POWDER, FOR SOLUTION INTRAMUSCULAR; INTRAVENOUS at 17:39

## 2024-06-17 RX ADMIN — INSULIN LISPRO 5 UNITS: 100 INJECTION, SOLUTION INTRAVENOUS; SUBCUTANEOUS at 22:39

## 2024-06-17 RX ADMIN — Medication 10 ML: at 22:40

## 2024-06-17 RX ADMIN — METOPROLOL SUCCINATE 100 MG: 100 TABLET, EXTENDED RELEASE ORAL at 09:32

## 2024-06-17 RX ADMIN — INSULIN LISPRO 8 UNITS: 100 INJECTION, SOLUTION INTRAVENOUS; SUBCUTANEOUS at 17:38

## 2024-06-17 RX ADMIN — TORSEMIDE 80 MG: 20 TABLET ORAL at 09:32

## 2024-06-17 RX ADMIN — EMPAGLIFLOZIN 10 MG: 10 TABLET, FILM COATED ORAL at 09:33

## 2024-06-17 RX ADMIN — ATORVASTATIN CALCIUM 40 MG: 20 TABLET, FILM COATED ORAL at 09:32

## 2024-06-17 RX ADMIN — INSULIN LISPRO 8 UNITS: 100 INJECTION, SOLUTION INTRAVENOUS; SUBCUTANEOUS at 11:57

## 2024-06-17 RX ADMIN — HYDRALAZINE HYDROCHLORIDE 10 MG: 10 TABLET ORAL at 17:38

## 2024-06-17 RX ADMIN — ASPIRIN 81 MG: 81 TABLET, COATED ORAL at 09:32

## 2024-06-17 RX ADMIN — INSULIN LISPRO 3 UNITS: 100 INJECTION, SOLUTION INTRAVENOUS; SUBCUTANEOUS at 06:57

## 2024-06-17 RX ADMIN — HYDRALAZINE HYDROCHLORIDE 10 MG: 10 TABLET ORAL at 09:32

## 2024-06-17 RX ADMIN — ISOSORBIDE MONONITRATE 60 MG: 60 TABLET, EXTENDED RELEASE ORAL at 09:32

## 2024-06-17 RX ADMIN — HYDRALAZINE HYDROCHLORIDE 10 MG: 10 TABLET ORAL at 22:31

## 2024-06-17 NOTE — PROGRESS NOTES
"DAILY PROGRESS NOTE  [facility]    Patient Identification:  Name: Denisse Chavez  Age: 78 y.o.  Sex: female  :  1945  MRN: [id number]         Primary Care Physician: Surekha Mcdonnell MD    Subjective:  Interval History: She complains about getting very out of breath just going to the bathroom and is still pretty weak.    Objective:    Scheduled Meds:aspirin, 81 mg, Oral, Daily  atorvastatin, 40 mg, Oral, Daily  cefTRIAXone, 2,000 mg, Intravenous, Q24H  docusate sodium, 100 mg, Oral, BID  empagliflozin, 10 mg, Oral, Daily  hydrALAZINE, 10 mg, Oral, TID  insulin glargine, 25 Units, Subcutaneous, Nightly  insulin lispro, 3-14 Units, Subcutaneous, 4x Daily AC & at Bedtime  isosorbide mononitrate, 60 mg, Oral, Q24H  metoprolol succinate XL, 100 mg, Oral, Q24H  sodium chloride, 10 mL, Intravenous, Q12H  spironolactone, 25 mg, Oral, Daily  torsemide, 80 mg, Oral, BID      Continuous Infusions:     Vital signs in last 24 hours:  Temp:  [97.9 °F (36.6 °C)-98.1 °F (36.7 °C)] 98 °F (36.7 °C)  Heart Rate:  [66-72] 66  Resp:  [16-18] 18  BP: (111-165)/(46-78) 165/73    Intake/Output:    Intake/Output Summary (Last 24 hours) at 2024 1336  Last data filed at 2024 0700  Gross per 24 hour   Intake 100 ml   Output 2300 ml   Net -2200 ml       Exam:  /73 (BP Location: Right arm, Patient Position: Sitting)   Pulse 66   Temp 98 °F (36.7 °C) (Oral)   Resp 18   Ht 162.6 cm (64\")   Wt 91.6 kg (202 lb)   SpO2 97%   BMI 34.67 kg/m²     General Appearance:    Alert, cooperative, no distress   Head:    Normocephalic, without obvious abnormality, atraumatic   Eyes:       Throat:   Lips, tongue, gums normal   Neck:   Supple, symmetrical, trachea midline, no JVD   Lungs:     Clear to auscultation bilaterally, respirations unlabored   Chest Wall:    No tenderness or deformity    Heart:    Regular rate and rhythm, S1 and S2 normal, no murmur,no  Rub or gallop   Abdomen:     Soft, nontender, bowel " sounds active, no masses, no organomegaly    Extremities:   Extremities normal, atraumatic, no cyanosis or edema   Pulses:      Skin:   Skin is warm and dry,  no rashes or palpable lesions   Neurologic:   no focal deficits noted      Lab Results (last 72 hours)       Procedure Component Value Units Date/Time    POC Glucose Once [[id number]]  (Abnormal) Collected: 06/16/24 1052    Specimen: Blood Updated: 06/16/24 1053     Glucose 169 mg/dL     POC Glucose Once [[id number]]  (Abnormal) Collected: 06/16/24 0644    Specimen: Blood Updated: 06/16/24 0646     Glucose 192 mg/dL     High Sensitivity Troponin T [[id number]]  (Abnormal) Collected: 06/16/24 0425    Specimen: Blood Updated: 06/16/24 0514     HS Troponin T 1,337 ng/L     Narrative:      High Sensitive Troponin T Reference Range:  <14.0 ng/L- Negative Female for AMI  <22.0 ng/L- Negative Male for AMI  >=14 - Abnormal Female indicating possible myocardial injury.  >=22 - Abnormal Male indicating possible myocardial injury.   Clinicians would have to utilize clinical acumen, EKG, Troponin, and serial changes to determine if it is an Acute Myocardial Infarction or myocardial injury due to an underlying chronic condition.         Basic Metabolic Panel [[id number]]  (Abnormal) Collected: 06/16/24 0425    Specimen: Blood Updated: 06/16/24 0510     Glucose 204 mg/dL      BUN 25 mg/dL      Creatinine 1.48 mg/dL      Sodium 135 mmol/L      Potassium 3.4 mmol/L      Chloride 96 mmol/L      CO2 25.1 mmol/L      Calcium 8.5 mg/dL      BUN/Creatinine Ratio 16.9     Anion Gap 13.9 mmol/L      eGFR 36.1 mL/min/1.73     Narrative:      GFR Normal >60  Chronic Kidney Disease <60  Kidney Failure <15    The GFR formula is only valid for adults with stable renal function between ages 18 and 70.    CBC Auto Differential [[id number]]  (Abnormal) Collected: 06/16/24 0425    Specimen: Blood Updated: 06/16/24 0452     WBC 10.31 10*3/mm3      RBC 4.25 10*6/mm3      Hemoglobin 11.7 g/dL       Hematocrit 36.5 %      MCV 85.9 fL      MCH 27.5 pg      MCHC 32.1 g/dL      RDW 14.2 %      RDW-SD 44.2 fl      MPV 10.5 fL      Platelets 221 10*3/mm3      Neutrophil % 78.7 %      Lymphocyte % 12.3 %      Monocyte % 7.4 %      Eosinophil % 0.9 %      Basophil % 0.2 %      Immature Grans % 0.5 %      Neutrophils, Absolute 8.12 10*3/mm3      Lymphocytes, Absolute 1.27 10*3/mm3      Monocytes, Absolute 0.76 10*3/mm3      Eosinophils, Absolute 0.09 10*3/mm3      Basophils, Absolute 0.02 10*3/mm3      Immature Grans, Absolute 0.05 10*3/mm3      nRBC 0.0 /100 WBC     Blood Culture - Blood, Arm, Right [[id number]] Collected: 06/15/24 2111    Specimen: Blood from Arm, Right Updated: 06/15/24 2118    Blood Culture - Blood, Arm, Left [[id number]] Collected: 06/15/24 2110    Specimen: Blood from Arm, Left Updated: 06/15/24 2117    POC Glucose Once [[id number]]  (Abnormal) Collected: 06/15/24 2105    Specimen: Blood Updated: 06/15/24 2106     Glucose 195 mg/dL     S. Pneumo Ag Urine or CSF - Urine, Urine, Clean Catch [[id number]]  (Normal) Collected: 06/15/24 1644    Specimen: Urine, Clean Catch Updated: 06/15/24 1848     Strep Pneumo Ag Negative    Legionella Antigen, Urine - Urine, Urine, Clean Catch [[id number]]  (Normal) Collected: 06/15/24 1644    Specimen: Urine, Clean Catch Updated: 06/15/24 1847     LEGIONELLA ANTIGEN, URINE Negative    POC Glucose Once [[id number]]  (Abnormal) Collected: 06/15/24 1814    Specimen: Blood Updated: 06/15/24 1816     Glucose 388 mg/dL     POC Glucose Once [[id number]]  (Abnormal) Collected: 06/15/24 1549    Specimen: Blood Updated: 06/15/24 1554     Glucose 451 mg/dL     POC Glucose Once [[id number]]  (Abnormal) Collected: 06/15/24 1546    Specimen: Blood Updated: 06/15/24 1554     Glucose 422 mg/dL     Hemoglobin A1c [[id number]]  (Abnormal) Collected: 06/15/24 0904    Specimen: Blood Updated: 06/15/24 1541     Hemoglobin A1C 8.40 %     Narrative:      Hemoglobin A1C Ranges:    Increased Risk  for Diabetes  5.7% to 6.4%  Diabetes                     >= 6.5%  Diabetic Goal                < 7.0%    High Sensitivity Troponin T 2Hr [[id number]]  (Abnormal) Collected: 06/15/24 1215    Specimen: Blood Updated: 06/15/24 1312     HS Troponin T 550 ng/L      Troponin T Delta -21 ng/L     Narrative:      High Sensitive Troponin T Reference Range:  <14.0 ng/L- Negative Female for AMI  <22.0 ng/L- Negative Male for AMI  >=14 - Abnormal Female indicating possible myocardial injury.  >=22 - Abnormal Male indicating possible myocardial injury.   Clinicians would have to utilize clinical acumen, EKG, Troponin, and serial changes to determine if it is an Acute Myocardial Infarction or myocardial injury due to an underlying chronic condition.         STAT Lactic Acid, Reflex [[id number]]  (Normal) Collected: 06/15/24 1215    Specimen: Blood Updated: 06/15/24 1304     Lactate 1.7 mmol/L     Urinalysis, Microscopic Only - Urine, Clean Catch [[id number]]  (Abnormal) Collected: 06/15/24 1234    Specimen: Urine, Clean Catch Updated: 06/15/24 1249     RBC, UA 0-2 /HPF      WBC, UA 0-2 /HPF      Bacteria, UA None Seen /HPF      Squamous Epithelial Cells, UA 3-6 /HPF      Hyaline Casts, UA 7-12 /LPF      Methodology Automated Microscopy    Urinalysis With Microscopic If Indicated (No Culture) - Urine, Clean Catch [[id number]]  (Abnormal) Collected: 06/15/24 1234    Specimen: Urine, Clean Catch Updated: 06/15/24 1248     Color, UA Yellow     Appearance, UA Clear     pH, UA 5.5     Specific Gravity, UA 1.017     Glucose, UA >=1000 mg/dL (3+)     Ketones, UA 15 mg/dL (1+)     Bilirubin, UA Negative     Blood, UA Trace     Protein,  mg/dL (2+)     Leuk Esterase, UA Negative     Nitrite, UA Negative     Urobilinogen, UA 1.0 E.U./dL    Respiratory Panel PCR w/COVID-19(SARS-CoV-2) FAUSTO/EFREN/MIRTHA/PAD/COR/ALTAGRACIA In-House, NP Swab in UTM/VTM, 2 HR TAT - Swab, Nasopharynx [[id number]]  (Normal) Collected: 06/15/24 0905    Specimen: Swab from  Nasopharynx Updated: 06/15/24 1003     ADENOVIRUS, PCR Not Detected     Coronavirus 229E Not Detected     Coronavirus HKU1 Not Detected     Coronavirus NL63 Not Detected     Coronavirus OC43 Not Detected     COVID19 Not Detected     Human Metapneumovirus Not Detected     Human Rhinovirus/Enterovirus Not Detected     Influenza A PCR Not Detected     Influenza B PCR Not Detected     Parainfluenza Virus 1 Not Detected     Parainfluenza Virus 2 Not Detected     Parainfluenza Virus 3 Not Detected     Parainfluenza Virus 4 Not Detected     RSV, PCR Not Detected     Bordetella pertussis pcr Not Detected     Bordetella parapertussis PCR Not Detected     Chlamydophila pneumoniae PCR Not Detected     Mycoplasma pneumo by PCR Not Detected    Narrative:      In the setting of a positive respiratory panel with a viral infection PLUS a negative procalcitonin without other underlying concern for bacterial infection, consider observing off antibiotics or discontinuation of antibiotics and continue supportive care. If the respiratory panel is positive for atypical bacterial infection (Bordetella pertussis, Chlamydophila pneumoniae, or Mycoplasma pneumoniae), consider antibiotic de-escalation to target atypical bacterial infection.    Lactic Acid, Plasma [[id number]]  (Abnormal) Collected: 06/15/24 0904    Specimen: Blood Updated: 06/15/24 0948     Lactate 2.1 mmol/L     High Sensitivity Troponin T [[id number]]  (Abnormal) Collected: 06/15/24 0904    Specimen: Blood Updated: 06/15/24 0947     HS Troponin T 571 ng/L     Narrative:      High Sensitive Troponin T Reference Range:  <14.0 ng/L- Negative Female for AMI  <22.0 ng/L- Negative Male for AMI  >=14 - Abnormal Female indicating possible myocardial injury.  >=22 - Abnormal Male indicating possible myocardial injury.   Clinicians would have to utilize clinical acumen, EKG, Troponin, and serial changes to determine if it is an Acute Myocardial Infarction or myocardial injury due to an  underlying chronic condition.         Extra Tubes [[id number]] Collected: 06/15/24 0904    Specimen: Blood, Venous Line Updated: 06/15/24 0945    Narrative:      The following orders were created for panel order Extra Tubes.  Procedure                               Abnormality         Status                     ---------                               -----------         ------                     Red Top[[id number]]                                          Final result               Light Blue Top[[id number]]                                   Final result                 Please view results for these tests on the individual orders.    Red Top [[id number]] Collected: 06/15/24 0904    Specimen: Blood Updated: 06/15/24 0945     Extra Tube Hold for add-ons.     Comment: Auto resulted.       Light Blue Top [[id number]] Collected: 06/15/24 0904    Specimen: Blood Updated: 06/15/24 0945     Extra Tube Hold for add-ons.     Comment: Auto resulted       BNP [[id number]]  (Abnormal) Collected: 06/15/24 0904    Specimen: Blood Updated: 06/15/24 0942     proBNP 5,512.0 pg/mL     Narrative:      This assay is used as an aid in the diagnosis of individuals suspected of having heart failure. It can be used as an aid in the diagnosis of acute decompensated heart failure (ADHF) in patients presenting with signs and symptoms of ADHF to the emergency department (ED). In addition, NT-proBNP of <300 pg/mL indicates ADHF is not likely.    Age Range Result Interpretation  NT-proBNP Concentration (pg/mL:      <50             Positive            >450                   Gray                 300-450                    Negative             <300    50-75           Positive            >900                  Gray                300-900                  Negative            <300      >75             Positive            >1800                  Gray                300-1800                  Negative            <300    Procalcitonin [[id number]]  (Abnormal)  "Collected: 06/15/24 0904    Specimen: Blood Updated: 06/15/24 0942     Procalcitonin 0.49 ng/mL     Narrative:      As a Marker for Sepsis (Non-Neonates):    1. <0.5 ng/mL represents a low risk of severe sepsis and/or septic shock.  2. >2 ng/mL represents a high risk of severe sepsis and/or septic shock.    As a Marker for Lower Respiratory Tract Infections that require antibiotic therapy:    PCT on Admission    Antibiotic Therapy       6-12 Hrs later    >0.5                Strongly Recommended  >0.25 - <0.5        Recommended   0.1 - 0.25          Discouraged              Remeasure/reassess PCT  <0.1                Strongly Discouraged     Remeasure/reassess PCT    As 28 day mortality risk marker: \"Change in Procalcitonin Result\" (>80% or <=80%) if Day 0 (or Day 1) and Day 4 values are available. Refer to http://www.[employer]-pct-calculator.com    Change in PCT <=80%  A decrease of PCT levels below or equal to 80% defines a positive change in PCT test result representing a higher risk for 28-day all-cause mortality of patients diagnosed with severe sepsis for septic shock.    Change in PCT >80%  A decrease of PCT levels of more than 80% defines a negative change in PCT result representing a lower risk for 28-day all-cause mortality of patients diagnosed with severe sepsis or septic shock.       Basic Metabolic Panel [[id number]]  (Abnormal) Collected: 06/15/24 0904    Specimen: Blood Updated: 06/15/24 0936     Glucose 321 mg/dL      BUN 18 mg/dL      Creatinine 1.24 mg/dL      Sodium 131 mmol/L      Potassium 4.0 mmol/L      Chloride 96 mmol/L      CO2 20.5 mmol/L      Calcium 8.8 mg/dL      BUN/Creatinine Ratio 14.5     Anion Gap 14.5 mmol/L      eGFR 44.6 mL/min/1.73     Narrative:      GFR Normal >60  Chronic Kidney Disease <60  Kidney Failure <15    The GFR formula is only valid for adults with stable renal function between ages 18 and 70.    Magnesium [[id number]]  (Normal) Collected: 06/15/24 0904    Specimen: " Blood Updated: 06/15/24 0936     Magnesium 2.2 mg/dL     CBC & Differential [[id number]]  (Abnormal) Collected: 06/15/24 0904    Specimen: Blood Updated: 06/15/24 0919    Narrative:      The following orders were created for panel order CBC & Differential.  Procedure                               Abnormality         Status                     ---------                               -----------         ------                     CBC Auto Differential[[id number]]        Abnormal            Final result                 Please view results for these tests on the individual orders.    CBC Auto Differential [[id number]]  (Abnormal) Collected: 06/15/24 0904    Specimen: Blood Updated: 06/15/24 0919     WBC 11.81 10*3/mm3      RBC 4.49 10*6/mm3      Hemoglobin 13.5 g/dL      Hematocrit 38.4 %      MCV 85.5 fL      MCH 30.1 pg      MCHC 35.2 g/dL      RDW 13.7 %      RDW-SD 42.1 fl      MPV 10.4 fL      Platelets 258 10*3/mm3      Neutrophil % 87.8 %      Lymphocyte % 5.7 %      Monocyte % 6.0 %      Eosinophil % 0.0 %      Basophil % 0.1 %      Immature Grans % 0.4 %      Neutrophils, Absolute 10.37 10*3/mm3      Lymphocytes, Absolute 0.67 10*3/mm3      Monocytes, Absolute 0.71 10*3/mm3      Eosinophils, Absolute 0.00 10*3/mm3      Basophils, Absolute 0.01 10*3/mm3      Immature Grans, Absolute 0.05 10*3/mm3      nRBC 0.0 /100 WBC           Data Review:  Results from last 7 days   Lab Units 06/17/24  0457 06/16/24  0425 06/15/24  0904   SODIUM mmol/L 138 135* 131*   POTASSIUM mmol/L 3.1* 3.4* 4.0   CHLORIDE mmol/L 101 96* 96*   CO2 mmol/L 26.0 25.1 20.5*   BUN mg/dL 27* 25* 18   CREATININE mg/dL 1.41* 1.48* 1.24*   GLUCOSE mg/dL 152* 204* 321*   CALCIUM mg/dL 8.1* 8.5* 8.8     Results from last 7 days   Lab Units 06/17/24  0457 06/16/24  0425 06/15/24  0904   WBC 10*3/mm3 8.38 10.31 11.81*   HEMOGLOBIN g/dL 12.3 11.7* 13.5   HEMATOCRIT % 37.3 36.5 38.4   PLATELETS 10*3/mm3 231 221 258         Results from last 7 days   Lab  "Units 06/15/24  0904   HEMOGLOBIN A1C % 8.40*     Lab Results   Lab Value Date/Time    TROPONINT 1,337 (C) 06/16/2024 0425    TROPONINT 550 (C) 06/15/2024 1215    TROPONINT 571 (C) 06/15/2024 0904               Invalid input(s): \"PROT\", \"LABALBU\"      Results from last 7 days   Lab Units 06/15/24  0904   HEMOGLOBIN A1C % 8.40*     Glucose   Date/Time Value Ref Range Status   06/17/2024 1129 269 (H) 70 - 130 mg/dL Final   06/17/2024 0647 168 (H) 70 - 130 mg/dL Final   06/16/2024 2038 209 (H) 70 - 130 mg/dL Final   06/16/2024 1553 210 (H) 70 - 130 mg/dL Final   06/16/2024 1052 169 (H) 70 - 130 mg/dL Final   06/16/2024 0644 192 (H) 70 - 130 mg/dL Final   06/15/2024 2105 195 (H) 70 - 130 mg/dL Final   06/15/2024 1814 388 (H) 70 - 130 mg/dL Final           Past Medical History:   Diagnosis Date    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  PET 6/2016 with small-medium sized lateral infarct, no ischemia.  This wall motion abnormality was seen on echo as well.    Cellulitis 07/2018    RIGHT LEG    Chronic kidney disease, stage III (moderate) 10/13/2016    Chronic venous insufficiency     Hyperlipidemia     Hypertension     Hypertriglyceridemia 9/29/2021    Low back pain     Mass of ovary 3/30/2016    Obesity (BMI 30-39.9) 12/22/2019    Sleep apnea     on CPAP.  Dr. Mueller    Spinal stenosis     Type 2 diabetes mellitus     Uterine leiomyoma 3/30/2016       Assessment:  Active Hospital Problems    Diagnosis  POA    **NSTEMI (non-ST elevated myocardial infarction) [I21.4]  Yes    Acute on chronic heart failure with preserved ejection fraction (HFpEF) [I50.33]  Unknown    Dyspnea [R06.00]  Unknown    Elevated brain natriuretic peptide (BNP) level [R79.89]  Unknown    PNA (pneumonia) [J18.9]  Unknown    Hypertriglyceridemia [E78.1]  Yes    CAD (coronary artery disease) [I25.10]  Yes    Chronic venous " insufficiency [I87.2]  Yes    Stage 3 chronic kidney disease [N18.30]  Yes    SAWYER on autoCPAP [G47.33]  Yes    Type 2 diabetes mellitus, with long-term current use of insulin [E11.9, Z79.4]  Not Applicable    Hyperlipidemia [E78.5]  Yes    Essential hypertension [I10]  Yes      Resolved Hospital Problems   No resolved problems to display.       Plan:  Continue on current medications.  Follow-up on labs and cultures.  Continue with antibiotics for pneumonia.  Results of heart cath noted showing triple-vessel disease with left main..  Glycemic controlle.  Will add 25 of Lantus at bedtime and continue with sliding scale insulin.  Discussed with the nurse.    Maikel Ladd MD  6/17/2024  13:36 EDT

## 2024-06-17 NOTE — DISCHARGE PLACEMENT REQUEST
"Denisse Cahvez \"Pat\" (78 y.o. Female)       Date of Birth   1945    Social Security Number       Address   [address] [place], [place] [address]    Home Phone   [phone]    MRN   [id number]       Muslim   Yazidi    Marital Status   Single                            Admission Date   6/15/24    Admission Type   Emergency    Admitting Provider   Maikel Ladd MD    Attending Provider   Maikel Ladd MD    Department, Room/Bed   [facility] CVI, 3112/1       Discharge Date       Discharge Disposition       Discharge Destination                                 Attending Provider: Maikel Ladd MD    Allergies: Ace Inhibitors, Angiotensin Receptor Blockers, Keflex [Cephalexin], Sulfa Antibiotics    Isolation: None   Infection: None   Code Status: CPR    Ht: 162.6 cm (64\")   Wt: 91.6 kg (202 lb)    Admission Cmt: None   Principal Problem: NSTEMI (non-ST elevated myocardial infarction) [I21.4]                   Active Insurance as of 6/15/2024       Primary Coverage       Payor Plan Insurance Group Employer/Plan Group    MEDICARE MEDICARE A & B        Payor Plan Address Payor Plan Phone Number Payor Plan Fax Number Effective Dates    [address] [phone]  9/1/2007 - None Entered    [facility] [id number]         Subscriber Name Subscriber Birth Date Member ID       DENISSE CHAVEZ 1945 [id number]               Secondary Coverage       Payor Plan Insurance Group Employer/Plan Group    [facility] SUPP [id number]       Payor Plan Address Payor Plan Phone Number Payor Plan Fax Number Effective Dates    [address]   12/1/2016 - None Entered    [facility] [address]         Subscriber Name Subscriber Birth Date Member ID       DENISSE CHAVEZ 1945 [id number]                     Emergency Contacts        (Rel.) Home Phone Work Phone Mobile Phone    ScottAnil (Son) [phone] -- [phone]    Daisy Cheng (Daughter) [phone] " -- [phone]

## 2024-06-17 NOTE — CASE MANAGEMENT/SOCIAL WORK
Discharge Planning Assessment  [facility]     Patient Name: Denisse Chavez  MRN: [id number]  Today's Date: 6/17/2024    Admit Date: 6/15/2024    Plan: [facility] pending .   Discharge Needs Assessment       Row Name 06/17/24 1730       Living Environment    People in Home child(reagan), adult    Name(s) of People in Home Anil Chavez/son    Current Living Arrangements home    Potentially Unsafe Housing Conditions none    In the past 12 months has the electric, gas, oil, or water company threatened to shut off services in your home? No    Primary Care Provided by self    Provides Primary Care For pet(s)  1 DOG    Family Caregiver if Needed child(reagan), adult    Family Caregiver Names Anil Chavez    Quality of Family Relationships helpful;involved;supportive    Able to Return to Prior Arrangements yes       Resource/Environmental Concerns    Resource/Environmental Concerns home accessibility    Home Accessibility Concerns stairs to enter home  6 entrance stair steps and 14 to the basement    Transportation Concerns none       Transportation Needs    In the past 12 months, has lack of transportation kept you from medical appointments or from getting medications? no    In the past 12 months, has lack of transportation kept you from meetings, work, or from getting things needed for daily living? No       Food Insecurity    Within the past 12 months, you worried that your food would run out before you got the money to buy more. Never true    Within the past 12 months, the food you bought just didn't last and you didn't have money to get more. Never true       Transition Planning    Patient/Family Anticipates Transition to inpatient rehabilitation facility    Patient/Family Anticipated Services at Transition skilled nursing    Transportation Anticipated family or friend will provide       Discharge Needs Assessment    Readmission Within the Last 30 Days no previous admission in last 30 days    Equipment  Currently Used at Home bp cuff;scales;cpap;glucometer;rollator;cane, straight;other (see comments)  LINDA GLUCOMETER    Concerns to be Addressed discharge planning    Anticipated Changes Related to Illness none    Equipment Needed After Discharge none    Discharge Facility/Level of Care Needs nursing facility, skilled    Provided Post Acute Provider List? N/A    N/A Provider List Comment Son wants her to go to [facility] at d/c.    Patient's Choice of Community Agency(s) [facility]                   Discharge Plan       Row Name 06/17/24 9627       Plan    Plan [facility] eval pending .    Plan Comments CCP spoke with patient and her son/Anil Chavez, at bedside.  CCP role explained and discharge planning discussed.  Face sheet verified.  Patient stated she is IADL's, retired and no longer drives.  Patient lives with her son Anil, in a single-story home with a basement and six entrance stair steps.  Patients PCP confirmed as, Surekha Mcdonnell.  Patient's pharmacy confirmed as, [facility].  Patient has the following DME- CPAP ([employer]), LINDA glucometer, BP cuff, scale, straight cane and rollator.  Patient has been to [facility] for past sub-acute rehab.  Pt son wants her to go to [facility] at discharge.  Referral sent to [facility] and pending.  Pt scheduled for heart surgery tomorrow, 6/18/24.  CCP will continue to follow……Alana PRIETO /MAGGIE.                  Continued Care and Services - Admitted Since 6/15/2024       Destination       Service Provider Request Status Selected Services Address Phone Fax Patient Preferred    [facility] Considering N/A [address] [address] [phone] --                  Expected Discharge Date and Time       Expected Discharge Date Expected Discharge Time    Jun 19, 2024            Demographic Summary       Row Name 06/17/24 162       General Information    Admission Type inpatient    Arrived From  emergency department    Required Notices Provided Important Message from Medicare    Referral Source admission list    Reason for Consult discharge planning    Preferred Language English       Contact Information    Permission Granted to Share Info With family/designee                   Functional Status       Row Name 06/17/24 1729       Employment/    Employment Status retired      Row Name 06/17/24 1621       Functional Status    Usual Activity Tolerance moderate    Current Activity Tolerance moderate       Physical Activity    On average, how many days per week do you engage in moderate to strenuous exercise (like a brisk walk)? 0 days    On average, how many minutes do you engage in exercise at this level? 0 min    Number of minutes of exercise per week 0       Assessment of Health Literacy    How often do you have someone help you read hospital materials? Never    Health Literacy Good       Functional Status, IADL    Medications independent    Meal Preparation independent    Housekeeping independent    Laundry independent    Shopping independent       Mental Status    General Appearance WDL WDL       Mental Status Summary    Recent Changes in Mental Status/Cognitive Functioning no changes                   Psychosocial    No documentation.                  Abuse/Neglect    No documentation.                  Legal    No documentation.                  Substance Abuse    No documentation.                  Patient Forms    No documentation.                     Alana Redd RN

## 2024-06-17 NOTE — PROGRESS NOTES
"   LOS: 2 days   Patient Care Team:  Surekha Mcdonnell MD as PCP - General (Internal Medicine)  Warner Tang MD as Cardiologist (Cardiology)  Sergio Eagle MD as Consulting Physician (Urology)  Juan Negrete MD as Consulting Physician (Endocrinology)  Miguel Angel Barrett DPM as Consulting Physician (Podiatry)  Gabriel Montenegro MD as Consulting Physician (Nephrology)  Linda Rodriguez MD (Ophthalmology)  Ginger Ross APRN as Nurse Practitioner (Nurse Practitioner)  Jr Shawn Abraham MD as Surgeon (Cardiothoracic Surgery)    Chief Complaint: fatigue     Interval History: No CP, so SOA at rest. No O2 requirement.     Objective   Vital Signs  Temp:  [97.9 °F (36.6 °C)-98.1 °F (36.7 °C)] 98 °F (36.7 °C)  Heart Rate:  [66-72] 66  Resp:  [16-18] 18  BP: (111-165)/(46-78) 165/73    Intake/Output Summary (Last 24 hours) at 6/17/2024 1417  Last data filed at 6/17/2024 1411  Gross per 24 hour   Intake 340 ml   Output 2750 ml   Net -2410 ml       Last Weight and Admission Weight        06/15/24  1153   Weight: 91.6 kg (202 lb)     Flowsheet Rows      Flowsheet Row First Filed Value   Admission Height 162.6 cm (64\") Documented at 06/15/2024 1153   Admission Weight 91.6 kg (202 lb) Documented at 06/15/2024 1153            Physical Exam  Constitutional:       General: She is not in acute distress.     Appearance: She is obese.   Cardiovascular:      Rate and Rhythm: Normal rate and regular rhythm.   Pulmonary:      Effort: Pulmonary effort is normal.      Breath sounds: Normal breath sounds.     Results Review:      Results from last 7 days   Lab Units 06/17/24  0457 06/16/24  0425 06/15/24  0904   SODIUM mmol/L 138 135* 131*   POTASSIUM mmol/L 3.1* 3.4* 4.0   CHLORIDE mmol/L 101 96* 96*   CO2 mmol/L 26.0 25.1 20.5*   BUN mg/dL 27* 25* 18   CREATININE mg/dL 1.41* 1.48* 1.24*   GLUCOSE mg/dL 152* 204* 321*   CALCIUM mg/dL 8.1* 8.5* 8.8     Results from last 7 days   Lab Units 06/16/24  0425 " 06/15/24  1215 06/15/24  0904   HSTROP T ng/L 1,337* 550* 571*     Results from last 7 days   Lab Units 06/17/24  0457 06/16/24  0425 06/15/24  0904   WBC 10*3/mm3 8.38 10.31 11.81*   HEMOGLOBIN g/dL 12.3 11.7* 13.5   HEMATOCRIT % 37.3 36.5 38.4   PLATELETS 10*3/mm3 231 221 258       Results from last 7 days   Lab Units 06/15/24  0904   MAGNESIUM mg/dL 2.2     I reviewed the patient's new clinical results.  I personally viewed and interpreted the patient's EKG/Telemetry data        Medication Review:   aspirin, 81 mg, Oral, Daily  atorvastatin, 40 mg, Oral, Daily  cefTRIAXone, 2,000 mg, Intravenous, Q24H  docusate sodium, 100 mg, Oral, BID  empagliflozin, 10 mg, Oral, Daily  hydrALAZINE, 10 mg, Oral, TID  insulin glargine, 25 Units, Subcutaneous, Nightly  insulin lispro, 3-14 Units, Subcutaneous, 4x Daily AC & at Bedtime  isosorbide mononitrate, 60 mg, Oral, Q24H  metoprolol succinate XL, 100 mg, Oral, Q24H  sodium chloride, 10 mL, Intravenous, Q12H  spironolactone, 25 mg, Oral, Daily  torsemide, 80 mg, Oral, BID      Assessment & Plan     1. Acute on chronic HFpEF  2. NSTEMI/multivessel CAD  3. CKD3  4. ? PNA  5. SAWYER  6. Obesity  7. DM2  8. Chronic venous insufficiency and diabetic foot ulcers  9. Mild MS, mean gradient 5mm Hg    Ideally she would have bypass but there is very real concern about her ability to heal given her ongoing issues with her RLE. She doesn't have angina, but definitely presented in acute HFpEF despite being on an excellent outpatient medical regimen of torsemide 80mg BID, dapagliflozin, spironolactone 25mg daily, and semaglutide. She's on metoprolol succinate 100mg daily and sometimes older women with HF don't do as well w high dose BB, but I feel she needs the anti-anginal therapy given the severity of her CAD. She's also on 60mg daily of ISMN, and hydralazine 10mg TID.     I will further discuss her case with Dr Abraham and with Dr Myrick. She would need a lot of bypasses and I don't  think there are any great targets for PCI.     Warner Tang MD  06/17/24  14:17 EDT

## 2024-06-18 LAB
ANION GAP SERPL CALCULATED.3IONS-SCNC: 14.4 MMOL/L (ref 5–15)
BASOPHILS # BLD AUTO: 0.03 10*3/MM3 (ref 0–0.2)
BASOPHILS NFR BLD AUTO: 0.3 % (ref 0–1.5)
BUN SERPL-MCNC: 29 MG/DL (ref 8–23)
BUN/CREAT SERPL: 20.1 (ref 7–25)
CALCIUM SPEC-SCNC: 8.3 MG/DL (ref 8.6–10.5)
CHLORIDE SERPL-SCNC: 97 MMOL/L (ref 98–107)
CO2 SERPL-SCNC: 24.6 MMOL/L (ref 22–29)
CREAT SERPL-MCNC: 1.44 MG/DL (ref 0.57–1)
DEPRECATED RDW RBC AUTO: 42.5 FL (ref 37–54)
EGFRCR SERPLBLD CKD-EPI 2021: 37.3 ML/MIN/1.73
EOSINOPHIL # BLD AUTO: 0.2 10*3/MM3 (ref 0–0.4)
EOSINOPHIL NFR BLD AUTO: 2.1 % (ref 0.3–6.2)
ERYTHROCYTE [DISTWIDTH] IN BLOOD BY AUTOMATED COUNT: 13.8 % (ref 12.3–15.4)
GLUCOSE BLDC GLUCOMTR-MCNC: 231 MG/DL (ref 70–130)
GLUCOSE BLDC GLUCOMTR-MCNC: 235 MG/DL (ref 70–130)
GLUCOSE BLDC GLUCOMTR-MCNC: 257 MG/DL (ref 70–130)
GLUCOSE BLDC GLUCOMTR-MCNC: 303 MG/DL (ref 70–130)
GLUCOSE SERPL-MCNC: 153 MG/DL (ref 65–99)
HCT VFR BLD AUTO: 39.9 % (ref 34–46.6)
HGB BLD-MCNC: 12.9 G/DL (ref 12–15.9)
IMM GRANULOCYTES # BLD AUTO: 0.08 10*3/MM3 (ref 0–0.05)
IMM GRANULOCYTES NFR BLD AUTO: 0.8 % (ref 0–0.5)
LYMPHOCYTES # BLD AUTO: 1.63 10*3/MM3 (ref 0.7–3.1)
LYMPHOCYTES NFR BLD AUTO: 17.3 % (ref 19.6–45.3)
MCH RBC QN AUTO: 27.6 PG (ref 26.6–33)
MCHC RBC AUTO-ENTMCNC: 32.3 G/DL (ref 31.5–35.7)
MCV RBC AUTO: 85.4 FL (ref 79–97)
MONOCYTES # BLD AUTO: 0.84 10*3/MM3 (ref 0.1–0.9)
MONOCYTES NFR BLD AUTO: 8.9 % (ref 5–12)
NEUTROPHILS NFR BLD AUTO: 6.64 10*3/MM3 (ref 1.7–7)
NEUTROPHILS NFR BLD AUTO: 70.6 % (ref 42.7–76)
NRBC BLD AUTO-RTO: 0 /100 WBC (ref 0–0.2)
PLATELET # BLD AUTO: 273 10*3/MM3 (ref 140–450)
PMV BLD AUTO: 10.5 FL (ref 6–12)
POTASSIUM SERPL-SCNC: 3.3 MMOL/L (ref 3.5–5.2)
RBC # BLD AUTO: 4.67 10*6/MM3 (ref 3.77–5.28)
SODIUM SERPL-SCNC: 136 MMOL/L (ref 136–145)
WBC NRBC COR # BLD AUTO: 9.42 10*3/MM3 (ref 3.4–10.8)

## 2024-06-18 PROCEDURE — 80048 BASIC METABOLIC PNL TOTAL CA: CPT | Performed by: HOSPITALIST

## 2024-06-18 PROCEDURE — 99232 SBSQ HOSP IP/OBS MODERATE 35: CPT | Performed by: NURSE PRACTITIONER

## 2024-06-18 PROCEDURE — 85025 COMPLETE CBC W/AUTO DIFF WBC: CPT | Performed by: HOSPITALIST

## 2024-06-18 PROCEDURE — 82948 REAGENT STRIP/BLOOD GLUCOSE: CPT

## 2024-06-18 PROCEDURE — 63710000001 INSULIN LISPRO (HUMAN) PER 5 UNITS: Performed by: INTERNAL MEDICINE

## 2024-06-18 PROCEDURE — 25010000002 CEFTRIAXONE PER 250 MG: Performed by: INTERNAL MEDICINE

## 2024-06-18 PROCEDURE — 63710000001 INSULIN GLARGINE PER 5 UNITS: Performed by: HOSPITALIST

## 2024-06-18 RX ORDER — POTASSIUM CHLORIDE 750 MG/1
20 TABLET, FILM COATED, EXTENDED RELEASE ORAL ONCE
Status: COMPLETED | OUTPATIENT
Start: 2024-06-18 | End: 2024-06-18

## 2024-06-18 RX ADMIN — HYDRALAZINE HYDROCHLORIDE 10 MG: 10 TABLET ORAL at 09:26

## 2024-06-18 RX ADMIN — INSULIN LISPRO 5 UNITS: 100 INJECTION, SOLUTION INTRAVENOUS; SUBCUTANEOUS at 07:01

## 2024-06-18 RX ADMIN — INSULIN LISPRO 5 UNITS: 100 INJECTION, SOLUTION INTRAVENOUS; SUBCUTANEOUS at 21:36

## 2024-06-18 RX ADMIN — TORSEMIDE 80 MG: 20 TABLET ORAL at 09:26

## 2024-06-18 RX ADMIN — ISOSORBIDE MONONITRATE 60 MG: 60 TABLET, EXTENDED RELEASE ORAL at 09:26

## 2024-06-18 RX ADMIN — INSULIN LISPRO 8 UNITS: 100 INJECTION, SOLUTION INTRAVENOUS; SUBCUTANEOUS at 16:44

## 2024-06-18 RX ADMIN — SPIRONOLACTONE 25 MG: 25 TABLET, FILM COATED ORAL at 09:26

## 2024-06-18 RX ADMIN — INSULIN LISPRO 10 UNITS: 100 INJECTION, SOLUTION INTRAVENOUS; SUBCUTANEOUS at 11:58

## 2024-06-18 RX ADMIN — POTASSIUM CHLORIDE 20 MEQ: 750 TABLET, EXTENDED RELEASE ORAL at 09:27

## 2024-06-18 RX ADMIN — INSULIN GLARGINE 40 UNITS: 100 INJECTION, SOLUTION SUBCUTANEOUS at 21:36

## 2024-06-18 RX ADMIN — TORSEMIDE 80 MG: 20 TABLET ORAL at 21:36

## 2024-06-18 RX ADMIN — Medication 10 ML: at 21:37

## 2024-06-18 RX ADMIN — ASPIRIN 81 MG: 81 TABLET, COATED ORAL at 09:26

## 2024-06-18 RX ADMIN — Medication 10 ML: at 09:26

## 2024-06-18 RX ADMIN — CEFTRIAXONE 2000 MG: 2 INJECTION, POWDER, FOR SOLUTION INTRAMUSCULAR; INTRAVENOUS at 16:45

## 2024-06-18 RX ADMIN — EMPAGLIFLOZIN 10 MG: 10 TABLET, FILM COATED ORAL at 09:27

## 2024-06-18 RX ADMIN — METOPROLOL SUCCINATE 100 MG: 100 TABLET, EXTENDED RELEASE ORAL at 09:26

## 2024-06-18 RX ADMIN — HYDRALAZINE HYDROCHLORIDE 10 MG: 10 TABLET ORAL at 16:44

## 2024-06-18 RX ADMIN — HYDRALAZINE HYDROCHLORIDE 10 MG: 10 TABLET ORAL at 21:35

## 2024-06-18 RX ADMIN — DOCUSATE SODIUM 100 MG: 100 CAPSULE, LIQUID FILLED ORAL at 09:27

## 2024-06-18 RX ADMIN — ATORVASTATIN CALCIUM 40 MG: 20 TABLET, FILM COATED ORAL at 21:35

## 2024-06-18 RX ADMIN — DOCUSATE SODIUM 100 MG: 100 CAPSULE, LIQUID FILLED ORAL at 21:38

## 2024-06-18 NOTE — PROGRESS NOTES
"DAILY PROGRESS NOTE  [facility]    Patient Identification:  Name: Denisse Chavez  Age: 78 y.o.  Sex: female  :  1945  MRN: [id number]         Primary Care Physician: Surekha Mcdonnell MD    Subjective:  Interval History: She complains about getting very out of breath just going to the bathroom and is still pretty weak.  She is feeling a little better today.    Objective:    Scheduled Meds:aspirin, 81 mg, Oral, Daily  atorvastatin, 40 mg, Oral, Daily  cefTRIAXone, 2,000 mg, Intravenous, Q24H  docusate sodium, 100 mg, Oral, BID  empagliflozin, 10 mg, Oral, Daily  hydrALAZINE, 10 mg, Oral, TID  insulin glargine, 25 Units, Subcutaneous, Nightly  insulin lispro, 3-14 Units, Subcutaneous, 4x Daily AC & at Bedtime  isosorbide mononitrate, 60 mg, Oral, Q24H  metoprolol succinate XL, 100 mg, Oral, Q24H  sodium chloride, 10 mL, Intravenous, Q12H  spironolactone, 25 mg, Oral, Daily  torsemide, 80 mg, Oral, BID      Continuous Infusions:     Vital signs in last 24 hours:  Temp:  [97.5 °F (36.4 °C)-98.8 °F (37.1 °C)] 98 °F (36.7 °C)  Heart Rate:  [63-70] 70  Resp:  [18] 18  BP: (140-142)/(56-61) 140/56    Intake/Output:    Intake/Output Summary (Last 24 hours) at 2024 1532  Last data filed at 2024 1330  Gross per 24 hour   Intake 240 ml   Output 2800 ml   Net -2560 ml       Exam:  /56 (BP Location: Right arm, Patient Position: Lying)   Pulse 70   Temp 98 °F (36.7 °C) (Oral)   Resp 18   Ht 162.6 cm (64\")   Wt 91.6 kg (202 lb)   SpO2 98%   BMI 34.67 kg/m²     General Appearance:    Alert, cooperative, no distress   Head:    Normocephalic, without obvious abnormality, atraumatic   Eyes:       Throat:   Lips, tongue, gums normal   Neck:   Supple, symmetrical, trachea midline, no JVD   Lungs:     Clear to auscultation bilaterally, respirations unlabored   Chest Wall:    No tenderness or deformity    Heart:    Regular rate and rhythm, S1 and S2 normal, no murmur,no  Rub or gallop "   Abdomen:     Soft, nontender, bowel sounds active, no masses, no organomegaly    Extremities:   Extremities normal, atraumatic, no cyanosis or edema   Pulses:      Skin:   Skin is warm and dry,  no rashes or palpable lesions   Neurologic:   no focal deficits noted      Lab Results (last 72 hours)       Procedure Component Value Units Date/Time    POC Glucose Once [[id number]]  (Abnormal) Collected: 06/16/24 1052    Specimen: Blood Updated: 06/16/24 1053     Glucose 169 mg/dL     POC Glucose Once [[id number]]  (Abnormal) Collected: 06/16/24 0644    Specimen: Blood Updated: 06/16/24 0646     Glucose 192 mg/dL     High Sensitivity Troponin T [[id number]]  (Abnormal) Collected: 06/16/24 0425    Specimen: Blood Updated: 06/16/24 0514     HS Troponin T 1,337 ng/L     Narrative:      High Sensitive Troponin T Reference Range:  <14.0 ng/L- Negative Female for AMI  <22.0 ng/L- Negative Male for AMI  >=14 - Abnormal Female indicating possible myocardial injury.  >=22 - Abnormal Male indicating possible myocardial injury.   Clinicians would have to utilize clinical acumen, EKG, Troponin, and serial changes to determine if it is an Acute Myocardial Infarction or myocardial injury due to an underlying chronic condition.         Basic Metabolic Panel [[id number]]  (Abnormal) Collected: 06/16/24 0425    Specimen: Blood Updated: 06/16/24 0510     Glucose 204 mg/dL      BUN 25 mg/dL      Creatinine 1.48 mg/dL      Sodium 135 mmol/L      Potassium 3.4 mmol/L      Chloride 96 mmol/L      CO2 25.1 mmol/L      Calcium 8.5 mg/dL      BUN/Creatinine Ratio 16.9     Anion Gap 13.9 mmol/L      eGFR 36.1 mL/min/1.73     Narrative:      GFR Normal >60  Chronic Kidney Disease <60  Kidney Failure <15    The GFR formula is only valid for adults with stable renal function between ages 18 and 70.    CBC Auto Differential [[id number]]  (Abnormal) Collected: 06/16/24 0425    Specimen: Blood Updated: 06/16/24 0452     WBC 10.31 10*3/mm3      RBC 4.25  10*6/mm3      Hemoglobin 11.7 g/dL      Hematocrit 36.5 %      MCV 85.9 fL      MCH 27.5 pg      MCHC 32.1 g/dL      RDW 14.2 %      RDW-SD 44.2 fl      MPV 10.5 fL      Platelets 221 10*3/mm3      Neutrophil % 78.7 %      Lymphocyte % 12.3 %      Monocyte % 7.4 %      Eosinophil % 0.9 %      Basophil % 0.2 %      Immature Grans % 0.5 %      Neutrophils, Absolute 8.12 10*3/mm3      Lymphocytes, Absolute 1.27 10*3/mm3      Monocytes, Absolute 0.76 10*3/mm3      Eosinophils, Absolute 0.09 10*3/mm3      Basophils, Absolute 0.02 10*3/mm3      Immature Grans, Absolute 0.05 10*3/mm3      nRBC 0.0 /100 WBC     Blood Culture - Blood, Arm, Right [[id number]] Collected: 06/15/24 2111    Specimen: Blood from Arm, Right Updated: 06/15/24 2118    Blood Culture - Blood, Arm, Left [[id number]] Collected: 06/15/24 2110    Specimen: Blood from Arm, Left Updated: 06/15/24 2117    POC Glucose Once [[id number]]  (Abnormal) Collected: 06/15/24 2105    Specimen: Blood Updated: 06/15/24 2106     Glucose 195 mg/dL     S. Pneumo Ag Urine or CSF - Urine, Urine, Clean Catch [[id number]]  (Normal) Collected: 06/15/24 1644    Specimen: Urine, Clean Catch Updated: 06/15/24 1848     Strep Pneumo Ag Negative    Legionella Antigen, Urine - Urine, Urine, Clean Catch [[id number]]  (Normal) Collected: 06/15/24 1644    Specimen: Urine, Clean Catch Updated: 06/15/24 1847     LEGIONELLA ANTIGEN, URINE Negative    POC Glucose Once [[id number]]  (Abnormal) Collected: 06/15/24 1814    Specimen: Blood Updated: 06/15/24 1816     Glucose 388 mg/dL     POC Glucose Once [[id number]]  (Abnormal) Collected: 06/15/24 1549    Specimen: Blood Updated: 06/15/24 1554     Glucose 451 mg/dL     POC Glucose Once [[id number]]  (Abnormal) Collected: 06/15/24 1546    Specimen: Blood Updated: 06/15/24 1554     Glucose 422 mg/dL     Hemoglobin A1c [[id number]]  (Abnormal) Collected: 06/15/24 0904    Specimen: Blood Updated: 06/15/24 1541     Hemoglobin A1C 8.40 %     Narrative:       Hemoglobin A1C Ranges:    Increased Risk for Diabetes  5.7% to 6.4%  Diabetes                     >= 6.5%  Diabetic Goal                < 7.0%    High Sensitivity Troponin T 2Hr [[id number]]  (Abnormal) Collected: 06/15/24 1215    Specimen: Blood Updated: 06/15/24 1312     HS Troponin T 550 ng/L      Troponin T Delta -21 ng/L     Narrative:      High Sensitive Troponin T Reference Range:  <14.0 ng/L- Negative Female for AMI  <22.0 ng/L- Negative Male for AMI  >=14 - Abnormal Female indicating possible myocardial injury.  >=22 - Abnormal Male indicating possible myocardial injury.   Clinicians would have to utilize clinical acumen, EKG, Troponin, and serial changes to determine if it is an Acute Myocardial Infarction or myocardial injury due to an underlying chronic condition.         STAT Lactic Acid, Reflex [[id number]]  (Normal) Collected: 06/15/24 1215    Specimen: Blood Updated: 06/15/24 1304     Lactate 1.7 mmol/L     Urinalysis, Microscopic Only - Urine, Clean Catch [[id number]]  (Abnormal) Collected: 06/15/24 1234    Specimen: Urine, Clean Catch Updated: 06/15/24 1249     RBC, UA 0-2 /HPF      WBC, UA 0-2 /HPF      Bacteria, UA None Seen /HPF      Squamous Epithelial Cells, UA 3-6 /HPF      Hyaline Casts, UA 7-12 /LPF      Methodology Automated Microscopy    Urinalysis With Microscopic If Indicated (No Culture) - Urine, Clean Catch [[id number]]  (Abnormal) Collected: 06/15/24 1234    Specimen: Urine, Clean Catch Updated: 06/15/24 1248     Color, UA Yellow     Appearance, UA Clear     pH, UA 5.5     Specific Gravity, UA 1.017     Glucose, UA >=1000 mg/dL (3+)     Ketones, UA 15 mg/dL (1+)     Bilirubin, UA Negative     Blood, UA Trace     Protein,  mg/dL (2+)     Leuk Esterase, UA Negative     Nitrite, UA Negative     Urobilinogen, UA 1.0 E.U./dL    Respiratory Panel PCR w/COVID-19(SARS-CoV-2) FAUSTO/EFREN/MIRTHA/PAD/COR/ALTAGRACIA In-House, NP Swab in UTM/VTM, 2 HR TAT - Swab, Nasopharynx [[id number]]  (Normal) Collected:  06/15/24 0905    Specimen: Swab from Nasopharynx Updated: 06/15/24 1003     ADENOVIRUS, PCR Not Detected     Coronavirus 229E Not Detected     Coronavirus HKU1 Not Detected     Coronavirus NL63 Not Detected     Coronavirus OC43 Not Detected     COVID19 Not Detected     Human Metapneumovirus Not Detected     Human Rhinovirus/Enterovirus Not Detected     Influenza A PCR Not Detected     Influenza B PCR Not Detected     Parainfluenza Virus 1 Not Detected     Parainfluenza Virus 2 Not Detected     Parainfluenza Virus 3 Not Detected     Parainfluenza Virus 4 Not Detected     RSV, PCR Not Detected     Bordetella pertussis pcr Not Detected     Bordetella parapertussis PCR Not Detected     Chlamydophila pneumoniae PCR Not Detected     Mycoplasma pneumo by PCR Not Detected    Narrative:      In the setting of a positive respiratory panel with a viral infection PLUS a negative procalcitonin without other underlying concern for bacterial infection, consider observing off antibiotics or discontinuation of antibiotics and continue supportive care. If the respiratory panel is positive for atypical bacterial infection (Bordetella pertussis, Chlamydophila pneumoniae, or Mycoplasma pneumoniae), consider antibiotic de-escalation to target atypical bacterial infection.    Lactic Acid, Plasma [[id number]]  (Abnormal) Collected: 06/15/24 0904    Specimen: Blood Updated: 06/15/24 0948     Lactate 2.1 mmol/L     High Sensitivity Troponin T [[id number]]  (Abnormal) Collected: 06/15/24 0904    Specimen: Blood Updated: 06/15/24 0947     HS Troponin T 571 ng/L     Narrative:      High Sensitive Troponin T Reference Range:  <14.0 ng/L- Negative Female for AMI  <22.0 ng/L- Negative Male for AMI  >=14 - Abnormal Female indicating possible myocardial injury.  >=22 - Abnormal Male indicating possible myocardial injury.   Clinicians would have to utilize clinical acumen, EKG, Troponin, and serial changes to determine if it is an Acute Myocardial  Infarction or myocardial injury due to an underlying chronic condition.         Extra Tubes [[id number]] Collected: 06/15/24 0904    Specimen: Blood, Venous Line Updated: 06/15/24 0945    Narrative:      The following orders were created for panel order Extra Tubes.  Procedure                               Abnormality         Status                     ---------                               -----------         ------                     Red Top[[id number]]                                          Final result               Light Blue Top[[id number]]                                   Final result                 Please view results for these tests on the individual orders.    Red Top [[id number]] Collected: 06/15/24 0904    Specimen: Blood Updated: 06/15/24 0945     Extra Tube Hold for add-ons.     Comment: Auto resulted.       Light Blue Top [[id number]] Collected: 06/15/24 0904    Specimen: Blood Updated: 06/15/24 0945     Extra Tube Hold for add-ons.     Comment: Auto resulted       BNP [[id number]]  (Abnormal) Collected: 06/15/24 0904    Specimen: Blood Updated: 06/15/24 0942     proBNP 5,512.0 pg/mL     Narrative:      This assay is used as an aid in the diagnosis of individuals suspected of having heart failure. It can be used as an aid in the diagnosis of acute decompensated heart failure (ADHF) in patients presenting with signs and symptoms of ADHF to the emergency department (ED). In addition, NT-proBNP of <300 pg/mL indicates ADHF is not likely.    Age Range Result Interpretation  NT-proBNP Concentration (pg/mL:      <50             Positive            >450                   Gray                 300-450                    Negative             <300    50-75           Positive            >900                  Gray                300-900                  Negative            <300      >75             Positive            >1800                  Gray                300-1800                  Negative            <300     "Procalcitonin [[id number]]  (Abnormal) Collected: 06/15/24 0904    Specimen: Blood Updated: 06/15/24 0942     Procalcitonin 0.49 ng/mL     Narrative:      As a Marker for Sepsis (Non-Neonates):    1. <0.5 ng/mL represents a low risk of severe sepsis and/or septic shock.  2. >2 ng/mL represents a high risk of severe sepsis and/or septic shock.    As a Marker for Lower Respiratory Tract Infections that require antibiotic therapy:    PCT on Admission    Antibiotic Therapy       6-12 Hrs later    >0.5                Strongly Recommended  >0.25 - <0.5        Recommended   0.1 - 0.25          Discouraged              Remeasure/reassess PCT  <0.1                Strongly Discouraged     Remeasure/reassess PCT    As 28 day mortality risk marker: \"Change in Procalcitonin Result\" (>80% or <=80%) if Day 0 (or Day 1) and Day 4 values are available. Refer to http://www.[employer] Comp[employer]-calculator.com    Change in PCT <=80%  A decrease of PCT levels below or equal to 80% defines a positive change in PCT test result representing a higher risk for 28-day all-cause mortality of patients diagnosed with severe sepsis for septic shock.    Change in PCT >80%  A decrease of PCT levels of more than 80% defines a negative change in PCT result representing a lower risk for 28-day all-cause mortality of patients diagnosed with severe sepsis or septic shock.       Basic Metabolic Panel [[id number]]  (Abnormal) Collected: 06/15/24 0904    Specimen: Blood Updated: 06/15/24 0936     Glucose 321 mg/dL      BUN 18 mg/dL      Creatinine 1.24 mg/dL      Sodium 131 mmol/L      Potassium 4.0 mmol/L      Chloride 96 mmol/L      CO2 20.5 mmol/L      Calcium 8.8 mg/dL      BUN/Creatinine Ratio 14.5     Anion Gap 14.5 mmol/L      eGFR 44.6 mL/min/1.73     Narrative:      GFR Normal >60  Chronic Kidney Disease <60  Kidney Failure <15    The GFR formula is only valid for adults with stable renal function between ages 18 and 70.    Magnesium [[id number]]  (Normal) " Collected: 06/15/24 0904    Specimen: Blood Updated: 06/15/24 0936     Magnesium 2.2 mg/dL     CBC & Differential [[id number]]  (Abnormal) Collected: 06/15/24 0904    Specimen: Blood Updated: 06/15/24 0919    Narrative:      The following orders were created for panel order CBC & Differential.  Procedure                               Abnormality         Status                     ---------                               -----------         ------                     CBC Auto Differential[[id number]]        Abnormal            Final result                 Please view results for these tests on the individual orders.    CBC Auto Differential [[id number]]  (Abnormal) Collected: 06/15/24 0904    Specimen: Blood Updated: 06/15/24 0919     WBC 11.81 10*3/mm3      RBC 4.49 10*6/mm3      Hemoglobin 13.5 g/dL      Hematocrit 38.4 %      MCV 85.5 fL      MCH 30.1 pg      MCHC 35.2 g/dL      RDW 13.7 %      RDW-SD 42.1 fl      MPV 10.4 fL      Platelets 258 10*3/mm3      Neutrophil % 87.8 %      Lymphocyte % 5.7 %      Monocyte % 6.0 %      Eosinophil % 0.0 %      Basophil % 0.1 %      Immature Grans % 0.4 %      Neutrophils, Absolute 10.37 10*3/mm3      Lymphocytes, Absolute 0.67 10*3/mm3      Monocytes, Absolute 0.71 10*3/mm3      Eosinophils, Absolute 0.00 10*3/mm3      Basophils, Absolute 0.01 10*3/mm3      Immature Grans, Absolute 0.05 10*3/mm3      nRBC 0.0 /100 WBC           Data Review:  Results from last 7 days   Lab Units 06/18/24 0426 06/17/24 0457 06/16/24 0425   SODIUM mmol/L 136 138 135*   POTASSIUM mmol/L 3.3* 3.1* 3.4*   CHLORIDE mmol/L 97* 101 96*   CO2 mmol/L 24.6 26.0 25.1   BUN mg/dL 29* 27* 25*   CREATININE mg/dL 1.44* 1.41* 1.48*   GLUCOSE mg/dL 153* 152* 204*   CALCIUM mg/dL 8.3* 8.1* 8.5*     Results from last 7 days   Lab Units 06/18/24 0426 06/17/24 0457 06/16/24 0425   WBC 10*3/mm3 9.42 8.38 10.31   HEMOGLOBIN g/dL 12.9 12.3 11.7*   HEMATOCRIT % 39.9 37.3 36.5   PLATELETS 10*3/mm3 273 231 221        "  Results from last 7 days   Lab Units 06/15/24  0904   HEMOGLOBIN A1C % 8.40*     Lab Results   Lab Value Date/Time    TROPONINT 1,337 (C) 06/16/2024 0425    TROPONINT 550 (C) 06/15/2024 1215    TROPONINT 571 (C) 06/15/2024 0904               Invalid input(s): \"PROT\", \"LABALBU\"      Results from last 7 days   Lab Units 06/15/24  0904   HEMOGLOBIN A1C % 8.40*     Glucose   Date/Time Value Ref Range Status   06/18/2024 1047 303 (H) 70 - 130 mg/dL Final   06/18/2024 0653 235 (H) 70 - 130 mg/dL Final   06/17/2024 2234 223 (H) 70 - 130 mg/dL Final   06/17/2024 1637 275 (H) 70 - 130 mg/dL Final   06/17/2024 1129 269 (H) 70 - 130 mg/dL Final   06/17/2024 0647 168 (H) 70 - 130 mg/dL Final   06/16/2024 2038 209 (H) 70 - 130 mg/dL Final   06/16/2024 1553 210 (H) 70 - 130 mg/dL Final           Past Medical History:   Diagnosis Date    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  PET 6/2016 with small-medium sized lateral infarct, no ischemia.  This wall motion abnormality was seen on echo as well.    Cellulitis 07/2018    RIGHT LEG    Chronic kidney disease, stage III (moderate) 10/13/2016    Chronic venous insufficiency     Hyperlipidemia     Hypertension     Hypertriglyceridemia 9/29/2021    Low back pain     Mass of ovary 3/30/2016    Obesity (BMI 30-39.9) 12/22/2019    Sleep apnea     on CPAP.  Dr. Mueller    Spinal stenosis     Type 2 diabetes mellitus     Uterine leiomyoma 3/30/2016       Assessment:  Active Hospital Problems    Diagnosis  POA    **NSTEMI (non-ST elevated myocardial infarction) [I21.4]  Yes    Acute on chronic heart failure with preserved ejection fraction (HFpEF) [I50.33]  Unknown    Dyspnea [R06.00]  Unknown    Elevated brain natriuretic peptide (BNP) level [R79.89]  Unknown    PNA (pneumonia) [J18.9]  Unknown    Hypertriglyceridemia [E78.1]  Yes    CAD (coronary artery disease) " [I25.10]  Yes    Chronic venous insufficiency [I87.2]  Yes    Stage 3 chronic kidney disease [N18.30]  Yes    SAWYER on autoCPAP [G47.33]  Yes    Type 2 diabetes mellitus, with long-term current use of insulin [E11.9, Z79.4]  Not Applicable    Hyperlipidemia [E78.5]  Yes    Essential hypertension [I10]  Yes      Resolved Hospital Problems   No resolved problems to display.       Plan:  Continue on current medications.  Follow-up on labs and cultures.  Continue with antibiotics for pneumonia.  Results of heart cath noted showing multi-vessel disease with left main..  Glycemic controlle.  Will increase to 40 of Lantus at bedtime and continue with sliding scale insulin.  Discussed with the nurse.    Maikel Ladd MD  6/18/2024  15:32 EDT

## 2024-06-18 NOTE — PROGRESS NOTES
"[facility]    Patient Name: Denisse Chavez  :1945  78 y.o.  LOS: 3  Encounter Provider: MEREDITH Gipson      Patient Care Team:  Surekha Mcdonnell MD as PCP - General (Internal Medicine)  Warner Tang MD as Cardiologist (Cardiology)  Sergio Eagle MD as Consulting Physician (Urology)  Juan Negrete MD as Consulting Physician (Endocrinology)  Miguel Angel Barrett DPM as Consulting Physician (Podiatry)  Gabriel Montenegro MD as Consulting Physician (Nephrology)  Linda Rodriguez MD (Ophthalmology)  Ginger Ross APRN as Nurse Practitioner (Nurse Practitioner)  Jr Shawn Abraham MD as Surgeon (Cardiothoracic Surgery)    Chief Complaint:  Coronary artery disease    Interval History: Sitting at side of bed eating lunch. Son at bedside. He and patient are concerned about elevated blood sugars.        Objective   Vital Signs  Temp:  [97.5 °F (36.4 °C)-98.8 °F (37.1 °C)] 98.4 °F (36.9 °C)  Heart Rate:  [63-66] 64  Resp:  [18] 18  BP: (140-165)/(56-73) 142/61    Intake/Output Summary (Last 24 hours) at 2024 0838  Last data filed at 2024 0752  Gross per 24 hour   Intake 480 ml   Output 1850 ml   Net -1370 ml     Flowsheet Rows      Flowsheet Row First Filed Value   Admission Height 162.6 cm (64\") Documented at 06/15/2024 1153   Admission Weight 91.6 kg (202 lb) Documented at 06/15/2024 1153              Vitals reviewed.   Constitutional:       General: Not in acute distress.     Appearance: Well-developed. Not diaphoretic.   HENT:      Head: Normocephalic.   Pulmonary:      Effort: Pulmonary effort is normal. No respiratory distress.      Breath sounds: Normal breath sounds. No wheezing. No rhonchi. No rales.   Cardiovascular:      Normal rate. Regular rhythm.      Murmurs: There is no murmur.   Pulses:     Radial: 2+ bilaterally.  Edema:     Peripheral edema present.     Pretibial: bilateral trace edema of the pretibial area.     Ankle: " "bilateral trace edema of the ankle.     Feet: bilateral trace edema of the feet.  Skin:     General: Skin is warm and dry. There is no cyanosis.      Findings: No rash.   Neurological:      Mental Status: Alert and oriented to person, place, and time.   Psychiatric:         Behavior: Behavior normal.         Thought Content: Thought content normal.         Judgment: Judgment normal.           Pertinent Test Results:  Results from last 7 days   Lab Units 06/18/24  0426 06/17/24  0457 06/16/24  0425 06/15/24  0904   SODIUM mmol/L 136 138 135* 131*   POTASSIUM mmol/L 3.3* 3.1* 3.4* 4.0   CHLORIDE mmol/L 97* 101 96* 96*   CO2 mmol/L 24.6 26.0 25.1 20.5*   BUN mg/dL 29* 27* 25* 18   CREATININE mg/dL 1.44* 1.41* 1.48* 1.24*   GLUCOSE mg/dL 153* 152* 204* 321*   CALCIUM mg/dL 8.3* 8.1* 8.5* 8.8     Results from last 7 days   Lab Units 06/16/24  0425 06/15/24  1215 06/15/24  0904   HSTROP T ng/L 1,337* 550* 571*     Results from last 7 days   Lab Units 06/18/24  0426 06/17/24  0457 06/16/24  0425 06/15/24  0904   WBC 10*3/mm3 9.42 8.38 10.31 11.81*   HEMOGLOBIN g/dL 12.9 12.3 11.7* 13.5   HEMATOCRIT % 39.9 37.3 36.5 38.4   PLATELETS 10*3/mm3 273 231 221 258         Results from last 7 days   Lab Units 06/15/24  0904   MAGNESIUM mg/dL 2.2           Invalid input(s): \"LDLCALC\"  Results from last 7 days   Lab Units 06/15/24  0904   PROBNP pg/mL 5,512.0*               Medication Review:   aspirin, 81 mg, Oral, Daily  atorvastatin, 40 mg, Oral, Daily  cefTRIAXone, 2,000 mg, Intravenous, Q24H  docusate sodium, 100 mg, Oral, BID  empagliflozin, 10 mg, Oral, Daily  hydrALAZINE, 10 mg, Oral, TID  insulin glargine, 25 Units, Subcutaneous, Nightly  insulin lispro, 3-14 Units, Subcutaneous, 4x Daily AC & at Bedtime  isosorbide mononitrate, 60 mg, Oral, Q24H  metoprolol succinate XL, 100 mg, Oral, Q24H  sodium chloride, 10 mL, Intravenous, Q12H  spironolactone, 25 mg, Oral, Daily  torsemide, 80 mg, Oral, BID              Assessment & " Plan     Active Hospital Problems    Diagnosis  POA    **NSTEMI (non-ST elevated myocardial infarction) [I21.4]  Yes    Acute on chronic heart failure with preserved ejection fraction (HFpEF) [I50.33]  Unknown    Dyspnea [R06.00]  Unknown    Elevated brain natriuretic peptide (BNP) level [R79.89]  Unknown    PNA (pneumonia) [J18.9]  Unknown    Hypertriglyceridemia [E78.1]  Yes    CAD (coronary artery disease) [I25.10]  Yes    Chronic venous insufficiency [I87.2]  Yes    Stage 3 chronic kidney disease [N18.30]  Yes    SAWYER on autoCPAP [G47.33]  Yes    Type 2 diabetes mellitus, with long-term current use of insulin [E11.9, Z79.4]  Not Applicable    Hyperlipidemia [E78.5]  Yes    Essential hypertension [I10]  Yes      Resolved Hospital Problems   No resolved problems to display.        1. Acute on chronic HFpEF. On GDMT with Jardiance, Toprol XL, spironolactone and torsemide. Volume status has improved.  2. NSTEMI/multivessel CAD. CT surgery following for possible CABG vs. PCI. Concern for healing due to chronic venous stasis and poorly controlled diabetes.  3. CKD3. Renal function stable. Potassium low. Will give 20 mEq today and start replacement protocol.  4. ? PNA. Remains on Ceftriaxone  5. SAWYER  6. Obesity  7. DM2  8. Chronic venous insufficiency and diabetic foot ulcers  9. Mild MS, mean gradient 5mm Hg           MEREDITH Gipson  [facility]   [facility]  [address] [place] [address]  Office: [phone]    06/18/24  08:38 EDT

## 2024-06-18 NOTE — PROGRESS NOTES
" LOS: 3 days   Patient Care Team:  Surekha Mcdonnell MD as PCP - General (Internal Medicine)  Warner Tang MD as Cardiologist (Cardiology)  Sergio Eagle MD as Consulting Physician (Urology)  Juan Negrete MD as Consulting Physician (Endocrinology)  Miguel Angel Barrett DPM as Consulting Physician (Podiatry)  Gabriel Montenegro MD as Consulting Physician (Nephrology)  Linda Rodriguez MD (Ophthalmology)  Ginger Ross APRN as Nurse Practitioner (Nurse Practitioner)  Jr Shawn Abraham MD as Surgeon (Cardiothoracic Surgery)    Chief Complaint:   CAD, CABG evaluation    Subjective  Resting comfortably this AM. Breathing is feeling better.     Vital Signs  Temp:  [97.5 °F (36.4 °C)-98.8 °F (37.1 °C)] 98.4 °F (36.9 °C)  Heart Rate:  [63-66] 64  Resp:  [18] 18  BP: (140-165)/(56-73) 142/61      06/15/24  1153   Weight: 91.6 kg (202 lb)     Body mass index is 34.67 kg/m².    Intake/Output Summary (Last 24 hours) at 6/18/2024 0911  Last data filed at 6/18/2024 0903  Gross per 24 hour   Intake 480 ml   Output 2750 ml   Net -2270 ml     I/O this shift:  In: -   Out: 1300 [Urine:1300]    Objective:  Vital signs: (most recent): Blood pressure 142/61, pulse 64, temperature 98.4 °F (36.9 °C), temperature source Oral, resp. rate 18, height 162.6 cm (64\"), weight 91.6 kg (202 lb), SpO2 99%.                Physical Exam:   General Appearance: awake and alert, no acute distress   Lungs: respirations regular, respirations unlabored, and diminished   Heart: regular rhythm & normal rate, normal S1, S2, and no murmur , +LE edema   Abdomen: soft or nontender, + bowel sounds    Skin: warm and dry, right foot bandage    Neuro: alert and oriented, no focal deficits.     Results Review:      WBC WBC   Date Value Ref Range Status   06/18/2024 9.42 3.40 - 10.80 10*3/mm3 Final   06/17/2024 8.38 3.40 - 10.80 10*3/mm3 Final   06/16/2024 10.31 3.40 - 10.80 10*3/mm3 Final      HGB Hemoglobin   Date Value Ref Range " "Status   06/18/2024 12.9 12.0 - 15.9 g/dL Final   06/17/2024 12.3 12.0 - 15.9 g/dL Final   06/16/2024 11.7 (L) 12.0 - 15.9 g/dL Final      HCT Hematocrit   Date Value Ref Range Status   06/18/2024 39.9 34.0 - 46.6 % Final   06/17/2024 37.3 34.0 - 46.6 % Final   06/16/2024 36.5 34.0 - 46.6 % Final      Platelets Platelets   Date Value Ref Range Status   06/18/2024 273 140 - 450 10*3/mm3 Final   06/17/2024 231 140 - 450 10*3/mm3 Final   06/16/2024 221 140 - 450 10*3/mm3 Final        PT/INR:  No results found for: \"PROTIME\"/No results found for: \"INR\"    Sodium Sodium   Date Value Ref Range Status   06/18/2024 136 136 - 145 mmol/L Final   06/17/2024 138 136 - 145 mmol/L Final   06/16/2024 135 (L) 136 - 145 mmol/L Final      Potassium Potassium   Date Value Ref Range Status   06/18/2024 3.3 (L) 3.5 - 5.2 mmol/L Final   06/17/2024 3.1 (L) 3.5 - 5.2 mmol/L Final   06/16/2024 3.4 (L) 3.5 - 5.2 mmol/L Final      Chloride Chloride   Date Value Ref Range Status   06/18/2024 97 (L) 98 - 107 mmol/L Final   06/17/2024 101 98 - 107 mmol/L Final   06/16/2024 96 (L) 98 - 107 mmol/L Final      Bicarbonate CO2   Date Value Ref Range Status   06/18/2024 24.6 22.0 - 29.0 mmol/L Final   06/17/2024 26.0 22.0 - 29.0 mmol/L Final   06/16/2024 25.1 22.0 - 29.0 mmol/L Final      BUN BUN   Date Value Ref Range Status   06/18/2024 29 (H) 8 - 23 mg/dL Final   06/17/2024 27 (H) 8 - 23 mg/dL Final   06/16/2024 25 (H) 8 - 23 mg/dL Final      Creatinine Creatinine   Date Value Ref Range Status   06/18/2024 1.44 (H) 0.57 - 1.00 mg/dL Final   06/17/2024 1.41 (H) 0.57 - 1.00 mg/dL Final   06/16/2024 1.48 (H) 0.57 - 1.00 mg/dL Final      Calcium Calcium   Date Value Ref Range Status   06/18/2024 8.3 (L) 8.6 - 10.5 mg/dL Final   06/17/2024 8.1 (L) 8.6 - 10.5 mg/dL Final   06/16/2024 8.5 (L) 8.6 - 10.5 mg/dL Final      Magnesium No results found for: \"MG\"     aspirin, 81 mg, Oral, Daily  atorvastatin, 40 mg, Oral, Daily  cefTRIAXone, 2,000 mg, " Intravenous, Q24H  docusate sodium, 100 mg, Oral, BID  empagliflozin, 10 mg, Oral, Daily  hydrALAZINE, 10 mg, Oral, TID  insulin glargine, 25 Units, Subcutaneous, Nightly  insulin lispro, 3-14 Units, Subcutaneous, 4x Daily AC & at Bedtime  isosorbide mononitrate, 60 mg, Oral, Q24H  metoprolol succinate XL, 100 mg, Oral, Q24H  potassium chloride, 20 mEq, Oral, Once  sodium chloride, 10 mL, Intravenous, Q12H  spironolactone, 25 mg, Oral, Daily  torsemide, 80 mg, Oral, BID             NSTEMI (non-ST elevated myocardial infarction)    Type 2 diabetes mellitus, with long-term current use of insulin    Hyperlipidemia    Essential hypertension    SAWYER on autoCPAP    Stage 3 chronic kidney disease    Chronic venous insufficiency    CAD (coronary artery disease)    Hypertriglyceridemia    Dyspnea    Elevated brain natriuretic peptide (BNP) level    PNA (pneumonia)    Acute on chronic heart failure with preserved ejection fraction (HFpEF)      Assessment & Plan    Severe 3 vessel CAD with significant left main disease  NSTEMI  Acute on chronic HFpEF---EF 56-60% (TTE)  HTN  HLD  Uncontrolled insulin dependent DM type 2---Hgb A1c 8.4, on Ozempic   SAWYER---uses CPAP  CKD stage 3  Chronic venous insufficiency   Community acquired pneumonia---on Rocephin  Scattered subcentimeter pulmonary nodules bilaterally---rec follow-up chest CT 12 months   Former tobacco abuse---quit 1970  Arthritis  Spinal stenosis  Obesity---BMI 34.67  Foot wound-- callus removed by podiatrist, has been bleeding    Carotid duplex: less than 50% stenosis bilat  Vein mapping: adequate    Patient seen and examined. CABG evaluation ongoing. Definite concern for infection with lower extremity wound and poorly controlled diabetes. Will discuss with Dr. Abraham.     Guerrero Hoyt PA-C  06/18/24  09:11 EDT

## 2024-06-19 ENCOUNTER — APPOINTMENT (OUTPATIENT)
Dept: GENERAL RADIOLOGY | Facility: HOSPITAL | Age: 79
DRG: 321 | End: 2024-06-19
Payer: MEDICARE

## 2024-06-19 LAB
ANION GAP SERPL CALCULATED.3IONS-SCNC: 15.8 MMOL/L (ref 5–15)
BASOPHILS # BLD AUTO: 0.05 10*3/MM3 (ref 0–0.2)
BASOPHILS NFR BLD AUTO: 0.4 % (ref 0–1.5)
BUN SERPL-MCNC: 31 MG/DL (ref 8–23)
BUN/CREAT SERPL: 21.4 (ref 7–25)
CALCIUM SPEC-SCNC: 8.7 MG/DL (ref 8.6–10.5)
CHLORIDE SERPL-SCNC: 99 MMOL/L (ref 98–107)
CO2 SERPL-SCNC: 24.2 MMOL/L (ref 22–29)
CREAT SERPL-MCNC: 1.45 MG/DL (ref 0.57–1)
DEPRECATED RDW RBC AUTO: 44.3 FL (ref 37–54)
EGFRCR SERPLBLD CKD-EPI 2021: 37 ML/MIN/1.73
EOSINOPHIL # BLD AUTO: 0.24 10*3/MM3 (ref 0–0.4)
EOSINOPHIL NFR BLD AUTO: 1.9 % (ref 0.3–6.2)
ERYTHROCYTE [DISTWIDTH] IN BLOOD BY AUTOMATED COUNT: 14.1 % (ref 12.3–15.4)
GLUCOSE BLDC GLUCOMTR-MCNC: 194 MG/DL (ref 70–130)
GLUCOSE BLDC GLUCOMTR-MCNC: 211 MG/DL (ref 70–130)
GLUCOSE BLDC GLUCOMTR-MCNC: 246 MG/DL (ref 70–130)
GLUCOSE BLDC GLUCOMTR-MCNC: 333 MG/DL (ref 70–130)
GLUCOSE SERPL-MCNC: 201 MG/DL (ref 65–99)
HCT VFR BLD AUTO: 40.8 % (ref 34–46.6)
HGB BLD-MCNC: 13.3 G/DL (ref 12–15.9)
IMM GRANULOCYTES # BLD AUTO: 0.16 10*3/MM3 (ref 0–0.05)
IMM GRANULOCYTES NFR BLD AUTO: 1.3 % (ref 0–0.5)
LYMPHOCYTES # BLD AUTO: 1.82 10*3/MM3 (ref 0.7–3.1)
LYMPHOCYTES NFR BLD AUTO: 14.2 % (ref 19.6–45.3)
MAGNESIUM SERPL-MCNC: 2.4 MG/DL (ref 1.6–2.4)
MCH RBC QN AUTO: 28.1 PG (ref 26.6–33)
MCHC RBC AUTO-ENTMCNC: 32.6 G/DL (ref 31.5–35.7)
MCV RBC AUTO: 86.1 FL (ref 79–97)
MONOCYTES # BLD AUTO: 0.86 10*3/MM3 (ref 0.1–0.9)
MONOCYTES NFR BLD AUTO: 6.7 % (ref 5–12)
NEUTROPHILS NFR BLD AUTO: 75.5 % (ref 42.7–76)
NEUTROPHILS NFR BLD AUTO: 9.67 10*3/MM3 (ref 1.7–7)
NRBC BLD AUTO-RTO: 0 /100 WBC (ref 0–0.2)
PLATELET # BLD AUTO: 328 10*3/MM3 (ref 140–450)
PMV BLD AUTO: 10.3 FL (ref 6–12)
POTASSIUM SERPL-SCNC: 3.4 MMOL/L (ref 3.5–5.2)
RBC # BLD AUTO: 4.74 10*6/MM3 (ref 3.77–5.28)
SODIUM SERPL-SCNC: 139 MMOL/L (ref 136–145)
WBC NRBC COR # BLD AUTO: 12.8 10*3/MM3 (ref 3.4–10.8)

## 2024-06-19 PROCEDURE — 02C23ZZ EXTIRPATION OF MATTER FROM CORONARY ARTERY, THREE ARTERIES, PERCUTANEOUS APPROACH: ICD-10-PCS | Performed by: STUDENT IN AN ORGANIZED HEALTH CARE EDUCATION/TRAINING PROGRAM

## 2024-06-19 PROCEDURE — 85025 COMPLETE CBC W/AUTO DIFF WBC: CPT | Performed by: HOSPITALIST

## 2024-06-19 PROCEDURE — 63710000001 INSULIN GLARGINE PER 5 UNITS: Performed by: STUDENT IN AN ORGANIZED HEALTH CARE EDUCATION/TRAINING PROGRAM

## 2024-06-19 PROCEDURE — 63710000001 INSULIN LISPRO (HUMAN) PER 5 UNITS: Performed by: INTERNAL MEDICINE

## 2024-06-19 PROCEDURE — 83735 ASSAY OF MAGNESIUM: CPT | Performed by: NURSE PRACTITIONER

## 2024-06-19 PROCEDURE — 82948 REAGENT STRIP/BLOOD GLUCOSE: CPT

## 2024-06-19 PROCEDURE — 027337Z DILATION OF CORONARY ARTERY, FOUR OR MORE ARTERIES WITH FOUR OR MORE DRUG-ELUTING INTRALUMINAL DEVICES, PERCUTANEOUS APPROACH: ICD-10-PCS | Performed by: STUDENT IN AN ORGANIZED HEALTH CARE EDUCATION/TRAINING PROGRAM

## 2024-06-19 PROCEDURE — 80048 BASIC METABOLIC PNL TOTAL CA: CPT | Performed by: NURSE PRACTITIONER

## 2024-06-19 PROCEDURE — 73630 X-RAY EXAM OF FOOT: CPT

## 2024-06-19 PROCEDURE — B2111ZZ FLUOROSCOPY OF MULTIPLE CORONARY ARTERIES USING LOW OSMOLAR CONTRAST: ICD-10-PCS | Performed by: STUDENT IN AN ORGANIZED HEALTH CARE EDUCATION/TRAINING PROGRAM

## 2024-06-19 PROCEDURE — 99232 SBSQ HOSP IP/OBS MODERATE 35: CPT | Performed by: NURSE PRACTITIONER

## 2024-06-19 PROCEDURE — 63710000001 INSULIN LISPRO (HUMAN) PER 5 UNITS: Performed by: STUDENT IN AN ORGANIZED HEALTH CARE EDUCATION/TRAINING PROGRAM

## 2024-06-19 PROCEDURE — B240ZZ3 ULTRASONOGRAPHY OF SINGLE CORONARY ARTERY, INTRAVASCULAR: ICD-10-PCS | Performed by: STUDENT IN AN ORGANIZED HEALTH CARE EDUCATION/TRAINING PROGRAM

## 2024-06-19 PROCEDURE — 25010000002 CEFTRIAXONE PER 250 MG: Performed by: INTERNAL MEDICINE

## 2024-06-19 RX ORDER — POTASSIUM CHLORIDE 750 MG/1
40 TABLET, FILM COATED, EXTENDED RELEASE ORAL EVERY 4 HOURS
Status: DISCONTINUED | OUTPATIENT
Start: 2024-06-19 | End: 2024-06-19

## 2024-06-19 RX ORDER — POTASSIUM CHLORIDE 750 MG/1
20 TABLET, FILM COATED, EXTENDED RELEASE ORAL ONCE
Status: COMPLETED | OUTPATIENT
Start: 2024-06-19 | End: 2024-06-19

## 2024-06-19 RX ORDER — INSULIN LISPRO 100 [IU]/ML
5 INJECTION, SOLUTION INTRAVENOUS; SUBCUTANEOUS
Status: DISCONTINUED | OUTPATIENT
Start: 2024-06-20 | End: 2024-06-22 | Stop reason: HOSPADM

## 2024-06-19 RX ORDER — INSULIN LISPRO 100 [IU]/ML
3 INJECTION, SOLUTION INTRAVENOUS; SUBCUTANEOUS
Status: DISCONTINUED | OUTPATIENT
Start: 2024-06-19 | End: 2024-06-19

## 2024-06-19 RX ADMIN — ACETAMINOPHEN 325MG 650 MG: 325 TABLET ORAL at 08:48

## 2024-06-19 RX ADMIN — INSULIN LISPRO 3 UNITS: 100 INJECTION, SOLUTION INTRAVENOUS; SUBCUTANEOUS at 16:37

## 2024-06-19 RX ADMIN — TORSEMIDE 80 MG: 20 TABLET ORAL at 08:48

## 2024-06-19 RX ADMIN — DOCUSATE SODIUM 100 MG: 100 CAPSULE, LIQUID FILLED ORAL at 21:04

## 2024-06-19 RX ADMIN — INSULIN GLARGINE 30 UNITS: 100 INJECTION, SOLUTION SUBCUTANEOUS at 21:03

## 2024-06-19 RX ADMIN — INSULIN LISPRO 10 UNITS: 100 INJECTION, SOLUTION INTRAVENOUS; SUBCUTANEOUS at 11:12

## 2024-06-19 RX ADMIN — INSULIN LISPRO 5 UNITS: 100 INJECTION, SOLUTION INTRAVENOUS; SUBCUTANEOUS at 06:55

## 2024-06-19 RX ADMIN — EMPAGLIFLOZIN 10 MG: 10 TABLET, FILM COATED ORAL at 08:48

## 2024-06-19 RX ADMIN — CEFTRIAXONE 2000 MG: 2 INJECTION, POWDER, FOR SOLUTION INTRAMUSCULAR; INTRAVENOUS at 16:40

## 2024-06-19 RX ADMIN — POTASSIUM CHLORIDE 20 MEQ: 750 TABLET, EXTENDED RELEASE ORAL at 16:37

## 2024-06-19 RX ADMIN — ATORVASTATIN CALCIUM 40 MG: 20 TABLET, FILM COATED ORAL at 21:03

## 2024-06-19 RX ADMIN — INSULIN LISPRO 5 UNITS: 100 INJECTION, SOLUTION INTRAVENOUS; SUBCUTANEOUS at 21:03

## 2024-06-19 RX ADMIN — HYDRALAZINE HYDROCHLORIDE 10 MG: 10 TABLET ORAL at 21:03

## 2024-06-19 RX ADMIN — ISOSORBIDE MONONITRATE 60 MG: 60 TABLET, EXTENDED RELEASE ORAL at 08:49

## 2024-06-19 RX ADMIN — DOCUSATE SODIUM 100 MG: 100 CAPSULE, LIQUID FILLED ORAL at 08:48

## 2024-06-19 RX ADMIN — INSULIN LISPRO 3 UNITS: 100 INJECTION, SOLUTION INTRAVENOUS; SUBCUTANEOUS at 11:12

## 2024-06-19 RX ADMIN — SPIRONOLACTONE 25 MG: 25 TABLET, FILM COATED ORAL at 08:48

## 2024-06-19 RX ADMIN — METOPROLOL SUCCINATE 100 MG: 100 TABLET, EXTENDED RELEASE ORAL at 08:49

## 2024-06-19 RX ADMIN — INSULIN GLARGINE 23 UNITS: 100 INJECTION, SOLUTION SUBCUTANEOUS at 08:50

## 2024-06-19 RX ADMIN — INSULIN LISPRO 3 UNITS: 100 INJECTION, SOLUTION INTRAVENOUS; SUBCUTANEOUS at 08:58

## 2024-06-19 RX ADMIN — Medication 10 ML: at 08:48

## 2024-06-19 RX ADMIN — HYDRALAZINE HYDROCHLORIDE 10 MG: 10 TABLET ORAL at 16:37

## 2024-06-19 RX ADMIN — Medication 10 ML: at 21:06

## 2024-06-19 RX ADMIN — ASPIRIN 81 MG: 81 TABLET, COATED ORAL at 08:48

## 2024-06-19 RX ADMIN — INSULIN LISPRO 3 UNITS: 100 INJECTION, SOLUTION INTRAVENOUS; SUBCUTANEOUS at 16:40

## 2024-06-19 RX ADMIN — POTASSIUM CHLORIDE 20 MEQ: 750 TABLET, EXTENDED RELEASE ORAL at 06:55

## 2024-06-19 RX ADMIN — HYDRALAZINE HYDROCHLORIDE 10 MG: 10 TABLET ORAL at 08:49

## 2024-06-19 NOTE — NURSING NOTE
06/19/24 0856   Wound 06/19/24 Right posterior foot Diabetic Ulcer   Placement Date: 06/19/24   Present on Original Admission: Yes  Side: Right  Orientation: posterior  Location: foot  Primary Wound Type: Diabetic Ulcer   Dressing Appearance dry;intact  (removed dressing)   Base dry;necrotic;red;scab   Periwound intact;dry   Edges irregular   Wound Length (cm) 0.8 cm   Wound Width (cm) 0.8 cm   Wound Depth (cm) 0.2 cm   Wound Surface Area (cm^2) 0.64 cm^2   Wound Volume (cm^3) 0.128 cm^3   Drainage Amount none     WOCN consult: Diabetic wound right plantar foot. States she has had for 3 weeks. Recommend podiatry consult patient. Communicated to unit RN.

## 2024-06-19 NOTE — PLAN OF CARE
Goal Outcome Evaluation:  Plan of Care Reviewed With: patient, son           Outcome Evaluation: NSR on monitor, room air, all other vss. Up with assistance and walker to toilet. No c/o pain or shortness of air. MRI of right foot pending to rule out infection. Plan for heart cath possibly friday for staged PCI. Plan of care explained to patient and son in depth. No further complaints.

## 2024-06-19 NOTE — PROGRESS NOTES
Name: Denisse Chavez ADMIT: 6/15/2024   : 1945  PCP: Surekha Mcdonnell MD    MRN: [id number] LOS: 4 days   AGE/SEX: 78 y.o. female  ROOM: [facility]     Subjective   Subjective   Laying in bed, no acute events overnight.  Swelling continues to improve, denies chest pain, does have mild shortness of breath.  No fevers no chills.  No nausea, vomiting, abdominal pain.  Overall reports she is feeling better.    Review of Systems   As above    Objective   Objective   Vital Signs  Temp:  [98 °F (36.7 °C)-98.9 °F (37.2 °C)] 98.7 °F (37.1 °C)  Heart Rate:  [64-70] 69  Resp:  [18] 18  BP: (115-160)/(56-86) 156/67  SpO2:  [98 %-99 %] 98 %  on   ;   Device (Oxygen Therapy): CPAP  Body mass index is 31.9 kg/m².  Physical Exam    General: A alert, laying in bed, not in distress,  HEENT: Normocephalic, atraumatic  CV: Regular rate and rhythm, no murmurs rubs or gallops  Lungs: Diminished at bases, no wheezing, nonlabored breathing,  Abdomen: Soft, nontender, nondistended  Extremities: Trace-1+ bilateral extremity edema, right upper plantar surface callus with open wound, no signs of drainage.    Results Review     I reviewed the patient's new clinical results.  Results from last 7 days   Lab Units 24  042   WBC 10*3/mm3 12.80* 9.42 8.38 10.31   HEMOGLOBIN g/dL 13.3 12.9 12.3 11.7*   PLATELETS 10*3/mm3 328 273 231 221     Results from last 7 days   Lab Units 24  04524  0425   SODIUM mmol/L 139 136 138 135*   POTASSIUM mmol/L 3.4* 3.3* 3.1* 3.4*   CHLORIDE mmol/L 99 97* 101 96*   CO2 mmol/L 24.2 24.6 26.0 25.1   BUN mg/dL 31* 29* 27* 25*   CREATININE mg/dL 1.45* 1.44* 1.41* 1.48*   GLUCOSE mg/dL 201* 153* 152* 204*   Estimated Creatinine Clearance: 33.6 mL/min (A) (by C-G formula based on SCr of 1.45 mg/dL (H)).    Results from last 7 days   Lab Units 24  0451 24  0426 24  0457 24  0425 06/15/24  0904    CALCIUM mg/dL 8.7 8.3* 8.1* 8.5* 8.8   MAGNESIUM mg/dL 2.4  --   --   --  2.2     Results from last 7 days   Lab Units 06/15/24  1215 06/15/24  0904   PROCALCITONIN ng/mL  --  0.49*   LACTATE mmol/L 1.7 2.1*     COVID19   Date Value Ref Range Status   06/15/2024 Not Detected Not Detected - Ref. Range Final   2020 Not Detected Not Detected - Ref. Range Final     Glucose   Date/Time Value Ref Range Status   2024 2044 231 (H) 70 - 130 mg/dL Final   2024 1553 257 (H) 70 - 130 mg/dL Final   2024 1047 303 (H) 70 - 130 mg/dL Final   2024 0653 235 (H) 70 - 130 mg/dL Final   2024 2234 223 (H) 70 - 130 mg/dL Final   2024 1637 275 (H) 70 - 130 mg/dL Final   2024 1129 269 (H) 70 - 130 mg/dL Final           Cardiac Catheterization/Vascular Study  [facility]   CARDIAC CATHETERIZATION PROCEDURE REPORT    Patient: Denisse Chavez  : 1945  MRN: [id number]    Procedure Date:  24    Referring Physician:   Dara Lemus MD    Interventional Cardiologist:   Jeffery Myrick MD    Indication:  NSTEMI    Clinical Presentation:  Ms. Mcguire is a 78-year-old woman with past medical history notable for   coronary artery disease status post angioplasty alone in , diabetes   type 2, hypertension, mixed hyperlipidemia, obesity, and chronic kidney   disease who presented to the hospital with worsening shortness of breath   chest discomfort found to have elevated troponins.  Fortunately patient's   symptoms improved EKG shows diffuse ischemia and presentation was   consistent with a non-ST ovation myocardial infarction sent for heart   catheterization today her echocardiogram also demonstrates new focal wall   motion abnormalities    Procedure performed:  Coronary angiography  Left heart catheterization  Ultrasound guidance for vascular access    Access Sites:  Right radial artery    Findings:  1. Coronary Artery Anatomy:  Dominance: Right  Left Main: 80%  calcified mid to distal left main stenoses.  Left Anterior Descending: Proximal segment contains luminal regularities   supplies a proximal diagonal branch.  There is a mid diagonal branch which   has a 70% proximal segment stenoses of this arise just prior to a 99% mid   LAD segment stenoses with JOSLYN I flow distally  Circumflex Artery: Moderate in caliber size 99% ostial circumflex segment   stenoses with sequential 70% mid segment stenoses.  Supplies a small high   marginal branch as well as a subtotaled mid marginal branch which fills   via left to left collaterals  Right Coronary Artery: Moderate in caliber size contains a 70% mid segment   stenoses followed by sequential 70% segment stenoses in the distal segment   supplying a PDA and large posterolateral branch    2. Hemodynamics:  Left Ventricle: 131/7/8 mmHg  Aorta: 130/53/82 mmHg    Conclusions:  Severe multivessel coronary artery disease with 80% distal left main   stenoses with 99% ostial circumflex segment stenoses, 99% mid LAD segment   stenoses with JOSLYN I flow distally and likely culprit for her NSTEMI   presentation and sequential 70% segment stenoses within the RCA.  All of   these blockages are heavily calcified  Normal LVEDP of 8 mmHg    Recommendations:   Will have cardiac surgery evaluate the patient for bypass options    Procedure Details:  Informed consent was obtained with an explanation of the risk and benefits   of the procedure. The patient was brought to the Cardiac Catheterization   Laboratory and was prepped and draped in a standard sterile fashion.   Moderate sedation with Fentanyl and Versed was administered by the   circulating nurse. Lidocaine 2% was used to anesthetize the right radial   artery and a 5/6 Slender sheath was placed.  A TGR catheter was then   advanced over a 0.035 guidewire into the ascending aorta.  This catheter   was used to engage the right and left coronary arteries with diagnostic   angiography obtained.  We  then uses catheter into the left ventricle   measure and LVDP perform pullback across aortic valve.  The catheter was   then removed over a guidewire. The radial artery sheath was removed   without difficulty and TR Band was placed over the access site with   excellent hemostasis.    Patient tolerated the procedure well without any complications, and   transferred to the post procedure area for recovery in a stable condition.    Complications:  None.    Estimated Blood Loss:  Minimal.    Jeffery Myrick MD  [facility]  06/16/24  14:39 EDT  Duplex Carotid Ultrasound CAR    Right internal carotid artery demonstrates a less than 50% stenosis.    Left internal carotid artery demonstrates a less than 50% stenosis.  Duplex Vein Mapping Lower Extremity - Bilateral CAR    The right great saphenous vein is patent  and of adequate size in the   thigh.    The right great saphenous vein is patent and of adequate size in the   calf.    The left great saphenous vein is patent and of adequate size in the   thigh.    The left great saphenous vein is patent  and of adequate size in the   calf.    Chronic superficial thrombophlebitis noted in the right small saphenous   vein.    Scheduled Medications  aspirin, 81 mg, Oral, Daily  atorvastatin, 40 mg, Oral, Daily  cefTRIAXone, 2,000 mg, Intravenous, Q24H  docusate sodium, 100 mg, Oral, BID  empagliflozin, 10 mg, Oral, Daily  hydrALAZINE, 10 mg, Oral, TID  insulin glargine, 40 Units, Subcutaneous, Nightly  insulin lispro, 3-14 Units, Subcutaneous, 4x Daily AC & at Bedtime  isosorbide mononitrate, 60 mg, Oral, Q24H  metoprolol succinate XL, 100 mg, Oral, Q24H  potassium chloride, 20 mEq, Oral, Once  sodium chloride, 10 mL, Intravenous, Q12H  spironolactone, 25 mg, Oral, Daily  torsemide, 80 mg, Oral, BID    Infusions   Diet  Diet: Cardiac, Diabetic; Healthy Heart (2-3 Na+); Consistent Carbohydrate; Fluid Consistency: Thin (IDDSI 0)    I have personally reviewed      [x]  Laboratory   [x]  Microbiology   [x]  Radiology   [x]  EKG/Telemetry  [x]  Cardiology/Vascular   []  Pathology    []  Records       Assessment/Plan     Active Hospital Problems    Diagnosis  POA    **NSTEMI (non-ST elevated myocardial infarction) [I21.4]  Yes    Acute on chronic heart failure with preserved ejection fraction (HFpEF) [I50.33]  Unknown    Dyspnea [R06.00]  Unknown    Elevated brain natriuretic peptide (BNP) level [R79.89]  Unknown    PNA (pneumonia) [J18.9]  Unknown    Hypertriglyceridemia [E78.1]  Yes    CAD (coronary artery disease) [I25.10]  Yes    Chronic venous insufficiency [I87.2]  Yes    Stage 3 chronic kidney disease [N18.30]  Yes    SAWYER on autoCPAP [G47.33]  Yes    Type 2 diabetes mellitus, with long-term current use of insulin [E11.9, Z79.4]  Not Applicable    Hyperlipidemia [E78.5]  Yes    Essential hypertension [I10]  Yes      Resolved Hospital Problems   No resolved problems to display.     Patient is a 78-year-old female with a history of including but not limited to CAD, DHF,  type II DM, HTN, HLD,  CKD stage III, presented to the ED with shortness of breath    NSTEMI/multivessel CAD  -Patient underwent heart catheter 6/16/24 which showed severe three-vessel CAD with significant left main disease.  -On aspirin, statin  -Cardiology and cardiothoracic surgery following  -Tentative plan for CABG      Acute on chronic diastolic heart failure/ hypertension  -Echocardiogram 06/15/2024 showed EF of 56 to 60% grade 2 diastolic dysfunction  -On metoprolol  mg every 24 hour spironolactone 25 mg daily, empagliflozin 10 mg daily, hydralazine 10 mg 3 times daily, isosorbide mononitrate 60 mg daily, torsemide 80 mg twice daily  -Blood pressure stable      Pneumonia  CT skin without contrast on 06/15/2024 showed patchy mild to moderate groundglass opacification is present within the bilateral lungs, left greater than right, favored represent  multifocal pneumonia in the appropriate  context. Asymmetric edema is less likely.         Pulmonary nodules  CT scan 06/15/2024 showed showed A few scattered subcentimeter pulm nodules bilaterally.   -Will need follow-up chest CT in 12 months per Fleischner criteria per radiology    Right plantar callus with open wound  -X-ray of the foot showed soft tissue lucency at the plantar aspect of the distal foot may correspond to patient's reported open wound, correlate clinically. Soft issue swelling of the forefoot suggests inflammation or infection.  -MRI of the foot without contrast ordered to evaluate osteomyelitis    Type II DM with hyperglycemia  -Hemoglobin A1c 06/15/2024 was 8.4  -Blood glucose not well-controlled, add lispro 3 units 3 times daily with meals, continue moderate scale SSI  -On Lantus 40 units nightly, change Lantus to 23 units twice daily      CKD stage III  Creatinine stable around 1.4-1.5  Monitor daily BMP    Hypokalemia  -P.o. replacement ordered, repeat in a.m.      SCDs VT prophylaxis.  Full code.  Discussed with patient.  Anticipate discharge TBD      Copied text in this note has been reviewed and is accurate as of 06/19/24.         Dictated utilizing Dragon dictation        Kit Lentz MD  [facility]  06/19/24  06:31 EDT

## 2024-06-19 NOTE — PROGRESS NOTES
"[facility]    Patient Name: Denisse hCavez  :1945  78 y.o.  LOS: 4  Encounter Provider: MEREDITH Gipson      Patient Care Team:  Surekha Mcdonnell MD as PCP - General (Internal Medicine)  Warner Tang MD as Cardiologist (Cardiology)  Sergio Eagle MD as Consulting Physician (Urology)  Juan Negrete MD as Consulting Physician (Endocrinology)  Miguel Angel Barrett DPM as Consulting Physician (Podiatry)  Gabriel Montenegro MD as Consulting Physician (Nephrology)  Linda Rodriguez MD (Ophthalmology)  Ginger Ross APRN as Nurse Practitioner (Nurse Practitioner)  Jr Shawn Abraham MD as Surgeon (Cardiothoracic Surgery)    Chief Complaint:  CAD, NSTEMI    Interval History: Sitting in the chair. Edema much improved. She states current plan is for PCI with Dr. Myrick, but scheduling of this is currently unknown.       Objective   Vital Signs  Temp:  [98 °F (36.7 °C)-98.9 °F (37.2 °C)] 98.7 °F (37.1 °C)  Heart Rate:  [66-70] 69  Resp:  [18] 18  BP: (115-160)/(56-86) 156/67    Intake/Output Summary (Last 24 hours) at 2024 0823  Last data filed at 2024 2354  Gross per 24 hour   Intake 240 ml   Output 3350 ml   Net -3110 ml     Flowsheet Rows      Flowsheet Row First Filed Value   Admission Height 162.6 cm (64\") Documented at 06/15/2024 1153   Admission Weight 91.6 kg (202 lb) Documented at 06/15/2024 1153              Constitutional:       General: Not in acute distress.     Appearance: Well-developed. Not diaphoretic.   HENT:      Head: Normocephalic.   Pulmonary:      Effort: Pulmonary effort is normal. No respiratory distress.      Breath sounds: Normal breath sounds. No wheezing. No rhonchi. No rales.   Cardiovascular:      Normal rate. Regular rhythm.      Murmurs: There is no murmur.   Pulses:     Radial: 2+ bilaterally.  Edema:     Peripheral edema present.     Pretibial: trace edema of the right pretibial area.     Ankle: trace edema " "of the right ankle.     Feet: trace edema of the right foot.  Skin:     General: Skin is warm and dry. There is no cyanosis.      Findings: No rash.   Neurological:      Mental Status: Alert and oriented to person, place, and time.   Psychiatric:         Behavior: Behavior normal.         Thought Content: Thought content normal.         Judgment: Judgment normal.           Pertinent Test Results:  Results from last 7 days   Lab Units 06/19/24  0451 06/18/24  0426 06/17/24  0457 06/16/24  0425 06/15/24  0904   SODIUM mmol/L 139 136 138 135* 131*   POTASSIUM mmol/L 3.4* 3.3* 3.1* 3.4* 4.0   CHLORIDE mmol/L 99 97* 101 96* 96*   CO2 mmol/L 24.2 24.6 26.0 25.1 20.5*   BUN mg/dL 31* 29* 27* 25* 18   CREATININE mg/dL 1.45* 1.44* 1.41* 1.48* 1.24*   GLUCOSE mg/dL 201* 153* 152* 204* 321*   CALCIUM mg/dL 8.7 8.3* 8.1* 8.5* 8.8     Results from last 7 days   Lab Units 06/16/24  0425 06/15/24  1215 06/15/24  0904   HSTROP T ng/L 1,337* 550* 571*     Results from last 7 days   Lab Units 06/19/24  0451 06/18/24  0426 06/17/24  0457 06/16/24  0425 06/15/24  0904   WBC 10*3/mm3 12.80* 9.42 8.38 10.31 11.81*   HEMOGLOBIN g/dL 13.3 12.9 12.3 11.7* 13.5   HEMATOCRIT % 40.8 39.9 37.3 36.5 38.4   PLATELETS 10*3/mm3 328 273 231 221 258         Results from last 7 days   Lab Units 06/19/24  0451 06/15/24  0904   MAGNESIUM mg/dL 2.4 2.2           Invalid input(s): \"LDLCALC\"  Results from last 7 days   Lab Units 06/15/24  0904   PROBNP pg/mL 5,512.0*               Medication Review:   aspirin, 81 mg, Oral, Daily  atorvastatin, 40 mg, Oral, Daily  cefTRIAXone, 2,000 mg, Intravenous, Q24H  docusate sodium, 100 mg, Oral, BID  empagliflozin, 10 mg, Oral, Daily  hydrALAZINE, 10 mg, Oral, TID  insulin glargine, 23 Units, Subcutaneous, Q12H  insulin lispro, 3 Units, Subcutaneous, TID With Meals  insulin lispro, 3-14 Units, Subcutaneous, 4x Daily AC & at Bedtime  isosorbide mononitrate, 60 mg, Oral, Q24H  metoprolol succinate XL, 100 mg, Oral, " Q24H  sodium chloride, 10 mL, Intravenous, Q12H  spironolactone, 25 mg, Oral, Daily  torsemide, 80 mg, Oral, BID              Assessment & Plan     Active Hospital Problems    Diagnosis  POA    **NSTEMI (non-ST elevated myocardial infarction) [I21.4]  Yes    Acute on chronic heart failure with preserved ejection fraction (HFpEF) [I50.33]  Unknown    Dyspnea [R06.00]  Unknown    Elevated brain natriuretic peptide (BNP) level [R79.89]  Unknown    PNA (pneumonia) [J18.9]  Unknown    Hypertriglyceridemia [E78.1]  Yes    CAD (coronary artery disease) [I25.10]  Yes    Chronic venous insufficiency [I87.2]  Yes    Stage 3 chronic kidney disease [N18.30]  Yes    SAWYER on autoCPAP [G47.33]  Yes    Type 2 diabetes mellitus, with long-term current use of insulin [E11.9, Z79.4]  Not Applicable    Hyperlipidemia [E78.5]  Yes    Essential hypertension [I10]  Yes      Resolved Hospital Problems   No resolved problems to display.        1. Acute on chronic HFpEF. On GDMT with Jardiance, Toprol XL, spironolactone and torsemide. Volume status has improved overall. Tolerating oral diuretics  2. NSTEMI/multivessel CAD. CT surgery following for possible CABG vs. PCI. Concern for healing due to chronic venous stasis and poorly controlled diabetes. Most recent plan is for PCI. Will try to determine scheduling of this. Likely Friday.  3. CKD3. Renal function stable.   4. ? PNA. Remains on Ceftriaxone  5. SAWYER  6. Obesity  7. DM2  8. Chronic venous insufficiency and diabetic foot ulcers  9. Mild MS, mean gradient 5mm Hg           MEREDITH Gipson  [facility]   [facility]  [address] [place] [address] - [address] [place] [address]  Office: [phone]    06/19/24  08:23 EDT

## 2024-06-20 LAB
ANION GAP SERPL CALCULATED.3IONS-SCNC: 12 MMOL/L (ref 5–15)
BACTERIA SPEC AEROBE CULT: NORMAL
BACTERIA SPEC AEROBE CULT: NORMAL
BUN SERPL-MCNC: 34 MG/DL (ref 8–23)
BUN/CREAT SERPL: 22.8 (ref 7–25)
CALCIUM SPEC-SCNC: 9.3 MG/DL (ref 8.6–10.5)
CHLORIDE SERPL-SCNC: 102 MMOL/L (ref 98–107)
CO2 SERPL-SCNC: 23 MMOL/L (ref 22–29)
CREAT SERPL-MCNC: 1.49 MG/DL (ref 0.57–1)
DEPRECATED RDW RBC AUTO: 43.2 FL (ref 37–54)
EGFRCR SERPLBLD CKD-EPI 2021: 35.8 ML/MIN/1.73
ERYTHROCYTE [DISTWIDTH] IN BLOOD BY AUTOMATED COUNT: 13.9 % (ref 12.3–15.4)
GLUCOSE BLDC GLUCOMTR-MCNC: 105 MG/DL (ref 70–130)
GLUCOSE BLDC GLUCOMTR-MCNC: 166 MG/DL (ref 70–130)
GLUCOSE BLDC GLUCOMTR-MCNC: 208 MG/DL (ref 70–130)
GLUCOSE BLDC GLUCOMTR-MCNC: 233 MG/DL (ref 70–130)
GLUCOSE SERPL-MCNC: 231 MG/DL (ref 65–99)
HCT VFR BLD AUTO: 40.6 % (ref 34–46.6)
HGB BLD-MCNC: 13.2 G/DL (ref 12–15.9)
MAGNESIUM SERPL-MCNC: 2.4 MG/DL (ref 1.6–2.4)
MCH RBC QN AUTO: 28 PG (ref 26.6–33)
MCHC RBC AUTO-ENTMCNC: 32.5 G/DL (ref 31.5–35.7)
MCV RBC AUTO: 86.2 FL (ref 79–97)
PHOSPHATE SERPL-MCNC: 4.8 MG/DL (ref 2.5–4.5)
PLATELET # BLD AUTO: 356 10*3/MM3 (ref 140–450)
PMV BLD AUTO: 10.3 FL (ref 6–12)
POTASSIUM SERPL-SCNC: 3.4 MMOL/L (ref 3.5–5.2)
POTASSIUM SERPL-SCNC: 4.3 MMOL/L (ref 3.5–5.2)
RBC # BLD AUTO: 4.71 10*6/MM3 (ref 3.77–5.28)
SODIUM SERPL-SCNC: 137 MMOL/L (ref 136–145)
WBC NRBC COR # BLD AUTO: 11.1 10*3/MM3 (ref 3.4–10.8)

## 2024-06-20 PROCEDURE — 92978 ENDOLUMINL IVUS OCT C 1ST: CPT | Performed by: STUDENT IN AN ORGANIZED HEALTH CARE EDUCATION/TRAINING PROGRAM

## 2024-06-20 PROCEDURE — 84100 ASSAY OF PHOSPHORUS: CPT | Performed by: STUDENT IN AN ORGANIZED HEALTH CARE EDUCATION/TRAINING PROGRAM

## 2024-06-20 PROCEDURE — C1725 CATH, TRANSLUMIN NON-LASER: HCPCS | Performed by: STUDENT IN AN ORGANIZED HEALTH CARE EDUCATION/TRAINING PROGRAM

## 2024-06-20 PROCEDURE — 25010000002 FENTANYL CITRATE (PF) 50 MCG/ML SOLUTION: Performed by: STUDENT IN AN ORGANIZED HEALTH CARE EDUCATION/TRAINING PROGRAM

## 2024-06-20 PROCEDURE — C1874 STENT, COATED/COV W/DEL SYS: HCPCS | Performed by: STUDENT IN AN ORGANIZED HEALTH CARE EDUCATION/TRAINING PROGRAM

## 2024-06-20 PROCEDURE — 25010000002 NICARDIPINE 2.5 MG/ML SOLUTION: Performed by: STUDENT IN AN ORGANIZED HEALTH CARE EDUCATION/TRAINING PROGRAM

## 2024-06-20 PROCEDURE — 25510000001 IOPAMIDOL PER 1 ML: Performed by: STUDENT IN AN ORGANIZED HEALTH CARE EDUCATION/TRAINING PROGRAM

## 2024-06-20 PROCEDURE — 85027 COMPLETE CBC AUTOMATED: CPT | Performed by: STUDENT IN AN ORGANIZED HEALTH CARE EDUCATION/TRAINING PROGRAM

## 2024-06-20 PROCEDURE — 85347 COAGULATION TIME ACTIVATED: CPT

## 2024-06-20 PROCEDURE — C1894 INTRO/SHEATH, NON-LASER: HCPCS | Performed by: STUDENT IN AN ORGANIZED HEALTH CARE EDUCATION/TRAINING PROGRAM

## 2024-06-20 PROCEDURE — 63710000001 INSULIN GLARGINE PER 5 UNITS: Performed by: STUDENT IN AN ORGANIZED HEALTH CARE EDUCATION/TRAINING PROGRAM

## 2024-06-20 PROCEDURE — C1769 GUIDE WIRE: HCPCS | Performed by: STUDENT IN AN ORGANIZED HEALTH CARE EDUCATION/TRAINING PROGRAM

## 2024-06-20 PROCEDURE — 82948 REAGENT STRIP/BLOOD GLUCOSE: CPT

## 2024-06-20 PROCEDURE — 80048 BASIC METABOLIC PNL TOTAL CA: CPT | Performed by: STUDENT IN AN ORGANIZED HEALTH CARE EDUCATION/TRAINING PROGRAM

## 2024-06-20 PROCEDURE — 92933 PRQ TRLML C ATHRC ST ANGIOP1: CPT | Performed by: STUDENT IN AN ORGANIZED HEALTH CARE EDUCATION/TRAINING PROGRAM

## 2024-06-20 PROCEDURE — 92979 ENDOLUMINL IVUS OCT C EA: CPT | Performed by: STUDENT IN AN ORGANIZED HEALTH CARE EDUCATION/TRAINING PROGRAM

## 2024-06-20 PROCEDURE — C1887 CATHETER, GUIDING: HCPCS | Performed by: STUDENT IN AN ORGANIZED HEALTH CARE EDUCATION/TRAINING PROGRAM

## 2024-06-20 PROCEDURE — C1753 CATH, INTRAVAS ULTRASOUND: HCPCS | Performed by: STUDENT IN AN ORGANIZED HEALTH CARE EDUCATION/TRAINING PROGRAM

## 2024-06-20 PROCEDURE — C9602 PERC D-E COR STENT ATHER S: HCPCS | Performed by: STUDENT IN AN ORGANIZED HEALTH CARE EDUCATION/TRAINING PROGRAM

## 2024-06-20 PROCEDURE — 84132 ASSAY OF SERUM POTASSIUM: CPT | Performed by: STUDENT IN AN ORGANIZED HEALTH CARE EDUCATION/TRAINING PROGRAM

## 2024-06-20 PROCEDURE — 92929 PR PRQ TRLUML CORONARY STENT W/ANGIO ADDL ART/BRNCH: CPT | Performed by: STUDENT IN AN ORGANIZED HEALTH CARE EDUCATION/TRAINING PROGRAM

## 2024-06-20 PROCEDURE — C9601 PERC DRUG-EL COR STENT BRAN: HCPCS | Performed by: STUDENT IN AN ORGANIZED HEALTH CARE EDUCATION/TRAINING PROGRAM

## 2024-06-20 PROCEDURE — C1724 CATH, TRANS ATHEREC,ROTATION: HCPCS | Performed by: STUDENT IN AN ORGANIZED HEALTH CARE EDUCATION/TRAINING PROGRAM

## 2024-06-20 PROCEDURE — 25010000002 MIDAZOLAM PER 1 MG: Performed by: STUDENT IN AN ORGANIZED HEALTH CARE EDUCATION/TRAINING PROGRAM

## 2024-06-20 PROCEDURE — 83735 ASSAY OF MAGNESIUM: CPT | Performed by: STUDENT IN AN ORGANIZED HEALTH CARE EDUCATION/TRAINING PROGRAM

## 2024-06-20 PROCEDURE — 25010000002 HEPARIN (PORCINE) PER 1000 UNITS: Performed by: STUDENT IN AN ORGANIZED HEALTH CARE EDUCATION/TRAINING PROGRAM

## 2024-06-20 PROCEDURE — 99232 SBSQ HOSP IP/OBS MODERATE 35: CPT | Performed by: INTERNAL MEDICINE

## 2024-06-20 PROCEDURE — 25010000002 ONDANSETRON PER 1 MG: Performed by: STUDENT IN AN ORGANIZED HEALTH CARE EDUCATION/TRAINING PROGRAM

## 2024-06-20 DEVICE — XIENCE SKYPOINT™ EVEROLIMUS ELUTING CORONARY STENT SYSTEM 4.00 MM X 12 MM / RAPID-EXCHANGE
Type: IMPLANTABLE DEVICE | Site: CORONARY | Status: FUNCTIONAL
Brand: XIENCE SKYPOINT™

## 2024-06-20 DEVICE — XIENCE SKYPOINT™ EVEROLIMUS ELUTING CORONARY STENT SYSTEM 4.00 MM X 33 MM / RAPID-EXCHANGE
Type: IMPLANTABLE DEVICE | Site: HEART | Status: FUNCTIONAL
Brand: XIENCE SKYPOINT™

## 2024-06-20 DEVICE — XIENCE SKYPOINT™ EVEROLIMUS ELUTING CORONARY STENT SYSTEM 2.75 MM X 33 MM / RAPID-EXCHANGE
Type: IMPLANTABLE DEVICE | Site: CORONARY | Status: FUNCTIONAL
Brand: XIENCE SKYPOINT™

## 2024-06-20 DEVICE — XIENCE SKYPOINT™ EVEROLIMUS ELUTING CORONARY STENT SYSTEM 2.75 MM X 38 MM / RAPID-EXCHANGE
Type: IMPLANTABLE DEVICE | Site: HEART | Status: FUNCTIONAL
Brand: XIENCE SKYPOINT™

## 2024-06-20 RX ORDER — ASPIRIN 325 MG
TABLET ORAL
Status: DISCONTINUED | OUTPATIENT
Start: 2024-06-20 | End: 2024-06-20 | Stop reason: HOSPADM

## 2024-06-20 RX ORDER — SODIUM CHLORIDE 9 MG/ML
INJECTION, SOLUTION INTRAVENOUS
Status: COMPLETED | OUTPATIENT
Start: 2024-06-20 | End: 2024-06-20

## 2024-06-20 RX ORDER — POTASSIUM CHLORIDE 750 MG/1
40 TABLET, FILM COATED, EXTENDED RELEASE ORAL ONCE
Status: DISCONTINUED | OUTPATIENT
Start: 2024-06-20 | End: 2024-06-22 | Stop reason: HOSPADM

## 2024-06-20 RX ORDER — ACETAMINOPHEN 325 MG/1
650 TABLET ORAL EVERY 4 HOURS PRN
Status: DISCONTINUED | OUTPATIENT
Start: 2024-06-20 | End: 2024-06-20 | Stop reason: SDUPTHER

## 2024-06-20 RX ORDER — CLOPIDOGREL 300 MG/1
600 TABLET, FILM COATED ORAL ONCE
Status: COMPLETED | OUTPATIENT
Start: 2024-06-20 | End: 2024-06-20

## 2024-06-20 RX ORDER — CLOPIDOGREL BISULFATE 75 MG/1
75 TABLET ORAL DAILY
Status: DISCONTINUED | OUTPATIENT
Start: 2024-06-21 | End: 2024-06-22 | Stop reason: HOSPADM

## 2024-06-20 RX ORDER — FENTANYL CITRATE 50 UG/ML
INJECTION, SOLUTION INTRAMUSCULAR; INTRAVENOUS
Status: DISCONTINUED | OUTPATIENT
Start: 2024-06-20 | End: 2024-06-20 | Stop reason: HOSPADM

## 2024-06-20 RX ORDER — ONDANSETRON 2 MG/ML
INJECTION INTRAMUSCULAR; INTRAVENOUS
Status: DISCONTINUED | OUTPATIENT
Start: 2024-06-20 | End: 2024-06-20 | Stop reason: HOSPADM

## 2024-06-20 RX ORDER — HEPARIN SODIUM 1000 [USP'U]/ML
INJECTION, SOLUTION INTRAVENOUS; SUBCUTANEOUS
Status: DISCONTINUED | OUTPATIENT
Start: 2024-06-20 | End: 2024-06-20 | Stop reason: HOSPADM

## 2024-06-20 RX ORDER — POTASSIUM CHLORIDE 750 MG/1
40 TABLET, FILM COATED, EXTENDED RELEASE ORAL EVERY 4 HOURS
Status: COMPLETED | OUTPATIENT
Start: 2024-06-20 | End: 2024-06-20

## 2024-06-20 RX ORDER — NICARDIPINE HYDROCHLORIDE 2.5 MG/ML
INJECTION INTRAVENOUS
Status: DISCONTINUED | OUTPATIENT
Start: 2024-06-20 | End: 2024-06-20 | Stop reason: HOSPADM

## 2024-06-20 RX ORDER — VERAPAMIL HYDROCHLORIDE 2.5 MG/ML
INJECTION, SOLUTION INTRAVENOUS
Status: DISCONTINUED | OUTPATIENT
Start: 2024-06-20 | End: 2024-06-20 | Stop reason: HOSPADM

## 2024-06-20 RX ORDER — LIDOCAINE HYDROCHLORIDE 20 MG/ML
INJECTION, SOLUTION INFILTRATION; PERINEURAL
Status: DISCONTINUED | OUTPATIENT
Start: 2024-06-20 | End: 2024-06-20 | Stop reason: HOSPADM

## 2024-06-20 RX ORDER — CLOPIDOGREL BISULFATE 75 MG/1
TABLET ORAL
Status: DISCONTINUED | OUTPATIENT
Start: 2024-06-20 | End: 2024-06-20 | Stop reason: HOSPADM

## 2024-06-20 RX ORDER — NITROGLYCERIN 0.4 MG/1
0.4 TABLET SUBLINGUAL
Status: DISCONTINUED | OUTPATIENT
Start: 2024-06-20 | End: 2024-06-22 | Stop reason: HOSPADM

## 2024-06-20 RX ORDER — MIDAZOLAM HYDROCHLORIDE 1 MG/ML
INJECTION INTRAMUSCULAR; INTRAVENOUS
Status: DISCONTINUED | OUTPATIENT
Start: 2024-06-20 | End: 2024-06-20 | Stop reason: HOSPADM

## 2024-06-20 RX ADMIN — ASPIRIN 81 MG: 81 TABLET, COATED ORAL at 08:39

## 2024-06-20 RX ADMIN — HYDRALAZINE HYDROCHLORIDE 10 MG: 10 TABLET ORAL at 08:39

## 2024-06-20 RX ADMIN — ISOSORBIDE MONONITRATE 60 MG: 60 TABLET, EXTENDED RELEASE ORAL at 08:39

## 2024-06-20 RX ADMIN — INSULIN GLARGINE 35 UNITS: 100 INJECTION, SOLUTION SUBCUTANEOUS at 08:39

## 2024-06-20 RX ADMIN — POTASSIUM CHLORIDE 40 MEQ: 750 TABLET, EXTENDED RELEASE ORAL at 12:03

## 2024-06-20 RX ADMIN — EMPAGLIFLOZIN 10 MG: 10 TABLET, FILM COATED ORAL at 09:00

## 2024-06-20 RX ADMIN — Medication 10 ML: at 09:41

## 2024-06-20 RX ADMIN — METOPROLOL SUCCINATE 100 MG: 100 TABLET, EXTENDED RELEASE ORAL at 08:39

## 2024-06-20 RX ADMIN — DOCUSATE SODIUM 100 MG: 100 CAPSULE, LIQUID FILLED ORAL at 21:27

## 2024-06-20 RX ADMIN — Medication 10 ML: at 21:28

## 2024-06-20 RX ADMIN — SPIRONOLACTONE 25 MG: 25 TABLET, FILM COATED ORAL at 08:40

## 2024-06-20 RX ADMIN — CLOPIDOGREL BISULFATE 600 MG: 300 TABLET, FILM COATED ORAL at 21:26

## 2024-06-20 RX ADMIN — POTASSIUM CHLORIDE 40 MEQ: 750 TABLET, EXTENDED RELEASE ORAL at 06:34

## 2024-06-20 RX ADMIN — ATORVASTATIN CALCIUM 40 MG: 20 TABLET, FILM COATED ORAL at 08:39

## 2024-06-20 NOTE — PROGRESS NOTES
"   LOS: 5 days   Patient Care Team:  Surekha Mcdonnell MD as PCP - General (Internal Medicine)  Warner Tang MD as Cardiologist (Cardiology)  Sergio Eagle MD as Consulting Physician (Urology)  Juan Negrete MD as Consulting Physician (Endocrinology)  Miguel Angel Barrett DPM as Consulting Physician (Podiatry)  Gabriel Montenegro MD as Consulting Physician (Nephrology)  Linda Rodriguez MD (Ophthalmology)  Ginger Ross APRN as Nurse Practitioner (Nurse Practitioner)  Jr Shawn Abraham MD as Surgeon (Cardiothoracic Surgery)    Chief Complaint: CP     Interval History: No issues overnight, seen just before cath. On RA, no CP/SOA.      Objective   Vital Signs  Temp:  [98.1 °F (36.7 °C)-98.9 °F (37.2 °C)] 98.2 °F (36.8 °C)  Heart Rate:  [52-66] 52  Resp:  [10-19] 14  BP: (117-156)/(44-93) 130/55    Intake/Output Summary (Last 24 hours) at 6/20/2024 1513  Last data filed at 6/20/2024 1152  Gross per 24 hour   Intake 210 ml   Output 2400 ml   Net -2190 ml       Last Weight and Admission Weight        06/19/24  0300   Weight: 84.3 kg (185 lb 13.6 oz)     Flowsheet Rows      Flowsheet Row First Filed Value   Admission Height 162.6 cm (64\") Documented at 06/15/2024 1153   Admission Weight 91.6 kg (202 lb) Documented at 06/15/2024 1153          Physical Exam  Constitutional:       General: She is not in acute distress.  HENT:      Head: Normocephalic.      Nose: Nose normal.      Mouth/Throat:      Pharynx: Oropharynx is clear.   Eyes:      Conjunctiva/sclera: Conjunctivae normal.   Cardiovascular:      Rate and Rhythm: Normal rate and regular rhythm.   Pulmonary:      Effort: Pulmonary effort is normal.      Breath sounds: Normal breath sounds.   Abdominal:      Palpations: Abdomen is soft.      Tenderness: There is no abdominal tenderness.   Musculoskeletal:         General: Swelling (legs are wrapped, + bilateral doughy swelling) present.   Neurological:      General: No focal deficit " present.         Results Review:      Results from last 7 days   Lab Units 06/20/24  0404 06/19/24  0451 06/18/24  0426   SODIUM mmol/L 137 139 136   POTASSIUM mmol/L 3.4* 3.4* 3.3*   CHLORIDE mmol/L 102 99 97*   CO2 mmol/L 23.0 24.2 24.6   BUN mg/dL 34* 31* 29*   CREATININE mg/dL 1.49* 1.45* 1.44*   GLUCOSE mg/dL 231* 201* 153*   CALCIUM mg/dL 9.3 8.7 8.3*     Results from last 7 days   Lab Units 06/16/24  0425 06/15/24  1215 06/15/24  0904   HSTROP T ng/L 1,337* 550* 571*     Results from last 7 days   Lab Units 06/20/24  0404 06/19/24  0451 06/18/24  0426   WBC 10*3/mm3 11.10* 12.80* 9.42   HEMOGLOBIN g/dL 13.2 13.3 12.9   HEMATOCRIT % 40.6 40.8 39.9   PLATELETS 10*3/mm3 356 328 273             Results from last 7 days   Lab Units 06/20/24  0404   MAGNESIUM mg/dL 2.4           I reviewed the patient's new clinical results.  I personally viewed and interpreted the patient's EKG/Telemetry data        Medication Review:   [Transfer Hold] aspirin, 81 mg, Oral, Daily  [Transfer Hold] atorvastatin, 40 mg, Oral, Daily  [Transfer Hold] docusate sodium, 100 mg, Oral, BID  [Transfer Hold] empagliflozin, 10 mg, Oral, Daily  hydrALAZINE, 10 mg, Oral, TID  [Transfer Hold] insulin glargine, 35 Units, Subcutaneous, Q12H  [Transfer Hold] insulin lispro, 3-14 Units, Subcutaneous, 4x Daily AC & at Bedtime  [Transfer Hold] insulin lispro, 5 Units, Subcutaneous, TID With Meals  [Transfer Hold] isosorbide mononitrate, 60 mg, Oral, Q24H  metoprolol succinate XL, 100 mg, Oral, Q24H  potassium chloride, 40 mEq, Oral, Once  [Transfer Hold] sodium chloride, 10 mL, Intravenous, Q12H  [Transfer Hold] spironolactone, 25 mg, Oral, Daily  [Held by provider] torsemide, 80 mg, Oral, BID        sodium chloride, , Last Rate: 75 mL/hr (06/20/24 1400)        Assessment & Plan     1. Acute on chronic HFpEF  2. NSTEMI/multivessel CAD  3. CKD3  4. ? PNA  5. SAWYER  6. Obesity  7. DM2  8. Chronic venous insufficiency and diabetic foot ulcers  9. Mild MS,  mean gradient 5mm Hg     After discussion amongst me, Marge Paredes and Ramez and Leigh, we have decided to move ahead with partial revascularization via PCI as we all feel there is very high risk of poor healing and infection given her significant ongoing problems with DFU and venous insufficiency. Dr Reggie will proceed today.     She is on an optimal regimen of torsemide 80mg BID, dapagliflozin, spironolactone 25mg daily, and semaglutide. She's on metoprolol succinate 100mg daily and sometimes older women with HF don't do as well w high dose BB, but I feel she needs the anti-anginal therapy given the severity of her CAD. She's also on 60mg daily of ISMN, and hydralazine 10mg TID.     Her vitals are good. We'll check renal function in AM and she will likely be able to go home tomorrow. Family is determining whether or not she may require short term SNF.     Warner Tang MD  06/20/24  15:13 EDT

## 2024-06-20 NOTE — PROGRESS NOTES
Name: Denisse Chavez ADMIT: 6/15/2024   : 1945  PCP: Surekha Mcdonnell MD    MRN: [id number] LOS: 5 days   AGE/SEX: 78 y.o. female  ROOM: [facility]     Subjective   Subjective   Patient seen this morning.  Not events overnight, no new complaints.  Denies shortness of breath or chest pain.    Review of Systems   As above    Objective   Objective   Vital Signs  Temp:  [98.1 °F (36.7 °C)-98.9 °F (37.2 °C)] 98.4 °F (36.9 °C)  Heart Rate:  [53-66] 60  Resp:  [18] 18  BP: (122-156)/(44-70) 156/65  SpO2:  [97 %-100 %] 97 %  on   ;   Device (Oxygen Therapy): room air  Body mass index is 31.9 kg/m².  Physical Exam    General: A alert, laying in bed, not in distress,   HEENT: Normocephalic, atraumatic  CV: Regular rate and rhythm, no murmurs rubs or gallops  Lungs: CTA anteriorly, no wheezing,  Abdomen: Soft, nontender, nondistended  Extremities: Trace-1+ bilateral extremity edema, no cyanosis    Results Review     I reviewed the patient's new clinical results.  Results from last 7 days   Lab Units 247   WBC 10*3/mm3 11.10* 12.80* 9.42 8.38   HEMOGLOBIN g/dL 13.2 13.3 12.9 12.3   PLATELETS 10*3/mm3 356 328 273 231     Results from last 7 days   Lab Units 24  04024  04524  04224  0457   SODIUM mmol/L 137 139 136 138   POTASSIUM mmol/L 3.4* 3.4* 3.3* 3.1*   CHLORIDE mmol/L 102 99 97* 101   CO2 mmol/L 23.0 24.2 24.6 26.0   BUN mg/dL 34* 31* 29* 27*   CREATININE mg/dL 1.49* 1.45* 1.44* 1.41*   GLUCOSE mg/dL 231* 201* 153* 152*   Estimated Creatinine Clearance: 32.7 mL/min (A) (by C-G formula based on SCr of 1.49 mg/dL (H)).    Results from last 7 days   Lab Units 24  0404 24  0451 24  0426 24  0457 24  0425 06/15/24  0904   CALCIUM mg/dL 9.3 8.7 8.3* 8.1*   < > 8.8   MAGNESIUM mg/dL 2.4 2.4  --   --   --  2.2   PHOSPHORUS mg/dL 4.8*  --   --   --   --   --     < > = values in this interval not displayed.      Results from last 7 days   Lab Units 06/15/24  1215 06/15/24  0904   PROCALCITONIN ng/mL  --  0.49*   LACTATE mmol/L 1.7 2.1*     COVID19   Date Value Ref Range Status   06/15/2024 Not Detected Not Detected - Ref. Range Final   05/06/2020 Not Detected Not Detected - Ref. Range Final     Glucose   Date/Time Value Ref Range Status   06/20/2024 0637 208 (H) 70 - 130 mg/dL Final   06/20/2024 0016 233 (H) 70 - 130 mg/dL Final   06/19/2024 2044 246 (H) 70 - 130 mg/dL Final   06/19/2024 1624 194 (H) 70 - 130 mg/dL Final   06/19/2024 1054 333 (H) 70 - 130 mg/dL Final   06/19/2024 0631 211 (H) 70 - 130 mg/dL Final   06/18/2024 2044 231 (H) 70 - 130 mg/dL Final           XR Foot 3+ View Right  Narrative: XR FOOT 3+ VW RIGHT-     INDICATIONS: Open wound.     TECHNIQUE: 3 VIEWS OF THE RIGHT foot     COMPARISON: 12/13/2023     FINDINGS:     A soft tissue lucency at the plantar aspect of the distal foot may  correspond to patient's reported open wound, correlate clinically. Soft  tissue swelling of the forefoot suggests inflammation or infection. Mild  calcaneal spurring is present. No acute fracture, erosion, or  dislocation is identified. If there is clinical suspicion for  radiographically occult osteomyelitis, MRI or bone scan would be  recommended. Dystrophic soft tissue calcifications are noted in the  lower leg. Arterial calcifications are present.     Impression:    As described.           This report was finalized on 6/19/2024 9:48 AM by Dr. Boris Castro M.D on Workstation: [id number]       Scheduled Medications  aspirin, 81 mg, Oral, Daily  atorvastatin, 40 mg, Oral, Daily  docusate sodium, 100 mg, Oral, BID  empagliflozin, 10 mg, Oral, Daily  hydrALAZINE, 10 mg, Oral, TID  insulin glargine, 35 Units, Subcutaneous, Q12H  insulin lispro, 3-14 Units, Subcutaneous, 4x Daily AC & at Bedtime  insulin lispro, 5 Units, Subcutaneous, TID With Meals  isosorbide mononitrate, 60 mg, Oral, Q24H  metoprolol succinate XL, 100  mg, Oral, Q24H  potassium chloride ER, 40 mEq, Oral, Q4H  potassium chloride, 40 mEq, Oral, Once  sodium chloride, 10 mL, Intravenous, Q12H  spironolactone, 25 mg, Oral, Daily  [Held by provider] torsemide, 80 mg, Oral, BID    Infusions   Diet  NPO Diet NPO Type: Sips with Meds    I have personally reviewed     [x]  Laboratory   []  Microbiology   []  Radiology   []  EKG/Telemetry  []  Cardiology/Vascular   [x]  Pathology    []  Records       Assessment/Plan     Active Hospital Problems    Diagnosis  POA    **NSTEMI (non-ST elevated myocardial infarction) [I21.4]  Yes    Acute on chronic heart failure with preserved ejection fraction (HFpEF) [I50.33]  Unknown    Dyspnea [R06.00]  Unknown    Elevated brain natriuretic peptide (BNP) level [R79.89]  Unknown    PNA (pneumonia) [J18.9]  Unknown    Hypertriglyceridemia [E78.1]  Yes    CAD (coronary artery disease) [I25.10]  Yes    Chronic venous insufficiency [I87.2]  Yes    Stage 3 chronic kidney disease [N18.30]  Yes    SAWYER on autoCPAP [G47.33]  Yes    Type 2 diabetes mellitus, with long-term current use of insulin [E11.9, Z79.4]  Not Applicable    Hyperlipidemia [E78.5]  Yes    Essential hypertension [I10]  Yes      Resolved Hospital Problems   No resolved problems to display.     Patient is a 78-year-old female with a history of including but not limited to CAD, DHF,  type II DM, HTN, HLD,  CKD stage III, presented to the ED with shortness of breath    NSTEMI/multivessel CAD  -Patient underwent heart catheter 6/16/24 which showed severe three-vessel CAD with significant left main disease.  -On aspirin, statin  -Cardiology and cardiothoracic surgery following  -Initial plan was for possible CABG, however due to high risk plan for PCI with stent placement patient.      Acute on chronic diastolic heart failure/ hypertension  -Echocardiogram 06/15/2024 showed EF of 56 to 60% grade 2 diastolic dysfunction  -On spironolactone, metoprolol, isosorbide,  hydralazine,  Jardiance  -Cardiology managing        Pneumonia?  CT skin without contrast on 06/15/2024 showed patchy mild to moderate groundglass opacification is present within the bilateral lungs, left greater than right, favored represent  multifocal pneumonia in the appropriate context. Asymmetric edema is less likely.   -Low suspicion for pneumonia, symptoms improving off antibiotics.  WBC improving      Pulmonary nodules  CT scan 06/15/2024 showed showed A few scattered subcentimeter pulm nodules bilaterally.   -Will need follow-up chest CT in 12 months per Fleischner criteria per radiology      Right plantar callus with open wound  -X-ray of the foot showed soft tissue lucency at the plantar aspect of the distal foot may correspond to patient's reported open wound, correlate clinically. Soft issue swelling of the forefoot suggests inflammation or infection.  -MRI of the foot without contrast ordered to evaluate osteomyelitis    Type II DM with hyperglycemia  -Hemoglobin A1c 06/15/2024 was 8.4  -Blood glucose improved but not at goal.  Increase Lantus to 35 units twice daily, increase scheduled lispro to 5 units 3 times daily, continue SSI.  -Hypoglycemic protocol  -Monitor and adjust as indicated      CKD stage III  Creatinine stable around 1.4-1.5  Monitor daily BMP    Hypokalemia  -Remains mildly low at 3.4.  -Ordered p.o. replacement.  Monitor daily.      SAWYER  -Continue nightly CPAP.    SCDs VT prophylaxis.  Full code.  Discussed with patient.  Anticipate discharge TBD      Copied text in this note has been reviewed and is accurate as of 06/20/24.         Dictated utilizing Dragon dictation        Kit Lentz MD  [facility]  06/20/24  10:34 EDT

## 2024-06-21 ENCOUNTER — APPOINTMENT (OUTPATIENT)
Dept: MRI IMAGING | Facility: HOSPITAL | Age: 79
DRG: 321 | End: 2024-06-21
Payer: MEDICARE

## 2024-06-21 LAB
ANION GAP SERPL CALCULATED.3IONS-SCNC: 11 MMOL/L (ref 5–15)
BUN SERPL-MCNC: 35 MG/DL (ref 8–23)
BUN/CREAT SERPL: 23.5 (ref 7–25)
CALCIUM SPEC-SCNC: 8.6 MG/DL (ref 8.6–10.5)
CHLORIDE SERPL-SCNC: 99 MMOL/L (ref 98–107)
CO2 SERPL-SCNC: 23 MMOL/L (ref 22–29)
CREAT SERPL-MCNC: 1.49 MG/DL (ref 0.57–1)
DEPRECATED RDW RBC AUTO: 44.7 FL (ref 37–54)
EGFRCR SERPLBLD CKD-EPI 2021: 35.8 ML/MIN/1.73
ERYTHROCYTE [DISTWIDTH] IN BLOOD BY AUTOMATED COUNT: 14.3 % (ref 12.3–15.4)
GLUCOSE BLDC GLUCOMTR-MCNC: 105 MG/DL (ref 70–130)
GLUCOSE BLDC GLUCOMTR-MCNC: 155 MG/DL (ref 70–130)
GLUCOSE BLDC GLUCOMTR-MCNC: 176 MG/DL (ref 70–130)
GLUCOSE BLDC GLUCOMTR-MCNC: 234 MG/DL (ref 70–130)
GLUCOSE SERPL-MCNC: 103 MG/DL (ref 65–99)
HCT VFR BLD AUTO: 38.6 % (ref 34–46.6)
HGB BLD-MCNC: 12.7 G/DL (ref 12–15.9)
MAGNESIUM SERPL-MCNC: 2.9 MG/DL (ref 1.6–2.4)
MCH RBC QN AUTO: 28.5 PG (ref 26.6–33)
MCHC RBC AUTO-ENTMCNC: 32.9 G/DL (ref 31.5–35.7)
MCV RBC AUTO: 86.5 FL (ref 79–97)
PHOSPHATE SERPL-MCNC: 4.6 MG/DL (ref 2.5–4.5)
PLATELET # BLD AUTO: 343 10*3/MM3 (ref 140–450)
PMV BLD AUTO: 10 FL (ref 6–12)
POTASSIUM SERPL-SCNC: 4.3 MMOL/L (ref 3.5–5.2)
QT INTERVAL: 513 MS
QTC INTERVAL: 496 MS
RBC # BLD AUTO: 4.46 10*6/MM3 (ref 3.77–5.28)
SODIUM SERPL-SCNC: 133 MMOL/L (ref 136–145)
WBC NRBC COR # BLD AUTO: 15.05 10*3/MM3 (ref 3.4–10.8)

## 2024-06-21 PROCEDURE — 63710000001 INSULIN GLARGINE PER 5 UNITS: Performed by: STUDENT IN AN ORGANIZED HEALTH CARE EDUCATION/TRAINING PROGRAM

## 2024-06-21 PROCEDURE — 85027 COMPLETE CBC AUTOMATED: CPT | Performed by: STUDENT IN AN ORGANIZED HEALTH CARE EDUCATION/TRAINING PROGRAM

## 2024-06-21 PROCEDURE — 84100 ASSAY OF PHOSPHORUS: CPT | Performed by: STUDENT IN AN ORGANIZED HEALTH CARE EDUCATION/TRAINING PROGRAM

## 2024-06-21 PROCEDURE — 63710000001 INSULIN LISPRO (HUMAN) PER 5 UNITS: Performed by: STUDENT IN AN ORGANIZED HEALTH CARE EDUCATION/TRAINING PROGRAM

## 2024-06-21 PROCEDURE — 93010 ELECTROCARDIOGRAM REPORT: CPT | Performed by: INTERNAL MEDICINE

## 2024-06-21 PROCEDURE — 97530 THERAPEUTIC ACTIVITIES: CPT

## 2024-06-21 PROCEDURE — 83735 ASSAY OF MAGNESIUM: CPT | Performed by: STUDENT IN AN ORGANIZED HEALTH CARE EDUCATION/TRAINING PROGRAM

## 2024-06-21 PROCEDURE — 80048 BASIC METABOLIC PNL TOTAL CA: CPT | Performed by: STUDENT IN AN ORGANIZED HEALTH CARE EDUCATION/TRAINING PROGRAM

## 2024-06-21 PROCEDURE — 97162 PT EVAL MOD COMPLEX 30 MIN: CPT

## 2024-06-21 PROCEDURE — 93005 ELECTROCARDIOGRAM TRACING: CPT | Performed by: STUDENT IN AN ORGANIZED HEALTH CARE EDUCATION/TRAINING PROGRAM

## 2024-06-21 PROCEDURE — 82948 REAGENT STRIP/BLOOD GLUCOSE: CPT

## 2024-06-21 PROCEDURE — 99232 SBSQ HOSP IP/OBS MODERATE 35: CPT | Performed by: NURSE PRACTITIONER

## 2024-06-21 PROCEDURE — 73718 MRI LOWER EXTREMITY W/O DYE: CPT

## 2024-06-21 RX ORDER — DOXYCYCLINE 100 MG/1
100 CAPSULE ORAL EVERY 12 HOURS SCHEDULED
Status: DISCONTINUED | OUTPATIENT
Start: 2024-06-21 | End: 2024-06-22 | Stop reason: HOSPADM

## 2024-06-21 RX ADMIN — DOXYCYCLINE 100 MG: 100 CAPSULE ORAL at 11:50

## 2024-06-21 RX ADMIN — INSULIN GLARGINE 25 UNITS: 100 INJECTION, SOLUTION SUBCUTANEOUS at 21:09

## 2024-06-21 RX ADMIN — INSULIN LISPRO 3 UNITS: 100 INJECTION, SOLUTION INTRAVENOUS; SUBCUTANEOUS at 16:18

## 2024-06-21 RX ADMIN — SPIRONOLACTONE 25 MG: 25 TABLET, FILM COATED ORAL at 08:15

## 2024-06-21 RX ADMIN — INSULIN GLARGINE 25 UNITS: 100 INJECTION, SOLUTION SUBCUTANEOUS at 08:17

## 2024-06-21 RX ADMIN — ASPIRIN 81 MG: 81 TABLET, COATED ORAL at 08:15

## 2024-06-21 RX ADMIN — DOXYCYCLINE 100 MG: 100 CAPSULE ORAL at 21:09

## 2024-06-21 RX ADMIN — HYDRALAZINE HYDROCHLORIDE 10 MG: 10 TABLET ORAL at 16:17

## 2024-06-21 RX ADMIN — INSULIN LISPRO 5 UNITS: 100 INJECTION, SOLUTION INTRAVENOUS; SUBCUTANEOUS at 16:17

## 2024-06-21 RX ADMIN — HYDRALAZINE HYDROCHLORIDE 10 MG: 10 TABLET ORAL at 21:09

## 2024-06-21 RX ADMIN — INSULIN LISPRO 5 UNITS: 100 INJECTION, SOLUTION INTRAVENOUS; SUBCUTANEOUS at 08:17

## 2024-06-21 RX ADMIN — CLOPIDOGREL BISULFATE 75 MG: 75 TABLET, FILM COATED ORAL at 12:05

## 2024-06-21 RX ADMIN — Medication 10 ML: at 08:21

## 2024-06-21 RX ADMIN — ATORVASTATIN CALCIUM 40 MG: 20 TABLET, FILM COATED ORAL at 08:15

## 2024-06-21 RX ADMIN — INSULIN LISPRO 5 UNITS: 100 INJECTION, SOLUTION INTRAVENOUS; SUBCUTANEOUS at 11:50

## 2024-06-21 RX ADMIN — ISOSORBIDE MONONITRATE 60 MG: 60 TABLET, EXTENDED RELEASE ORAL at 08:16

## 2024-06-21 RX ADMIN — DOCUSATE SODIUM 100 MG: 100 CAPSULE, LIQUID FILLED ORAL at 21:09

## 2024-06-21 RX ADMIN — EMPAGLIFLOZIN 10 MG: 10 TABLET, FILM COATED ORAL at 08:15

## 2024-06-21 RX ADMIN — Medication 10 ML: at 21:09

## 2024-06-21 RX ADMIN — INSULIN LISPRO 3 UNITS: 100 INJECTION, SOLUTION INTRAVENOUS; SUBCUTANEOUS at 21:09

## 2024-06-21 NOTE — PLAN OF CARE
Goal Outcome Evaluation:  Plan of Care Reviewed With: patient           Outcome Evaluation: Pt s/p LHC with multiple stents yesterday evening. R radial dressing clean, dry, intact. Pt ambulated the combs with PT today. CM talked to [facility], possible bed available tomorrow. VS stable, NSR to hyacinth on tele. Ambulating to BR with rollater with standby assist. Continue with current POC and update as needed.

## 2024-06-21 NOTE — PLAN OF CARE
Goal Outcome Evaluation:  Plan of Care Reviewed With: patient           Outcome Evaluation: S/p heart cath with stent placement x4, right radial site. brusing noted at site, otherwise soft and no bleeding. dressing c/d/i. MRI of foot completed over night. son at bedside, upset after procedure states was not updated on what was done during the cath, states will be back today and wants to talk with provider. tele SB. compliant with home CPAP machine at bedside. VSS. ambulatory standby assist with rollater. plan of care ongoing, will update as needed.

## 2024-06-21 NOTE — PLAN OF CARE
Goal Outcome Evaluation:              Outcome Evaluation: Pt is 79 yo female admitted to [facility] with acute on chronic HF, MI, s/p heart cath. PMH significant for CKD, SAWYER, obesity, DM. Patient lives with son, uses cane for household mobility, rollator for community mobility. Today, pt awake and alert, son at bedside, agreeable to therapy. patient has no c/o pain,states she is very fatigued. Patient performed bed mobility with CGA, sit to stand with CGA, ambulated short distance with rollator and CGA. Pt has impairments consisting of generalized weakness, decreased activity tolerance and would benefit from skilled PT. d/c plans are SNU for short term rehab.      Anticipated Discharge Disposition (PT): skilled nursing facility

## 2024-06-21 NOTE — PROGRESS NOTES
Name: Denisse Chavez ADMIT: 6/15/2024   : 1945  PCP: Surekha Mcdonnell MD    MRN: [id number] LOS: 6 days   AGE/SEX: 78 y.o. female  ROOM: [facility]     Subjective   Subjective   Patient seen this morning.  Status post heart cath with stent placement yesterday.  Denies chest pain, shortness of breath, fevers, chills.      Review of Systems   As above    Objective   Objective   Vital Signs  Temp:  [97.2 °F (36.2 °C)-98.4 °F (36.9 °C)] 97.2 °F (36.2 °C)  Heart Rate:  [51-63] 53  Resp:  [10-19] 16  BP: (117-174)/(55-93) 139/73  SpO2:  [95 %-100 %] 95 %  on   ;   Device (Oxygen Therapy): room air  Body mass index is 31.9 kg/m².  Physical Exam    General: Alert, laying in bed, not in distress,  HEENT: Normocephalic, atraumatic  CV: Regular rate and rhythm, no murmurs rubs or gallops  Lungs: CTA anteriorly, no wheezing,  Abdomen: Soft, nontender, nondistended  Extremities: Trace-1+ bilateral extremity edema, no cyanosis    Results Review     I reviewed the patient's new clinical results.  Results from last 7 days   Lab Units 24  0426   WBC 10*3/mm3 15.05* 11.10* 12.80* 9.42   HEMOGLOBIN g/dL 12.7 13.2 13.3 12.9   PLATELETS 10*3/mm3 343 356 328 273     Results from last 7 days   Lab Units 24  0624  0426   SODIUM mmol/L 133*  --  137 139 136   POTASSIUM mmol/L 4.3 4.3 3.4* 3.4* 3.3*   CHLORIDE mmol/L 99  --  102 99 97*   CO2 mmol/L 23.0  --  23.0 24.2 24.6   BUN mg/dL 35*  --  34* 31* 29*   CREATININE mg/dL 1.49*  --  1.49* 1.45* 1.44*   GLUCOSE mg/dL 103*  --  231* 201* 153*   Estimated Creatinine Clearance: 32.7 mL/min (A) (by C-G formula based on SCr of 1.49 mg/dL (H)).    Results from last 7 days   Lab Units 24  0627 24  0404 24  0451 24  0426 24  0425 06/15/24  0904   CALCIUM mg/dL 8.6 9.3 8.7 8.3*   < > 8.8   MAGNESIUM mg/dL 2.9* 2.4 2.4  --   --  2.2   PHOSPHORUS  mg/dL 4.6* 4.8*  --   --   --   --     < > = values in this interval not displayed.     Results from last 7 days   Lab Units 06/15/24  1215 06/15/24  0904   PROCALCITONIN ng/mL  --  0.49*   LACTATE mmol/L 1.7 2.1*     COVID19   Date Value Ref Range Status   06/15/2024 Not Detected Not Detected - Ref. Range Final   05/06/2020 Not Detected Not Detected - Ref. Range Final     Glucose   Date/Time Value Ref Range Status   06/21/2024 0625 105 70 - 130 mg/dL Final   06/20/2024 2057 105 70 - 130 mg/dL Final   06/20/2024 1050 166 (H) 70 - 130 mg/dL Final   06/20/2024 0637 208 (H) 70 - 130 mg/dL Final   06/20/2024 0016 233 (H) 70 - 130 mg/dL Final   06/19/2024 2044 246 (H) 70 - 130 mg/dL Final   06/19/2024 1624 194 (H) 70 - 130 mg/dL Final           MRI Foot Right Without Contrast  Narrative: Patient: JENIFER CROWE  Time Out: 05:43  Exam(s): MRI RIGHT FOOT Without Contrast     EXAM:    MR Right Lower Extremity Without Intravenous Contrast, Foot    CLINICAL HISTORY:     Reason for exam: rule out osteomylitis due to diabetic foot ulcer.    TECHNIQUE:    Multiplanar magnetic resonance images of the right foot without   intravenous contrast.    COMPARISON:    No relevant prior studies available.    FINDINGS:   LIGAMENTS:    Medial collateral:  Unremarkable.    Lateral collateral:  Unremarkable.    Lisfranc:  Unremarkable.     TENDONS:    Flexor:  Unremarkable.    Extensor:  Unremarkable.    Peroneal:  Unremarkable.    Tibialis anterior:  Unremarkable.    Tibialis posterior:  Unremarkable.      Muscles:  Some increased T2 signal intensity demonstrated within the   plantar muscle group within the visualized foot.    Fluid:  Unremarkable.  No joint effusion.    Sinus tarsi:  Unremarkable as visualized.    Tarsal tunnel:  Unremarkable.    Plantar fascia:  Unremarkable.    Cartilage:  Unremarkable.    Bones joints:  Unremarkable.  No acute fracture.    Soft tissues:  There is some diffuse subcutaneous and deep soft tissue   edema.   More focal region of wound demonstrated presumably just superior   to the medial malleolus    IMPRESSION:         Some cellulitis present, presumed wound medially.  Nonspecific   increased T2 signal intensity within the muscle structures probably   related to the same this can also be seen with diabetes.  No findings to   suggest osteomyelitis.  Impression: Electronically signed by Sam Alfred MD on 06-21-24 at 0543    Scheduled Medications  aspirin, 81 mg, Oral, Daily  atorvastatin, 40 mg, Oral, Daily  clopidogrel, 75 mg, Oral, Daily  docusate sodium, 100 mg, Oral, BID  doxycycline, 100 mg, Oral, Q12H  empagliflozin, 10 mg, Oral, Daily  hydrALAZINE, 10 mg, Oral, TID  insulin glargine, 25 Units, Subcutaneous, Q12H  insulin lispro, 3-14 Units, Subcutaneous, 4x Daily AC & at Bedtime  insulin lispro, 5 Units, Subcutaneous, TID With Meals  isosorbide mononitrate, 60 mg, Oral, Q24H  metoprolol succinate XL, 100 mg, Oral, Q24H  potassium chloride, 40 mEq, Oral, Once  sodium chloride, 10 mL, Intravenous, Q12H  spironolactone, 25 mg, Oral, Daily  [Held by provider] torsemide, 80 mg, Oral, BID    Infusions   Diet  Diet: Cardiac; Healthy Heart (2-3 Na+); Fluid Consistency: Thin (IDDSI 0)    I have personally reviewed     [x]  Laboratory   []  Microbiology   [x]  Radiology   []  EKG/Telemetry  []  Cardiology/Vascular   []  Pathology    []  Records       Assessment/Plan     Active Hospital Problems    Diagnosis  POA    **NSTEMI (non-ST elevated myocardial infarction) [I21.4]  Yes    Acute on chronic heart failure with preserved ejection fraction (HFpEF) [I50.33]  Unknown    Dyspnea [R06.00]  Unknown    Elevated brain natriuretic peptide (BNP) level [R79.89]  Unknown    PNA (pneumonia) [J18.9]  Unknown    Hypertriglyceridemia [E78.1]  Yes    CAD (coronary artery disease) [I25.10]  Yes    Chronic venous insufficiency [I87.2]  Yes    Stage 3 chronic kidney disease [N18.30]  Yes    SAWYER on autoCPAP [G47.33]  Yes    Type 2  diabetes mellitus, with long-term current use of insulin [E11.9, Z79.4]  Not Applicable    Hyperlipidemia [E78.5]  Yes    Essential hypertension [I10]  Yes      Resolved Hospital Problems   No resolved problems to display.     Patient is a 78-year-old female with a history of including but not limited to CAD, DHF,  type II DM, HTN, HLD,  CKD stage III, presented to the ED with shortness of breath    NSTEMI/multivessel CAD  -Patient underwent heart catheter 6/16/24 which showed severe three-vessel CAD with significant left main disease.  -Cardiology following  -Status post PCI with stent placement 06/20  -On Plavix, statin, aspirin, metoprolol      Acute on chronic diastolic heart failure/ hypertension  -Echocardiogram 06/15/2024 showed EF of 56 to 60% grade 2 diastolic dysfunction  -On spironolactone, metoprolol, isosorbide,  hydralazine, Jardiance  -E cardiology managing        Pneumonia?  CT skin without contrast on 06/15/2024 showed patchy mild to moderate groundglass opacification is present within the bilateral lungs, left greater than right, favored represent  multifocal pneumonia in the appropriate context. Asymmetric edema is less likely.   -Completed 5-day course of ceftriaxone for possible pneumonia  -WBC is up to 15.05, suspect reactive secondary to heart cath 06/20,      Pulmonary nodules  CT scan 06/15/2024 showed showed A few scattered subcentimeter pulm nodules bilaterally.   -Will need follow-up chest CT in 12 months per Fleischner criteria per radiology      Right plantar callus with open wound  -X-ray of the foot showed soft tissue lucency at the plantar aspect of the distal foot may correspond to patient's reported open wound, correlate clinically. Soft issue swelling of the forefoot suggests inflammation or infection.  -MRI of the foot    No findings to  suggest osteomyelitis, findings consistent with cellulitis  -Initiated on doxycycline for 7 days    Type II DM with hyperglycemia  -Hemoglobin  A1c 06/15/2024 was 8.4  -Blood glucose stable, continue current regimen      CKD stage III  Creatinine stable around 1.4-1.5  Monitor daily BMP    Hypokalemia  Improved.  Monitor.       SAWYER  -Continue nightly CPAP.    SCDs VT prophylaxis.  Full code.  Discussed with patient.  Anticipate discharge SNF when cleared by cardiology, likely in 1-2 days      Copied text in this note has been reviewed and is accurate as of 06/21/24.         Dictated utilizing Dragon dictation        Kit Lentz MD  [facility]  06/21/24  10:27 EDT

## 2024-06-21 NOTE — PROGRESS NOTES
"[facility]    Patient Name: Denisse Chavez  :1945  78 y.o.  LOS: 6  Encounter Provider: MEREDITH Gipson      Patient Care Team:  Surekha Mcdonnell MD as PCP - General (Internal Medicine)  Warner Tang MD as Cardiologist (Cardiology)  Sergio Eagle MD as Consulting Physician (Urology)  Juan Negrete MD as Consulting Physician (Endocrinology)  Miguel Angel Barrett DPM as Consulting Physician (Podiatry)  Gabriel Montenegro MD as Consulting Physician (Nephrology)  Linda Rodriguez MD (Ophthalmology)  Ginger Ross APRN as Nurse Practitioner (Nurse Practitioner)  Jr Shawn Abraham MD as Surgeon (Cardiothoracic Surgery)    Chief Complaint:   CAD, NSTEMI     Interval History: Resting in bed. Radial cath site with clean, dry dressing in place.  Mild ecchymosis to site but not painful.       Objective   Vital Signs  Temp:  [97.2 °F (36.2 °C)-98.2 °F (36.8 °C)] 97.2 °F (36.2 °C)  Heart Rate:  [51-63] 53  Resp:  [10-19] 17  BP: (122-174)/(49-86) 137/49    Intake/Output Summary (Last 24 hours) at 2024 1251  Last data filed at 2024 1203  Gross per 24 hour   Intake 2240 ml   Output 2000 ml   Net 240 ml     Flowsheet Rows      Flowsheet Row First Filed Value   Admission Height 162.6 cm (64\") Documented at 06/15/2024 1153   Admission Weight 91.6 kg (202 lb) Documented at 06/15/2024 1153              Vitals reviewed.   Constitutional:       General: Not in acute distress.     Appearance: Well-developed. Not diaphoretic.   HENT:      Head: Normocephalic.   Pulmonary:      Effort: Pulmonary effort is normal. No respiratory distress.      Breath sounds: Normal breath sounds. No wheezing. No rhonchi. No rales.   Cardiovascular:      Normal rate. Regular rhythm.      Comments: Right radial cath site dressing CDI with mild ecchymosis  Pulses:     Radial: 2+ bilaterally.  Edema:     Peripheral edema present.     Pretibial: bilateral trace edema of the " "pretibial area.     Ankle: bilateral trace edema of the ankle.     Feet: bilateral trace edema of the feet.  Skin:     General: Skin is warm and dry. There is no cyanosis.      Findings: No rash.   Neurological:      Mental Status: Alert and oriented to person, place, and time.   Psychiatric:         Behavior: Behavior normal.         Thought Content: Thought content normal.         Judgment: Judgment normal.           Pertinent Test Results:  Results from last 7 days   Lab Units 06/21/24  0627 06/20/24  2020 06/20/24  0404 06/19/24  0451 06/18/24  0426 06/17/24  0457 06/16/24  0425 06/15/24  0904   SODIUM mmol/L 133*  --  137 139 136 138 135* 131*   POTASSIUM mmol/L 4.3 4.3 3.4* 3.4* 3.3* 3.1* 3.4* 4.0   CHLORIDE mmol/L 99  --  102 99 97* 101 96* 96*   CO2 mmol/L 23.0  --  23.0 24.2 24.6 26.0 25.1 20.5*   BUN mg/dL 35*  --  34* 31* 29* 27* 25* 18   CREATININE mg/dL 1.49*  --  1.49* 1.45* 1.44* 1.41* 1.48* 1.24*   GLUCOSE mg/dL 103*  --  231* 201* 153* 152* 204* 321*   CALCIUM mg/dL 8.6  --  9.3 8.7 8.3* 8.1* 8.5* 8.8     Results from last 7 days   Lab Units 06/16/24  0425 06/15/24  1215 06/15/24  0904   HSTROP T ng/L 1,337* 550* 571*     Results from last 7 days   Lab Units 06/21/24  0627 06/20/24  0404 06/19/24  0451 06/18/24  0426 06/17/24  0457 06/16/24  0425 06/15/24  0904   WBC 10*3/mm3 15.05* 11.10* 12.80* 9.42 8.38 10.31 11.81*   HEMOGLOBIN g/dL 12.7 13.2 13.3 12.9 12.3 11.7* 13.5   HEMATOCRIT % 38.6 40.6 40.8 39.9 37.3 36.5 38.4   PLATELETS 10*3/mm3 343 356 328 273 231 221 258         Results from last 7 days   Lab Units 06/21/24  0627 06/20/24  0404 06/19/24  0451 06/15/24  0904   MAGNESIUM mg/dL 2.9* 2.4 2.4 2.2           Invalid input(s): \"LDLCALC\"  Results from last 7 days   Lab Units 06/15/24  0904   PROBNP pg/mL 5,512.0*               Medication Review:   aspirin, 81 mg, Oral, Daily  atorvastatin, 40 mg, Oral, Daily  clopidogrel, 75 mg, Oral, Daily  docusate sodium, 100 mg, Oral, BID  doxycycline, 100 " mg, Oral, Q12H  empagliflozin, 10 mg, Oral, Daily  hydrALAZINE, 10 mg, Oral, TID  insulin glargine, 25 Units, Subcutaneous, Q12H  insulin lispro, 3-14 Units, Subcutaneous, 4x Daily AC & at Bedtime  insulin lispro, 5 Units, Subcutaneous, TID With Meals  isosorbide mononitrate, 60 mg, Oral, Q24H  metoprolol succinate XL, 100 mg, Oral, Q24H  potassium chloride, 40 mEq, Oral, Once  sodium chloride, 10 mL, Intravenous, Q12H  spironolactone, 25 mg, Oral, Daily  torsemide, 80 mg, Oral, BID              Assessment & Plan     Active Hospital Problems    Diagnosis  POA    **NSTEMI (non-ST elevated myocardial infarction) [I21.4]  Yes    Acute on chronic heart failure with preserved ejection fraction (HFpEF) [I50.33]  Unknown    Dyspnea [R06.00]  Unknown    Elevated brain natriuretic peptide (BNP) level [R79.89]  Unknown    PNA (pneumonia) [J18.9]  Unknown    Hypertriglyceridemia [E78.1]  Yes    CAD (coronary artery disease) [I25.10]  Yes    Chronic venous insufficiency [I87.2]  Yes    Stage 3 chronic kidney disease [N18.30]  Yes    SAWYER on autoCPAP [G47.33]  Yes    Type 2 diabetes mellitus, with long-term current use of insulin [E11.9, Z79.4]  Not Applicable    Hyperlipidemia [E78.5]  Yes    Essential hypertension [I10]  Yes      Resolved Hospital Problems   No resolved problems to display.        1. Acute on chronic HFpEF. On GDMT with Jardiance, Toprol XL, spironolactone and torsemide. Volume status has improved overall.  Renal function stable after cardiac cath yesterday.  Will resume torsemide.  2. NSTEMI/multivessel CAD. S/p PCI x4 yesterday. Plan for second stage PCI in 2-4 weeks with Dr. Paredes.  3. CKD3. Renal function stable.   4. ? PNA. Completed Ceftriaxone  5. SAWYER  6. Obesity  7. DM2  8. Chronic venous insufficiency and diabetic foot ulcers  9. Mild MS, mean gradient 5mm Hg           MEREDITH Gipson  [facility]   [facility]  [address] [place] [address] - [address]  [place]  [address]  Office: [phone]    06/21/24  12:51 EDT

## 2024-06-21 NOTE — CASE MANAGEMENT/SOCIAL WORK
Continued Stay Note  [facility]     Patient Name: Denisse Chavez  MRN: [id number]  Today's Date: 6/21/2024    Admit Date: 6/15/2024    Plan: [facility]- bed available over the weekend   Discharge Plan       Row Name 06/21/24 1139       Plan    Plan [facility]- bed available over the weekend    Patient/Family in Agreement with Plan yes    Plan Comments CCP confirmed with [facility] able to accept and has bed available over the weekend- if patient admits Saturday can admit after 4PM. CCP called and spoke with patient's son, Anil, who is in agreemenent. Anil states either he or a friend can provide transport. Pharmacy updated. Packet with patient's chart. Eileen HARPER CCP                   Discharge Codes    No documentation.                 Expected Discharge Date and Time       Expected Discharge Date Expected Discharge Time    Jun 24, 2024               Eileen Andujar RN

## 2024-06-21 NOTE — THERAPY EVALUATION
Patient Name: Denisse Chavez  : 1945    MRN: [id number]                              Today's Date: 2024       Admit Date: 6/15/2024    Visit Dx:     ICD-10-CM ICD-9-CM   1. NSTEMI (non-ST elevated myocardial infarction)  I21.4 410.70   2. Dyspnea, unspecified type  R06.00 786.09   3. Elevated brain natriuretic peptide (BNP) level  R79.89 790.99   4. Chronic renal impairment, unspecified CKD stage  N18.9 585.9   5. History of coronary artery disease  Z86.79 V12.59   6. History of diabetes mellitus  Z86.39 V12.29   7. Acute on chronic heart failure with preserved ejection fraction (HFpEF)  I50.33 428.23   8. Coronary artery disease involving native coronary artery of native heart without angina pectoris  I25.10 414.01   9. Status post insertion of drug-eluting stent into left anterior descending (LAD) artery  Z95.5 V45.82   10. Presence of drug-eluting stent in left circumflex coronary artery  Z95.5 V45.82     Patient Active Problem List   Diagnosis    Type 2 diabetes mellitus, with long-term current use of insulin    Hyperlipidemia    Essential hypertension    Angiomyolipoma of kidney    Lumbar spinal stenosis    SAWYER on autoCPAP    Stage 3 chronic kidney disease    Chronic venous insufficiency    CAD (coronary artery disease)    Circadian rhythm sleep disorder, delayed sleep phase type    Class 2 severe obesity due to excess calories with serious comorbidity and body mass index (BMI) of 39.0 to 39.9 in adult    History of colon polyps    Aortic valve sclerosis    Hypertriglyceridemia    Osteoporosis    Vitamin D deficiency    Diabetic foot ulcer    NSTEMI (non-ST elevated myocardial infarction)    Dyspnea    Elevated brain natriuretic peptide (BNP) level    PNA (pneumonia)    Acute on chronic heart failure with preserved ejection fraction (HFpEF)     Past Medical History:   Diagnosis Date    Angiomyolipoma of kidney 2016    Aortic valve sclerosis     stable 2020    Arthritis     CAD (coronary  artery disease)     MI in 1996, treated medically.  PET 6/2016 with small-medium sized lateral infarct, no ischemia.  This wall motion abnormality was seen on echo as well.    Cellulitis 07/2018    RIGHT LEG    Chronic kidney disease, stage III (moderate) 10/13/2016    Chronic venous insufficiency     Hyperlipidemia     Hypertension     Hypertriglyceridemia 9/29/2021    Low back pain     Mass of ovary 3/30/2016    Obesity (BMI 30-39.9) 12/22/2019    Sleep apnea     on CPAP.  Dr. Mueller    Spinal stenosis     Type 2 diabetes mellitus     Uterine leiomyoma 3/30/2016     Past Surgical History:   Procedure Laterality Date    CARDIAC CATHETERIZATION N/A 6/16/2024    Procedure: Coronary angiography;  Surgeon: Jeffery Myrick MD;  Location:  [facility];  Service: Cardiovascular;  Laterality: N/A;  NO LV GRAM    CARDIAC CATHETERIZATION N/A 6/16/2024    Procedure: Left Heart Cath;  Surgeon: Jeffery Myrick MD;  Location:  [facility];  Service: Cardiovascular;  Laterality: N/A;    CATARACT EXTRACTION Bilateral     X2     COLONOSCOPY N/A 8/29/2018    Procedure: COLONOSCOPY to CECUM WITH HOT SNARE POLYPECTOMY;  Surgeon: Neal Reilly MD;  Location: [facility];  Service: Gastroenterology    COLONOSCOPY N/A 12/13/2023    Procedure: COLONOSCOPY to cecum and TI with cold snare and cold biopsy polypectomies;  Surgeon: Polly Ruiz MD;  Location: [facility];  Service: General;  Laterality: N/A;  pre: screening  post: polyps    EPIDURAL BLOCK      HERNIA REPAIR      HYSTERECTOMY      TONSILLECTOMY        General Information       Row Name 06/21/24 1010          Physical Therapy Time and Intention    Document Type evaluation  -EM     Mode of Treatment individual therapy;physical therapy  -EM       Row Name 06/21/24 1010          General Information    Patient Profile Reviewed yes  -EM     Prior Level of Function independent:  uses cane inside the home (son states rollator  "will not fit inside bathroom or kitchen), uses rollator outside the home  -EM     Existing Precautions/Restrictions no known precautions/restrictions  -EM       Row Name 06/21/24 1010          Living Environment    People in Home child(reagan), adult  son reports he works \"odd hours\" and pt home alone while he is working  -EM       Row Name 06/21/24 1010          Home Main Entrance    Number of Stairs, Main Entrance six  -EM       Row Name 06/21/24 1010          Stairs Within Home, Primary    Number of Stairs, Within Home, Primary none  -EM       Row Name 06/21/24 1010          Cognition    Orientation Status (Cognition) oriented x 4  -EM       Row Name 06/21/24 1010          Safety Issues, Functional Mobility    Impairments Affecting Function (Mobility) endurance/activity tolerance;strength  -EM               User Key  (r) = Recorded By, (t) = Taken By, (c) = Cosigned By      Initials Name Provider Type    EM Rose Marie Maki, PT Physical Therapist                   Mobility       Row Name 06/21/24 1012          Bed Mobility    Bed Mobility supine-sit;sit-supine  -EM     Supine-Sit [place] (Bed Mobility) contact guard  -EM     Sit-Supine [place] (Bed Mobility) standby assist  -EM     Assistive Device (Bed Mobility) head of bed elevated  -EM     Comment, (Bed Mobility) verbal cues for scooting forward to EOB, increased difficulty since pt had cardiac cath and unable to utilize R wrist/hand to push  -EM       Row Name 06/21/24 1012          Sit-Stand Transfer    Sit-Stand [place] (Transfers) contact guard  -EM     Assistive Device (Sit-Stand Transfers) walker, 4-wheeled  -EM       Row Name 06/21/24 1012          Gait/Stairs (Locomotion)    [place] Level (Gait) contact guard  -EM     Assistive Device (Gait) walker, 4-wheeled  -EM     Distance in Feet (Gait) 30  -EM     Deviations/Abnormal Patterns (Gait) gait speed decreased  -EM     Bilateral Gait Deviations forward flexed posture  -EM     " Comment, (Gait/Stairs) no overt LOB, pt c/o fatigue  -EM               User Key  (r) = Recorded By, (t) = Taken By, (c) = Cosigned By      Initials Name Provider Type    EM Rose Marie Maki PT Physical Therapist                   Obj/Interventions       Row Name 06/21/24 1014          Range of Motion Comprehensive    General Range of Motion other (see comments)  -EM     Comment, General Range of Motion pt has limited L shoulder flx/abd (about 90), chronic issue per pt report  -EM       Row Name 06/21/24 1014          Strength Comprehensive (MMT)    General Manual Muscle Testing (MMT) Assessment other (see comments)  -EM     Comment, General Manual Muscle Testing (MMT) Assessment no focal deficits identified, generalized weakness noted  -EM       Row Name 06/21/24 1014          Balance    Balance Assessment sitting static balance;sitting dynamic balance;standing static balance;standing dynamic balance  -EM     Static Sitting Balance standby assist  -EM     Dynamic Sitting Balance standby assist  -EM     Position, Sitting Balance sitting edge of bed  -EM     Static Standing Balance contact guard  -EM     Dynamic Standing Balance contact guard  -EM     Position/Device Used, Standing Balance walker, rolling  -EM       Row Name 06/21/24 1014          Sensory Assessment (Somatosensory)    Sensory Assessment (Somatosensory) sensation intact  -EM               User Key  (r) = Recorded By, (t) = Taken By, (c) = Cosigned By      Initials Name Provider Type    EM Rose Marie Maki PT Physical Therapist                   Goals/Plan       Row Name 06/21/24 1018          Bed Mobility Goal 1 (PT)    Activity/Assistive Device (Bed Mobility Goal 1, PT) bed mobility activities, all  -EM     [place] Level/Cues Needed (Bed Mobility Goal 1, PT) supervision required  -EM     Time Frame (Bed Mobility Goal 1, PT) 1 week  -EM       Row Name 06/21/24 1018          Transfer Goal 1 (PT)    Activity/Assistive Device (Transfer Goal  1, PT) transfers, all;walker, rolling  -EM     [place] Level/Cues Needed (Transfer Goal 1, PT) supervision required  -EM     Time Frame (Transfer Goal 1, PT) 1 week  -EM       Row Name 06/21/24 1018          Gait Training Goal 1 (PT)    Activity/Assistive Device (Gait Training Goal 1, PT) gait (walking locomotion);walker, rolling  -EM     [place] Level (Gait Training Goal 1, PT) standby assist  -EM     Distance (Gait Training Goal 1, PT) 100  -EM     Time Frame (Gait Training Goal 1, PT) 1 week  -EM       Row Name 06/21/24 1018          Therapy Assessment/Plan (PT)    Planned Therapy Interventions (PT) bed mobility training;gait training;home exercise program;patient/family education;transfer training;strengthening  -EM               User Key  (r) = Recorded By, (t) = Taken By, (c) = Cosigned By      Initials Name Provider Type    EM Rose Marie Maki, PT Physical Therapist                   Clinical Impression       Row Name 06/21/24 1015          Pain    Pretreatment Pain Rating 0/10 - no pain  -EM       Row Name 06/21/24 1015          Plan of Care Review    Outcome Evaluation Pt is 79 yo female admitted to [facility] with acute on chronic HF, MI, s/p heart cath. PMH significant for CKD, SAWYER, obesity, DM. Patient lives with son, uses cane for household mobility, rollator for community mobility. Today, pt awake and alert, son at bedside, agreeable to therapy. patient has no c/o pain,states she is very fatigued. Patient performed bed mobility with CGA, sit to stand with CGA, ambulated short distance with rollator and CGA. Pt has impairments consisting of generalized weakness, decreased activity tolerance and would benefit from skilled PT. d/c plans are SNU for short term rehab.  -EM       Row Name 06/21/24 1015          Therapy Assessment/Plan (PT)    Patient/Family Therapy Goals Statement (PT) go to rehab to get stronger  -EM     Rehab Potential (PT) good, to achieve stated therapy goals  -EM     Criteria for  Skilled Interventions Met (PT) yes;skilled treatment is necessary  -EM     Therapy Frequency (PT) 5 times/wk  -EM       Row Name 06/21/24 1015          Positioning and Restraints    Pre-Treatment Position in bed  -EM     Post Treatment Position bed  -EM     In Bed supine;call light within reach;with family/caregiver;notified nsg  -EM               User Key  (r) = Recorded By, (t) = Taken By, (c) = Cosigned By      Initials Name Provider Type    Rose Marie Eller PT Physical Therapist                   Outcome Measures       Row Name 06/21/24 1019          How much help from another person do you currently need...    Turning from your back to your side while in flat bed without using bedrails? 3  -EM     Moving from lying on back to sitting on the side of a flat bed without bedrails? 3  -EM     Moving to and from a bed to a chair (including a wheelchair)? 3  -EM     Standing up from a chair using your arms (e.g., wheelchair, bedside chair)? 3  -EM     Climbing 3-5 steps with a railing? 3  -EM     To walk in hospital room? 3  -EM     AM-PAC 6 Clicks Score (PT) 18  -EM     Highest Level of Mobility Goal 6 --> Walk 10 steps or more  -EM               User Key  (r) = Recorded By, (t) = Taken By, (c) = Cosigned By      Initials Name Provider Type    Rose Marie Eller PT Physical Therapist                                 Physical Therapy Education       Title: PT OT SLP Therapies (In Progress)       Topic: Physical Therapy (In Progress)       Point: Mobility training (Done)       Learning Progress Summary             Patient Acceptance, E, VU by EM at 6/21/2024 1019                         Point: Home exercise program (Not Started)       Learner Progress:  Not documented in this visit.              Point: Body mechanics (Not Started)       Learner Progress:  Not documented in this visit.              Point: Precautions (Not Started)       Learner Progress:  Not documented in this visit.                               User Key       Initials Effective Dates Name Provider Type Discipline    EM 06/16/21 -  Rose Marie Maki PT Physical Therapist PT                  PT Recommendation and Plan  Planned Therapy Interventions (PT): bed mobility training, gait training, home exercise program, patient/family education, transfer training, strengthening  Outcome Evaluation: Pt is 77 yo female admitted to [facility] with acute on chronic HF, MI, s/p heart cath. PMH significant for CKD, SAWYER, obesity, DM. Patient lives with son, uses cane for household mobility, rollator for community mobility. Today, pt awake and alert, son at bedside, agreeable to therapy. patient has no c/o pain,states she is very fatigued. Patient performed bed mobility with CGA, sit to stand with CGA, ambulated short distance with rollator and CGA. Pt has impairments consisting of generalized weakness, decreased activity tolerance and would benefit from skilled PT. d/c plans are SNU for short term rehab.     Time Calculation:         PT Charges       Row Name 06/21/24 1020             Time Calculation    Start Time 0941  -EM      Stop Time 0957  -EM      Time Calculation (min) 16 min  -EM      PT Received On 06/21/24  -EM      PT - Next Appointment 06/24/24  -EM      PT Goal Re-Cert Due Date 06/28/24  -EM         Time Calculation- PT    Total Timed Code Minutes- PT 10 minute(s)  -EM         Timed Charges    32273 - PT Therapeutic Activity Minutes 10  -EM         Total Minutes    Timed Charges Total Minutes 10  -EM       Total Minutes 10  -EM                User Key  (r) = Recorded By, (t) = Taken By, (c) = Cosigned By      Initials Name Provider Type    EM Rose Marie Maki PT Physical Therapist                  Therapy Charges for Today       Code Description Service Date Service Provider Modifiers Qty    50115559765  PT THERAPEUTIC ACT EA 15 MIN 6/21/2024 Rose Marie Maki, PT GP 1    84598827135 HC PT EVAL MOD COMPLEXITY 2 6/21/2024 Rose Marie Maki  PT GP 1            PT G-Codes  AM-PAC 6 Clicks Score (PT): 18  PT Discharge Summary  Anticipated Discharge Disposition (PT): skilled nursing facility    Rose Marie Maki, PT  6/21/2024

## 2024-06-22 VITALS
TEMPERATURE: 99.1 F | BODY MASS INDEX: 31.73 KG/M2 | OXYGEN SATURATION: 95 % | HEART RATE: 60 BPM | SYSTOLIC BLOOD PRESSURE: 141 MMHG | HEIGHT: 64 IN | RESPIRATION RATE: 18 BRPM | DIASTOLIC BLOOD PRESSURE: 54 MMHG | WEIGHT: 185.85 LBS

## 2024-06-22 PROBLEM — I21.9 TYPE 1 MYOCARDIAL INFARCTION: Status: ACTIVE | Noted: 2024-06-22

## 2024-06-22 PROBLEM — J18.9 PNEUMONIA, UNSPECIFIED ORGANISM: Status: ACTIVE | Noted: 2024-06-22

## 2024-06-22 LAB
ACT BLD: 287 SECONDS (ref 82–152)
ACT BLD: 299 SECONDS (ref 82–152)
ACT BLD: 312 SECONDS (ref 82–152)
ACT BLD: 348 SECONDS (ref 82–152)
ACT BLD: 379 SECONDS (ref 82–152)
ACT BLD: 446 SECONDS (ref 82–152)
ALBUMIN SERPL-MCNC: 3.9 G/DL (ref 3.5–5.2)
ALP SERPL-CCNC: 76 U/L (ref 39–117)
ALT SERPL W P-5'-P-CCNC: 12 U/L (ref 1–33)
ANION GAP SERPL CALCULATED.3IONS-SCNC: 13.8 MMOL/L (ref 5–15)
AST SERPL-CCNC: 33 U/L (ref 1–32)
BILIRUB CONJ SERPL-MCNC: <0.2 MG/DL (ref 0–0.3)
BILIRUB INDIRECT SERPL-MCNC: ABNORMAL MG/DL
BILIRUB SERPL-MCNC: 0.4 MG/DL (ref 0–1.2)
BUN SERPL-MCNC: 30 MG/DL (ref 8–23)
BUN/CREAT SERPL: 21 (ref 7–25)
CALCIUM SPEC-SCNC: 9.9 MG/DL (ref 8.6–10.5)
CHLORIDE SERPL-SCNC: 100 MMOL/L (ref 98–107)
CO2 SERPL-SCNC: 23.2 MMOL/L (ref 22–29)
CREAT SERPL-MCNC: 1.43 MG/DL (ref 0.57–1)
DEPRECATED RDW RBC AUTO: 44.5 FL (ref 37–54)
EGFRCR SERPLBLD CKD-EPI 2021: 37.6 ML/MIN/1.73
ERYTHROCYTE [DISTWIDTH] IN BLOOD BY AUTOMATED COUNT: 14.1 % (ref 12.3–15.4)
GLUCOSE BLDC GLUCOMTR-MCNC: 150 MG/DL (ref 70–130)
GLUCOSE BLDC GLUCOMTR-MCNC: 230 MG/DL (ref 70–130)
GLUCOSE SERPL-MCNC: 136 MG/DL (ref 65–99)
HCT VFR BLD AUTO: 39.6 % (ref 34–46.6)
HGB BLD-MCNC: 12.8 G/DL (ref 12–15.9)
MAGNESIUM SERPL-MCNC: 2.6 MG/DL (ref 1.6–2.4)
MCH RBC QN AUTO: 28 PG (ref 26.6–33)
MCHC RBC AUTO-ENTMCNC: 32.3 G/DL (ref 31.5–35.7)
MCV RBC AUTO: 86.7 FL (ref 79–97)
PHOSPHATE SERPL-MCNC: 4.3 MG/DL (ref 2.5–4.5)
PLATELET # BLD AUTO: 343 10*3/MM3 (ref 140–450)
PMV BLD AUTO: 10 FL (ref 6–12)
POTASSIUM SERPL-SCNC: 4.1 MMOL/L (ref 3.5–5.2)
PROT SERPL-MCNC: 7 G/DL (ref 6–8.5)
RBC # BLD AUTO: 4.57 10*6/MM3 (ref 3.77–5.28)
SODIUM SERPL-SCNC: 137 MMOL/L (ref 136–145)
WBC NRBC COR # BLD AUTO: 12.85 10*3/MM3 (ref 3.4–10.8)

## 2024-06-22 PROCEDURE — 85027 COMPLETE CBC AUTOMATED: CPT | Performed by: STUDENT IN AN ORGANIZED HEALTH CARE EDUCATION/TRAINING PROGRAM

## 2024-06-22 PROCEDURE — 80048 BASIC METABOLIC PNL TOTAL CA: CPT | Performed by: STUDENT IN AN ORGANIZED HEALTH CARE EDUCATION/TRAINING PROGRAM

## 2024-06-22 PROCEDURE — 83735 ASSAY OF MAGNESIUM: CPT | Performed by: STUDENT IN AN ORGANIZED HEALTH CARE EDUCATION/TRAINING PROGRAM

## 2024-06-22 PROCEDURE — 84100 ASSAY OF PHOSPHORUS: CPT | Performed by: STUDENT IN AN ORGANIZED HEALTH CARE EDUCATION/TRAINING PROGRAM

## 2024-06-22 PROCEDURE — 82948 REAGENT STRIP/BLOOD GLUCOSE: CPT

## 2024-06-22 PROCEDURE — 80076 HEPATIC FUNCTION PANEL: CPT | Performed by: STUDENT IN AN ORGANIZED HEALTH CARE EDUCATION/TRAINING PROGRAM

## 2024-06-22 PROCEDURE — 99232 SBSQ HOSP IP/OBS MODERATE 35: CPT | Performed by: NURSE PRACTITIONER

## 2024-06-22 PROCEDURE — 63710000001 INSULIN GLARGINE PER 5 UNITS: Performed by: STUDENT IN AN ORGANIZED HEALTH CARE EDUCATION/TRAINING PROGRAM

## 2024-06-22 PROCEDURE — 63710000001 INSULIN LISPRO (HUMAN) PER 5 UNITS: Performed by: STUDENT IN AN ORGANIZED HEALTH CARE EDUCATION/TRAINING PROGRAM

## 2024-06-22 RX ORDER — INSULIN LISPRO 100 [IU]/ML
3-14 INJECTION, SOLUTION INTRAVENOUS; SUBCUTANEOUS
Start: 2024-06-22

## 2024-06-22 RX ORDER — HYDRALAZINE HYDROCHLORIDE 10 MG/1
10 TABLET, FILM COATED ORAL 3 TIMES DAILY
Status: DISCONTINUED | OUTPATIENT
Start: 2024-06-22 | End: 2024-06-22 | Stop reason: HOSPADM

## 2024-06-22 RX ORDER — DOXYCYCLINE 100 MG/1
100 CAPSULE ORAL EVERY 12 HOURS SCHEDULED
Start: 2024-06-22 | End: 2024-06-28

## 2024-06-22 RX ORDER — ISOSORBIDE MONONITRATE 60 MG/1
60 TABLET, EXTENDED RELEASE ORAL
Status: DISCONTINUED | OUTPATIENT
Start: 2024-06-22 | End: 2024-06-22 | Stop reason: HOSPADM

## 2024-06-22 RX ORDER — METOPROLOL SUCCINATE 100 MG/1
100 TABLET, EXTENDED RELEASE ORAL
Status: DISCONTINUED | OUTPATIENT
Start: 2024-06-22 | End: 2024-06-22 | Stop reason: HOSPADM

## 2024-06-22 RX ORDER — CLOPIDOGREL BISULFATE 75 MG/1
75 TABLET ORAL DAILY
Start: 2024-06-22

## 2024-06-22 RX ADMIN — INSULIN LISPRO 5 UNITS: 100 INJECTION, SOLUTION INTRAVENOUS; SUBCUTANEOUS at 09:00

## 2024-06-22 RX ADMIN — ASPIRIN 81 MG: 81 TABLET, COATED ORAL at 08:59

## 2024-06-22 RX ADMIN — INSULIN LISPRO 5 UNITS: 100 INJECTION, SOLUTION INTRAVENOUS; SUBCUTANEOUS at 12:31

## 2024-06-22 RX ADMIN — DOXYCYCLINE 100 MG: 100 CAPSULE ORAL at 08:59

## 2024-06-22 RX ADMIN — EMPAGLIFLOZIN 10 MG: 10 TABLET, FILM COATED ORAL at 08:59

## 2024-06-22 RX ADMIN — TORSEMIDE 80 MG: 20 TABLET ORAL at 08:59

## 2024-06-22 RX ADMIN — TORSEMIDE 80 MG: 20 TABLET ORAL at 00:21

## 2024-06-22 RX ADMIN — INSULIN LISPRO 3 UNITS: 100 INJECTION, SOLUTION INTRAVENOUS; SUBCUTANEOUS at 06:54

## 2024-06-22 RX ADMIN — INSULIN GLARGINE 25 UNITS: 100 INJECTION, SOLUTION SUBCUTANEOUS at 09:00

## 2024-06-22 RX ADMIN — METOPROLOL SUCCINATE 100 MG: 100 TABLET, EXTENDED RELEASE ORAL at 05:43

## 2024-06-22 RX ADMIN — ATORVASTATIN CALCIUM 40 MG: 20 TABLET, FILM COATED ORAL at 08:59

## 2024-06-22 RX ADMIN — CLOPIDOGREL BISULFATE 75 MG: 75 TABLET, FILM COATED ORAL at 08:59

## 2024-06-22 RX ADMIN — Medication 10 ML: at 09:00

## 2024-06-22 RX ADMIN — ISOSORBIDE MONONITRATE 60 MG: 60 TABLET, EXTENDED RELEASE ORAL at 05:43

## 2024-06-22 RX ADMIN — HYDRALAZINE HYDROCHLORIDE 10 MG: 10 TABLET ORAL at 05:43

## 2024-06-22 RX ADMIN — SPIRONOLACTONE 25 MG: 25 TABLET, FILM COATED ORAL at 08:59

## 2024-06-22 NOTE — CASE MANAGEMENT/SOCIAL WORK
Case Management Discharge Note      Final Note: DC to SNF    Provided Post Acute Provider List?: N/A  N/A Provider List Comment: Son wants her to go to [facility] at d/c.    Selected Continued Care - Discharged on 6/22/2024 Admission date: 6/15/2024 - Discharge disposition: Skilled Nursing Facility (DC - External)      Destination Coordination complete.      Service Provider Selected Services Address Phone Fax Patient Preferred    [facility] [address] [place], [place] [address] [phone] --              Durable Medical Equipment    No services have been selected for the patient.                Dialysis/Infusion    No services have been selected for the patient.                Home Medical Care    No services have been selected for the patient.                Therapy    No services have been selected for the patient.                Community Resources    No services have been selected for the patient.                Community & DME    No services have been selected for the patient.                         Final Discharge Disposition Code: 03 - skilled nursing facility (SNF)

## 2024-06-22 NOTE — DISCHARGE SUMMARY
Patient Name: Denisse Chavez  : 1945  MRN: [id number]    Date of Admission: 6/15/2024  Date of Discharge:  2024  Primary Care Physician: Surekha Mcdonnell MD      Chief Complaint:   Shortness of Breath and Back Pain      Discharge Diagnoses     Active Hospital Problems    Diagnosis  POA    **NSTEMI (non-ST elevated myocardial infarction) [I21.4]  Yes    Type 1 myocardial infarction [I21.9]  Yes    Pneumonia, unspecified organism [J18.9]  Yes    Acute on chronic heart failure with preserved ejection fraction (HFpEF) [I50.33]  Unknown    Dyspnea [R06.00]  Unknown    Elevated brain natriuretic peptide (BNP) level [R79.89]  Unknown    PNA (pneumonia) [J18.9]  Unknown    Hypertriglyceridemia [E78.1]  Yes    CAD (coronary artery disease) [I25.10]  Yes    Chronic venous insufficiency [I87.2]  Yes    Stage 3 chronic kidney disease [N18.30]  Yes    SAWYER on autoCPAP [G47.33]  Yes    Type 2 diabetes mellitus, with long-term current use of insulin [E11.9, Z79.4]  Not Applicable    Hyperlipidemia [E78.5]  Yes    Essential hypertension [I10]  Yes      Resolved Hospital Problems   No resolved problems to display.        Hospital Course     atient is a 78-year-old female with a history of including but not limited to CAD, DHF,  type II DM, HTN, HLD,  CKD stage III, presented to the ED with shortness of breath     NSTEMI/multivessel CAD  -Patient's high-sensitivity troponin was elevated at 571 on admission, trended up to 1337.  Cardiology was consulted, patient underwent heart catheter 24 which showed severe three-vessel CAD with significant left main disease.  Initial plan was for possible CABG in the setting of multivessel CAD and cardiothoracic surgery was consulted, however patient was determined to be high risk for CABG and plan was staged PCI.  Patient underwent initial PCI S/p PCI x4 on 2024.  Plan for second stage PCI in 2-4 weeks with Dr. Paredes per cardiology.  Continue dual antiplatelets with  Plavix, aspirin.  Continue statin, metoprolol          Acute on chronic diastolic heart failure/ hypertension-continue torsemide 80 mg twice daily, spironolactone 25 p.o. daily, home Jardiance was increased to 10 mg daily.  Continue metoprolol.          Pneumonia?  CT of the chest without contrast on 06/15/2024 showed patchy mild to moderate groundglass opacification is present within the bilateral lungs, left greater than right, favored represent multifocal pneumonia in the appropriate context. Asymmetric edema is less likely. Completed 5-day course of ceftriaxone for possible pneumonia    Pulmonary nodules  CT scan 06/15/2024 showed showed A few scattered subcentimeter pulm nodules bilaterally.  Discussed with patient.  Will need follow-up chest CT in 12 months per Fleischner criteria per radiology        Right plantar callus with open wound/diabetic foot ulcer  -X-ray of the foot showed soft tissue lucency at the plantar aspect of the distal foot may correspond to patient's reported open wound, correlate clinically. Soft issue swelling of the forefoot suggests inflammation or infection.MRI of the foot    No findings to  suggest osteomyelitis, findings consistent with cellulitis.  Continue doxycycline for 6 more days to complete 7-day course of antibiotics.  Continue wound care.       Type II DM with hyperglycemia-continued outpatient insulin aspart protamine-insulin aspart ( Novolog 70/30) at discharge.  Sliding-scale insulin.     CKD stage III-Creatinine has been stable around 1.4-1.5.  Repeat BMP in 5-7 days to monitor renal function.    SAWYER-Continue nightly CPAP.       At the time of discharge patient was told to take all medications as prescribed, keep all follow-up appointments, and call their doctor or return to the hospital with any worsening or concerning symptoms.            Day of Discharge     Subjective:  Patient seen this morning, sitting up at the side of the bed, eating breakfast.  No acute events  overnight.  Denies shortness of breath, chest pain, palpitations.  Feels about the same.    Physical Exam:  Temp:  [98 °F (36.7 °C)-99.1 °F (37.3 °C)] 99.1 °F (37.3 °C)  Heart Rate:  [60-71] 64  Resp:  [16-18] 18  BP: (144-204)/(52-84) 144/66  Body mass index is 31.9 kg/m².  Physical Exam      General: Alert, sitting up at side of the bed, not in distress,  HEENT: Normocephalic, atraumatic  CV: Regular rate and rhythm, no murmurs rubs or gallops  Lungs: Clear to auscultation bilaterally, no crackles or wheezes  Abdomen: Soft, nontender, nondistended  Extremities: Bilateral lower extremity edema, no cyanosis       Consultants     Consult Orders (all) (From admission, onward)       Start     Ordered    06/21/24 1046  Inpatient Case Management  Consult  Once        Provider:  (Not yet assigned)    06/21/24 1046    06/16/24 1439  Inpatient Cardiothoracic Surgery Consult  Once        Specialty:  Cardiothoracic Surgery  Provider:  Jr Shawn Abraham MD    06/16/24 1439    06/15/24 1525  Inpatient Cardiology Consult  Once        Specialty:  Cardiology  Provider:  Dara Lemus MD    06/15/24 1525    06/15/24 1010  [facility] (on-call MD unless specified) Details  Once        Specialty:  Hospitalist  Provider:  Maikel Ladd MD    06/15/24 1009    06/15/24 1006  [facility] (on-call MD unless specified)  STAT        Specialty:  Cardiology  Provider:  (Not yet assigned)    06/15/24 1005                  Procedures     Percutaneous Coronary Intervention, Intravascular Ultrasound, Atherectomy-coronary, Stent AKIRA coronary, Coronary angiography      Imaging Results (All)       Procedure Component Value Units Date/Time    MRI Foot Right Without Contrast [[id number]] Collected: 06/21/24 0543     Updated: 06/21/24 0543    Narrative:        Patient: JENIFER CROWE  Time Out: 05:43  Exam(s): MRI RIGHT FOOT Without Contrast     EXAM:    MR Right Lower Extremity Without Intravenous Contrast, Foot    CLINICAL HISTORY:      Reason for exam: rule out osteomylitis due to diabetic foot ulcer.    TECHNIQUE:    Multiplanar magnetic resonance images of the right foot without   intravenous contrast.    COMPARISON:    No relevant prior studies available.    FINDINGS:   LIGAMENTS:    Medial collateral:  Unremarkable.    Lateral collateral:  Unremarkable.    Lisfranc:  Unremarkable.     TENDONS:    Flexor:  Unremarkable.    Extensor:  Unremarkable.    Peroneal:  Unremarkable.    Tibialis anterior:  Unremarkable.    Tibialis posterior:  Unremarkable.      Muscles:  Some increased T2 signal intensity demonstrated within the   plantar muscle group within the visualized foot.    Fluid:  Unremarkable.  No joint effusion.    Sinus tarsi:  Unremarkable as visualized.    Tarsal tunnel:  Unremarkable.    Plantar fascia:  Unremarkable.    Cartilage:  Unremarkable.    Bones joints:  Unremarkable.  No acute fracture.    Soft tissues:  There is some diffuse subcutaneous and deep soft tissue   edema.  More focal region of wound demonstrated presumably just superior   to the medial malleolus    IMPRESSION:         Some cellulitis present, presumed wound medially.  Nonspecific   increased T2 signal intensity within the muscle structures probably   related to the same this can also be seen with diabetes.  No findings to   suggest osteomyelitis.      Impression:          Electronically signed by Sam Alfred MD on 06-21-24 at 0543    XR Foot 3+ View Right [[id number]] Collected: 06/19/24 0946     Updated: 06/19/24 0951    Narrative:      XR FOOT 3+ VW RIGHT-     INDICATIONS: Open wound.     TECHNIQUE: 3 VIEWS OF THE RIGHT foot     COMPARISON: 12/13/2023     FINDINGS:     A soft tissue lucency at the plantar aspect of the distal foot may  correspond to patient's reported open wound, correlate clinically. Soft  tissue swelling of the forefoot suggests inflammation or infection. Mild  calcaneal spurring is present. No acute fracture, erosion, or  dislocation is  identified. If there is clinical suspicion for  radiographically occult osteomyelitis, MRI or bone scan would be  recommended. Dystrophic soft tissue calcifications are noted in the  lower leg. Arterial calcifications are present.       Impression:         As described.           This report was finalized on 6/19/2024 9:48 AM by Dr. Boris Castro M.D on Workstation: [id number]       CT Chest Without Contrast Diagnostic [[id number]] Collected: 06/15/24 1133     Updated: 06/15/24 1147    Narrative:      CT CHEST WITHOUT IV CONTRAST     HISTORY: Possible occult pneumonia     TECHNIQUE: Radiation dose reduction techniques were utilized, including  automated exposure control and exposure modulation based on body size.   3 mm images were obtained through the chest without IV contrast.     COMPARISON: None     FINDINGS:  Evaluation is suboptimal without intravenous contrast.     There is small pericardial effusion. No noncalcified mediastinal or  axillary adenopathy by size criteria. At least mild to moderate coronary  calcification or coronary artery stents. A few scattered sub-6 mm pulm  nodules are present bilaterally     There is patchy groundglass opacification within the bilateral lungs,  left greater than right, and most notably within the upper lungs. No  pleural effusion or pneumothorax. Presumed old left fifth rib fracture  posteriorly with extensive callus formation. No suspicious lytic or  blastic osseous lesions.       Impression:      1.  Patchy mild to moderate groundglass opacification is present within  the bilateral lungs, left greater than right, favored represent  multifocal pneumonia in the appropriate context. Asymmetric edema is  less likely. Correlation with patient history is recommended.  2.  A few scattered subcentimeter pulm nodules bilaterally. Follow-up  chest CT in 12 months to be considered to ensure per Fleischner  criteria..  3.  Other findings as above           This report was  finalized on 6/15/2024 11:44 AM by Dr. Dyllan Beckman M.D on Workstation: [id number]       XR Chest 1 View [[id number]] Collected: 06/15/24 0854     Updated: 06/15/24 0859    Narrative:      XR CHEST 1 VW-     HISTORY: Female who is 78 years-old, dyspnea     TECHNIQUE: Frontal view of the chest     COMPARISON: 5/6/2020     FINDINGS: The heart size is normal. Pulmonary vasculature appears mildly  congested. No focal pulmonary consolidation, pleural effusion, or  pneumothorax. Old granulomatous disease is apparent. No acute osseous  process.       Impression:      No focal pulmonary consolidation. Mild pulmonary vascular  congestion. Follow-up as clinical indications persist.     This report was finalized on 6/15/2024 8:56 AM by Dr. Boris Castro M.D on Workstation: [id number]             Results for orders placed during the hospital encounter of 06/15/24    Duplex Carotid Ultrasound CAR    Interpretation Summary    Right internal carotid artery demonstrates a less than 50% stenosis.    Left internal carotid artery demonstrates a less than 50% stenosis.    Results for orders placed during the hospital encounter of 06/15/24    Adult Transthoracic Echo Complete W/ Cont if Necessary Per Protocol    Interpretation Summary    Left ventricular ejection fraction appears to be 56 - 60%.    The following left ventricular wall segments are hypokinetic: apical inferior, mid inferior, apical septal, mid inferoseptal and mid anteroseptal.    Left ventricular diastolic function is consistent with (grade II w/high LAP) pseudonormalization.    There is moderate calcification of the aortic valve.    Mild mitral valve stenosis is present. The mitral valve mean gradient is 5 mmHg.    Pertinent Labs     Results from last 7 days   Lab Units 06/22/24  0457 06/21/24  0627 06/20/24  0404 06/19/24  0451   WBC 10*3/mm3 12.85* 15.05* 11.10* 12.80*   HEMOGLOBIN g/dL 12.8 12.7 13.2 13.3   PLATELETS 10*3/mm3 343 343 356 328     Results from  "last 7 days   Lab Units 06/22/24  0457 06/21/24  0627 06/20/24 2020 06/20/24  0404 06/19/24  0451   SODIUM mmol/L 137 133*  --  137 139   POTASSIUM mmol/L 4.1 4.3 4.3 3.4* 3.4*   CHLORIDE mmol/L 100 99  --  102 99   CO2 mmol/L 23.2 23.0  --  23.0 24.2   BUN mg/dL 30* 35*  --  34* 31*   CREATININE mg/dL 1.43* 1.49*  --  1.49* 1.45*   GLUCOSE mg/dL 136* 103*  --  231* 201*   Estimated Creatinine Clearance: 34 mL/min (A) (by C-G formula based on SCr of 1.43 mg/dL (H)).    Results from last 7 days   Lab Units 06/22/24 0457 06/21/24 0627 06/20/24 0404 06/19/24  0451   CALCIUM mg/dL 9.9 8.6 9.3 8.7   MAGNESIUM mg/dL 2.6* 2.9* 2.4 2.4   PHOSPHORUS mg/dL 4.3 4.6* 4.8*  --        Results from last 7 days   Lab Units 06/16/24  0425 06/15/24  1215   HSTROP T ng/L 1,337* 550*           Invalid input(s): \"LDLCALC\"  Results from last 7 days   Lab Units 06/15/24  2111 06/15/24  2110   BLOODCX  No growth at 5 days No growth at 5 days             Test Results Pending at Discharge     Pending Labs       Order Current Status    Hepatic Function Panel In process            Discharge Details        Discharge Medications        New Medications        Instructions Start Date   clopidogrel 75 MG tablet  Commonly known as: PLAVIX   75 mg, Oral, Daily      doxycycline 100 MG capsule  Commonly known as: MONODOX   100 mg, Oral, Every 12 Hours Scheduled      empagliflozin 10 MG tablet tablet  Commonly known as: JARDIANCE  Replaces: dapagliflozin 5 MG tablet tablet   10 mg, Oral, Daily      insulin lispro 100 UNIT/ML injection  Commonly known as: HUMALOG/ADMELOG   3-14 Units, Subcutaneous, 4 Times Daily Before Meals & Nightly             Changes to Medications        Instructions Start Date   hydrOXYzine 25 MG tablet  Commonly known as: ATARAX  What changed: See the new instructions.   TAKE 1 TABLET BY MOUTH AT NIGHT AS NEEDED FOR INSOMNIA      isosorbide mononitrate 60 MG 24 hr tablet  Commonly known as: IMDUR  What changed: how to take " this   Take 1 tablet by mouth once daily      metoprolol succinate  MG 24 hr tablet  Commonly known as: TOPROL-XL  What changed: how to take this   Take 1 tablet by mouth once daily             Continue These Medications        Instructions Start Date   aspirin 81 MG tablet   81 mg, Oral, Nightly      atorvastatin 40 MG tablet  Commonly known as: LIPITOR   Take 1 tablet by mouth once daily      Calcium Carbonate 1500 (600 Ca) MG tablet   1 tablet, Oral      docusate sodium 100 MG capsule  Commonly known as: COLACE   100 mg, Oral, 2 Times Daily      hydrALAZINE 10 MG tablet  Commonly known as: APRESOLINE   TAKE 1 TABLET BY MOUTH THREE TIMES DAILY      insulin aspart prot-insulin aspart (70-30) 100 UNIT/ML injection  Commonly known as: novoLOG 70/30   70 Units, Subcutaneous, 2 Times Daily With Meals, Patient uses 70U in AM and 60U in PM      Semaglutide(0.25 or 0.5MG/DOS) 2 MG/1.5ML solution pen-injector  Commonly known as: OZEMPIC   Subcutaneous, Weekly, Patient takes on Tuesdays      spironolactone 25 MG tablet  Commonly known as: ALDACTONE   25 mg, Oral, Daily      torsemide 20 MG tablet  Commonly known as: DEMADEX   80 mg, Oral, 2 Times Daily      vitamin D 1.25 MG (15362 UT) capsule capsule  Commonly known as: ERGOCALCIFEROL   50,000 Units, Every 30 Days, Patient takes one tablet monthly, on the 19th             Stop These Medications      acetaminophen-codeine 300-30 MG per tablet  Commonly known as: TYLENOL with CODEINE #3     dapagliflozin 5 MG tablet tablet  Commonly known as: FARXIGA  Replaced by: empagliflozin 10 MG tablet tablet              Allergies   Allergen Reactions    Ace Inhibitors Other (See Comments)     Hyperkalemia/ROB    Angiotensin Receptor Blockers Other (See Comments)     Pt unable to remember    Keflex [Cephalexin] Rash     Tolerated ceftriaxone Dec 2019; tolerated zosyn May 2020    Sulfa Antibiotics Itching     rash       Discharge Disposition:  Skilled Nursing Facility ([facility] -  [facility])      Discharge Diet:  Diet Order   Procedures    Diet: Cardiac; Healthy Heart (2-3 Na+); Fluid Consistency: Thin (IDDSI 0)       Discharge Activity:       CODE STATUS:    Code Status and Medical Interventions:   Ordered at: 06/16/24 1439     Level Of Support Discussed With:    Patient     Code Status (Patient has no pulse and is not breathing):    CPR (Attempt to Resuscitate)     Medical Interventions (Patient has pulse or is breathing):    Full Support       Future Appointments   Date Time Provider Department Center   7/31/2024 10:30 AM Alfredito Mueller MD [facility] None   8/21/2024  9:20 AM [facility]   8/28/2024 11:15 AM Surekha Mcdonnell MD [facility]   9/24/2024  3:00 PM REFERRED INJECTION CHAIR [facility]   9/25/2024 11:45 AM Warner Tang MD [facility]   3/25/2025  3:00 PM REFERRED INJECTION CHAIR [facility]     Additional Instructions for the Follow-ups that You Need to Schedule       Ambulatory Referral to Cardiac Rehab   As directed             Contact information for follow-up providers       [facility] .    Specialty: Cardiac Rehabilitation  Contact information:  [address] [id number] [phone]             Surekha Mcdonnell MD .    Specialty: Internal Medicine  Contact information:  [address]  [facility] [address]  [phone]               Joselito Paredes MD. Schedule an appointment as soon as possible for a visit in 1 week(s).    Specialty: Cardiology  Contact information:  [address]  [place] [facility] [address]  [phone]                       Contact information for after-discharge care       Destination       [facility] .    Service: Skilled Nursing  Contact information:  [address] [address]  [phone]                                   Additional Instructions for the Follow-ups that You Need to Schedule        Ambulatory Referral to Cardiac Rehab   As directed          Time Spent on Discharge:  Greater than 30 minutes      Kit Lentz MD  [facility]  06/22/24  09:02 EDT

## 2024-06-22 NOTE — DISCHARGE INSTRUCTIONS
Pulmonary nodules.  CT scan on 06/15/2024 showed multiple pulmonary nodules in both lungs.  Repeat CT scan in 12 months recommended to monitor.  Follow-up with PCP to schedule repeat CT scan of the chest in 07/2025

## 2024-06-22 NOTE — PLAN OF CARE
Goal Outcome Evaluation:  Plan of Care Reviewed With: patient           Outcome Evaluation: had restful night, wore home CPAP with no issues. blood pressure elevated, on call notified with new orders to give AM blood pressure medication early. No complaints through the night. remained asymptomatic with elevated blood pressure. up with x1 assist and rollator to bathroom multiple time through the night. [facility]. son will provider transportation. Continue with plan of care and will update as needed.

## 2024-06-22 NOTE — PROGRESS NOTES
"[facility]    Patient Name: Denisse Chavez  :1945  78 y.o.  LOS: 7  Encounter Provider: MEREDITH Gipson      Patient Care Team:  Surekha Mcdonnell MD as PCP - General (Internal Medicine)  Warner Tang MD as Cardiologist (Cardiology)  Sergio Eagle MD as Consulting Physician (Urology)  Juan Negrete MD as Consulting Physician (Endocrinology)  Miguel Angel Barrett DPM as Consulting Physician (Podiatry)  Gabriel Montenegro MD as Consulting Physician (Nephrology)  Linda Rodriguez MD (Ophthalmology)  Ginger Ross APRN as Nurse Practitioner (Nurse Practitioner)  Jr Shawn Abraham MD as Surgeon (Cardiothoracic Surgery)    Chief Complaint:  CAD, NSTEMI     Interval History: Note plans to discharge to rehab today.       Objective   Vital Signs  Temp:  [98 °F (36.7 °C)-99.1 °F (37.3 °C)] 99.1 °F (37.3 °C)  Heart Rate:  [60-71] 64  Resp:  [16-18] 18  BP: (144-204)/(52-84) 144/66    Intake/Output Summary (Last 24 hours) at 2024 0859  Last data filed at 2024 0556  Gross per 24 hour   Intake 860 ml   Output 3000 ml   Net -2140 ml     Flowsheet Rows      Flowsheet Row First Filed Value   Admission Height 162.6 cm (64\") Documented at 06/15/2024 1153   Admission Weight 91.6 kg (202 lb) Documented at 06/15/2024 1153              Vitals reviewed.   Constitutional:       General: Not in acute distress.     Appearance: Well-developed. Not diaphoretic.   HENT:      Head: Normocephalic.   Pulmonary:      Effort: Pulmonary effort is normal. No respiratory distress.      Breath sounds: Normal breath sounds. No wheezing. No rhonchi. No rales.   Cardiovascular:      Normal rate. Regular rhythm.      Murmurs: There is no murmur.      Comments: Right radial cath site with mild ecchymosis but no erythema, edema, or drainage  Pulses:     Radial: 2+ bilaterally.  Edema:     Peripheral edema absent.   Skin:     General: Skin is warm and dry. There is no cyanosis. " "     Findings: No rash.   Neurological:      Mental Status: Alert and oriented to person, place, and time.   Psychiatric:         Behavior: Behavior normal. Behavior is cooperative.         Thought Content: Thought content normal.         Judgment: Judgment normal.           Pertinent Test Results:  Results from last 7 days   Lab Units 06/22/24  0457 06/21/24  0627 06/20/24 2020 06/20/24  0404 06/19/24  0451 06/18/24  0426 06/17/24  0457 06/16/24  0425   SODIUM mmol/L 137 133*  --  137 139 136 138 135*   POTASSIUM mmol/L 4.1 4.3 4.3 3.4* 3.4* 3.3* 3.1* 3.4*   CHLORIDE mmol/L 100 99  --  102 99 97* 101 96*   CO2 mmol/L 23.2 23.0  --  23.0 24.2 24.6 26.0 25.1   BUN mg/dL 30* 35*  --  34* 31* 29* 27* 25*   CREATININE mg/dL 1.43* 1.49*  --  1.49* 1.45* 1.44* 1.41* 1.48*   GLUCOSE mg/dL 136* 103*  --  231* 201* 153* 152* 204*   CALCIUM mg/dL 9.9 8.6  --  9.3 8.7 8.3* 8.1* 8.5*     Results from last 7 days   Lab Units 06/16/24  0425 06/15/24  1215 06/15/24  0904   HSTROP T ng/L 1,337* 550* 571*     Results from last 7 days   Lab Units 06/22/24  0457 06/21/24 0627 06/20/24  0404 06/19/24  0451 06/18/24  0426 06/17/24  0457 06/16/24  0425   WBC 10*3/mm3 12.85* 15.05* 11.10* 12.80* 9.42 8.38 10.31   HEMOGLOBIN g/dL 12.8 12.7 13.2 13.3 12.9 12.3 11.7*   HEMATOCRIT % 39.6 38.6 40.6 40.8 39.9 37.3 36.5   PLATELETS 10*3/mm3 343 343 356 328 273 231 221         Results from last 7 days   Lab Units 06/22/24  0457 06/21/24  0627 06/20/24  0404 06/19/24  0451 06/15/24  0904   MAGNESIUM mg/dL 2.6* 2.9* 2.4 2.4 2.2           Invalid input(s): \"LDLCALC\"  Results from last 7 days   Lab Units 06/15/24  0904   PROBNP pg/mL 5,512.0*               Medication Review:   aspirin, 81 mg, Oral, Daily  atorvastatin, 40 mg, Oral, Daily  clopidogrel, 75 mg, Oral, Daily  docusate sodium, 100 mg, Oral, BID  doxycycline, 100 mg, Oral, Q12H  empagliflozin, 10 mg, Oral, Daily  hydrALAZINE, 10 mg, Oral, TID  insulin glargine, 25 Units, Subcutaneous, " Q12H  insulin lispro, 3-14 Units, Subcutaneous, 4x Daily AC & at Bedtime  insulin lispro, 5 Units, Subcutaneous, TID With Meals  isosorbide mononitrate, 60 mg, Oral, Q24H  metoprolol succinate XL, 100 mg, Oral, Q24H  potassium chloride, 40 mEq, Oral, Once  sodium chloride, 10 mL, Intravenous, Q12H  spironolactone, 25 mg, Oral, Daily  torsemide, 80 mg, Oral, BID              Assessment & Plan     Active Hospital Problems    Diagnosis  POA    **NSTEMI (non-ST elevated myocardial infarction) [I21.4]  Yes    Acute on chronic heart failure with preserved ejection fraction (HFpEF) [I50.33]  Unknown    Dyspnea [R06.00]  Unknown    Elevated brain natriuretic peptide (BNP) level [R79.89]  Unknown    PNA (pneumonia) [J18.9]  Unknown    Hypertriglyceridemia [E78.1]  Yes    CAD (coronary artery disease) [I25.10]  Yes    Chronic venous insufficiency [I87.2]  Yes    Stage 3 chronic kidney disease [N18.30]  Yes    SAWYER on autoCPAP [G47.33]  Yes    Type 2 diabetes mellitus, with long-term current use of insulin [E11.9, Z79.4]  Not Applicable    Hyperlipidemia [E78.5]  Yes    Essential hypertension [I10]  Yes      Resolved Hospital Problems   No resolved problems to display.        1. Acute on chronic HFpEF. On GDMT with Jardiance, Toprol XL, spironolactone and torsemide. Volume status has improved overall.  Renal function stable after cardiac cath.  2. NSTEMI/multivessel CAD. S/p PCI x4 yesterday. Plan for second stage PCI in 2-4 weeks per Dr. Paredes.  3. CKD3. Renal function stable.   4. ? PNA. Completed Ceftriaxone  5. SAWYER  6. Obesity  7. DM2  8. Chronic venous insufficiency and diabetic foot ulcers: follows with podiatry as outpatient  9. Mild MS, mean gradient 5mm Hg    No objection to discharge from a cardiology standpoint. Will need follow up in office in 2 weeks with MEREDITH.            MEREDITH Gipson  [facility]   [facility]  [address] [place] [id number]  Office:  [phone]    06/22/24  08:59 EDT

## 2024-06-22 NOTE — NURSING NOTE
Patient blood pressure elevated during rounds. No PRN medications for blood pressure and no blood pressure medications due. On call provider for internal medicine notified with new orders received to give AM blood pressure medications at this time. Plan of care ongoing, will update as needed.

## 2024-06-23 NOTE — PROGRESS NOTES
[facility] Clinical Pharmacy Services: National Cardiology Data Registry (NCDR) Medication Review    Denisse Chavez is s/p PCI with drug-eluting stent placement for NSTEMI (planned staged PCI) . Pharmacy to review discharge medications to make sure appropriate medications have been prescribed.    Patient has been discharged on the following:  Aspirin 81 mg nightly  High Intensity Statin: Atorvastatin 40 mg daily  Beta-blocker: Metoprolol  mg daily  P2Y12 Inhibitor: Clopidogrel 75 mg daily  LVEF=56-60% (no requirement for ACE/ARB)    These medications meet the requirements for NCDR discharge medication for chest pain and MI.    Umu Luna, Pharm.D., [facility]   Clinical Pharmacist   Phone Extension #[phone]

## 2024-06-24 ENCOUNTER — TELEPHONE (OUTPATIENT)
Dept: CARDIOLOGY | Facility: CLINIC | Age: 79
End: 2024-06-24

## 2024-06-24 NOTE — TELEPHONE ENCOUNTER
Caller: [facility]    Relationship to patient:[facility]    Best call back number: [phone]    New or established patient?  [] New  [x] Established    Date of discharge:06/22/2024    Facility discharged from:     Diagnosis/Symptoms: NSTEMI    Length of stay (If applicable): 7 DAYS    Specialty Only: Did you see a [facility] provider?    [x] Yes  [] No  If so, who? DR VARGAS    NEEDS F/U AROUND 06/29/2024  PLEASE CALL TO SHAHNAZ

## 2024-06-27 ENCOUNTER — TELEPHONE (OUTPATIENT)
Dept: CARDIAC REHAB | Facility: HOSPITAL | Age: 79
End: 2024-06-27
Payer: MEDICARE

## 2024-06-27 NOTE — TELEPHONE ENCOUNTER
I just spoke with the patient's son, effie Ma: recent referral to cardiac rehab.  He states that his mom is at [facility] recovering from her recent hospitalization.  He also told me that she needs to have more stents placed in the next 2-3 weeks.      We will call again to get her scheduled after her upcoming procedure once we receive the referral.  The son also is very invested in his mom's care.  He said he call us to schedule if he does not hear from us and thinks his mom is ready to participate in the program.      Thank you for always referring your patients to cardiac rehab!

## 2024-06-28 ENCOUNTER — OFFICE VISIT (OUTPATIENT)
Dept: CARDIOLOGY | Facility: CLINIC | Age: 79
End: 2024-06-28
Payer: MEDICARE

## 2024-06-28 VITALS
HEART RATE: 53 BPM | WEIGHT: 185 LBS | DIASTOLIC BLOOD PRESSURE: 74 MMHG | HEIGHT: 64 IN | BODY MASS INDEX: 31.58 KG/M2 | SYSTOLIC BLOOD PRESSURE: 136 MMHG

## 2024-06-28 DIAGNOSIS — I25.10 CORONARY ARTERY DISEASE INVOLVING NATIVE CORONARY ARTERY OF NATIVE HEART, UNSPECIFIED WHETHER ANGINA PRESENT: Primary | ICD-10-CM

## 2024-06-28 NOTE — H&P (VIEW-ONLY)
Subjective:     Encounter Date:06/28/2024      Patient ID: Denisse Chavez is a 78 y.o. female.    Chief Complaint:follow up CAD  History of Present Illness  This is a 78-year-old female who follows with Dr. Gee and is new to me today.  She has a past medical history of coronary artery disease, hypertension, hyperlipidemia, diabetes, obstructive sleep apnea on CPAP and obesity.    On Angelina 15 she presented to the emergency room with complaints of shortness of breath.  Chest x-ray showed pulmonary edema.  EKG showed nonspecific ST changes.  BNP and troponin were both elevated.  She had an echocardiogram in 2023 that had grade 2 diastolic dysfunction and no significant valvular dysfunction.  IV Lasix was started in the emergency room.  A repeat echocardiogram was ordered and showed normal LV systolic function with some hypokinesis of the mid and apical septum and inferior wall.  She was set up to undergo a cardiac catheterization.  She was found to have multivessel coronary artery disease.  The case was discussed with Dr. Hathaway and it was felt that she was not a good candidate for bypass surgery.  She ultimately underwent high risk PCI with Dr. López with atherectomy of the left main, LAD, proximal circumflex and PCI with drug-eluting stent to the second diagonal, LAD, circumflex and left main.  She was to return for staged PCI with atherectomy to the severe mid RCA stenosis.    She is here today for follow-up visit.  She has been doing well since she was discharged.  She is at the [facility] where she is doing physical therapy and Occupational Therapy.  She is really struggling with her strength and endurance but this is getting better.  She denies any chest pain or shortness of breath.  No palpitations, dizziness or syncope.  Her lower extremity swelling has improved significantly.  She denies orthopnea or PND.  Her right radial cath site is bruised but soft.  She denies any pain in her right arm.  Her  blood pressures are overall stable.    Prior history:  In 1996 she suffered a myocardial infarction while in [place].  She underwent a cardiac catheterization which showed nonobstructive coronary artery disease.    In June 2016 an echocardiogram was performed that showed normal LV systolic function, grade II diastolic dysfunction, aortic valve calcification, but no aortic stenosis.  There was hypokinesis of the lateral wall so a PET stress was ordered that showed small to medium sized lateral infarct without ischemia.    In August 2018, repeat echocardiogram showed the following: EF 59%, diastolic function normal, aortic valve sclerosis, trace aortic insufficiency, moderate mitral annular calcification, trace mitral insufficiency.     In May 2020, echocardiogram showed EF 72%, mild LVH, mild aortic valve calcification, mild mitral annular calcification, mild mitral regurgitation, and trace tricuspid regurgitation.     I have reviewed and updated as appropriate allergies, current medications, past family history, past medical history, past surgical history and problem list.    Review of Systems   Constitutional: Positive for malaise/fatigue. Negative for fever, weight gain and weight loss.   HENT:  Negative for congestion, hoarse voice and sore throat.    Eyes:  Negative for blurred vision and double vision.   Cardiovascular:  Positive for leg swelling. Negative for chest pain, dyspnea on exertion, orthopnea, palpitations and syncope.   Respiratory:  Negative for cough, shortness of breath and wheezing.    Gastrointestinal:  Negative for abdominal pain, hematemesis, hematochezia and melena.   Genitourinary:  Negative for dysuria and hematuria.   Neurological:  Negative for dizziness, headaches, light-headedness and numbness.   Psychiatric/Behavioral:  Negative for depression. The patient is not nervous/anxious.          Current Outpatient Medications:     aspirin 81 MG tablet, Take 1 tablet by mouth Every Night.,  Disp: , Rfl:     atorvastatin (LIPITOR) 40 MG tablet, Take 1 tablet by mouth once daily (Patient taking differently: Take 1 tablet by mouth Daily.), Disp: 90 tablet, Rfl: 0    Calcium Carbonate 1500 (600 Ca) MG tablet, Take 1 tablet by mouth., Disp: , Rfl:     clopidogrel (PLAVIX) 75 MG tablet, Take 1 tablet by mouth Daily., Disp: , Rfl:     docusate sodium (COLACE) 100 MG capsule, Take 1 capsule by mouth 2 (Two) Times a Day., Disp: , Rfl:     doxycycline (MONODOX) 100 MG capsule, Take 1 capsule by mouth Every 12 (Twelve) Hours for 12 doses. Indications: Infection of the Skin and/or Soft Tissue, Disp: , Rfl:     empagliflozin (JARDIANCE) 10 MG tablet tablet, Take 1 tablet by mouth Daily., Disp: , Rfl:     hydrALAZINE (APRESOLINE) 10 MG tablet, TAKE 1 TABLET BY MOUTH THREE TIMES DAILY (Patient taking differently: Take 1 tablet by mouth 3 (Three) Times a Day.), Disp: 270 tablet, Rfl: 0    hydrOXYzine (ATARAX) 25 MG tablet, TAKE 1 TABLET BY MOUTH AT NIGHT AS NEEDED FOR INSOMNIA (Patient taking differently: Patient has prescription but has not taken for a while), Disp: 90 tablet, Rfl: 0    insulin aspart protamine-insulin aspart (novoLOG 70/30) injection, Inject 70 Units under the skin into the appropriate area as directed 2 (Two) Times a Day With Meals. Patient uses 70U in AM and 60U in PM, Disp: , Rfl:     insulin lispro (HUMALOG/ADMELOG) 100 UNIT/ML injection, Inject 3-14 Units under the skin into the appropriate area as directed 4 (Four) Times a Day Before Meals & at Bedtime., Disp: , Rfl:     isosorbide mononitrate (IMDUR) 60 MG 24 hr tablet, Take 1 tablet by mouth once daily (Patient taking differently: 1 tablet Daily.), Disp: 90 tablet, Rfl: 0    metoprolol succinate XL (TOPROL-XL) 100 MG 24 hr tablet, Take 1 tablet by mouth once daily (Patient taking differently: 1 tablet Daily.), Disp: 90 tablet, Rfl: 0    Semaglutide,0.25 or 0.5MG/DOS, (OZEMPIC) 2 MG/1.5ML solution pen-injector, Inject  under the skin into the  appropriate area as directed 1 (One) Time Per Week. Patient takes on Tuesdays, Disp: , Rfl:     spironolactone (ALDACTONE) 25 MG tablet, Take 1 tablet by mouth Daily., Disp: , Rfl:     torsemide (DEMADEX) 20 MG tablet, Take 4 tablets by mouth 2 (Two) Times a Day., Disp: , Rfl:     vitamin D (ERGOCALCIFEROL) 42694 units capsule capsule, 1 capsule Every 30 (Thirty) Days. Patient takes one tablet monthly, on the 19th, Disp: , Rfl:     Past Medical History:   Diagnosis Date    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  PET 6/2016 with small-medium sized lateral infarct, no ischemia.  This wall motion abnormality was seen on echo as well.    Cellulitis 07/2018    RIGHT LEG    Chronic kidney disease, stage III (moderate) 10/13/2016    Chronic venous insufficiency     Hyperlipidemia     Hypertension     Hypertriglyceridemia 9/29/2021    Low back pain     Mass of ovary 3/30/2016    Obesity (BMI 30-39.9) 12/22/2019    Sleep apnea     on CPAP.  Dr. Mueller    Spinal stenosis     Type 2 diabetes mellitus     Uterine leiomyoma 3/30/2016       Past Surgical History:   Procedure Laterality Date    CARDIAC CATHETERIZATION N/A 6/16/2024    Procedure: Coronary angiography;  Surgeon: Jeffery Myrick MD;  Location: [facility] CATH INVASIVE LOCATION;  Service: Cardiovascular;  Laterality: N/A;  NO LV GRAM    CARDIAC CATHETERIZATION N/A 6/16/2024    Procedure: Left Heart Cath;  Surgeon: Jeffery Myrick MD;  Location: [facility] INVAS[facility] LOC[facility];  Service: Cardiovascular;  Laterality: N/A;    CARDIAC CATHETERIZATION N/A 6/20/2024    Procedure: Percutaneous Coronary Intervention;  Surgeon: Joselito Paredes MD;  Location: [facility] CATH INVASIVE LOCATION;  Service: Cardiovascular;  Laterality: N/A;    CARDIAC CATHETERIZATION N/A 6/20/2024    Procedure: Atherectomy-coronary;  Surgeon: Joselito Paredes MD;  Location: [facility] CATH  INVASIVE LOCATION;  Service: Cardiovascular;  Laterality: N/A;    CARDIAC CATHETERIZATION N/A 2024    Procedure: Stent AKIRA coronary;  Surgeon: Joselito Paredes MD;  Location:  [facility] CATH INVASIVE LOCATION;  Service: Cardiovascular;  Laterality: N/A;    CARDIAC CATHETERIZATION N/A 2024    Procedure: Coronary angiography;  Surgeon: Joselito Paredes MD;  Location:  [facility] CATH INVASIVE LOCATION;  Service: Cardiovascular;  Laterality: N/A;    CATARACT EXTRACTION Bilateral     X2     COLONOSCOPY N/A 2018    Procedure: COLONOSCOPY to CECUM WITH HOT SNARE POLYPECTOMY;  Surgeon: Neal Reilly MD;  Location:  [facility];  Service: Gastroenterology    COLONOSCOPY N/A 2023    Procedure: COLONOSCOPY to cecum and TI with cold snare and cold biopsy polypectomies;  Surgeon: Polly Ruiz MD;  Location:  [facility];  Service: General;  Laterality: N/A;  pre: screening  post: polyps    EPIDURAL BLOCK      HERNIA REPAIR      HYSTERECTOMY      INTERVENTIONAL RADIOLOGY PROCEDURE N/A 2024    Procedure: Intravascular Ultrasound;  Surgeon: Joselito Paredes MD;  Location:  [facility] CATH INVASIVE LOCATION;  Service: Cardiovascular;  Laterality: N/A;    TONSILLECTOMY         Family History   Problem Relation Age of Onset    Diabetes Mother     Heart attack Mother     Hypertension Mother     Hypothyroidism Mother     Diabetes type II Son     Breast cancer Neg Hx        Social History     Tobacco Use    Smoking status: Former     Current packs/day: 0.00     Average packs/day: 0.3 packs/day for 2.0 years (0.5 ttl pk-yrs)     Types: Cigarettes     Start date:      Quit date: 1970     Years since quittin.5    Smokeless tobacco: Never    Tobacco comments:     LIGHT USAGE   Vaping Use    Vaping status: Never Used   Substance Use Topics    Alcohol use: No    Drug use: No         ECG 12 Lead    Date/Time: 2024 3:39 PM  Performed by: Antoinette Burris APRN    Authorized by: Antoinette Burris APRN  Comparison:  "compared with previous ECG from 2024  Similar to previous ECG  Rhythm: sinus rhythm  Conduction: left bundle branch block  Other findings: left ventricular hypertrophy with strain             Objective:     Visit Vitals  /74   Pulse 53   Ht 162.6 cm (64\")   Wt 83.9 kg (185 lb)   BMI 31.76 kg/m²             Physical Exam  Constitutional:       Appearance: Normal appearance. She is normal weight.   HENT:      Head: Normocephalic.   Neck:      Vascular: No carotid bruit.   Cardiovascular:      Rate and Rhythm: Normal rate and regular rhythm.      Chest Wall: PMI is not displaced.      Pulses: Normal pulses.           Radial pulses are 2+ on the right side and 2+ on the left side.        Posterior tibial pulses are 2+ on the right side and 2+ on the left side.      Heart sounds: Normal heart sounds. No murmur heard.     No friction rub. No gallop.      Comments: Right radial cath site soft with pea sized knot at the insertion site.  Non-tender.  Pulmonary:      Effort: Pulmonary effort is normal.      Breath sounds: Normal breath sounds.   Abdominal:      General: Bowel sounds are normal. There is no distension.      Palpations: Abdomen is soft.   Musculoskeletal:      Right lower le+ No edema.      Left lower le+ No edema.   Skin:     General: Skin is warm and dry.      Capillary Refill: Capillary refill takes less than 2 seconds.   Neurological:      Mental Status: She is alert and oriented to person, place, and time.   Psychiatric:         Mood and Affect: Mood normal.         Behavior: Behavior normal.         Thought Content: Thought content normal.          Lab Review:   Lipid Panel          2023    09:02 2024    09:49   Lipid Panel   Total Cholesterol 171  216    Triglycerides 246  167    HDL Cholesterol 31  35    VLDL Cholesterol 42  31    LDL Cholesterol  98  150          Cardiac Procedures:       Assessment:         Diagnoses and all orders for this visit:    1. Coronary artery " disease involving native coronary artery of native heart, unspecified whether angina present (Primary)  -     Case Request Cath Lab: Percutaneous Coronary Intervention            Plan:       CAD: s/p atherectomy and PCI with stenting to the LM, LAD, circumflex and second diagonal. EKG is stable. No anginal symptoms. She is to return for staged PCI of the mid RCA. Discussed with Dr. Paredes and will plan for next week. Patient and patient's son would like for this to be done before she is discharged from the [facility] on 7/4. Continue statin, beta blocker, aspirin and clopidogrel.  Chronic HFpEF: appears compensated on exam today. No changes.  HTN: blood pressure stable. No changes  HLD: on statin. Goal LDL < 70. Most recent was 150. May need to consider increasing atorvastatin to 80 mg daily if she can tolerate. Will readdress after next stenting.  Diabetes  Stage 3 CKD  SAWYER: CPAP compliant  Obesity: BMI 31.76    Thank you for allowing me to participate in this patient's care. Please call with any questions or concerns. Ms. Chavez will follow up with with Vicky or myself 1 week post cath.          Your medication list            Accurate as of June 28, 2024  3:31 PM. If you have any questions, ask your nurse or doctor.                CHANGE how you take these medications        Instructions Last Dose Given Next Dose Due   hydrOXYzine 25 MG tablet  Commonly known as: ATARAX  What changed: See the new instructions.      TAKE 1 TABLET BY MOUTH AT NIGHT AS NEEDED FOR INSOMNIA       isosorbide mononitrate 60 MG 24 hr tablet  Commonly known as: IMDUR  What changed: how to take this      Take 1 tablet by mouth once daily       metoprolol succinate  MG 24 hr tablet  Commonly known as: TOPROL-XL  What changed: how to take this      Take 1 tablet by mouth once daily              CONTINUE taking these medications        Instructions Last Dose Given Next Dose Due   aspirin 81 MG tablet      Take 1 tablet by mouth Every  Night.       atorvastatin 40 MG tablet  Commonly known as: LIPITOR      Take 1 tablet by mouth once daily       Calcium Carbonate 1500 (600 Ca) MG tablet      Take 1 tablet by mouth.       clopidogrel 75 MG tablet  Commonly known as: PLAVIX      Take 1 tablet by mouth Daily.       docusate sodium 100 MG capsule  Commonly known as: COLACE      Take 1 capsule by mouth 2 (Two) Times a Day.       doxycycline 100 MG capsule  Commonly known as: MONODOX      Take 1 capsule by mouth Every 12 (Twelve) Hours for 12 doses. Indications: Infection of the Skin and/or Soft Tissue       empagliflozin 10 MG tablet tablet  Commonly known as: JARDIANCE      Take 1 tablet by mouth Daily.       hydrALAZINE 10 MG tablet  Commonly known as: APRESOLINE      TAKE 1 TABLET BY MOUTH THREE TIMES DAILY       insulin aspart prot-insulin aspart (70-30) 100 UNIT/ML injection  Commonly known as: novoLOG 70/30      Inject 70 Units under the skin into the appropriate area as directed 2 (Two) Times a Day With Meals. Patient uses 70U in AM and 60U in PM       insulin lispro 100 UNIT/ML injection  Commonly known as: HUMALOG/ADMELOG      Inject 3-14 Units under the skin into the appropriate area as directed 4 (Four) Times a Day Before Meals & at Bedtime.       Semaglutide(0.25 or 0.5MG/DOS) 2 MG/1.5ML solution pen-injector  Commonly known as: OZEMPIC      Inject  under the skin into the appropriate area as directed 1 (One) Time Per Week. Patient takes on Tuesdays       spironolactone 25 MG tablet  Commonly known as: ALDACTONE      Take 1 tablet by mouth Daily.       torsemide 20 MG tablet  Commonly known as: DEMADEX      Take 4 tablets by mouth 2 (Two) Times a Day.       vitamin D 1.25 MG (92421 UT) capsule capsule  Commonly known as: ERGOCALCIFEROL      1 capsule Every 30 (Thirty) Days. Patient takes one tablet monthly, on the 19th                  MEREDITH Perry  06/28/24  3:31 PM EDT

## 2024-06-28 NOTE — PROGRESS NOTES
Subjective:     Encounter Date:06/28/2024      Patient ID: Denisse Chavez is a 78 y.o. female.    Chief Complaint:follow up CAD  History of Present Illness  This is a 78-year-old female who follows with Dr. Gee and is new to me today.  She has a past medical history of coronary artery disease, hypertension, hyperlipidemia, diabetes, obstructive sleep apnea on CPAP and obesity.    On Angelina 15 she presented to the emergency room with complaints of shortness of breath.  Chest x-ray showed pulmonary edema.  EKG showed nonspecific ST changes.  BNP and troponin were both elevated.  She had an echocardiogram in 2023 that had grade 2 diastolic dysfunction and no significant valvular dysfunction.  IV Lasix was started in the emergency room.  A repeat echocardiogram was ordered and showed normal LV systolic function with some hypokinesis of the mid and apical septum and inferior wall.  She was set up to undergo a cardiac catheterization.  She was found to have multivessel coronary artery disease.  The case was discussed with Dr. Hathaway and it was felt that she was not a good candidate for bypass surgery.  She ultimately underwent high risk PCI with Dr. López with atherectomy of the left main, LAD, proximal circumflex and PCI with drug-eluting stent to the second diagonal, LAD, circumflex and left main.  She was to return for staged PCI with atherectomy to the severe mid RCA stenosis.    She is here today for follow-up visit.  She has been doing well since she was discharged.  She is at the [facility] where she is doing physical therapy and Occupational Therapy.  She is really struggling with her strength and endurance but this is getting better.  She denies any chest pain or shortness of breath.  No palpitations, dizziness or syncope.  Her lower extremity swelling has improved significantly.  She denies orthopnea or PND.  Her right radial cath site is bruised but soft.  She denies any pain in her right arm.  Her  blood pressures are overall stable.    Prior history:  In 1996 she suffered a myocardial infarction while in [place].  She underwent a cardiac catheterization which showed nonobstructive coronary artery disease.    In June 2016 an echocardiogram was performed that showed normal LV systolic function, grade II diastolic dysfunction, aortic valve calcification, but no aortic stenosis.  There was hypokinesis of the lateral wall so a PET stress was ordered that showed small to medium sized lateral infarct without ischemia.    In August 2018, repeat echocardiogram showed the following: EF 59%, diastolic function normal, aortic valve sclerosis, trace aortic insufficiency, moderate mitral annular calcification, trace mitral insufficiency.     In May 2020, echocardiogram showed EF 72%, mild LVH, mild aortic valve calcification, mild mitral annular calcification, mild mitral regurgitation, and trace tricuspid regurgitation.     I have reviewed and updated as appropriate allergies, current medications, past family history, past medical history, past surgical history and problem list.    Review of Systems   Constitutional: Positive for malaise/fatigue. Negative for fever, weight gain and weight loss.   HENT:  Negative for congestion, hoarse voice and sore throat.    Eyes:  Negative for blurred vision and double vision.   Cardiovascular:  Positive for leg swelling. Negative for chest pain, dyspnea on exertion, orthopnea, palpitations and syncope.   Respiratory:  Negative for cough, shortness of breath and wheezing.    Gastrointestinal:  Negative for abdominal pain, hematemesis, hematochezia and melena.   Genitourinary:  Negative for dysuria and hematuria.   Neurological:  Negative for dizziness, headaches, light-headedness and numbness.   Psychiatric/Behavioral:  Negative for depression. The patient is not nervous/anxious.          Current Outpatient Medications:     aspirin 81 MG tablet, Take 1 tablet by mouth Every Night.,  Disp: , Rfl:     atorvastatin (LIPITOR) 40 MG tablet, Take 1 tablet by mouth once daily (Patient taking differently: Take 1 tablet by mouth Daily.), Disp: 90 tablet, Rfl: 0    Calcium Carbonate 1500 (600 Ca) MG tablet, Take 1 tablet by mouth., Disp: , Rfl:     clopidogrel (PLAVIX) 75 MG tablet, Take 1 tablet by mouth Daily., Disp: , Rfl:     docusate sodium (COLACE) 100 MG capsule, Take 1 capsule by mouth 2 (Two) Times a Day., Disp: , Rfl:     doxycycline (MONODOX) 100 MG capsule, Take 1 capsule by mouth Every 12 (Twelve) Hours for 12 doses. Indications: Infection of the Skin and/or Soft Tissue, Disp: , Rfl:     empagliflozin (JARDIANCE) 10 MG tablet tablet, Take 1 tablet by mouth Daily., Disp: , Rfl:     hydrALAZINE (APRESOLINE) 10 MG tablet, TAKE 1 TABLET BY MOUTH THREE TIMES DAILY (Patient taking differently: Take 1 tablet by mouth 3 (Three) Times a Day.), Disp: 270 tablet, Rfl: 0    hydrOXYzine (ATARAX) 25 MG tablet, TAKE 1 TABLET BY MOUTH AT NIGHT AS NEEDED FOR INSOMNIA (Patient taking differently: Patient has prescription but has not taken for a while), Disp: 90 tablet, Rfl: 0    insulin aspart protamine-insulin aspart (novoLOG 70/30) injection, Inject 70 Units under the skin into the appropriate area as directed 2 (Two) Times a Day With Meals. Patient uses 70U in AM and 60U in PM, Disp: , Rfl:     insulin lispro (HUMALOG/ADMELOG) 100 UNIT/ML injection, Inject 3-14 Units under the skin into the appropriate area as directed 4 (Four) Times a Day Before Meals & at Bedtime., Disp: , Rfl:     isosorbide mononitrate (IMDUR) 60 MG 24 hr tablet, Take 1 tablet by mouth once daily (Patient taking differently: 1 tablet Daily.), Disp: 90 tablet, Rfl: 0    metoprolol succinate XL (TOPROL-XL) 100 MG 24 hr tablet, Take 1 tablet by mouth once daily (Patient taking differently: 1 tablet Daily.), Disp: 90 tablet, Rfl: 0    Semaglutide,0.25 or 0.5MG/DOS, (OZEMPIC) 2 MG/1.5ML solution pen-injector, Inject  under the skin into the  appropriate area as directed 1 (One) Time Per Week. Patient takes on Tuesdays, Disp: , Rfl:     spironolactone (ALDACTONE) 25 MG tablet, Take 1 tablet by mouth Daily., Disp: , Rfl:     torsemide (DEMADEX) 20 MG tablet, Take 4 tablets by mouth 2 (Two) Times a Day., Disp: , Rfl:     vitamin D (ERGOCALCIFEROL) 65556 units capsule capsule, 1 capsule Every 30 (Thirty) Days. Patient takes one tablet monthly, on the 19th, Disp: , Rfl:     Past Medical History:   Diagnosis Date    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  PET 6/2016 with small-medium sized lateral infarct, no ischemia.  This wall motion abnormality was seen on echo as well.    Cellulitis 07/2018    RIGHT LEG    Chronic kidney disease, stage III (moderate) 10/13/2016    Chronic venous insufficiency     Hyperlipidemia     Hypertension     Hypertriglyceridemia 9/29/2021    Low back pain     Mass of ovary 3/30/2016    Obesity (BMI 30-39.9) 12/22/2019    Sleep apnea     on CPAP.  Dr. Mueller    Spinal stenosis     Type 2 diabetes mellitus     Uterine leiomyoma 3/30/2016       Past Surgical History:   Procedure Laterality Date    CARDIAC CATHETERIZATION N/A 6/16/2024    Procedure: Coronary angiography;  Surgeon: Jeffery Myrick MD;  Location: [facility] CATH INVASIVE LOCATION;  Service: Cardiovascular;  Laterality: N/A;  NO LV GRAM    CARDIAC CATHETERIZATION N/A 6/16/2024    Procedure: Left Heart Cath;  Surgeon: Jeffery Myrick MD;  Location: [facility] CATH INVASIVE LOCATION;  Service: Cardiovascular;  Laterality: N/A;    CARDIAC CATHETERIZATION N/A 6/20/2024    Procedure: Percutaneous Coronary Intervention;  Surgeon: Joselito Paredes MD;  [facility];  Service: Cardiovascular;  Laterality: N/A;    CARDIAC CATHETERIZATION N/A 6/20/2024    Procedure: Atherectomy-coronary;  Surgeon: Joselito Paredes MD;  Location: [facility] CATH  INVASIVE LOCATION;  Service: Cardiovascular;  Laterality: N/A;    CARDIAC CATHETERIZATION N/A 2024    Procedure: Stent AKIRA coronary;  Surgeon: Joselito Paredes MD;  Location:  [facility] CATH INVASIVE LOCATION;  Service: Cardiovascular;  Laterality: N/A;    CARDIAC CATHETERIZATION N/A 2024    Procedure: Coronary angiography;  Surgeon: Joselito Paredes MD;  Location:  [facility] CATH INVASIVE LOCATION;  Service: Cardiovascular;  Laterality: N/A;    CATARACT EXTRACTION Bilateral     X2     COLONOSCOPY N/A 2018    Procedure: COLONOSCOPY to CECUM WITH HOT SNARE POLYPECTOMY;  Surgeon: Neal Reilly MD;  Location:  [facility];  Service: Gastroenterology    COLONOSCOPY N/A 2023    Procedure: COLONOSCOPY to cecum and TI with cold snare and cold biopsy polypectomies;  Surgeon: Polly Ruiz MD;  Location:  [facility];  Service: General;  Laterality: N/A;  pre: screening  post: polyps    EPIDURAL BLOCK      HERNIA REPAIR      HYSTERECTOMY      INTERVENTIONAL RADIOLOGY PROCEDURE N/A 2024    Procedure: Intravascular Ultrasound;  Surgeon: Joselito Paredes MD;  Location:  [facility] CATH INVASIVE LOCATION;  Service: Cardiovascular;  Laterality: N/A;    TONSILLECTOMY         Family History   Problem Relation Age of Onset    Diabetes Mother     Heart attack Mother     Hypertension Mother     Hypothyroidism Mother     Diabetes type II Son     Breast cancer Neg Hx        Social History     Tobacco Use    Smoking status: Former     Current packs/day: 0.00     Average packs/day: 0.3 packs/day for 2.0 years (0.5 ttl pk-yrs)     Types: Cigarettes     Start date:      Quit date: 1970     Years since quittin.5    Smokeless tobacco: Never    Tobacco comments:     LIGHT USAGE   Vaping Use    Vaping status: Never Used   Substance Use Topics    Alcohol use: No    Drug use: No         ECG 12 Lead    Date/Time: 2024 3:39 PM  Performed by: Antoinette Burris APRN    Authorized by: Antoinette Burris APRN  Comparison:  "compared with previous ECG from 2024  Similar to previous ECG  Rhythm: sinus rhythm  Conduction: left bundle branch block  Other findings: left ventricular hypertrophy with strain             Objective:     Visit Vitals  /74   Pulse 53   Ht 162.6 cm (64\")   Wt 83.9 kg (185 lb)   BMI 31.76 kg/m²             Physical Exam  Constitutional:       Appearance: Normal appearance. She is normal weight.   HENT:      Head: Normocephalic.   Neck:      Vascular: No carotid bruit.   Cardiovascular:      Rate and Rhythm: Normal rate and regular rhythm.      Chest Wall: PMI is not displaced.      Pulses: Normal pulses.           Radial pulses are 2+ on the right side and 2+ on the left side.        Posterior tibial pulses are 2+ on the right side and 2+ on the left side.      Heart sounds: Normal heart sounds. No murmur heard.     No friction rub. No gallop.      Comments: Right radial cath site soft with pea sized knot at the insertion site.  Non-tender.  Pulmonary:      Effort: Pulmonary effort is normal.      Breath sounds: Normal breath sounds.   Abdominal:      General: Bowel sounds are normal. There is no distension.      Palpations: Abdomen is soft.   Musculoskeletal:      Right lower le+ No edema.      Left lower le+ No edema.   Skin:     General: Skin is warm and dry.      Capillary Refill: Capillary refill takes less than 2 seconds.   Neurological:      Mental Status: She is alert and oriented to person, place, and time.   Psychiatric:         Mood and Affect: Mood normal.         Behavior: Behavior normal.         Thought Content: Thought content normal.          Lab Review:   Lipid Panel          2023    09:02 2024    09:49   Lipid Panel   Total Cholesterol 171  216    Triglycerides 246  167    HDL Cholesterol 31  35    VLDL Cholesterol 42  31    LDL Cholesterol  98  150          Cardiac Procedures:       Assessment:         Diagnoses and all orders for this visit:    1. Coronary artery " disease involving native coronary artery of native heart, unspecified whether angina present (Primary)  -     Case Request Cath Lab: Percutaneous Coronary Intervention            Plan:       CAD: s/p atherectomy and PCI with stenting to the LM, LAD, circumflex and second diagonal. EKG is stable. No anginal symptoms. She is to return for staged PCI of the mid RCA. Discussed with Dr. Paredes and will plan for next week. Patient and patient's son would like for this to be done before she is discharged from the [facility] on 7/4. Continue statin, beta blocker, aspirin and clopidogrel.  Chronic HFpEF: appears compensated on exam today. No changes.  HTN: blood pressure stable. No changes  HLD: on statin. Goal LDL < 70. Most recent was 150. May need to consider increasing atorvastatin to 80 mg daily if she can tolerate. Will readdress after next stenting.  Diabetes  Stage 3 CKD  SAWYER: CPAP compliant  Obesity: BMI 31.76    Thank you for allowing me to participate in this patient's care. Please call with any questions or concerns. Ms. Chavez will follow up with with Vicky or myself 1 week post cath.          Your medication list            Accurate as of June 28, 2024  3:31 PM. If you have any questions, ask your nurse or doctor.                CHANGE how you take these medications        Instructions Last Dose Given Next Dose Due   hydrOXYzine 25 MG tablet  Commonly known as: ATARAX  What changed: See the new instructions.      TAKE 1 TABLET BY MOUTH AT NIGHT AS NEEDED FOR INSOMNIA       isosorbide mononitrate 60 MG 24 hr tablet  Commonly known as: IMDUR  What changed: how to take this      Take 1 tablet by mouth once daily       metoprolol succinate  MG 24 hr tablet  Commonly known as: TOPROL-XL  What changed: how to take this      Take 1 tablet by mouth once daily              CONTINUE taking these medications        Instructions Last Dose Given Next Dose Due   aspirin 81 MG tablet      Take 1 tablet by mouth Every  Night.       atorvastatin 40 MG tablet  Commonly known as: LIPITOR      Take 1 tablet by mouth once daily       Calcium Carbonate 1500 (600 Ca) MG tablet      Take 1 tablet by mouth.       clopidogrel 75 MG tablet  Commonly known as: PLAVIX      Take 1 tablet by mouth Daily.       docusate sodium 100 MG capsule  Commonly known as: COLACE      Take 1 capsule by mouth 2 (Two) Times a Day.       doxycycline 100 MG capsule  Commonly known as: MONODOX      Take 1 capsule by mouth Every 12 (Twelve) Hours for 12 doses. Indications: Infection of the Skin and/or Soft Tissue       empagliflozin 10 MG tablet tablet  Commonly known as: JARDIANCE      Take 1 tablet by mouth Daily.       hydrALAZINE 10 MG tablet  Commonly known as: APRESOLINE      TAKE 1 TABLET BY MOUTH THREE TIMES DAILY       insulin aspart prot-insulin aspart (70-30) 100 UNIT/ML injection  Commonly known as: novoLOG 70/30      Inject 70 Units under the skin into the appropriate area as directed 2 (Two) Times a Day With Meals. Patient uses 70U in AM and 60U in PM       insulin lispro 100 UNIT/ML injection  Commonly known as: HUMALOG/ADMELOG      Inject 3-14 Units under the skin into the appropriate area as directed 4 (Four) Times a Day Before Meals & at Bedtime.       Semaglutide(0.25 or 0.5MG/DOS) 2 MG/1.5ML solution pen-injector  Commonly known as: OZEMPIC      Inject  under the skin into the appropriate area as directed 1 (One) Time Per Week. Patient takes on Tuesdays       spironolactone 25 MG tablet  Commonly known as: ALDACTONE      Take 1 tablet by mouth Daily.       torsemide 20 MG tablet  Commonly known as: DEMADEX      Take 4 tablets by mouth 2 (Two) Times a Day.       vitamin D 1.25 MG (59214 UT) capsule capsule  Commonly known as: ERGOCALCIFEROL      1 capsule Every 30 (Thirty) Days. Patient takes one tablet monthly, on the 19th                  MEREDITH Perry  06/28/24  3:31 PM EDT

## 2024-07-05 ENCOUNTER — READMISSION MANAGEMENT (OUTPATIENT)
Dept: CALL CENTER | Facility: HOSPITAL | Age: 79
End: 2024-07-05
Payer: MEDICARE

## 2024-07-05 NOTE — OUTREACH NOTE
Prep Survey      Flowsheet Row Responses   [facility] facility patient discharged from? Non-BH   Is LACE score < 7 ? Non-BH Discharge   Eligibility [facility]   Date of Discharge 07/04/24   Discharge Disposition Home or Self Care   Discharge diagnosis Non-ST elevation (NSTEMI) myocardial infarction   Does the patient have one of the following disease processes/diagnoses(primary or secondary)? Acute MI (STEMI,NSTEMI)   Prep survey completed? Yes            Wendy GRAVES - Registered Nurse

## 2024-07-08 ENCOUNTER — HOSPITAL ENCOUNTER (INPATIENT)
Facility: HOSPITAL | Age: 79
LOS: 1 days | Discharge: HOME | End: 2024-07-09
Attending: STUDENT IN AN ORGANIZED HEALTH CARE EDUCATION/TRAINING PROGRAM | Admitting: STUDENT IN AN ORGANIZED HEALTH CARE EDUCATION/TRAINING PROGRAM
Payer: MEDICARE

## 2024-07-08 ENCOUNTER — TRANSITIONAL CARE MANAGEMENT TELEPHONE ENCOUNTER (OUTPATIENT)
Dept: CALL CENTER | Facility: HOSPITAL | Age: 79
End: 2024-07-08
Payer: MEDICARE

## 2024-07-08 DIAGNOSIS — I25.10 CORONARY ARTERY DISEASE INVOLVING NATIVE CORONARY ARTERY OF NATIVE HEART, UNSPECIFIED WHETHER ANGINA PRESENT: ICD-10-CM

## 2024-07-08 DIAGNOSIS — Z95.5 S/P RIGHT CORONARY ARTERY (RCA) STENT PLACEMENT: Primary | ICD-10-CM

## 2024-07-08 LAB — GLUCOSE BLDC GLUCOMTR-MCNC: 185 MG/DL (ref 70–130)

## 2024-07-08 PROCEDURE — C1725 CATH, TRANSLUMIN NON-LASER: HCPCS | Performed by: STUDENT IN AN ORGANIZED HEALTH CARE EDUCATION/TRAINING PROGRAM

## 2024-07-08 PROCEDURE — C1874 STENT, COATED/COV W/DEL SYS: HCPCS | Performed by: STUDENT IN AN ORGANIZED HEALTH CARE EDUCATION/TRAINING PROGRAM

## 2024-07-08 PROCEDURE — C1887 CATHETER, GUIDING: HCPCS | Performed by: STUDENT IN AN ORGANIZED HEALTH CARE EDUCATION/TRAINING PROGRAM

## 2024-07-08 PROCEDURE — 25010000002 MIDAZOLAM PER 1 MG: Performed by: STUDENT IN AN ORGANIZED HEALTH CARE EDUCATION/TRAINING PROGRAM

## 2024-07-08 PROCEDURE — 92933 PRQ TRLML C ATHRC ST ANGIOP1: CPT | Performed by: STUDENT IN AN ORGANIZED HEALTH CARE EDUCATION/TRAINING PROGRAM

## 2024-07-08 PROCEDURE — 25810000003 SODIUM CHLORIDE 0.9 % SOLUTION: Performed by: STUDENT IN AN ORGANIZED HEALTH CARE EDUCATION/TRAINING PROGRAM

## 2024-07-08 PROCEDURE — 92978 ENDOLUMINL IVUS OCT C 1ST: CPT | Performed by: STUDENT IN AN ORGANIZED HEALTH CARE EDUCATION/TRAINING PROGRAM

## 2024-07-08 PROCEDURE — C1769 GUIDE WIRE: HCPCS | Performed by: STUDENT IN AN ORGANIZED HEALTH CARE EDUCATION/TRAINING PROGRAM

## 2024-07-08 PROCEDURE — 25010000002 NICARDIPINE 2.5 MG/ML SOLUTION: Performed by: STUDENT IN AN ORGANIZED HEALTH CARE EDUCATION/TRAINING PROGRAM

## 2024-07-08 PROCEDURE — 25510000001 IOPAMIDOL PER 1 ML: Performed by: STUDENT IN AN ORGANIZED HEALTH CARE EDUCATION/TRAINING PROGRAM

## 2024-07-08 PROCEDURE — C9602 PERC D-E COR STENT ATHER S: HCPCS | Performed by: STUDENT IN AN ORGANIZED HEALTH CARE EDUCATION/TRAINING PROGRAM

## 2024-07-08 PROCEDURE — C1894 INTRO/SHEATH, NON-LASER: HCPCS | Performed by: STUDENT IN AN ORGANIZED HEALTH CARE EDUCATION/TRAINING PROGRAM

## 2024-07-08 PROCEDURE — 25010000002 HEPARIN (PORCINE) PER 1000 UNITS: Performed by: STUDENT IN AN ORGANIZED HEALTH CARE EDUCATION/TRAINING PROGRAM

## 2024-07-08 PROCEDURE — C1724 CATH, TRANS ATHEREC,ROTATION: HCPCS | Performed by: STUDENT IN AN ORGANIZED HEALTH CARE EDUCATION/TRAINING PROGRAM

## 2024-07-08 PROCEDURE — 82948 REAGENT STRIP/BLOOD GLUCOSE: CPT

## 2024-07-08 PROCEDURE — C1753 CATH, INTRAVAS ULTRASOUND: HCPCS | Performed by: STUDENT IN AN ORGANIZED HEALTH CARE EDUCATION/TRAINING PROGRAM

## 2024-07-08 PROCEDURE — 25010000002 FENTANYL CITRATE (PF) 50 MCG/ML SOLUTION: Performed by: STUDENT IN AN ORGANIZED HEALTH CARE EDUCATION/TRAINING PROGRAM

## 2024-07-08 PROCEDURE — 85347 COAGULATION TIME ACTIVATED: CPT

## 2024-07-08 DEVICE — XIENCE SKYPOINT™ EVEROLIMUS ELUTING CORONARY STENT SYSTEM 3.00 MM X 48 MM / RAPID-EXCHANGE
Type: IMPLANTABLE DEVICE | Site: CORONARY | Status: FUNCTIONAL
Brand: XIENCE SKYPOINT™

## 2024-07-08 DEVICE — XIENCE SKYPOINT™ EVEROLIMUS ELUTING CORONARY STENT SYSTEM 3.50 MM X 48 MM / RAPID-EXCHANGE
Type: IMPLANTABLE DEVICE | Site: CORONARY | Status: FUNCTIONAL
Brand: XIENCE SKYPOINT™

## 2024-07-08 DEVICE — XIENCE SKYPOINT™ EVEROLIMUS ELUTING CORONARY STENT SYSTEM 4.00 MM X 48 MM / RAPID-EXCHANGE
Type: IMPLANTABLE DEVICE | Site: CORONARY | Status: FUNCTIONAL
Brand: XIENCE SKYPOINT™

## 2024-07-08 RX ORDER — DOCUSATE SODIUM 100 MG/1
100 CAPSULE, LIQUID FILLED ORAL DAILY
Status: DISCONTINUED | OUTPATIENT
Start: 2024-07-08 | End: 2024-07-09 | Stop reason: HOSPADM

## 2024-07-08 RX ORDER — TORSEMIDE 20 MG/1
80 TABLET ORAL 2 TIMES DAILY
Status: DISCONTINUED | OUTPATIENT
Start: 2024-07-08 | End: 2024-07-09 | Stop reason: HOSPADM

## 2024-07-08 RX ORDER — SODIUM CHLORIDE 9 MG/ML
75 INJECTION, SOLUTION INTRAVENOUS CONTINUOUS
Status: DISCONTINUED | OUTPATIENT
Start: 2024-07-08 | End: 2024-07-09 | Stop reason: HOSPADM

## 2024-07-08 RX ORDER — VERAPAMIL HYDROCHLORIDE 2.5 MG/ML
INJECTION, SOLUTION INTRAVENOUS
Status: DISCONTINUED | OUTPATIENT
Start: 2024-07-08 | End: 2024-07-08 | Stop reason: HOSPADM

## 2024-07-08 RX ORDER — HYDRALAZINE HYDROCHLORIDE 10 MG/1
TABLET, FILM COATED ORAL
Qty: 270 TABLET | Refills: 0 | Status: SHIPPED | OUTPATIENT
Start: 2024-07-08 | End: 2024-07-09 | Stop reason: HOSPADM

## 2024-07-08 RX ORDER — MIDAZOLAM HYDROCHLORIDE 1 MG/ML
INJECTION INTRAMUSCULAR; INTRAVENOUS
Status: DISCONTINUED | OUTPATIENT
Start: 2024-07-08 | End: 2024-07-08 | Stop reason: HOSPADM

## 2024-07-08 RX ORDER — CLOPIDOGREL BISULFATE 75 MG/1
75 TABLET ORAL DAILY
Status: DISCONTINUED | OUTPATIENT
Start: 2024-07-08 | End: 2024-07-09 | Stop reason: HOSPADM

## 2024-07-08 RX ORDER — FENTANYL CITRATE 50 UG/ML
INJECTION, SOLUTION INTRAMUSCULAR; INTRAVENOUS
Status: DISCONTINUED | OUTPATIENT
Start: 2024-07-08 | End: 2024-07-08 | Stop reason: HOSPADM

## 2024-07-08 RX ORDER — ASPIRIN 81 MG/1
81 TABLET ORAL DAILY
Status: DISCONTINUED | OUTPATIENT
Start: 2024-07-08 | End: 2024-07-09 | Stop reason: HOSPADM

## 2024-07-08 RX ORDER — ISOSORBIDE MONONITRATE 60 MG/1
60 TABLET, EXTENDED RELEASE ORAL DAILY
Status: DISCONTINUED | OUTPATIENT
Start: 2024-07-08 | End: 2024-07-09 | Stop reason: HOSPADM

## 2024-07-08 RX ORDER — ATORVASTATIN CALCIUM 20 MG/1
40 TABLET, FILM COATED ORAL DAILY
Status: DISCONTINUED | OUTPATIENT
Start: 2024-07-08 | End: 2024-07-09 | Stop reason: HOSPADM

## 2024-07-08 RX ORDER — HEPARIN SODIUM 1000 [USP'U]/ML
INJECTION, SOLUTION INTRAVENOUS; SUBCUTANEOUS
Status: DISCONTINUED | OUTPATIENT
Start: 2024-07-08 | End: 2024-07-08 | Stop reason: HOSPADM

## 2024-07-08 RX ORDER — LIDOCAINE HYDROCHLORIDE 20 MG/ML
INJECTION, SOLUTION INFILTRATION; PERINEURAL
Status: DISCONTINUED | OUTPATIENT
Start: 2024-07-08 | End: 2024-07-08 | Stop reason: HOSPADM

## 2024-07-08 RX ORDER — NICARDIPINE HYDROCHLORIDE 2.5 MG/ML
INJECTION INTRAVENOUS
Status: DISCONTINUED | OUTPATIENT
Start: 2024-07-08 | End: 2024-07-08 | Stop reason: HOSPADM

## 2024-07-08 RX ORDER — SPIRONOLACTONE 25 MG/1
25 TABLET ORAL DAILY
Status: DISCONTINUED | OUTPATIENT
Start: 2024-07-08 | End: 2024-07-09 | Stop reason: HOSPADM

## 2024-07-08 RX ORDER — ASPIRIN 325 MG
TABLET ORAL
Status: DISCONTINUED | OUTPATIENT
Start: 2024-07-08 | End: 2024-07-08 | Stop reason: HOSPADM

## 2024-07-08 RX ORDER — HYDRALAZINE HYDROCHLORIDE 10 MG/1
10 TABLET, FILM COATED ORAL 3 TIMES DAILY
Status: DISCONTINUED | OUTPATIENT
Start: 2024-07-08 | End: 2024-07-09

## 2024-07-08 RX ORDER — SODIUM CHLORIDE 0.9 % (FLUSH) 0.9 %
10 SYRINGE (ML) INJECTION AS NEEDED
Status: DISCONTINUED | OUTPATIENT
Start: 2024-07-08 | End: 2024-07-08 | Stop reason: HOSPADM

## 2024-07-08 RX ORDER — METOPROLOL SUCCINATE 100 MG/1
100 TABLET, EXTENDED RELEASE ORAL DAILY
Status: DISCONTINUED | OUTPATIENT
Start: 2024-07-08 | End: 2024-07-09 | Stop reason: HOSPADM

## 2024-07-08 RX ORDER — ACETAMINOPHEN 325 MG/1
650 TABLET ORAL EVERY 4 HOURS PRN
Status: DISCONTINUED | OUTPATIENT
Start: 2024-07-08 | End: 2024-07-09 | Stop reason: HOSPADM

## 2024-07-08 RX ORDER — CLOPIDOGREL BISULFATE 75 MG/1
TABLET ORAL
Status: DISCONTINUED | OUTPATIENT
Start: 2024-07-08 | End: 2024-07-08 | Stop reason: HOSPADM

## 2024-07-08 RX ADMIN — SODIUM CHLORIDE 75 ML/HR: 9 INJECTION, SOLUTION INTRAVENOUS at 15:44

## 2024-07-08 RX ADMIN — TORSEMIDE 80 MG: 20 TABLET ORAL at 20:10

## 2024-07-08 RX ADMIN — ACETAMINOPHEN 325MG 650 MG: 325 TABLET ORAL at 23:41

## 2024-07-08 RX ADMIN — HYDRALAZINE HYDROCHLORIDE 10 MG: 10 TABLET ORAL at 20:10

## 2024-07-08 RX ADMIN — DOCUSATE SODIUM 100 MG: 100 CAPSULE, LIQUID FILLED ORAL at 15:43

## 2024-07-08 RX ADMIN — SODIUM CHLORIDE 75 ML/HR: 9 INJECTION, SOLUTION INTRAVENOUS at 11:54

## 2024-07-08 RX ADMIN — ATORVASTATIN CALCIUM 40 MG: 20 TABLET, FILM COATED ORAL at 15:43

## 2024-07-08 RX ADMIN — EMPAGLIFLOZIN 10 MG: 10 TABLET, FILM COATED ORAL at 15:44

## 2024-07-08 RX ADMIN — SPIRONOLACTONE 25 MG: 25 TABLET, FILM COATED ORAL at 15:44

## 2024-07-08 RX ADMIN — METOPROLOL SUCCINATE 100 MG: 100 TABLET, EXTENDED RELEASE ORAL at 17:37

## 2024-07-08 RX ADMIN — SODIUM CHLORIDE 251 ML: 9 INJECTION, SOLUTION INTRAVENOUS at 11:54

## 2024-07-08 RX ADMIN — HYDRALAZINE HYDROCHLORIDE 10 MG: 10 TABLET ORAL at 15:44

## 2024-07-08 RX ADMIN — ISOSORBIDE MONONITRATE 60 MG: 60 TABLET, EXTENDED RELEASE ORAL at 17:37

## 2024-07-08 NOTE — Clinical Note
Hemostasis started on the right radial artery. R-Band was used in achieving hemostasis. Radial compression device applied to vessel. Hemostasis achieved successfully. Closure device additional comment: Vasc band with 14cc of air

## 2024-07-08 NOTE — Clinical Note
First balloon inflation max pressure = 18 davina. First balloon inflation duration = 8 seconds. Second inflation of balloon - Max pressure = 20 davina. 2nd Inflation of balloon - Duration = 5 seconds. 2nd inflation was done at 13:59 EDT.

## 2024-07-08 NOTE — Clinical Note
A 7 fr sheath was successfully inserted with ultrasound guidance into the right radial artery. Sheath insertion not delayed.

## 2024-07-08 NOTE — Clinical Note
Atherectomy performed in the right coronary artery. Orbital atherectomy. 1st Pass rate = 80 RPM. 1st Pass duration = 15 seconds. 2nd Pass rate = 80 RPM. 2nd Pass duration = 22 seconds. 3rd Pass rate = 120 RPM. 3rd Pass duration = 18 seconds. 4th Pass rate = 120 RPM. 4th Pass duration = 22 seconds.

## 2024-07-08 NOTE — Clinical Note
First balloon inflation max pressure = 12 davina. First balloon inflation duration = 5 seconds. Second inflation of balloon - Max pressure = 14 davina. 2nd Inflation of balloon - Duration = 7 seconds. 2nd inflation was done at 13:47 EDT. Third inflation of balloon - Max pressure = 18 davina. 3rd Inflation of balloon - Duration = 7 seconds. 3rd inflation was done at 13:47 EDT.

## 2024-07-08 NOTE — Clinical Note
First balloon inflation max pressure = 14 davina. First balloon inflation duration = 5 seconds. Second inflation of balloon - Max pressure = 16 davina. 2nd Inflation of balloon - Duration = 5 seconds. 2nd inflation was done at 14:02 EDT. Third inflation of balloon - Max pressure = 20 davina. 3rd Inflation of balloon - Duration = 7 seconds. 3rd inflation was done at 14:02 EDT. Fourth inflation of balloon - Max pressure = 22 davina. 4th Inflation of  balloon - Duration = 5 seconds. 4th inflation was done at 14:02 EDT.

## 2024-07-08 NOTE — Clinical Note
First balloon inflation max pressure = 14 davina. First balloon inflation duration = 7 seconds. Second inflation of balloon - Max pressure = 16 davina. 2nd Inflation of balloon - Duration = 7 seconds. 2nd inflation was done at 13:37 EDT. Third inflation of balloon - Max pressure = 18 davina. 3rd Inflation of balloon - Duration = 7 seconds. 3rd inflation was done at 13:37 EDT. Fourth inflation of balloon - Max pressure = 20 davina. 4th Inflation of  balloon - Duration = 7 seconds. 4th inflation was done at 13:38 EDT.

## 2024-07-08 NOTE — OUTREACH NOTE
Call Center TCM Note      Flowsheet Row Responses   [facility] facility patient discharged from? Non-BH   Does the patient have one of the following disease processes/diagnoses(primary or secondary)? Acute MI (STEMI,NSTEMI)   TCM attempt successful? No   Unsuccessful attempts Attempt 1   Change in Health Status Readmitted            Anamaria Phipps RN    7/8/2024, 11:47 EDT

## 2024-07-08 NOTE — Clinical Note
First balloon inflation max pressure = 12 davina. First balloon inflation duration = 5 seconds. Second inflation of balloon - Max pressure = 16 davina. 2nd Inflation of balloon - Duration = 8 seconds. 2nd inflation was done at 13:40 EDT. Third inflation of balloon - Max pressure = 18 davina. 3rd Inflation of balloon - Duration = 5 seconds. 3rd inflation was done at 13:40 EDT. Fourth inflation of balloon - Max pressure = 20 davina. 4th Inflation of  balloon - Duration = 5 seconds. 4th inflation was done at 13:40 EDT.

## 2024-07-08 NOTE — Clinical Note
First balloon inflation max pressure = 14 davina. First balloon inflation duration = 8 seconds. Second inflation of balloon - Max pressure = 14 davina. 2nd Inflation of balloon - Duration = 5 seconds. 2nd inflation was done at 14:10 EDT. Third inflation of balloon - Max pressure = 18 davina. 3rd Inflation of balloon - Duration = 7 seconds. 3rd inflation was done at 14:11 EDT. Fourth inflation of balloon - Max pressure = 20 davina. 4th Inflation of  balloon - Duration = 7 seconds. 4th inflation was done at 14:11 EDT.

## 2024-07-08 NOTE — Clinical Note
First balloon inflation max pressure = 18 davina. First balloon inflation duration = 5 seconds. Second inflation of balloon - Max pressure = 20 davina. 2nd Inflation of balloon - Duration = 8 seconds. 2nd inflation was done at 14:03 EDT. Third inflation of balloon - Max pressure = 20 davina. 3rd Inflation of balloon - Duration = 10 seconds. 3rd inflation was done at 14:04 EDT. Fourth inflation of balloon - Max pressure = 20 davina. 4th Inflation of  balloon - Duration = 8 seconds. 4th inflation was done at 14:04 EDT.

## 2024-07-08 NOTE — Clinical Note
First balloon inflation max pressure = 20 davina. First balloon inflation duration = 7 seconds. Second inflation of balloon - Max pressure = 16 davina. 2nd Inflation of balloon - Duration = 7 seconds. 2nd inflation was done at 14:11 EDT. Third inflation of balloon - Max pressure = 18 davina. 3rd Inflation of balloon - Duration = 5 seconds. 3rd inflation was done at 14:11 EDT.

## 2024-07-08 NOTE — Clinical Note
First balloon inflation max pressure = 20 davina. First balloon inflation duration = 15 seconds. Second inflation of balloon - Max pressure = 10 davina. 2nd Inflation of balloon - Duration = 10 seconds.

## 2024-07-08 NOTE — PLAN OF CARE
Problem: Adult Inpatient Plan of Care  Goal: Readiness for Transition of Care  Intervention: Mutually Develop Transition Plan  Recent Flowsheet Documentation  Taken 7/8/2024 1454 by Laura Kumar, RN  Equipment Currently Used at Home:   walker, rolling   cane, quad tip  Taken 7/8/2024 1452 by Laura Kumar, RN  Transportation Anticipated: family or friend will provide  Patient/Family Anticipated Services at Transition: none  Patient/Family Anticipates Transition to: home   Goal Outcome Evaluation:

## 2024-07-09 ENCOUNTER — READMISSION MANAGEMENT (OUTPATIENT)
Dept: CALL CENTER | Facility: HOSPITAL | Age: 79
End: 2024-07-09
Payer: MEDICARE

## 2024-07-09 VITALS
OXYGEN SATURATION: 100 % | HEIGHT: 62 IN | HEART RATE: 67 BPM | SYSTOLIC BLOOD PRESSURE: 125 MMHG | TEMPERATURE: 99.3 F | RESPIRATION RATE: 16 BRPM | WEIGHT: 190.7 LBS | BODY MASS INDEX: 35.09 KG/M2 | DIASTOLIC BLOOD PRESSURE: 53 MMHG

## 2024-07-09 LAB
ANION GAP SERPL CALCULATED.3IONS-SCNC: 14.6 MMOL/L (ref 5–15)
BUN SERPL-MCNC: 19 MG/DL (ref 8–23)
BUN/CREAT SERPL: 17.3 (ref 7–25)
CALCIUM SPEC-SCNC: 8.9 MG/DL (ref 8.6–10.5)
CHLORIDE SERPL-SCNC: 102 MMOL/L (ref 98–107)
CO2 SERPL-SCNC: 22.4 MMOL/L (ref 22–29)
CREAT SERPL-MCNC: 1.1 MG/DL (ref 0.57–1)
DEPRECATED RDW RBC AUTO: 44.6 FL (ref 37–54)
EGFRCR SERPLBLD CKD-EPI 2021: 51.5 ML/MIN/1.73
ERYTHROCYTE [DISTWIDTH] IN BLOOD BY AUTOMATED COUNT: 14.2 % (ref 12.3–15.4)
GLUCOSE SERPL-MCNC: 219 MG/DL (ref 65–99)
HCT VFR BLD AUTO: 37 % (ref 34–46.6)
HGB BLD-MCNC: 12.2 G/DL (ref 12–15.9)
MCH RBC QN AUTO: 28.6 PG (ref 26.6–33)
MCHC RBC AUTO-ENTMCNC: 33 G/DL (ref 31.5–35.7)
MCV RBC AUTO: 86.9 FL (ref 79–97)
PLATELET # BLD AUTO: 190 10*3/MM3 (ref 140–450)
PMV BLD AUTO: 10.3 FL (ref 6–12)
POTASSIUM SERPL-SCNC: 4.2 MMOL/L (ref 3.5–5.2)
QT INTERVAL: 416 MS
QTC INTERVAL: 405 MS
RBC # BLD AUTO: 4.26 10*6/MM3 (ref 3.77–5.28)
SODIUM SERPL-SCNC: 139 MMOL/L (ref 136–145)
WBC NRBC COR # BLD AUTO: 9.27 10*3/MM3 (ref 3.4–10.8)

## 2024-07-09 PROCEDURE — 80048 BASIC METABOLIC PNL TOTAL CA: CPT | Performed by: STUDENT IN AN ORGANIZED HEALTH CARE EDUCATION/TRAINING PROGRAM

## 2024-07-09 PROCEDURE — B240ZZ3 ULTRASONOGRAPHY OF SINGLE CORONARY ARTERY, INTRAVASCULAR: ICD-10-PCS | Performed by: INTERNAL MEDICINE

## 2024-07-09 PROCEDURE — 02C03Z7 EXTIRPATION OF MATTER FROM CORONARY ARTERY, ONE ARTERY, ORBITAL ATHERECTOMY TECHNIQUE, PERCUTANEOUS APPROACH: ICD-10-PCS | Performed by: INTERNAL MEDICINE

## 2024-07-09 PROCEDURE — 25810000003 SODIUM CHLORIDE 0.9 % SOLUTION: Performed by: STUDENT IN AN ORGANIZED HEALTH CARE EDUCATION/TRAINING PROGRAM

## 2024-07-09 PROCEDURE — 93010 ELECTROCARDIOGRAM REPORT: CPT | Performed by: INTERNAL MEDICINE

## 2024-07-09 PROCEDURE — 027135Z DILATION OF CORONARY ARTERY, TWO ARTERIES WITH TWO DRUG-ELUTING INTRALUMINAL DEVICES, PERCUTANEOUS APPROACH: ICD-10-PCS | Performed by: INTERNAL MEDICINE

## 2024-07-09 PROCEDURE — 99238 HOSP IP/OBS DSCHRG MGMT 30/<: CPT | Performed by: STUDENT IN AN ORGANIZED HEALTH CARE EDUCATION/TRAINING PROGRAM

## 2024-07-09 PROCEDURE — 85027 COMPLETE CBC AUTOMATED: CPT | Performed by: STUDENT IN AN ORGANIZED HEALTH CARE EDUCATION/TRAINING PROGRAM

## 2024-07-09 PROCEDURE — 93005 ELECTROCARDIOGRAM TRACING: CPT | Performed by: STUDENT IN AN ORGANIZED HEALTH CARE EDUCATION/TRAINING PROGRAM

## 2024-07-09 PROCEDURE — B2111ZZ FLUOROSCOPY OF MULTIPLE CORONARY ARTERIES USING LOW OSMOLAR CONTRAST: ICD-10-PCS | Performed by: INTERNAL MEDICINE

## 2024-07-09 RX ORDER — HYDRALAZINE HYDROCHLORIDE 25 MG/1
25 TABLET, FILM COATED ORAL 3 TIMES DAILY
Qty: 90 TABLET | Refills: 3 | Status: SHIPPED | OUTPATIENT
Start: 2024-07-09

## 2024-07-09 RX ORDER — HYDRALAZINE HYDROCHLORIDE 25 MG/1
25 TABLET, FILM COATED ORAL 3 TIMES DAILY
Status: DISCONTINUED | OUTPATIENT
Start: 2024-07-09 | End: 2024-07-09 | Stop reason: HOSPADM

## 2024-07-09 RX ORDER — ACETAMINOPHEN 325 MG/1
650 TABLET ORAL EVERY 4 HOURS PRN
Qty: 14 TABLET | Refills: 0 | Status: SHIPPED | OUTPATIENT
Start: 2024-07-09

## 2024-07-09 RX ADMIN — SODIUM CHLORIDE 75 ML/HR: 9 INJECTION, SOLUTION INTRAVENOUS at 04:25

## 2024-07-09 RX ADMIN — TORSEMIDE 80 MG: 20 TABLET ORAL at 09:43

## 2024-07-09 RX ADMIN — ISOSORBIDE MONONITRATE 60 MG: 60 TABLET, EXTENDED RELEASE ORAL at 12:16

## 2024-07-09 RX ADMIN — ASPIRIN 81 MG: 81 TABLET, COATED ORAL at 09:41

## 2024-07-09 RX ADMIN — CLOPIDOGREL BISULFATE 75 MG: 75 TABLET, FILM COATED ORAL at 09:41

## 2024-07-09 RX ADMIN — SPIRONOLACTONE 25 MG: 25 TABLET, FILM COATED ORAL at 09:40

## 2024-07-09 RX ADMIN — ATORVASTATIN CALCIUM 40 MG: 20 TABLET, FILM COATED ORAL at 09:41

## 2024-07-09 RX ADMIN — ACETAMINOPHEN 325MG 650 MG: 325 TABLET ORAL at 09:56

## 2024-07-09 RX ADMIN — HYDRALAZINE HYDROCHLORIDE 25 MG: 25 TABLET ORAL at 09:43

## 2024-07-09 RX ADMIN — EMPAGLIFLOZIN 10 MG: 10 TABLET, FILM COATED ORAL at 09:40

## 2024-07-09 RX ADMIN — METOPROLOL SUCCINATE 100 MG: 100 TABLET, EXTENDED RELEASE ORAL at 09:41

## 2024-07-09 NOTE — DISCHARGE PLACEMENT REQUEST
"Jenifer Chavez \"Pat\" (78 y.o. Female)       Date of Birth   1945    Social Security Number       Address   [address] [place], [place] [address]    Home Phone   [phone]    MRN   [id number]       Taoism   Mandaen    Marital Status   Single                            Admission Date   7/8/24    Admission Type   Elective    Admitting Provider   Joselito Paredes MD    Attending Provider   Joselito Paredes MD    Department, Room/Bed   [facility] CARDIOVASC UNIT, 2207/1       Discharge Date       Discharge Disposition   Home or Self Care    Discharge Destination                                 Attending Provider: Joselito Paredes MD    Allergies: Ace Inhibitors, Angiotensin Receptor Blockers, Keflex [Cephalexin], Sulfa Antibiotics    Isolation: None   Infection: None   Code Status: CPR    Ht: 157.5 cm (62\")   Wt: 86.5 kg (190 lb 11.2 oz)    Admission Cmt: None   Principal Problem: CAD (coronary artery disease) [I25.10]                   Active Insurance as of 7/8/2024       Primary Coverage       Payor Plan Insurance Group Employer/Plan Group    MEDICARE MEDICARE A & B        Payor Plan Address Payor Plan Phone Number Payor Plan Fax Number Effective Dates    [address] [phone]  9/1/2007 - None Entered    [facility] [id number]         Subscriber Name Subscriber Birth Date Member ID       JENIFER CHAVEZ 1945 [id number]               Secondary Coverage       Payor Plan Insurance Group Employer/Plan Group    [facility] SUPP [id number]       Payor Plan Address Payor Plan Phone Number Payor Plan Fax Number Effective Dates    [address]   12/1/2016 - None Entered    [facility] [id number]         Subscriber Name Subscriber Birth Date Member ID       JENIFER CHAVEZ 1945 [id number]                     Emergency Contacts        (Rel.) Home Phone Work Phone Mobile Phone    ScottAnil (Son) [phone] -- [phone]    Daisy Cheng (Daughter) " [phone] -- [phone]

## 2024-07-09 NOTE — OUTREACH NOTE
Prep Survey      Flowsheet Row Responses   [facility] facility patient discharged from? [facility]   Is LACE score < 7 ? Yes   Eligibility Not Eligible   What are the reasons patient is not eligible? [facility]  [[facility]]   Does the patient have one of the following disease processes/diagnoses(primary or secondary)? Other   Prep survey completed? Yes            Anamaria SHAFER - Registered Nurse

## 2024-07-09 NOTE — CASE MANAGEMENT/SOCIAL WORK
Case Management Discharge Note      Final Note: Pt discharged to [facility] via private auto, 7/9/24 with son transporting……..Alana PRIETO/LEROY LECHUGA         Selected Continued Care - Discharged on 7/9/2024 Admission date: 7/8/2024 - Discharge disposition: Home or Self Care      Destination Coordination complete.      Service Provider Selected Services Address Phone Fax Patient Preferred    [facility] [address] [facility] [address] [phone] --              Durable Medical Equipment    No services have been selected for the patient.                Dialysis/Infusion    No services have been selected for the patient.                Home Medical Care    No services have been selected for the patient.                Therapy    No services have been selected for the patient.                Community Resources    No services have been selected for the patient.                Community & DME    No services have been selected for the patient.                    Selected Continued Care - Prior Encounters Includes continued care and service providers with selected services from prior encounters from 4/9/2024 to 7/9/2024      Discharged on 6/22/2024 Admission date: 6/15/2024 - Discharge disposition: Skilled Nursing Facility (DC - External)      Destination       Service Provider Selected Services Address Phone Fax Patient Preferred    [facility] [address] [facility] [address] [phone] --                          Transportation Services  Private: Car    Final Discharge Disposition Code: 03 - skilled nursing facility (SNF)

## 2024-07-09 NOTE — DISCHARGE SUMMARY
[facility] Discharge Summary      Patient Name: Denisse Chavez  Age/Sex: 78 y.o. female  : 1945  MRN: [id number]    Encounter Provider: Joselito López MD, [facility]  Referring Provider: Joselito Paredes MD  Place of Service: [facility] CARDIOLOGY  Patient Care Team:  Surekha Mcdonnell MD as PCP - General (Internal Medicine)  Warner Tang MD as Cardiologist (Cardiology)  Sergio Eagle MD as Consulting Physician (Urology)  Juan Negrete MD as Consulting Physician (Endocrinology)  Miguel Angel Barrett DPM as Consulting Physician (Podiatry)  Gabriel Montenegro MD as Consulting Physician (Nephrology)  Linda Rodriguez MD (Ophthalmology)  Ginger Ross APRN as Nurse Practitioner (Nurse Practitioner)  Jr Shawn Abraham MD as Surgeon (Cardiothoracic Surgery)         Date of Discharge:  2024     Date of Admit: 2024      Discharge Diagnosis:   CAD (coronary artery disease)    S/P right coronary artery (RCA) stent placement        Hospital Course:   78-year-old woman, followed by Dr. Tang, with CAD status post recent left main, LAD, circumflex, diagonal PCI, CKD, hypertension, hyperlipidemia, SAWYER, diabetes who presents for staged PCI of her RCA. She received PCI to the RCA with IVUS guided orbital atherectomy and overlapping 4.0 x 48mm, 3.5 x 48mm, and 3.0 x 48mm Xience Skypoint drug-eluting stents (postdilated proximally and in the midportion with a 4.5 mm NC, mid with a 4.0 mm NC, and distally with a 3.0 mm NC.)  He was monitored overnight and did well.  He did have significant hypertension while here so I increased her hydralazine to 25 mg 3 times daily.  She should follow-up in our office in 1 to 2 weeks.    Vitals:    24 0940   BP: (!) 182/48   Pulse: 67   Resp:    Temp:    SpO2:      Physical Exam:  GEN: no distress, alert and oriented  HEENT: NACT, EOMI, moist mucous membranes  Lungs: CTAB, no wheezes, rales or rhonchi  CV:  normal rate, regular rhythm, normal S1, S2, right radial artery access site CDI, no hematoma, pulse 2+  Abdomen: soft, nontender, nondistended, NABS  Extremities: no edema  Skin: no rash, warm, dry  Heme/Lymph: no bruising  Psych: organized thought, normal behavior and affect     Procedures Performed:  Procedure(s):  Percutaneous Coronary Intervention  Intravascular Ultrasound  Coronary angiography  Atherectomy-coronary  Stent AKIRA coronary         Consults:  Consults       Date and Time Order Name Status Description    6/16/2024  2:39 PM Inpatient Cardiothoracic Surgery Consult Completed             Pertinent Test Results:    Results from last 7 days   Lab Units 07/09/24  0620   SODIUM mmol/L 139   POTASSIUM mmol/L 4.2   CHLORIDE mmol/L 102   CO2 mmol/L 22.4   BUN mg/dL 19   CREATININE mg/dL 1.10*   GLUCOSE mg/dL 219*   CALCIUM mg/dL 8.9           Results from last 7 days   Lab Units 07/09/24  0620   WBC 10*3/mm3 9.27   HEMOGLOBIN g/dL 12.2   HEMATOCRIT % 37.0   PLATELETS 10*3/mm3 190                               Discharge Medications     Your medication list        START taking these medications        Instructions Last Dose Given Next Dose Due   acetaminophen 325 MG tablet  Commonly known as: TYLENOL      Take 2 tablets by mouth Every 4 (Four) Hours As Needed for Mild Pain.              CHANGE how you take these medications        Instructions Last Dose Given Next Dose Due   hydrALAZINE 25 MG tablet  Commonly known as: APRESOLINE  What changed:   medication strength  how much to take      Take 1 tablet by mouth 3 (Three) Times a Day.       hydrOXYzine 25 MG tablet  Commonly known as: ATARAX  What changed: See the new instructions.      TAKE 1 TABLET BY MOUTH AT NIGHT AS NEEDED FOR INSOMNIA       isosorbide mononitrate 60 MG 24 hr tablet  Commonly known as: IMDUR  What changed: how to take this      Take 1 tablet by mouth once daily       metoprolol succinate  MG 24 hr tablet  Commonly known as:  TOPROL-XL  What changed: how to take this      Take 1 tablet by mouth once daily              CONTINUE taking these medications        Instructions Last Dose Given Next Dose Due   aspirin 81 MG tablet      Take 1 tablet by mouth Every Night.       atorvastatin 40 MG tablet  Commonly known as: LIPITOR      Take 1 tablet by mouth once daily       Calcium Carbonate 1500 (600 Ca) MG tablet      Take 1 tablet by mouth.       clopidogrel 75 MG tablet  Commonly known as: PLAVIX      Take 1 tablet by mouth Daily.       docusate sodium 100 MG capsule  Commonly known as: COLACE      Take 1 capsule by mouth 2 (Two) Times a Day.       empagliflozin 10 MG tablet tablet  Commonly known as: JARDIANCE      Take 1 tablet by mouth Daily.       insulin aspart prot-insulin aspart (70-30) 100 UNIT/ML injection  Commonly known as: novoLOG 70/30      Inject 70 Units under the skin into the appropriate area as directed 2 (Two) Times a Day With Meals. Patient uses 70U in AM and 60U in PM       insulin lispro 100 UNIT/ML injection  Commonly known as: HUMALOG/ADMELOG      Inject 3-14 Units under the skin into the appropriate area as directed 4 (Four) Times a Day Before Meals & at Bedtime.       Semaglutide(0.25 or 0.5MG/DOS) 2 MG/1.5ML solution pen-injector  Commonly known as: OZEMPIC      Inject  under the skin into the appropriate area as directed 1 (One) Time Per Week. Patient takes on Tuesdays       spironolactone 25 MG tablet  Commonly known as: ALDACTONE      Take 1 tablet by mouth Daily.       torsemide 20 MG tablet  Commonly known as: DEMADEX      Take 4 tablets by mouth 2 (Two) Times a Day.       vitamin D 1.25 MG (74002 UT) capsule capsule  Commonly known as: ERGOCALCIFEROL      1 capsule Every 30 (Thirty) Days. Patient takes one tablet monthly, on the 19th                 Where to Get Your Medications        These medications were sent to [facility] [place] [address]      Hours: Monday  to Friday 7 AM to 6 PM, Saturday & Sunday 8 AM to 4:30 PM (Closed 12 PM to 12:30 PM) Phone: [phone]   acetaminophen 325 MG tablet  hydrALAZINE 25 MG tablet           Discharge Diet:   Dietary Orders (From admission, onward)       Start     Ordered    07/08/24 1456  Diet: Cardiac, Diabetic; Healthy Heart (2-3 Na+); Consistent Carbohydrate; Fluid Consistency: Thin (IDDSI 0)  Diet Effective Now        References:    Diet Order [place]   Question Answer Comment   Diets: Cardiac    Diets: Diabetic    Cardiac Diet: Healthy Heart (2-3 Na+)    Diabetic Diet: Consistent Carbohydrate    Fluid Consistency: Thin (IDDSI 0)        07/08/24 1456                        Discharge disposition: Home    Discharge condition: Stable      Follow-up Appointments  Future Appointments   Date Time Provider Department Center   7/16/2024  3:00 PM Surekha Mcdonnell MD [facility]   7/31/2024 10:30 AM Alfredito Mueller MD [facility] None   8/21/2024  9:20 AM [facility]   8/28/2024 11:15 AM Surekha Mcdonnell MD [facility]   9/24/2024  3:00 PM REFERRED INJECTION CHAIR EP [facility] IN[facility]   9/25/2024 11:45 AM Warner Tang MD [facility]   3/25/2025  3:00 PM REFERRED INJECTION CHAIR EP [facility] INFUS EP [facility]      Contact information for follow-up providers       [facility] .    Specialty: Cardiac Rehabilitation  Contact information:  [address] [facility] [address] [phone]             Surekha Mcdonnell MD .    Specialty: Internal Medicine  Contact information:  [address] [address]  [phone]                       Contact information for after-discharge care       Destination       [facility] .    Service: Skilled Nursing  Contact information:  [address] [facility] [address]  [phone]                                     Test Results Pending at Discharge  Additional Instructions for the Follow-ups  that You Need to Schedule       Ambulatory Referral to Cardiac Rehab   As directed                 Time:   I spent  < 30  minutes on this discharge activity which included: face-to-face encounter with the patient, reviewing the data in the system, coordination of the care with the nursing staff as well as consultants, documentation, and entering orders.        Joselito López MD, FACC, [facility]  07/09/24  12:06 EDT

## 2024-07-09 NOTE — PLAN OF CARE
Goal Outcome Evaluation:  Plan of Care Reviewed With: patient        Progress: no change  Outcome Evaluation: A&O, no complaints of pain, TR band removed, up with assist, SR on the monitor, VSS, possible dc today, continue plan of care.

## 2024-07-09 NOTE — CASE MANAGEMENT/SOCIAL WORK
Continued Stay Note  [facility]     Patient Name: Denisse Chavez  MRN: [id number]  Today's Date: 7/9/2024    Admit Date: 7/8/2024    Plan: [facility] today.   Discharge Plan       Row Name 07/09/24 1137       Plan    Plan [facility] today. Pt to arrive at 1PM. Awaiting D/C summary completion     Plan Comments S/W Pt and son/Anil Chavez at bedside.  Pt is weak and he wants her to go back to [facility] for rehab.  Pt d/c from [facility] to [facility] last month therefore, she already has a qualifying inpatient three midnight stay.  CCP called Tasha, liaison to and she is on vacation.  CCP called [facility] at 11:33 AM to advise of referral.  [facility] advised that since Pt was just there for rehab, they are able to accept her back today at 1:00 PM.  CCP instructed the RN to notify Dr. Paredes that a D/C Summary is needed for Pt to d/c to rehab.  Packet given to Aparna/LEROY to call report...............Alana PRIETO/LAVERN                   Discharge Codes    No documentation.                 Expected Discharge Date and Time       Expected Discharge Date Expected Discharge Time    Jul 9, 2024               Alana Redd RN

## 2024-07-10 LAB
ACT BLD: 269 SECONDS (ref 82–152)
ACT BLD: 317 SECONDS (ref 82–152)

## 2024-07-11 ENCOUNTER — PATIENT OUTREACH (OUTPATIENT)
Dept: CASE MANAGEMENT | Facility: OTHER | Age: 79
End: 2024-07-11
Payer: MEDICARE

## 2024-07-11 NOTE — OUTREACH NOTE
AMBULATORY CASE MANAGEMENT NOTE    Names and Relationships of Patient/Support Persons: Contact: Piero [facility]  Jaleesa; Relationship: Other -     SNF Follow-up    Questions/Answers      Flowsheet Row Responses   Acute Facility Discharged From [facility]   Acute Discharge Date 07/09/24   Name of the Skilled Nursing Facility? [facility]   Purpose of SNF Admission PT, SN   Estimated length of stay for the patient? to be determined   Who is the insurance provider or payor of patient stay? Medicare   Skilled Nursing Discharge Date? --  [unknown]          Patient Outreach    VM for  Jaleesa at [facility], [phone].  Outreach for one week to follow up.    Steff SHAFER  Ambulatory Case Management    7/11/2024, 12:59 EDT

## 2024-07-15 ENCOUNTER — TELEPHONE (OUTPATIENT)
Dept: CARDIAC REHAB | Facility: HOSPITAL | Age: 79
End: 2024-07-15
Payer: MEDICARE

## 2024-07-15 NOTE — TELEPHONE ENCOUNTER
Patient is going to be released from in patient rehab this Thursday according to her son.They will call and schedule out patient rehab when she is ready to attend.

## 2024-07-17 RX ORDER — ATORVASTATIN CALCIUM 40 MG/1
TABLET, FILM COATED ORAL
Qty: 90 TABLET | Refills: 0 | Status: SHIPPED | OUTPATIENT
Start: 2024-07-17

## 2024-07-18 ENCOUNTER — READMISSION MANAGEMENT (OUTPATIENT)
Dept: CALL CENTER | Facility: HOSPITAL | Age: 79
End: 2024-07-18
Payer: MEDICARE

## 2024-07-18 NOTE — OUTREACH NOTE
Prep Survey      Flowsheet Row Responses   [facility] facility patient discharged from? Non-BH   Is LACE score < 7 ? Non-BH Discharge   Eligibility [facility]   Date of Discharge 07/18/24   Discharge diagnosis surgical aftercare following surgery on the circulatory system   Does the patient have one of the following disease processes/diagnoses(primary or secondary)? General Surgery   Prep survey completed? Yes            Wendy GRAVES - Registered Nurse

## 2024-07-22 ENCOUNTER — TRANSITIONAL CARE MANAGEMENT TELEPHONE ENCOUNTER (OUTPATIENT)
Dept: CALL CENTER | Facility: HOSPITAL | Age: 79
End: 2024-07-22
Payer: MEDICARE

## 2024-07-22 NOTE — OUTREACH NOTE
Call Center TCM Note      Flowsheet Row Responses   [facility] [place] patient discharged from? Non-BH   Does the patient have one of the following disease processes/diagnoses(primary or secondary)? Other   TCM attempt successful? No   Unsuccessful attempts Attempt 1   Call Status Left message            Marlen Leslie RN    7/22/2024, 15:33 EDT

## 2024-07-22 NOTE — OUTREACH NOTE
Call Center TCM Note      Flowsheet Row Responses   [facility] [place] patient discharged from? Non-BH   Does the patient have one of the following disease processes/diagnoses(primary or secondary)? Other   TCM attempt successful? No   Unsuccessful attempts Attempt 2   Call Status Left message            Marlen Leslie RN    7/22/2024, 17:46 EDT

## 2024-07-23 ENCOUNTER — TRANSITIONAL CARE MANAGEMENT TELEPHONE ENCOUNTER (OUTPATIENT)
Dept: CALL CENTER | Facility: HOSPITAL | Age: 79
End: 2024-07-23
Payer: MEDICARE

## 2024-07-23 NOTE — OUTREACH NOTE
Call Center TCM Note      Flowsheet Row Responses   [facility] patient discharged from? Non-   Does the patient have one of the following disease processes/diagnoses(primary or secondary)? Other   TCM attempt successful? Yes   Call start time 0914   Call end time 0918   Discharge diagnosis surgical aftercare following surgery on the circulatory system   Is patient permission given to speak with other caregiver? Yes   Person spoke with today (if not patient) and relationship son Anil Cohen reviewed with patient/caregiver? Yes   Is the patient having any side effects they believe may be caused by any medication additions or changes? No   Does the patient have all medications ordered at discharge? Yes   Prescription comments Son states all needed medications are in place   Is the patient taking all medications as directed (includes completed medication regime)? Yes   Comments TCM APPT WITH PCP DR CLIFF VELASQUEZ IS 07/24/2024   Does the patient have an appointment with their PCP within 7-14 days of discharge? Yes   Has home health visited the patient within 72 hours of discharge? N/A   Home health comments No HH, pt will be starting outpt  Cardiac Rehab   Psychosocial issues? No   Did the patient receive a copy of their discharge instructions? Yes   Nursing interventions Reviewed instructions with patient   What is the patient's perception of their health status since discharge? Improving   Is the patient/caregiver able to teach back signs and symptoms related to disease process for when to call PCP? Yes   Is the patient/caregiver able to teach back signs and symptoms related to disease process for when to call 911? Yes   Is the patient/caregiver able to teach back the hierarchy of who to call/visit for symptoms/problems? PCP, Specialist, Home health nurse, Urgent Care, ED, 911 Yes   If the patient is a current smoker, are they able to teach back resources for cessation? Not a smoker   TCM call completed? Yes    Wrap up additional comments D/C DX: Scheduled heart cath,  PCI,  AKIRA stents - Pt discharged from [facility] 07/09 and from SNF 07/18/2024. TCM APPT with PCP Dr Surekha Mcdonnell is tomorrow 07/24/2024.   Call end time 0918            Sarah Houston MA    7/23/2024, 09:21 EDT

## 2024-07-24 ENCOUNTER — OFFICE VISIT (OUTPATIENT)
Dept: INTERNAL MEDICINE | Facility: CLINIC | Age: 79
End: 2024-07-24
Payer: MEDICARE

## 2024-07-24 VITALS
BODY MASS INDEX: 34.04 KG/M2 | DIASTOLIC BLOOD PRESSURE: 58 MMHG | SYSTOLIC BLOOD PRESSURE: 118 MMHG | HEIGHT: 62 IN | WEIGHT: 185 LBS | HEART RATE: 71 BPM | OXYGEN SATURATION: 98 %

## 2024-07-24 DIAGNOSIS — I25.10 CORONARY ARTERY DISEASE INVOLVING NATIVE CORONARY ARTERY OF NATIVE HEART, UNSPECIFIED WHETHER ANGINA PRESENT: Primary | Chronic | ICD-10-CM

## 2024-07-24 DIAGNOSIS — I21.4 NSTEMI (NON-ST ELEVATED MYOCARDIAL INFARCTION): ICD-10-CM

## 2024-07-24 DIAGNOSIS — J18.9 PNEUMONIA DUE TO INFECTIOUS ORGANISM, UNSPECIFIED LATERALITY, UNSPECIFIED PART OF LUNG: ICD-10-CM

## 2024-07-24 DIAGNOSIS — L97.509 DIABETIC FOOT ULCER ASSOCIATED WITH TYPE 2 DIABETES MELLITUS, UNSPECIFIED LATERALITY, UNSPECIFIED PART OF FOOT, UNSPECIFIED ULCER STAGE: ICD-10-CM

## 2024-07-24 DIAGNOSIS — R91.1 LUNG NODULE: ICD-10-CM

## 2024-07-24 DIAGNOSIS — E11.621 DIABETIC FOOT ULCER ASSOCIATED WITH TYPE 2 DIABETES MELLITUS, UNSPECIFIED LATERALITY, UNSPECIFIED PART OF FOOT, UNSPECIFIED ULCER STAGE: ICD-10-CM

## 2024-07-24 PROCEDURE — 1160F RVW MEDS BY RX/DR IN RCRD: CPT | Performed by: INTERNAL MEDICINE

## 2024-07-24 PROCEDURE — 3078F DIAST BP <80 MM HG: CPT | Performed by: INTERNAL MEDICINE

## 2024-07-24 PROCEDURE — 1159F MED LIST DOCD IN RCRD: CPT | Performed by: INTERNAL MEDICINE

## 2024-07-24 PROCEDURE — 3074F SYST BP LT 130 MM HG: CPT | Performed by: INTERNAL MEDICINE

## 2024-07-24 PROCEDURE — 1111F DSCHRG MED/CURRENT MED MERGE: CPT | Performed by: INTERNAL MEDICINE

## 2024-07-24 PROCEDURE — 99495 TRANSJ CARE MGMT MOD F2F 14D: CPT | Performed by: INTERNAL MEDICINE

## 2024-07-24 PROCEDURE — 1126F AMNT PAIN NOTED NONE PRSNT: CPT | Performed by: INTERNAL MEDICINE

## 2024-07-24 RX ORDER — DAPAGLIFLOZIN 5 MG/1
5 TABLET, FILM COATED ORAL DAILY
Start: 2024-07-24

## 2024-07-24 RX ORDER — CLOPIDOGREL BISULFATE 75 MG/1
75 TABLET ORAL DAILY
Qty: 90 TABLET | Refills: 3 | Status: SHIPPED | OUTPATIENT
Start: 2024-07-24

## 2024-07-26 NOTE — PROGRESS NOTES
Subjective     Denisse Chavez is a 78 y.o. female who presents with   Chief Complaint   Patient presents with    Post-op       History of Present Illness     The following data was reviewed by: Surekha Mcdonnell MD on 07/24/2024:  Common labs          6/21/2024    06:27 6/22/2024    04:57 7/9/2024    06:20   Common Labs   Glucose 103  136  219    BUN 35  30  19    Creatinine 1.49  1.43  1.10    Sodium 133  137  139    Potassium 4.3  4.1  4.2    Chloride 99  100  102    Calcium 8.6  9.9  8.9    Albumin  3.9     Total Bilirubin  0.4     Alkaline Phosphatase  76     AST (SGOT)  33     ALT (SGPT)  12     WBC 15.05  12.85  9.27    Hemoglobin 12.7  12.8  12.2    Hematocrit 38.6  39.6  37.0    Platelets 343  343  190      Patient presents in hospital f/u.  She was admitted for CAD with stent placement.  I have reviewed discharge summary, labs, scans, cardiovascular studies and medication changes.  She states she is feeling well.        Review of Systems   Respiratory: Negative.     Cardiovascular: Negative.        The following portions of the patient's history were reviewed and updated as appropriate: allergies, current medications and problem list.    Patient Active Problem List    Diagnosis Date Noted    S/P right coronary artery (RCA) stent placement 07/08/2024    Type 1 myocardial infarction 06/22/2024    Pneumonia, unspecified organism 06/22/2024    Acute on chronic heart failure with preserved ejection fraction (HFpEF) 06/17/2024    NSTEMI (non-ST elevated myocardial infarction) 06/15/2024    Dyspnea 06/15/2024    Elevated brain natriuretic peptide (BNP) level 06/15/2024    PNA (pneumonia) 06/15/2024    Diabetic foot ulcer 12/13/2023    Vitamin D deficiency 08/09/2023    Osteoporosis 11/23/2021    Hypertriglyceridemia 09/29/2021    Aortic valve sclerosis 07/25/2019    History of colon polyps 06/13/2018     Note Last Updated: 6/13/2018     Added automatically from request for surgery [id number]      Circadian rhythm  sleep disorder, delayed sleep phase type 04/23/2018    Class 2 severe obesity due to excess calories with serious comorbidity and body mass index (BMI) of 39.0 to 39.9 in adult 04/23/2018    Chronic venous insufficiency     CAD (coronary artery disease)      Note Last Updated: 6/28/2017     MI in 1996, treated medically.  PET 6/2016 with small-medium sized lateral infarct, no ischemia.  This wall motion abnormality was seen on echo as well.      Stage 3 chronic kidney disease 10/13/2016    SAWYER on autoCPAP 09/04/2016    Lumbar spinal stenosis 05/16/2016    Angiomyolipoma of kidney 03/30/2016    Type 2 diabetes mellitus, with long-term current use of insulin 03/08/2016    Hyperlipidemia 03/08/2016    Essential hypertension 03/08/2016       Current Outpatient Medications on File Prior to Visit   Medication Sig Dispense Refill    acetaminophen (TYLENOL) 325 MG tablet Take 2 tablets by mouth Every 4 (Four) Hours As Needed for Mild Pain. 14 tablet 0    aspirin 81 MG tablet Take 1 tablet by mouth Every Night.      atorvastatin (LIPITOR) 40 MG tablet Take 1 tablet by mouth once daily 90 tablet 0    Calcium Carbonate 1500 (600 Ca) MG tablet Take 1 tablet by mouth.      docusate sodium (COLACE) 100 MG capsule Take 1 capsule by mouth 2 (Two) Times a Day.      hydrALAZINE (APRESOLINE) 25 MG tablet Take 1 tablet by mouth 3 (Three) Times a Day. 90 tablet 3    insulin aspart protamine-insulin aspart (novoLOG 70/30) injection Inject 70 Units under the skin into the appropriate area as directed 2 (Two) Times a Day With Meals. Patient uses 70U in AM and 60U in PM      insulin lispro (HUMALOG/ADMELOG) 100 UNIT/ML injection Inject 3-14 Units under the skin into the appropriate area as directed 4 (Four) Times a Day Before Meals & at Bedtime.      isosorbide mononitrate (IMDUR) 60 MG 24 hr tablet Take 1 tablet by mouth once daily (Patient taking differently: 1 tablet Daily.) 90 tablet 0    metoprolol succinate XL (TOPROL-XL) 100 MG 24 hr  "tablet Take 1 tablet by mouth once daily (Patient taking differently: 1 tablet Daily.) 90 tablet 0    spironolactone (ALDACTONE) 25 MG tablet Take 1 tablet by mouth Daily.      torsemide (DEMADEX) 20 MG tablet Take 4 tablets by mouth 2 (Two) Times a Day.      vitamin D (ERGOCALCIFEROL) 00450 units capsule capsule 1 capsule Every 30 (Thirty) Days. Patient takes one tablet monthly, on the 19th      Semaglutide, 1 MG/DOSE, (OZEMPIC) 2 MG/1.5ML solution pen-injector Inject 1 mg under the skin into the appropriate area as directed 1 (One) Time Per Week.       No current facility-administered medications on file prior to visit.       Objective     /58   Pulse 71   Ht 157.5 cm (62.01\")   Wt 83.9 kg (185 lb)   SpO2 98%   BMI 33.83 kg/m²     Physical Exam  Constitutional:       Appearance: She is well-developed.   HENT:      Head: Normocephalic and atraumatic.   Pulmonary:      Effort: Pulmonary effort is normal.   Neurological:      Mental Status: She is alert and oriented to person, place, and time.   Psychiatric:         Behavior: Behavior normal.         Assessment & Plan   Diagnoses and all orders for this visit:    1. Coronary artery disease involving native coronary artery of native heart, unspecified whether angina present (Primary)    Other orders  -     clopidogrel (PLAVIX) 75 MG tablet; Take 1 tablet by mouth Daily.  Dispense: 90 tablet; Refill: 3  -     dapagliflozin (Farxiga) 5 MG tablet tablet; Take 1 tablet by mouth Daily.        Discussion           Future Appointments   Date Time Provider Department Center   7/31/2024 10:30 AM Alfredito Mueller MD [facility] None   8/21/2024  9:20 AM [facility]   8/28/2024 11:15 AM Surekha Mcdonnell MD [facility]   9/24/2024  3:00 PM REFERRED INJECTION CHAIR [facility]   9/25/2024 11:45 AM Warner Tang MD [facility]   3/25/2025  3:00 PM REFERRED INJECTION CHAIR [facility]         "

## 2024-07-26 NOTE — PROGRESS NOTES
Transitional Care Follow Up Visit  Subjective     Denisse Chavez is a 78 y.o. female who presents for a transitional care management visit.    Within 48 business hours after discharge our office contacted her via telephone to coordinate her care and needs.      I reviewed and discussed the details of that call along with the discharge summary, hospital problems, inpatient lab results, inpatient diagnostic studies, and consultation reports with Denisse.     Current outpatient and discharge medications have been reconciled for the patient.  Reviewed by: Surekha Mcdonnell MD          7/18/2024    12:34 PM   Date of TCM Phone Call   [facility]   Date of Discharge 7/18/2024     Risk for Readmission (LACE) No data recorded    History of Present Illness   Course During Hospital Stay:      Reviewed first hospital stay.      NSTEMI/multivessel CAD  -Patient's high-sensitivity troponin was elevated at 571 on admission, trended up to 1337.  Cardiology was consulted, patient underwent heart catheter 6/16/24 which showed severe three-vessel CAD with significant left main disease.  Initial plan was for possible CABG in the setting of multivessel CAD and cardiothoracic surgery was consulted, however patient was determined to be high risk for CABG and plan was staged PCI.  Patient underwent initial PCI S/p PCI x4 on 06/20/2024.  Plan for second stage PCI in 2-4 weeks with Dr. Paredes per cardiology.  Continue dual antiplatelets with Plavix, aspirin.  Continue statin, metoprolol           Acute on chronic diastolic heart failure/ hypertension-continue torsemide 80 mg twice daily, spironolactone 25 p.o. daily, home Jardiance was increased to 10 mg daily.  Continue metoprolol.           Pneumonia?  CT of the chest without contrast on 06/15/2024 showed patchy mild to moderate groundglass opacification is present within the bilateral lungs, left greater than right, favored represent multifocal  "pneumonia in the appropriate context. Asymmetric edema is less likely. Completed 5-day course of ceftriaxone for possible pneumonia     Pulmonary nodules  CT scan 06/15/2024 showed showed A few scattered subcentimeter pulm nodules bilaterally.  Discussed with patient.  Will need follow-up chest CT in 12 months per Fleischner criteria per radiology        Right plantar callus with open wound/diabetic foot ulcer  -X-ray of the foot showed soft tissue lucency at the plantar aspect of the distal foot may correspond to patient's reported open wound, correlate clinically. Soft issue swelling of the forefoot suggests inflammation or infection.MRI of the foot    No findings to  suggest osteomyelitis, findings consistent with cellulitis.  Continue doxycycline for 6 more days to complete 7-day course of antibiotics.  Continue wound care.     Reviewed second hospital stay    78-year-old woman, followed by Dr. Tang, with CAD status post recent left main, LAD, circumflex, diagonal PCI, CKD, hypertension, hyperlipidemia, SAWYER, diabetes who presents for staged PCI of her RCA. She received PCI to the RCA with IVUS guided orbital atherectomy and overlapping 4.0 x 48mm, 3.5 x 48mm, and 3.0 x 48mm Xience Skypoint drug-eluting stents (postdilated proximally and in the midportion with a 4.5 mm NC, mid with a 4.0 mm NC, and distally with a 3.0 mm NC.)  He was monitored overnight and did well.  He did have significant hypertension while here so I increased her hydralazine to 25 mg 3 times daily.  She should follow-up in our office in 1 to 2 weeks.      The following portions of the patient's history were reviewed and updated as appropriate: allergies, current medications, past family history, past medical history, past social history, past surgical history, and problem list.    Review of Systems   Respiratory: Negative.     Cardiovascular: Negative.        Objective   /58   Pulse 71   Ht 157.5 cm (62.01\")   Wt 83.9 kg (185 lb)   " SpO2 98%   BMI 33.83 kg/m²   Physical Exam  Constitutional:       Appearance: She is well-developed.   HENT:      Head: Normocephalic and atraumatic.   Pulmonary:      Effort: Pulmonary effort is normal.   Neurological:      Mental Status: She is alert and oriented to person, place, and time.   Psychiatric:         Behavior: Behavior normal.         Assessment & Plan   Diagnoses and all orders for this visit:    1. Coronary artery disease involving native coronary artery of native heart, unspecified whether angina present (Primary)    2. NSTEMI (non-ST elevated myocardial infarction)    3. Pneumonia due to infectious organism, unspecified laterality, unspecified part of lung    4. Diabetic foot ulcer associated with type 2 diabetes mellitus, unspecified laterality, unspecified part of foot, unspecified ulcer stage    5. Lung nodule    Other orders  -     clopidogrel (PLAVIX) 75 MG tablet; Take 1 tablet by mouth Daily.  Dispense: 90 tablet; Refill: 3  -     dapagliflozin (Farxiga) 5 MG tablet tablet; Take 1 tablet by mouth Daily.    F/u CAD with NSTEMI and staged PCI times four to  left main, LAD, circumflex, diagonal PCI, followed by PCI times one to RCA.  Overall feels well.  She needs Plavix refilled.    F/u PNA.  Treated in hospital. Fells well now.   Lung nodules found.  Need to f/u on those in one year.    Treated diabetic foot ulcer.  Son states this is managed by her podiatrist.  Improving.

## 2024-07-27 PROBLEM — R91.1 LUNG NODULE: Status: ACTIVE | Noted: 2024-07-27

## 2024-07-27 PROBLEM — J18.9 PNEUMONIA, UNSPECIFIED ORGANISM: Status: RESOLVED | Noted: 2024-06-22 | Resolved: 2024-07-27

## 2024-07-27 PROBLEM — R79.89 ELEVATED BRAIN NATRIURETIC PEPTIDE (BNP) LEVEL: Status: RESOLVED | Noted: 2024-06-15 | Resolved: 2024-07-27

## 2024-07-27 PROBLEM — Z86.0100 HISTORY OF COLON POLYPS: Status: RESOLVED | Noted: 2018-06-13 | Resolved: 2024-07-27

## 2024-07-27 PROBLEM — Z86.010 HISTORY OF COLON POLYPS: Status: RESOLVED | Noted: 2018-06-13 | Resolved: 2024-07-27

## 2024-07-27 PROBLEM — R06.00 DYSPNEA: Status: RESOLVED | Noted: 2024-06-15 | Resolved: 2024-07-27

## 2024-07-27 PROBLEM — E78.1 HYPERTRIGLYCERIDEMIA: Status: RESOLVED | Noted: 2021-09-29 | Resolved: 2024-07-27

## 2024-07-31 ENCOUNTER — OFFICE VISIT (OUTPATIENT)
Dept: SLEEP MEDICINE | Facility: HOSPITAL | Age: 79
End: 2024-07-31
Payer: MEDICARE

## 2024-07-31 VITALS — BODY MASS INDEX: 33.68 KG/M2 | HEIGHT: 62 IN | OXYGEN SATURATION: 98 % | HEART RATE: 69 BPM | WEIGHT: 183 LBS

## 2024-07-31 DIAGNOSIS — G47.21 CIRCADIAN RHYTHM SLEEP DISORDER, DELAYED SLEEP PHASE TYPE: ICD-10-CM

## 2024-07-31 DIAGNOSIS — G47.33 OSA ON CPAP: Primary | ICD-10-CM

## 2024-07-31 PROCEDURE — G0463 HOSPITAL OUTPT CLINIC VISIT: HCPCS

## 2024-07-31 PROCEDURE — 99213 OFFICE O/P EST LOW 20 MIN: CPT | Performed by: INTERNAL MEDICINE

## 2024-07-31 PROCEDURE — 1159F MED LIST DOCD IN RCRD: CPT | Performed by: INTERNAL MEDICINE

## 2024-07-31 PROCEDURE — 1160F RVW MEDS BY RX/DR IN RCRD: CPT | Performed by: INTERNAL MEDICINE

## 2024-07-31 NOTE — PROGRESS NOTES
"Follow Up Sleep Disorders Center Note     Chief Complaint:  SAWYER     Primary Care Physician: Surekha Mcdonnell MD    Interval History:   The patient is a 78 y.o. female who I last saw 2023 and that note was reviewed.  The patient reports she is unchanged.  She goes to bed between 1 and 1:30 AM and gets out of bed 11 AM.  She will use the restroom during that time.    Since last seen, the patient did have 8 total heart stents placed in 2 settings.  The patient is ambulating with a rolling walker.  She states she is better.    Review of Systems:    A complete review of systems was done and all were negative with the exception of the above    Social History:    Social History     Socioeconomic History    Marital status: Single    Number of children: 3   Tobacco Use    Smoking status: Former     Current packs/day: 0.00     Average packs/day: 0.3 packs/day for 2.0 years (0.5 ttl pk-yrs)     Types: Cigarettes     Start date:      Quit date:      Years since quittin.6    Smokeless tobacco: Never    Tobacco comments:     LIGHT USAGE   Vaping Use    Vaping status: Never Used   Substance and Sexual Activity    Alcohol use: No    Drug use: No    Sexual activity: Defer       Allergies:  Ace inhibitors, Angiotensin receptor blockers, Keflex [cephalexin], and Sulfa antibiotics     Medication Review:  Reviewed.      Vital Signs:    Vitals:    24 0900   Pulse: 69   SpO2: 98%   Weight: 83 kg (183 lb)   Height: 157.5 cm (62.01\")     Body mass index is 33.46 kg/m².    Physical Exam:    Constitutional:  Well developed 78 y.o. female that appears in no apparent distress.  Awake & oriented times 3.  Normal mood with normal recent and remote memory and normal judgement.  Eyes:  Conjunctivae normal.  Oropharynx: Previously, moist mucous membranes without exudate and a large tongue and normal uvula and patent posterior pharyngeal opening and class II-3 Mallampati airway.    Self-administered Onida Sleepiness Scale " test results: 1  0-5 Lower normal daytime sleepiness  6-10 Higher normal daytime sleepiness  11-12 Mild, 13-15 Moderate, & 16-24 Severe excessive daytime sleepiness     Downloaded PAP Data Evaluated For Therapeutic Response and Compliance:  DME is [facility] and she uses a fullface mask.  Downloads between 5/1 and 7/29/2024 compliance 88%.  Average usage is 3 hours and 22 minutes.  Average AHI is mildly abnormal at 8.3 but all subsets are normal and she does have an average large leak of 59 minutes.  Average auto CPAP pressure is 15.7 and her DreamStation 2 auto CPAP is 9-17    I have reviewed the above results and compared them with the patient's last downloads and reviewed with the patient.    Impression:   Obstructive sleep apnea adequately treated with DreamStation 2 auto CPAP. The patient appears to be at goal with good compliance and usage is somewhat suboptimal. The patient has no complaints of hypersomnolence.  Circadian rhythm sleep disorder, delayed sleep phase type    Plan:  Good sleep hygiene measures should be maintained.  Weight loss would be beneficial in this patient who is obese by Body mass index is 33.46 kg/m².  When last seen, 7/26/2023, weight was 212 pounds and presently she is 183 pounds.    After evaluating the patient and assessing results available, the patient is benefiting from the treatment being provided.     The patient will continue DreamStation 2 auto CPAP.  Potential side effects of not using PAP therapy reviewed and addressed as needed.  After clinical evaluation and review of downloads, I recommend no changes to the patient's pressures.  A new prescription will be sent to the patient's DME.    Downloads demonstrate that the patient is taking her mask off and the machine is running.  I will recheck downloads in 2 months.    I answered all of the patient's questions.  The patient will call the Sleep Disorder Center for any problems and will follow up in 1 year.      Alfredito GLEZ  MD [facility]  Sleep Medicine  07/31/24  10:12 EDT

## 2024-08-04 PROBLEM — E66.812 CLASS 2 SEVERE OBESITY DUE TO EXCESS CALORIES WITH SERIOUS COMORBIDITY AND BODY MASS INDEX (BMI) OF 39.0 TO 39.9 IN ADULT: Status: RESOLVED | Noted: 2018-04-23 | Resolved: 2024-08-04

## 2024-08-04 PROBLEM — E66.01 CLASS 2 SEVERE OBESITY DUE TO EXCESS CALORIES WITH SERIOUS COMORBIDITY AND BODY MASS INDEX (BMI) OF 39.0 TO 39.9 IN ADULT: Status: RESOLVED | Noted: 2018-04-23 | Resolved: 2024-08-04

## 2024-08-06 ENCOUNTER — PATIENT OUTREACH (OUTPATIENT)
Dept: CASE MANAGEMENT | Facility: OTHER | Age: 79
End: 2024-08-06
Payer: MEDICARE

## 2024-08-06 NOTE — OUTREACH NOTE
AMBULATORY CASE MANAGEMENT NOTE    Names and Relationships of Patient/Support Persons: Contact: Ann Siegel; Relationship:  -     Patient Outreach    Outgoing call to Ann Siegel and verified that the patient was DC from SNF 7/18.  Previously outreached and VM with DC planner was made.  Transition team did outreach.  Have scheduled outreaches to patient to offer HRCM services and VM was left today.      Steff SHAFER  Ambulatory Case Management    8/6/2024, 15:03 EDT

## 2024-08-13 ENCOUNTER — TELEPHONE (OUTPATIENT)
Dept: CASE MANAGEMENT | Facility: OTHER | Age: 79
End: 2024-08-13
Payer: MEDICARE

## 2024-08-13 NOTE — TELEPHONE ENCOUNTER
Outreach attempted x 3 for follow up and to offer HRCM program and services.  VMs with ACM contact were left x 3.  Steff Villegas RN ACM

## 2024-08-20 RX ORDER — ISOSORBIDE MONONITRATE 60 MG/1
TABLET, EXTENDED RELEASE ORAL
Qty: 90 TABLET | Refills: 0 | Status: SHIPPED | OUTPATIENT
Start: 2024-08-20

## 2024-08-21 DIAGNOSIS — E11.22 TYPE 2 DIABETES MELLITUS WITH STAGE 3A CHRONIC KIDNEY DISEASE, WITH LONG-TERM CURRENT USE OF INSULIN: Primary | ICD-10-CM

## 2024-08-21 DIAGNOSIS — E55.9 VITAMIN D DEFICIENCY: ICD-10-CM

## 2024-08-21 DIAGNOSIS — E78.2 MIXED HYPERLIPIDEMIA: Chronic | ICD-10-CM

## 2024-08-21 DIAGNOSIS — Z79.4 TYPE 2 DIABETES MELLITUS WITH STAGE 3A CHRONIC KIDNEY DISEASE, WITH LONG-TERM CURRENT USE OF INSULIN: Primary | ICD-10-CM

## 2024-08-21 DIAGNOSIS — I10 ESSENTIAL HYPERTENSION: ICD-10-CM

## 2024-08-21 DIAGNOSIS — N18.31 TYPE 2 DIABETES MELLITUS WITH STAGE 3A CHRONIC KIDNEY DISEASE, WITH LONG-TERM CURRENT USE OF INSULIN: Primary | ICD-10-CM

## 2024-08-22 LAB
25(OH)D3+25(OH)D2 SERPL-MCNC: 35.1 NG/ML (ref 30–100)
ALBUMIN SERPL-MCNC: 3.8 G/DL (ref 3.8–4.8)
ALBUMIN/CREAT UR: 53 MG/G CREAT (ref 0–29)
ALP SERPL-CCNC: 117 IU/L (ref 44–121)
ALT SERPL-CCNC: 15 IU/L (ref 0–32)
APPEARANCE UR: CLEAR
AST SERPL-CCNC: 25 IU/L (ref 0–40)
BACTERIA #/AREA URNS HPF: ABNORMAL /[HPF]
BASOPHILS # BLD AUTO: 0 X10E3/UL (ref 0–0.2)
BASOPHILS NFR BLD AUTO: 0 %
BILIRUB SERPL-MCNC: 0.6 MG/DL (ref 0–1.2)
BILIRUB UR QL STRIP: NEGATIVE
BUN SERPL-MCNC: 28 MG/DL (ref 8–27)
BUN/CREAT SERPL: 20 (ref 12–28)
CALCIUM SERPL-MCNC: 9.9 MG/DL (ref 8.7–10.3)
CASTS URNS QL MICRO: ABNORMAL /LPF
CHLORIDE SERPL-SCNC: 90 MMOL/L (ref 96–106)
CHOLEST SERPL-MCNC: 114 MG/DL (ref 100–199)
CO2 SERPL-SCNC: 29 MMOL/L (ref 20–29)
COLOR UR: YELLOW
CREAT SERPL-MCNC: 1.4 MG/DL (ref 0.57–1)
CREAT UR-MCNC: 41.9 MG/DL
EGFRCR SERPLBLD CKD-EPI 2021: 39 ML/MIN/1.73
EOSINOPHIL # BLD AUTO: 0.1 X10E3/UL (ref 0–0.4)
EOSINOPHIL NFR BLD AUTO: 2 %
EPI CELLS #/AREA URNS HPF: ABNORMAL /HPF (ref 0–10)
ERYTHROCYTE [DISTWIDTH] IN BLOOD BY AUTOMATED COUNT: 14.4 % (ref 11.7–15.4)
GLOBULIN SER CALC-MCNC: 3.3 G/DL (ref 1.5–4.5)
GLUCOSE SERPL-MCNC: 225 MG/DL (ref 70–99)
GLUCOSE UR QL STRIP: ABNORMAL
HBA1C MFR BLD: 8.8 % (ref 4.8–5.6)
HCT VFR BLD AUTO: 40.5 % (ref 34–46.6)
HDLC SERPL-MCNC: 28 MG/DL
HGB BLD-MCNC: 12.8 G/DL (ref 11.1–15.9)
HGB UR QL STRIP: NEGATIVE
IMM GRANULOCYTES # BLD AUTO: 0 X10E3/UL (ref 0–0.1)
IMM GRANULOCYTES NFR BLD AUTO: 1 %
KETONES UR QL STRIP: NEGATIVE
LDLC SERPL CALC-MCNC: 50 MG/DL (ref 0–99)
LEUKOCYTE ESTERASE UR QL STRIP: ABNORMAL
LYMPHOCYTES # BLD AUTO: 1.5 X10E3/UL (ref 0.7–3.1)
LYMPHOCYTES NFR BLD AUTO: 17 %
MCH RBC QN AUTO: 27.5 PG (ref 26.6–33)
MCHC RBC AUTO-ENTMCNC: 31.6 G/DL (ref 31.5–35.7)
MCV RBC AUTO: 87 FL (ref 79–97)
MICRO URNS: ABNORMAL
MICROALBUMIN UR-MCNC: 22.4 UG/ML
MONOCYTES # BLD AUTO: 0.8 X10E3/UL (ref 0.1–0.9)
MONOCYTES NFR BLD AUTO: 9 %
NEUTROPHILS # BLD AUTO: 6.3 X10E3/UL (ref 1.4–7)
NEUTROPHILS NFR BLD AUTO: 71 %
NITRITE UR QL STRIP: POSITIVE
PH UR STRIP: 6 [PH] (ref 5–7.5)
PLATELET # BLD AUTO: 303 X10E3/UL (ref 150–450)
POTASSIUM SERPL-SCNC: 4 MMOL/L (ref 3.5–5.2)
PROT SERPL-MCNC: 7.1 G/DL (ref 6–8.5)
PROT UR QL STRIP: NEGATIVE
RBC # BLD AUTO: 4.66 X10E6/UL (ref 3.77–5.28)
RBC #/AREA URNS HPF: ABNORMAL /HPF (ref 0–2)
SODIUM SERPL-SCNC: 136 MMOL/L (ref 134–144)
SP GR UR STRIP: 1.01 (ref 1–1.03)
TRIGL SERPL-MCNC: 220 MG/DL (ref 0–149)
TSH SERPL DL<=0.005 MIU/L-ACNC: 2 UIU/ML (ref 0.45–4.5)
UROBILINOGEN UR STRIP-MCNC: 0.2 MG/DL (ref 0.2–1)
VLDLC SERPL CALC-MCNC: 36 MG/DL (ref 5–40)
WBC # BLD AUTO: 8.8 X10E3/UL (ref 3.4–10.8)
WBC #/AREA URNS HPF: >30 /HPF (ref 0–5)

## 2024-08-23 ENCOUNTER — OFFICE VISIT (OUTPATIENT)
Dept: CARDIAC REHAB | Facility: HOSPITAL | Age: 79
End: 2024-08-23
Payer: MEDICARE

## 2024-08-23 DIAGNOSIS — Z95.5 STATUS POST INSERTION OF DRUG ELUTING CORONARY ARTERY STENT: ICD-10-CM

## 2024-08-23 DIAGNOSIS — I21.4 NSTEMI, INITIAL EPISODE OF CARE: Chronic | ICD-10-CM

## 2024-08-23 PROCEDURE — 93798 PHYS/QHP OP CAR RHAB W/ECG: CPT

## 2024-08-23 PROCEDURE — 93797 PHYS/QHP OP CAR RHAB WO ECG: CPT

## 2024-08-26 ENCOUNTER — TREATMENT (OUTPATIENT)
Dept: CARDIAC REHAB | Facility: HOSPITAL | Age: 79
End: 2024-08-26
Payer: MEDICARE

## 2024-08-26 DIAGNOSIS — Z95.5 STATUS POST INSERTION OF DRUG ELUTING CORONARY ARTERY STENT: Primary | ICD-10-CM

## 2024-08-26 PROCEDURE — 93798 PHYS/QHP OP CAR RHAB W/ECG: CPT

## 2024-08-28 ENCOUNTER — OFFICE VISIT (OUTPATIENT)
Dept: INTERNAL MEDICINE | Facility: CLINIC | Age: 79
End: 2024-08-28
Payer: MEDICARE

## 2024-08-28 ENCOUNTER — TREATMENT (OUTPATIENT)
Dept: CARDIAC REHAB | Facility: HOSPITAL | Age: 79
End: 2024-08-28
Payer: MEDICARE

## 2024-08-28 VITALS
WEIGHT: 176 LBS | BODY MASS INDEX: 32.39 KG/M2 | DIASTOLIC BLOOD PRESSURE: 60 MMHG | HEIGHT: 62 IN | OXYGEN SATURATION: 98 % | HEART RATE: 70 BPM | SYSTOLIC BLOOD PRESSURE: 110 MMHG

## 2024-08-28 DIAGNOSIS — Z95.5 STATUS POST INSERTION OF DRUG ELUTING CORONARY ARTERY STENT: Primary | ICD-10-CM

## 2024-08-28 DIAGNOSIS — M81.0 OSTEOPOROSIS, UNSPECIFIED OSTEOPOROSIS TYPE, UNSPECIFIED PATHOLOGICAL FRACTURE PRESENCE: ICD-10-CM

## 2024-08-28 DIAGNOSIS — I10 ESSENTIAL HYPERTENSION: ICD-10-CM

## 2024-08-28 DIAGNOSIS — N18.31 TYPE 2 DIABETES MELLITUS WITH STAGE 3A CHRONIC KIDNEY DISEASE, WITH LONG-TERM CURRENT USE OF INSULIN: ICD-10-CM

## 2024-08-28 DIAGNOSIS — E11.22 TYPE 2 DIABETES MELLITUS WITH STAGE 3A CHRONIC KIDNEY DISEASE, WITH LONG-TERM CURRENT USE OF INSULIN: ICD-10-CM

## 2024-08-28 DIAGNOSIS — Z79.4 TYPE 2 DIABETES MELLITUS WITH STAGE 3A CHRONIC KIDNEY DISEASE, WITH LONG-TERM CURRENT USE OF INSULIN: ICD-10-CM

## 2024-08-28 DIAGNOSIS — N18.32 STAGE 3B CHRONIC KIDNEY DISEASE: ICD-10-CM

## 2024-08-28 DIAGNOSIS — Z00.00 MEDICARE ANNUAL WELLNESS VISIT, SUBSEQUENT: Primary | ICD-10-CM

## 2024-08-28 DIAGNOSIS — E78.2 MIXED HYPERLIPIDEMIA: Chronic | ICD-10-CM

## 2024-08-28 PROBLEM — J18.9 PNA (PNEUMONIA): Status: RESOLVED | Noted: 2024-06-15 | Resolved: 2024-08-28

## 2024-08-28 PROCEDURE — 93798 PHYS/QHP OP CAR RHAB W/ECG: CPT

## 2024-08-28 NOTE — PATIENT INSTRUCTIONS
Medicare Wellness  Personal Prevention Plan of Service     Date of Office Visit:    Encounter Provider:  Surekha Mcdonnell MD  Place of Service:  [facility]  Patient Name: Denisse Chavez  :  1945    As part of the Medicare Wellness portion of your visit today, we are providing you with this personalized preventive plan of services (PPPS). This plan is based upon recommendations of the [place] Preventive Services Task Force (USPSTF) and the Advisory Committee on Immunization Practices (ACIP).    This lists the preventive care services that should be considered, and provides dates of when you are due. Items listed as completed are up-to-date and do not require any further intervention.    Health Maintenance   Topic Date Due    RSV Vaccine - Adults (1 - 1-dose 60+ series) Never done    COVID-19 Vaccine (2023- season) 2023    ANNUAL WELLNESS VISIT  2024    INFLUENZA VACCINE  2024    MAMMOGRAM  2024    HEMOGLOBIN A1C  2025    BMI FOLLOWUP  2025    DIABETIC EYE EXAM  2025    LIPID PANEL  2025    URINE MICROALBUMIN  2025    TDAP/TD VACCINES (2 - Td or Tdap) 10/13/2026    COLORECTAL CANCER SCREENING  2026    DXA SCAN  2028    HEPATITIS C SCREENING  Completed    Pneumococcal Vaccine 65+  Completed    ZOSTER VACCINE  Completed       No orders of the defined types were placed in this encounter.      No follow-ups on file.

## 2024-08-28 NOTE — PROGRESS NOTES
Subjective   The ABCs of the Annual Wellness Visit  Medicare Wellness Visit      Denisse Chavez is a 78 y.o. patient who presents for a Medicare Wellness Visit.    The following portions of the patient's history were reviewed and   updated as appropriate: allergies, current medications, past family history, past medical history, past social history, past surgical history, and problem list.    Compared to one year ago, the patient's physical   health is the same.  Compared to one year ago, the patient's mental   health is the same.    Recent Hospitalizations:  This patient has had a [facility] admission record on file within the last 365 days.  Current Medical Providers:  Patient Care Team:  Surekha Mcdonnell MD as PCP - General (Internal Medicine)  Warner Tang MD as Cardiologist (Cardiology)  Sergio Eagle MD as Consulting Physician (Urology)  Juan Negrete MD as Consulting Physician (Endocrinology)  Miguel Angel Barrett DPM as Consulting Physician (Podiatry)  Gabriel Montenegro MD as Consulting Physician (Nephrology)  Linda Rodriguez MD (Ophthalmology)  Ginger Ross APRN as Nurse Practitioner (Nurse Practitioner)  Jr Shawn Abraham MD as Surgeon (Cardiothoracic Surgery)    Outpatient Medications Prior to Visit   Medication Sig Dispense Refill    acetaminophen (TYLENOL) 325 MG tablet Take 2 tablets by mouth Every 4 (Four) Hours As Needed for Mild Pain. 14 tablet 0    aspirin 81 MG tablet Take 1 tablet by mouth Every Night.      atorvastatin (LIPITOR) 40 MG tablet Take 1 tablet by mouth once daily 90 tablet 0    Calcium Carbonate 1500 (600 Ca) MG tablet Take 1 tablet by mouth.      clopidogrel (PLAVIX) 75 MG tablet Take 1 tablet by mouth Daily. 90 tablet 3    dapagliflozin (Farxiga) 5 MG tablet tablet Take 1 tablet by mouth Daily.      docusate sodium (COLACE) 100 MG capsule Take 1 capsule by mouth 2 (Two) Times a Day.      hydrALAZINE (APRESOLINE) 25 MG tablet Take 1  tablet by mouth 3 (Three) Times a Day. 90 tablet 3    insulin aspart protamine-insulin aspart (novoLOG 70/30) injection Inject 70 Units under the skin into the appropriate area as directed 2 (Two) Times a Day With Meals. Patient uses 70U in AM and 60U in PM      isosorbide mononitrate (IMDUR) 60 MG 24 hr tablet Take 1 tablet by mouth once daily 90 tablet 0    metoprolol succinate XL (TOPROL-XL) 100 MG 24 hr tablet Take 1 tablet by mouth once daily (Patient taking differently: 1 tablet Daily.) 90 tablet 0    Semaglutide, 1 MG/DOSE, (OZEMPIC) 2 MG/1.5ML solution pen-injector Inject 1 mg under the skin into the appropriate area as directed 1 (One) Time Per Week.      spironolactone (ALDACTONE) 25 MG tablet Take 1 tablet by mouth Daily.      torsemide (DEMADEX) 20 MG tablet Take 4 tablets by mouth 2 (Two) Times a Day.      vitamin D (ERGOCALCIFEROL) 47590 units capsule capsule 1 capsule Every 30 (Thirty) Days. Patient takes one tablet monthly, on the 19th      insulin lispro (HUMALOG/ADMELOG) 100 UNIT/ML injection Inject 3-14 Units under the skin into the appropriate area as directed 4 (Four) Times a Day Before Meals & at Bedtime.       No facility-administered medications prior to visit.     No opioid medication identified on active medication list. I have reviewed chart for other potential  high risk medication/s and harmful drug interactions in the elderly.      Aspirin is on active medication list. Aspirin use is indicated based on review of current medical condition/s. Pros and cons of this therapy have been discussed today. Benefits of this medication outweigh potential harm.  Patient has been encouraged to continue taking this medication.  .      Patient Active Problem List   Diagnosis    Type 2 diabetes mellitus, with long-term current use of insulin    Hyperlipidemia    Essential hypertension    Angiomyolipoma of kidney    Lumbar spinal stenosis    SAWYER on autoCPAP    Stage 3 chronic kidney disease    Chronic  "venous insufficiency    CAD (coronary artery disease)    Circadian rhythm sleep disorder, delayed sleep phase type    Aortic valve sclerosis    Osteoporosis    Vitamin D deficiency    Diabetic foot ulcer    NSTEMI (non-ST elevated myocardial infarction)    Acute on chronic heart failure with preserved ejection fraction (HFpEF)    Type 1 myocardial infarction    S/P primary angioplasty with coronary stent    Lung nodule     Advance Care Planning Advance Directive is on file.  ACP discussion was held with the patient during this visit. Patient has an advance directive in EMR which is still valid.             Objective   Vitals:    24 1115   BP: 110/60   Pulse: 70   SpO2: 98%   Weight: 79.8 kg (176 lb)   Height: 157.5 cm (62.01\")   PainSc: 0-No pain       Estimated body mass index is 32.18 kg/m² as calculated from the following:    Height as of this encounter: 157.5 cm (62.01\").    Weight as of this encounter: 79.8 kg (176 lb).            Does the patient have evidence of cognitive impairment? No  Lab Results   Component Value Date    CHLPL 114 2024    TRIG 220 (H) 2024    HDL 28 (L) 2024    LDL 50 2024    VLDL 36 2024    HGBA1C 8.8 (H) 2024    HGBA1C 8.40 (H) 06/15/2024                                                                                                Health  Risk Assessment    Smoking Status:  Social History     Tobacco Use   Smoking Status Former    Current packs/day: 0.00    Average packs/day: 0.3 packs/day for 2.0 years (0.5 ttl pk-yrs)    Types: Cigarettes    Start date:     Quit date: 1970    Years since quittin.6   Smokeless Tobacco Never   Tobacco Comments    LIGHT USAGE     Alcohol Consumption:  Social History     Substance and Sexual Activity   Alcohol Use No       Fall Risk Screen  STEADI Fall Risk Assessment was completed, and patient is at LOW risk for falls.Assessment completed on:2024    Depression Screenin/28/2024    11:14 AM "   PHQ-2/PHQ-9 Depression Screening   Little Interest or Pleasure in Doing Things 0-->not at all   Feeling Down, Depressed or Hopeless 0-->not at all   PHQ-9: Brief Depression Severity Measure Score 0     Health Habits and Functional and Cognitive Screenin/28/2024    11:14 AM   Functional & Cognitive Status   Do you have difficulty preparing food and eating? No   Do you have difficulty bathing yourself, getting dressed or grooming yourself? No   Do you have difficulty using the toilet? No   Do you have difficulty moving around from place to place? No   Do you have trouble with steps or getting out of a bed or a chair? Yes   Current Diet Well Balanced Diet   Dental Exam Up to date   Eye Exam Up to date   Exercise (times per week) 3 times per week   Current Exercises Include Cardiovascular Workout   Do you need help using the phone?  No   Are you deaf or do you have serious difficulty hearing?  No   Do you need help to go to places out of walking distance? Yes   Do you need help shopping? Yes   Do you need help preparing meals?  No   Do you need help with housework?  No   Do you need help with laundry? No   Do you need help taking your medications? No   Do you need help managing money? No   Do you ever drive or ride in a car without wearing a seat belt? No   Have you felt unusual stress, anger or loneliness in the last month? No   Who do you live with? Child   If you need help, do you have trouble finding someone available to you? No   Have you been bothered in the last four weeks by sexual problems? No   Do you have difficulty concentrating, remembering or making decisions? No           Age-appropriate Screening Schedule:  Refer to the list below for future screening recommendations based on patient's age, sex and/or medical conditions. Orders for these recommended tests are listed in the plan section. The patient has been provided with a written plan.    Health Maintenance List  Health Maintenance   Topic  "Date Due    RSV Vaccine - Adults (1 - 1-dose 60+ series) Never done    COVID-19 Vaccine (7 - 2023-24 season) 09/01/2023    ANNUAL WELLNESS VISIT  08/23/2024    INFLUENZA VACCINE  08/01/2024    MAMMOGRAM  11/22/2024    HEMOGLOBIN A1C  02/21/2025    BMI FOLLOWUP  07/24/2025    DIABETIC EYE EXAM  07/30/2025    LIPID PANEL  08/21/2025    URINE MICROALBUMIN  08/21/2025    TDAP/TD VACCINES (2 - Td or Tdap) 10/13/2026    COLORECTAL CANCER SCREENING  12/13/2026    DXA SCAN  11/22/2028    HEPATITIS C SCREENING  Completed    Pneumococcal Vaccine 65+  Completed    ZOSTER VACCINE  Completed                                                                                                                                                CMS Preventative Services Quick Reference  Risk Factors Identified During Encounter  Immunizations Discussed/Encouraged: Influenza, COVID19, and RSV (Respiratory Syncytial Virus)    The above risks/problems have been discussed with the patient.  Pertinent information has been shared with the patient in the After Visit Summary.  An After Visit Summary and PPPS were made available to the patient.    Follow Up:   Next Medicare Wellness visit to be scheduled in 1 year.         Additional E&M Note during same encounter follows:  Patient has additional, significant, and separately identifiable condition(s)/problem(s) that require work above and beyond the Medicare Wellness Visit     Chief Complaint  Medicare Wellness-subsequent    Subjective   HPI  Pat is also being seen today for additional medical problem/s.    DM-2.  Control not optimal.  She is followed by endocrine. HTN.  Blood pressure is under good control.  HLD.  LDL cholesterol is under good control.        Review of Systems   Respiratory: Negative.     Cardiovascular: Negative.               Objective   Vital Signs:  /60   Pulse 70   Ht 157.5 cm (62.01\")   Wt 79.8 kg (176 lb)   SpO2 98%   BMI 32.18 kg/m²   Physical Exam  Constitutional: "       Appearance: She is well-developed.   HENT:      Head: Normocephalic and atraumatic.   Cardiovascular:      Rate and Rhythm: Normal rate and regular rhythm.      Heart sounds: Normal heart sounds.   Pulmonary:      Effort: Pulmonary effort is normal.      Breath sounds: Normal breath sounds.   Skin:     General: Skin is warm and dry.   Neurological:      Mental Status: She is alert and oriented to person, place, and time.   Psychiatric:         Behavior: Behavior normal.         The following data was reviewed by: Surekha Mcdonnell MD on 08/28/2024:  Common labs          6/22/2024    04:57 7/9/2024    06:20 8/21/2024    09:14   Common Labs   Glucose 136  219  225    BUN 30  19  28    Creatinine 1.43  1.10  1.40    Sodium 137  139  136    Potassium 4.1  4.2  4.0    Chloride 100  102  90    Calcium 9.9  8.9  9.9    Total Protein   7.1    Albumin 3.9   3.8    Total Bilirubin 0.4   0.6    Alkaline Phosphatase 76   117    AST (SGOT) 33   25    ALT (SGPT) 12   15    WBC 12.85  9.27  8.8    Hemoglobin 12.8  12.2  12.8    Hematocrit 39.6  37.0  40.5    Platelets 343  190  303    Total Cholesterol   114    Triglycerides   220    HDL Cholesterol   28    LDL Cholesterol    50    Hemoglobin A1C   8.8    Microalbumin, Urine   22.4                Assessment and Plan Additional age appropriate preventative wellness advice topics were discussed during today's preventative wellness exam(some topics already addressed during AWV portion of the note above):   Nutrition: Discussed nutrition plan with patient. Information shared in after visit summary. Goal is for a well balanced diet to enhance overall health.              Medicare annual wellness visit, subsequent    Essential hypertension    Mixed hyperlipidemia     Stage 3b chronic kidney disease    Type 2 diabetes mellitus with stage 3a chronic kidney disease, with long-term current use of insulin    Osteoporosis, unspecified osteoporosis type, unspecified pathological fracture  presence    No orders of the defined types were placed in this encounter.     HTN. Control is good.  The patient is advised to continue current dosage of metoprolol, ISMN, hydralazine.    HLD. Control is good.  The patient is advised to continue current dosage of atorvastatin.    Dm-2 with ckd3b.  Kidneys are stable.  DM control not optimal.  The patient is advised to continue current dosage of insulin, Ozempic  and Farxiga as managed by endocrinology.    OP.  Tolerating Prolia.  Continue every six months.           Follow Up   Return in about 6 months (around 2/28/2025) for Recheck.  Patient was given instructions and counseling regarding her condition or for health maintenance advice. Please see specific information pulled into the AVS if appropriate.

## 2024-08-30 ENCOUNTER — TREATMENT (OUTPATIENT)
Dept: CARDIAC REHAB | Facility: HOSPITAL | Age: 79
End: 2024-08-30
Payer: MEDICARE

## 2024-08-30 DIAGNOSIS — Z95.5 STATUS POST INSERTION OF DRUG ELUTING CORONARY ARTERY STENT: Primary | ICD-10-CM

## 2024-08-30 PROCEDURE — 93798 PHYS/QHP OP CAR RHAB W/ECG: CPT

## 2024-09-04 ENCOUNTER — TREATMENT (OUTPATIENT)
Dept: CARDIAC REHAB | Facility: HOSPITAL | Age: 79
End: 2024-09-04
Payer: MEDICARE

## 2024-09-04 DIAGNOSIS — Z95.5 STATUS POST INSERTION OF DRUG ELUTING CORONARY ARTERY STENT: Primary | ICD-10-CM

## 2024-09-04 PROCEDURE — 93798 PHYS/QHP OP CAR RHAB W/ECG: CPT

## 2024-09-06 ENCOUNTER — TREATMENT (OUTPATIENT)
Dept: CARDIAC REHAB | Facility: HOSPITAL | Age: 79
End: 2024-09-06
Payer: MEDICARE

## 2024-09-06 DIAGNOSIS — Z95.5 STATUS POST INSERTION OF DRUG ELUTING CORONARY ARTERY STENT: Primary | ICD-10-CM

## 2024-09-06 PROCEDURE — 93798 PHYS/QHP OP CAR RHAB W/ECG: CPT

## 2024-09-09 ENCOUNTER — APPOINTMENT (OUTPATIENT)
Dept: CARDIAC REHAB | Facility: HOSPITAL | Age: 79
End: 2024-09-09
Payer: MEDICARE

## 2024-09-09 RX ORDER — METOPROLOL SUCCINATE 100 MG/1
TABLET, EXTENDED RELEASE ORAL
Qty: 90 TABLET | Refills: 2 | Status: SHIPPED | OUTPATIENT
Start: 2024-09-09

## 2024-09-11 ENCOUNTER — TREATMENT (OUTPATIENT)
Dept: CARDIAC REHAB | Facility: HOSPITAL | Age: 79
End: 2024-09-11
Payer: MEDICARE

## 2024-09-11 DIAGNOSIS — Z95.5 STATUS POST INSERTION OF DRUG ELUTING CORONARY ARTERY STENT: Primary | ICD-10-CM

## 2024-09-11 LAB — GLUCOSE BLDC GLUCOMTR-MCNC: 156 MG/DL (ref 70–130)

## 2024-09-11 PROCEDURE — 93798 PHYS/QHP OP CAR RHAB W/ECG: CPT

## 2024-09-13 ENCOUNTER — TREATMENT (OUTPATIENT)
Dept: CARDIAC REHAB | Facility: HOSPITAL | Age: 79
End: 2024-09-13
Payer: MEDICARE

## 2024-09-13 DIAGNOSIS — Z95.5 STATUS POST INSERTION OF DRUG ELUTING CORONARY ARTERY STENT: Primary | ICD-10-CM

## 2024-09-13 PROCEDURE — 93798 PHYS/QHP OP CAR RHAB W/ECG: CPT

## 2024-09-16 ENCOUNTER — APPOINTMENT (OUTPATIENT)
Dept: CARDIAC REHAB | Facility: HOSPITAL | Age: 79
End: 2024-09-16
Payer: MEDICARE

## 2024-09-18 ENCOUNTER — TREATMENT (OUTPATIENT)
Dept: CARDIAC REHAB | Facility: HOSPITAL | Age: 79
End: 2024-09-18
Payer: MEDICARE

## 2024-09-18 DIAGNOSIS — Z95.5 STATUS POST INSERTION OF DRUG ELUTING CORONARY ARTERY STENT: Primary | ICD-10-CM

## 2024-09-18 PROCEDURE — 93798 PHYS/QHP OP CAR RHAB W/ECG: CPT

## 2024-09-20 ENCOUNTER — TREATMENT (OUTPATIENT)
Dept: CARDIAC REHAB | Facility: HOSPITAL | Age: 79
End: 2024-09-20
Payer: MEDICARE

## 2024-09-20 DIAGNOSIS — Z95.5 STATUS POST INSERTION OF DRUG ELUTING CORONARY ARTERY STENT: Primary | ICD-10-CM

## 2024-09-20 PROCEDURE — 93798 PHYS/QHP OP CAR RHAB W/ECG: CPT

## 2024-09-22 DIAGNOSIS — M81.0 OSTEOPOROSIS, UNSPECIFIED OSTEOPOROSIS TYPE, UNSPECIFIED PATHOLOGICAL FRACTURE PRESENCE: Primary | ICD-10-CM

## 2024-09-23 ENCOUNTER — TREATMENT (OUTPATIENT)
Dept: CARDIAC REHAB | Facility: HOSPITAL | Age: 79
End: 2024-09-23
Payer: MEDICARE

## 2024-09-23 DIAGNOSIS — Z95.5 STATUS POST INSERTION OF DRUG ELUTING CORONARY ARTERY STENT: Primary | ICD-10-CM

## 2024-09-23 LAB — GLUCOSE BLDC GLUCOMTR-MCNC: 152 MG/DL (ref 70–130)

## 2024-09-23 PROCEDURE — 93798 PHYS/QHP OP CAR RHAB W/ECG: CPT

## 2024-09-24 ENCOUNTER — INFUSION (OUTPATIENT)
Dept: ONCOLOGY | Facility: HOSPITAL | Age: 79
End: 2024-09-24
Payer: MEDICARE

## 2024-09-24 ENCOUNTER — LAB (OUTPATIENT)
Dept: OTHER | Facility: HOSPITAL | Age: 79
End: 2024-09-24
Payer: MEDICARE

## 2024-09-24 DIAGNOSIS — M81.0 OSTEOPOROSIS, UNSPECIFIED OSTEOPOROSIS TYPE, UNSPECIFIED PATHOLOGICAL FRACTURE PRESENCE: ICD-10-CM

## 2024-09-24 DIAGNOSIS — M81.0 OSTEOPOROSIS, UNSPECIFIED OSTEOPOROSIS TYPE, UNSPECIFIED PATHOLOGICAL FRACTURE PRESENCE: Primary | ICD-10-CM

## 2024-09-24 LAB
25(OH)D3 SERPL-MCNC: 40.4 NG/ML (ref 30–100)
ALBUMIN SERPL-MCNC: 4.2 G/DL (ref 3.5–5.2)
ALBUMIN/GLOB SERPL: 1.1 G/DL
ALP SERPL-CCNC: 148 U/L (ref 39–117)
ALT SERPL W P-5'-P-CCNC: 17 U/L (ref 1–33)
ANION GAP SERPL CALCULATED.3IONS-SCNC: 12.4 MMOL/L (ref 5–15)
AST SERPL-CCNC: 24 U/L (ref 1–32)
BILIRUB SERPL-MCNC: 0.6 MG/DL (ref 0–1.2)
BUN SERPL-MCNC: 28 MG/DL (ref 8–23)
BUN/CREAT SERPL: 21.9 (ref 7–25)
CALCIUM SPEC-SCNC: 10.4 MG/DL (ref 8.6–10.5)
CHLORIDE SERPL-SCNC: 94 MMOL/L (ref 98–107)
CO2 SERPL-SCNC: 32.6 MMOL/L (ref 22–29)
CREAT SERPL-MCNC: 1.28 MG/DL (ref 0.57–1)
EGFRCR SERPLBLD CKD-EPI 2021: 43 ML/MIN/1.73
GLOBULIN UR ELPH-MCNC: 3.8 GM/DL
GLUCOSE SERPL-MCNC: 266 MG/DL (ref 65–99)
MAGNESIUM SERPL-MCNC: 2.4 MG/DL (ref 1.6–2.4)
PHOSPHATE SERPL-MCNC: 4 MG/DL (ref 2.5–4.5)
POTASSIUM SERPL-SCNC: 3.7 MMOL/L (ref 3.5–5.2)
PROT SERPL-MCNC: 8 G/DL (ref 6–8.5)
SODIUM SERPL-SCNC: 139 MMOL/L (ref 136–145)

## 2024-09-24 PROCEDURE — 82306 VITAMIN D 25 HYDROXY: CPT | Performed by: INTERNAL MEDICINE

## 2024-09-24 PROCEDURE — 25010000002 DENOSUMAB 60 MG/ML SOLUTION PREFILLED SYRINGE: Performed by: INTERNAL MEDICINE

## 2024-09-24 PROCEDURE — 84100 ASSAY OF PHOSPHORUS: CPT | Performed by: INTERNAL MEDICINE

## 2024-09-24 PROCEDURE — 80053 COMPREHEN METABOLIC PANEL: CPT | Performed by: INTERNAL MEDICINE

## 2024-09-24 PROCEDURE — 96372 THER/PROPH/DIAG INJ SC/IM: CPT

## 2024-09-24 PROCEDURE — 83735 ASSAY OF MAGNESIUM: CPT | Performed by: INTERNAL MEDICINE

## 2024-09-24 RX ADMIN — DENOSUMAB 60 MG: 60 INJECTION SUBCUTANEOUS at 14:49

## 2024-09-25 ENCOUNTER — TREATMENT (OUTPATIENT)
Dept: CARDIAC REHAB | Facility: HOSPITAL | Age: 79
End: 2024-09-25
Payer: MEDICARE

## 2024-09-25 DIAGNOSIS — Z95.5 STATUS POST INSERTION OF DRUG ELUTING CORONARY ARTERY STENT: Primary | ICD-10-CM

## 2024-09-25 PROCEDURE — 93798 PHYS/QHP OP CAR RHAB W/ECG: CPT

## 2024-09-27 ENCOUNTER — TREATMENT (OUTPATIENT)
Dept: CARDIAC REHAB | Facility: HOSPITAL | Age: 79
End: 2024-09-27
Payer: MEDICARE

## 2024-09-27 DIAGNOSIS — Z95.5 STATUS POST INSERTION OF DRUG ELUTING CORONARY ARTERY STENT: Primary | ICD-10-CM

## 2024-09-27 PROCEDURE — 93798 PHYS/QHP OP CAR RHAB W/ECG: CPT

## 2024-10-02 ENCOUNTER — TREATMENT (OUTPATIENT)
Dept: CARDIAC REHAB | Facility: HOSPITAL | Age: 79
End: 2024-10-02
Payer: MEDICARE

## 2024-10-02 DIAGNOSIS — Z95.5 STATUS POST INSERTION OF DRUG ELUTING CORONARY ARTERY STENT: Primary | ICD-10-CM

## 2024-10-02 PROCEDURE — 93798 PHYS/QHP OP CAR RHAB W/ECG: CPT

## 2024-10-04 ENCOUNTER — TREATMENT (OUTPATIENT)
Dept: CARDIAC REHAB | Facility: HOSPITAL | Age: 79
End: 2024-10-04
Payer: MEDICARE

## 2024-10-04 DIAGNOSIS — Z95.5 STATUS POST INSERTION OF DRUG ELUTING CORONARY ARTERY STENT: Primary | ICD-10-CM

## 2024-10-04 PROCEDURE — 93798 PHYS/QHP OP CAR RHAB W/ECG: CPT

## 2024-10-07 ENCOUNTER — TREATMENT (OUTPATIENT)
Dept: CARDIAC REHAB | Facility: HOSPITAL | Age: 79
End: 2024-10-07
Payer: MEDICARE

## 2024-10-07 DIAGNOSIS — Z95.5 STATUS POST INSERTION OF DRUG ELUTING CORONARY ARTERY STENT: Primary | ICD-10-CM

## 2024-10-07 RX ORDER — HYDRALAZINE HYDROCHLORIDE 10 MG/1
TABLET, FILM COATED ORAL
Qty: 270 TABLET | Refills: 0 | Status: SHIPPED | OUTPATIENT
Start: 2024-10-07

## 2024-10-09 ENCOUNTER — APPOINTMENT (OUTPATIENT)
Dept: CARDIAC REHAB | Facility: HOSPITAL | Age: 79
End: 2024-10-09
Payer: MEDICARE

## 2024-10-11 ENCOUNTER — APPOINTMENT (OUTPATIENT)
Dept: CARDIAC REHAB | Facility: HOSPITAL | Age: 79
End: 2024-10-11
Payer: MEDICARE

## 2024-10-14 ENCOUNTER — APPOINTMENT (OUTPATIENT)
Dept: CARDIAC REHAB | Facility: HOSPITAL | Age: 79
End: 2024-10-14
Payer: MEDICARE

## 2024-10-16 ENCOUNTER — TREATMENT (OUTPATIENT)
Dept: CARDIAC REHAB | Facility: HOSPITAL | Age: 79
End: 2024-10-16
Payer: MEDICARE

## 2024-10-16 DIAGNOSIS — Z95.5 STATUS POST INSERTION OF DRUG ELUTING CORONARY ARTERY STENT: Primary | ICD-10-CM

## 2024-10-16 PROCEDURE — 93798 PHYS/QHP OP CAR RHAB W/ECG: CPT

## 2024-10-18 ENCOUNTER — TREATMENT (OUTPATIENT)
Dept: CARDIAC REHAB | Facility: HOSPITAL | Age: 79
End: 2024-10-18
Payer: MEDICARE

## 2024-10-18 DIAGNOSIS — Z95.5 STATUS POST INSERTION OF DRUG ELUTING CORONARY ARTERY STENT: Primary | ICD-10-CM

## 2024-10-18 PROCEDURE — 93798 PHYS/QHP OP CAR RHAB W/ECG: CPT

## 2024-10-21 ENCOUNTER — APPOINTMENT (OUTPATIENT)
Dept: CARDIAC REHAB | Facility: HOSPITAL | Age: 79
End: 2024-10-21
Payer: MEDICARE

## 2024-10-21 LAB — GLUCOSE BLDC GLUCOMTR-MCNC: 144 MG/DL (ref 70–130)

## 2024-10-23 ENCOUNTER — APPOINTMENT (OUTPATIENT)
Dept: CARDIAC REHAB | Facility: HOSPITAL | Age: 79
End: 2024-10-23
Payer: MEDICARE

## 2024-10-25 ENCOUNTER — APPOINTMENT (OUTPATIENT)
Dept: CARDIAC REHAB | Facility: HOSPITAL | Age: 79
End: 2024-10-25
Payer: MEDICARE

## 2024-10-28 ENCOUNTER — APPOINTMENT (OUTPATIENT)
Dept: CARDIAC REHAB | Facility: HOSPITAL | Age: 79
End: 2024-10-28
Payer: MEDICARE

## 2024-10-30 ENCOUNTER — APPOINTMENT (OUTPATIENT)
Dept: CARDIAC REHAB | Facility: HOSPITAL | Age: 79
End: 2024-10-30
Payer: MEDICARE

## 2024-10-31 ENCOUNTER — TRANSCRIBE ORDERS (OUTPATIENT)
Dept: ADMINISTRATIVE | Facility: HOSPITAL | Age: 79
End: 2024-10-31
Payer: MEDICARE

## 2024-10-31 DIAGNOSIS — Z12.31 ENCOUNTER FOR SCREENING MAMMOGRAM FOR MALIGNANT NEOPLASM OF BREAST: Primary | ICD-10-CM

## 2024-11-12 NOTE — Clinical Note
First balloon inflation max pressure = 14 davina. First balloon inflation duration = 7 seconds. Second inflation of balloon - Max pressure = 20 davina. 2nd Inflation of balloon - Duration = 10 seconds. 24.4

## 2024-12-02 NOTE — PROGRESS NOTES
RM:________     PCP: Surekha Mcdonnell MD    : 1945  AGE: 79 y.o.  EST PATIENT     REASON FOR VISIT/  CC:        BP Readings from Last 3 Encounters:   24 110/60   24 118/58   24 125/53      Wt Readings from Last 3 Encounters:   24 79.8 kg (176 lb)   24 83 kg (183 lb)   24 83.9 kg (185 lb)        WT: ____________ BP: __________L __________R HR______    CHEST PAIN: _____________    SOA: _____________PALPS: _______________     LIGHTHEADED: ___________FATIGUE: ________________ EDEMA __________    ALLERGIES:Ace inhibitors, Angiotensin receptor blockers, Keflex [cephalexin], and Sulfa antibiotics SMOKING HISTORY:  Social History     Tobacco Use    Smoking status: Former     Current packs/day: 0.00     Average packs/day: 0.3 packs/day for 2.0 years (0.5 ttl pk-yrs)     Types: Cigarettes     Start date:      Quit date: 1970     Years since quittin.9    Smokeless tobacco: Never    Tobacco comments:     LIGHT USAGE   Vaping Use    Vaping status: Never Used   Substance Use Topics    Alcohol use: No    Drug use: No     CAFFEINE USE_________________  ALCOHOL ______________________

## 2024-12-11 ENCOUNTER — OFFICE VISIT (OUTPATIENT)
Dept: CARDIOLOGY | Facility: CLINIC | Age: 79
End: 2024-12-11
Payer: MEDICARE

## 2024-12-11 VITALS
HEIGHT: 62 IN | BODY MASS INDEX: 32.2 KG/M2 | DIASTOLIC BLOOD PRESSURE: 70 MMHG | HEART RATE: 70 BPM | SYSTOLIC BLOOD PRESSURE: 120 MMHG | WEIGHT: 175 LBS

## 2024-12-11 DIAGNOSIS — N18.32 STAGE 3B CHRONIC KIDNEY DISEASE: ICD-10-CM

## 2024-12-11 DIAGNOSIS — I34.2 NONRHEUMATIC MITRAL VALVE STENOSIS: ICD-10-CM

## 2024-12-11 DIAGNOSIS — E78.2 MIXED HYPERLIPIDEMIA: Chronic | ICD-10-CM

## 2024-12-11 DIAGNOSIS — I10 ESSENTIAL HYPERTENSION: ICD-10-CM

## 2024-12-11 DIAGNOSIS — I35.8 AORTIC VALVE SCLEROSIS: ICD-10-CM

## 2024-12-11 DIAGNOSIS — I25.10 CORONARY ARTERY DISEASE INVOLVING NATIVE CORONARY ARTERY OF NATIVE HEART WITHOUT ANGINA PECTORIS: Primary | Chronic | ICD-10-CM

## 2024-12-11 PROBLEM — I21.9 TYPE 1 MYOCARDIAL INFARCTION: Status: RESOLVED | Noted: 2024-06-22 | Resolved: 2024-12-11

## 2024-12-11 PROBLEM — I50.32 CHRONIC HEART FAILURE WITH PRESERVED EJECTION FRACTION (HFPEF): Status: ACTIVE | Noted: 2024-06-17

## 2024-12-11 PROBLEM — I50.33 ACUTE ON CHRONIC HEART FAILURE WITH PRESERVED EJECTION FRACTION (HFPEF): Status: RESOLVED | Noted: 2024-06-17 | Resolved: 2024-12-11

## 2024-12-11 PROBLEM — I21.4 NSTEMI (NON-ST ELEVATED MYOCARDIAL INFARCTION): Status: RESOLVED | Noted: 2024-06-15 | Resolved: 2024-12-11

## 2024-12-11 NOTE — PROGRESS NOTES
Date of Office Visit: 2024  Encounter Provider: Warner Tang MD  Place of Service: [facility] CARDIOLOGY  Patient Name: Denisse Chavez  :1945    Chief Complaint   Patient presents with    Follow-up   :     HPI: Denisse Chavez is a 79 y.o. female who presents today in follow up.  She established care with me in May 2016. I have reviewed prior notes and there are no changes except for any new updates described below. I have also reviewed any information entered into the medical record by the patient or by ancillary staff.     She suffered a myocardial infarction in [place] in . She underwent cardiac catheterization and was reportedly found to have nonobstructive disease for which medical therapy was recommended. Her anginal symptoms were chest heaviness and nausea. In 2016, an echo was performed; it revealed normal LV systolic function, grade II diastolic dysfunction, and aortic valve calcification without stenosis. There was hypokinesis of the lateral wall, so I ordered a PET.  This revealed a small-medium sized lateral infarct without ischemia.   A repeat echo reported normal LVSF/wall motion, and aortic valve sclerosis.    She presented with an NSTEMI and acute HFpEF in 2024. She underwent coronary angiography and was found to have severe multivessel disease.  She was not felt to be a candidate for open heart surgery due to her comorbidities.  She underwent atherectomy and stenting of the left main, LAD, and proximal circumflex as well as stenting of the second diagonal.  Shortly thereafter, she underwent staged intervention with atherectomy and PCI to the RCA and rPLA.  She has done well since then and has not required readmission.  She is completing in cardiac rehab.  She is not having angina.  She denies shortness of breath or leg swelling.    Past Medical History:   Diagnosis Date    Angiomyolipoma of kidney 2016    Aortic valve sclerosis      stable 2020    Arthritis     CAD (coronary artery disease)     MI in 1996, treated medically.  PET 6/2016 with small-medium sized lateral infarct, no ischemia.  This wall motion abnormality was seen on echo as well.    Cellulitis 07/2018    RIGHT LEG    Chronic heart failure with preserved ejection fraction (HFpEF) 06/17/2024    Chronic kidney disease, stage III (moderate) 10/13/2016    Chronic venous insufficiency     Hyperlipidemia     Hypertension     Hypertriglyceridemia 09/29/2021    Low back pain     Mass of ovary 03/30/2016    Obesity (BMI 30-39.9) 12/22/2019    Sleep apnea     on CPAP.  Dr. Mueller    Spinal stenosis     Type 2 diabetes mellitus     Uterine leiomyoma 03/30/2016         Past Surgical History:   Procedure Laterality Date    CARDIAC CATHETERIZATION N/A 6/16/2024    Procedure: Coronary angiography;  Surgeon: Jeffery Myrick MD;  Location:  [facility] CATH INVASIVE LOCATION;  Service: Cardiovascular;  Laterality: N/A;  NO LV GRAM    CARDIAC CATHETERIZATION N/A 6/16/2024    Procedure: Left Heart Cath;  Surgeon: Jeffery Myrick MD;  Location:  [facility] CATH INVASIVE LOCATION;  Service: Cardiovascular;  Laterality: N/A;    CARDIAC CATHETERIZATION N/A 6/20/2024    Procedure: Percutaneous Coronary Intervention;  Surgeon: Joselito Paredes MD;  Location:  [facility] CATH INVASIVE LOCATION;  Service: Cardiovascular;  Laterality: N/A;    CARDIAC CATHETERIZATION N/A 6/20/2024    Procedure: Atherectomy-coronary;  Surgeon: Joselito Paredes MD;  Location:  [facility] CATH INVASIVE LOCATION;  Service: Cardiovascular;  Laterality: N/A;    CARDIAC CATHETERIZATION N/A 6/20/2024    Procedure: Stent AKIRA coronary;  Surgeon: Joselito Paredes MD;  [facility];  Service: Cardiovascular;  Laterality: N/A;    CARDIAC CATHETERIZATION N/A 6/20/2024    Procedure: Coronary angiography;  Surgeon: Joselito Paredes MD;  [facility];  Service: Cardiovascular;  Laterality: N/A;    CARDIAC  CATHETERIZATION N/A 7/8/2024    Procedure: Percutaneous Coronary Intervention;  Surgeon: Joselito Paredes MD;  Location:  [facility] CATH INVASIVE LOCATION;  Service: Cardiology;  Laterality: N/A;    CARDIAC CATHETERIZATION N/A 7/8/2024    Procedure: Coronary angiography;  Surgeon: Joselito Paredes MD;  Location:  [facility] CATH INVASIVE LOCATION;  Service: Cardiology;  Laterality: N/A;    CARDIAC CATHETERIZATION N/A 7/8/2024    Procedure: Atherectomy-coronary;  Surgeon: Joselito Paredes MD;  Location:  [facility] CATH INVASIVE LOCATION;  Service: Cardiology;  Laterality: N/A;    CARDIAC CATHETERIZATION N/A 7/8/2024    Procedure: Stent AKIRA coronary;  Surgeon: Joselito Paredes MD;  Location:  [facility] CATH INVASIVE LOCATION;  Service: Cardiology;  Laterality: N/A;    CATARACT EXTRACTION Bilateral     X2     COLONOSCOPY N/A 8/29/2018    Procedure: COLONOSCOPY to CECUM WITH HOT SNARE POLYPECTOMY;  Surgeon: Neal Reilly MD;  [facility];  Service: Gastroenterology    COLONOSCOPY N/A 12/13/2023    Procedure: COLONOSCOPY to cecum and TI with cold snare and cold biopsy polypectomies;  Surgeon: Polly Ruiz MD;  [facility];  Service: General;  Laterality: N/A;  pre: screening  post: polyps    EPIDURAL BLOCK      HERNIA REPAIR      HYSTERECTOMY      INTERVENTIONAL RADIOLOGY PROCEDURE N/A 6/20/2024    Procedure: Intravascular Ultrasound;  Surgeon: Joselito Paredes MD;  Location: [facility] CATH INVASIVE LOCATION;  Service: Cardiovascular;  Laterality: N/A;    INTERVENTIONAL RADIOLOGY PROCEDURE N/A 7/8/2024    Procedure: Intravascular Ultrasound;  Surgeon: Joselito Paredes MD;  Location: [facility] CATH INVASIVE LOCATION;  Service: Cardiology;  Laterality: N/A;    TONSILLECTOMY         Social History     Socioeconomic History    Marital status: Single    Number of children: 3   Tobacco Use    Smoking status: Former     Current packs/day: 0.00     Average packs/day: 0.3 packs/day for 2.0 years (0.5 ttl pk-yrs)     Types: Cigarettes     Start  date:      Quit date:      Years since quittin.9    Smokeless tobacco: Never    Tobacco comments:     LIGHT USAGE   Vaping Use    Vaping status: Never Used   Substance and Sexual Activity    Alcohol use: No    Drug use: No    Sexual activity: Defer       Family History   Problem Relation Age of Onset    Diabetes Mother     Heart attack Mother     Hypertension Mother     Hypothyroidism Mother     Diabetes type II Son     Breast cancer Neg Hx        Review of Systems   Constitutional: Positive for malaise/fatigue.   Cardiovascular:  Negative for chest pain and palpitations.   Respiratory:  Negative for shortness of breath.    Endocrine: Positive for polyuria.   Musculoskeletal:  Positive for joint pain and joint swelling.   Neurological:  Negative for light-headedness.   All other systems reviewed and are negative.      Allergies   Allergen Reactions    Ace Inhibitors Other (See Comments)     Hyperkalemia/ROB    Angiotensin Receptor Blockers Other (See Comments)     Pt unable to remember    Keflex [Cephalexin] Rash     Tolerated ceftriaxone Dec 2019; tolerated zosyn May 2020    Sulfa Antibiotics Itching     rash         Current Outpatient Medications:     aspirin 81 MG tablet, Take 1 tablet by mouth Every Night., Disp: , Rfl:     atorvastatin (LIPITOR) 40 MG tablet, Take 1 tablet by mouth once daily, Disp: 90 tablet, Rfl: 0    clopidogrel (PLAVIX) 75 MG tablet, Take 1 tablet by mouth Daily., Disp: 90 tablet, Rfl: 3    Cyanocobalamin (VITAMIN B 12 PO), Take  by mouth., Disp: , Rfl:     dapagliflozin (Farxiga) 5 MG tablet tablet, Take 1 tablet by mouth Daily., Disp: , Rfl:     docusate sodium (COLACE) 100 MG capsule, Take 1 capsule by mouth 2 (Two) Times a Day., Disp: , Rfl:     hydrALAZINE (APRESOLINE) 10 MG tablet, TAKE 1 TABLET BY MOUTH THREE TIMES DAILY, Disp: 270 tablet, Rfl: 0    insulin aspart protamine-insulin aspart (novoLOG 70/30) injection, Inject 70 Units under the skin into the appropriate area  "as directed 2 (Two) Times a Day With Meals. Patient uses 70U in AM and 60U in PM, Disp: , Rfl:     isosorbide mononitrate (IMDUR) 60 MG 24 hr tablet, Take 1 tablet by mouth once daily, Disp: 90 tablet, Rfl: 0    metoprolol succinate XL (TOPROL-XL) 100 MG 24 hr tablet, Take 1 tablet by mouth once daily, Disp: 90 tablet, Rfl: 2    NON FORMULARY, Calcitrol, Disp: , Rfl:     Semaglutide, 1 MG/DOSE, (OZEMPIC) 2 MG/1.5ML solution pen-injector, Inject 1 mg under the skin into the appropriate area as directed 1 (One) Time Per Week., Disp: , Rfl:     spironolactone (ALDACTONE) 25 MG tablet, Take 1 tablet by mouth Daily., Disp: , Rfl:     torsemide (DEMADEX) 20 MG tablet, Take 4 tablets by mouth 2 (Two) Times a Day., Disp: , Rfl:     vitamin D (ERGOCALCIFEROL) 00593 units capsule capsule, 1 capsule Every 30 (Thirty) Days. Patient takes one tablet monthly, on the 19th, Disp: , Rfl:      Objective:     Vitals:    12/11/24 1340   BP: 120/70   BP Location: Right arm   Patient Position: Sitting   Cuff Size: Large Adult   Pulse: 70   Weight: 79.4 kg (175 lb)   Height: 157.5 cm (62.01\")     Body mass index is 32 kg/m².    Physical Exam  Vitals reviewed.   Constitutional:       Comments: Obese   HENT:      Head: Normocephalic.      Nose: Nose normal.   Eyes:      Conjunctiva/sclera: Conjunctivae normal.   Neck:      Vascular: No JVD.   Cardiovascular:      Rate and Rhythm: Normal rate and regular rhythm.      Pulses: Normal pulses and intact distal pulses.      Heart sounds: Heart sounds are distant. Murmur heard.      Systolic murmur is present with a grade of 2/6.   Pulmonary:      Effort: Pulmonary effort is normal.   Abdominal:      Palpations: Abdomen is soft.      Tenderness: There is no abdominal tenderness.      Comments: Obesity limits abdominal exam   Musculoskeletal:         General: No swelling (chronic/doughy appearing swelling). Normal range of motion.      Cervical back: Normal range of motion.   Skin:     General: " Skin is warm and dry.   Neurological:      Mental Status: She is alert. Mental status is at baseline.   Psychiatric:         Mood and Affect: Mood normal.           ECG 12 Lead    Date/Time: 12/11/2024 2:07 PM  Performed by: Warner Tang MD    Authorized by: Warner Tang MD  Comparison: compared with previous ECG   Similar to previous ECG  Rhythm: sinus rhythm  Conduction: incomplete left bundle branch block  QRS axis: left  Other findings: left ventricular hypertrophy with strain    Clinical impression: abnormal EKG            Assessment:       Diagnosis Plan   1. Coronary artery disease involving native coronary artery of native heart without angina pectoris        2. Mixed hyperlipidemia        3. Essential hypertension        4. Stage 3b chronic kidney disease        5. Aortic valve sclerosis        6. Nonrheumatic mitral valve stenosis               Plan:       1/2.  Coronary Artery Disease  Assessment   The patient has no angina    Subjective - Objective   There is a history of past MI  1996, 6/2024   There has been a previous stent procedure using AKIRA  6/2024, 7/2024   Current antiplatelet therapy includes aspirin 81 mg and clopidogrel 75 mg   The patient qualifies for cardiac rehabilitation, and is already participating in a cardiac rehab program    She is on atorvastatin.    3/4.  Her BP is within goal. She follows with Dr Montenegro for her CKD.    5/6. An echo in 2024 showed aortic sclerosis without stenosis. It showed very mild functional MS due to MAC; her mean gradient was 5mm Hg.     Sincerely,       Warner Tang MD

## 2024-12-17 RX ORDER — ATORVASTATIN CALCIUM 40 MG/1
TABLET, FILM COATED ORAL
Qty: 90 TABLET | Refills: 0 | Status: SHIPPED | OUTPATIENT
Start: 2024-12-17

## 2025-01-08 ENCOUNTER — HOSPITAL ENCOUNTER (OUTPATIENT)
Dept: MAMMOGRAPHY | Facility: HOSPITAL | Age: 80
Discharge: HOME | End: 2025-01-08
Admitting: INTERNAL MEDICINE
Payer: MEDICARE

## 2025-01-08 DIAGNOSIS — Z12.31 ENCOUNTER FOR SCREENING MAMMOGRAM FOR MALIGNANT NEOPLASM OF BREAST: ICD-10-CM

## 2025-01-08 PROCEDURE — 77067 SCR MAMMO BI INCL CAD: CPT

## 2025-01-08 PROCEDURE — 77063 BREAST TOMOSYNTHESIS BI: CPT

## 2025-02-18 DIAGNOSIS — N18.31 TYPE 2 DIABETES MELLITUS WITH STAGE 3A CHRONIC KIDNEY DISEASE, WITH LONG-TERM CURRENT USE OF INSULIN: ICD-10-CM

## 2025-02-18 DIAGNOSIS — E55.9 VITAMIN D DEFICIENCY: ICD-10-CM

## 2025-02-18 DIAGNOSIS — Z79.4 TYPE 2 DIABETES MELLITUS WITH STAGE 3A CHRONIC KIDNEY DISEASE, WITH LONG-TERM CURRENT USE OF INSULIN: ICD-10-CM

## 2025-02-18 DIAGNOSIS — E78.2 MIXED HYPERLIPIDEMIA: Primary | Chronic | ICD-10-CM

## 2025-02-18 DIAGNOSIS — E11.22 TYPE 2 DIABETES MELLITUS WITH STAGE 3A CHRONIC KIDNEY DISEASE, WITH LONG-TERM CURRENT USE OF INSULIN: ICD-10-CM

## 2025-02-18 DIAGNOSIS — I10 ESSENTIAL HYPERTENSION: ICD-10-CM

## 2025-02-19 LAB
25(OH)D3+25(OH)D2 SERPL-MCNC: 51.4 NG/ML (ref 30–100)
ALBUMIN SERPL-MCNC: 3.9 G/DL (ref 3.5–5.2)
ALBUMIN/GLOB SERPL: 1.2 G/DL
ALP SERPL-CCNC: 125 U/L (ref 39–117)
ALT SERPL-CCNC: 24 U/L (ref 1–33)
AST SERPL-CCNC: 33 U/L (ref 1–32)
BASOPHILS # BLD AUTO: 0.03 10*3/MM3 (ref 0–0.2)
BASOPHILS NFR BLD AUTO: 0.3 % (ref 0–1.5)
BILIRUB SERPL-MCNC: 0.5 MG/DL (ref 0–1.2)
BUN SERPL-MCNC: 27 MG/DL (ref 8–23)
BUN/CREAT SERPL: 21.3 (ref 7–25)
CALCIUM SERPL-MCNC: 9.8 MG/DL (ref 8.6–10.5)
CHLORIDE SERPL-SCNC: 94 MMOL/L (ref 98–107)
CHOLEST SERPL-MCNC: 128 MG/DL (ref 0–200)
CO2 SERPL-SCNC: 31.8 MMOL/L (ref 22–29)
CREAT SERPL-MCNC: 1.27 MG/DL (ref 0.57–1)
EGFRCR SERPLBLD CKD-EPI 2021: 43.1 ML/MIN/1.73
EOSINOPHIL # BLD AUTO: 0.16 10*3/MM3 (ref 0–0.4)
EOSINOPHIL NFR BLD AUTO: 1.6 % (ref 0.3–6.2)
ERYTHROCYTE [DISTWIDTH] IN BLOOD BY AUTOMATED COUNT: 14.4 % (ref 12.3–15.4)
GLOBULIN SER CALC-MCNC: 3.2 GM/DL
GLUCOSE SERPL-MCNC: 92 MG/DL (ref 65–99)
HBA1C MFR BLD: 8.5 % (ref 4.8–5.6)
HCT VFR BLD AUTO: 41 % (ref 34–46.6)
HDLC SERPL-MCNC: 32 MG/DL (ref 40–60)
HGB BLD-MCNC: 13 G/DL (ref 12–15.9)
IMM GRANULOCYTES # BLD AUTO: 0.05 10*3/MM3 (ref 0–0.05)
IMM GRANULOCYTES NFR BLD AUTO: 0.5 % (ref 0–0.5)
LDLC SERPL CALC-MCNC: 66 MG/DL (ref 0–100)
LYMPHOCYTES # BLD AUTO: 1.5 10*3/MM3 (ref 0.7–3.1)
LYMPHOCYTES NFR BLD AUTO: 15.4 % (ref 19.6–45.3)
MCH RBC QN AUTO: 27.8 PG (ref 26.6–33)
MCHC RBC AUTO-ENTMCNC: 31.7 G/DL (ref 31.5–35.7)
MCV RBC AUTO: 87.6 FL (ref 79–97)
MONOCYTES # BLD AUTO: 0.87 10*3/MM3 (ref 0.1–0.9)
MONOCYTES NFR BLD AUTO: 8.9 % (ref 5–12)
NEUTROPHILS # BLD AUTO: 7.12 10*3/MM3 (ref 1.7–7)
NEUTROPHILS NFR BLD AUTO: 73.3 % (ref 42.7–76)
NRBC BLD AUTO-RTO: 0 /100 WBC (ref 0–0.2)
PLATELET # BLD AUTO: 269 10*3/MM3 (ref 140–450)
POTASSIUM SERPL-SCNC: 4.1 MMOL/L (ref 3.5–5.2)
PROT SERPL-MCNC: 7.1 G/DL (ref 6–8.5)
RBC # BLD AUTO: 4.68 10*6/MM3 (ref 3.77–5.28)
SODIUM SERPL-SCNC: 137 MMOL/L (ref 136–145)
TRIGL SERPL-MCNC: 175 MG/DL (ref 0–150)
VLDLC SERPL CALC-MCNC: 30 MG/DL (ref 5–40)
WBC # BLD AUTO: 9.73 10*3/MM3 (ref 3.4–10.8)

## 2025-02-26 ENCOUNTER — OFFICE VISIT (OUTPATIENT)
Dept: INTERNAL MEDICINE | Facility: CLINIC | Age: 80
End: 2025-02-26
Payer: MEDICARE

## 2025-02-26 VITALS
HEART RATE: 73 BPM | SYSTOLIC BLOOD PRESSURE: 122 MMHG | BODY MASS INDEX: 30.91 KG/M2 | DIASTOLIC BLOOD PRESSURE: 62 MMHG | OXYGEN SATURATION: 98 % | WEIGHT: 168 LBS | HEIGHT: 62 IN

## 2025-02-26 DIAGNOSIS — E11.22 TYPE 2 DIABETES MELLITUS WITH STAGE 3A CHRONIC KIDNEY DISEASE, WITH LONG-TERM CURRENT USE OF INSULIN: Primary | ICD-10-CM

## 2025-02-26 DIAGNOSIS — E78.2 MIXED HYPERLIPIDEMIA: Chronic | ICD-10-CM

## 2025-02-26 DIAGNOSIS — N18.32 STAGE 3B CHRONIC KIDNEY DISEASE: ICD-10-CM

## 2025-02-26 DIAGNOSIS — I10 ESSENTIAL HYPERTENSION: ICD-10-CM

## 2025-02-26 DIAGNOSIS — N18.31 TYPE 2 DIABETES MELLITUS WITH STAGE 3A CHRONIC KIDNEY DISEASE, WITH LONG-TERM CURRENT USE OF INSULIN: Primary | ICD-10-CM

## 2025-02-26 DIAGNOSIS — Z79.4 TYPE 2 DIABETES MELLITUS WITH STAGE 3A CHRONIC KIDNEY DISEASE, WITH LONG-TERM CURRENT USE OF INSULIN: Primary | ICD-10-CM

## 2025-02-26 PROBLEM — E11.621 DIABETIC FOOT ULCER: Status: RESOLVED | Noted: 2023-12-13 | Resolved: 2025-02-26

## 2025-02-26 PROBLEM — L97.509 DIABETIC FOOT ULCER: Status: RESOLVED | Noted: 2023-12-13 | Resolved: 2025-02-26

## 2025-02-26 RX ORDER — CALCITRIOL 0.25 UG/1
0.25 CAPSULE, LIQUID FILLED ORAL DAILY
COMMUNITY
End: 2025-02-26

## 2025-02-26 NOTE — PROGRESS NOTES
Subjective     Denisse Chavez is a 79 y.o. female who presents with   Chief Complaint   Patient presents with    Diabetes    Hypertension    Hyperlipidemia       Diabetes    Hypertension    Hyperlipidemia         The following data was reviewed by: Surekha Mcdonnell MD on 02/26/2025:  Common labs          10/2/2024    09:06 1/9/2025    11:43 2/19/2025    08:56   Common Labs   Glucose   92    BUN   27    Creatinine   1.27    Sodium   137    Potassium   4.1    Chloride   94    Calcium   9.8    Albumin   3.9    Total Bilirubin   0.5    Alkaline Phosphatase   125    AST (SGOT)   33    ALT (SGPT)   24    WBC   9.73    Hemoglobin   13.0    Hematocrit   41.0    Platelets   269    Total Cholesterol   128    Triglycerides   175    HDL Cholesterol   32    LDL Cholesterol    66    Hemoglobin A1C   8.50    Uric Acid 6.4     8           Details          This result is from an external source.             DM-2.  Not optimal control. HTN.  Blood pressure is under good control.  HLD.  LDL cholesterol is under good control.        Review of Systems   Respiratory: Negative.     Cardiovascular: Negative.        The following portions of the patient's history were reviewed and updated as appropriate: allergies, current medications and problem list.    Patient Active Problem List    Diagnosis Date Noted    Nonrheumatic mitral valve stenosis 12/11/2024    Lung nodule 07/27/2024    S/P primary angioplasty with coronary stent 07/08/2024     Note Last Updated: 7/27/2024     PCI S/p PCI x4 on 06/20/2024.  PCI to RCA 7/19/2024      Chronic heart failure with preserved ejection fraction (HFpEF) 06/17/2024    Vitamin D deficiency 08/09/2023    Osteoporosis 11/23/2021    Aortic valve sclerosis 07/25/2019    Circadian rhythm sleep disorder, delayed sleep phase type 04/23/2018    Chronic venous insufficiency     CAD (coronary artery disease)      Note Last Updated: 6/28/2017     MI in 1996, treated medically.  PET 6/2016 with small-medium sized  lateral infarct, no ischemia.  This wall motion abnormality was seen on echo as well.      Stage 3 chronic kidney disease 10/13/2016    SAWYER on autoCPAP 09/04/2016    Lumbar spinal stenosis 05/16/2016    Angiomyolipoma of kidney 03/30/2016    Type 2 diabetes mellitus, with long-term current use of insulin 03/08/2016    Hyperlipidemia 03/08/2016    Essential hypertension 03/08/2016       Current Outpatient Medications on File Prior to Visit   Medication Sig Dispense Refill    aspirin 81 MG tablet Take 1 tablet by mouth Every Night.      atorvastatin (LIPITOR) 40 MG tablet Take 1 tablet by mouth once daily 90 tablet 0    clopidogrel (PLAVIX) 75 MG tablet Take 1 tablet by mouth Daily. 90 tablet 3    Cyanocobalamin (VITAMIN B 12 PO) Take  by mouth.      dapagliflozin (Farxiga) 5 MG tablet tablet Take 1 tablet by mouth Daily.      docusate sodium (COLACE) 100 MG capsule Take 1 capsule by mouth 2 (Two) Times a Day.      hydrALAZINE (APRESOLINE) 10 MG tablet TAKE 1 TABLET BY MOUTH THREE TIMES DAILY 270 tablet 0    insulin aspart protamine-insulin aspart (novoLOG 70/30) injection Inject 70 Units under the skin into the appropriate area as directed 2 (Two) Times a Day With Meals. Patient uses 70U in AM and 60U in PM      isosorbide mononitrate (IMDUR) 60 MG 24 hr tablet Take 1 tablet by mouth once daily 90 tablet 0    metoprolol succinate XL (TOPROL-XL) 100 MG 24 hr tablet Take 1 tablet by mouth once daily 90 tablet 2    NON FORMULARY Calcitrol      Semaglutide, 1 MG/DOSE, (OZEMPIC) 2 MG/1.5ML solution pen-injector Inject 1 mg under the skin into the appropriate area as directed 1 (One) Time Per Week.      spironolactone (ALDACTONE) 25 MG tablet Take 1 tablet by mouth Daily.      torsemide (DEMADEX) 20 MG tablet Take 4 tablets by mouth 2 (Two) Times a Day.      vitamin D (ERGOCALCIFEROL) 90111 units capsule capsule 1 capsule Every 30 (Thirty) Days. Patient takes one tablet monthly, on the 19th      [DISCONTINUED] calcitriol  "(ROCALTROL) 0.25 MCG capsule Take 1 capsule by mouth Daily.       No current facility-administered medications on file prior to visit.       Objective     /62   Pulse 73   Ht 157.5 cm (62.01\")   Wt 76.2 kg (168 lb)   SpO2 98%   BMI 30.72 kg/m²     Physical Exam  Constitutional:       Appearance: She is well-developed.   HENT:      Head: Normocephalic and atraumatic.   Cardiovascular:      Rate and Rhythm: Normal rate and regular rhythm.      Heart sounds: Normal heart sounds.   Pulmonary:      Effort: Pulmonary effort is normal.      Breath sounds: Normal breath sounds.   Skin:     General: Skin is warm and dry.   Neurological:      Mental Status: She is alert and oriented to person, place, and time.   Psychiatric:         Behavior: Behavior normal.         Assessment & Plan   Diagnoses and all orders for this visit:    1. Type 2 diabetes mellitus with stage 3a chronic kidney disease, with long-term current use of insulin (Primary)    2. Essential hypertension    3. Mixed hyperlipidemia    4. Stage 3b chronic kidney disease        Discussion    HTN. Control is good.  The patient is advised to continue current dosage of metoprolol, ISMN, hydralazine.    HLD. Control is good.  The patient is advised to continue current dosage of atorvastatin.    Dm-2 with ckd3b.  Kidneys are stable.  DM control not optimal.  The patient is advised to continue current dosage of insulin, Ozempic  and Farxiga as managed by endocrinology.        Of note I did discover a live bedbug on her leg.  I instructed the son to inspect home for possible infestation.    Reviewed and updated 2/28/2025       Future Appointments   Date Time Provider Department Center   3/25/2025  3:00 PM REFERRED INJECTION CHAIR [facility]   6/17/2025  1:30 PM Vicky Herrera APRN [facility]   7/30/2025 10:30 AM Alfredito Mueller MD [facility] None         "

## 2025-03-25 ENCOUNTER — LAB (OUTPATIENT)
Dept: OTHER | Facility: HOSPITAL | Age: 80
End: 2025-03-25
Payer: MEDICARE

## 2025-03-25 ENCOUNTER — INFUSION (OUTPATIENT)
Dept: ONCOLOGY | Facility: HOSPITAL | Age: 80
End: 2025-03-25
Payer: MEDICARE

## 2025-03-25 DIAGNOSIS — M81.0 OSTEOPOROSIS, UNSPECIFIED OSTEOPOROSIS TYPE, UNSPECIFIED PATHOLOGICAL FRACTURE PRESENCE: Primary | ICD-10-CM

## 2025-03-25 DIAGNOSIS — M81.0 OSTEOPOROSIS, UNSPECIFIED OSTEOPOROSIS TYPE, UNSPECIFIED PATHOLOGICAL FRACTURE PRESENCE: ICD-10-CM

## 2025-03-25 LAB
25(OH)D3 SERPL-MCNC: 49.3 NG/ML (ref 30–100)
ALBUMIN SERPL-MCNC: 4.1 G/DL (ref 3.5–5.2)
ALBUMIN/GLOB SERPL: 1.1 G/DL
ALP SERPL-CCNC: 118 U/L (ref 39–117)
ALT SERPL W P-5'-P-CCNC: 23 U/L (ref 1–33)
ANION GAP SERPL CALCULATED.3IONS-SCNC: 11.8 MMOL/L (ref 5–15)
AST SERPL-CCNC: 27 U/L (ref 1–32)
BILIRUB SERPL-MCNC: 0.8 MG/DL (ref 0–1.2)
BUN SERPL-MCNC: 34 MG/DL (ref 8–23)
BUN/CREAT SERPL: 24.6 (ref 7–25)
CALCIUM SPEC-SCNC: 10 MG/DL (ref 8.6–10.5)
CHLORIDE SERPL-SCNC: 93 MMOL/L (ref 98–107)
CO2 SERPL-SCNC: 31.2 MMOL/L (ref 22–29)
CREAT SERPL-MCNC: 1.38 MG/DL (ref 0.57–1)
EGFRCR SERPLBLD CKD-EPI 2021: 39 ML/MIN/1.73
GLOBULIN UR ELPH-MCNC: 3.6 GM/DL
GLUCOSE SERPL-MCNC: 281 MG/DL (ref 65–99)
MAGNESIUM SERPL-MCNC: 2.4 MG/DL (ref 1.6–2.4)
PHOSPHATE SERPL-MCNC: 4.7 MG/DL (ref 2.5–4.5)
POTASSIUM SERPL-SCNC: 3.7 MMOL/L (ref 3.5–5.2)
PROT SERPL-MCNC: 7.7 G/DL (ref 6–8.5)
SODIUM SERPL-SCNC: 136 MMOL/L (ref 136–145)

## 2025-03-25 PROCEDURE — 25010000002 DENOSUMAB 60 MG/ML SOLUTION PREFILLED SYRINGE: Performed by: INTERNAL MEDICINE

## 2025-03-25 PROCEDURE — 83735 ASSAY OF MAGNESIUM: CPT | Performed by: INTERNAL MEDICINE

## 2025-03-25 PROCEDURE — 82306 VITAMIN D 25 HYDROXY: CPT | Performed by: INTERNAL MEDICINE

## 2025-03-25 PROCEDURE — 80053 COMPREHEN METABOLIC PANEL: CPT | Performed by: INTERNAL MEDICINE

## 2025-03-25 PROCEDURE — 96372 THER/PROPH/DIAG INJ SC/IM: CPT

## 2025-03-25 PROCEDURE — 84100 ASSAY OF PHOSPHORUS: CPT | Performed by: INTERNAL MEDICINE

## 2025-03-25 RX ADMIN — DENOSUMAB 60 MG: 60 INJECTION SUBCUTANEOUS at 14:35

## 2025-03-25 NOTE — NURSING NOTE
Pt arrived for prolia injection. Indication and side effects reviewed. Denies recent dental work. Reviewed labs and verified medications. Pt tolerated injection well without complication. Encouraged pt to call ordering provider for any questions or concerns, and instructed on how to schedule future appts. Pt v/u and was discharged in stable condition.

## 2025-05-05 RX ORDER — ISOSORBIDE MONONITRATE 60 MG/1
60 TABLET, EXTENDED RELEASE ORAL DAILY
Qty: 90 TABLET | Refills: 0 | Status: SHIPPED | OUTPATIENT
Start: 2025-05-05

## 2025-06-17 ENCOUNTER — TELEPHONE (OUTPATIENT)
Dept: CARDIOLOGY | Age: 80
End: 2025-06-17

## 2025-06-17 ENCOUNTER — OFFICE VISIT (OUTPATIENT)
Dept: CARDIOLOGY | Age: 80
End: 2025-06-17
Payer: MEDICARE

## 2025-06-17 VITALS
OXYGEN SATURATION: 100 % | SYSTOLIC BLOOD PRESSURE: 102 MMHG | BODY MASS INDEX: 29.22 KG/M2 | HEIGHT: 62 IN | DIASTOLIC BLOOD PRESSURE: 58 MMHG | HEART RATE: 68 BPM | WEIGHT: 158.8 LBS

## 2025-06-17 DIAGNOSIS — I10 ESSENTIAL HYPERTENSION: ICD-10-CM

## 2025-06-17 DIAGNOSIS — G47.33 OSA ON CPAP: ICD-10-CM

## 2025-06-17 DIAGNOSIS — E78.2 MIXED HYPERLIPIDEMIA: Chronic | ICD-10-CM

## 2025-06-17 DIAGNOSIS — I35.9 AORTIC VALVE CALCIFICATION: ICD-10-CM

## 2025-06-17 DIAGNOSIS — I49.1 PAC (PREMATURE ATRIAL CONTRACTION): ICD-10-CM

## 2025-06-17 DIAGNOSIS — I25.10 CORONARY ARTERY DISEASE INVOLVING NATIVE CORONARY ARTERY OF NATIVE HEART WITHOUT ANGINA PECTORIS: Primary | Chronic | ICD-10-CM

## 2025-06-17 DIAGNOSIS — I34.2 NONRHEUMATIC MITRAL VALVE STENOSIS: ICD-10-CM

## 2025-06-17 DIAGNOSIS — I50.32 CHRONIC HEART FAILURE WITH PRESERVED EJECTION FRACTION (HFPEF): ICD-10-CM

## 2025-06-17 DIAGNOSIS — I87.2 CHRONIC VENOUS INSUFFICIENCY: ICD-10-CM

## 2025-06-17 PROBLEM — I35.8 AORTIC VALVE SCLEROSIS: Status: RESOLVED | Noted: 2019-07-25 | Resolved: 2025-06-17

## 2025-06-17 PROBLEM — Z95.5 S/P PRIMARY ANGIOPLASTY WITH CORONARY STENT: Status: RESOLVED | Noted: 2024-07-08 | Resolved: 2025-06-17

## 2025-06-17 PROCEDURE — 3074F SYST BP LT 130 MM HG: CPT | Performed by: NURSE PRACTITIONER

## 2025-06-17 PROCEDURE — 93000 ELECTROCARDIOGRAM COMPLETE: CPT | Performed by: NURSE PRACTITIONER

## 2025-06-17 PROCEDURE — 1160F RVW MEDS BY RX/DR IN RCRD: CPT | Performed by: NURSE PRACTITIONER

## 2025-06-17 PROCEDURE — 3078F DIAST BP <80 MM HG: CPT | Performed by: NURSE PRACTITIONER

## 2025-06-17 PROCEDURE — 1159F MED LIST DOCD IN RCRD: CPT | Performed by: NURSE PRACTITIONER

## 2025-06-17 PROCEDURE — 99214 OFFICE O/P EST MOD 30 MIN: CPT | Performed by: NURSE PRACTITIONER

## 2025-06-17 RX ORDER — ISOSORBIDE MONONITRATE 30 MG/1
30 TABLET, EXTENDED RELEASE ORAL DAILY
Qty: 90 TABLET | Refills: 1 | Status: SHIPPED | OUTPATIENT
Start: 2025-06-17

## 2025-06-17 NOTE — PROGRESS NOTES
Date of Office Visit: 2025  Encounter Provider: MEREDITH Alcazar  Place of Service: [facility] CARDIOLOGY  Patient Name: Denisse Chavez  :1945  Primary Cardiologist: Dr. Warner Tang    Chief Complaint   Patient presents with    Coronary Artery Disease    Follow-up   :     HPI: Denisse Chavez is a 79 y.o. female who presents today for 6-month cardiac follow-up visit.  I have reviewed her medical records.    She has known hypertension, chronic kidney disease, hyperlipidemia, hypertriglyceridemia, type 2 diabetes mellitus, obstructive sleep apnea on CPAP, and previous obesity.    In [place] in , she suffered a non-STEMI and was diagnosed with nonobstructive coronary artery disease per coronary angiogram.    In 2024, she had a non-STEMI and was diagnosed with severe multivessel coronary artery disease.  She was not a candidate for open heart surgery due to comorbidities.  She underwent atherectomy and stenting of the left main, LAD, proximal circumflex, and second diagonal.  Shortly thereafter, she underwent staged intervention with atherectomy/PCI to the RCA and R NORMA.  She was noted to have bilateral mild carotid artery stenosis per duplex.  Echocardiogram showed LVEF normal, grade 2 diastolic dysfunction, moderate aortic valve calcification, moderate mitral annular calcification, and mild mitral stenosis.  She participated in cardiac rehab.    She presents today with her son accompanying her.  He says she is lost 60 pounds on Ozempic.  She recently fell after being lightheaded and having low blood sugar.  He feels that she may have lost too much weight in too short of time.  Her Ozempic dose has been reduced.  Her lower extremity edema has improved according to the son and she is wearing support hose.  She denies chest pain, shortness of air, palpitations, dizziness, syncope, or bleeding.  She is using her CPAP machine.  Blood pressure is on the low  side of normal and heart rate normal.  She has a single PAC on today's EKG.      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  PET 6/2016 with small-medium sized lateral infarct, no ischemia.  This wall motion abnormality was seen on echo as well.    Cellulitis 07/2018    RIGHT LEG    Chronic heart failure with preserved ejection fraction (HFpEF) 06/17/2024    Chronic kidney disease, stage III (moderate) 10/13/2016    Chronic venous insufficiency     Diabetic foot ulcer 12/13/2023    Hyperlipidemia     Hypertension     Hypertriglyceridemia 09/29/2021    Low back pain     Mass of ovary 03/30/2016    Obesity (BMI 30-39.9) 12/22/2019    Sleep apnea     on CPAP.  Dr. Mueller    Spinal stenosis     Type 2 diabetes mellitus     Uterine leiomyoma 03/30/2016       Past Surgical History:   Procedure Laterality Date    CARDIAC CATHETERIZATION N/A 6/16/2024    Procedure: Coronary angiography;  Surgeon: Jeffery Myrick MD;  Location: [facility] CATH INVASIVE LOCATION;  Service: Cardiovascular;  Laterality: N/A;  NO LV GRAM    CARDIAC CATHETERIZATION N/A 6/16/2024    Procedure: Left Heart Cath;  Surgeon: Jeffery Myrick MD;  Location:  [facility] CATH INVASIVE LOCATION;  Service: Cardiovascular;  Laterality: N/A;    CARDIAC CATHETERIZATION N/A 6/20/2024    Procedure: Percutaneous Coronary Intervention;  Surgeon: Joselito Paredes MD;  Location[facility] C[facility]AS[facility] LOC[facility];  Service: Cardiovascular;  Laterality: N/A;    CARDIAC CATHETERIZATION N/A 6/20/2024    Procedure: Atherectomy-coronary;  Surgeon: Joselito Paredes MD;  [facility];  Service: Cardiovascular;  Laterality: N/A;    CARDIAC CATHETERIZATION N/A 6/20/2024    Procedure: Stent AKIRA coronary;  Surgeon: Joselito Paredes MD;  Location: [facility] CATH INVASIVE LOCATION;  Service: Cardiovascular;  Laterality:  N/A;    CARDIAC CATHETERIZATION N/A 6/20/2024    Procedure: Coronary angiography;  Surgeon: Joselito Paredes MD;  Location:  [facility] CATH INVASIVE LOCATION;  Service: Cardiovascular;  Laterality: N/A;    CARDIAC CATHETERIZATION N/A 7/8/2024    Procedure: Percutaneous Coronary Intervention;  Surgeon: Joselito Paredes MD;  Location:  [facility] CATH INVASIVE LOCATION;  Service: Cardiology;  Laterality: N/A;    CARDIAC CATHETERIZATION N/A 7/8/2024    Procedure: Coronary angiography;  Surgeon: Joselito Paredes MD;  Location:  [facility] CATH INVASIVE LOCATION;  Service: Cardiology;  Laterality: N/A;    CARDIAC CATHETERIZATION N/A 7/8/2024    Procedure: Atherectomy-coronary;  Surgeon: Joselito Paredes MD;  Location:  [facility] CATH INVASIVE LOCATION;  Service: Cardiology;  Laterality: N/A;    CARDIAC CATHETERIZATION N/A 7/8/2024    Procedure: Stent AKIRA coronary;  Surgeon: Joselito Paredes MD;  [facility];  Service: Cardiology;  Laterality: N/A;    CATARACT EXTRACTION Bilateral     X2     COLONOSCOPY N/A 8/29/2018    Procedure: COLONOSCOPY to CECUM WITH HOT SNARE POLYPECTOMY;  Surgeon: Neal Reilly MD;  Location: [facility] ENDOSCOPY;  Service: Gastroenterology    COLONOSCOPY N/A 12/13/2023    Procedure: COLONOSCOPY to cecum and TI with cold snare and cold biopsy polypectomies;  Surgeon: Polly Ruiz MD;  [facility];  Service: General;  Laterality: N/A;  pre: screening  post: polyps    EPIDURAL BLOCK      HERNIA REPAIR      HYSTERECTOMY      INTERVENTIONAL RADIOLOGY PROCEDURE N/A 6/20/2024    Procedure: Intravascular Ultrasound;  Surgeon: Joselito Paredes MD;  [facility];  Service: Cardiovascular;  Laterality: N/A;    INTERVENTIONAL RADIOLOGY PROCEDURE N/A 7/8/2024    Procedure: Intravascular Ultrasound;  Surgeon: Joselito Paredes MD;  [facility];  Service: Cardiology;  Laterality: N/A;    TONSILLECTOMY         Social History     Socioeconomic History    Marital  status: Single    Number of children: 3   Tobacco Use    Smoking status: Former     Current packs/day: 0.00     Average packs/day: 0.3 packs/day for 2.0 years (0.5 ttl pk-yrs)     Types: Cigarettes     Start date:      Quit date:      Years since quittin.4    Smokeless tobacco: Never    Tobacco comments:     LIGHT USAGE   Vaping Use    Vaping status: Never Used   Substance and Sexual Activity    Alcohol use: No    Drug use: No    Sexual activity: Defer       Family History   Problem Relation Age of Onset    Diabetes Mother     Heart attack Mother     Hypertension Mother     Hypothyroidism Mother     Diabetes type II Son     Breast cancer Neg Hx        The following portion of the patient's history were reviewed and updated as appropriate: past medical history, past surgical history, past social history, past family history, allergies, current medications, and problem list.    Review of Systems   Constitutional: Positive for weight loss.   Cardiovascular:  Positive for leg swelling.   Respiratory: Negative.     Hematologic/Lymphatic: Negative.    Neurological: Negative.        Allergies   Allergen Reactions    Ace Inhibitors Other (See Comments)     Hyperkalemia/ROB    Angiotensin Receptor Blockers Other (See Comments)     Pt unable to remember    Keflex [Cephalexin] Rash     Tolerated ceftriaxone Dec 2019; tolerated zosyn May 2020    Sulfa Antibiotics Itching     rash         Current Outpatient Medications:     aspirin 81 MG tablet, Take 1 tablet by mouth Every Night., Disp: , Rfl:     atorvastatin (LIPITOR) 40 MG tablet, Take 1 tablet by mouth once daily, Disp: 90 tablet, Rfl: 0    clopidogrel (PLAVIX) 75 MG tablet, Take 1 tablet by mouth Daily., Disp: 90 tablet, Rfl: 3    Cyanocobalamin (VITAMIN B 12 PO), Take  by mouth. (Patient taking differently: Take 1,000 mcg by mouth 3 (Three) Times a Week.), Disp: , Rfl:     dapagliflozin (Farxiga) 5 MG tablet tablet, Take 1 tablet by mouth Daily., Disp: , Rfl:  "    docusate sodium (COLACE) 100 MG capsule, Take 1 capsule by mouth 2 (Two) Times a Day., Disp: , Rfl:     hydrALAZINE (APRESOLINE) 10 MG tablet, TAKE 1 TABLET BY MOUTH THREE TIMES DAILY, Disp: 270 tablet, Rfl: 0    insulin aspart protamine-insulin aspart (novoLOG 70/30) injection, Inject 70 Units under the skin into the appropriate area as directed 2 (Two) Times a Day With Meals. Patient uses 70U in AM and 60U in PM (Patient taking differently: Inject 50 Units under the skin into the appropriate area as directed 2 (Two) Times a Day With Meals. Patient uses 50 units in AM and 40U in PM), Disp: , Rfl:     isosorbide mononitrate (IMDUR) 60 MG 24 hr tablet, Take 1 tablet by mouth once daily, Disp: 90 tablet, Rfl: 0    metoprolol succinate XL (TOPROL-XL) 100 MG 24 hr tablet, Take 1 tablet by mouth once daily, Disp: 90 tablet, Rfl: 2    NON FORMULARY, Calcitrol, Disp: , Rfl:     Semaglutide, 1 MG/DOSE, (OZEMPIC) 2 MG/1.5ML solution pen-injector, Inject 1 mg under the skin into the appropriate area as directed 1 (One) Time Per Week., Disp: , Rfl:     spironolactone (ALDACTONE) 25 MG tablet, Take 1 tablet by mouth Daily., Disp: , Rfl:     torsemide (DEMADEX) 20 MG tablet, Take 4 tablets by mouth 2 (Two) Times a Day., Disp: , Rfl:     vitamin D (ERGOCALCIFEROL) 11633 units capsule capsule, 1 capsule Every 30 (Thirty) Days. Patient takes one tablet monthly, on the 19th, Disp: , Rfl:          Objective:     Vitals:    06/17/25 1334   BP: 102/58   BP Location: Right arm   Patient Position: Sitting   Cuff Size: Adult   Pulse: 68   SpO2: 100%   Weight: 72 kg (158 lb 12.8 oz)   Height: 157.5 cm (62.01\")     Body mass index is 29.04 kg/m².    PHYSICAL EXAM:    Vitals Reviewed.   General Appearance: No acute distress, well developed and well nourished.   Eyes: Glasses.   HENT: No hearing loss noted.    Respiratory: No signs of respiratory distress. Respiration rhythm and depth normal.  Clear to auscultation. "   Cardiovascular:  Jugular Venous Pressure: Normal  Heart Rate and Rhythm: Normal, Heart Sounds: Normal S1 and S2. No S3 or S4 noted.  Murmurs: RUSB grade 2/6 systolic murmur present.  Lower Extremities: Trace bilateral lower extremity edema.  Musculoskeletal: Normal movement of extremities.  Skin: General appearance normal.    Psychiatric: Patient alert and oriented to person, place, and time. Speech and behavior appropriate. Normal mood and affect.       ECG 12 Lead    Date/Time: 6/17/2025 1:22 PM  Performed by: Vicky Herrera APRN    Authorized by: Vicky Herrera APRN  Comparison: compared with previous ECG from 12/11/2024  Similar to previous ECG  Rhythm: sinus rhythm  Ectopy: atrial premature contractions  Rate: normal  BPM: 68  Conduction: conduction normal  ST Segments: ST segments normal  T Waves: T waves normal  QRS axis: normal    Clinical impression: non-specific ECG            Assessment:       Diagnosis Plan   1. Coronary artery disease involving native coronary artery of native heart without angina pectoris        2. Chronic heart failure with preserved ejection fraction (HFpEF)        3. Aortic valve calcification        4. Nonrheumatic mitral valve stenosis        5. PAC (premature atrial contraction)        6. Essential hypertension        7. Mixed hyperlipidemia        8. ASWYER on autoCPAP        9. Chronic venous insufficiency               Plan:       1.  Coronary Artery Disease: History of remote MI in 1996.  Multiple stent placements in July 2024.  Continue aspirin, clopidogrel, and atorvastatin.  She has been on isosorbide for a long time and I wonder if she needs that anymore.  I will discuss her medications with Dr. Tang.    2.  Chronic heart failure with preserved LVEF.  Euvolemic.    3/4.  Known aortic valve calcification mild mitral stenosis.  Mild heart murmur present.    5.  Single premature atrial contraction noted on today's EKG.  Asymptomatic.    6.  Hypertension: Blood pressure  on the low side of normal today.  Asymptomatic.    7.  Hyperlipidemia: She remains on atorvastatin with lipid panels followed by her PCP.    8.  Obstructive sleep apnea on CPAP.    9.  Chronic venous insufficiency and lower extremity edema.  Her son says her edema has improved.    10.  Her son reports that she has lost 60 pounds on Ozempic.    11.  She will call with any cardiac concerns.  Follow-up with Dr. Tang in 6 months.    ADDENDUM:  I discussed her medications with Dr. Tang.  She recommended reducing the isosorbide to 30 mg daily.  Son will be informed.    As always, it has been a pleasure to participate in your patient's care. Thank you.         Sincerely,         MEREDITH Munoz  [facility] Cardiology      Dictated utilizing Dragon Dictation

## 2025-06-17 NOTE — TELEPHONE ENCOUNTER
Patient seen today in the office and blood pressure was on the low side of normal.  This may be due to her significant weight loss.    I discussed her medications with Dr. Tang.  She recommended reducing the isosorbide to 30 mg daily.      I tried calling her son, Anil and there was no answer.  Please call Anil tomorrow and inform him to reduce isosorbide to 30 mg daily.  New prescription sent to [facility].  Call with any cardiac concerns.  Thank you

## 2025-06-17 NOTE — PROGRESS NOTES
You can go down to 30. Despite intervention she still has a lot of residual disease, very calcified and small branch vessels. Would stay on clopidogrel as monotherapy.    prabhakar

## 2025-06-18 NOTE — TELEPHONE ENCOUNTER
Reviewed recommendations with patient's son Anil (ok per YOUSIF), verbalized understanding, will call with any further questions or complaints.  Son states that he will have pt stop old isosorbide dose, and start new 30mg daily dose as recommended.    Ermelinda Jimenez RN  Triage Nurse  06/18/25 08:37 EDT

## 2025-06-30 RX ORDER — ATORVASTATIN CALCIUM 40 MG/1
40 TABLET, FILM COATED ORAL DAILY
Qty: 90 TABLET | Refills: 0 | Status: SHIPPED | OUTPATIENT
Start: 2025-06-30

## 2025-06-30 RX ORDER — METOPROLOL SUCCINATE 100 MG/1
100 TABLET, EXTENDED RELEASE ORAL DAILY
Qty: 90 TABLET | Refills: 0 | Status: SHIPPED | OUTPATIENT
Start: 2025-06-30

## 2025-07-15 ENCOUNTER — TELEPHONE (OUTPATIENT)
Dept: INTERNAL MEDICINE | Facility: CLINIC | Age: 80
End: 2025-07-15
Payer: MEDICARE

## 2025-07-15 DIAGNOSIS — R91.1 LUNG NODULE: Primary | ICD-10-CM

## 2025-07-15 NOTE — TELEPHONE ENCOUNTER
Call and advise.  She is due for one year follow up of lung nodule.  I have placed order.  DEMARIO

## 2025-07-30 ENCOUNTER — OFFICE VISIT (OUTPATIENT)
Dept: SLEEP MEDICINE | Facility: HOSPITAL | Age: 80
End: 2025-07-30
Payer: MEDICARE

## 2025-07-30 VITALS — WEIGHT: 166 LBS | OXYGEN SATURATION: 98 % | BODY MASS INDEX: 30.55 KG/M2 | HEIGHT: 62 IN | HEART RATE: 63 BPM

## 2025-07-30 DIAGNOSIS — G47.33 OSA ON CPAP: Primary | ICD-10-CM

## 2025-07-30 DIAGNOSIS — G47.21 CIRCADIAN RHYTHM SLEEP DISORDER, DELAYED SLEEP PHASE TYPE: ICD-10-CM

## 2025-07-30 PROCEDURE — G0463 HOSPITAL OUTPT CLINIC VISIT: HCPCS

## 2025-07-30 NOTE — PROGRESS NOTES
"[facility]  Follow Up Sleep Disorders Center Note     Chief Complaint:  SAWYER     Primary Care Physician: Surekha Mcdonnell MD    Interval History:   The patient is a 79 y.o. female who I last saw 2024 and that note was reviewed.  The patient reports she is unchanged.  She is using a rolling walker.  The patient completed rehab.  She goes to bed at 12:30 AM gets out of bed at 7 AM.  She will use the restroom during that time.    Review of Systems:    A complete review of systems was done and all were negative with the exception of the above    Social History:    Social History     Socioeconomic History    Marital status: Single    Number of children: 3   Tobacco Use    Smoking status: Former     Current packs/day: 0.00     Average packs/day: 0.3 packs/day for 2.0 years (0.5 ttl pk-yrs)     Types: Cigarettes     Start date:      Quit date:      Years since quittin.6    Smokeless tobacco: Never    Tobacco comments:     LIGHT USAGE   Vaping Use    Vaping status: Never Used   Substance and Sexual Activity    Alcohol use: No    Drug use: No    Sexual activity: Defer       Allergies:  Ace inhibitors, Angiotensin receptor blockers, Keflex [cephalexin], and Sulfa antibiotics     Medication Review:  Reviewed.      Vital Signs:    Vitals:    25 1005   Pulse: 63   SpO2: 98%   Weight: 75.3 kg (166 lb)   Height: 157.5 cm (62.01\")     Body mass index is 30.35 kg/m².    Physical Exam:    Constitutional:  Well developed 79 y.o. female that appears in no apparent distress.  Awake & oriented times 3.  Normal mood with normal recent and remote memory and normal judgement.  Eyes:  Conjunctivae normal.  Oropharynx: Previously, moist mucous membranes without exudate and a large tongue and normal uvula and patent posterior pharyngeal opening and class II-3 Mallampati airway.     Self-administered [place] Sleepiness Scale test results: 4, previously 1  0-5 Lower normal daytime sleepiness  6-10 Higher " normal daytime sleepiness  11-12 Mild, 13-15 Moderate, & 16-24 Severe excessive daytime sleepiness     Downloaded PAP Data Evaluated For Therapeutic Response and Compliance:  DME is [facility] and the patient uses a fullface mask.  Downloads between 4/30 and 7/28/2025 compliance 96%.  Average usage is 4 hours and 21 minutes.  Average AHI is mildly abnormal at 13.1 but all subsets are normal except for hypopnea index and she does have an average large leak of 1 hour and 46 minutes.  Average auto CPAP pressure is 10.2 and her DreamStation 2 auto CPAP is set at 9-17    I have reviewed the above results and compared them with the patient's last downloads and reviewed with the patient.    Impression:   Severe obstructive sleep apnea by overnight polysomnogram 10/12/2016, weight 204 pounds, adequately treated with DreamStation 2 auto CPAP. The patient is at goal with good compliance and usage is somewhat suboptimal. The patient has no complaints of hypersomnolence.  Circadian rhythm sleep disorder, delayed sleep phase type    Plan:  Good sleep hygiene measures should be maintained.  Further weight loss would be beneficial in this patient who is obese by Body mass index is 30.35 kg/m².  When last seen 7/31/2024, her weight was 183 pounds and today she weighs 166 pounds    BMI is >= 30 and <35. (Class 1 Obesity). The following options were offered after discussion;: information on healthy weight added to patient's after visit summary        After evaluating the patient and assessing results available, the patient is benefiting from the treatment being provided.     The patient will continue DreamStation 2 auto CPAP for their ongoing obstructive sleep apnea treatment.  Potential side effects of not using PAP therapy reviewed and addressed as needed.  After clinical evaluation and review of downloads, I recommend changes to the patient's pressures.  Due to weight loss, via modem, auto CPAP will be changed to 7-14  cm water pressure.  A new prescription will be sent to the patient's DME.    If further weight loss occurs, the pressures may become too high and the patient is to call.    I answered all of the patient's questions.  The patient will call the Sleep Disorder Center for any problems and will follow up for obstructive sleep apnea care in 1 year.      Alfredito Mueller MD  Sleep Medicine  07/30/25  10:07 EDT

## 2025-07-31 RX ORDER — ISOSORBIDE MONONITRATE 60 MG/1
60 TABLET, EXTENDED RELEASE ORAL DAILY
Qty: 90 TABLET | Refills: 0 | OUTPATIENT
Start: 2025-07-31

## 2025-08-04 RX ORDER — HYDRALAZINE HYDROCHLORIDE 10 MG/1
10 TABLET, FILM COATED ORAL 3 TIMES DAILY
Qty: 270 TABLET | Refills: 0 | Status: SHIPPED | OUTPATIENT
Start: 2025-08-04

## 2025-08-06 ENCOUNTER — HOSPITAL ENCOUNTER (OUTPATIENT)
Dept: CT IMAGING | Facility: HOSPITAL | Age: 80
Discharge: HOME | End: 2025-08-06
Admitting: INTERNAL MEDICINE
Payer: MEDICARE

## 2025-08-06 DIAGNOSIS — R91.1 LUNG NODULE: ICD-10-CM

## 2025-08-06 PROCEDURE — 71250 CT THORAX DX C-: CPT

## (undated) DEVICE — GLIDESHEATH SLENDER STAINLESS STEEL KIT: Brand: GLIDESHEATH SLENDER

## (undated) DEVICE — NC TREK NEO™ CORONARY DILATATION CATHETER 4.00 MM X 20 MM / RAPID-EXCHANGE: Brand: NC TREK NEO™

## (undated) DEVICE — NC TREK NEO™ CORONARY DILATATION CATHETER 2.50 X 20 MM / RAPID-EXCHANGE: Brand: NC TREK NEO™

## (undated) DEVICE — TREK CORONARY DILATATION CATHETER 3.0 MM X 12 MM / RAPID-EXCHANGE: Brand: TREK

## (undated) DEVICE — ADAPT CLN BIOGUARD AIR/H2O DISP

## (undated) DEVICE — KT ORCA ORCAPOD DISP STRL

## (undated) DEVICE — HI-TORQUE BALANCE MIDDLEWEIGHT GUIDE WIRE .014 STRAIGHT TIP 3.0 CM X 190 CM: Brand: HI-TORQUE BALANCE MIDDLEWEIGHT

## (undated) DEVICE — PRE-CONNECTED EXCHANGEABLE BURR CATHETER AND BURR ADVANCING DEVICE: Brand: ROTAPRO™

## (undated) DEVICE — Device

## (undated) DEVICE — MINI TREK CORONARY DILATATION CATHETER 1.50 MM X 12 MM / RAPID-EXCHANGE: Brand: MINI TREK

## (undated) DEVICE — THE SINGLE USE ETRAP – POLYP TRAP IS USED FOR SUCTION RETRIEVAL OF ENDOSCOPICALLY REMOVED POLYPS.: Brand: ETRAP

## (undated) DEVICE — TUBING, SUCTION, 1/4" X 10', STRAIGHT: Brand: MEDLINE

## (undated) DEVICE — GW ATHRCTMY ROTAWIRE DRV FLOP W/WIRECLIP/TORQ FLX 330CM

## (undated) DEVICE — LUBE ROTAGLIDE BURRCATH SYS

## (undated) DEVICE — DGW .035 FC J3MM 260CM TEF: Brand: EMERALD

## (undated) DEVICE — TR BAND RADIAL ARTERY COMPRESSION DEVICE: Brand: TR BAND

## (undated) DEVICE — NC TREK NEO™ CORONARY DILATATION CATHETER 2.50 X 12 MM / RAPID-EXCHANGE: Brand: NC TREK NEO™

## (undated) DEVICE — Device: Brand: DEFENDO AIR/WATER/SUCTION AND BIOPSY VALVE

## (undated) DEVICE — NC TREK NEO™ CORONARY DILATATION CATHETER 2.75 MM X 12 MM / RAPID-EXCHANGE: Brand: NC TREK NEO™

## (undated) DEVICE — NC TREK NEO™ CORONARY DILATATION CATHETER 2.75 MM X 20 MM / RAPID-EXCHANGE: Brand: NC TREK NEO™

## (undated) DEVICE — CANNULA,ADULT,SOFT-TOUCH,7'TUBE,UC: Brand: PENDING

## (undated) DEVICE — DIAMONDBACK CORONARY, CLASSIC CROWN, 1.25MM, 135CM SHAFT: Brand: DIAMONDBACK CORONARY

## (undated) DEVICE — CORONARY IMAGING CATHETER: Brand: OPTICROSS™ 6 HD

## (undated) DEVICE — NC TREK NEO™ CORONARY DILATATION CATHETER 2.75 MM X 15 MM / RAPID-EXCHANGE: Brand: NC TREK NEO™

## (undated) DEVICE — XIENCE SKYPOINT™ EVEROLIMUS ELUTING CORONARY STENT SYSTEM 2.50 MM X 28 MM / RAPID-EXCHANGE
Type: IMPLANTABLE DEVICE | Site: HEART | Status: NON-FUNCTIONAL
Brand: XIENCE SKYPOINT™
Removed: 2024-06-20

## (undated) DEVICE — THE TORRENT IRRIGATION SCOPE CONNECTOR IS USED WITH THE TORRENT IRRIGATION TUBING TO PROVIDE IRRIGATION FLUIDS SUCH AS STERILE WATER DURING GASTROINTESTINAL ENDOSCOPIC PROCEDURES WHEN USED IN CONJUNCTION WITH AN IRRIGATION PUMP (OR ELECTROSURGICAL UNIT).: Brand: TORRENT

## (undated) DEVICE — NC TREK NEO™ CORONARY DILATATION CATHETER 3.00 MM X 25 MM / RAPID-EXCHANGE: Brand: NC TREK NEO™

## (undated) DEVICE — GC 7F 078 AL .75: Brand: VISTA BRITE TIP

## (undated) DEVICE — SNAR POLYP SENSATION STDOVL 27 240 BX40

## (undated) DEVICE — Device: Brand: ASAHI SION BLUE

## (undated) DEVICE — BALN PTCA TAKERU RX 2.25X12MM

## (undated) DEVICE — THE VASC BAND HEMOSTAT IS A COMPRESSION DEVICE TO ASSIST HEMOSTASIS OF ARTERIAL, VENOUS AND HEMODIALYSIS PERCUTANEOUS ACCESS SITES.: Brand: VASC BAND™ HEMOSTAT

## (undated) DEVICE — NC TREK NEO™ CORONARY DILATATION CATHETER 3.00 MM X 12 MM / RAPID-EXCHANGE: Brand: NC TREK NEO™

## (undated) DEVICE — BALN PTCA TAKERU RX 2X8MM

## (undated) DEVICE — NC TREK NEO™ CORONARY DILATATION CATHETER 4.50 MM X 20 MM / RAPID-EXCHANGE: Brand: NC TREK NEO™

## (undated) DEVICE — NC TREK NEO™ CORONARY DILATATION CATHETER 3.00 MM X 20 MM / RAPID-EXCHANGE: Brand: NC TREK NEO™

## (undated) DEVICE — Device: Brand: ASAHI CORSAIR PRO XS

## (undated) DEVICE — GUIDELINER CATHETERS ARE INTENDED TO BE USED IN CONJUNCTION WITH GUIDE CATHETERS TO ACCESS DISCRETE REGIONS OF THE CORONARY AND/OR PERIPHERAL VASCULATURE, AND TO FACILITATE PLACEMENT OF INTERVENTIONAL DEVICES.: Brand: GUIDELINER® V3 CATHETER

## (undated) DEVICE — NC TREK NEO™ CORONARY DILATATION CATHETER 4.00 MM X 12 MM / RAPID-EXCHANGE: Brand: NC TREK NEO™

## (undated) DEVICE — LN SMPL CO2 SHTRM SD STREAM W/M LUER

## (undated) DEVICE — KT MANIFLD CARDIAC

## (undated) DEVICE — VIPERSLIDE, 100ML BAG, 10 PK: Brand: VIPERSLIDE

## (undated) DEVICE — SINGLE-USE BIOPSY FORCEPS: Brand: RADIAL JAW 4

## (undated) DEVICE — CATH GUIDE GUIDELINERV3 5.5F 150CM

## (undated) DEVICE — TREK CORONARY DILATATION CATHETER 2.50 MM X 15 MM / RAPID-EXCHANGE: Brand: TREK

## (undated) DEVICE — BALN PTCA TAKERU RX NC 4X12MM

## (undated) DEVICE — TREK CORONARY DILATATION CATHETER 2.50 MM X 12 MM / RAPID-EXCHANGE: Brand: TREK

## (undated) DEVICE — CANN O2 ETCO2 FITS ALL CONN CO2 SMPL A/ 7IN DISP LF

## (undated) DEVICE — COPILOT BLEEDBACK CONTROL VALVE: Brand: COPILOT

## (undated) DEVICE — DEV INDEFLATOR P/N 580289

## (undated) DEVICE — SNAR POLYP CAPTIVATOR RND STFF 2.4 240CM 10MM 1P/U

## (undated) DEVICE — BALN PTCA TAKERURX DIL 3X15MM

## (undated) DEVICE — XIENCE SKYPOINT™ EVEROLIMUS ELUTING CORONARY STENT SYSTEM 4.00 MM X 38 MM / RAPID-EXCHANGE
Type: IMPLANTABLE DEVICE | Site: HEART | Status: NON-FUNCTIONAL
Brand: XIENCE SKYPOINT™
Removed: 2024-06-20

## (undated) DEVICE — NC TREK NEO™ CORONARY DILATATION CATHETER 2.50 X 8 MM / RAPID-EXCHANGE: Brand: NC TREK NEO™

## (undated) DEVICE — BALN PTCA TAKERU RX 2.5X12MM

## (undated) DEVICE — PK CATH CARD 40

## (undated) DEVICE — GUIDEWIRE, VIPERWIRE ADVANCE CORONARY FLEX TIP .014: DIA, .012" 1 PACK 325 CM: Brand: DIAMONDBACK CORONARY

## (undated) DEVICE — SENSR O2 OXIMAX FNGR A/ 18IN NONSTR

## (undated) DEVICE — NC TREK NEO™ CORONARY DILATATION CATHETER 2.75 MM X 8 MM / RAPID-EXCHANGE: Brand: NC TREK NEO™

## (undated) DEVICE — CATH GUIDE ZUMA2 EBU 7F 3.5X100CM

## (undated) DEVICE — RADIFOCUS OPTITORQUE ANGIOGRAPHIC CATHETER: Brand: OPTITORQUE